# Patient Record
Sex: MALE | Race: WHITE | NOT HISPANIC OR LATINO
[De-identification: names, ages, dates, MRNs, and addresses within clinical notes are randomized per-mention and may not be internally consistent; named-entity substitution may affect disease eponyms.]

---

## 2017-01-30 PROBLEM — Z00.00 ENCOUNTER FOR PREVENTIVE HEALTH EXAMINATION: Status: ACTIVE | Noted: 2017-01-30

## 2017-02-01 ENCOUNTER — APPOINTMENT (OUTPATIENT)
Dept: INTERNAL MEDICINE | Facility: CLINIC | Age: 82
End: 2017-02-01

## 2017-02-01 DIAGNOSIS — Z71.89 OTHER SPECIFIED COUNSELING: ICD-10-CM

## 2017-02-01 DIAGNOSIS — M10.9 GOUT, UNSPECIFIED: ICD-10-CM

## 2018-12-22 ENCOUNTER — INPATIENT (INPATIENT)
Facility: HOSPITAL | Age: 83
LOS: 9 days | Discharge: NURSING, REHAB OR LONG-TERM CARE | DRG: 101 | End: 2019-01-01
Attending: FAMILY MEDICINE | Admitting: FAMILY MEDICINE
Payer: MEDICARE

## 2018-12-22 VITALS
RESPIRATION RATE: 20 BRPM | DIASTOLIC BLOOD PRESSURE: 68 MMHG | WEIGHT: 139.99 LBS | SYSTOLIC BLOOD PRESSURE: 154 MMHG | HEART RATE: 72 BPM | OXYGEN SATURATION: 96 % | HEIGHT: 72 IN | TEMPERATURE: 99 F

## 2018-12-22 DIAGNOSIS — A41.9 SEPSIS, UNSPECIFIED ORGANISM: ICD-10-CM

## 2018-12-22 DIAGNOSIS — J18.9 PNEUMONIA, UNSPECIFIED ORGANISM: ICD-10-CM

## 2018-12-22 LAB
ALBUMIN SERPL ELPH-MCNC: 3 G/DL — LOW (ref 3.3–5)
ALP SERPL-CCNC: 27 U/L — LOW (ref 30–120)
ALT FLD-CCNC: 12 U/L DA — SIGNIFICANT CHANGE UP (ref 10–60)
ANION GAP SERPL CALC-SCNC: 12 MMOL/L — SIGNIFICANT CHANGE UP (ref 5–17)
APPEARANCE UR: CLEAR — SIGNIFICANT CHANGE UP
APTT BLD: 41.8 SEC — HIGH (ref 28.5–37)
AST SERPL-CCNC: 15 U/L — SIGNIFICANT CHANGE UP (ref 10–40)
BASE EXCESS BLDV CALC-SCNC: -3.7 MMOL/L — LOW (ref -2–2)
BASOPHILS # BLD AUTO: 0.01 K/UL — SIGNIFICANT CHANGE UP (ref 0–0.2)
BASOPHILS NFR BLD AUTO: 0.1 % — SIGNIFICANT CHANGE UP (ref 0–2)
BILIRUB SERPL-MCNC: 1.1 MG/DL — SIGNIFICANT CHANGE UP (ref 0.2–1.2)
BILIRUB UR-MCNC: NEGATIVE — SIGNIFICANT CHANGE UP
BUN SERPL-MCNC: 19 MG/DL — SIGNIFICANT CHANGE UP (ref 7–23)
CALCIUM SERPL-MCNC: 9.6 MG/DL — SIGNIFICANT CHANGE UP (ref 8.4–10.5)
CHLORIDE SERPL-SCNC: 98 MMOL/L — SIGNIFICANT CHANGE UP (ref 96–108)
CO2 SERPL-SCNC: 25 MMOL/L — SIGNIFICANT CHANGE UP (ref 22–31)
COLOR SPEC: YELLOW — SIGNIFICANT CHANGE UP
CREAT SERPL-MCNC: 1.67 MG/DL — HIGH (ref 0.5–1.3)
DIFF PNL FLD: ABNORMAL
DIGOXIN SERPL-MCNC: 1 NG/ML — SIGNIFICANT CHANGE UP (ref 0.8–2)
EOSINOPHIL # BLD AUTO: 0 K/UL — SIGNIFICANT CHANGE UP (ref 0–0.5)
EOSINOPHIL NFR BLD AUTO: 0 % — SIGNIFICANT CHANGE UP (ref 0–6)
FLU A RESULT: SIGNIFICANT CHANGE UP
FLU A RESULT: SIGNIFICANT CHANGE UP
FLUAV AG NPH QL: SIGNIFICANT CHANGE UP
FLUBV AG NPH QL: SIGNIFICANT CHANGE UP
GAS PNL BLDV: SIGNIFICANT CHANGE UP
GLUCOSE SERPL-MCNC: 167 MG/DL — HIGH (ref 70–99)
GLUCOSE UR QL: NEGATIVE MG/DL — SIGNIFICANT CHANGE UP
HCO3 BLDV-SCNC: 21 MMOL/L — SIGNIFICANT CHANGE UP (ref 21–29)
HCT VFR BLD CALC: 25 % — LOW (ref 39–50)
HGB BLD-MCNC: 8.1 G/DL — LOW (ref 13–17)
HOROWITZ INDEX BLDV+IHG-RTO: 21 — SIGNIFICANT CHANGE UP
IMM GRANULOCYTES NFR BLD AUTO: 2.3 % — HIGH (ref 0–1.5)
INR BLD: 2.78 RATIO — HIGH (ref 0.88–1.16)
KETONES UR-MCNC: NEGATIVE — SIGNIFICANT CHANGE UP
LACTATE SERPL-SCNC: 1.1 MMOL/L — SIGNIFICANT CHANGE UP (ref 0.7–2)
LACTATE SERPL-SCNC: 3 MMOL/L — HIGH (ref 0.7–2)
LEUKOCYTE ESTERASE UR-ACNC: NEGATIVE — SIGNIFICANT CHANGE UP
LYMPHOCYTES # BLD AUTO: 1.01 K/UL — SIGNIFICANT CHANGE UP (ref 1–3.3)
LYMPHOCYTES # BLD AUTO: 5.3 % — LOW (ref 13–44)
MCHC RBC-ENTMCNC: 32.4 GM/DL — SIGNIFICANT CHANGE UP (ref 32–36)
MCHC RBC-ENTMCNC: 35.8 PG — HIGH (ref 27–34)
MCV RBC AUTO: 110.6 FL — HIGH (ref 80–100)
MONOCYTES # BLD AUTO: 9.63 K/UL — HIGH (ref 0–0.9)
MONOCYTES NFR BLD AUTO: 51 % — HIGH (ref 2–14)
NEUTROPHILS # BLD AUTO: 7.8 K/UL — HIGH (ref 1.8–7.4)
NEUTROPHILS NFR BLD AUTO: 41.3 % — LOW (ref 43–77)
NITRITE UR-MCNC: NEGATIVE — SIGNIFICANT CHANGE UP
NRBC # BLD: 0 /100 WBCS — SIGNIFICANT CHANGE UP (ref 0–0)
PCO2 BLDV: 28 MMHG — LOW (ref 35–50)
PH BLDV: 7.46 — HIGH (ref 7.35–7.45)
PH UR: 5 — SIGNIFICANT CHANGE UP (ref 5–8)
PLATELET # BLD AUTO: 245 K/UL — SIGNIFICANT CHANGE UP (ref 150–400)
PO2 BLDV: 44 MMHG — SIGNIFICANT CHANGE UP (ref 25–45)
POTASSIUM SERPL-MCNC: 3.6 MMOL/L — SIGNIFICANT CHANGE UP (ref 3.5–5.3)
POTASSIUM SERPL-SCNC: 3.6 MMOL/L — SIGNIFICANT CHANGE UP (ref 3.5–5.3)
PROT SERPL-MCNC: 8.1 G/DL — SIGNIFICANT CHANGE UP (ref 6–8.3)
PROT UR-MCNC: 100 MG/DL
PROTHROM AB SERPL-ACNC: 31.3 SEC — HIGH (ref 10–12.9)
RBC # BLD: 2.26 M/UL — LOW (ref 4.2–5.8)
RBC # FLD: 16.9 % — HIGH (ref 10.3–14.5)
RBC CASTS # UR COMP ASSIST: SIGNIFICANT CHANGE UP /HPF (ref 0–4)
RSV RESULT: SIGNIFICANT CHANGE UP
RSV RNA RESP QL NAA+PROBE: SIGNIFICANT CHANGE UP
SAO2 % BLDV: 74 % — SIGNIFICANT CHANGE UP (ref 67–88)
SODIUM SERPL-SCNC: 135 MMOL/L — SIGNIFICANT CHANGE UP (ref 135–145)
SP GR SPEC: 1.01 — SIGNIFICANT CHANGE UP (ref 1.01–1.02)
TROPONIN I SERPL-MCNC: 0.01 NG/ML — LOW (ref 0.02–0.06)
UROBILINOGEN FLD QL: 1 MG/DL
WBC # BLD: 18.88 K/UL — HIGH (ref 3.8–10.5)
WBC # FLD AUTO: 18.88 K/UL — HIGH (ref 3.8–10.5)

## 2018-12-22 PROCEDURE — 99285 EMERGENCY DEPT VISIT HI MDM: CPT

## 2018-12-22 PROCEDURE — 71045 X-RAY EXAM CHEST 1 VIEW: CPT | Mod: 26

## 2018-12-22 PROCEDURE — 12345: CPT | Mod: NC

## 2018-12-22 PROCEDURE — 71250 CT THORAX DX C-: CPT | Mod: 26

## 2018-12-22 PROCEDURE — 74176 CT ABD & PELVIS W/O CONTRAST: CPT | Mod: 26

## 2018-12-22 PROCEDURE — 93010 ELECTROCARDIOGRAM REPORT: CPT | Mod: 76

## 2018-12-22 PROCEDURE — 70450 CT HEAD/BRAIN W/O DYE: CPT | Mod: 26

## 2018-12-22 PROCEDURE — 99223 1ST HOSP IP/OBS HIGH 75: CPT

## 2018-12-22 RX ORDER — ASPIRIN/CALCIUM CARB/MAGNESIUM 324 MG
81 TABLET ORAL DAILY
Qty: 0 | Refills: 0 | Status: DISCONTINUED | OUTPATIENT
Start: 2018-12-22 | End: 2019-01-01

## 2018-12-22 RX ORDER — ACETAMINOPHEN 500 MG
650 TABLET ORAL EVERY 6 HOURS
Qty: 0 | Refills: 0 | Status: DISCONTINUED | OUTPATIENT
Start: 2018-12-22 | End: 2018-12-25

## 2018-12-22 RX ORDER — AZITHROMYCIN 500 MG/1
500 TABLET, FILM COATED ORAL ONCE
Qty: 0 | Refills: 0 | Status: COMPLETED | OUTPATIENT
Start: 2018-12-22 | End: 2018-12-22

## 2018-12-22 RX ORDER — ASPIRIN/CALCIUM CARB/MAGNESIUM 324 MG
325 TABLET ORAL ONCE
Qty: 0 | Refills: 0 | Status: COMPLETED | OUTPATIENT
Start: 2018-12-22 | End: 2018-12-22

## 2018-12-22 RX ORDER — SODIUM CHLORIDE 9 MG/ML
1000 INJECTION INTRAMUSCULAR; INTRAVENOUS; SUBCUTANEOUS
Qty: 0 | Refills: 0 | Status: COMPLETED | OUTPATIENT
Start: 2018-12-22 | End: 2018-12-22

## 2018-12-22 RX ORDER — ACETAMINOPHEN 500 MG
650 TABLET ORAL ONCE
Qty: 0 | Refills: 0 | Status: COMPLETED | OUTPATIENT
Start: 2018-12-22 | End: 2018-12-22

## 2018-12-22 RX ORDER — SODIUM CHLORIDE 9 MG/ML
1000 INJECTION, SOLUTION INTRAVENOUS
Qty: 0 | Refills: 0 | Status: DISCONTINUED | OUTPATIENT
Start: 2018-12-22 | End: 2018-12-23

## 2018-12-22 RX ORDER — CEFTRIAXONE 500 MG/1
1 INJECTION, POWDER, FOR SOLUTION INTRAMUSCULAR; INTRAVENOUS ONCE
Qty: 0 | Refills: 0 | Status: COMPLETED | OUTPATIENT
Start: 2018-12-22 | End: 2018-12-22

## 2018-12-22 RX ADMIN — SODIUM CHLORIDE 1000 MILLILITER(S): 9 INJECTION INTRAMUSCULAR; INTRAVENOUS; SUBCUTANEOUS at 16:22

## 2018-12-22 RX ADMIN — SODIUM CHLORIDE 1000 MILLILITER(S): 9 INJECTION INTRAMUSCULAR; INTRAVENOUS; SUBCUTANEOUS at 17:50

## 2018-12-22 RX ADMIN — CEFTRIAXONE 100 GRAM(S): 500 INJECTION, POWDER, FOR SOLUTION INTRAMUSCULAR; INTRAVENOUS at 17:00

## 2018-12-22 RX ADMIN — CEFTRIAXONE 1 GRAM(S): 500 INJECTION, POWDER, FOR SOLUTION INTRAMUSCULAR; INTRAVENOUS at 17:30

## 2018-12-22 RX ADMIN — SODIUM CHLORIDE 1000 MILLILITER(S): 9 INJECTION INTRAMUSCULAR; INTRAVENOUS; SUBCUTANEOUS at 15:30

## 2018-12-22 RX ADMIN — Medication 650 MILLIGRAM(S): at 18:31

## 2018-12-22 RX ADMIN — AZITHROMYCIN 500 MILLIGRAM(S): 500 TABLET, FILM COATED ORAL at 18:44

## 2018-12-22 RX ADMIN — Medication 650 MILLIGRAM(S): at 18:30

## 2018-12-22 RX ADMIN — SODIUM CHLORIDE 50 MILLILITER(S): 9 INJECTION, SOLUTION INTRAVENOUS at 21:48

## 2018-12-22 RX ADMIN — Medication 325 MILLIGRAM(S): at 23:23

## 2018-12-22 RX ADMIN — SODIUM CHLORIDE 1000 MILLILITER(S): 9 INJECTION INTRAMUSCULAR; INTRAVENOUS; SUBCUTANEOUS at 17:03

## 2018-12-22 RX ADMIN — AZITHROMYCIN 255 MILLIGRAM(S): 500 TABLET, FILM COATED ORAL at 17:30

## 2018-12-22 RX ADMIN — SODIUM CHLORIDE 1000 MILLILITER(S): 9 INJECTION INTRAMUSCULAR; INTRAVENOUS; SUBCUTANEOUS at 18:04

## 2018-12-22 NOTE — H&P ADULT - NSHPREVIEWOFSYSTEMS_GEN_ALL_CORE
-    CONSTITUTIONAL: No fever, weight loss, or fatigue.  EYES: No eye pain, visual disturbances, or discharge.  ENMT:  No difficulty hearing, tinnitus, vertigo; No sinus or throat pain.  NECK: No pain or stiffness.  RESPIRATORY: (+) occasional cough, no wheezing, or hemoptysis; No shortness of breath.  CARDIOVASCULAR: No chest pain, palpitations, dizziness, or leg swelling.  GASTROINTESTINAL: No abdominal pain, no nausea, vomiting, or hematemesis; No diarrhea or Change in bowel habits. No melena or hematochezia.  GENITOURINARY: No dysuria, frequency, hematuria, or incontinence.  NEUROLOGICAL: No headaches, (+) focal muscle weakness, denies numbness, or tremors.  SKIN: No itching, burning or rashes.  MUSCULOSKELETAL: No joint swelling or pain.  PSYCHIATRIC: No depression, anxiety, or agitation.  HEME/LYMPH: No easy bruising, bleeding gums, or nose bleed.  ALLERGY AND IMMUNOLOGIC: No hives or eczema.

## 2018-12-22 NOTE — ED PROVIDER NOTE - CONSTITUTIONAL, MLM
normal... Well appearing, well nourished, awake, alert, oriented to person, place, time/situation and in no apparent distress. low grade fever noted.

## 2018-12-22 NOTE — H&P ADULT - ASSESSMENT
84 y/o M with PMH of HTN, A. Fib on Xarelto, Stomach CA, GERD, and Gout presented with generalized weakness & repeated falls, also had seizures at the ED.

## 2018-12-22 NOTE — H&P ADULT - HISTORY OF PRESENT ILLNESS
This is an 84 y/o M with PMH of HTN, A. Fib on Xarelto, Stomach CA, GERD, and Gout who presented with generalized weakness with repeated falls. Patiet states that he fell while was in [place] a week ago when his left leg buckled under him, didn't see a doctor as he was coming back to [place] next day, but over the past week he fell several times & was feeling week, and moves with difficulty. No fever or chills at home, no flu-like symptoms, and no sick contacts. At the ED, patient had a siezure episode for about 30 seconds that involved his left side of the body. Never had any seizure episodes in the past.

## 2018-12-22 NOTE — H&P ADULT - PROBLEM SELECTOR PLAN 7
with CVR, will continue Digoxin & Metoprolol (Betaxolol is non formulary) with hold parameters in addition to Xarelto.

## 2018-12-22 NOTE — H&P ADULT - PROBLEM SELECTOR PLAN 10
IMPROVE VTE Individual Risk Assessment          RISK                                                          Points    [  ] Previous VTE                                                3  [  ] Thrombophilia                                             2  [ x ] Lower limb paralysis                                    2        (unable to hold up >15 seconds)    [  ] Current Cancer                                             2         (within 6 months)  [ x ] Immobilization > 24 hrs                              1  [  ] ICU/CCU stay > 24 hours                            1  [ x ] Age > 60                                                    1    IMPROVE VTE Score 4.    **IMPROVE score of 4 in addition to the other risk factors not included in this scoring system, on Xarelto for chronic A. Fib, will continue, no need for further DVT prophylaxis.

## 2018-12-22 NOTE — H&P ADULT - NSHPLABSRESULTS_GEN_ALL_CORE
-        Lactate Trend   @ 17:45 Lactate:1.1    @ 15:41 Lactate:3.0                           8.1    18.88 )-----------( 245      ( 22 Dec 2018 15:41 )             25.0                135  |  98  |  19  ----------------------------<  167<H>  3.6   |  25  |  1.67<H>    Ca    9.6      22 Dec 2018 15:41    TPro  8.1  /  Alb  3.0<L>  /  TBili  1.1  /  DBili  x   /  AST  15  /  ALT  12  /  AlkPhos  27<L>            Urinalysis Basic - ( 22 Dec 2018 15:59 )    Color: Yellow / Appearance: Clear / S.015 / pH: x  Gluc: x / Ketone: Negative  / Bili: Negative / Urobili: 1 mg/dL   Blood: x / Protein: 100 mg/dL / Nitrite: Negative   Leuk Esterase: Negative / RBC: 0-2 /HPF / WBC x   Sq Epi: x / Non Sq Epi: x / Bacteria: x      PT/INR - ( 22 Dec 2018 15:41 )   PT: 31.3 sec;   INR: 2.78 ratio         PTT - ( 22 Dec 2018 15:41 )  PTT:41.8 sec  CARDIAC MARKERS ( 22 Dec 2018 15:41 )  .008 ng/mL / x     / x     / x     / x          CAPILLARY BLOOD GLUCOSE      POCT Blood Glucose.: 150 mg/dL (23 Dec 2018 02:04)         CT CHEST                   1. Small bilateral pleural effusions right greater than left. Compressive   atelectasis is noted on the right side in some dependent atelectasis left   side.   No pneumothorax.   2. There is cardiomegaly with coronary artery and valvular calcification   without pericardial effusion.   3. No evidence of mediastinal adenopathy by size criteria or fluid   collections   in the mediastinum.            CT ABDOMEN & PELVIS:  Pending official report.          CT BRAIN                No acute intracranial hemorrhage, mass effect, or shift of   the midline structures.  Age indeterminate lacunar infarct within the posterior limb of the right   internal capsule.          XR CHEST AP OR PA 1V     Cardiomegaly. Clear lungs. No effusion.  IMPRESSION: Cardiomegaly with clear lungs    Image reviewed by me.        EKG:    As per my review shows A. Fib with CVR at 70/min, LAD (- 60),  SANDY, complete RBBB with 2ry repolarization abnormality, borderline QTc intervals, normal QRS voltage, with early transition.      -

## 2018-12-22 NOTE — H&P ADULT - PROBLEM SELECTOR PLAN 1
of unknown onset, possibly started a week ago when patient had a fall in [place] with his left leg buckled under him for unclear reason, had witnessed seizure of left side at the ED, CT showed age indeterminate lacunar infarct of posterior limb of the right internal capsule, admitted to telemetry on stroke protocol, neuro checks, started on diet as he passed dysphagia screening, fasting lipid profile, HbA1c, and TTE in am, neurology consult with Dr. Ferraro was called.

## 2018-12-22 NOTE — H&P ADULT - NSHPPHYSICALEXAM_GEN_ALL_CORE
-    Vital Signs Last 24 Hrs  T(C): 37.1 (23 Dec 2018 02:05), Max: 38.7 (22 Dec 2018 18:00)  T(F): 98.7 (23 Dec 2018 02:05), Max: 101.7 (22 Dec 2018 18:00)  HR: 74 (23 Dec 2018 02:10) (69 - 93)  BP: 149/78 (23 Dec 2018 02:10) (124/57 - 163/62)  BP(mean): --  RR: 20 (23 Dec 2018 02:10) (16 - 24)  SpO2: 100% (23 Dec 2018 02:10) (93% - 100%)          PHYSICAL EXAM:    GENERAL: NAD, well-groomed, well-developed.  HEAD:  Atraumatic, Norm cephalic.  EYES: PERRLA, conjunctiva clear.  ENMT: no nasal discharge, no yas-pharyngeal erythema or exudates, MMM.   NECK: Supple, No JVD.  NERVOUS SYSTEM:  Alert & oriented X3, right sided mouth deviation, left upper ext motor power 2/5, left lower ext 3/5, on the right side 5/5 both upper & lower ext. B/L (+) extensor plantar response, passed bedside dysphagia screening by me.  CHEST/LUNG: Fair air entry B/L, no rales, rhonchi, or wheezing.  HEART: Variable S1 & acc S2, grade 2/6 PAVITHRA over the apex and cardiac base, no extra sounds.  ABDOMEN: Soft, non-tender, non-distended; bowel sounds present, no palpable masses or organomegaly.  EXTREMITIES:  No clubbing, cyanosis, or edema.  VASCULAR: 2+ radial, carotid pulses B/L, no carotid bruits.  SKIN: No rashes or lesions.  PSYCH: normal affect & behavior.

## 2018-12-22 NOTE — ED ADULT NURSE NOTE - OBJECTIVE STATEMENT
Pt presents with complaint of multiple falls in past week. states "my legs are giving out". Pt presents with complaint of multiple falls in past week. states "my legs are giving out". Moving all extremities well. Blanchable erythema noted on sacrum

## 2018-12-22 NOTE — CONSULT NOTE ADULT - SUBJECTIVE AND OBJECTIVE BOX
Date/Time Patient Seen:  		  Referring MD:   Data Reviewed	       Patient is a 83y old  Male who presents with a chief complaint of     Subjective/HPI    in bed  seen and examined  vs and meds reviewed  ER provider note reviewed    labs and imaging reviewed and discussed with pt and family    ED Provider Note [Charted Location: [facility]] [Authored: 22-Dec-2018 15:10]- for Visit: [id number], Complete, Revised, Signed in Full, General    HISTORY OF PRESENT ILLNESS:    High Risk Travel:  International Travel? No(1).    · Chief Complaint: The patient is a 83y Male complaining of fall.  · HPI Objective Statement: 84 y/o M pt with hx of atrial fibrillation treated with blood thinners, gout, and HTN BIBA presents to the ED c/o generalized weakness and frequent falls over the past week. Pt states that he was in [place] when he had his first fall 1 week ago, but did not see a doctor at the time as he was flying back to [place] the next day. Pt states that over the past week he has been unable to ambulate due to weakness in b/l legs. Associated with cough. He has received his flu vaccine this season. Denies sick contact, SOB, chest pain, fever, chills, diarrhea, numbness in extremities, head injury, or LOC. No other complaints at this time.     	Dr. Lois Fierro- pmd  · Presenting Symptoms: generalized weakness and frequent falls, cough  · Negative Findings: no fever, no loss of consciousness, no numbness, no diarrhea, chills, SOB, chest pain, head injury  · Timing: gradual onset, frequent  · Duration: week(s)  · Severity: MODERATE  · Context: unknown  · Aggravated Factors: none  · Relieving Factors: none       PAST MEDICAL & SURGICAL HISTORY:  Stomach cancer  Hypertension  Atrial fibrillation  Gout  No significant past surgical history        Medication list         MEDICATIONS  (STANDING):  lactated ringers. 1000 milliLiter(s) (50 mL/Hr) IV Continuous <Continuous>    MEDICATIONS  (PRN):         Vitals log        ICU Vital Signs Last 24 Hrs  T(C): 38.7 (22 Dec 2018 18:00), Max: 38.7 (22 Dec 2018 18:00)  T(F): 101.7 (22 Dec 2018 18:00), Max: 101.7 (22 Dec 2018 18:00)  HR: 70 (22 Dec 2018 18:21) (70 - 74)  BP: 150/54 (22 Dec 2018 18:21) (150/54 - 163/62)  BP(mean): --  ABP: --  ABP(mean): --  RR: 24 (22 Dec 2018 18:21) (17 - 24)  SpO2: 97% (22 Dec 2018 18:21) (96% - 99%)     non smoker  non drinker  retired publishing exec          Input and Output:  I&O's Detail      Lab Data                        8.1    18.88 )-----------( 245      ( 22 Dec 2018 15:41 )             25.0     12-22    135  |  98  |  19  ----------------------------<  167<H>  3.6   |  25  |  1.67<H>    Ca    9.6      22 Dec 2018 15:41    TPro  8.1  /  Alb  3.0<L>  /  TBili  1.1  /  DBili  x   /  AST  15  /  ALT  12  /  AlkPhos  27<L>  12-22      CARDIAC MARKERS ( 22 Dec 2018 15:41 )  .008 ng/mL / x     / x     / x     / x            Review of Systems	      Objective     Physical Examination    heart s1s2  lung dec VS  abd soft  extr no gross edema      Pertinent Lab findings & Imaging      Jonathan:  NO   Adequate UO     I&O's Detail           Discussed with:     Cultures:	        Radiology      cxr  clear

## 2018-12-22 NOTE — H&P ADULT - PROBLEM SELECTOR PLAN 2
Possibly related to the above, no history of seizure episodes in the past, seizure & aspiration precautions, will monitor, PRN Ativan, neurology consult as stated above.

## 2018-12-22 NOTE — H&P ADULT - PROBLEM SELECTOR PLAN 3
of unclear cause, received IVF bolus as per sepsis protocol by ED team, and was started on empirical antibiotic therapy, will get TTE in am, also VALENTE & ESR given the monocytosis, trend TLC, and follow culture results, ID consult with Dr. Florez was called.

## 2018-12-22 NOTE — H&P ADULT - PROBLEM SELECTOR PLAN 6
ML acute on CKD, no available baseline at this time, will recheck after overnight IVF hydration, avoid nephrotoxins.

## 2018-12-22 NOTE — ED PROVIDER NOTE - OBJECTIVE STATEMENT
82 y/o M pt with hx of atrial fibrillation treated with blood thinners, gout, and HTN BIBA presents to the ED c/o generalized weakness and frequent falls over the past week. Pt states that he was in [place] when he had his first fall 1 week ago, but did not see a doctor at the time as he was flying back to [place] the next day. Pt states that over the past week he has been unable to ambulate due to weakness in b/l legs. Associated with cough. He has received his flu vaccine this season. Denies sick contact, SOB, chest pain, fever, chills, diarrhea, numbness in extremities, head injury, or LOC. No other complaints at this time.     Dr. Lois Fierro- carmina

## 2018-12-23 DIAGNOSIS — I35.0 NONRHEUMATIC AORTIC (VALVE) STENOSIS: ICD-10-CM

## 2018-12-23 DIAGNOSIS — I48.91 UNSPECIFIED ATRIAL FIBRILLATION: ICD-10-CM

## 2018-12-23 DIAGNOSIS — D72.821 MONOCYTOSIS (SYMPTOMATIC): ICD-10-CM

## 2018-12-23 DIAGNOSIS — F10.10 ALCOHOL ABUSE, UNCOMPLICATED: ICD-10-CM

## 2018-12-23 DIAGNOSIS — I63.9 CEREBRAL INFARCTION, UNSPECIFIED: ICD-10-CM

## 2018-12-23 DIAGNOSIS — R41.82 ALTERED MENTAL STATUS, UNSPECIFIED: ICD-10-CM

## 2018-12-23 DIAGNOSIS — I10 ESSENTIAL (PRIMARY) HYPERTENSION: ICD-10-CM

## 2018-12-23 DIAGNOSIS — R56.9 UNSPECIFIED CONVULSIONS: ICD-10-CM

## 2018-12-23 DIAGNOSIS — N28.9 DISORDER OF KIDNEY AND URETER, UNSPECIFIED: ICD-10-CM

## 2018-12-23 DIAGNOSIS — M10.9 GOUT, UNSPECIFIED: ICD-10-CM

## 2018-12-23 DIAGNOSIS — Z29.9 ENCOUNTER FOR PROPHYLACTIC MEASURES, UNSPECIFIED: ICD-10-CM

## 2018-12-23 DIAGNOSIS — R65.10 SYSTEMIC INFLAMMATORY RESPONSE SYNDROME (SIRS) OF NON-INFECTIOUS ORIGIN WITHOUT ACUTE ORGAN DYSFUNCTION: ICD-10-CM

## 2018-12-23 DIAGNOSIS — D53.9 NUTRITIONAL ANEMIA, UNSPECIFIED: ICD-10-CM

## 2018-12-23 LAB
ALBUMIN SERPL ELPH-MCNC: 2.6 G/DL — LOW (ref 3.3–5)
ALP SERPL-CCNC: 24 U/L — LOW (ref 30–120)
ALT FLD-CCNC: 13 U/L DA — SIGNIFICANT CHANGE UP (ref 10–60)
ANION GAP SERPL CALC-SCNC: 12 MMOL/L — SIGNIFICANT CHANGE UP (ref 5–17)
ANION GAP SERPL CALC-SCNC: 14 MMOL/L — SIGNIFICANT CHANGE UP (ref 5–17)
AST SERPL-CCNC: 13 U/L — SIGNIFICANT CHANGE UP (ref 10–40)
BASOPHILS # BLD AUTO: 0.01 K/UL — SIGNIFICANT CHANGE UP (ref 0–0.2)
BASOPHILS NFR BLD AUTO: 0.1 % — SIGNIFICANT CHANGE UP (ref 0–2)
BILIRUB DIRECT SERPL-MCNC: 0.3 MG/DL — HIGH (ref 0–0.2)
BILIRUB INDIRECT FLD-MCNC: 0.5 MG/DL — SIGNIFICANT CHANGE UP (ref 0.2–1)
BILIRUB SERPL-MCNC: 0.8 MG/DL — SIGNIFICANT CHANGE UP (ref 0.2–1.2)
BUN SERPL-MCNC: 17 MG/DL — SIGNIFICANT CHANGE UP (ref 7–23)
BUN SERPL-MCNC: 17 MG/DL — SIGNIFICANT CHANGE UP (ref 7–23)
CALCIUM SERPL-MCNC: 9.1 MG/DL — SIGNIFICANT CHANGE UP (ref 8.4–10.5)
CALCIUM SERPL-MCNC: 9.2 MG/DL — SIGNIFICANT CHANGE UP (ref 8.4–10.5)
CHLORIDE SERPL-SCNC: 101 MMOL/L — SIGNIFICANT CHANGE UP (ref 96–108)
CHLORIDE SERPL-SCNC: 103 MMOL/L — SIGNIFICANT CHANGE UP (ref 96–108)
CHOLEST SERPL-MCNC: 95 MG/DL — SIGNIFICANT CHANGE UP (ref 10–199)
CO2 SERPL-SCNC: 23 MMOL/L — SIGNIFICANT CHANGE UP (ref 22–31)
CO2 SERPL-SCNC: 24 MMOL/L — SIGNIFICANT CHANGE UP (ref 22–31)
CREAT SERPL-MCNC: 1.51 MG/DL — HIGH (ref 0.5–1.3)
CREAT SERPL-MCNC: 1.56 MG/DL — HIGH (ref 0.5–1.3)
CRP SERPL-MCNC: 15.61 MG/DL — HIGH (ref 0–0.4)
CULTURE RESULTS: SIGNIFICANT CHANGE UP
EOSINOPHIL # BLD AUTO: 0.01 K/UL — SIGNIFICANT CHANGE UP (ref 0–0.5)
EOSINOPHIL NFR BLD AUTO: 0.1 % — SIGNIFICANT CHANGE UP (ref 0–6)
ERYTHROCYTE [SEDIMENTATION RATE] IN BLOOD: 140 MM/HR — HIGH (ref 0–9)
FERRITIN SERPL-MCNC: 555 NG/ML — HIGH (ref 30–400)
FOLATE RBC-MCNC: 1185 NG/ML — SIGNIFICANT CHANGE UP (ref 499–1504)
GLUCOSE SERPL-MCNC: 162 MG/DL — HIGH (ref 70–99)
GLUCOSE SERPL-MCNC: 191 MG/DL — HIGH (ref 70–99)
HBA1C BLD-MCNC: 6.3 % — HIGH (ref 4–5.6)
HCT VFR BLD CALC: 23.2 % — LOW (ref 39–50)
HCT VFR BLD CALC: 25.3 % — LOW (ref 39–50)
HDLC SERPL-MCNC: 26 MG/DL — LOW
HGB BLD-MCNC: 7.4 G/DL — LOW (ref 13–17)
IMM GRANULOCYTES NFR BLD AUTO: 1.9 % — HIGH (ref 0–1.5)
IRON SATN MFR SERPL: 17 UG/DL — LOW (ref 45–165)
IRON SATN MFR SERPL: 7 % — LOW (ref 16–55)
LDH SERPL L TO P-CCNC: 304 U/L — HIGH (ref 50–242)
LIPID PNL WITH DIRECT LDL SERPL: 55 MG/DL — SIGNIFICANT CHANGE UP
LYMPHOCYTES # BLD AUTO: 1.07 K/UL — SIGNIFICANT CHANGE UP (ref 1–3.3)
LYMPHOCYTES # BLD AUTO: 6.3 % — LOW (ref 13–44)
MAGNESIUM SERPL-MCNC: 1.1 MG/DL — LOW (ref 1.6–2.6)
MAGNESIUM SERPL-MCNC: 1.5 MG/DL — LOW (ref 1.6–2.6)
MCHC RBC-ENTMCNC: 31.9 GM/DL — LOW (ref 32–36)
MCHC RBC-ENTMCNC: 35.9 PG — HIGH (ref 27–34)
MCV RBC AUTO: 112.6 FL — HIGH (ref 80–100)
MONOCYTES # BLD AUTO: 8.39 K/UL — HIGH (ref 0–0.9)
MONOCYTES NFR BLD AUTO: 49 % — HIGH (ref 2–14)
NEUTROPHILS # BLD AUTO: 7.31 K/UL — SIGNIFICANT CHANGE UP (ref 1.8–7.4)
NEUTROPHILS NFR BLD AUTO: 42.6 % — LOW (ref 43–77)
NT-PROBNP SERPL-SCNC: 6580 PG/ML — HIGH (ref 0–450)
PHOSPHATE SERPL-MCNC: 3.1 MG/DL — SIGNIFICANT CHANGE UP (ref 2.5–4.5)
PHOSPHATE SERPL-MCNC: 3.1 MG/DL — SIGNIFICANT CHANGE UP (ref 2.5–4.5)
PLATELET # BLD AUTO: 229 K/UL — SIGNIFICANT CHANGE UP (ref 150–400)
POTASSIUM SERPL-MCNC: 3.3 MMOL/L — LOW (ref 3.5–5.3)
POTASSIUM SERPL-MCNC: 3.4 MMOL/L — LOW (ref 3.5–5.3)
POTASSIUM SERPL-SCNC: 3.3 MMOL/L — LOW (ref 3.5–5.3)
POTASSIUM SERPL-SCNC: 3.4 MMOL/L — LOW (ref 3.5–5.3)
PROCALCITONIN SERPL-MCNC: 0.28 NG/ML — HIGH (ref 0.02–0.1)
PROLACTIN SERPL-MCNC: 23.6 NG/ML — HIGH (ref 4.1–18.4)
PROT SERPL-MCNC: 7.6 G/DL — SIGNIFICANT CHANGE UP (ref 6–8.3)
RBC # BLD: 2.06 M/UL — LOW (ref 4.2–5.8)
RBC # FLD: 17 % — HIGH (ref 10.3–14.5)
SODIUM SERPL-SCNC: 138 MMOL/L — SIGNIFICANT CHANGE UP (ref 135–145)
SODIUM SERPL-SCNC: 139 MMOL/L — SIGNIFICANT CHANGE UP (ref 135–145)
SPECIMEN SOURCE: SIGNIFICANT CHANGE UP
T3 SERPL-MCNC: 72 NG/DL — LOW (ref 80–200)
T4 AB SER-ACNC: 6.9 UG/DL — SIGNIFICANT CHANGE UP (ref 4.6–12)
TIBC SERPL-MCNC: 228 UG/DL — SIGNIFICANT CHANGE UP (ref 220–430)
TOTAL CHOLESTEROL/HDL RATIO MEASUREMENT: 3.7 RATIO — SIGNIFICANT CHANGE UP (ref 3.4–9.6)
TRIGL SERPL-MCNC: 69 MG/DL — SIGNIFICANT CHANGE UP (ref 10–149)
TSH SERPL-MCNC: 2.06 UIU/ML — SIGNIFICANT CHANGE UP (ref 0.27–4.2)
UIBC SERPL-MCNC: 211 UG/DL — SIGNIFICANT CHANGE UP (ref 110–370)
VIT B12 SERPL-MCNC: 587 PG/ML — SIGNIFICANT CHANGE UP (ref 232–1245)
WBC # BLD: 17.11 K/UL — HIGH (ref 3.8–10.5)
WBC # FLD AUTO: 17.11 K/UL — HIGH (ref 3.8–10.5)

## 2018-12-23 PROCEDURE — 99233 SBSQ HOSP IP/OBS HIGH 50: CPT

## 2018-12-23 PROCEDURE — 93306 TTE W/DOPPLER COMPLETE: CPT | Mod: 26

## 2018-12-23 PROCEDURE — 99223 1ST HOSP IP/OBS HIGH 75: CPT | Mod: 25

## 2018-12-23 PROCEDURE — 73562 X-RAY EXAM OF KNEE 3: CPT | Mod: 26,50

## 2018-12-23 RX ORDER — CEFTRIAXONE 500 MG/1
INJECTION, POWDER, FOR SOLUTION INTRAMUSCULAR; INTRAVENOUS
Qty: 0 | Refills: 0 | Status: DISCONTINUED | OUTPATIENT
Start: 2018-12-23 | End: 2018-12-24

## 2018-12-23 RX ORDER — RIVAROXABAN 15 MG-20MG
10 KIT ORAL EVERY 24 HOURS
Qty: 0 | Refills: 0 | Status: DISCONTINUED | OUTPATIENT
Start: 2018-12-23 | End: 2018-12-24

## 2018-12-23 RX ORDER — AZITHROMYCIN 500 MG/1
500 TABLET, FILM COATED ORAL DAILY
Qty: 0 | Refills: 0 | Status: DISCONTINUED | OUTPATIENT
Start: 2018-12-23 | End: 2018-12-24

## 2018-12-23 RX ORDER — FOLIC ACID 0.8 MG
1 TABLET ORAL DAILY
Qty: 0 | Refills: 0 | Status: DISCONTINUED | OUTPATIENT
Start: 2018-12-23 | End: 2019-01-01

## 2018-12-23 RX ORDER — POTASSIUM CHLORIDE 20 MEQ
20 PACKET (EA) ORAL
Qty: 0 | Refills: 0 | Status: COMPLETED | OUTPATIENT
Start: 2018-12-23 | End: 2018-12-23

## 2018-12-23 RX ORDER — COLCHICINE 0.6 MG
0.6 TABLET ORAL DAILY
Qty: 0 | Refills: 0 | Status: DISCONTINUED | OUTPATIENT
Start: 2018-12-23 | End: 2018-12-27

## 2018-12-23 RX ORDER — AZITHROMYCIN 500 MG/1
500 TABLET, FILM COATED ORAL DAILY
Qty: 0 | Refills: 0 | Status: DISCONTINUED | OUTPATIENT
Start: 2018-12-23 | End: 2018-12-23

## 2018-12-23 RX ORDER — LEVETIRACETAM 250 MG/1
500 TABLET, FILM COATED ORAL EVERY 12 HOURS
Qty: 0 | Refills: 0 | Status: DISCONTINUED | OUTPATIENT
Start: 2018-12-23 | End: 2018-12-28

## 2018-12-23 RX ORDER — ALLOPURINOL 300 MG
100 TABLET ORAL DAILY
Qty: 0 | Refills: 0 | Status: DISCONTINUED | OUTPATIENT
Start: 2018-12-23 | End: 2019-01-01

## 2018-12-23 RX ORDER — METOPROLOL TARTRATE 50 MG
25 TABLET ORAL DAILY
Qty: 0 | Refills: 0 | Status: DISCONTINUED | OUTPATIENT
Start: 2018-12-23 | End: 2018-12-29

## 2018-12-23 RX ORDER — CEFTRIAXONE 500 MG/1
1 INJECTION, POWDER, FOR SOLUTION INTRAMUSCULAR; INTRAVENOUS ONCE
Qty: 0 | Refills: 0 | Status: COMPLETED | OUTPATIENT
Start: 2018-12-23 | End: 2018-12-23

## 2018-12-23 RX ORDER — CEFTRIAXONE 500 MG/1
1 INJECTION, POWDER, FOR SOLUTION INTRAMUSCULAR; INTRAVENOUS EVERY 24 HOURS
Qty: 0 | Refills: 0 | Status: DISCONTINUED | OUTPATIENT
Start: 2018-12-24 | End: 2018-12-24

## 2018-12-23 RX ORDER — LEVETIRACETAM 250 MG/1
500 TABLET, FILM COATED ORAL EVERY 12 HOURS
Qty: 0 | Refills: 0 | Status: DISCONTINUED | OUTPATIENT
Start: 2018-12-23 | End: 2018-12-23

## 2018-12-23 RX ORDER — PANTOPRAZOLE SODIUM 20 MG/1
40 TABLET, DELAYED RELEASE ORAL
Qty: 0 | Refills: 0 | Status: DISCONTINUED | OUTPATIENT
Start: 2018-12-23 | End: 2018-12-23

## 2018-12-23 RX ORDER — MAGNESIUM SULFATE 500 MG/ML
2 VIAL (ML) INJECTION ONCE
Qty: 0 | Refills: 0 | Status: COMPLETED | OUTPATIENT
Start: 2018-12-23 | End: 2018-12-23

## 2018-12-23 RX ORDER — DIGOXIN 250 MCG
0.12 TABLET ORAL DAILY
Qty: 0 | Refills: 0 | Status: DISCONTINUED | OUTPATIENT
Start: 2018-12-23 | End: 2018-12-25

## 2018-12-23 RX ORDER — AMLODIPINE BESYLATE 2.5 MG/1
10 TABLET ORAL DAILY
Qty: 0 | Refills: 0 | Status: DISCONTINUED | OUTPATIENT
Start: 2018-12-23 | End: 2019-01-01

## 2018-12-23 RX ORDER — THIAMINE MONONITRATE (VIT B1) 100 MG
100 TABLET ORAL DAILY
Qty: 0 | Refills: 0 | Status: DISCONTINUED | OUTPATIENT
Start: 2018-12-23 | End: 2018-12-24

## 2018-12-23 RX ORDER — HYDROCHLOROTHIAZIDE 25 MG
25 TABLET ORAL DAILY
Qty: 0 | Refills: 0 | Status: DISCONTINUED | OUTPATIENT
Start: 2018-12-23 | End: 2018-12-24

## 2018-12-23 RX ADMIN — Medication 1 MILLIGRAM(S): at 21:38

## 2018-12-23 RX ADMIN — LEVETIRACETAM 400 MILLIGRAM(S): 250 TABLET, FILM COATED ORAL at 17:40

## 2018-12-23 RX ADMIN — RIVAROXABAN 10 MILLIGRAM(S): KIT at 17:41

## 2018-12-23 RX ADMIN — Medication 1 MILLIGRAM(S): at 12:30

## 2018-12-23 RX ADMIN — CEFTRIAXONE 100 GRAM(S): 500 INJECTION, POWDER, FOR SOLUTION INTRAMUSCULAR; INTRAVENOUS at 04:52

## 2018-12-23 RX ADMIN — AZITHROMYCIN 500 MILLIGRAM(S): 500 TABLET, FILM COATED ORAL at 07:00

## 2018-12-23 RX ADMIN — AMLODIPINE BESYLATE 10 MILLIGRAM(S): 2.5 TABLET ORAL at 06:59

## 2018-12-23 RX ADMIN — Medication 0.6 MILLIGRAM(S): at 17:41

## 2018-12-23 RX ADMIN — Medication 1 TABLET(S): at 12:30

## 2018-12-23 RX ADMIN — Medication 100 MILLIGRAM(S): at 12:30

## 2018-12-23 RX ADMIN — Medication 1 MILLIGRAM(S): at 11:02

## 2018-12-23 RX ADMIN — Medication 25 MILLIGRAM(S): at 07:00

## 2018-12-23 RX ADMIN — Medication 650 MILLIGRAM(S): at 11:00

## 2018-12-23 RX ADMIN — Medication 20 MILLIEQUIVALENT(S): at 12:30

## 2018-12-23 RX ADMIN — Medication 81 MILLIGRAM(S): at 12:30

## 2018-12-23 RX ADMIN — SODIUM CHLORIDE 50 MILLILITER(S): 9 INJECTION, SOLUTION INTRAVENOUS at 04:13

## 2018-12-23 RX ADMIN — Medication 1 MILLIGRAM(S): at 13:07

## 2018-12-23 RX ADMIN — Medication 50 GRAM(S): at 09:27

## 2018-12-23 RX ADMIN — Medication 0.12 MILLIGRAM(S): at 07:00

## 2018-12-23 RX ADMIN — Medication 1 MILLIGRAM(S): at 10:03

## 2018-12-23 RX ADMIN — LEVETIRACETAM 400 MILLIGRAM(S): 250 TABLET, FILM COATED ORAL at 04:13

## 2018-12-23 RX ADMIN — Medication 0.6 MILLIGRAM(S): at 06:59

## 2018-12-23 RX ADMIN — Medication 100 MILLIGRAM(S): at 12:31

## 2018-12-23 RX ADMIN — Medication 650 MILLIGRAM(S): at 09:59

## 2018-12-23 RX ADMIN — Medication 20 MILLIEQUIVALENT(S): at 09:27

## 2018-12-23 NOTE — PROVIDER CONTACT NOTE (EICU) - ASSESSMENT
upon video conference patient was alert and oriented. He was able to give a description of the events this evening stating "my left side was shaking a lot. then I could move my legs much, but that's been my problem my legs are not as strong as they were in the past." Stated he does not use home O2 and otherwise feels "tired".     I reviewed the labs, radiology, medications, and EMR documents we have access to. It does seem that Mr. Garrison arrived with multiorgan dysfunction, presumably from sepsis (fever, leukocytosis), with some liver dysfunction (pt, aptt, inr elevated- rivaroxaban should not do this), YUKI (presumed new), and hyperlactemia (resolved). CT chest and head noted, with CT abd/pelvis without prelim read- my assessment polycystic kidneys, contracted gall bladder v wall thickening and surround fluid (pt without biomarkers of gall bladder disease nor exam findings of such).

## 2018-12-23 NOTE — CONSULT NOTE ADULT - ASSESSMENT
84 y/o M with PMH of HTN, A. Fib on Xarelto, Stomach CA, GERD, and Gout who presented with generalized weakness with repeated falls. Has history of ETOH and occasional hemorrhoidal bleeding.  Lab workup notable for macrocytic anemia and leukocytosis with diff suggestive of monocytosis    Macrocytic anemia most likely related to ETOH but may have underlying myelodysplasia/myeloproliferative disease with monocytosis  Hgb has decreased since admission that maybe related to hydration but need to rule out bleeding    Recommendations  1.  check fe studies, B12, folate and thyroid studies  2.  check stool occult blood  3.  will hold transfusion and observe  4.  follow CBC  5.  will do peripheral blood flow cytometry as outpatient  5.  further hem onc recommendations pending above  discussed with patient and Dr. Mcdonnell

## 2018-12-23 NOTE — PROVIDER CONTACT NOTE (EICU) - RECOMMENDATIONS
- I restarted antibiotics (ceftriaxone and azithromycin- last dose ~1650 12.22.18) as documented fevers, malaise, hyperlactemia, and leukocytosis.   - stopped fluids, lactate resolved, tolerating PO; hopes to avoid iatrogenic volume overload  - Unclear if this was a seizure or rigors. documentation of generalized tonic clonic for 40 seconds differs from patients' description and I would not expect a generalized tonic clonic to have no post ictal phase. while seizure is possible, maybe patient had stroke in [place]- could account for leg weakness- which could be nidus with reduced seizure threshold in setting of sepsis; Pt has been placed on levetiracetam, I would have a low threshold for stopping this medication and observing off of it (reduce side effects of new medication in 83 year old); obviously if 2nd seizure then would start.   - several home medications have not bee restarted- digoxin, amlodipine, and rivaroxaban: I would suggest restarting these when possible, [id number]= 4-5 (unclear if h/o TIA; aortic calcifications on CT);  HASBLED=2  - Will require MRI of brain

## 2018-12-23 NOTE — PROVIDER CONTACT NOTE (EICU) - BACKGROUND
admitted for generalized weakness and falls; resides in [place]. PMH of atrial fibrillation and HTN.     Pt on medical floors had generalized tonic/clonic activity

## 2018-12-23 NOTE — CONSULT NOTE ADULT - SUBJECTIVE AND OBJECTIVE BOX
[facility]  Mickey Constantino PA-C  [address] [place] [address]  [phone]      Chief Complaint:  Patient is a 83y old  Male who presents with a chief complaint of Generalized weakness. (23 Dec 2018 12:34)      HPI:    Allergies:  No Known Allergies      Medications:  acetaminophen   Tablet .. 650 milliGRAM(s) Oral every 6 hours PRN  allopurinol 100 milliGRAM(s) Oral daily  aluminum hydroxide/magnesium hydroxide/simethicone Suspension 30 milliLiter(s) Oral every 6 hours PRN  amLODIPine   Tablet 10 milliGRAM(s) Oral daily  aspirin enteric coated 81 milliGRAM(s) Oral daily  azithromycin   Tablet 500 milliGRAM(s) Oral daily  bisacodyl 5 milliGRAM(s) Oral every 12 hours PRN  cefTRIAXone   IVPB      colchicine 0.6 milliGRAM(s) Oral two times a day  digoxin     Tablet 0.125 milliGRAM(s) Oral daily  folic acid 1 milliGRAM(s) Oral daily  guaiFENesin   Syrup  (Sugar-Free) 200 milliGRAM(s) Oral every 6 hours PRN  hydrochlorothiazide 25 milliGRAM(s) Oral daily  levETIRAcetam  IVPB 500 milliGRAM(s) IV Intermittent every 12 hours  LORazepam   Injectable 1 milliGRAM(s) IV Push every 2 hours PRN  LORazepam   Injectable 1 milliGRAM(s) IV Push every 1 hour PRN  metoprolol succinate ER 25 milliGRAM(s) Oral daily  multivitamin 1 Tablet(s) Oral daily  rivaroxaban 10 milliGRAM(s) Oral every 24 hours  thiamine 100 milliGRAM(s) Oral daily      PMHX/PSHX:  Stomach cancer  Hypertension  Atrial fibrillation  Gout  No significant past surgical history      Family history:  No pertinent family history in first degree relatives      Social History:     ROS:     General:  No wt loss, fevers, chills, night sweats, fatigue,   Eyes:  Good vision, no reported pain  ENT:  No sore throat, pain, runny nose, dysphagia  CV:  No pain, palpitations, hypo/hypertension  Resp:  No dyspnea, cough, tachypnea, wheezing  GI:  No pain, No nausea, No vomiting, No diarrhea, No constipation, No weight loss, No fever, No pruritis, No rectal bleeding, No tarry stools, No dysphagia,  :  No pain, bleeding, incontinence, nocturia  Muscle:  No pain, weakness  Neuro:  No weakness, tingling, memory problems  Psych:  No fatigue, insomnia, mood problems, depression  Endocrine:  No polyuria, polydipsia, cold/heat intolerance  Heme:  No petechiae, ecchymosis, easy bruisability  Skin:  No rash, tattoos, scars, edema      PHYSICAL EXAM:   Vital Signs:  Vital Signs Last 24 Hrs  T(C): 37.2 (23 Dec 2018 08:00), Max: 38.7 (22 Dec 2018 18:00)  T(F): 98.9 (23 Dec 2018 08:00), Max: 101.7 (22 Dec 2018 18:00)  HR: 74 (23 Dec 2018 12:00) (59 - 93)  BP: 151/56 (23 Dec 2018 12:00) (119/55 - 163/62)  BP(mean): 78 (23 Dec 2018 12:00) (71 - 78)  RR: 22 (23 Dec 2018 12:00) (16 - 25)  SpO2: 95% (23 Dec 2018 12:00) (93% - 100%)  Daily Height in cm: 182.88 (22 Dec 2018 14:48)    Daily Weight in k.9 (23 Dec 2018 10:36)    GENERAL:  Appears stated age, well-groomed, well-nourished, no distress  HEENT:  NC/AT,  conjunctivae clear and pink, no thyromegaly, nodules, adenopathy, no JVD, sclera -anicteric  CHEST:  Full & symmetric excursion, no increased effort, breath sounds clear  HEART:  Regular rhythm, S1, S2, no murmur/rub/S3/S4, no abdominal bruit, no edema  ABDOMEN:  Soft, non-tender, non-distended, normoactive bowel sounds,  no masses ,no hepato-splenomegaly, no signs of chronic liver disease  EXTEREMITIES:  no cyanosis,clubbing or edema  SKIN:  No rash/erythema/ecchymoses/petechiae/wounds/abscess/warm/dry  NEURO:  Alert, oriented, no asterixis, no tremor, no encephalopathy    LABS:                        7.4    17.11 )-----------( 229      ( 23 Dec 2018 06:42 )             23.2     12    138  |  101  |  17  ----------------------------<  191<H>  3.4<L>   |  23  |  1.51<H>    Ca    9.2      23 Dec 2018 11:57  Phos  3.1       Mg     1.5         TPro  7.6  /  Alb  2.6<L>  /  TBili  0.8  /  DBili  0.3<H>  /  AST  13  /  ALT  13  /  AlkPhos  24<L>      LIVER FUNCTIONS - ( 23 Dec 2018 11:57 )  Alb: 2.6 g/dL / Pro: 7.6 g/dL / ALK PHOS: 24 U/L / ALT: 13 U/L DA / AST: 13 U/L / GGT: x           PT/INR - ( 22 Dec 2018 15:41 )   PT: 31.3 sec;   INR: 2.78 ratio         PTT - ( 22 Dec 2018 15:41 )  PTT:41.8 sec  Urinalysis Basic - ( 22 Dec 2018 15:59 )    Color: Yellow / Appearance: Clear / S.015 / pH: x  Gluc: x / Ketone: Negative  / Bili: Negative / Urobili: 1 mg/dL   Blood: x / Protein: 100 mg/dL / Nitrite: Negative   Leuk Esterase: Negative / RBC: 0-2 /HPF / WBC x   Sq Epi: x / Non Sq Epi: x / Bacteria: x          Imaging: [facility]  Mickey Constantino PA-C  [address] [place] [address]  [phone]      Chief Complaint:  Patient is a 83y old  Male who presents with a chief complaint of Generalized weakness. (23 Dec 2018 12:34)      HPI: This is an 82 y/o M with PMH of HTN, A. Fib on Xarelto, Stomach CA, GERD, and Gout who presented with generalized weakness with repeated falls. Patiet states that he fell while was in [place] a week ago when his left leg buckled under him, didn't see a doctor as he was coming back to [place] next day, but over the past week he fell several times & was feeling week, and moves with difficulty. No fever or chills at home, no flu-like symptoms, and no sick contacts. At the ED, patient had a siezure episode for about 30 seconds that involved his left side of the body. Never had any seizure episodes in the past.    Allergies:  No Known Allergies      Medications:  acetaminophen   Tablet .. 650 milliGRAM(s) Oral every 6 hours PRN  allopurinol 100 milliGRAM(s) Oral daily  aluminum hydroxide/magnesium hydroxide/simethicone Suspension 30 milliLiter(s) Oral every 6 hours PRN  amLODIPine   Tablet 10 milliGRAM(s) Oral daily  aspirin enteric coated 81 milliGRAM(s) Oral daily  azithromycin   Tablet 500 milliGRAM(s) Oral daily  bisacodyl 5 milliGRAM(s) Oral every 12 hours PRN  cefTRIAXone   IVPB      colchicine 0.6 milliGRAM(s) Oral two times a day  digoxin     Tablet 0.125 milliGRAM(s) Oral daily  folic acid 1 milliGRAM(s) Oral daily  guaiFENesin   Syrup  (Sugar-Free) 200 milliGRAM(s) Oral every 6 hours PRN  hydrochlorothiazide 25 milliGRAM(s) Oral daily  levETIRAcetam  IVPB 500 milliGRAM(s) IV Intermittent every 12 hours  LORazepam   Injectable 1 milliGRAM(s) IV Push every 2 hours PRN  LORazepam   Injectable 1 milliGRAM(s) IV Push every 1 hour PRN  metoprolol succinate ER 25 milliGRAM(s) Oral daily  multivitamin 1 Tablet(s) Oral daily  rivaroxaban 10 milliGRAM(s) Oral every 24 hours  thiamine 100 milliGRAM(s) Oral daily      PMHX/PSHX:  Stomach cancer  Hypertension  Atrial fibrillation  Gout  No significant past surgical history      Family history:  No pertinent family history in first degree relatives      Social History:     ROS:     unable to obtain        PHYSICAL EXAM:   Vital Signs:  Vital Signs Last 24 Hrs  T(C): 37.2 (23 Dec 2018 08:00), Max: 38.7 (22 Dec 2018 18:00)  T(F): 98.9 (23 Dec 2018 08:00), Max: 101.7 (22 Dec 2018 18:00)  HR: 74 (23 Dec 2018 12:00) (59 - 93)  BP: 151/56 (23 Dec 2018 12:00) (119/55 - 163/62)  BP(mean): 78 (23 Dec 2018 12:00) (71 - 78)  RR: 22 (23 Dec 2018 12:00) (16 - 25)  SpO2: 95% (23 Dec 2018 12:00) (93% - 100%)  Daily Height in cm: 182.88 (22 Dec 2018 14:48)    Daily Weight in k.9 (23 Dec 2018 10:36)    nad  confused  frail  non toxic  soft, nt  no edema      LABS:                        7.4    17.11 )-----------( 229      ( 23 Dec 2018 06:42 )             23.2     12-    138  |  101  |  17  ----------------------------<  191<H>  3.4<L>   |  23  |  1.51<H>    Ca    9.2      23 Dec 2018 11:57  Phos  3.1     12  Mg     1.5     12    TPro  7.6  /  Alb  2.6<L>  /  TBili  0.8  /  DBili  0.3<H>  /  AST  13  /  ALT  13  /  AlkPhos  24<L>  12-23    LIVER FUNCTIONS - ( 23 Dec 2018 11:57 )  Alb: 2.6 g/dL / Pro: 7.6 g/dL / ALK PHOS: 24 U/L / ALT: 13 U/L DA / AST: 13 U/L / GGT: x           PT/INR - ( 22 Dec 2018 15:41 )   PT: 31.3 sec;   INR: 2.78 ratio         PTT - ( 22 Dec 2018 15:41 )  PTT:41.8 sec  Urinalysis Basic - ( 22 Dec 2018 15:59 )    Color: Yellow / Appearance: Clear / S.015 / pH: x  Gluc: x / Ketone: Negative  / Bili: Negative / Urobili: 1 mg/dL   Blood: x / Protein: 100 mg/dL / Nitrite: Negative   Leuk Esterase: Negative / RBC: 0-2 /HPF / WBC x   Sq Epi: x / Non Sq Epi: x / Bacteria: x          Imaging:

## 2018-12-23 NOTE — CONSULT NOTE ADULT - SUBJECTIVE AND OBJECTIVE BOX
Reason for Consult :     HPI:  This is an 82 y/o M with PMH of HTN, A. Fib on Xarelto, Stomach CA 25 years ago, got radiation , GERD, and Gout who presented with generalized weakness with repeated falls. Patient states that he fell while was in [place] a week ago when his left leg buckled under him, didn't see a doctor as he was coming back to [place] next day, but over the past week he fell several times & was feeling week, and moves with difficulty. No fever or chills at home, no flu-like symptoms, and no sick contacts. At the ED, patient had a seizure episode for about 30 seconds that involved his left side of the body. Never had any seizure episodes in the past.    he had another episode ? seizure while on [facility] [place] , RRT was called and patient was transferred to [facility] . he complaints of pain in LE very hypersensitive to touch . He was seen by his PMD 2 weeks ago and was never told he had elevated WBC       Past Medical & Surgical Hx:  PAST MEDICAL & SURGICAL HISTORY:  Stomach cancer  Hypertension  Atrial fibrillation  Gout  No significant past surgical history      Social History-- Retired lives with significant other   EtOH: drinks several times a week   Tobacco: denies   Drug Use: denies       FAMILY HISTORY:  No pertinent family history in first degree relatives      Allergies    No Known Allergies    Intolerances        Home/Outpatient  Medications   Home Medications:  allopurinol 100 mg oral tablet: 1 tab(s) orally once a day (22 Dec 2018 17:47)  betaxolol 10 mg oral tablet: 1 tab(s) orally once a day (22 Dec 2018 17:47)  colchicine 0.6 mg oral tablet: 1 tab(s) orally 2 times a day (22 Dec 2018 17:47)  digoxin 125 mcg (0.125 mg) oral tablet: 1 tab(s) orally once a day (22 Dec 2018 17:47)  HydroDIURIL 25 mg oral tablet: 1 tab(s) orally 3 times a week (22 Dec 2018 17:47)  NexIUM 24HR 20 mg oral delayed release capsule: 1 cap(s) orally once a day (22 Dec 2018 17:47)  Norvasc 10 mg oral tablet: 1 tab(s) orally once a day (22 Dec 2018 17:47)  Xarelto 10 mg oral tablet: 1 tab(s) orally once a day (22 Dec 2018 17:47)      Current Inpatient Medications :    ANTIBIOTICS:   azithromycin   Tablet 500 milliGRAM(s) Oral daily  cefTRIAXone   IVPB          OTHER RELEVANT MEDICATIONS :  acetaminophen   Tablet .. 650 milliGRAM(s) Oral every 6 hours PRN  allopurinol 100 milliGRAM(s) Oral daily  aluminum hydroxide/magnesium hydroxide/simethicone Suspension 30 milliLiter(s) Oral every 6 hours PRN  amLODIPine   Tablet 10 milliGRAM(s) Oral daily  aspirin enteric coated 81 milliGRAM(s) Oral daily  bisacodyl 5 milliGRAM(s) Oral every 12 hours PRN  colchicine 0.6 milliGRAM(s) Oral two times a day  digoxin     Tablet 0.125 milliGRAM(s) Oral daily  folic acid 1 milliGRAM(s) Oral daily  guaiFENesin   Syrup  (Sugar-Free) 200 milliGRAM(s) Oral every 6 hours PRN  hydrochlorothiazide 25 milliGRAM(s) Oral daily  levETIRAcetam  IVPB 500 milliGRAM(s) IV Intermittent every 12 hours  metoprolol succinate ER 25 milliGRAM(s) Oral daily  rivaroxaban 10 milliGRAM(s) Oral every 24 hours  thiamine 100 milliGRAM(s) Oral daily          REVIEW OF SYSTEMS:      ROS:  CONSTITUTIONAL:  Positive for fever or chills, feels weak, poor appetite  EYES:  Negative  blurry vision or double vision  CARDIOVASCULAR:  Negative for chest pain or palpitations  RESPIRATORY:  Negative for cough, wheezing, or SOB   GASTROINTESTINAL:  Negative for nausea, vomiting, diarrhea, constipation, or abdominal pain  GENITOURINARY:  Negative frequency, urgency , dysuria or hematuria   NEUROLOGIC:  Pos for dizziness, lightheadedness  All other systems were reviewed and are negative      I&O's Detail    22 Dec 2018 07:01  -  23 Dec 2018 07:00  --------------------------------------------------------  IN:    lactated ringers.: 350 mL  Total IN: 350 mL    OUT:    Voided: 650 mL  Total OUT: 650 mL    Total NET: -300 mL      Physical Exam:  Vital Signs Last 24 Hrs  T(C): 36.4 (23 Dec 2018 04:02), Max: 38.7 (22 Dec 2018 18:00)  T(F): 97.6 (23 Dec 2018 04:02), Max: 101.7 (22 Dec 2018 18:00)  HR: 59 (23 Dec 2018 06:00) (59 - 93)  BP: 139/51 (23 Dec 2018 06:00) (124/57 - 163/62)  BP(mean): 76 (23 Dec 2018 06:00) (75 - 76)  RR: 22 (23 Dec 2018 06:00) (16 - 24)  SpO2: 99% (23 Dec 2018 06:00) (93% - 100%)  Height (cm): 182.88 ( @ 14:48)  Weight (kg): 63.5 ( @ 14:48)  BMI (kg/m2): 19 ( @ 14:48)  BSA (m2): 1.83 ( @ 14:48)      GEN: NAD, pleasant  HEENT: normocephalic and atraumatic. EOMI. YUMIKO. Moist mucosa. Clear Posterior pharynx.  NECK: Supple. No carotid bruits.  No lymphadenopathy or thyromegaly.  LUNGS: Clear to auscultation.  HEART: Regular rate and rhythm without murmur.  ABDOMEN: Soft, nontender, and nondistended.  Positive bowel sounds.  No hepatosplenomegaly was noted.  EXTREMITIES: Without any cyanosis, clubbing, rash, lesions or edema.  NEUROLOGIC: A & O x 3, Cranial nerves II through XII are grossly intact. No focal neurological deficits   SKIN: No ulceration or induration present.      Labs:                7.4    17.11  )----------(  229       ( 23 Dec 2018 06:42 )               23.2      139    |  103    |  17     ----------------------------<  162        ( 23 Dec 2018 06:41 )  3.3     |  24     |  1.56     Ca    9.1        ( 23 Dec 2018 06:41 )  Phos  3.1       ( 23 Dec 2018 06:41 )  Mg     1.1       ( 23 Dec 2018 06:41 )    TPro  8.1    /  Alb  3.0    /  TBili  1.1    /  DBili  x      /  AST  15     /  ALT  12     /  AlkPhos  27     ( 22 Dec 2018 15:41 )    LIVER FUNCTIONS - ( 22 Dec 2018 15:41 )  Alb: 3.0 g/dL / Pro: 8.1 g/dL / ALK PHOS: 27 U/L / ALT: 12 U/L DA / AST: 15 U/L / GGT: x           PT/INR -  31.3 sec / 2.78 ratio   ( 22 Dec 2018 15:41 )       PTT -  41.8 sec   ( 22 Dec 2018 15:41 )  CAPILLARY BLOOD GLUCOSE    CARDIAC MARKERS ( 22 Dec 2018 15:41 )  .008 ng/mL / x     / x     / x     / x          Urinalysis Basic - ( 22 Dec 2018 15:59 )    Color: Yellow / Appearance: Clear / S.015 / pH: x  Gluc: x / Ketone: Negative  / Bili: Negative / Urobili: 1 mg/dL   Blood: x / Protein: 100 mg/dL / Nitrite: Negative   Leuk Esterase: Negative / RBC: 0-2 /HPF / WBC x   Sq Epi: x / Non Sq Epi: x / Bacteria: x    Lactate, Blood: 1.1 mmol/L (18 @ 17:45)  Lactate, Blood: 3.0 mmol/L (18 @ 15:41)    Sedimentation Rate, Erythrocyte: 140 mm/Hr (. @ 06:55)        Blood Gas Profile - Venous (18 @ 20:47)    pH, Venous: 7.46    pCO2, Venous: 28 mmHg    pO2, Venous: 44 mmHg    HCO3, Venous: 21 mmol/L    Base Excess, Venous: -3.7 mmol/L    Oxygen Saturation, Venous: 74 %    FIO2, Venous: 21    Blood Gas Source Venous: Venous      RECENT CULTURES:          RADIOLOGY & ADDITIONAL STUDIES:    CT Head No Cont (18 @ 16:20) >  FINDINGS: There is no acute intracranial hemorrhage, mass effect, shift   of the midline structures, herniation, extra-axial fluid collection, or   hydrocephalus.    There is an age indeterminate appearing lacunar infarct within the   posterior limb of the right internal capsule.    There is diffuse cerebral volume loss with prominence of the sulci,   fissures, and cisternal spaces which is normal for the patient's age.   There is mild deep and periventricular white matter hypoattenuation   statistically compatible with microvascular changes given calcific   atherosclerotic disease of the intracranial arteries.    Polyps versus retention cysts are noted within the left maxilla sinus.   Additional smaller polyps are notable within the bilateral sphenoid   sinuses. The mastoid air cells are clear. The calvarium is intact. The   imagedorbits are unremarkable.    IMPRESSION: No acute intracranial hemorrhage, mass effect, or shift of   the midline structures.    Age indeterminate lacunar infarct within the posterior limb of the right   internal capsule.          < from: CT Chest No Cont (12.22.18 @ 20:45) >  FINDINGS:    Lungs:  See Pleural Space Finding.    Pleural space:  Small bilateral pleural effusions right greater than   left.   Compressive atelectasis is noted on the right side in some dependent   atelectasis left side.    Heart:  There is cardiomegaly with coronary artery and valvular   calcification   without pericardial effusion.    Aorta:  Vascular calcificationin the lower neck as well as   atherosclerotic   calcification thoracic aorta without gross aneurysm.    Lymph nodes:  No evidence of mediastinal adenopathy by size criteria or   fluid   collections in the mediastinum. No evidence of mediastinal adenopathy by   size   criteria or fluid collections in the mediastinum.    Bones/joints:  Degenerative changes with erosion a.c. joint degenerative   changes glenohumeral joint. Scapulae appear nonacute. No acute rib   fractures   are appreciated. Degenerative changes throughout thoracic spine with mild   compression deformities in midportion without acute bony abnormality.    Soft tissues: Unremarkable.     IMPRESSION:   1. Small bilateral pleural effusions right greater than left. Compressive   atelectasis is noted on the right side in some dependent atelectasis left   side.   No pneumothorax.   2. There is cardiomegaly with coronary artery and valvular calcification   without pericardial effusion.   3. No evidence of mediastinal adenopathy by size criteria or fluid   collections   in the mediastinum.   4. Please see CT scan abdomen and pelvis for findings in the upper   abdomen.    Report for CT abdomen pelvis no contrast: (Prelim report is not available   for review)    Liver,  spleen, pancreas, and adrenal glands are grossly unremarkable.   Gallbladder is contracted. This limits evaluation, however there may be   small amount of pericholecystic fluid. There is no hydronephrosis.   Bilateral renal cysts are seen. There are nonobstructing renal calculi on   the right measuring up to 5 mm there are no calculi within the ureters.   No bowel obstruction is seen. Colonic diverticulosis identified with mild   thickening of the sigmoid colon. Mild questionable stranding around the   sigmoid colon.. Appendix not clearly seen. No inflammatory changes right   lower quadrant. No small bowel obstruction. Calcified atherosclerosis of   the aorta. 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 No retroperitoneal or inguinal adenopathy. Urinary bladder   unremarkable. Prostate is enlarged measuring 5.2 cm transverse dimension.   Degenerative changes seen in the bones.    IMPRESSION FOR CT ABDOMEN AND PELVIS:    NONOBSTRUCTING RIGHT RENAL CALCULI    COLONIC DIVERTICULOSIS, MILD THICKENING OF THE SIGMOID COLON WITH MILD   QUESTIONABLE PERICOLONIC STRANDING. THIS COULD REPRESENT MILD/EARLY   DIVERTICULITIS.    PROSTATE ENLARGEMENT, CORRELATE WITH PSA.     GALLBLADDER IS CONTRACTED. THIS LIMITS EVALUATION, HOWEVER THERE MAY BE   SMALL AMOUNT OF PERICHOLECYSTIC FLUID. THIS CAN BE BETTER CHARACTERIZED   ON ULTRASOUND.      Assessment :   82 y/o M with PMH of HTN, A. Fib on Xarelto, Stomach CA, GERD, and Gout presented with generalized weakness & repeated falls, also had seizures at the ED. Noted to be febrile , stormy cause of fever could be UTI as he ahs urinary retention with BPH , PVR was 350 . Doubt he has pneumonia     He may have a myeloproliferative disorder with leukocytosis with monocytosis may have CMML . he also has severe anemia .    Etiology of seizure is unclear ? CVA       Plan :   - needs hematology evaluation, need to obtain baseline labs from his PMD  - dc Zithromax as doubt he has pneumonia   - anemia profile   - await neurology evaluation  - fall precautions   - may need Flomax for BPH  - PT evaluation       Continue with present regime .  Appropriate use of antibiotics and adverse effects reviewed.      I have discussed the above plan of care with patient in detail. He  expressed understanding of the treatment plan . Risks, benefits and alternatives discussed in detail. I have asked if he has any questions or concerns and appropriately addressed them to the best of my ability .      > 65 minutes spent in direct patient care reviewing  the notes, lab data/ imaging , discussion with multidisciplinary team. All questions were addressed and answered to the best of my capacity .    Thank you for allowing me to participate in the care of your patient .

## 2018-12-23 NOTE — CONSULT NOTE ADULT - SUBJECTIVE AND OBJECTIVE BOX
Patient is a 83y old  Male who presents with a chief complaint of Generalized weakness. (23 Dec 2018 08:30)      HPI:  This is an 84 y/o M with PMH of HTN, A. Fib on Xarelto, Stomach CA, GERD, and Gout who presented with generalized weakness with repeated falls. Patiet states that he fell while was in [place] a week ago when his left leg buckled under him, didn't see a doctor as he was coming back to [place] next day, but over the past week he fell several times & was feeling week, and moves with difficulty. No fever or chills at home, no flu-like symptoms, and no sick contacts. At the ED, patient had a siezure episode for about 30 seconds that involved his left side of the body. Never had any seizure episodes in the past. (22 Dec 2018 20:48)       ROS: +ETOH 3-4 vodka per day, complains of difficulty with balance, complains of occasional hemorrhoids  Negative except for:    PAST MEDICAL & SURGICAL HISTORY:  Stomach cancer  Hypertension  Atrial fibrillation  Gout  No significant past surgical history      SOCIAL HISTORY:    FAMILY HISTORY:  No pertinent family history in first degree relatives      MEDICATIONS  (STANDING):  allopurinol 100 milliGRAM(s) Oral daily  amLODIPine   Tablet 10 milliGRAM(s) Oral daily  aspirin enteric coated 81 milliGRAM(s) Oral daily  azithromycin   Tablet 500 milliGRAM(s) Oral daily  cefTRIAXone   IVPB      colchicine 0.6 milliGRAM(s) Oral two times a day  digoxin     Tablet 0.125 milliGRAM(s) Oral daily  folic acid 1 milliGRAM(s) Oral daily  hydrochlorothiazide 25 milliGRAM(s) Oral daily  levETIRAcetam  IVPB 500 milliGRAM(s) IV Intermittent every 12 hours  magnesium sulfate  IVPB 2 Gram(s) IV Intermittent once  metoprolol succinate ER 25 milliGRAM(s) Oral daily  potassium chloride    Tablet ER 20 milliEquivalent(s) Oral every 2 hours  rivaroxaban 10 milliGRAM(s) Oral every 24 hours  thiamine 100 milliGRAM(s) Oral daily    MEDICATIONS  (PRN):  acetaminophen   Tablet .. 650 milliGRAM(s) Oral every 6 hours PRN Temp greater or equal to 38C (100.4F), Mild Pain (1 - 3)  aluminum hydroxide/magnesium hydroxide/simethicone Suspension 30 milliLiter(s) Oral every 6 hours PRN Dyspepsia  bisacodyl 5 milliGRAM(s) Oral every 12 hours PRN Constipation  guaiFENesin   Syrup  (Sugar-Free) 200 milliGRAM(s) Oral every 6 hours PRN Cough      Allergies    No Known Allergies    Intolerances        Vital Signs Last 24 Hrs  T(C): 36.4 (23 Dec 2018 04:02), Max: 38.7 (22 Dec 2018 18:00)  T(F): 97.6 (23 Dec 2018 04:02), Max: 101.7 (22 Dec 2018 18:00)  HR: 59 (23 Dec 2018 06:00) (59 - 93)  BP: 139/51 (23 Dec 2018 06:00) (124/57 - 163/62)  BP(mean): 76 (23 Dec 2018 06:00) (75 - 76)  RR: 22 (23 Dec 2018 06:00) (16 - 24)  SpO2: 99% (23 Dec 2018 06:00) (93% - 100%)    PHYSICAL EXAM  General: adult in NAD  HEENT: clear oropharynx, anicteric sclera, pink conjunctivae  Neck: supple  CV: normal S1S2 with no murmur rubs or gallops  Lungs: clear to auscultation, no wheezes, no rhales  Abdomen: soft non-tender non-distended, no hepato/splenomegaly  Ext: no clubbing cyanosis or edema  Skin: no rashes and no petichiae  Neuro: alert and oriented X3 no focal deficits      LABS:    CBC Full  -  ( 23 Dec 2018 06:42 )  WBC Count : 17.11 K/uL  Hemoglobin : 7.4 g/dL  Hematocrit : 23.2 %  Platelet Count - Automated : 229 K/uL  Mean Cell Volume : 112.6 fl  Mean Cell Hemoglobin : 35.9 pg  Mean Cell Hemoglobin Concentration : 31.9 gm/dL  Auto Neutrophil # : 7.31 K/uL  Auto Lymphocyte # : 1.07 K/uL  Auto Monocyte # : 8.39 K/uL  Auto Eosinophil # : 0.01 K/uL  Auto Basophil # : 0.01 K/uL  Auto Neutrophil % : 42.6 %  Auto Lymphocyte % : 6.3 %  Auto Monocyte % : 49.0 %  Auto Eosinophil % : 0.1 %  Auto Basophil % : 0.1 %    12-23    139  |  103  |  17  ----------------------------<  162<H>  3.3<L>   |  24  |  1.56<H>    Ca    9.1      23 Dec 2018 06:41  Phos  3.1     12-23  Mg     1.1     12-23    TPro  8.1  /  Alb  3.0<L>  /  TBili  1.1  /  DBili  x   /  AST  15  /  ALT  12  /  AlkPhos  27<L>  12-22    PT/INR - ( 22 Dec 2018 15:41 )   PT: 31.3 sec;   INR: 2.78 ratio         PTT - ( 22 Dec 2018 15:41 )  PTT:41.8 sec          BLOOD SMEAR INTERPRETATION:  WBC  RBC  plt    RADIOLOGY & ADDITIONAL STUDIES:    < from: CT Chest No Cont (12.22.18 @ 20:45) >    EXAM:  CT ABDOMEN AND PELVIS                          EXAM:  CT CHEST                                  PROCEDURE DATE:  12/22/2018          INTERPRETATION:  [facility] RADIOLOGIST PRELIMINARY REPORT    EXAM:    CT Chest Without Contrast     EXAM DATE/TIME:    12/22/2018 8:11 PM     CLINICAL HISTORY:    83 years old, male; Signs and symptoms; Other: Weakness     TECHNIQUE:    Axial computed tomography images of the chest without intravenous   contrast.    Coronal and sagittal reformatted images were created and reviewed.    MIP reconstructed images were created and reviewed.     COMPARISON:    DX XR CHEST 12/22/2018 4:07 PM     FINDINGS:    Lungs:  See Pleural Space Finding.    Pleural space:  Small bilateral pleural effusions right greater than   left.   Compressive atelectasis is noted on the right side in some dependent   atelectasis left side.    Heart:  There is cardiomegaly with coronary artery and valvular   calcification   without pericardial effusion.    Aorta:  Vascular calcificationin the lower neck as well as   atherosclerotic   calcification thoracic aorta without gross aneurysm.    Lymph nodes:  No evidence of mediastinal adenopathy by size criteria or   fluid   collections in the mediastinum. No evidence of mediastinal adenopathy by   size   criteria or fluid collections in the mediastinum.    Bones/joints:  Degenerative changes with erosion a.c. joint degenerative   changes glenohumeral joint. Scapulae appear nonacute. No acute rib   fractures   are appreciated. Degenerative changes throughout thoracic spine with mild   compression deformities in midportion without acute bony abnormality.    Soft tissues: Unremarkable.     IMPRESSION:   1. Small bilateral pleural effusions right greater than left. Compressive   atelectasis is noted on the right side in some dependent atelectasis left   side.   No pneumothorax.   2. There is cardiomegaly with coronary artery and valvular calcification   without pericardial effusion.   3. No evidence of mediastinal adenopathy by size criteria or fluid   collections   in the mediastinum.   4. Please see CT scan abdomen and pelvis for findings in the upper   abdomen.      Report for CT abdomen pelvis no contrast: (Prelim report is not available   for review)    Liver,  spleen, pancreas, and adrenal glands are grossly unremarkable.   Gallbladder is contracted. This limits evaluation, however there may be   small amount of pericholecystic fluid. There is no hydronephrosis.   Bilateral renal cysts are seen. There are nonobstructing renal calculi on   the right measuring up to 5 mm there are no calculi within the ureters.   No bowel obstruction is seen. Colonic diverticulosis identified with mild   thickening of the sigmoid colon. Mild questionable stranding around the   sigmoid colon.. Appendix not clearly seen. No inflammatory changes right   lower quadrant. No small bowel obstruction. Calcified atherosclerosis of   the aorta. 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 No retroperitoneal or inguinal adenopathy. Urinary bladder   unremarkable. Prostate is enlarged measuring 5.2 cm transverse dimension.   Degenerative changes seen in the bones.    IMPRESSION FOR CT ABDOMEN AND PELVIS:    NONOBSTRUCTING RIGHT RENAL CALCULI    COLONIC DIVERTICULOSIS, MILD THICKENING OF THE SIGMOID COLON WITH MILD   QUESTIONABLE PERICOLONIC STRANDING. THIS COULD REPRESENT MILD/EARLY   DIVERTICULITIS.    PROSTATE ENLARGEMENT, CORRELATE WITH PSA.     GALLBLADDER IS CONTRACTED. THIS LIMITS EVALUATION, HOWEVER THERE MAY BE   SMALL AMOUNT OF PERICHOLECYSTIC FLUID. THIS CAN BE BETTER CHARACTERIZED   ON ULTRASOUND.                  MATTHEW SINGLETON M.D., ATTENDING RADIOLOGIST  This document has been electronically signed. Dec 23 2018  8:13AM          < end of copied text > Patient is a 83y old  Male who presents with a chief complaint of Generalized weakness. (23 Dec 2018 08:30)      HPI:  This is an 82 y/o M with PMH of HTN, A. Fib on Xarelto, Stomach CA, GERD, and Gout who presented with generalized weakness with repeated falls. Patiet states that he fell while was in [place] a week ago when his left leg buckled under him, didn't see a doctor as he was coming back to [place] next day, but over the past week he fell several times & was feeling week, and moves with difficulty. No fever or chills at home, no flu-like symptoms, and no sick contacts. At the ED, patient had a siezure episode for about 30 seconds that involved his left side of the body. Never had any seizure episodes in the past. (22 Dec 2018 20:48)       ROS: +ETOH 3-4 vodka per day, complains of difficulty with balance, complains of occasional hemorrhoids  Negative except for:    PAST MEDICAL & SURGICAL HISTORY:  Stomach cancer  Hypertension  Atrial fibrillation  Gout  No significant past surgical history      SOCIAL HISTORY:    FAMILY HISTORY:  No pertinent family history in first degree relatives      MEDICATIONS  (STANDING):  allopurinol 100 milliGRAM(s) Oral daily  amLODIPine   Tablet 10 milliGRAM(s) Oral daily  aspirin enteric coated 81 milliGRAM(s) Oral daily  azithromycin   Tablet 500 milliGRAM(s) Oral daily  cefTRIAXone   IVPB      colchicine 0.6 milliGRAM(s) Oral two times a day  digoxin     Tablet 0.125 milliGRAM(s) Oral daily  folic acid 1 milliGRAM(s) Oral daily  hydrochlorothiazide 25 milliGRAM(s) Oral daily  levETIRAcetam  IVPB 500 milliGRAM(s) IV Intermittent every 12 hours  magnesium sulfate  IVPB 2 Gram(s) IV Intermittent once  metoprolol succinate ER 25 milliGRAM(s) Oral daily  potassium chloride    Tablet ER 20 milliEquivalent(s) Oral every 2 hours  rivaroxaban 10 milliGRAM(s) Oral every 24 hours  thiamine 100 milliGRAM(s) Oral daily    MEDICATIONS  (PRN):  acetaminophen   Tablet .. 650 milliGRAM(s) Oral every 6 hours PRN Temp greater or equal to 38C (100.4F), Mild Pain (1 - 3)  aluminum hydroxide/magnesium hydroxide/simethicone Suspension 30 milliLiter(s) Oral every 6 hours PRN Dyspepsia  bisacodyl 5 milliGRAM(s) Oral every 12 hours PRN Constipation  guaiFENesin   Syrup  (Sugar-Free) 200 milliGRAM(s) Oral every 6 hours PRN Cough      Allergies    No Known Allergies    Intolerances        Vital Signs Last 24 Hrs  T(C): 36.4 (23 Dec 2018 04:02), Max: 38.7 (22 Dec 2018 18:00)  T(F): 97.6 (23 Dec 2018 04:02), Max: 101.7 (22 Dec 2018 18:00)  HR: 59 (23 Dec 2018 06:00) (59 - 93)  BP: 139/51 (23 Dec 2018 06:00) (124/57 - 163/62)  BP(mean): 76 (23 Dec 2018 06:00) (75 - 76)  RR: 22 (23 Dec 2018 06:00) (16 - 24)  SpO2: 99% (23 Dec 2018 06:00) (93% - 100%)    PHYSICAL EXAM  General: adult in NAD  HEENT: clear oropharynx, anicteric sclera, pink conjunctivae  Neck: supple  CV: normal S1S2 with no murmur rubs or gallops  Lungs: clear to auscultation, no wheezes, no rhales  Abdomen: soft non-tender non-distended, no hepato/splenomegaly  Ext: no clubbing cyanosis or edema  Skin: no rashes and no petichiae  Neuro: alert and oriented X3 no focal deficits      LABS:    CBC Full  -  ( 23 Dec 2018 06:42 )  WBC Count : 17.11 K/uL  Hemoglobin : 7.4 g/dL  Hematocrit : 23.2 %  Platelet Count - Automated : 229 K/uL  Mean Cell Volume : 112.6 fl  Mean Cell Hemoglobin : 35.9 pg  Mean Cell Hemoglobin Concentration : 31.9 gm/dL  Auto Neutrophil # : 7.31 K/uL  Auto Lymphocyte # : 1.07 K/uL  Auto Monocyte # : 8.39 K/uL  Auto Eosinophil # : 0.01 K/uL  Auto Basophil # : 0.01 K/uL  Auto Neutrophil % : 42.6 %  Auto Lymphocyte % : 6.3 %  Auto Monocyte % : 49.0 %  Auto Eosinophil % : 0.1 %  Auto Basophil % : 0.1 %    12-23    139  |  103  |  17  ----------------------------<  162<H>  3.3<L>   |  24  |  1.56<H>    Ca    9.1      23 Dec 2018 06:41  Phos  3.1     12-23  Mg     1.1     12-23    TPro  8.1  /  Alb  3.0<L>  /  TBili  1.1  /  DBili  x   /  AST  15  /  ALT  12  /  AlkPhos  27<L>  12-22    PT/INR - ( 22 Dec 2018 15:41 )   PT: 31.3 sec;   INR: 2.78 ratio         PTT - ( 22 Dec 2018 15:41 )  PTT:41.8 sec          BLOOD SMEAR INTERPRETATION:  WBC:  mildly increased in number with predominant polys. there is a mild increase in monocytes and lymphocytes  RBC:  macrocytosis without NRBC or polychromatophilia  plt:  normal in number with occasional large plts    RADIOLOGY & ADDITIONAL STUDIES:    < from: CT Chest No Cont (12.22.18 @ 20:45) >    EXAM:  CT ABDOMEN AND PELVIS                          EXAM:  CT CHEST                                  PROCEDURE DATE:  12/22/2018          INTERPRETATION:  [facility] RADIOLOGIST PRELIMINARY REPORT    EXAM:    CT Chest Without Contrast     EXAM DATE/TIME:    12/22/2018 8:11 PM     CLINICAL HISTORY:    83 years old, male; Signs and symptoms; Other: Weakness     TECHNIQUE:    Axial computed tomography images of the chest without intravenous   contrast.    Coronal and sagittal reformatted images were created and reviewed.    MIP reconstructed images were created and reviewed.     COMPARISON:    DX XR CHEST 12/22/2018 4:07 PM     FINDINGS:    Lungs:  See Pleural Space Finding.    Pleural space:  Small bilateral pleural effusions right greater than   left.   Compressive atelectasis is noted on the right side in some dependent   atelectasis left side.    Heart:  There is cardiomegaly with coronary artery and valvular   calcification   without pericardial effusion.    Aorta:  Vascular calcificationin the lower neck as well as   atherosclerotic   calcification thoracic aorta without gross aneurysm.    Lymph nodes:  No evidence of mediastinal adenopathy by size criteria or   fluid   collections in the mediastinum. No evidence of mediastinal adenopathy by   size   criteria or fluid collections in the mediastinum.    Bones/joints:  Degenerative changes with erosion a.c. joint degenerative   changes glenohumeral joint. Scapulae appear nonacute. No acute rib   fractures   are appreciated. Degenerative changes throughout thoracic spine with mild   compression deformities in midportion without acute bony abnormality.    Soft tissues: Unremarkable.     IMPRESSION:   1. Small bilateral pleural effusions right greater than left. Compressive   atelectasis is noted on the right side in some dependent atelectasis left   side.   No pneumothorax.   2. There is cardiomegaly with coronary artery and valvular calcification   without pericardial effusion.   3. No evidence of mediastinal adenopathy by size criteria or fluid   collections   in the mediastinum.   4. Please see CT scan abdomen and pelvis for findings in the upper   abdomen.      Report for CT abdomen pelvis no contrast: (Prelim report is not available   for review)    Liver,  spleen, pancreas, and adrenal glands are grossly unremarkable.   Gallbladder is contracted. This limits evaluation, however there may be   small amount of pericholecystic fluid. There is no hydronephrosis.   Bilateral renal cysts are seen. There are nonobstructing renal calculi on   the right measuring up to 5 mm there are no calculi within the ureters.   No bowel obstruction is seen. Colonic diverticulosis identified with mild   thickening of the sigmoid colon. Mild questionable stranding around the   sigmoid colon.. Appendix not clearly seen. No inflammatory changes right   lower quadrant. No small bowel obstruction. Calcified atherosclerosis of   the aorta. 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 No retroperitoneal or inguinal adenopathy. Urinary bladder   unremarkable. Prostate is enlarged measuring 5.2 cm transverse dimension.   Degenerative changes seen in the bones.    IMPRESSION FOR CT ABDOMEN AND PELVIS:    NONOBSTRUCTING RIGHT RENAL CALCULI    COLONIC DIVERTICULOSIS, MILD THICKENING OF THE SIGMOID COLON WITH MILD   QUESTIONABLE PERICOLONIC STRANDING. THIS COULD REPRESENT MILD/EARLY   DIVERTICULITIS.    PROSTATE ENLARGEMENT, CORRELATE WITH PSA.     GALLBLADDER IS CONTRACTED. THIS LIMITS EVALUATION, HOWEVER THERE MAY BE   SMALL AMOUNT OF PERICHOLECYSTIC FLUID. THIS CAN BE BETTER CHARACTERIZED   ON ULTRASOUND.                  MATTHEW SINGLETON M.D., ATTENDING RADIOLOGIST  This document has been electronically signed. Dec 23 2018  8:13AM          < end of copied text >

## 2018-12-23 NOTE — CONSULT NOTE ADULT - SUBJECTIVE AND OBJECTIVE BOX
REASON FOR CONSULT:   Patient is currently confused  details from chart , nurse  CHIEF COMPLAINT:    HPI:  This is an 82 y/o M with PMH of HTN, A. Fib on Xarelto, Stomach CA, GERD, and Gout who presented with generalized weakness with repeated falls. Patient states that he fell while was in [place] a week ago when his left leg buckled under him, didn't see a doctor as he was coming back to [place] next day, but over the past week he fell several times & was feeling week, and moves with difficulty. No fever or chills at home, no flu-like symptoms, and no sick contacts. At the ED, patient had a seizure episode for about 30 seconds that involved his left side of the body. Never had any seizure episodes in the past. as per patient did have shaky extremities while he was awake and talking , patient was drinking alcohol a lot     patient currently denies any chest pain or shortness of breath  patient blood work showed mild leucocytosis       PAST MEDICAL & SURGICAL HISTORY:  Stomach cancer  Hypertension  Atrial fibrillation  Gout  No significant past surgical history      Allergies    No Known Allergies    Intolerances        SOCIAL HISTORY: non smoker ,  drinks alcohol regularly     FAMILY HISTORY:  No hx of MI limited history       MEDICATIONS:  MEDICATIONS  (STANDING):  allopurinol 100 milliGRAM(s) Oral daily  amLODIPine   Tablet 10 milliGRAM(s) Oral daily  aspirin enteric coated 81 milliGRAM(s) Oral daily  azithromycin   Tablet 500 milliGRAM(s) Oral daily  cefTRIAXone   IVPB      colchicine 0.6 milliGRAM(s) Oral two times a day  digoxin     Tablet 0.125 milliGRAM(s) Oral daily  folic acid 1 milliGRAM(s) Oral daily  hydrochlorothiazide 25 milliGRAM(s) Oral daily  levETIRAcetam  IVPB 500 milliGRAM(s) IV Intermittent every 12 hours  metoprolol succinate ER 25 milliGRAM(s) Oral daily  multivitamin 1 Tablet(s) Oral daily  potassium chloride    Tablet ER 20 milliEquivalent(s) Oral every 2 hours  rivaroxaban 10 milliGRAM(s) Oral every 24 hours  thiamine 100 milliGRAM(s) Oral daily    MEDICATIONS  (PRN):  acetaminophen   Tablet .. 650 milliGRAM(s) Oral every 6 hours PRN Temp greater or equal to 38C (100.4F), Mild Pain (1 - 3)  aluminum hydroxide/magnesium hydroxide/simethicone Suspension 30 milliLiter(s) Oral every 6 hours PRN Dyspepsia  bisacodyl 5 milliGRAM(s) Oral every 12 hours PRN Constipation  guaiFENesin   Syrup  (Sugar-Free) 200 milliGRAM(s) Oral every 6 hours PRN Cough  LORazepam   Injectable 1 milliGRAM(s) IV Push every 2 hours PRN CIWA-Ar score increase by 2 points and a total score of 7 or less  LORazepam   Injectable 1 milliGRAM(s) IV Push every 1 hour PRN CIWA-Ar score 8 or greater      REVIEW OF SYSTEMS:    confused limited currently   denies any chest pain or shortness of breath   All other review of systems is negative unless indicated above    Vital Signs Last 24 Hrs  T(C): 37.2 (23 Dec 2018 08:00), Max: 38.7 (22 Dec 2018 18:00)  T(F): 98.9 (23 Dec 2018 08:00), Max: 101.7 (22 Dec 2018 18:00)  HR: 70 (23 Dec 2018 08:00) (59 - 93)  BP: 136/48 (23 Dec 2018 08:00) (124/57 - 163/62)  BP(mean): 71 (23 Dec 2018 08:00) (71 - 76)  RR: 25 (23 Dec 2018 08:00) (16 - 25)  SpO2: 99% (23 Dec 2018 08:00) (93% - 100%)    I&O's Summary    22 Dec 2018 07:01  -  23 Dec 2018 07:00  --------------------------------------------------------  IN: 350 mL / OUT: 650 mL / NET: -300 mL    23 Dec 2018 07:01  -  23 Dec 2018 12:35  --------------------------------------------------------  IN: 50 mL / OUT: 200 mL / NET: -150 mL        PHYSICAL EXAM:    Constitutional: NAD, awake and alert, well-developed  HEENT: PERR, EOMI,  No oral cyananosis.  Neck:  supple,  No JVD  Respiratory: Breath sounds are clear bilaterally, No wheezing, rales or rhonchi  Cardiovascular: S1 and S2, IR IR ESM III/VI intact S2   Gastrointestinal: Bowel Sounds present, soft, nontender.   Extremities: No peripheral edema. No clubbing or cyanosis.  Vascular: 2+ peripheral pulses  Neurological: A/O confused  no focal deficits  Musculoskeletal: no calf tenderness.  Skin: No rashes.      LABS: All Labs Reviewed:                        7.4    17.11 )-----------( 229      ( 23 Dec 2018 06:42 )             23.2                         8.1    18.88 )-----------( 245      ( 22 Dec 2018 15:41 )             25.0     23 Dec 2018 11:57    138    |  101    |  17     ----------------------------<  191    3.4     |  23     |  1.51   23 Dec 2018 06:41    139    |  103    |  17     ----------------------------<  162    3.3     |  24     |  1.56   22 Dec 2018 15:41    135    |  98     |  19     ----------------------------<  167    3.6     |  25     |  1.67     Ca    9.2        23 Dec 2018 11:57  Ca    9.1        23 Dec 2018 06:41  Ca    9.6        22 Dec 2018 15:41  Phos  3.1       23 Dec 2018 11:57  Phos  3.1       23 Dec 2018 06:41  Mg     1.5       23 Dec 2018 11:57  Mg     1.1       23 Dec 2018 06:41    TPro  7.6    /  Alb  2.6    /  TBili  0.8    /  DBili  0.3    /  AST  13     /  ALT  13     /  AlkPhos  24     23 Dec 2018 11:57  TPro  8.1    /  Alb  3.0    /  TBili  1.1    /  DBili  x      /  AST  15     /  ALT  12     /  AlkPhos  27     22 Dec 2018 15:41    PT/INR - ( 22 Dec 2018 15:41 )   PT: 31.3 sec;   INR: 2.78 ratio         PTT - ( 22 Dec 2018 15:41 )  PTT:41.8 sec  CARDIAC MARKERS ( 22 Dec 2018 15:41 )  .008 ng/mL / x     / x     / x     / x          Blood Culture:         RADIOLOGY/EKG: afib RBBB LAFB     	    < from: US Transthoracic Echocardiogram w/Doppler Complete (12.23.18 @ 10:19) >  SUMMARY:  1. moderate left ventricular hypertrophy with normal left ventricular   systolic function with stage I diastolic dysfunction  2. calcific severe aortic stenosis, recommend clinical correlation  3. mild tricuspid regurgitation      < end of copied text >

## 2018-12-23 NOTE — PROGRESS NOTE ADULT - SUBJECTIVE AND OBJECTIVE BOX
Date/Time Patient Seen:  		  Referring MD:   Data Reviewed	       Patient is a 83y old  Male who presents with a chief complaint of Generalized weakness. (23 Dec 2018 04:01)  vs and meds reviewed      Subjective/HPI     PAST MEDICAL & SURGICAL HISTORY:  Stomach cancer  Hypertension  Atrial fibrillation  Gout  No significant past surgical history        Medication list         MEDICATIONS  (STANDING):  allopurinol 100 milliGRAM(s) Oral daily  amLODIPine   Tablet 10 milliGRAM(s) Oral daily  aspirin enteric coated 81 milliGRAM(s) Oral daily  azithromycin   Tablet 500 milliGRAM(s) Oral daily  cefTRIAXone   IVPB      colchicine 0.6 milliGRAM(s) Oral two times a day  digoxin     Tablet 0.125 milliGRAM(s) Oral daily  folic acid 1 milliGRAM(s) Oral daily  hydrochlorothiazide 25 milliGRAM(s) Oral daily  levETIRAcetam  IVPB 500 milliGRAM(s) IV Intermittent every 12 hours  metoprolol succinate ER 25 milliGRAM(s) Oral daily  rivaroxaban 10 milliGRAM(s) Oral every 24 hours  thiamine 100 milliGRAM(s) Oral daily    MEDICATIONS  (PRN):  acetaminophen   Tablet .. 650 milliGRAM(s) Oral every 6 hours PRN Temp greater or equal to 38C (100.4F), Mild Pain (1 - 3)  aluminum hydroxide/magnesium hydroxide/simethicone Suspension 30 milliLiter(s) Oral every 6 hours PRN Dyspepsia  bisacodyl 5 milliGRAM(s) Oral every 12 hours PRN Constipation  guaiFENesin   Syrup  (Sugar-Free) 200 milliGRAM(s) Oral every 6 hours PRN Cough         Vitals log        ICU Vital Signs Last 24 Hrs  T(C): 36.4 (23 Dec 2018 04:02), Max: 38.7 (22 Dec 2018 18:00)  T(F): 97.6 (23 Dec 2018 04:02), Max: 101.7 (22 Dec 2018 18:00)  HR: 70 (23 Dec 2018 04:00) (69 - 93)  BP: 149/53 (23 Dec 2018 04:00) (124/57 - 163/62)  BP(mean): 75 (23 Dec 2018 04:00) (75 - 75)  ABP: --  ABP(mean): --  RR: 22 (23 Dec 2018 04:00) (16 - 24)  SpO2: 98% (23 Dec 2018 04:00) (93% - 100%)           Input and Output:  I&O's Detail    22 Dec 2018 07:01  -  23 Dec 2018 07:00  --------------------------------------------------------  IN:    lactated ringers.: 350 mL  Total IN: 350 mL    OUT:    Voided: 650 mL  Total OUT: 650 mL    Total NET: -300 mL          Lab Data                        7.4    17.11 )-----------( 229      ( 23 Dec 2018 06:42 )             23.2     12-22    135  |  98  |  19  ----------------------------<  167<H>  3.6   |  25  |  1.67<H>    Ca    9.6      22 Dec 2018 15:41    TPro  8.1  /  Alb  3.0<L>  /  TBili  1.1  /  DBili  x   /  AST  15  /  ALT  12  /  AlkPhos  27<L>  12-22      CARDIAC MARKERS ( 22 Dec 2018 15:41 )  .008 ng/mL / x     / x     / x     / x            Review of Systems	      Objective     Physical Examination  heart s1s2  lung dec BS  abd soft        Pertinent Lab findings & Imaging      Jonathan:  NO   Adequate UO     I&O's Detail    22 Dec 2018 07:01  -  23 Dec 2018 07:00  --------------------------------------------------------  IN:    lactated ringers.: 350 mL  Total IN: 350 mL    OUT:    Voided: 650 mL  Total OUT: 650 mL    Total NET: -300 mL               Discussed with:     Cultures:	        Radiology

## 2018-12-23 NOTE — PROGRESS NOTE ADULT - ASSESSMENT
82 y/o M with PMH of HTN, A. Fib on Xarelto, Stomach CA, GERD, and Gout presented with generalized weakness & repeated falls, also had seizures at the ED.

## 2018-12-23 NOTE — PROGRESS NOTE ADULT - ATTENDING COMMENTS
Anemia- f/u anemia w/u.  no transfusion at present as per hematology.  peripheral blood flow cytometry as outpatient.    Knee pain- will get x ray of both knees.    Hypokalemia, low MG- repleted, repeat labs in am.    Management plan was discussed with patient at the bedside, he understands & agrees. Anemia- f/u anemia w/u.  no transfusion at present as per hematology.  peripheral blood flow cytometry as outpatient.    Knee pain- will get x ray of both knees.    Hypokalemia, low MG- repleted, repeat labs in am.    h/o ETOH- will start on CIWA protocol.    Management plan was discussed with patient at the bedside, he understands & agrees.

## 2018-12-23 NOTE — PROGRESS NOTE ADULT - SUBJECTIVE AND OBJECTIVE BOX
Patient is a 83y old  Male who presents with a chief complaint of Generalized weakness. (23 Dec 2018 08:56)      INTERVAL HPI/OVERNIGHT EVENTS: patient is seen and examined, c/o generalized pain, bilateral knee pain.    Pain Location & Control:     MEDICATIONS  (STANDING):  allopurinol 100 milliGRAM(s) Oral daily  amLODIPine   Tablet 10 milliGRAM(s) Oral daily  aspirin enteric coated 81 milliGRAM(s) Oral daily  azithromycin   Tablet 500 milliGRAM(s) Oral daily  cefTRIAXone   IVPB      colchicine 0.6 milliGRAM(s) Oral two times a day  digoxin     Tablet 0.125 milliGRAM(s) Oral daily  folic acid 1 milliGRAM(s) Oral daily  hydrochlorothiazide 25 milliGRAM(s) Oral daily  levETIRAcetam  IVPB 500 milliGRAM(s) IV Intermittent every 12 hours  metoprolol succinate ER 25 milliGRAM(s) Oral daily  multivitamin 1 Tablet(s) Oral daily  potassium chloride    Tablet ER 20 milliEquivalent(s) Oral every 2 hours  rivaroxaban 10 milliGRAM(s) Oral every 24 hours  thiamine 100 milliGRAM(s) Oral daily    MEDICATIONS  (PRN):  acetaminophen   Tablet .. 650 milliGRAM(s) Oral every 6 hours PRN Temp greater or equal to 38C (100.4F), Mild Pain (1 - 3)  aluminum hydroxide/magnesium hydroxide/simethicone Suspension 30 milliLiter(s) Oral every 6 hours PRN Dyspepsia  bisacodyl 5 milliGRAM(s) Oral every 12 hours PRN Constipation  guaiFENesin   Syrup  (Sugar-Free) 200 milliGRAM(s) Oral every 6 hours PRN Cough  LORazepam     Tablet 1 milliGRAM(s) Oral every 2 hours PRN CIWA-Ar score increase by 2 points and a total score of 7 or less  LORazepam     Tablet 1 milliGRAM(s) Oral every 1 hour PRN CIWA-Ar score 8 or greater      Allergies    No Known Allergies    Intolerances      Vital Signs Last 24 Hrs  T(C): 37.2 (23 Dec 2018 08:00), Max: 38.7 (22 Dec 2018 18:00)  T(F): 98.9 (23 Dec 2018 08:00), Max: 101.7 (22 Dec 2018 18:00)  HR: 70 (23 Dec 2018 08:00) (59 - 93)  BP: 136/48 (23 Dec 2018 08:00) (124/57 - 163/62)  BP(mean): 71 (23 Dec 2018 08:00) (71 - 76)  RR: 25 (23 Dec 2018 08:00) (16 - 25)  SpO2: 99% (23 Dec 2018 08:00) (93% - 100%)        I&O's Detail    22 Dec 2018 07:01  -  23 Dec 2018 07:00  --------------------------------------------------------  IN:    lactated ringers.: 350 mL  Total IN: 350 mL    OUT:    Voided: 650 mL  Total OUT: 650 mL    Total NET: -300 mL      23 Dec 2018 07:01  -  23 Dec 2018 10:25  --------------------------------------------------------  IN:    IV PiggyBack: 50 mL  Total IN: 50 mL    OUT:    Voided: 200 mL  Total OUT: 200 mL    Total NET: -150 mL          LABS:                        7.4    17.11 )-----------( 229      ( 23 Dec 2018 06:42 )             23.2     23 Dec 2018 06:41    139    |  103    |  17     ----------------------------<  162    3.3     |  24     |  1.56     Ca    9.1        23 Dec 2018 06:41  Phos  3.1       23 Dec 2018 06:41  Mg     1.1       23 Dec 2018 06:41    TPro  8.1    /  Alb  3.0    /  TBili  1.1    /  DBili  x      /  AST  15     /  ALT  12     /  AlkPhos  27     22 Dec 2018 15:41    PT/INR - ( 22 Dec 2018 15:41 )   PT: 31.3 sec;   INR: 2.78 ratio         PTT - ( 22 Dec 2018 15:41 )  PTT:41.8 sec  Urinalysis Basic - ( 22 Dec 2018 15:59 )    Color: Yellow / Appearance: Clear / S.015 / pH: x  Gluc: x / Ketone: Negative  / Bili: Negative / Urobili: 1 mg/dL   Blood: x / Protein: 100 mg/dL / Nitrite: Negative   Leuk Esterase: Negative / RBC: 0-2 /HPF / WBC x   Sq Epi: x / Non Sq Epi: x / Bacteria: x      CAPILLARY BLOOD GLUCOSE      POCT Blood Glucose.: 150 mg/dL (23 Dec 2018 02:04)    CARDIAC MARKERS ( 22 Dec 2018 15:41 )  .008 ng/mL / x     / x     / x     / x          Cultures    Lactate, Blood: 1.1 mmol/L ( @ 17:45)  Lactate, Blood: 3.0 mmol/L ( @ 15:41)        RADIOLOGY & ADDITIONAL TESTS:    Imaging Personally Reviewed:  [ ] YES  [ ] NO    Consultant(s) Notes Reviewed:  [x ] YES  [ ] NO    Care Discussed with Consultants/Other Providers [x ] YES  [ ] NO

## 2018-12-23 NOTE — CONSULT NOTE ADULT - PROBLEM SELECTOR RECOMMENDATION 3
severe AS on echo which was technically suboptimal , clinically AS does not appears to be severe , clinically compensated ,  will monitor ? his falls might not be related to this instead may be due to his alcoholism

## 2018-12-23 NOTE — PROGRESS NOTE ADULT - PROBLEM SELECTOR PLAN 1
of unknown onset, possibly started a week ago when patient had a fall in [place] with his left leg buckled under him for unclear reason, had witnessed seizure of left side at the ED, CT showed age indeterminate lacunar infarct of posterior limb of the right internal capsule, admitted to telemetry on stroke protocol, neuro checks, started on diet as he passed dysphagia screening, fasting lipid profile, HbA1c, and TTE in am, neurology consult with Dr. Ferraro.

## 2018-12-23 NOTE — PROGRESS NOTE ADULT - SUBJECTIVE AND OBJECTIVE BOX
neuro cons dict.  presented with fall  witnessed seizure x2.  bilateral leg weakness cva vs ?spinal.  consider ct of cervical spine without.  brain and  lumbar mri ordered   continue ac/keppra.

## 2018-12-23 NOTE — CHART NOTE - NSCHARTNOTEFT_GEN_A_CORE
RR was activated by RN, patient had a witnessed seizure episode tonic/clonic involved the whole body for about 40 seconds, regained consciousness immediately after, no post-ictal, vitals: HR 74/min, /78, RR 20/min, SPO2 100% on NC, F.S 150 mg/dl.  Patient is AAO X3, not in any distress, Left upper extremity motor strength now 4/5 (was 2/5 earlier on admission), 1/5 on left lower extremity, 5/5 on both right upper & right lower extremities, mouth deviation to right side didn't change, B/L (+) extensor planter response, more clear on left side, (+) sensory loss on left lower extremity. Will stat patient on Keppra 500 mg IVPB Q 12h 1st dose now, upgraded patient to SPCU, Neurology Dr. Ferraro was called, left message with his answering service, waiting for him to call back.

## 2018-12-23 NOTE — PROVIDER CONTACT NOTE (EICU) - SITUATION
- reviewed medications post admit note; pt ordered pantoprazole PO. d/c' medication documented history of GERD,  or bleeding; pt tolerating PO.

## 2018-12-23 NOTE — PROGRESS NOTE ADULT - ASSESSMENT
83M with PMHx as above, admitted with generalized weakness and multiple falls, possible seizure episode in ED, YUKI, sepsis (lactic acidosis, leukocytosis),. Pt now upgraded to SPCU after RRT for witnessed "tonic-clonic seizure" activity that lasted ~40 seconds    -Pt started on keppra  -Neuro consult  -CTH negative on admission. Will need MRI in am. ? CVA  -Monitor HD's. Continue BB, hctz, asa  -TTE ordered, follow up  -Respiratory stable  -PO diet. PPI  -Continue Abx. Monitor wbc/temp  -F/U cultures  -Monitor UOP/lytes  -IVF's dc'd by EICU. Lactic acidosis resolved  -DVT ppx  -Supportive care

## 2018-12-23 NOTE — CONSULT NOTE ADULT - PROBLEM SELECTOR RECOMMENDATION 9
possibly multi factorial  alcohol withdrawal , sepsis ?CVA old vs new ,   as per neurology evaluation , alcohol withdrawal protocol
sepsis eval  will check ct chest and abd urgent without contrast  will check VBG  will check TFT  will check CRP and procalcitonin  RVP neg  I and O  IVF gently - monitor for volume overload  blood cx, serial labs, serial PE, monitor vs and HD and Sat, keep sat > 88 pct  pt got dual ABX coverage for poss CAP in ED, will reassess need and target for ABX in am, may need ID eval  lactic acid repeat shows return to normal  discussed plan of care with pt and significant other  will follow
diet as tolerated  etiology unclear ? biliary versus diverticulitis  cont antibiotics  daily lfts  check abdominal sonogram

## 2018-12-23 NOTE — PROGRESS NOTE ADULT - PROBLEM SELECTOR PLAN 7
with CVR, will continue Digoxin & Metoprolol (Betaxolol is non formulary) with hold parameters in addition to Xarelto.  cardiology evl- Dr.Palla. notified.

## 2018-12-23 NOTE — PROGRESS NOTE ADULT - SUBJECTIVE AND OBJECTIVE BOX
83M with PMHx of gastric cancer, htn, afib, gout, gerd, initially admitted with generalised weakness and multiple falls. Pt possibly had a siezure episode in ED as per chart notes. Now RRT called by bedside RN for tonic-clonic seizure lasting ~40 seconds which was witnessed by the RN. Pt seen and examined at bedside, awake, following commands. no post-ictal phase. Pt denies chest pain, sob, n/v/d, loc. Pt started on keppra and transferred to SPCU for further management      PAST MEDICAL & SURGICAL HISTORY:  Stomach cancer  Hypertension  Atrial fibrillation  Gout  No significant past surgical history      Review of Systems:  Constitutional: No fever, chills, fatigue  Neuro: No headache, numbness, +weakness, + falls  Resp: No cough, wheezing, shortness of breath  CVS: No chest pain, palpitations, leg swelling  GI: No abdominal pain, nausea, vomiting, diarrhea   : No dysuria, frequency, incontinence  Skin: No itching, burning, rashes, or lesions   Msk: No joint pain or swelling  Psych: No depression, anxiety, mood swings    T(F): 97.6 (18 @ 04:02), Max: 101.7 (18 @ 18:00)  HR: 70 (18 @ 04:00) (69 - 93)  BP: 149/53 (18 @ 04:00) (124/57 - 163/62)  RR: 22 (18 @ 04:00) (16 - 24)  SpO2: 98% (18 @ 04:00) (93% - 100%)  Wt(kg): --        CAPILLARY BLOOD GLUCOSE      POCT Blood Glucose.: 150 mg/dL (23 Dec 2018 02:04)      I&O's Summary    22 Dec 2018 07:01  -  23 Dec 2018 05:59  --------------------------------------------------------  IN: 350 mL / OUT: 500 mL / NET: -150 mL        Physical Exam:     Gen: WD/WG male  Neuro: A&Ox3, non-focal  HEENT: NC/AT  Resp: CTA b/l  CVS: nl S1/S2, RRR  Abd: soft, nt, nd, +bs  Ext: no edema, +pulses  Skin: well perfused, warm      Meds:    azithromycin   Tablet Oral  cefTRIAXone   IVPB   amLODIPine   Tablet Oral  digoxin     Tablet Oral  hydrochlorothiazide Oral  metoprolol succinate ER Oral  allopurinol Oral  colchicine Oral  guaiFENesin   Syrup  (Sugar-Free) Oral PRN  acetaminophen   Tablet .. Oral PRN  levETIRAcetam  IVPB IV Intermittent  aspirin enteric coated Oral  rivaroxaban Oral  aluminum hydroxide/magnesium hydroxide/simethicone Suspension Oral PRN  bisacodyl Oral PRN                              8.1    18.88 )--------( 245      ( 22 Dec 2018 15:41 )             25.0           135  |  98  |  19  ----------------------------<  167<H>  3.6   |  25  |  1.67<H>    Ca    9.6      22 Dec 2018 15:41    TPro  8.1  /  Alb  3.0<L>  /  TBili  1.1  /  DBili  x   /  AST  15  /  ALT  12  /  AlkPhos  27<L>      Lactate 1.1            @ 17:45    Lactate 3.0            @ 15:41      CARDIAC MARKERS ( 22 Dec 2018 15:41 )  .008 ng/mL / x     / x     / x     / x          PT/INR - ( 22 Dec 2018 15:41 )   PT: 31.3 sec;   INR: 2.78 ratio         PTT - ( 22 Dec 2018 15:41 )  PTT:41.8 sec  Urinalysis Basic - ( 22 Dec 2018 15:59 )    Color: Yellow / Appearance: Clear / S.015 / pH: x  Gluc: x / Ketone: Negative  / Bili: Negative / Urobili: 1 mg/dL   Blood: x / Protein: 100 mg/dL / Nitrite: Negative   Leuk Esterase: Negative / RBC: 0-2 /HPF / WBC x   Sq Epi: x / Non Sq Epi: x / Bacteria: x        CTH:    TECHNIQUE: Multiple axial CT images of the head were obtained without   contrast. Sagittal and coronal reconstructed images were acquired from the   source data.     COMPARISON: No prior CT studies of the brain are available for comparison at   this institution.     FINDINGS: There is no acute intracranial hemorrhage, mass effect, shift of   the midline structures, herniation, extra-axial fluid collection, or   hydrocephalus.     There is an age indeterminate appearing lacunar infarct within the posterior   limb of the right internal capsule.     There is diffuse cerebral volume loss with prominence of the sulci,   fissures, and cisternal spaces which is normal for the patient's age. There   is mild deep and periventricular white matter hypoattenuation statistically   compatible with microvascular changes given calcific atherosclerotic disease   of the intracranial arteries.     Polyps versus retention cysts are noted within the left maxilla sinus.   Additional smaller polyps are notable within the bilateral sphenoid sinuses.   The mastoid air cells are clear. The calvarium is intact. The imaged orbits   are unremarkable.     IMPRESSION: No acute intracranial hemorrhage, mass effect, or shift of the   midline structures.     Age indeterminate lacunar infarct within the posterior limb of the right   internal capsule.     CT Chest/Abd/P:  TECHNIQUE:   Axial computed tomography images of the chest without intravenous contrast.   Coronal and sagittal reformatted images were created and reviewed.   MIP reconstructed images were created and reviewed.     COMPARISON:   DX XR CHEST 2018 4:07 PM     FINDINGS:   Lungs: See Pleural Space Finding.   Pleural space: Small bilateral pleural effusions right greater than left.   Compressive atelectasis is noted on the right side in some dependent   atelectasis left side.   Heart: There is cardiomegaly with coronary artery and valvular   calcification   without pericardial effusion.   Aorta: Vascular calcification in the lower neck as well as atherosclerotic   calcification thoracic aorta without gross aneurysm.   Lymph nodes: No evidence of mediastinal adenopathy by size criteria or   fluid   collections in the mediastinum. No evidence of mediastinal adenopathy by   size   criteria or fluid collections in the mediastinum.   Bones/joints: Degenerative changes with erosion a.c. joint degenerative   changes glenohumeral joint. Scapulae appear nonacute. No acute rib fractures   are appreciated. Degenerative changes throughout thoracic spine with mild   compression deformities in midportion without acute bony abnormality.   Soft tissues: Unremarkable.     IMPRESSION:   1. Small bilateral pleural effusions right greater than left. Compressive   atelectasis is noted on the right side in some dependent atelectasis left   side.   No pneumothorax.   2. There is cardiomegaly with coronary artery and valvular calcification   without pericardial effusion.   3. No evidence of mediastinal adenopathy by size criteria or fluid   collections   in the mediastinum.   4. Please see CT scan abdomen and pelvis for findings in the upper abdomen.             ******PRELIMINARY REPORT******   ******PRELIMINARY REPORT******    CXR:  INTERPRETATION: XR CHEST AP OR PA 1V     Single AP view     HISTORY: Weakness     Comparison: none.     Cardiomegaly. Clear lungs. No effusion.     IMPRESSION: Cardiomegaly with clear lungs       CENTRAL LINE: no    WELLS: no    A-LINE: no    GLOBAL ISSUE/BEST PRACTICE:  Analgesia: yes  Sedation: no  HOB elevation: yes  Stress ulcer prophylaxis: n/a  VTE prophylaxis: yes  Glycemic control: n/a  Nutrition: yes      CODE STATUS: Full  GOC discussion: Y  Critical care time spent (mins): 49  (Reviewing data, imaging, discussing with multidisciplinary team, non inclusive of procedures, discussing goals of care with patient/family)

## 2018-12-23 NOTE — DIETITIAN INITIAL EVALUATION ADULT. - OTHER INFO
84 y/o M with PMH of HTN, A. Fib on Xarelto, Stomach CA, GERD, and Gout who presented with generalized weakness with repeated falls. Has history of ETOH and occasional hemorrhoidal bleeding.  Lab workup notable for macrocytic anemia and leukocytosis with diff suggestive of monocytosis 82 y/o M with PMH of HTN, A. Fib, Stomach CA, GERD, and Gout who presented with generalized weakness with repeated falls. Has history of ETOH and occasional hemorrhoidal bleeding.  MD documents:  lab workup notable for macrocytic anemia and leukocytosis with diff suggestive of monocytosis.  Seen by heme and recommended  check Fe studies, B 12, folate and throid studies.  CT head: no acute intracranial hemorrhage.  Subjective information obtained from pt's partner at bedside. Pt had good appetite and po intake PTA. NKA to food. No difficulty in chewing or swallowing.  Pt appears confused and agitated.  He tolerated his breakfast without difficulty. Vitamin-mineral supplementation in place (MVI, B1, folic acid) and same remains appropriate. No dietary complaints at this time.

## 2018-12-23 NOTE — PROGRESS NOTE ADULT - PROBLEM SELECTOR PLAN 1
sepsis eval  labs and PE and imaging reviewed  ct scan report prelim reviewed  doubt PNA - on emp ABX  eval for GI source of sepsis - may need GI and ID eval  I and O  monitor vs and HD and Sat  keep sat > 88 pct  needs to use I martin  cvs regimen, AF - rate control, on tele monitor  replete lytes  pain assessment  am WBC noted  cx pending  will follow

## 2018-12-24 ENCOUNTER — OUTPATIENT (OUTPATIENT)
Dept: OUTPATIENT SERVICES | Facility: HOSPITAL | Age: 83
LOS: 1 days | End: 2018-12-24
Payer: COMMERCIAL

## 2018-12-24 DIAGNOSIS — J18.9 PNEUMONIA, UNSPECIFIED ORGANISM: ICD-10-CM

## 2018-12-24 LAB
ACANTHOCYTES BLD QL SMEAR: SLIGHT — SIGNIFICANT CHANGE UP
ANION GAP SERPL CALC-SCNC: 15 MMOL/L — SIGNIFICANT CHANGE UP (ref 5–17)
ANISOCYTOSIS BLD QL: SIGNIFICANT CHANGE UP
APPEARANCE UR: CLEAR — SIGNIFICANT CHANGE UP
BASE EXCESS BLDA CALC-SCNC: 0.2 MMOL/L — SIGNIFICANT CHANGE UP (ref -2–2)
BASOPHILS # BLD AUTO: 0 K/UL — SIGNIFICANT CHANGE UP (ref 0–0.2)
BASOPHILS NFR BLD AUTO: 0 % — SIGNIFICANT CHANGE UP (ref 0–2)
BILIRUB UR-MCNC: NEGATIVE — SIGNIFICANT CHANGE UP
BLOOD GAS SOURCE: SIGNIFICANT CHANGE UP
BUN SERPL-MCNC: 17 MG/DL — SIGNIFICANT CHANGE UP (ref 7–23)
CALCIUM SERPL-MCNC: 9.3 MG/DL — SIGNIFICANT CHANGE UP (ref 8.4–10.5)
CHLORIDE SERPL-SCNC: 101 MMOL/L — SIGNIFICANT CHANGE UP (ref 96–108)
CO2 SERPL-SCNC: 23 MMOL/L — SIGNIFICANT CHANGE UP (ref 22–31)
COLOR SPEC: YELLOW — SIGNIFICANT CHANGE UP
CREAT SERPL-MCNC: 1.38 MG/DL — HIGH (ref 0.5–1.3)
DACRYOCYTES BLD QL SMEAR: SLIGHT — SIGNIFICANT CHANGE UP
DIFF PNL FLD: ABNORMAL
ELLIPTOCYTES BLD QL SMEAR: SLIGHT — SIGNIFICANT CHANGE UP
EOSINOPHIL # BLD AUTO: 0 K/UL — SIGNIFICANT CHANGE UP (ref 0–0.5)
EOSINOPHIL NFR BLD AUTO: 0 % — SIGNIFICANT CHANGE UP (ref 0–6)
GLUCOSE SERPL-MCNC: 156 MG/DL — HIGH (ref 70–99)
GLUCOSE UR QL: NEGATIVE MG/DL — SIGNIFICANT CHANGE UP
HCO3 BLDA-SCNC: 25 MMOL/L — SIGNIFICANT CHANGE UP (ref 21–29)
HCT VFR BLD CALC: 26.1 % — LOW (ref 39–50)
HGB BLD-MCNC: 8.5 G/DL — LOW (ref 13–17)
HOROWITZ INDEX BLDA+IHG-RTO: 30 — SIGNIFICANT CHANGE UP
HYPOCHROMIA BLD QL: SLIGHT — SIGNIFICANT CHANGE UP
KETONES UR-MCNC: NEGATIVE — SIGNIFICANT CHANGE UP
LACTATE SERPL-SCNC: 1.5 MMOL/L — SIGNIFICANT CHANGE UP (ref 0.7–2)
LACTATE SERPL-SCNC: 2 MMOL/L — SIGNIFICANT CHANGE UP (ref 0.7–2)
LEUKOCYTE ESTERASE UR-ACNC: NEGATIVE — SIGNIFICANT CHANGE UP
LYMPHOCYTES # BLD AUTO: 12 % — LOW (ref 13–44)
LYMPHOCYTES # BLD AUTO: 3.53 K/UL — HIGH (ref 1–3.3)
LYMPHOCYTES # SPEC AUTO: 1 % — HIGH (ref 0–0)
MAGNESIUM SERPL-MCNC: 1.6 MG/DL — SIGNIFICANT CHANGE UP (ref 1.6–2.6)
MANUAL SMEAR VERIFICATION: SIGNIFICANT CHANGE UP
MCHC RBC-ENTMCNC: 32.6 GM/DL — SIGNIFICANT CHANGE UP (ref 32–36)
MCHC RBC-ENTMCNC: 35.7 PG — HIGH (ref 27–34)
MCV RBC AUTO: 109.7 FL — HIGH (ref 80–100)
MICROCYTES BLD QL: SLIGHT — SIGNIFICANT CHANGE UP
MONOCYTES # BLD AUTO: 6.47 K/UL — HIGH (ref 0–0.9)
MONOCYTES NFR BLD AUTO: 22 % — HIGH (ref 2–14)
NEUTROPHILS # BLD AUTO: 19.11 K/UL — HIGH (ref 1.8–7.4)
NEUTROPHILS NFR BLD AUTO: 60 % — SIGNIFICANT CHANGE UP (ref 43–77)
NEUTS BAND # BLD: 5 % — SIGNIFICANT CHANGE UP (ref 0–8)
NITRITE UR-MCNC: NEGATIVE — SIGNIFICANT CHANGE UP
NRBC # BLD: 0 — SIGNIFICANT CHANGE UP
NRBC # BLD: SIGNIFICANT CHANGE UP /100 WBCS (ref 0–0)
OVALOCYTES BLD QL SMEAR: SLIGHT — SIGNIFICANT CHANGE UP
PCO2 BLDA: 26 MMHG — LOW (ref 32–46)
PH BLD: 7.54 — HIGH (ref 7.35–7.45)
PH UR: 5 — SIGNIFICANT CHANGE UP (ref 5–8)
PHOSPHATE SERPL-MCNC: 3.5 MG/DL — SIGNIFICANT CHANGE UP (ref 2.5–4.5)
PLAT MORPH BLD: NORMAL — SIGNIFICANT CHANGE UP
PLATELET # BLD AUTO: 275 K/UL — SIGNIFICANT CHANGE UP (ref 150–400)
PLATELET COUNT - ESTIMATE: NORMAL — SIGNIFICANT CHANGE UP
PO2 BLDA: 87 MMHG — SIGNIFICANT CHANGE UP (ref 74–108)
POIKILOCYTOSIS BLD QL AUTO: SIGNIFICANT CHANGE UP
POLYCHROMASIA BLD QL SMEAR: SLIGHT — SIGNIFICANT CHANGE UP
POTASSIUM SERPL-MCNC: 3.9 MMOL/L — SIGNIFICANT CHANGE UP (ref 3.5–5.3)
POTASSIUM SERPL-SCNC: 3.9 MMOL/L — SIGNIFICANT CHANGE UP (ref 3.5–5.3)
PROT UR-MCNC: 30 MG/DL
RBC # BLD: 2.38 M/UL — LOW (ref 4.2–5.8)
RBC # FLD: 17 % — HIGH (ref 10.3–14.5)
RBC BLD AUTO: ABNORMAL
SAO2 % BLDA: 96 % — SIGNIFICANT CHANGE UP (ref 92–96)
SCHISTOCYTES BLD QL AUTO: SLIGHT — SIGNIFICANT CHANGE UP
SMUDGE CELLS # BLD: PRESENT — SIGNIFICANT CHANGE UP
SODIUM SERPL-SCNC: 139 MMOL/L — SIGNIFICANT CHANGE UP (ref 135–145)
SP GR SPEC: 1.02 — SIGNIFICANT CHANGE UP (ref 1.01–1.02)
SPHEROCYTES BLD QL SMEAR: SLIGHT — SIGNIFICANT CHANGE UP
STOMATOCYTES BLD QL SMEAR: SLIGHT — SIGNIFICANT CHANGE UP
UROBILINOGEN FLD QL: NEGATIVE MG/DL — SIGNIFICANT CHANGE UP
WBC # BLD: 29.4 K/UL — HIGH (ref 3.8–10.5)
WBC # FLD AUTO: 29.4 K/UL — HIGH (ref 3.8–10.5)

## 2018-12-24 PROCEDURE — 71045 X-RAY EXAM CHEST 1 VIEW: CPT | Mod: 26

## 2018-12-24 PROCEDURE — 72148 MRI LUMBAR SPINE W/O DYE: CPT | Mod: 26

## 2018-12-24 PROCEDURE — 99233 SBSQ HOSP IP/OBS HIGH 50: CPT

## 2018-12-24 PROCEDURE — 70551 MRI BRAIN STEM W/O DYE: CPT | Mod: 26

## 2018-12-24 RX ORDER — AZITHROMYCIN 500 MG/1
TABLET, FILM COATED ORAL
Qty: 0 | Refills: 0 | Status: DISCONTINUED | OUTPATIENT
Start: 2018-12-24 | End: 2018-12-25

## 2018-12-24 RX ORDER — PIPERACILLIN AND TAZOBACTAM 4; .5 G/20ML; G/20ML
3.38 INJECTION, POWDER, LYOPHILIZED, FOR SOLUTION INTRAVENOUS ONCE
Qty: 0 | Refills: 0 | Status: COMPLETED | OUTPATIENT
Start: 2018-12-24 | End: 2018-12-24

## 2018-12-24 RX ORDER — THIAMINE MONONITRATE (VIT B1) 100 MG
500 TABLET ORAL THREE TIMES A DAY
Qty: 0 | Refills: 0 | Status: COMPLETED | OUTPATIENT
Start: 2018-12-24 | End: 2018-12-27

## 2018-12-24 RX ORDER — PIPERACILLIN AND TAZOBACTAM 4; .5 G/20ML; G/20ML
3.38 INJECTION, POWDER, LYOPHILIZED, FOR SOLUTION INTRAVENOUS EVERY 8 HOURS
Qty: 0 | Refills: 0 | Status: COMPLETED | OUTPATIENT
Start: 2018-12-24 | End: 2018-12-31

## 2018-12-24 RX ORDER — AZITHROMYCIN 500 MG/1
500 TABLET, FILM COATED ORAL EVERY 24 HOURS
Qty: 0 | Refills: 0 | Status: DISCONTINUED | OUTPATIENT
Start: 2018-12-25 | End: 2018-12-25

## 2018-12-24 RX ORDER — ACETAMINOPHEN 500 MG
650 TABLET ORAL EVERY 6 HOURS
Qty: 0 | Refills: 0 | Status: DISCONTINUED | OUTPATIENT
Start: 2018-12-24 | End: 2018-12-25

## 2018-12-24 RX ORDER — ENOXAPARIN SODIUM 100 MG/ML
60 INJECTION SUBCUTANEOUS
Qty: 0 | Refills: 0 | Status: DISCONTINUED | OUTPATIENT
Start: 2018-12-24 | End: 2018-12-28

## 2018-12-24 RX ORDER — THIAMINE MONONITRATE (VIT B1) 100 MG
500 TABLET ORAL THREE TIMES A DAY
Qty: 0 | Refills: 0 | Status: DISCONTINUED | OUTPATIENT
Start: 2018-12-24 | End: 2018-12-24

## 2018-12-24 RX ORDER — AZITHROMYCIN 500 MG/1
500 TABLET, FILM COATED ORAL ONCE
Qty: 0 | Refills: 0 | Status: COMPLETED | OUTPATIENT
Start: 2018-12-24 | End: 2018-12-24

## 2018-12-24 RX ORDER — SODIUM CHLORIDE 9 MG/ML
1000 INJECTION, SOLUTION INTRAVENOUS
Qty: 0 | Refills: 0 | Status: DISCONTINUED | OUTPATIENT
Start: 2018-12-24 | End: 2018-12-25

## 2018-12-24 RX ADMIN — AZITHROMYCIN 255 MILLIGRAM(S): 500 TABLET, FILM COATED ORAL at 19:53

## 2018-12-24 RX ADMIN — Medication 25 MILLIGRAM(S): at 06:17

## 2018-12-24 RX ADMIN — Medication 0.6 MILLIGRAM(S): at 18:23

## 2018-12-24 RX ADMIN — Medication 1 MILLIGRAM(S): at 06:27

## 2018-12-24 RX ADMIN — Medication 650 MILLIGRAM(S): at 18:22

## 2018-12-24 RX ADMIN — Medication 650 MILLIGRAM(S): at 23:39

## 2018-12-24 RX ADMIN — AMLODIPINE BESYLATE 10 MILLIGRAM(S): 2.5 TABLET ORAL at 06:17

## 2018-12-24 RX ADMIN — LEVETIRACETAM 400 MILLIGRAM(S): 250 TABLET, FILM COATED ORAL at 06:18

## 2018-12-24 RX ADMIN — Medication 105 MILLIGRAM(S): at 18:31

## 2018-12-24 RX ADMIN — LEVETIRACETAM 400 MILLIGRAM(S): 250 TABLET, FILM COATED ORAL at 18:22

## 2018-12-24 RX ADMIN — Medication 0.12 MILLIGRAM(S): at 06:17

## 2018-12-24 RX ADMIN — PIPERACILLIN AND TAZOBACTAM 200 GRAM(S): 4; .5 INJECTION, POWDER, LYOPHILIZED, FOR SOLUTION INTRAVENOUS at 18:18

## 2018-12-24 RX ADMIN — Medication 0.6 MILLIGRAM(S): at 06:17

## 2018-12-24 RX ADMIN — Medication 105 MILLIGRAM(S): at 23:39

## 2018-12-24 RX ADMIN — Medication 1 MILLIGRAM(S): at 00:50

## 2018-12-24 RX ADMIN — CEFTRIAXONE 100 GRAM(S): 500 INJECTION, POWDER, FOR SOLUTION INTRAMUSCULAR; INTRAVENOUS at 04:24

## 2018-12-24 RX ADMIN — RIVAROXABAN 10 MILLIGRAM(S): KIT at 18:22

## 2018-12-24 RX ADMIN — Medication 650 MILLIGRAM(S): at 19:00

## 2018-12-24 RX ADMIN — Medication 105 MILLIGRAM(S): at 12:00

## 2018-12-24 NOTE — PROVIDER CONTACT NOTE (CHANGE IN STATUS NOTIFICATION) - BACKGROUND
pt febrile this afternoon, sepsis work-up done, tylenol suppository given at 1800, next due midnight, pt obtunded, unable to take PO

## 2018-12-24 NOTE — PROGRESS NOTE ADULT - ASSESSMENT
84 y/o M with PMH of HTN, A. Fib on Xarelto, Stomach CA, GERD, and Gout who presented with generalized weakness with repeated falls. Has history of ETOH and occasional hemorrhoidal bleeding.  Lab workup notable for macrocytic anemia and leukocytosis with diff suggestive of monocytosis    Macrocytic anemia most likely related to ETOH but may have underlying myelodysplasia/myeloproliferative disease with monocytosis.     Anemia Hgb has decreased since admission that maybe related to hydration but need to rule out bleeding  B12, Folate and iron studies are adequate. TFTs WNL    Ferritin elevated, likely reactive, increased due to acute phase reactant, as evidenced by elevated .   Should have further eval of this outpt to ensure returns to normal.     Anemia of chronic disease    Hx of Gastric cacner    Recommendations  1.  check stool occult blood, if positive will need GI eval.   2.  will hold transfusion and observe  3.  follow CBC  4.  will do peripheral blood flow cytometry as outpatient, repeat ferritin outpt  5.  further hem onc recommendations pending above  6. EtOH withdrawl protocol per medicine/SP[facility] team.   7. Obtain additional cancer records. Await family contact.

## 2018-12-24 NOTE — PROVIDER CONTACT NOTE (EICU) - BACKGROUND
- please see previous notes for further details; admitted with recurrent falls, currently concerns for sepsis until proven otherwise, in setting of presumed EtOH withdraw and seizures No

## 2018-12-24 NOTE — OCCUPATIONAL THERAPY INITIAL EVALUATION ADULT - PERTINENT HX OF CURRENT PROBLEM, REHAB EVAL
Adm with generalized weakness & recent h/o falls (r/o CVA/TIA).  ED 12/23, patient had a siezure episode for about 30 seconds that involved his left side of the body. Never had any seizure episodes in the past. At the ED, patient had a siezure episode for about 30 seconds that involved his left side of the body. Never had any seizure episodes in the past. 12/24 RRT called by nurse, + witnessed ~40 sec tonic clonic seizure. Adm with generalized weakness & recent h/o falls (r/o CVA/TIA). At the ED, patient had a siezure episode for about 30 seconds that involved his left side of the body. Never had any seizure episodes in the past. 12/24 RRT called by nurse, + witnessed ~40 sec tonic clonic seizure. + Pneumonia

## 2018-12-24 NOTE — PROGRESS NOTE ADULT - SUBJECTIVE AND OBJECTIVE BOX
Patient is a 83y old  Male who presents with a chief complaint of Generalized weakness. (24 Dec 2018 09:04)      INTERVAL HPI/OVERNIGHT EVENTS:  unable to obtained as pt is sedated.    Pain Location & Control:     MEDICATIONS  (STANDING):  allopurinol 100 milliGRAM(s) Oral daily  amLODIPine   Tablet 10 milliGRAM(s) Oral daily  aspirin enteric coated 81 milliGRAM(s) Oral daily  colchicine 0.6 milliGRAM(s) Oral two times a day  digoxin     Tablet 0.125 milliGRAM(s) Oral daily  folic acid 1 milliGRAM(s) Oral daily  hydrochlorothiazide 25 milliGRAM(s) Oral daily  levETIRAcetam  IVPB 500 milliGRAM(s) IV Intermittent every 12 hours  metoprolol succinate ER 25 milliGRAM(s) Oral daily  multivitamin 1 Tablet(s) Oral daily  rivaroxaban 10 milliGRAM(s) Oral every 24 hours  thiamine IVPB 500 milliGRAM(s) IV Intermittent three times a day    MEDICATIONS  (PRN):  acetaminophen   Tablet .. 650 milliGRAM(s) Oral every 6 hours PRN Temp greater or equal to 38C (100.4F), Mild Pain (1 - 3)  aluminum hydroxide/magnesium hydroxide/simethicone Suspension 30 milliLiter(s) Oral every 6 hours PRN Dyspepsia  bisacodyl 5 milliGRAM(s) Oral every 12 hours PRN Constipation  guaiFENesin   Syrup  (Sugar-Free) 200 milliGRAM(s) Oral every 6 hours PRN Cough  LORazepam   Injectable 1 milliGRAM(s) IV Push every 2 hours PRN CIWA-Ar score increase by 2 points and a total score of 7 or less  LORazepam   Injectable 1 milliGRAM(s) IV Push every 1 hour PRN CIWA-Ar score 8 or greater      Allergies    No Known Allergies    Intolerances          Vital Signs Last 24 Hrs  T(C): 36.7 (24 Dec 2018 08:57), Max: 37.9 (23 Dec 2018 16:03)  T(F): 98.1 (24 Dec 2018 08:57), Max: 100.2 (23 Dec 2018 16:03)  HR: 82 (24 Dec 2018 13:50) (75 - 98)  BP: 148/58 (24 Dec 2018 13:50) (129/57 - 148/58)  BP(mean): 80 (24 Dec 2018 10:00) (71 - 89)  RR: 22 (24 Dec 2018 13:50) (16 - 35)  SpO2: 94% (24 Dec 2018 13:50) (94% - 100%)        I&O's Detail    23 Dec 2018 07:01  -  24 Dec 2018 07:00  --------------------------------------------------------  IN:    IV PiggyBack: 250 mL  Total IN: 250 mL    OUT:    Voided: 200 mL  Total OUT: 200 mL    Total NET: 50 mL          LABS:                        8.5    29.40 )-----------( 275      ( 24 Dec 2018 07:18 )             26.1     24 Dec 2018 07:18    139    |  101    |  17     ----------------------------<  156    3.9     |  23     |  1.38     Ca    9.3        24 Dec 2018 07:18  Phos  3.5       24 Dec 2018 07:18  Mg     1.6       24 Dec 2018 07:18      PT/INR - ( 22 Dec 2018 15:41 )   PT: 31.3 sec;   INR: 2.78 ratio         PTT - ( 22 Dec 2018 15:41 )  PTT:41.8 sec  Urinalysis Basic - ( 22 Dec 2018 15:59 )    Color: Yellow / Appearance: Clear / S.015 / pH: x  Gluc: x / Ketone: Negative  / Bili: Negative / Urobili: 1 mg/dL   Blood: x / Protein: 100 mg/dL / Nitrite: Negative   Leuk Esterase: Negative / RBC: 0-2 /HPF / WBC x   Sq Epi: x / Non Sq Epi: x / Bacteria: x      CAPILLARY BLOOD GLUCOSE        CARDIAC MARKERS ( 22 Dec 2018 15:41 )  .008 ng/mL / x     / x     / x     / x          Cultures  Culture Results:   No growth to date. ( @ 23:17)  Culture Results:   No growth to date. ( @ 21:00)  Culture Results:   <10,000 CFU/ml Normal Urogenital kristi present ( @ 20:50)        Culture - Blood (collected 18 @ 23:17)  Source: .Blood Blood  Preliminary Report (18 @ 01:01):    No growth to date.    Culture - Blood (collected 18 @ 21:00)  Source: .Blood Blood  Preliminary Report (18 @ 22:01):    No growth to date.    Culture - Urine (collected 18 @ 20:50)  Source: .Urine Clean Catch (Midstream)  Final Report (18 @ 16:28):    <10,000 CFU/ml Normal Urogenital kristi present        RADIOLOGY & ADDITIONAL TESTS:    Imaging Personally Reviewed:  [ ] YES  [ ] NO    Consultant(s) Notes Reviewed:  [x ] YES  [ ] NO    Care Discussed with Consultants/Other Providers [x ] YES  [ ] NO

## 2018-12-24 NOTE — PROGRESS NOTE ADULT - PROBLEM SELECTOR PLAN 1
unknown onset, possibly started a week ago when patient had a fall in [place]  CT showed age indeterminate lacunar infarct of posterior limb of the right internal capsule.  physical therapy.

## 2018-12-24 NOTE — PROVIDER CONTACT NOTE (CHANGE IN STATUS NOTIFICATION) - ACTION/TREATMENT ORDERED:
will place orders-lactate, straight cath for urine cx, respiratory to obtain ABG, place pt on hypothermia blanket, continue to monitor

## 2018-12-24 NOTE — PROGRESS NOTE ADULT - SUBJECTIVE AND OBJECTIVE BOX
ID Progress note covering Dr. Jerez    Name: YUE KING  Age: 83y  Gender: Male  MRN: [id number]    Interval History-- events noted, now on CIWA protocol as has hx of ETOH abuse , sedated. Seen by GI, hem/onc  and neurology . Scheduled for MRI . Has swollen right knee ? CPPD   Notes reviewed    Past Medical History--  Stomach cancer  Hypertension  Atrial fibrillation  Gout  No significant past surgical history      For details regarding the patient's social history, family history, and other miscellaneous elements, please refer the initial infectious diseases consultation and/or the admitting history and physical examination for this admission.    Allergies--  Allergies    No Known Allergies    Intolerances        Medications--  Antibiotics:  azithromycin   Tablet 500 milliGRAM(s) Oral daily  cefTRIAXone   IVPB 1 Gram(s) IV Intermittent every 24 hours  cefTRIAXone   IVPB        Immunologic:    Other:  acetaminophen   Tablet .. PRN  allopurinol  aluminum hydroxide/magnesium hydroxide/simethicone Suspension PRN  amLODIPine   Tablet  aspirin enteric coated  bisacodyl PRN  colchicine  digoxin     Tablet  folic acid  guaiFENesin   Syrup  (Sugar-Free) PRN  hydrochlorothiazide  levETIRAcetam  IVPB  LORazepam   Injectable PRN  LORazepam   Injectable PRN  metoprolol succinate ER  multivitamin  rivaroxaban  thiamine      Review of Systems--  Review of systems unable to be obtained secondary to clinical condition.    Physical Examination--    Vital Signs: T(F): 98.3 (12-24-18 @ 04:02), Max: 100.2 (12-23-18 @ 16:03)  HR: 98 (12-24-18 @ 06:00)  BP: 129/72 (12-24-18 @ 06:00)  RR: 35 (12-24-18 @ 06:00)  SpO2: 97% (12-24-18 @ 06:00)  Wt(kg): --  General: Nontoxic-appearing Male in no acute distress. sedated   HEENT: AT/NC. PERRL. EOMI. Anicteric. Conjunctiva pink and moist. Oropharynx clear. Dentition fair.  Neck: Not rigid. No sense of mass.  Nodes: None palpable.  Lungs: Clear bilaterally without rales, wheezing or rhonchi  Heart: Regular rate and rhythm. No Murmur. No rub. No gallop. No palpable thrill.  Abdomen: Bowel sounds present and normoactive. Soft. Nondistended. Nontender. No sense of mass. No organomegaly.  Back: No spinal tenderness. No costovertebral angle tenderness.   Extremities: No cyanosis or clubbing. No edema. Right knee - joint effusion   Skin: Warm. Dry. Good turgor. No rash. No vasculitic stigmata.  Psychiatric: Appropriate affect and mood for situation.         Laboratory Studies--  CBC                        8.5    29.40 )-----------( 275      ( 24 Dec 2018 07:18 )             26.1       Chemistries  12-23    138  |  101  |  17  ----------------------------<  191<H>  3.4<L>   |  23  |  1.51<H>    Ca    9.2      23 Dec 2018 11:57  Phos  3.1     12-23  Mg     1.5     12-23    TPro  7.6  /  Alb  2.6<L>  /  TBili  0.8  /  DBili  0.3<H>  /  AST  13  /  ALT  13  /  AlkPhos  24<L>  12-23    Sedimentation Rate, Erythrocyte (12.23.18 @ 06:55)    Sedimentation Rate, Erythrocyte: 140 mm/Hr    Procalcitonin, Serum (12.23.18 @ 14:32)    Procalcitonin, Serum: 0.28:     C-Reactive Protein, Serum (12.23.18 @ 12:05)    C-Reactive Protein, Serum: 15.61 mg/dL    Iron with Total Binding Capacity (12.23.18 @ 12:05)    Iron - Total Binding Capacity.: 228 ug/dL    % Saturation, Iron: 7 %    Iron Total, Serum: 17 ug/dL    Unsaturated Iron Binding Capacity: 211 ug/dL    Ferritin, Serum (12.23.18 @ 12:05)    Ferritin, Serum: 555 ng/mL    Lactate, Blood: 1.1 mmol/L (12-22-18 @ 17:45)  Lactate, Blood: 3.0 mmol/L (12-22-18 @ 15:41)    Culture Data    Culture - Blood (collected 22 Dec 2018 23:17)  Source: .Blood Blood  Preliminary Report (24 Dec 2018 01:01):    No growth to date.    Culture - Blood (collected 22 Dec 2018 21:00)  Source: .Blood Blood  Preliminary Report (23 Dec 2018 22:01):    No growth to date.    Culture - Urine (collected 22 Dec 2018 20:50)  Source: .Urine Clean Catch (Midstream)  Final Report (23 Dec 2018 16:28):    <10,000 CFU/ml Normal Urogenital kristi present      Radiology:  Xray Knee 3 Views, Bilateral (12.23.18 @ 12:55) >  FINDINGS:  No prior exams are available for comparison.    No fracture is seen. A joint effusion is found.  No loose body is   identified. There is chondrocalcinosis suggestive of CPPD. Minimal   productive changes are noted. Atherosclerotic vascular calcifications are   noted. The soft tissues appear intact    IMPRESSION:   Joint effusion  Chondrocalcinosis suggestive of CPPD.        CT Head No Cont (12.22.18 @ 16:20) >  IMPRESSION: No acute intracranial hemorrhage, mass effect, or shift of   the midline structures.    Age indeterminate lacunar infarct within the posterior limb of the right   internal capsule.      CT Chest No Cont (12.22.18 @ 20:45) >  IMPRESSION:   1. Small bilateral pleural effusions right greater than left. Compressive   atelectasis is noted on the right side in some dependent atelectasis left   side.   No pneumothorax.   2. There is cardiomegaly with coronary artery and valvular calcification   without pericardial effusion.   3. No evidence of mediastinal adenopathy by size criteria or fluid   collections   in the mediastinum.   4. Please see CT scan abdomen and pelvis for findings in the upper   abdomen.    IMPRESSION FOR CT ABDOMEN AND PELVIS:    NONOBSTRUCTING RIGHT RENAL CALCULI    COLONIC DIVERTICULOSIS, MILD THICKENING OF THE SIGMOID COLON WITH MILD   QUESTIONABLE PERICOLONIC STRANDING. THIS COULD REPRESENT MILD/EARLY   DIVERTICULITIS.    PROSTATE ENLARGEMENT, CORRELATE WITH PSA.     GALLBLADDER IS CONTRACTED. THIS LIMITS EVALUATION, HOWEVER THERE MAY BE   SMALL AMOUNT OF PERICHOLECYSTIC FLUID. THIS CAN BE BETTER CHARACTERIZED   ON ULTRASOUND.  Assessment--    84 y/o M with PMH of HTN, A. Fib on Xarelto, Stomach CA, GERD, and Gout presented with generalized weakness & repeated falls, also had seizures at the ED. Noted to be febrile , stormy cause of fever could be UTI as he ahs urinary retention with BPH , PVR was 350 . Doubt he has pneumonia ,  he has hx of heavy ETOH abuse     He may have a myeloproliferative disorder with leukocytosis with monocytosis may have CMML . he also has severe anemia . Leukocytosis likely from myeloproliferative disorder , doubt any infection     Etiology of seizure is unclear ? CVA probable alcohol withdrawal seizure on CIWA protocol     Possible pseudogout Right knee       Plan :   - DC antibiotics   - anemia profile   - fall precautions   - may need Flomax for BPH  - PT evaluation   - follow MRI   - add colchicine for pseuogout      Continue with present regime .  Appropriate use of antibiotics and adverse effects reviewed.      I have discussed the above plan of care with patient and family in detail. They expressed understanding of the treatment plan . Risks, benefits and alternatives discussed in detail. I have asked if they have any questions or concerns and appropriately addressed them to the best of my ability .      > 35 minutes spent in direct patient care reviewing  the notes, lab data/ imaging , discussion with multidisciplinary team. All questions were addressed and answered to the best of my capacity .    Thank you for allowing me to participate in the care of your patient .        Dimple Florez MD  [phone]

## 2018-12-24 NOTE — PROGRESS NOTE ADULT - SUBJECTIVE AND OBJECTIVE BOX
Neurology Follow up note    YUE KING83yMale    HPI:  This is an 82 y/o M with PMH of HTN, A. Fib on Xarelto, Stomach CA, GERD, and Gout who presented with generalized weakness with repeated falls. Patiet states that he fell while was in [place] a week ago when his left leg buckled under him, didn't see a doctor as he was coming back to [place] next day, but over the past week he fell several times & was feeling week, and moves with difficulty. No fever or chills at home, no flu-like symptoms, and no sick contacts. At the ED, patient had a siezure episode for about 30 seconds that involved his left side of the body. Never had any seizure episodes in the past. (22 Dec 2018 20:48)      Interval History - events noted.    Patient is seen, chart was reviewed and case was discussed with the treatment team.  Pt is not in any distress.   Lying on bed comfortably.   No events reported overnight.   No clinical seizure was reported.  Sitting on chair bed comfortably.    is at bedside.    Vital Signs Last 24 Hrs  T(C): 39.5 (24 Dec 2018 17:03), Max: 39.5 (24 Dec 2018 17:03)  T(F): 103.1 (24 Dec 2018 17:03), Max: 103.1 (24 Dec 2018 17:03)  HR: 92 (24 Dec 2018 18:00) (76 - 100)  BP: 130/63 (24 Dec 2018 18:00) (129/57 - 148/58)  BP(mean): 83 (24 Dec 2018 18:00) (76 - 89)  RR: 28 (24 Dec 2018 18:00) (16 - 35)  SpO2: 97% (24 Dec 2018 18:00) (94% - 100%)        REVIEW OF SYSTEMS:  not in distress. sleepy.    On Neurological Examination:    Mental Status - Pt is lethargic barely opening his eyes(was given benzo)    Cranial Nerves - Pupils 3 mm equal and reactive to light, extraocular eye movements intact  Pt has no facial asymmetry.     Muscle tone - is normal    Motor Exam - NO DRIFT,  LE 2/5..  Sensory Exam -. Pt withdraws all extremities equally on stimulation. No asymmetry seen.    Deep tendon Reflexes - 2 plus all over.       Neck Supple -  Yes.     MEDICATIONS    acetaminophen   Tablet .. 650 milliGRAM(s) Oral every 6 hours PRN  acetaminophen  Suppository .. 650 milliGRAM(s) Rectal every 6 hours  allopurinol 100 milliGRAM(s) Oral daily  aluminum hydroxide/magnesium hydroxide/simethicone Suspension 30 milliLiter(s) Oral every 6 hours PRN  amLODIPine   Tablet 10 milliGRAM(s) Oral daily  aspirin enteric coated 81 milliGRAM(s) Oral daily  azithromycin  IVPB      azithromycin  IVPB 500 milliGRAM(s) IV Intermittent once  bisacodyl 5 milliGRAM(s) Oral every 12 hours PRN  colchicine 0.6 milliGRAM(s) Oral two times a day  digoxin     Tablet 0.125 milliGRAM(s) Oral daily  enoxaparin Injectable 60 milliGRAM(s) SubCutaneous two times a day  folic acid 1 milliGRAM(s) Oral daily  guaiFENesin   Syrup  (Sugar-Free) 200 milliGRAM(s) Oral every 6 hours PRN  lactated ringers. 1000 milliLiter(s) IV Continuous <Continuous>  levETIRAcetam  IVPB 500 milliGRAM(s) IV Intermittent every 12 hours  metoprolol succinate ER 25 milliGRAM(s) Oral daily  multivitamin 1 Tablet(s) Oral daily  piperacillin/tazobactam IVPB. 3.375 Gram(s) IV Intermittent every 8 hours  thiamine IVPB 500 milliGRAM(s) IV Intermittent three times a day      Allergies    No Known Allergies    Intolerances        LABS:  CBC Full  -  ( 24 Dec 2018 07:18 )  WBC Count : 29.40 K/uL  Hemoglobin : 8.5 g/dL  Hematocrit : 26.1 %  Platelet Count - Automated : 275 K/uL  Mean Cell Volume : 109.7 fl  Mean Cell Hemoglobin : 35.7 pg  Mean Cell Hemoglobin Concentration : 32.6 gm/dL  Auto Neutrophil # : 19.11 K/uL  Auto Lymphocyte # : 3.53 K/uL  Auto Monocyte # : 6.47 K/uL  Auto Eosinophil # : 0.00 K/uL  Auto Basophil # : 0.00 K/uL  Auto Neutrophil % : 60.0 %  Auto Lymphocyte % : 12.0 %  Auto Monocyte % : 22.0 %  Auto Eosinophil % : 0.0 %  Auto Basophil % : 0.0 %      12-24    139  |  101  |  17  ----------------------------<  156<H>  3.9   |  23  |  1.38<H>    Ca    9.3      24 Dec 2018 07:18  Phos  3.5     12-24  Mg     1.6     12-24    TPro  7.6  /  Alb  2.6<L>  /  TBili  0.8  /  DBili  0.3<H>  /  AST  13  /  ALT  13  /  AlkPhos  24<L>  12-23    Hemoglobin A1C:     Vitamin B12     RADIOLOGY    ASSESSMENT AND PLAN:      NEW ONSET GTC SEIZURE .  BILATERAL LEG WEAKNESS  ALCOHOL ABUSE,    WOULD REVIEW MRI OF THE HEAD AND LUMBAR SPINE,  CONTINUE KEPPRA,  ON CIWA PROTOCOL  CONTINUE THIAMINE.  Physical therapy evaluation.  OOB to chair/ambulation with assistance only.  Pain is accessed and addressed..  Plan of care was discussed with family. Questions answered.  Would continue to follow.

## 2018-12-24 NOTE — PROGRESS NOTE ADULT - ATTENDING COMMENTS
Anemia- f/u anemia w/u.  no transfusion at present as per hematology.  peripheral blood flow cytometry as outpatient.    Knee pain- +effusion.    Hypokalemia, low MG- resolved.    h/o ETOH- impending DTs  started on CIWA protocol.    f/u MRI brain and ls spine.

## 2018-12-24 NOTE — PROGRESS NOTE ADULT - SUBJECTIVE AND OBJECTIVE BOX
[INTERVAL HX: ]  Patient seen and examined;  Chart reviewed and events noted;   on EtOH - DT protocol.   responds to voice, to name, responds "What"  then falls asleep.   RN staff reports pt otherwise comfortable, no new events,.   Monitor w Afib    MEDICATIONS  (STANDING):  allopurinol 100 milliGRAM(s) Oral daily  amLODIPine   Tablet 10 milliGRAM(s) Oral daily  aspirin enteric coated 81 milliGRAM(s) Oral daily  colchicine 0.6 milliGRAM(s) Oral two times a day  digoxin     Tablet 0.125 milliGRAM(s) Oral daily  folic acid 1 milliGRAM(s) Oral daily  hydrochlorothiazide 25 milliGRAM(s) Oral daily  levETIRAcetam  IVPB 500 milliGRAM(s) IV Intermittent every 12 hours  metoprolol succinate ER 25 milliGRAM(s) Oral daily  multivitamin 1 Tablet(s) Oral daily  rivaroxaban 10 milliGRAM(s) Oral every 24 hours  thiamine 100 milliGRAM(s) Oral daily    MEDICATIONS  (PRN):  acetaminophen   Tablet .. 650 milliGRAM(s) Oral every 6 hours PRN Temp greater or equal to 38C (100.4F), Mild Pain (1 - 3)  aluminum hydroxide/magnesium hydroxide/simethicone Suspension 30 milliLiter(s) Oral every 6 hours PRN Dyspepsia  bisacodyl 5 milliGRAM(s) Oral every 12 hours PRN Constipation  guaiFENesin   Syrup  (Sugar-Free) 200 milliGRAM(s) Oral every 6 hours PRN Cough  LORazepam   Injectable 1 milliGRAM(s) IV Push every 2 hours PRN CIWA-Ar score increase by 2 points and a total score of 7 or less  LORazepam   Injectable 1 milliGRAM(s) IV Push every 1 hour PRN CIWA-Ar score 8 or greater      Vital Signs Last 24 Hrs  T(C): 36.7 (24 Dec 2018 08:57), Max: 37.9 (23 Dec 2018 16:03)  T(F): 98.1 (24 Dec 2018 08:57), Max: 100.2 (23 Dec 2018 16:03)  HR: 98 (24 Dec 2018 06:00) (69 - 98)  BP: 129/72 (24 Dec 2018 06:00) (119/55 - 151/56)  BP(mean): 89 (24 Dec 2018 06:00) (71 - 89)  RR: 35 (24 Dec 2018 06:00) (16 - 35)  SpO2: 97% (24 Dec 2018 06:00) (95% - 100%)    [PHYSICAL EXAM]  General: adult in NAD,  WN,  WD.  HEENT: clear oropharynx, anicteric sclera, pink conjunctivae.  Neck: supple, no masses.  CV: irreg-irreg S1S2, no murmur, no rubs, no gallops.  Lungs: clear to auscultation, no wheezes, no rales, no rhonchi.  Abdomen: soft, non-tender, non-distended, no hepatosplenomegaly, normal BS, no guarding.  Ext: no clubbing, no cyanosis, no edema.  Skin: no rashes,  no petechiae, no venous stasis changes.  Neuro: alert and oriented X1, no focal motor deficits.  LN: no KARISHMA.      [LABS:]                        8.5    29.40 )-----------( 275      ( 24 Dec 2018 07:18 )             26.1     12-24    139  |  101  |  17  ----------------------------<  156<H>  3.9   |  23  |  1.38<H>    Ca    9.3      24 Dec 2018 07:18  Phos  3.5     12-24  Mg     1.6     12-24    TPro  7.6  /  Alb  2.6<L>  /  TBili  0.8  /  DBili  0.3<H>  /  AST  13  /  ALT  13  /  AlkPhos  24<L>  12-23    PT/INR - ( 22 Dec 2018 15:41 )   PT: 31.3 sec;   INR: 2.78 ratio    PTT - ( 22 Dec 2018 15:41 )  PTT:41.8 sec      Vitamin B12, Serum (12.23.18 @ 12:05)    Vitamin B12, Serum: 587: Note: Reference Range Change on 12/18/2017. pg/mL    Folic Acid, RBC (12.23.18 @ 19:57)    Folate, RBC: 1185 ng/mL    Ferritin, Serum (12.23.18 @ 12:05)    Ferritin, Serum: 555 ng/mL    Iron with Total Binding Capacity (12.23.18 @ 12:05)    Iron - Total Binding Capacity.: 228 ug/dL    % Saturation, Iron: 7 %    Iron Total, Serum: 17 ug/dL    Unsaturated Iron Binding Capacity: 211 ug/dL    Sedimentation Rate, Erythrocyte: 140 mm/Hr (12.23.18 @ 06:55)    Thyroid Stimulating Hormone, Serum (12.23.18 @ 12:05)    Thyroid Stimulating Hormone, Serum: 2.06 uIU/mL          [RADIOLOGY STUDIES:]

## 2018-12-24 NOTE — PROGRESS NOTE ADULT - SUBJECTIVE AND OBJECTIVE BOX
PCP:    REQUESTING PHYSICIAN:    REASON FOR CONSULT:    CHIEF COMPLAINT:    HPI:  This is an 82 y/o M with PMH of HTN, A. Fib on Xarelto, Stomach CA, GERD, and Gout who presented with generalized weakness with repeated falls. Patient states that he fell while was in [place] a week ago when his left leg buckled under him, didn't see a doctor as he was coming back to [place] next day, but over the past week he fell several times & was feeling week, and moves with difficulty. No fever or chills at home, no flu-like symptoms, and no sick contacts. At the ED, patient had a seizure episode for about 30 seconds that involved his left side of the body. Never had any seizure episodes in the past. as per patient did have shaky extremities while he was awake and talking , patient was drinking alcohol a lot     patient currently denies any chest pain or shortness of breath  patient blood work showed mild leucocytosis   18: Increase leucocytosis today and fever. Pt lethargic, Atrial fib    PAST MEDICAL & SURGICAL HISTORY:  Stomach cancer  Hypertension  Atrial fibrillation  Gout  No significant past surgical history      SOCIAL HISTORY:    FAMILY HISTORY:  No pertinent family history in first degree relatives      ALLERGIES:  Allergies    No Known Allergies    Intolerances        MEDICATIONS:    MEDICATIONS  (STANDING):  acetaminophen  Suppository .. 650 milliGRAM(s) Rectal every 6 hours  allopurinol 100 milliGRAM(s) Oral daily  amLODIPine   Tablet 10 milliGRAM(s) Oral daily  aspirin enteric coated 81 milliGRAM(s) Oral daily  azithromycin  IVPB      azithromycin  IVPB 500 milliGRAM(s) IV Intermittent once  colchicine 0.6 milliGRAM(s) Oral two times a day  digoxin     Tablet 0.125 milliGRAM(s) Oral daily  enoxaparin Injectable 60 milliGRAM(s) SubCutaneous two times a day  folic acid 1 milliGRAM(s) Oral daily  lactated ringers. 1000 milliLiter(s) (125 mL/Hr) IV Continuous <Continuous>  levETIRAcetam  IVPB 500 milliGRAM(s) IV Intermittent every 12 hours  metoprolol succinate ER 25 milliGRAM(s) Oral daily  multivitamin 1 Tablet(s) Oral daily  piperacillin/tazobactam IVPB. 3.375 Gram(s) IV Intermittent every 8 hours  thiamine IVPB 500 milliGRAM(s) IV Intermittent three times a day    MEDICATIONS  (PRN):  acetaminophen   Tablet .. 650 milliGRAM(s) Oral every 6 hours PRN Temp greater or equal to 38C (100.4F), Mild Pain (1 - 3)  aluminum hydroxide/magnesium hydroxide/simethicone Suspension 30 milliLiter(s) Oral every 6 hours PRN Dyspepsia  bisacodyl 5 milliGRAM(s) Oral every 12 hours PRN Constipation  guaiFENesin   Syrup  (Sugar-Free) 200 milliGRAM(s) Oral every 6 hours PRN Cough      REVIEW OF SYSTEMS:    CONSTITUTIONAL: No weakness, fevers or chills  EYES/ENT: No visual changes;  No vertigo or throat pain   NECK: No pain or stiffness  RESPIRATORY: No cough, wheezing, hemoptysis; No shortness of breath  CARDIOVASCULAR: No chest pain or palpitations  GASTROINTESTINAL: No abdominal or epigastric pain. No nausea, vomiting, or hematemesis; No diarrhea or constipation. No melena or hematochezia.  GENITOURINARY: No dysuria, frequency or hematuria  NEUROLOGICAL: No numbness or weakness  SKIN: No itching, burning, rashes, or lesions   All other review of systems is negative unless indicated above    Vital Signs Last 24 Hrs  T(C): 39.5 (24 Dec 2018 17:03), Max: 39.5 (24 Dec 2018 17:03)  T(F): 103.1 (24 Dec 2018 17:03), Max: 103.1 (24 Dec 2018 17:03)  HR: 92 (24 Dec 2018 18:00) (76 - 100)  BP: 130/63 (24 Dec 2018 18:00) (129/57 - 148/58)  BP(mean): 83 (24 Dec 2018 18:00) (76 - 89)  RR: 28 (24 Dec 2018 18:00) (16 - 35)  SpO2: 97% (24 Dec 2018 18:00) (94% - 100%)Daily     Daily Weight in k.7 (24 Dec 2018 05:55)I&O's Summary    23 Dec 2018 07:01  -  24 Dec 2018 07:00  --------------------------------------------------------  IN: 250 mL / OUT: 200 mL / NET: 50 mL    24 Dec 2018 07:01  -  24 Dec 2018 19:37  --------------------------------------------------------  IN: 250 mL / OUT: 0 mL / NET: 250 mL        PHYSICAL EXAM:    Constitutional: NAD, awake and alert, well-developed  HEENT: PERR, EOMI,  No oral cyananosis.  Neck:  supple,  No JVD  Respiratory: Breath sounds are clear bilaterally, No wheezing, rales or rhonchi  Cardiovascular: S1 and S2, regular rate and rhythm, no Murmurs, gallops or rubs  Gastrointestinal: Bowel Sounds present, soft, nontender.   Extremities: No peripheral edema. No clubbing or cyanosis.  Vascular: 2+ peripheral pulses  Neurological: A/O x 3, no focal deficits  Musculoskeletal: no calf tenderness.  Skin: No rashes.      LABS: All Labs Reviewed:                        8.5    29.40 )-----------( 275      ( 24 Dec 2018 07:18 )             26.1                         x      x     )-----------( x        ( 23 Dec 2018 19:57 )             25.3                         7.4    17.11 )-----------( 229      ( 23 Dec 2018 06:42 )             23.2     24 Dec 2018 07:18    139    |  101    |  17     ----------------------------<  156    3.9     |  23     |  1.38   23 Dec 2018 11:57    138    |  101    |  17     ----------------------------<  191    3.4     |  23     |  1.51   23 Dec 2018 06:41    139    |  103    |  17     ----------------------------<  162    3.3     |  24     |  1.56     Ca    9.3        24 Dec 2018 07:18  Ca    9.2        23 Dec 2018 11:57  Ca    9.1        23 Dec 2018 06:41  Phos  3.5       24 Dec 2018 07:18  Phos  3.1       23 Dec 2018 11:57  Phos  3.1       23 Dec 2018 06:41  Mg     1.6       24 Dec 2018 07:18  Mg     1.5       23 Dec 2018 11:57  Mg     1.1       23 Dec 2018 06:41    TPro  7.6    /  Alb  2.6    /  TBili  0.8    /  DBili  0.3    /  AST  13     /  ALT  13     /  AlkPhos  24     23 Dec 2018 11:57  TPro  8.1    /  Alb  3.0    /  TBili  1.1    /  DBili  x      /  AST  15     /  ALT  12     /  AlkPhos  27     22 Dec 2018 15:41          Blood Culture: Organism --  Gram Stain Blood -- Gram Stain --  Specimen Source .Blood Blood  Culture-Blood --    Organism --  Gram Stain Blood -- Gram Stain --  Specimen Source .Blood Blood  Culture-Blood --    Organism --  Gram Stain Blood -- Gram Stain --  Specimen Source .Urine Clean Catch (Midstream)  Culture-Blood --       @ 06:41  Pro Bnp [id number]     @ 12:05  TSH: 2.06      RADIOLOGY/EKG:      ECHO/CARDIAC CATHTERIZATION/STRESS TEST:

## 2018-12-24 NOTE — PROGRESS NOTE ADULT - SUBJECTIVE AND OBJECTIVE BOX
Date/Time Patient Seen:  		  Referring MD:   Data Reviewed	       Patient is a 83y old  Male who presents with a chief complaint of Generalized weakness. (24 Dec 2018 07:43)      Subjective/HPI     PAST MEDICAL & SURGICAL HISTORY:  Stomach cancer  Hypertension  Atrial fibrillation  Gout  No significant past surgical history        Medication list         MEDICATIONS  (STANDING):  allopurinol 100 milliGRAM(s) Oral daily  amLODIPine   Tablet 10 milliGRAM(s) Oral daily  aspirin enteric coated 81 milliGRAM(s) Oral daily  colchicine 0.6 milliGRAM(s) Oral two times a day  digoxin     Tablet 0.125 milliGRAM(s) Oral daily  folic acid 1 milliGRAM(s) Oral daily  hydrochlorothiazide 25 milliGRAM(s) Oral daily  levETIRAcetam  IVPB 500 milliGRAM(s) IV Intermittent every 12 hours  metoprolol succinate ER 25 milliGRAM(s) Oral daily  multivitamin 1 Tablet(s) Oral daily  rivaroxaban 10 milliGRAM(s) Oral every 24 hours  thiamine 100 milliGRAM(s) Oral daily    MEDICATIONS  (PRN):  acetaminophen   Tablet .. 650 milliGRAM(s) Oral every 6 hours PRN Temp greater or equal to 38C (100.4F), Mild Pain (1 - 3)  aluminum hydroxide/magnesium hydroxide/simethicone Suspension 30 milliLiter(s) Oral every 6 hours PRN Dyspepsia  bisacodyl 5 milliGRAM(s) Oral every 12 hours PRN Constipation  guaiFENesin   Syrup  (Sugar-Free) 200 milliGRAM(s) Oral every 6 hours PRN Cough  LORazepam   Injectable 1 milliGRAM(s) IV Push every 2 hours PRN CIWA-Ar score increase by 2 points and a total score of 7 or less  LORazepam   Injectable 1 milliGRAM(s) IV Push every 1 hour PRN CIWA-Ar score 8 or greater         Vitals log        ICU Vital Signs Last 24 Hrs  T(C): 36.8 (24 Dec 2018 04:02), Max: 37.9 (23 Dec 2018 16:03)  T(F): 98.3 (24 Dec 2018 04:02), Max: 100.2 (23 Dec 2018 16:03)  HR: 98 (24 Dec 2018 06:00) (69 - 98)  BP: 129/72 (24 Dec 2018 06:00) (119/55 - 151/56)  BP(mean): 89 (24 Dec 2018 06:00) (71 - 89)  ABP: --  ABP(mean): --  RR: 35 (24 Dec 2018 06:00) (16 - 35)  SpO2: 97% (24 Dec 2018 06:00) (95% - 100%)           Input and Output:  I&O's Detail    23 Dec 2018 07:01  -  24 Dec 2018 07:00  --------------------------------------------------------  IN:    IV PiggyBack: 250 mL  Total IN: 250 mL    OUT:    Voided: 200 mL  Total OUT: 200 mL    Total NET: 50 mL          Lab Data                        8.5    29.40 )-----------( 275      ( 24 Dec 2018 07:18 )             26.1     12-24    139  |  101  |  17  ----------------------------<  156<H>  3.9   |  23  |  1.38<H>    Ca    9.3      24 Dec 2018 07:18  Phos  3.1     12-23  Mg     1.5     12-23    TPro  7.6  /  Alb  2.6<L>  /  TBili  0.8  /  DBili  0.3<H>  /  AST  13  /  ALT  13  /  AlkPhos  24<L>  12-23      CARDIAC MARKERS ( 22 Dec 2018 15:41 )  .008 ng/mL / x     / x     / x     / x            Review of Systems	      Objective     Physical Examination  heart s1s2  lung dec BS  abd soft  frail  on o2 support        Pertinent Lab findings & Imaging      Jonathan:  NO   Adequate UO     I&O's Detail    23 Dec 2018 07:01  -  24 Dec 2018 07:00  --------------------------------------------------------  IN:    IV PiggyBack: 250 mL  Total IN: 250 mL    OUT:    Voided: 200 mL  Total OUT: 200 mL    Total NET: 50 mL               Discussed with:     Cultures:	        Radiology

## 2018-12-24 NOTE — PROVIDER CONTACT NOTE (EICU) - BACKGROUND
admitted for "falls" and "malaise" moved to ICU for "seizures" concerns for alcohol withdraw seizures v new nidus.

## 2018-12-24 NOTE — PROVIDER CONTACT NOTE (EICU) - SITUATION
spoke with bedside RN Nasra about obtunded state, unable to take PO, and need for various medications

## 2018-12-24 NOTE — PROGRESS NOTE ADULT - PROBLEM SELECTOR PLAN 7
with CVR, will continue Digoxin & Metoprolol (Betaxolol is non formulary) with hold parameters in addition to Xarelto.  cardiology evl- Dr.Palla. .

## 2018-12-24 NOTE — PROVIDER CONTACT NOTE (EICU) - ASSESSMENT
- pt now obtunded  - has been given a total of 5mg lorazepam since 12.23.18 @ 1100  - MRI's pending per neurology documentation (brain and spine)  - pt remains with bilateral LE weakness- spinal MRI pending for possible infarct (?)- h/o a.fib  - remains on Keppra  -

## 2018-12-24 NOTE — PROGRESS NOTE ADULT - ASSESSMENT
83M with PMHx as above, admitted with generalized weakness and multiple falls, possible seizure episode in ED, YUKI, sepsis (lactic acidosis, leukocytosis),. Pt now upgraded to SPCU after RRT for witnessed "tonic-clonic seizure" activity that lasted ~40 seconds, hypomagnesemia, worsening anemia. Now with persistent fevers, luekocytosis, rising lactate concern for sepsis, and lethargy    -Pt started on keppra  -Neuro consult appreciated  -Continue CIWA. Thiamine/MVI/FA. Monitor for fulminant withdrawal with DT's  -Standing/prn ativan dc'd with instructions to RN to notify EICU/PA if increasing CIWA  -Neuro checks  -Monitor HD's. Continue BB, hctz, asa  -Respiratory stable  -Check ABG to evaluate for possible CO2 retention   -PO diet. PPI  -Continue Abx. Monitor wbc/temp. ID recommendations appreciated  -May need to add vancomycin  -No obvious source of sepsis  -F/U cultures  -Monitor UOP/lytes  -Replete Mg+  -DVT ppx  -Supportive care

## 2018-12-24 NOTE — PROVIDER CONTACT NOTE (EICU) - RECOMMENDATIONS
[continued from above]  - regarding his acute phase reactants (ESR, CRP, Ferritin, etc): while this could be from a variety of causes, the ESR =140 is interesting. he carries a remote history of cancer (could be new malginancy or myelodysplastic finding especially looking at the cell morphology), vasculitis or rheumatologic disease is a concern (VALENTE is pending results) though from what I can ascertain he has no stigma of vasculitis, renal function is improving, no complaints of visual changes, jaw claudication, headaches. Inflammatory/infection could also be the cause, again I feel as if this should be "ruled out". Hematologic workups should be done once the acute condition is resolved. IF his leukocytosis continues to increase and we do have concerns of leukostatsis causing CNS changes or organ dysfunction, then he would require plasmaphersis and transfer to tertiary center. [continued from above]  - regarding his acute phase reactants (ESR, CRP, Ferritin, etc): while this could be from a variety of causes, the ESR =140 is interesting. he carries a remote history of cancer (could be new malignancy or myelodysplastic finding especially looking at the cell morphology), vasculitis or rheumatologic disease is a concern (VALENTE is pending results) though from what I can ascertain he has no stigma of vasculitis- though CT abd/pelvis did note colonic diverticulosis early -itis, with sigmoid thickening and possibly pericolonic stranding (pt with no complains on my initial evaluation) , renal function is improving, no complaints of visual changes, jaw claudication, headaches. Inflammatory/infection could also be the cause, again I feel as if this should be "ruled out". Hematologic workups should be done once the acute condition is resolved. IF his leukocytosis continues to increase and we do have concerns of leukostatsis causing CNS changes or organ dysfunction, then he would require plasmaphersis and transfer to tertiary center.

## 2018-12-24 NOTE — PROVIDER CONTACT NOTE (EICU) - ASSESSMENT
- d/w Hospitalist this evening; pt had MRIs (Brain and Spine), developed another fever, this time 103 (admitted with fever >101).   - remains with leukocytosis

## 2018-12-24 NOTE — PROVIDER CONTACT NOTE (EICU) - ASSESSMENT
- lactate reviewed, 2; does not require 30ml/Kg at this time unless his clinical status and hemodynamics change. mental status remains limited.

## 2018-12-24 NOTE — PROVIDER CONTACT NOTE (EICU) - RECOMMENDATIONS
- On O2, does not use at home: would remove as long as SpO2 >92%  - fevers and leukocytosis on admission- antibiotics d/c'd 12.23.18--> low threshold for restarting short course 3-5 days based on clinical status  - started high dose thiamine, now with extra information, Wernicke's dysfunction could account for walking difficulty  - on Keppra for new seizures, if withdraw seizure would stop this medication as it could add to his obtundated state.   - d/w Hospitalist who would like to discuss with consultants the above recommendations. - On O2, does not use at home: would remove as long as SpO2 >92%  - fevers and leukocytosis on admission- antibiotics d/c'd 12.23.18--> low threshold for restarting short course 3-5 days based on clinical status  - started high dose thiamine, now with extra information, Wernicke's dysfunction could account for walking difficulty  - on Keppra for new seizures, if withdraw seizure would stop this medication as it could add to his obtundated state.   - if he cannot take PO will require full dose anticoagulation (see previous notes for [id number] and HASBLED scores); Lovenox most likely.  - d/w Hospitalist who would like to discuss with consultants the above recommendations.

## 2018-12-24 NOTE — PROGRESS NOTE ADULT - SUBJECTIVE AND OBJECTIVE BOX
83M with PMHx of gastric cancer, htn, afib, gout, gerd, initially admitted with generalised weakness and multiple falls. Pt possibly had a siezure episode in ED as per chart notes. Now RRT called by bedside RN for tonic-clonic seizure lasting ~40 seconds which was witnessed by the RN. Pt seen and examined at bedside, awake, following commands. no post-ictal phase. Pt denies chest pain, sob, n/v/d, loc. Pt started on keppra and transferred to SPCU for further management    24 hour events: Pt seen and examined at bedside. Charts/labs reviewed. Consults recommendations appreciated. Pt with etoh abuse. Started on CIWA    PAST MEDICAL & SURGICAL HISTORY:  Stomach cancer  Hypertension  Atrial fibrillation  Gout  No significant past surgical history      Review of Systems:  Constitutional: No fever, chills, fatigue  Neuro: No headache, numbness, +weakness, + falls  Resp: No cough, wheezing, shortness of breath  CVS: No chest pain, palpitations, leg swelling  GI: No abdominal pain, nausea, vomiting, diarrhea   : No dysuria, frequency, incontinence  Skin: No itching, burning, rashes, or lesions   Msk: No joint pain or swelling  Psych: No depression, anxiety, mood swings      T(F): 98.9 (18 @ 19:59), Max: 100.2 (18 @ 16:03)  HR: 75 (18 @ 18:00) (59 - 93)  BP: 143/53 (18 @ 18:00) (119/55 - 154/60)  RR: 31 (18 @ 18:00) (17 - 31)  SpO2: 99% (18 @ 18:00) (93% - 100%)  Wt(kg): --      CAPILLARY BLOOD GLUCOSE      POCT Blood Glucose.: 150 mg/dL (23 Dec 2018 02:04)      I&O's Summary    22 Dec 2018 07:  -  23 Dec 2018 07:00  --------------------------------------------------------  IN: 350 mL / OUT: 650 mL / NET: -300 mL    23 Dec 2018 07:01  -  23 Dec 2018 20:58  --------------------------------------------------------  IN: 150 mL / OUT: 200 mL / NET: -50 mL        Physical Exam:     Gen: WD/WG male  Neuro: A&Ox3, non-focal  HEENT: NC/AT  Resp: CTA b/l  CVS: nl S1/S2, RRR  Abd: soft, nt, nd, +bs  Ext: no edema, +pulses  Skin: well perfused, warm      Meds:    azithromycin   Tablet Oral  cefTRIAXone   IVPB   amLODIPine   Tablet Oral  digoxin     Tablet Oral  hydrochlorothiazide Oral  metoprolol succinate ER Oral  allopurinol Oral  colchicine Oral  guaiFENesin   Syrup  (Sugar-Free) Oral PRN  acetaminophen   Tablet .. Oral PRN  levETIRAcetam  IVPB IV Intermittent  LORazepam   Injectable IV Push PRN  LORazepam   Injectable IV Push PRN  aspirin enteric coated Oral  rivaroxaban Oral  aluminum hydroxide/magnesium hydroxide/simethicone Suspension Oral PRN  bisacodyl Oral PRN  folic acid Oral  multivitamin Oral  thiamine Oral                            7.4    17.11 )-----------( 229      ( 23 Dec 2018 06:42 )             23.2       12    138  |  101  |  17  ----------------------------<  191<H>  3.4<L>   |  23  |  1.51<H>    Ca    9.2      23 Dec 2018 11:57  Phos  3.1       Mg     1.5         TPro  7.6  /  Alb  2.6<L>  /  TBili  0.8  /  DBili  0.3<H>  /  AST  13  /  ALT  13  /  AlkPhos  24<L>  12      CARDIAC MARKERS ( 22 Dec 2018 15:41 )  .008 ng/mL / x     / x     / x     / x          PT/INR - ( 22 Dec 2018 15:41 )   PT: 31.3 sec;   INR: 2.78 ratio         PTT - ( 22 Dec 2018 15:41 )  PTT:41.8 sec  Urinalysis Basic - ( 22 Dec 2018 15:59 )    Color: Yellow / Appearance: Clear / S.015 / pH: x  Gluc: x / Ketone: Negative  / Bili: Negative / Urobili: 1 mg/dL   Blood: x / Protein: 100 mg/dL / Nitrite: Negative   Leuk Esterase: Negative / RBC: 0-2 /HPF / WBC x   Sq Epi: x / Non Sq Epi: x / Bacteria: x      .Urine Clean Catch (Midstream)   <10,000 CFU/ml Normal Urogenital kristi present --  @ 20:50        CENTRAL LINE: no    WELLS: no    A-LINE: no    GLOBAL ISSUE/BEST PRACTICE:  Analgesia: yes  Sedation: no  HOB elevation: yes  Stress ulcer prophylaxis: n/a  VTE prophylaxis: yes  Glycemic control: n/a  Nutrition: yes      CODE STATUS: Full  GOC discussion: Y  Critical care time spent (mins): 32  (Reviewing data, imaging, discussing with multidisciplinary team, non inclusive of procedures, discussing goals of care with patient/family)

## 2018-12-24 NOTE — PROGRESS NOTE ADULT - PROBLEM SELECTOR PLAN 1
ams - work up under way, poss DOMINICK, on ciwa and ativan PRN, serial labs, serial PE, replete lytes, monitor I and O  TTE reviewed, severe AS, HFpEF, may benefit from cardio eval, monitor for volume overload  atelectas, leukocytosis, frail and weak, ID eval noted, monitored off ABX  supportive medical regimen in ICU  AED regimen in effect, monitor labs, Neuro follow up, seizure precs  will follow  imaging and labs reviewed

## 2018-12-24 NOTE — PROVIDER CONTACT NOTE (EICU) - ACTION/TREATMENT ORDERED:
* d/c' lorazepam (provider to rn note stating physician/acp must be alerted for concerns of withdraw)  * d/c'd hydrochlorothiazide while we provide fluids- should be restarted when he can tolerate it.

## 2018-12-24 NOTE — PROVIDER CONTACT NOTE (EICU) - ASSESSMENT
- unable to take PO at this time, as mental status alertness/awareness is a change over past 24 hours (per my initial evaluation and documentation from yesterday) and now fever >103. I am going to hold Keppra and lorazepam at this time. If these are withdraw seizures, Keppra would not be the drug of choice. We can observe off of Keppra for the time being. Lorazepam as needed, though being held at this time as the complexity of this case requires assessment for any concerns of seizure v withdraw v rigors.   - his cardiac medications can be given IV as needed (betablockers, digoxin, etc)  - will provide full dose lovenox (see previous notes for [id number] and HASBLED scores) he has been on rivaroxaban.   - high dose thiamine started this morning - unable to take PO at this time, as mental status alertness/awareness is a change over past 24 hours (per my initial evaluation and documentation from yesterday) and now fever >103. I am going to hold Keppra and lorazepam at this time. If these are withdraw seizures, Keppra would not be the drug of choice. We can observe off of Keppra for the time being. Lorazepam as needed, though being held at this time as the complexity of this case requires assessment for any concerns of seizure v withdraw v rigors.   - regarding potential source: CT chest has some trace effusions and atelectasis, CXR today (12.24.18) to my eyes does not appear to have consolidation, though this can lag behind clinical symptoms v "atypical" pneumonia; His urine culture and UA do not appear to be grossly infected. CT abd/pelvis did note colonic diverticulosis early -itis, with sigmoid thickening and possibly pericolonic stranding (pt with no complains on my initial evaluation), hence the pipercillin/tazobactam for translocation if colonic kristi coverage. CNS infection(s) is a possibility, however based on my initial interview with him 12.22.18 this did not seem to be a clinical concern- he had no mental status changes (at that time), no headaches, no occular pain, was able to range his neck and "shake head" without concerns/complaints. The confounding variables is the starting of EtOH withdraw medications and anti seizure medications- we should follow clinically for now less ID has more definitive recommendations; Obviously viral syndromes are common this time of year as well, currently RSV and Influenza are negative, other viral syndromes would be supportive.   - his cardiac medications can be given IV as needed (betablockers, digoxin, etc)  - will provide full dose lovenox (see previous notes for [id number] and HASBLED scores) he has been on rivaroxaban.   - high dose thiamine started this morning

## 2018-12-24 NOTE — PROGRESS NOTE ADULT - ASSESSMENT
83M with PMHx as above, admitted with generalized weakness and multiple falls, possible seizure episode in ED, YUKI, sepsis (lactic acidosis, leukocytosis),. Pt now upgraded to SPCU after RRT for witnessed "tonic-clonic seizure" activity that lasted ~40 seconds. Now with hypokalemia, hypomagnesemia, worsening anemia    -Pt started on keppra  -Neuro consult appreciated  -Continue CIWA. Thiamine/MVI/FA. Monitor for fulminant withdrawal with DT's  -CTH negative on admission. Will need MRI in am. ? CVA  -Monitor HD's. Continue BB, hctz, asa  -F/U TTE  -Respiratory stable  -PO diet. PPI  -Continue Abx. Monitor wbc/temp. ID recommendations appreciated  -F/U cultures  -Monitor UOP/lytes  -Replete Mg+/K+  -DVT ppx  -Supportive care

## 2018-12-24 NOTE — PROVIDER CONTACT NOTE (EICU) - RECOMMENDATIONS
- concerns for sepsis until proven otherwise. Arrived from [place] with 'generalized weakness, malaise, and fevers'.   - started pipercillin/tazobactam, azithromycin, blood cultures, and lactate; CXR as well  - does not require the 30ml/kg until lactate returns, not hypotensive at this time; respiratory status is not compromised at the  moment; will start some fluids for his mental status changes (per primary team contributed to his EtOH withdraw and lorazepam use), poor PO intake ability, and increased insensible losses.   - Per hospitalist ID is following  - MRI's negative; if mental status remains a concern, would stop Keppra  - will d/c' lorazepam and note to call if he shows signs of withdraw, as CIWA scoring can be confused with either partial seizure, tachycardia from fevers, and worsening sepsis. While the picture can be confusing, I would rather eICU or primary team be alerted to changes than empiric treatment which may compound the issue at hand.

## 2018-12-25 LAB
AMMONIA BLD-MCNC: <10 UMOL/L — LOW (ref 11–32)
ANION GAP SERPL CALC-SCNC: 14 MMOL/L — SIGNIFICANT CHANGE UP (ref 5–17)
BASE EXCESS BLDV CALC-SCNC: 0.9 MMOL/L — SIGNIFICANT CHANGE UP (ref -2–2)
BUN SERPL-MCNC: 27 MG/DL — HIGH (ref 7–23)
CALCIUM SERPL-MCNC: 9.6 MG/DL — SIGNIFICANT CHANGE UP (ref 8.4–10.5)
CHLORIDE SERPL-SCNC: 102 MMOL/L — SIGNIFICANT CHANGE UP (ref 96–108)
CO2 SERPL-SCNC: 22 MMOL/L — SIGNIFICANT CHANGE UP (ref 22–31)
CREAT SERPL-MCNC: 1.65 MG/DL — HIGH (ref 0.5–1.3)
GAS PNL BLDV: SIGNIFICANT CHANGE UP
GLUCOSE SERPL-MCNC: 148 MG/DL — HIGH (ref 70–99)
HCO3 BLDV-SCNC: 25 MMOL/L — SIGNIFICANT CHANGE UP (ref 21–29)
HCT VFR BLD CALC: 25.6 % — LOW (ref 39–50)
HGB BLD-MCNC: 8.1 G/DL — LOW (ref 13–17)
HIV 1+2 AB+HIV1 P24 AG SERPL QL IA: SIGNIFICANT CHANGE UP
HOROWITZ INDEX BLDV+IHG-RTO: 21 — SIGNIFICANT CHANGE UP
MCHC RBC-ENTMCNC: 31.6 GM/DL — LOW (ref 32–36)
MCHC RBC-ENTMCNC: 35.1 PG — HIGH (ref 27–34)
MCV RBC AUTO: 110.8 FL — HIGH (ref 80–100)
NRBC # BLD: 0 /100 WBCS — SIGNIFICANT CHANGE UP (ref 0–0)
PCO2 BLDV: 42 MMHG — SIGNIFICANT CHANGE UP (ref 35–50)
PH BLDV: 7.4 — SIGNIFICANT CHANGE UP (ref 7.35–7.45)
PLATELET # BLD AUTO: 293 K/UL — SIGNIFICANT CHANGE UP (ref 150–400)
PO2 BLDV: 36 — SIGNIFICANT CHANGE UP (ref 25–45)
POTASSIUM SERPL-MCNC: 4.2 MMOL/L — SIGNIFICANT CHANGE UP (ref 3.5–5.3)
POTASSIUM SERPL-SCNC: 4.2 MMOL/L — SIGNIFICANT CHANGE UP (ref 3.5–5.3)
RBC # BLD: 2.31 M/UL — LOW (ref 4.2–5.8)
RBC # FLD: 17.1 % — HIGH (ref 10.3–14.5)
SAO2 % BLDV: 54 % — LOW (ref 67–88)
SODIUM SERPL-SCNC: 138 MMOL/L — SIGNIFICANT CHANGE UP (ref 135–145)
WBC # BLD: 32.87 K/UL — HIGH (ref 3.8–10.5)
WBC # FLD AUTO: 32.87 K/UL — HIGH (ref 3.8–10.5)

## 2018-12-25 PROCEDURE — 99233 SBSQ HOSP IP/OBS HIGH 50: CPT

## 2018-12-25 RX ORDER — TAMSULOSIN HYDROCHLORIDE 0.4 MG/1
0.4 CAPSULE ORAL AT BEDTIME
Qty: 0 | Refills: 0 | Status: DISCONTINUED | OUTPATIENT
Start: 2018-12-25 | End: 2019-01-01

## 2018-12-25 RX ORDER — DIGOXIN 250 MCG
0.12 TABLET ORAL DAILY
Qty: 0 | Refills: 0 | Status: DISCONTINUED | OUTPATIENT
Start: 2018-12-25 | End: 2018-12-26

## 2018-12-25 RX ORDER — METOPROLOL TARTRATE 50 MG
5 TABLET ORAL EVERY 6 HOURS
Qty: 0 | Refills: 0 | Status: DISCONTINUED | OUTPATIENT
Start: 2018-12-25 | End: 2018-12-26

## 2018-12-25 RX ORDER — ACETAMINOPHEN 500 MG
650 TABLET ORAL EVERY 6 HOURS
Qty: 0 | Refills: 0 | Status: DISCONTINUED | OUTPATIENT
Start: 2018-12-25 | End: 2019-01-01

## 2018-12-25 RX ORDER — DEXTROSE MONOHYDRATE, SODIUM CHLORIDE, AND POTASSIUM CHLORIDE 50; .745; 4.5 G/1000ML; G/1000ML; G/1000ML
1000 INJECTION, SOLUTION INTRAVENOUS
Qty: 0 | Refills: 0 | Status: DISCONTINUED | OUTPATIENT
Start: 2018-12-25 | End: 2018-12-28

## 2018-12-25 RX ADMIN — LEVETIRACETAM 400 MILLIGRAM(S): 250 TABLET, FILM COATED ORAL at 06:20

## 2018-12-25 RX ADMIN — Medication 650 MILLIGRAM(S): at 13:30

## 2018-12-25 RX ADMIN — Medication 5 MILLIGRAM(S): at 06:19

## 2018-12-25 RX ADMIN — LEVETIRACETAM 400 MILLIGRAM(S): 250 TABLET, FILM COATED ORAL at 17:28

## 2018-12-25 RX ADMIN — DEXTROSE MONOHYDRATE, SODIUM CHLORIDE, AND POTASSIUM CHLORIDE 75 MILLILITER(S): 50; .745; 4.5 INJECTION, SOLUTION INTRAVENOUS at 23:25

## 2018-12-25 RX ADMIN — Medication 100 MILLIGRAM(S): at 14:38

## 2018-12-25 RX ADMIN — Medication 650 MILLIGRAM(S): at 21:56

## 2018-12-25 RX ADMIN — Medication 1 TABLET(S): at 13:31

## 2018-12-25 RX ADMIN — Medication 81 MILLIGRAM(S): at 13:31

## 2018-12-25 RX ADMIN — ENOXAPARIN SODIUM 60 MILLIGRAM(S): 100 INJECTION SUBCUTANEOUS at 06:19

## 2018-12-25 RX ADMIN — Medication 0.6 MILLIGRAM(S): at 17:29

## 2018-12-25 RX ADMIN — Medication 105 MILLIGRAM(S): at 16:01

## 2018-12-25 RX ADMIN — PIPERACILLIN AND TAZOBACTAM 25 GRAM(S): 4; .5 INJECTION, POWDER, LYOPHILIZED, FOR SOLUTION INTRAVENOUS at 09:24

## 2018-12-25 RX ADMIN — Medication 0.12 MILLIGRAM(S): at 13:30

## 2018-12-25 RX ADMIN — Medication 650 MILLIGRAM(S): at 06:48

## 2018-12-25 RX ADMIN — Medication 105 MILLIGRAM(S): at 06:19

## 2018-12-25 RX ADMIN — Medication 650 MILLIGRAM(S): at 14:30

## 2018-12-25 RX ADMIN — PIPERACILLIN AND TAZOBACTAM 25 GRAM(S): 4; .5 INJECTION, POWDER, LYOPHILIZED, FOR SOLUTION INTRAVENOUS at 01:26

## 2018-12-25 RX ADMIN — ENOXAPARIN SODIUM 60 MILLIGRAM(S): 100 INJECTION SUBCUTANEOUS at 17:28

## 2018-12-25 RX ADMIN — Medication 650 MILLIGRAM(S): at 00:10

## 2018-12-25 RX ADMIN — DEXTROSE MONOHYDRATE, SODIUM CHLORIDE, AND POTASSIUM CHLORIDE 75 MILLILITER(S): 50; .745; 4.5 INJECTION, SOLUTION INTRAVENOUS at 09:24

## 2018-12-25 RX ADMIN — TAMSULOSIN HYDROCHLORIDE 0.4 MILLIGRAM(S): 0.4 CAPSULE ORAL at 21:42

## 2018-12-25 RX ADMIN — Medication 105 MILLIGRAM(S): at 21:42

## 2018-12-25 RX ADMIN — Medication 650 MILLIGRAM(S): at 06:18

## 2018-12-25 RX ADMIN — PIPERACILLIN AND TAZOBACTAM 25 GRAM(S): 4; .5 INJECTION, POWDER, LYOPHILIZED, FOR SOLUTION INTRAVENOUS at 17:28

## 2018-12-25 RX ADMIN — Medication 1 MILLIGRAM(S): at 13:31

## 2018-12-25 NOTE — PROGRESS NOTE ADULT - ATTENDING COMMENTS
Anemia- f/u anemia w/u.  no transfusion at present as per hematology.  peripheral blood flow cytometry as outpatient.    Knee pain- +effusion.    Hypokalemia, low MG- resolved.    h/o ETOH- impending DTs  d/c ativan due to lethargy.  f/u Ammonia level, VBG, West Nile serology, HIV screen, RPR screen

## 2018-12-25 NOTE — PROGRESS NOTE ADULT - SUBJECTIVE AND OBJECTIVE BOX
Patient is a 83y old  Male who presents with a chief complaint of Generalized weakness. (26 Dec 2018 14:11)    24 hour events: ***  PAST MEDICAL & SURGICAL HISTORY:  Stomach cancer  Hypertension  Atrial fibrillation  Gout  No significant past surgical history      Review of Systems:  Constitutional: No fever, chills, fatigue  Neuro: No headache, numbness, weakness  Resp: No cough, wheezing, shortness of breath  CVS: No chest pain, palpitations, leg swelling  GI: No abdominal pain, nausea, vomiting, diarrhea   : No dysuria, frequency, incontinence  Skin: No itching, burning, rashes, or lesions   Msk: No joint pain or swelling  Psych: No depression, anxiety, mood swings    T(F): 100.5 (18 @ 19:57), Max: 101 (18 @ 00:11)  HR: 76 (18 @ 20:00) (75 - 89)  BP: 145/63 (18 @ 20:00) (118/55 - 145/63)  RR: 19 (18 @ 20:00) (16 - 25)  SpO2: 100% (18 @ 20:00) (91% - 100%)  Wt(kg): --        CAPILLARY BLOOD GLUCOSE          I&O's Summary    25 Dec 2018 07:  -  26 Dec 2018 07:00  --------------------------------------------------------  IN: 2530 mL / OUT: 100 mL / NET: 2430 mL    26 Dec 2018 07:  -  26 Dec 2018 20:24  --------------------------------------------------------  IN: 1075 mL / OUT: 0 mL / NET: 1075 mL        Physical Exam:     Gen:   Neuro: A&Ox3, non-focal  HEENT: NC/AT  Resp: CTA b/l  CVS: nl S1/S2, RRR  Abd: soft, nt, nd, +bs  Ext: no edema, +pulses  Skin: well perfused, warm    Meds:  piperacillin/tazobactam IVPB. IV Intermittent    amLODIPine   Tablet Oral  digoxin     Tablet Oral  metoprolol succinate ER Oral  tamsulosin Oral    allopurinol Oral  colchicine Oral    guaiFENesin   Syrup  (Sugar-Free) Oral PRN    acetaminophen   Tablet .. Oral PRN  levETIRAcetam  IVPB IV Intermittent      aspirin enteric coated Oral  enoxaparin Injectable SubCutaneous    aluminum hydroxide/magnesium hydroxide/simethicone Suspension Oral PRN  bisacodyl Oral PRN      dextrose 5% + sodium chloride 0.45% with potassium chloride 20 mEq/L IV Continuous  folic acid Oral  multivitamin Oral  thiamine IVPB IV Intermittent                                  7.0    16.72 )-----------( 255      ( 26 Dec 2018 13:19 )             21.8     Bands 1.0        137  |  103  |  37<H>  ----------------------------<  236<H>  3.8   |  23  |  1.75<H>    Ca    8.9      26 Dec 2018 06:57    TPro  6.7  /  Alb  2.0<L>  /  TBili  0.5  /  DBili  0.2  /  AST  16  /  ALT  13  /  AlkPhos  27<L>              Urinalysis Basic - ( 24 Dec 2018 23:31 )    Color: Yellow / Appearance: Clear / S.020 / pH: x  Gluc: x / Ketone: Negative  / Bili: Negative / Urobili: Negative mg/dL   Blood: x / Protein: 30 mg/dL / Nitrite: Negative   Leuk Esterase: Negative / RBC: 0-2 /HPF / WBC 0-2   Sq Epi: x / Non Sq Epi: Occasional / Bacteria: Few      .Urine Catheterized   No growth --  @ 11:03  .Blood Blood   No growth to date. --  @ 00:33  .Blood Blood   No growth to date. --  @ 23:17  .Blood Blood   No growth to date. --  @ 21:00  .Urine Clean Catch (Midstream)   <10,000 CFU/ml Normal Urogenital kristi present --  @ 20:50          Radiology: ***    Bedside lung ultrasound: ***    Bedside ECHO: ***    CENTRAL LINE: Y/N          DATE INSERTED:              REMOVE: Y/N    WELLS: Y/N                        DATE INSERTED:              REMOVE: Y/N    A-LINE: Y/N                       DATE INSERTED:              REMOVE: Y/N    GLOBAL ISSUE/BEST PRACTICE:  Analgesia:  Sedation:  HOB elevation: yes  Stress ulcer prophylaxis:  VTE prophylaxis:  Glycemic control:  Nutrition:      CODE STATUS: ***  GOC discussion: Y  Critical care time spent (mins): ***  (Reviewing data, imaging, discussing with multidisciplinary team, non inclusive of procedures, discussing goals of care with patient/family) 83M with PMHx of gastric cancer, htn, afib, gout, gerd, initially admitted with generalised weakness and multiple falls. Pt possibly had a siezure episode in ED as per chart notes. Now RRT called by bedside RN for tonic-clonic seizure lasting ~40 seconds which was witnessed by the RN. Pt seen and examined at bedside, awake, following commands. no post-ictal phase. Pt denies chest pain, sob, n/v/d, loc. Pt started on keppra and transferred to [facility] for further management    24 hour events: Pt seen and examined at bedside. Charts/labs reviewed. Pt with persistent fevers and leukocytosis. Remained febrile overnight    PAST MEDICAL & SURGICAL HISTORY:  Stomach cancer  Hypertension  Atrial fibrillation  Gout  No significant past surgical history      Review of Systems:  unable to obtain    T(F): 100.5   HR: 76   BP: 145/63   RR: 19   SpO2: 100  Wt(kg): --      23 Dec 2018 07:01  -  24 Dec 2018 07:00  --------------------------------------------------------  IN: 250 mL / OUT: 200 mL / NET: 50 mL    24 Dec 2018 07:01  -  24 Dec 2018 22:20  --------------------------------------------------------  IN: 750 mL / OUT: 0 mL / NET: 750 mL      Physical Exam:     Gen: WD/WG male  Neuro: lethargic, arousable  HEENT: NC/AT  Resp: CTA b/l  CVS: nl S1/S2, RRR  Abd: soft, nt, nd, +bs  Ext: no edema, +pulses  Skin: well perfused, warm      Meds:    piperacillin/tazobactam IVPB. IV Intermittent  amLODIPine   Tablet Oral  digoxin     Tablet Oral  metoprolol succinate ER Oral  tamsulosin Oral  allopurinol Oral  colchicine Oral  guaiFENesin   Syrup  (Sugar-Free) Oral PRN  acetaminophen   Tablet .. Oral PRN  levETIRAcetam  IVPB IV Intermittent  aspirin enteric coated Oral  enoxaparin Injectable SubCutaneous  aluminum hydroxide/magnesium hydroxide/simethicone Suspension Oral PRN  bisacodyl Oral PRN  dextrose 5% + sodium chloride 0.45% with potassium chloride 20 mEq/L IV Continuous  folic acid Oral  multivitamin Oral  thiamine IVPB IV Intermittent                                8.5    29.40 )-----------( 275      ( 24 Dec 2018 07:18 )             26.1     Bands 5.0    12-24    139  |  101  |  17  ----------------------------<  156<H>  3.9   |  23  |  1.38<H>    Ca    9.3      24 Dec 2018 07:18  Phos  3.5     12-24  Mg     1.6     12-24    TPro  7.6  /  Alb  2.6<L>  /  TBili  0.8  /  DBili  0.3<H>  /  AST  13  /  ALT  13  /  AlkPhos  24<L>  12-23    Lactate 2.0           12-24 @ 18:36          .Blood Blood   No growth to date. -- 12-22 @ 23:17  .Blood Blood   No growth to date. -- 12-22 @ 21:00  .Urine Clean Catch (Midstream)   <10,000 CFU/ml Normal Urogenital kristi present -- 12-22 @ 20:50        CENTRAL LINE: no    WELLS: no    A-LINE: no    GLOBAL ISSUE/BEST PRACTICE:  Analgesia: yes  Sedation: no  HOB elevation: yes  Stress ulcer prophylaxis: n/a  VTE prophylaxis: yes  Glycemic control: n/a  Nutrition: yes      CODE STATUS: Full  GOC discussion: Y  Critical care time spent (mins): 32  (Reviewing data, imaging, discussing with multidisciplinary team, non inclusive of procedures, discussing goals of care with patient/family)

## 2018-12-25 NOTE — PROGRESS NOTE ADULT - MENTAL STATUS
unable to examine strength LE due to pain in both knees.
unable to examin due to AMS.
unable to examine as pt is sedated.

## 2018-12-25 NOTE — PROGRESS NOTE ADULT - SUBJECTIVE AND OBJECTIVE BOX
PCP:    REQUESTING PHYSICIAN:    REASON FOR CONSULT:    CHIEF COMPLAINT:    HPI:  This is an 84 y/o M with PMH of HTN, A. Fib on Xarelto, Stomach CA, GERD, and Gout who presented with generalized weakness with repeated falls. Patient states that he fell while was in [place] a week ago when his left leg buckled under him, didn't see a doctor as he was coming back to [place] next day, but over the past week he fell several times & was feeling week, and moves with difficulty. No fever or chills at home, no flu-like symptoms, and no sick contacts. At the ED, patient had a seizure episode for about 30 seconds that involved his left side of the body. Never had any seizure episodes in the past. as per patient did have shaky extremities while he was awake and talking , patient was drinking alcohol a lot     patient currently denies any chest pain or shortness of breath  patient blood work showed mild leucocytosis   18: Increase leucocytosis today and fever. Pt lethargic, Atrial fib    18 Patient is awake , confused , heart rate is controlled     PAST MEDICAL & SURGICAL HISTORY:  Stomach cancer  Hypertension  Atrial fibrillation  Gout      MEDICATIONS  (STANDING):  acetaminophen  Suppository .. 650 milliGRAM(s) Rectal every 6 hours  allopurinol 100 milliGRAM(s) Oral daily  amLODIPine   Tablet 10 milliGRAM(s) Oral daily  aspirin enteric coated 81 milliGRAM(s) Oral daily  colchicine 0.6 milliGRAM(s) Oral two times a day  dextrose 5% + sodium chloride 0.45% with potassium chloride 20 mEq/L 1000 milliLiter(s) (75 mL/Hr) IV Continuous <Continuous>  digoxin  Injectable 0.125 milliGRAM(s) IV Push daily  enoxaparin Injectable 60 milliGRAM(s) SubCutaneous two times a day  folic acid 1 milliGRAM(s) Oral daily  levETIRAcetam  IVPB 500 milliGRAM(s) IV Intermittent every 12 hours  metoprolol succinate ER 25 milliGRAM(s) Oral daily  multivitamin 1 Tablet(s) Oral daily  piperacillin/tazobactam IVPB. 3.375 Gram(s) IV Intermittent every 8 hours  thiamine IVPB 500 milliGRAM(s) IV Intermittent three times a day    MEDICATIONS  (PRN):  acetaminophen   Tablet .. 650 milliGRAM(s) Oral every 6 hours PRN Temp greater or equal to 38C (100.4F), Mild Pain (1 - 3)  aluminum hydroxide/magnesium hydroxide/simethicone Suspension 30 milliLiter(s) Oral every 6 hours PRN Dyspepsia  bisacodyl 5 milliGRAM(s) Oral every 12 hours PRN Constipation  guaiFENesin   Syrup  (Sugar-Free) 200 milliGRAM(s) Oral every 6 hours PRN Cough  metoprolol tartrate Injectable 5 milliGRAM(s) IV Push every 6 hours PRN if cannot tolerate PO medications      REVIEW OF SYSTEMS:     confused ,     Vital Signs Last 24 Hrs  T(C): 37.2 (25 Dec 2018 08:15), Max: 39.5 (24 Dec 2018 17:03)  T(F): 99 (25 Dec 2018 08:15), Max: 103.1 (24 Dec 2018 17:03)  HR: 77 (25 Dec 2018 10:00) (77 - 106)  BP: 116/63 (25 Dec 2018 10:00) (115/90 - 148/58)  BP(mean): 79 (25 Dec 2018 10:00) (74 - 98)  RR: 19 (25 Dec 2018 10:00) (19 - 31)  SpO2: 100% (25 Dec 2018 10:00) (94% - 100%)    I&O's Summary    24 Dec 2018 07:  -  25 Dec 2018 07:00  --------------------------------------------------------  IN: 1900 mL / OUT: 200 mL / NET: 1700 mL    25 Dec 2018 07:01  -  25 Dec 2018 12:03  --------------------------------------------------------  IN: 150 mL / OUT: 0 mL / NET: 150 mL        PHYSICAL EXAM:    Constitutional: NAD, awake and alert, well-developed  HEENT: PERR, EOMI,  No oral cyananosis.  Neck:  supple,  No JVD  Respiratory: Breath sounds are clear bilaterally, No wheezing, rales or rhonchi  Cardiovascular: S1 and S2, IR IR  Gastrointestinal: Bowel Sounds present, soft, nontender.   Extremities: No peripheral edema. No clubbing or cyanosis.  Vascular: 2+ peripheral pulses  Neurological: A/O confused   Musculoskeletal: no calf tenderness.  Skin: No rashes.      LABS: All Labs Reviewed:                              8.1    32.87 )-----------( 293      ( 25 Dec 2018 06:29 )             25.6     12-25    138  |  102  |  27<H>  ----------------------------<  148<H>  4.2   |  22  |  1.65<H>    Ca    9.6      25 Dec 2018 06:29  Phos  3.5     12-  Mg     1.6     12-              Urinalysis Basic - ( 24 Dec 2018 23:31 )    Color: Yellow / Appearance: Clear / S.020 / pH: x  Gluc: x / Ketone: Negative  / Bili: Negative / Urobili: Negative mg/dL   Blood: x / Protein: 30 mg/dL / Nitrite: Negative   Leuk Esterase: Negative / RBC: 0-2 /HPF / WBC 0-2   Sq Epi: x / Non Sq Epi: Occasional / Bacteria: Few       Chol 95 LDL 55 HDL 26<L> Trig 69    RADIOLOGY/EKG:< from: 12 Lead ECG (18 @ 17:55) >  Atrial fibrillation with premature ventricular or aberrantly conducted complexes  Right bundle branch block  Left anterior fascicular block  *** Bifascicular block ***  Abnormal ECG  When compared with ECG of 22-DEC-2018 15:09, (Unconfirmed)  T wave inversion no longer evident in Inferior leads  Confirmed by Palla MD, Kyle ([id number]) on 2018 10:28:32 AM    < end of copied text >        ECHO/CARDIAC CATHTERIZATION/STRESS TEST:  < from: US Transthoracic Echocardiogram w/Doppler Complete (18 @ 10:19) >    SUMMARY:  1. moderate left ventricular hypertrophy with normal left ventricular   systolic function with stage I diastolic dysfunction  2. calcific severe aortic stenosis, recommend clinical correlation  3. mild tricuspid regurgitation        < end of copied text >

## 2018-12-25 NOTE — PROGRESS NOTE ADULT - SUBJECTIVE AND OBJECTIVE BOX
Patient is a 83y old  Male who presents with a chief complaint of Generalized weakness. (25 Dec 2018 09:36)      INTERVAL HPI/OVERNIGHT EVENTS:  still lethargic, mental status impoving compared to yesterday, talking irrelevant  Pain Location & Control:     MEDICATIONS  (STANDING):  acetaminophen  Suppository .. 650 milliGRAM(s) Rectal every 6 hours  allopurinol 100 milliGRAM(s) Oral daily  amLODIPine   Tablet 10 milliGRAM(s) Oral daily  aspirin enteric coated 81 milliGRAM(s) Oral daily  colchicine 0.6 milliGRAM(s) Oral two times a day  dextrose 5% + sodium chloride 0.45% with potassium chloride 20 mEq/L 1000 milliLiter(s) (75 mL/Hr) IV Continuous <Continuous>  digoxin  Injectable 0.125 milliGRAM(s) IV Push daily  enoxaparin Injectable 60 milliGRAM(s) SubCutaneous two times a day  folic acid 1 milliGRAM(s) Oral daily  levETIRAcetam  IVPB 500 milliGRAM(s) IV Intermittent every 12 hours  metoprolol succinate ER 25 milliGRAM(s) Oral daily  multivitamin 1 Tablet(s) Oral daily  piperacillin/tazobactam IVPB. 3.375 Gram(s) IV Intermittent every 8 hours  thiamine IVPB 500 milliGRAM(s) IV Intermittent three times a day    MEDICATIONS  (PRN):  acetaminophen   Tablet .. 650 milliGRAM(s) Oral every 6 hours PRN Temp greater or equal to 38C (100.4F), Mild Pain (1 - 3)  aluminum hydroxide/magnesium hydroxide/simethicone Suspension 30 milliLiter(s) Oral every 6 hours PRN Dyspepsia  bisacodyl 5 milliGRAM(s) Oral every 12 hours PRN Constipation  guaiFENesin   Syrup  (Sugar-Free) 200 milliGRAM(s) Oral every 6 hours PRN Cough  metoprolol tartrate Injectable 5 milliGRAM(s) IV Push every 6 hours PRN if cannot tolerate PO medications      Allergies    No Known Allergies    Intolerances        Vital Signs Last 24 Hrs  T(C): 37.2 (25 Dec 2018 08:15), Max: 39.5 (24 Dec 2018 17:03)  T(F): 99 (25 Dec 2018 08:15), Max: 103.1 (24 Dec 2018 17:03)  HR: 87 (25 Dec 2018 08:00) (82 - 106)  BP: 115/90 (25 Dec 2018 08:00) (115/90 - 148/58)  BP(mean): 98 (25 Dec 2018 08:00) (74 - 98)  RR: 21 (25 Dec 2018 08:00) (21 - 31)  SpO2: 100% (25 Dec 2018 08:00) (94% - 100%)        I&O's Detail    24 Dec 2018 07:01  -  25 Dec 2018 07:00  --------------------------------------------------------  IN:    IV PiggyBack: 650 mL    lactated ringers.: 1250 mL  Total IN: 1900 mL    OUT:    Voided: 200 mL  Total OUT: 200 mL    Total NET: 1700 mL          LABS:                        8.1    32.87 )-----------( 293      ( 25 Dec 2018 06:29 )             25.6     25 Dec 2018 06:29    138    |  102    |  27     ----------------------------<  148    4.2     |  22     |  1.65     Ca    9.6        25 Dec 2018 06:29        Urinalysis Basic - ( 24 Dec 2018 23:31 )    Color: Yellow / Appearance: Clear / S.020 / pH: x  Gluc: x / Ketone: Negative  / Bili: Negative / Urobili: Negative mg/dL   Blood: x / Protein: 30 mg/dL / Nitrite: Negative   Leuk Esterase: Negative / RBC: 0-2 /HPF / WBC 0-2   Sq Epi: x / Non Sq Epi: Occasional / Bacteria: Few      CAPILLARY BLOOD GLUCOSE            Cultures  Culture Results:   No growth to date. ( @ 23:17)  Culture Results:   No growth to date. ( @ 21:00)  Culture Results:   <10,000 CFU/ml Normal Urogenital kristi present ( @ 20:50)    Lactate, Blood: 1.5 mmol/L ( @ 22:16)  Lactate, Blood: 2.0 mmol/L ( @ 18:36)      Culture - Blood (collected 18 @ 23:17)  Source: .Blood Blood  Preliminary Report (18 @ 01:01):    No growth to date.    Culture - Blood (collected 18 @ 21:00)  Source: .Blood Blood  Preliminary Report (18 @ 22:01):    No growth to date.    Culture - Urine (collected 18 @ 20:50)  Source: .Urine Clean Catch (Midstream)  Final Report (18 @ 16:28):    <10,000 CFU/ml Normal Urogenital kristi present        RADIOLOGY & ADDITIONAL TESTS:    Imaging Personally Reviewed:  [ ] YES  [ ] NO    Consultant(s) Notes Reviewed:  x YES  [ ] NO    Care Discussed with Consultants/Other Providers [x] YES  [ ] NO

## 2018-12-25 NOTE — PROGRESS NOTE ADULT - SUBJECTIVE AND OBJECTIVE BOX
Date/Time Patient Seen:  		  Referring MD:   Data Reviewed	       Patient is a 83y old  Male who presents with a chief complaint of Generalized weakness. (25 Dec 2018 08:53)      Subjective/HPI     PAST MEDICAL & SURGICAL HISTORY:  Stomach cancer  Hypertension  Atrial fibrillation  Gout  No significant past surgical history        Medication list         MEDICATIONS  (STANDING):  acetaminophen  Suppository .. 650 milliGRAM(s) Rectal every 6 hours  allopurinol 100 milliGRAM(s) Oral daily  amLODIPine   Tablet 10 milliGRAM(s) Oral daily  aspirin enteric coated 81 milliGRAM(s) Oral daily  colchicine 0.6 milliGRAM(s) Oral two times a day  dextrose 5% + sodium chloride 0.45% with potassium chloride 20 mEq/L 1000 milliLiter(s) (75 mL/Hr) IV Continuous <Continuous>  digoxin  Injectable 0.125 milliGRAM(s) IV Push daily  enoxaparin Injectable 60 milliGRAM(s) SubCutaneous two times a day  folic acid 1 milliGRAM(s) Oral daily  levETIRAcetam  IVPB 500 milliGRAM(s) IV Intermittent every 12 hours  metoprolol succinate ER 25 milliGRAM(s) Oral daily  multivitamin 1 Tablet(s) Oral daily  piperacillin/tazobactam IVPB. 3.375 Gram(s) IV Intermittent every 8 hours  thiamine IVPB 500 milliGRAM(s) IV Intermittent three times a day    MEDICATIONS  (PRN):  acetaminophen   Tablet .. 650 milliGRAM(s) Oral every 6 hours PRN Temp greater or equal to 38C (100.4F), Mild Pain (1 - 3)  aluminum hydroxide/magnesium hydroxide/simethicone Suspension 30 milliLiter(s) Oral every 6 hours PRN Dyspepsia  bisacodyl 5 milliGRAM(s) Oral every 12 hours PRN Constipation  guaiFENesin   Syrup  (Sugar-Free) 200 milliGRAM(s) Oral every 6 hours PRN Cough  metoprolol tartrate Injectable 5 milliGRAM(s) IV Push every 6 hours PRN if cannot tolerate PO medications         Vitals log        ICU Vital Signs Last 24 Hrs  T(C): 37.2 (25 Dec 2018 08:15), Max: 39.5 (24 Dec 2018 17:03)  T(F): 99 (25 Dec 2018 08:15), Max: 103.1 (24 Dec 2018 17:03)  HR: 87 (25 Dec 2018 08:00) (82 - 106)  BP: 115/90 (25 Dec 2018 08:00) (115/90 - 148/58)  BP(mean): 98 (25 Dec 2018 08:00) (74 - 98)  ABP: --  ABP(mean): --  RR: 21 (25 Dec 2018 08:00) (21 - 31)  SpO2: 100% (25 Dec 2018 08:00) (94% - 100%)           Input and Output:  I&O's Detail    24 Dec 2018 07:01  -  25 Dec 2018 07:00  --------------------------------------------------------  IN:    IV PiggyBack: 650 mL    lactated ringers.: 1250 mL  Total IN: 1900 mL    OUT:    Voided: 200 mL  Total OUT: 200 mL    Total NET: 1700 mL          Lab Data                        8.1    32.87 )-----------( 293      ( 25 Dec 2018 06:29 )             25.6     12-25    138  |  102  |  27<H>  ----------------------------<  148<H>  4.2   |  22  |  1.65<H>    Ca    9.6      25 Dec 2018 06:29  Phos  3.5     12-24  Mg     1.6     12-24    TPro  7.6  /  Alb  2.6<L>  /  TBili  0.8  /  DBili  0.3<H>  /  AST  13  /  ALT  13  /  AlkPhos  24<L>  12-23    ABG - ( 24 Dec 2018 22:15 )  pH, Arterial: x     pH, Blood: 7.54  /  pCO2: 26    /  pO2: 87    / HCO3: 25    / Base Excess: 0.2   /  SaO2: 96                      Review of Systems	      Objective     Physical Examination  heart s1s2  lung dec BS  abd soft  frail  weak  confused          Pertinent Lab findings & Imaging      Jonathan:  NO   Adequate UO     I&O's Detail    24 Dec 2018 07:01  -  25 Dec 2018 07:00  --------------------------------------------------------  IN:    IV PiggyBack: 650 mL    lactated ringers.: 1250 mL  Total IN: 1900 mL    OUT:    Voided: 200 mL  Total OUT: 200 mL    Total NET: 1700 mL               Discussed with:     Cultures:	        Radiology

## 2018-12-25 NOTE — PROGRESS NOTE ADULT - PROBLEM SELECTOR PLAN 3
Compensated at this time it does not appears to be severe on clinical exam . Will address therapeutic possibilities when stable

## 2018-12-25 NOTE — PROGRESS NOTE ADULT - SUBJECTIVE AND OBJECTIVE BOX
[INTERVAL HX: ]  Patient seen and examined;  Chart reviewed and events noted;   febrile overnight. Blood cx sent per RN.   interacted more today, knew in hospital, and name, .  Monitor with Afib    MEDICATIONS  (STANDING):  acetaminophen  Suppository .. 650 milliGRAM(s) Rectal every 6 hours  allopurinol 100 milliGRAM(s) Oral daily  amLODIPine   Tablet 10 milliGRAM(s) Oral daily  aspirin enteric coated 81 milliGRAM(s) Oral daily  colchicine 0.6 milliGRAM(s) Oral two times a day  dextrose 5% + sodium chloride 0.45% with potassium chloride 20 mEq/L 1000 milliLiter(s) (75 mL/Hr) IV Continuous <Continuous>  digoxin  Injectable 0.125 milliGRAM(s) IV Push daily  enoxaparin Injectable 60 milliGRAM(s) SubCutaneous two times a day  folic acid 1 milliGRAM(s) Oral daily  levETIRAcetam  IVPB 500 milliGRAM(s) IV Intermittent every 12 hours  metoprolol succinate ER 25 milliGRAM(s) Oral daily  multivitamin 1 Tablet(s) Oral daily  piperacillin/tazobactam IVPB. 3.375 Gram(s) IV Intermittent every 8 hours  thiamine IVPB 500 milliGRAM(s) IV Intermittent three times a day    MEDICATIONS  (PRN):  acetaminophen   Tablet .. 650 milliGRAM(s) Oral every 6 hours PRN Temp greater or equal to 38C (100.4F), Mild Pain (1 - 3)  aluminum hydroxide/magnesium hydroxide/simethicone Suspension 30 milliLiter(s) Oral every 6 hours PRN Dyspepsia  bisacodyl 5 milliGRAM(s) Oral every 12 hours PRN Constipation  guaiFENesin   Syrup  (Sugar-Free) 200 milliGRAM(s) Oral every 6 hours PRN Cough  metoprolol tartrate Injectable 5 milliGRAM(s) IV Push every 6 hours PRN if cannot tolerate PO medications      Vital Signs Last 24 Hrs  T(C): 37.2 (25 Dec 2018 08:15), Max: 39.5 (24 Dec 2018 17:03)  T(F): 99 (25 Dec 2018 08:15), Max: 103.1 (24 Dec 2018 17:03)  HR: 87 (25 Dec 2018 08:00) (82 - 106)  BP: 115/90 (25 Dec 2018 08:00) (115/90 - 148/58)  BP(mean): 98 (25 Dec 2018 08:00) (74 - 98)  RR: 21 (25 Dec 2018 08:00) (21 - 31)  SpO2: 100% (25 Dec 2018 08:00) (94% - 100%)    [PHYSICAL EXAM]  General: adult in NAD,  WN,  WD. sedated. PERRL  HEENT: clear oropharynx, anicteric sclera, pink conjunctivae.  Neck: supple, no masses.  CV: irreg-irreg S1S2, no murmur, no rubs, no gallops.  Lungs: clear to auscultation, no wheezes, no rales, no rhonchi. coarse/congested upper airway sounds  Abdomen: soft, non-tender, non-distended, no hepatosplenomegaly, normal BS, no guarding.  Ext: no clubbing, no cyanosis, no edema.  Skin: no rashes,  no petechiae, no venous stasis changes.  Neuro: alert and oriented X1, no focal motor deficits.  LN: no SC KARISHMA.  responds in several word sentences, but garbled      [LABS:]                        8.1    32.87 )-----------( 293      ( 25 Dec 2018 06:29 )             25.6     12-25    138  |  102  |  27<H>  ----------------------------<  148<H>  4.2   |  22  |  1.65<H>    Ca    9.6      25 Dec 2018 06:29  Phos  3.5     12-24  Mg     1.6     12-24    TPro  7.6  /  Alb  2.6<L>  /  TBili  0.8  /  DBili  0.3<H>  /  AST  13  /  ALT  13  /  AlkPhos  24<L>  12-23      Ferritin, Serum (12.23.18 @ 12:05)    Ferritin, Serum: 555 ng/mL        [RADIOLOGY STUDIES:]

## 2018-12-25 NOTE — PROGRESS NOTE ADULT - ASSESSMENT
83M with PMHx as above, admitted with generalized weakness and multiple falls, possible seizure episode in ED, YUKI, sepsis (lactic acidosis, leukocytosis),. Pt now upgraded to [facility] after RRT for witnessed "tonic-clonic seizure" activity that lasted ~40 seconds, hypomagnesemia, worsening anemia. Now with persistent fevers, luekocytosis, rising lactate concern for sepsis, and lethargy    -Pt started on keppra  -Neuro consult appreciated  -Continue CIWA. Thiamine/MVI/FA. Monitor for fulminant withdrawal with DT's  -Standing/prn ativan dc'd with instructions to RN to notify EICU/PA if increasing CIWA  -Neuro checks  -Monitor HD's. Continue BB, hctz, asa  -Respiratory stable  -ABG wnl  -Lactate improving  -PO diet. PPI  -Continue Abx. Monitor wbc/temp. ID recommendations appreciated  -May need to add vancomycin  -No obvious source of sepsis  -F/U cultures  -Monitor UOP/lytes  -Replete Mg+  -DVT ppx  -Supportive care

## 2018-12-25 NOTE — PROGRESS NOTE ADULT - PROBLEM SELECTOR PLAN 1
unknown onset, possibly started a week ago when patient had a fall in [place]  CT showed age indeterminate lacunar infarct of posterior limb of the right internal capsule.  physical therapy.  MRI brain and spine- negative.

## 2018-12-25 NOTE — PROGRESS NOTE ADULT - PROBLEM SELECTOR PLAN 1
febrile, confused, work up under way, ID follow up noted  MRI brain and spine noted,  CKD YUKI - I and O noted, keep in balance, monitor for volume overload  monitor vs and HD and Sat, keep sat > 88 pct  will check Ammonia level, VBG, West Nile serology, HIV screen, RPR screen  will follow and monitor  prognosis guarded  serial labs  serial PE

## 2018-12-25 NOTE — PROGRESS NOTE ADULT - SUBJECTIVE AND OBJECTIVE BOX
ID Progress note     Name: YUE KING  Age: 83y  Gender: Male  MRN: [id number]    Interval History-- Events noted, remains confused and congested . Febrile overnight to 102 . No seizures .   Notes reviewed    Past Medical History--  Stomach cancer  Hypertension  Atrial fibrillation  Gout  No significant past surgical history      For details regarding the patient's social history, family history, and other miscellaneous elements, please refer the initial infectious diseases consultation and/or the admitting history and physical examination for this admission.    Allergies--  Allergies    No Known Allergies    Intolerances        Medications--  Antibiotics:  azithromycin  IVPB      azithromycin  IVPB 500 milliGRAM(s) IV Intermittent every 24 hours  piperacillin/tazobactam IVPB. 3.375 Gram(s) IV Intermittent every 8 hours    Immunologic:    Other:  acetaminophen   Tablet .. PRN  acetaminophen  Suppository ..  allopurinol  aluminum hydroxide/magnesium hydroxide/simethicone Suspension PRN  amLODIPine   Tablet  aspirin enteric coated  bisacodyl PRN  colchicine  digoxin  Injectable  enoxaparin Injectable  folic acid  guaiFENesin   Syrup  (Sugar-Free) PRN  lactated ringers.  levETIRAcetam  IVPB  metoprolol succinate ER  metoprolol tartrate Injectable PRN  multivitamin  thiamine IVPB      Review of Systems--  Review of systems unable to be obtained secondary to clinical condition.    Physical Examination--    Vital Signs: T(F): 99.5 (12-25-18 @ 04:02), Max: 103.1 (12-24-18 @ 17:03)  HR: 101 (12-25-18 @ 06:23)  BP: 126/53 (12-25-18 @ 06:23)  RR: 24 (12-25-18 @ 06:23)  SpO2: 100% (12-25-18 @ 06:23)  Wt(kg): --  General: Nontoxic-appearing Male in no acute distress.  HEENT: AT/NC. PERRL. EOMI. Anicteric. Conjunctiva pink and moist. Oropharynx clear. Dentition fair.  Neck: Not rigid. No sense of mass.  Nodes: None palpable.  Lungs: Coarse rales  bilaterally without rales, wheezing or rhonchi  Heart: Regular rate and rhythm. No Murmur. No rub. No gallop. No palpable thrill.  Abdomen: Bowel sounds present and normoactive. Soft. Nondistended. Nontender. No sense of mass. No organomegaly.  Back: No spinal tenderness. No costovertebral angle tenderness.   Extremities: No cyanosis or clubbing. No edema. knee joint effusion right knee  Skin: Warm. Dry. Good turgor. No rash. No vasculitic stigmata.  Psychiatric: Appropriate affect and mood for situation.         Laboratory Studies--  CBC                        8.1    32.87 )-----------( 293      ( 25 Dec 2018 06:29 )             25.6       Chemistries  12-25    138  |  102  |  27<H>  ----------------------------<  148<H>  4.2   |  22  |  1.65<H>    Ca    9.6      25 Dec 2018 06:29  Phos  3.5     12-24  Mg     1.6     12-24    TPro  7.6  /  Alb  2.6<L>  /  TBili  0.8  /  DBili  0.3<H>  /  AST  13  /  ALT  13  /  AlkPhos  24<L>  12-23      Culture Data    Culture - Blood (collected 22 Dec 2018 23:17)  Source: .Blood Blood  Preliminary Report (24 Dec 2018 01:01):    No growth to date.    Culture - Blood (collected 22 Dec 2018 21:00)  Source: .Blood Blood  Preliminary Report (23 Dec 2018 22:01):    No growth to date.    Culture - Urine (collected 22 Dec 2018 20:50)  Source: .Urine Clean Catch (Midstream)  Final Report (23 Dec 2018 16:28):    <10,000 CFU/ml Normal Urogenital kristi present            Radiology:    Xray Chest 1 View- PORTABLE-Urgent (12.24.18 @ 17:58) >    Portable AP radiograph is compared to 12/22/2018.    The heart is again enlarged. The trachea is midline airthere is no focal   infiltrate or pleural effusion. The osseous structures are intact.    Impression: Cardiomegaly. No active disease. No change.     MR Lumbar Spine No Cont (12.24.18 @ 14:52) >  History back pain    There is mild lower thoracic dextroscoliosis and mid lumbar   levoscoliosis. There is mild diffuse red marrow replacement without focal   infiltrative lesion or edema. There is no compression fracture or   subluxation. The conus is normal    There are ventral syndesmophytes at L1-L2 and L2-L3 with relative   preservation of the discspaces. There is mild facet hypertrophy at L2-L3   without significant spinal canal or foraminal stenosis    At L3-L4 there is mild annular osteophyte with ventral discogenic   sclerosis and bridging syndesmophyte. There is mild ligamentous and facet  hypertrophy together resulting in mild right-sided foraminal stenosis   without central canal stenosis    At L4-L5 disc space is maintained. There are mild ventral degenerative   endplate changes. Facet hypertrophy and far lateral osteophyte results in   mild left-sided foraminal stenosis without central canal stenosis    The L5-S1 disc is maintained without significant spinal stenosis. Facet   hypertrophy results in moderate left-sided foraminal stenosis    IMPRESSION:    No acute findings. Negative for compression fracture, focal disc   protrusion or spinal stenosis. Diffuse red marrow replacement suggestive   of chronic anemia.    MR Head No Cont (12.24.18 @ 14:30) >    Comparison CT performed 2 days prior    There is no mass effect, cortical edema or hydrocephalus. There isno   evidence of acute infarct or previous parenchymal hemorrhage. There is   mild central volume loss. The orbital and sellar contents and cerebellar   tonsils are within normal limits.    IMPRESSION:    No acute findings    CT Chest No Cont (12.22.18 @ 20:45) >  IMPRESSION:   1. Small bilateral pleural effusions right greater than left. Compressive   atelectasis is noted on the right side in some dependent atelectasis left   side.   No pneumothorax.   2. There is cardiomegaly with coronary artery and valvular calcification   without pericardial effusion.   3. No evidence of mediastinal adenopathy by size criteria or fluid   collections   in the mediastinum.   4. Please see CT scan abdomen and pelvis for findings in the upper   abdomen.    IMPRESSION FOR CT ABDOMEN AND PELVIS:    NONOBSTRUCTING RIGHT RENAL CALCULI    COLONIC DIVERTICULOSIS, MILD THICKENING OF THE SIGMOID COLON WITH MILD   QUESTIONABLE PERICOLONIC STRANDING. THIS COULD REPRESENT MILD/EARLY   DIVERTICULITIS.    PROSTATE ENLARGEMENT, CORRELATE WITH PSA.     GALLBLADDER IS CONTRACTED. THIS LIMITS EVALUATION, HOWEVER THERE MAY BE   SMALL AMOUNT OF PERICHOLECYSTIC FLUID. THIS CAN BE BETTER CHARACTERIZED   ON ULTRASOUND.  Assessment--    82 y/o M with PMH of HTN, A. Fib on Xarelto, Stomach CA, GERD, and Gout presented with generalized weakness & repeated falls, also had seizures at the ED. Noted to be febrile , likley cause of fever could be UTI as he ahs urinary retention with BPH , PVR was 350 . Doubt he has pneumonia ,  he has hx of heavy ETOH abuse     He may have a myeloproliferative disorder with leukocytosis with monocytosis may have CMML . he also has severe anemia . Leukocytosis likely from myeloproliferative disorder , doubt any infection     Etiology of seizure is unclear probable alcohol withdrawal seizure on CIWA protocol     Possible pseudogout Right knee     Etiology of fever could be aspiration or viral syndrome     Plan :   - DC Zithromax , continue with Zosyn   - hematology follow up await flow cytometry results    - anemia profile   - fall precautions   - may need Flomax for BPH  - PT evaluation   - follow MRI   - add colchicine for pseuogout    Continue with present regime .  Appropriate use of antibiotics and adverse effects reviewed.    I have discussed the above plan of care with patient/family in detail. They expressed understanding of the treatment plan . Risks, benefits and alternatives discussed in detail. I have asked if they have any questions or concerns and appropriately addressed them to the best of my ability .      > 35 minutes spent in direct patient care reviewing  the notes, lab data/ imaging , discussion with multidisciplinary team. All questions were addressed and answered to the best of my capacity .    Thank you for allowing me to participate in the care of your patient .        Dimple Florez MD  [phone]

## 2018-12-26 LAB
ALBUMIN SERPL ELPH-MCNC: 2 G/DL — LOW (ref 3.3–5)
ALP SERPL-CCNC: 27 U/L — LOW (ref 30–120)
ALT FLD-CCNC: 13 U/L DA — SIGNIFICANT CHANGE UP (ref 10–60)
ANION GAP SERPL CALC-SCNC: 11 MMOL/L — SIGNIFICANT CHANGE UP (ref 5–17)
ANISOCYTOSIS BLD QL: SLIGHT — SIGNIFICANT CHANGE UP
AST SERPL-CCNC: 16 U/L — SIGNIFICANT CHANGE UP (ref 10–40)
BASOPHILS # BLD AUTO: 0 K/UL — SIGNIFICANT CHANGE UP (ref 0–0.2)
BASOPHILS NFR BLD AUTO: 0 % — SIGNIFICANT CHANGE UP (ref 0–2)
BILIRUB DIRECT SERPL-MCNC: 0.2 MG/DL — SIGNIFICANT CHANGE UP (ref 0–0.2)
BILIRUB INDIRECT FLD-MCNC: 0.3 MG/DL — SIGNIFICANT CHANGE UP (ref 0.2–1)
BILIRUB SERPL-MCNC: 0.5 MG/DL — SIGNIFICANT CHANGE UP (ref 0.2–1.2)
BUN SERPL-MCNC: 37 MG/DL — HIGH (ref 7–23)
CALCIUM SERPL-MCNC: 8.9 MG/DL — SIGNIFICANT CHANGE UP (ref 8.4–10.5)
CHLORIDE SERPL-SCNC: 103 MMOL/L — SIGNIFICANT CHANGE UP (ref 96–108)
CO2 SERPL-SCNC: 23 MMOL/L — SIGNIFICANT CHANGE UP (ref 22–31)
CREAT SERPL-MCNC: 1.75 MG/DL — HIGH (ref 0.5–1.3)
CULTURE RESULTS: NO GROWTH — SIGNIFICANT CHANGE UP
DACRYOCYTES BLD QL SMEAR: SLIGHT — SIGNIFICANT CHANGE UP
ELLIPTOCYTES BLD QL SMEAR: SLIGHT — SIGNIFICANT CHANGE UP
EOSINOPHIL # BLD AUTO: 0 K/UL — SIGNIFICANT CHANGE UP (ref 0–0.5)
EOSINOPHIL NFR BLD AUTO: 0 % — SIGNIFICANT CHANGE UP (ref 0–6)
GLUCOSE SERPL-MCNC: 236 MG/DL — HIGH (ref 70–99)
HAPTOGLOB SERPL-MCNC: 293 MG/DL — HIGH (ref 34–200)
HCT VFR BLD CALC: 21.8 % — LOW (ref 39–50)
HCT VFR BLD CALC: 22.1 % — LOW (ref 39–50)
HGB BLD-MCNC: 7 G/DL — CRITICAL LOW (ref 13–17)
HGB BLD-MCNC: 7.1 G/DL — LOW (ref 13–17)
LDH SERPL L TO P-CCNC: 273 U/L — HIGH (ref 50–242)
LG PLATELETS BLD QL AUTO: SLIGHT — SIGNIFICANT CHANGE UP
LYMPHOCYTES # BLD AUTO: 1.4 K/UL — SIGNIFICANT CHANGE UP (ref 1–3.3)
LYMPHOCYTES # BLD AUTO: 8 % — LOW (ref 13–44)
MACROCYTES BLD QL: SIGNIFICANT CHANGE UP
MANUAL SMEAR VERIFICATION: SIGNIFICANT CHANGE UP
MCHC RBC-ENTMCNC: 32.1 GM/DL — SIGNIFICANT CHANGE UP (ref 32–36)
MCHC RBC-ENTMCNC: 32.1 GM/DL — SIGNIFICANT CHANGE UP (ref 32–36)
MCHC RBC-ENTMCNC: 34.8 PG — HIGH (ref 27–34)
MCHC RBC-ENTMCNC: 35.1 PG — HIGH (ref 27–34)
MCV RBC AUTO: 108.5 FL — HIGH (ref 80–100)
MCV RBC AUTO: 109.4 FL — HIGH (ref 80–100)
MICROCYTES BLD QL: SLIGHT — SIGNIFICANT CHANGE UP
MONOCYTES # BLD AUTO: 2.97 K/UL — HIGH (ref 0–0.9)
MONOCYTES NFR BLD AUTO: 17 % — HIGH (ref 2–14)
NEUTROPHILS # BLD AUTO: 13.11 K/UL — HIGH (ref 1.8–7.4)
NEUTROPHILS NFR BLD AUTO: 74 % — SIGNIFICANT CHANGE UP (ref 43–77)
NEUTS BAND # BLD: 1 % — SIGNIFICANT CHANGE UP (ref 0–8)
NRBC # BLD: 0 /100 WBCS — SIGNIFICANT CHANGE UP (ref 0–0)
NRBC # BLD: 0 — SIGNIFICANT CHANGE UP
NRBC # BLD: SIGNIFICANT CHANGE UP /100 WBCS (ref 0–0)
PLAT MORPH BLD: NORMAL — SIGNIFICANT CHANGE UP
PLATELET # BLD AUTO: 253 K/UL — SIGNIFICANT CHANGE UP (ref 150–400)
PLATELET # BLD AUTO: 255 K/UL — SIGNIFICANT CHANGE UP (ref 150–400)
POIKILOCYTOSIS BLD QL AUTO: SLIGHT — SIGNIFICANT CHANGE UP
POLYCHROMASIA BLD QL SMEAR: SLIGHT — SIGNIFICANT CHANGE UP
POTASSIUM SERPL-MCNC: 3.8 MMOL/L — SIGNIFICANT CHANGE UP (ref 3.5–5.3)
POTASSIUM SERPL-SCNC: 3.8 MMOL/L — SIGNIFICANT CHANGE UP (ref 3.5–5.3)
PROT SERPL-MCNC: 6.7 G/DL — SIGNIFICANT CHANGE UP (ref 6–8.3)
RBC # BLD: 2.01 M/UL — LOW (ref 4.2–5.8)
RBC # BLD: 2.02 M/UL — LOW (ref 4.2–5.8)
RBC # FLD: 17.2 % — HIGH (ref 10.3–14.5)
RBC # FLD: 17.3 % — HIGH (ref 10.3–14.5)
RBC BLD AUTO: ABNORMAL
ROULEAUX BLD QL SMEAR: PRESENT
SODIUM SERPL-SCNC: 137 MMOL/L — SIGNIFICANT CHANGE UP (ref 135–145)
SPECIMEN SOURCE: SIGNIFICANT CHANGE UP
T PALLIDUM AB TITR SER: NEGATIVE — SIGNIFICANT CHANGE UP
TARGETS BLD QL SMEAR: SLIGHT — SIGNIFICANT CHANGE UP
WBC # BLD: 16.72 K/UL — HIGH (ref 3.8–10.5)
WBC # BLD: 17.48 K/UL — HIGH (ref 3.8–10.5)
WBC # FLD AUTO: 16.72 K/UL — HIGH (ref 3.8–10.5)
WBC # FLD AUTO: 17.48 K/UL — HIGH (ref 3.8–10.5)

## 2018-12-26 PROCEDURE — 72148 MRI LUMBAR SPINE W/O DYE: CPT

## 2018-12-26 PROCEDURE — 99233 SBSQ HOSP IP/OBS HIGH 50: CPT

## 2018-12-26 PROCEDURE — 70551 MRI BRAIN STEM W/O DYE: CPT

## 2018-12-26 PROCEDURE — P9016: CPT

## 2018-12-26 RX ORDER — DIGOXIN 250 MCG
0.12 TABLET ORAL DAILY
Qty: 0 | Refills: 0 | Status: DISCONTINUED | OUTPATIENT
Start: 2018-12-26 | End: 2018-12-29

## 2018-12-26 RX ADMIN — TAMSULOSIN HYDROCHLORIDE 0.4 MILLIGRAM(S): 0.4 CAPSULE ORAL at 21:37

## 2018-12-26 RX ADMIN — AMLODIPINE BESYLATE 10 MILLIGRAM(S): 2.5 TABLET ORAL at 06:17

## 2018-12-26 RX ADMIN — PIPERACILLIN AND TAZOBACTAM 25 GRAM(S): 4; .5 INJECTION, POWDER, LYOPHILIZED, FOR SOLUTION INTRAVENOUS at 13:02

## 2018-12-26 RX ADMIN — Medication 100 MILLIGRAM(S): at 13:03

## 2018-12-26 RX ADMIN — Medication 0.12 MILLIGRAM(S): at 13:03

## 2018-12-26 RX ADMIN — Medication 105 MILLIGRAM(S): at 15:34

## 2018-12-26 RX ADMIN — PIPERACILLIN AND TAZOBACTAM 25 GRAM(S): 4; .5 INJECTION, POWDER, LYOPHILIZED, FOR SOLUTION INTRAVENOUS at 19:04

## 2018-12-26 RX ADMIN — Medication 1 MILLIGRAM(S): at 13:03

## 2018-12-26 RX ADMIN — Medication 650 MILLIGRAM(S): at 13:37

## 2018-12-26 RX ADMIN — LEVETIRACETAM 400 MILLIGRAM(S): 250 TABLET, FILM COATED ORAL at 19:03

## 2018-12-26 RX ADMIN — Medication 650 MILLIGRAM(S): at 14:37

## 2018-12-26 RX ADMIN — Medication 81 MILLIGRAM(S): at 13:03

## 2018-12-26 RX ADMIN — Medication 25 MILLIGRAM(S): at 06:16

## 2018-12-26 RX ADMIN — DEXTROSE MONOHYDRATE, SODIUM CHLORIDE, AND POTASSIUM CHLORIDE 75 MILLILITER(S): 50; .745; 4.5 INJECTION, SOLUTION INTRAVENOUS at 13:32

## 2018-12-26 RX ADMIN — PIPERACILLIN AND TAZOBACTAM 25 GRAM(S): 4; .5 INJECTION, POWDER, LYOPHILIZED, FOR SOLUTION INTRAVENOUS at 01:50

## 2018-12-26 RX ADMIN — Medication 1 TABLET(S): at 13:03

## 2018-12-26 RX ADMIN — Medication 105 MILLIGRAM(S): at 06:16

## 2018-12-26 RX ADMIN — Medication 0.6 MILLIGRAM(S): at 06:15

## 2018-12-26 RX ADMIN — LEVETIRACETAM 400 MILLIGRAM(S): 250 TABLET, FILM COATED ORAL at 06:16

## 2018-12-26 RX ADMIN — ENOXAPARIN SODIUM 60 MILLIGRAM(S): 100 INJECTION SUBCUTANEOUS at 06:16

## 2018-12-26 NOTE — PROGRESS NOTE ADULT - PROBLEM SELECTOR PLAN 1
unknown onset, possibly started a week ago when patient had a fall in [place]  CT showed age indeterminate lacunar infarct of posterior limb of the right internal capsule.  physical therapy.  MRI brain and spine- negative. IMPROVE VTE Individual Risk Assessment          RISK                                                          Points    [  ] Previous VTE                                                3  [  ] Thrombophilia                                             2  [ x ] Lower limb paralysis                                    2        (unable to hold up >15 seconds)    [  ] Current Cancer                                             2         (within 6 months)  [ x ] Immobilization > 24 hrs                              1  [  ] ICU/CCU stay > 24 hours                            1  [ x ] Age > 60                                                    1    IMPROVE VTE Score 4.    **IMPROVE score of 4 in addition to the other risk factors not included in this scoring system, on Xarelto for chronic A. Fib, will continue, no need for further DVT prophylaxis.

## 2018-12-26 NOTE — PROGRESS NOTE ADULT - SUBJECTIVE AND OBJECTIVE BOX
REASON FOR VISIT: AF    HPI:  82 y/o man with a history of atrial fibrillation, HTN, gastric cancer, GERD, Gout, alcohol use admitted 12/22/18 with viral infection, seizure.    12/24/18: Increase leucocytosis today and fever. Pt lethargic, Atrial fib  12/25/18 Patient is awake , confused , heart rate is controlled   12/26/18: No complaints.    MEDICATIONS  (STANDING):  allopurinol 100 milliGRAM(s) Oral daily  amLODIPine   Tablet 10 milliGRAM(s) Oral daily  aspirin enteric coated 81 milliGRAM(s) Oral daily  colchicine 0.6 milliGRAM(s) Oral two times a day  dextrose 5% + sodium chloride 0.45% with potassium chloride 20 mEq/L 1000 milliLiter(s) (75 mL/Hr) IV Continuous <Continuous>  digoxin  Injectable 0.125 milliGRAM(s) IV Push daily  enoxaparin Injectable 60 milliGRAM(s) SubCutaneous two times a day  folic acid 1 milliGRAM(s) Oral daily  levETIRAcetam  IVPB 500 milliGRAM(s) IV Intermittent every 12 hours  metoprolol succinate ER 25 milliGRAM(s) Oral daily  multivitamin 1 Tablet(s) Oral daily  piperacillin/tazobactam IVPB. 3.375 Gram(s) IV Intermittent every 8 hours  tamsulosin 0.4 milliGRAM(s) Oral at bedtime  thiamine IVPB 500 milliGRAM(s) IV Intermittent three times a day    Vital Signs Last 24 Hrs  T(C): 37.5 (26 Dec 2018 04:16), Max: 38.3 (26 Dec 2018 00:11)  T(F): 99.5 (26 Dec 2018 04:16), Max: 101 (26 Dec 2018 00:11)  HR: 78 (26 Dec 2018 06:00) (71 - 87)  BP: 132/62 (26 Dec 2018 06:00) (115/90 - 135/59)  BP(mean): 82 (26 Dec 2018 06:00) (66 - 98)  RR: 20 (26 Dec 2018 06:00) (10 - 25)  SpO2: 99% (26 Dec 2018 06:00) (91% - 100%)    PHYSICAL EXAM:  Constitutional: Semirecumbent in bed, awake, no distress  Respiratory: Breath sounds are clear bilaterally, Nonlabored  Cardiovascular: Irregular, systolic murmur, + S2  Gastrointestinal: Abdomen is soft, nontender.   Extremities: No pedal edema.   Skin: Warm, dry; no rash.    LABS:                     7.1    17.48 )-----------( 253      ( 26 Dec 2018 06:57 )             22.1     137  |  103  |  37<H>  ----------------------------<  236<H>  3.8   |  23  |  1.75<H>    Ca    8.9      26 Dec 2018 06:57    12 Lead ECG (12.22.18 @ 17:55):  Atrial fibrillation with premature ventricular or aberrantly conducted complexes.  Right bundle branch block.  Left anterior fascicular block.     Transthoracic Echocardiogram w/Doppler Complete (12.23.18 @ 10:19):  1. Moderate left ventricular hypertrophy with normal left ventricular systolic function with stage I diastolic dysfunction  2. Calcific severe aortic stenosis, recommend clinical correlation  3. Mild tricuspid regurgitation

## 2018-12-26 NOTE — PROGRESS NOTE ADULT - PROBLEM SELECTOR PLAN 7
with CVR, will continue Digoxin & Metoprolol (Betaxolol is non formulary) with hold parameters in addition to Xarelto.  cardiology evl- Dr.Palla.

## 2018-12-26 NOTE — PROGRESS NOTE ADULT - SUBJECTIVE AND OBJECTIVE BOX
ID Progress note     Name: YUE KING  Age: 83y  Gender: Male  MRN: [id number]    Interval History-- Events noted, remains confused, febrile , positive for cough , so far sepsis work up is negative   Notes reviewed    Past Medical History--  Stomach cancer  Hypertension  Atrial fibrillation  Gout  No significant past surgical history      For details regarding the patient's social history, family history, and other miscellaneous elements, please refer the initial infectious diseases consultation and/or the admitting history and physical examination for this admission.    Allergies--  Allergies    No Known Allergies    Intolerances        Medications--  Antibiotics:  piperacillin/tazobactam IVPB. 3.375 Gram(s) IV Intermittent every 8 hours    Immunologic:    Other:  acetaminophen   Tablet .. PRN  allopurinol  aluminum hydroxide/magnesium hydroxide/simethicone Suspension PRN  amLODIPine   Tablet  aspirin enteric coated  bisacodyl PRN  colchicine  dextrose 5% + sodium chloride 0.45% with potassium chloride 20 mEq/L  digoxin  Injectable  enoxaparin Injectable  folic acid  guaiFENesin   Syrup  (Sugar-Free) PRN  levETIRAcetam  IVPB  metoprolol succinate ER  metoprolol tartrate Injectable PRN  multivitamin  tamsulosin  thiamine IVPB      Review of Systems--  Review of systems unable to be obtained secondary to clinical condition.    Physical Examination--    Vital Signs: T(F): 99.5 (12-26-18 @ 04:16), Max: 101 (12-26-18 @ 00:11)  HR: 78 (12-26-18 @ 06:00)  BP: 132/62 (12-26-18 @ 06:00)  RR: 20 (12-26-18 @ 06:00)  SpO2: 99% (12-26-18 @ 06:00)  Wt(kg): --  General: Nontoxic-appearing Male in no acute distress.  HEENT: AT/NC. PERRL. EOMI. Anicteric. Conjunctiva pink and moist. Oropharynx clear. Dentition fair.  Neck: Not rigid. No sense of mass.  Nodes: None palpable.  Lungs: coarse breath sounds  bilaterally without rales, wheezing or rhonchi  Heart: Regular rate and rhythm. No Murmur. No rub. No gallop. No palpable thrill.  Abdomen: Bowel sounds present and normoactive. Soft. Nondistended. Nontender. No sense of mass. No organomegaly.  Back: No spinal tenderness. No costovertebral angle tenderness.   Extremities: No cyanosis or clubbing. No edema.   Skin: Warm. Dry. Good turgor. No rash. No vasculitic stigmata.  Psychiatric: Appropriate affect and mood for situation.         Laboratory Studies--  CBC                        7.1    17.48 )-----------( 253      ( 26 Dec 2018 06:57 )             22.1       Chemistries  12-26    137  |  103  |  37<H>  ----------------------------<  236<H>  3.8   |  23  |  1.75<H>    Ca    8.9      26 Dec 2018 06:57    HIV-1/2 Antigen/Antibody Screen by [facility] (12.25.18 @ 20:56)    HIV-1/2 Combo Result: Nonreact:    Ferritin, Serum (12.23.18 @ 12:05)    Ferritin, Serum: 555 ng/mL    Iron with Total Binding Capacity (12.23.18 @ 12:05)    Iron - Total Binding Capacity.: 228 ug/dL    % Saturation, Iron: 7 %    Iron Total, Serum: 17 ug/dL    Unsaturated Iron Binding Capacity: 211 ug/dL          Culture Data    Culture - Blood (collected 25 Dec 2018 00:33)  Source: .Blood Blood-Peripheral  Preliminary Report (26 Dec 2018 01:01):    No growth to date.    Culture - Blood (collected 25 Dec 2018 00:33)  Source: .Blood Blood  Preliminary Report (26 Dec 2018 01:01):    No growth to date.    Culture - Blood (collected 22 Dec 2018 23:17)  Source: .Blood Blood  Preliminary Report (24 Dec 2018 01:01):    No growth to date.    Culture - Blood (collected 22 Dec 2018 21:00)  Source: .Blood Blood  Preliminary Report (23 Dec 2018 22:01):    No growth to date.    Culture - Urine (collected 22 Dec 2018 20:50)  Source: .Urine Clean Catch (Midstream)  Final Report (23 Dec 2018 16:28):    <10,000 CFU/ml Normal Urogenital kristi present          Radiology:   Xray Chest 1 View- PORTABLE-Urgent (12.24.18 @ 17:58) >  Portable AP radiograph is compared to 12/22/2018.    The heart is again enlarged. The trachea is midline airthere is no focal   infiltrate or pleural effusion. The osseous structures are intact.    Impression: Cardiomegaly. No active disease. No change.       MR Head No Cont (12.24.18 @ 14:30) >    Comparison CT performed 2 days prior    There is no mass effect, cortical edema or hydrocephalus. There isno   evidence of acute infarct or previous parenchymal hemorrhage. There is   mild central volume loss. The orbital and sellar contents and cerebellar   tonsils are within normal limits.    IMPRESSION:    No acute findings       MR Lumbar Spine No Cont (12.24.18 @ 14:52) >    IMPRESSION:    No acute findings. Negative for compression fracture, focal disc   protrusion or spinal stenosis. Diffuse red marrow replacement suggestive   of chronic anemia.      Assessment--  82 y/o M with PMH of HTN, A. Fib on Xarelto, Stomach CA, GERD, and Gout presented with generalized weakness & repeated falls, also had seizures at the ED. Noted to be febrile , likley cause of fever could be UTI as he ahs urinary retention with BPH , PVR was 350 . Doubt he has pneumonia ,  he has hx of heavy ETOH abuse     He may have a myeloproliferative disorder with leukocytosis with monocytosis may have CMML . he also has severe anemia . Leukocytosis likely from myeloproliferative disorder , doubt any infection     Etiology of seizure is unclear probable alcohol withdrawal seizure on CIWA protocol     Possible pseudogout Right knee     Etiology of fever could be aspiration or viral syndrome     His HIV test is negative     Plan :   - will  continue with Zosyn   - hematology follow up await flow cytometry results    - anemia profile   - fall precautions   - may need Flomax for BPH  - PT evaluation   - follow MRI   - add colchicine for pseudogout    Continue with present regime .  Appropriate use of antibiotics and adverse effects reviewed.    I have discussed the above plan of care with patient's family in detail. They expressed understanding of the treatment plan . Risks, benefits and alternatives discussed in detail. I have asked if they have any questions or concerns and appropriately addressed them to the best of my ability .      > 35 minutes spent in direct patient care reviewing  the notes, lab data/ imaging , discussion with multidisciplinary team. All questions were addressed and answered to the best of my capacity .    Thank you for allowing me to participate in the care of your patient .        Dimple Florez MD  [phone]

## 2018-12-26 NOTE — PROGRESS NOTE ADULT - SUBJECTIVE AND OBJECTIVE BOX
Date/Time Patient Seen:  		  Referring MD:   Data Reviewed	       Patient is a 83y old  Male who presents with a chief complaint of Generalized weakness. (25 Dec 2018 12:02)      Subjective/HPI     PAST MEDICAL & SURGICAL HISTORY:  Stomach cancer  Hypertension  Atrial fibrillation  Gout  No significant past surgical history        Medication list         MEDICATIONS  (STANDING):  allopurinol 100 milliGRAM(s) Oral daily  amLODIPine   Tablet 10 milliGRAM(s) Oral daily  aspirin enteric coated 81 milliGRAM(s) Oral daily  colchicine 0.6 milliGRAM(s) Oral two times a day  dextrose 5% + sodium chloride 0.45% with potassium chloride 20 mEq/L 1000 milliLiter(s) (75 mL/Hr) IV Continuous <Continuous>  digoxin  Injectable 0.125 milliGRAM(s) IV Push daily  enoxaparin Injectable 60 milliGRAM(s) SubCutaneous two times a day  folic acid 1 milliGRAM(s) Oral daily  levETIRAcetam  IVPB 500 milliGRAM(s) IV Intermittent every 12 hours  metoprolol succinate ER 25 milliGRAM(s) Oral daily  multivitamin 1 Tablet(s) Oral daily  piperacillin/tazobactam IVPB. 3.375 Gram(s) IV Intermittent every 8 hours  tamsulosin 0.4 milliGRAM(s) Oral at bedtime  thiamine IVPB 500 milliGRAM(s) IV Intermittent three times a day    MEDICATIONS  (PRN):  acetaminophen   Tablet .. 650 milliGRAM(s) Oral every 6 hours PRN Temp greater or equal to 38C (100.4F)  aluminum hydroxide/magnesium hydroxide/simethicone Suspension 30 milliLiter(s) Oral every 6 hours PRN Dyspepsia  bisacodyl 5 milliGRAM(s) Oral every 12 hours PRN Constipation  guaiFENesin   Syrup  (Sugar-Free) 200 milliGRAM(s) Oral every 6 hours PRN Cough  metoprolol tartrate Injectable 5 milliGRAM(s) IV Push every 6 hours PRN if cannot tolerate PO medications         Vitals log        ICU Vital Signs Last 24 Hrs  T(C): 37.5 (26 Dec 2018 04:16), Max: 38.3 (26 Dec 2018 00:11)  T(F): 99.5 (26 Dec 2018 04:16), Max: 101 (26 Dec 2018 00:11)  HR: 78 (26 Dec 2018 06:00) (71 - 87)  BP: 132/62 (26 Dec 2018 06:00) (115/90 - 135/59)  BP(mean): 82 (26 Dec 2018 06:00) (66 - 98)  ABP: --  ABP(mean): --  RR: 20 (26 Dec 2018 06:00) (10 - 25)  SpO2: 99% (26 Dec 2018 06:00) (91% - 100%)           Input and Output:  I&O's Detail    25 Dec 2018 07:01  -  26 Dec 2018 07:00  --------------------------------------------------------  IN:    dextrose 5% + sodium chloride 0.45% with potassium chloride 20 mEq/L: 1275 mL    IV PiggyBack: 500 mL    Oral Fluid: 120 mL  Total IN: 1895 mL    OUT:    Voided: 100 mL  Total OUT: 100 mL    Total NET: 1795 mL          Lab Data                        7.1    17.48 )-----------( 253      ( 26 Dec 2018 06:57 )             22.1     12-26    137  |  103  |  37<H>  ----------------------------<  236<H>  3.8   |  23  |  1.75<H>    Ca    8.9      26 Dec 2018 06:57      ABG - ( 24 Dec 2018 22:15 )  pH, Arterial: x     pH, Blood: 7.54  /  pCO2: 26    /  pO2: 87    / HCO3: 25    / Base Excess: 0.2   /  SaO2: 96                      Review of Systems	      Objective     Physical Examination    frail  weak  lung dec BS  abd soft  cn grossly int      Pertinent Lab findings & Imaging      Jonathan:  NO   Adequate UO     I&O's Detail    25 Dec 2018 07:01  -  26 Dec 2018 07:00  --------------------------------------------------------  IN:    dextrose 5% + sodium chloride 0.45% with potassium chloride 20 mEq/L: 1275 mL    IV PiggyBack: 500 mL    Oral Fluid: 120 mL  Total IN: 1895 mL    OUT:    Voided: 100 mL  Total OUT: 100 mL    Total NET: 1795 mL               Discussed with:     Cultures:	        Radiology

## 2018-12-26 NOTE — PROGRESS NOTE ADULT - COMMENTS
unable to obtained as pt is confused and lethargic.

## 2018-12-26 NOTE — PROGRESS NOTE ADULT - PROBLEM SELECTOR PLAN 3
f/u culture report.  started on antibiotics due to fever on 12/24. Fever likely due to aspiration pneumonia.  blood and urin cultures- NGTD.  started on antibiotics due to fever on 12/24.  amonia level -normal.  HIV-negative.   lactate- wnl.

## 2018-12-26 NOTE — PROGRESS NOTE ADULT - ASSESSMENT
82 y/o M with PMH of HTN, A. Fib on Xarelto, Stomach CA, GERD, and Gout presented with generalized weakness & repeated falls, also had seizures at the ED. 84 y/o M with PMH of HTN, A. Fib on Xarelto, Stomach CA, GERD, and Gout presented with generalized weakness & repeated falls, also had seizures at the ED.  pt is in ICU and undergoing Tx for aspiration PNA, mngt , workup of CVA  Consult called for evaluation of leukocytosis , anemia     Severe macrocytic anemia   sufficient iron , normal B12/ folate , normal TSH , no evidence of hemolysis   etiology is most likely multifactorial :anemia in CKD , acute illness, bone marrow suppression by ETOH with possible underling hematological disorder  there is no indication for emergent bone marrow Bx today  MM panel is ordered   full hemolysis workup is pending , but LFTs are WNL today  Hb is 7.0 today   FOBT is pending   Leukocytosis improved on current regimen ( most likely reactive, monitor)  Old LW could be obtained for clarification of pt's baseline     PLAN:  transfuse 1 unit pRBC   if hematology workup is unrevealing and an appropriate response to transfusion with short term stabilization of Hb is observed , then would treat acute condition and expect an improvement     LW results, anemia workup, utility / safety ( pt is anticoagulated) of BM Bx were d/w pt's partner in length   multiple Qs were answered with consequent appropriate feedback to pt's family /partner satisfaction  further recommendation pending above

## 2018-12-26 NOTE — PROGRESS NOTE ADULT - SUBJECTIVE AND OBJECTIVE BOX
Neurology Follow up note    YUE [id number]    HPI:  This is an 82 y/o M with PMH of HTN, A. Fib on Xarelto, Stomach CA, GERD, and Gout who presented with generalized weakness with repeated falls. Patiet states that he fell while was in [place] a week ago when his left leg buckled under him, didn't see a doctor as he was coming back to [place] next day, but over the past week he fell several times & was feeling week, and moves with difficulty. No fever or chills at home, no flu-like symptoms, and no sick contacts. At the ED, patient had a siezure episode for about 30 seconds that involved his left side of the body. Never had any seizure episodes in the past. (22 Dec 2018 20:48)      Interval History - no new events.    Patient is seen, chart was reviewed and case was discussed with the treatment team.  Pt is not in any distress.   Lying on bed comfortably.   No clinical seizure was reported.  Sitting on chair bed comfortably.    is at bedside.    Vital Signs Last 24 Hrs  T(C): 37.7 (26 Dec 2018 12:06), Max: 38.3 (26 Dec 2018 00:11)  T(F): 99.8 (26 Dec 2018 12:06), Max: 101 (26 Dec 2018 00:11)  HR: 76 (26 Dec 2018 10:00) (71 - 87)  BP: 118/55 (26 Dec 2018 10:00) (116/45 - 135/59)  BP(mean): 72 (26 Dec 2018 10:00) (66 - 83)  RR: 20 (26 Dec 2018 10:00) (15 - 25)  SpO2: 100% (26 Dec 2018 10:00) (91% - 100%)        REVIEW OF SYSTEMS:    limited due to confusion.  not in any distress.    On Neurological Examination:    Mental Status - Pt is alert, awake, oriented X2  Followssimple  commands     Speech -  Normal.    Cranial Nerves - Pupils 3 mm equal and reactive to light  Pt has no facial asymmetry. Facial sensation is intact.  Tongue - is in midline.    Muscle tone ; normal.      Motor Exam - good hand grasp bilaterally.  not able to assess power elsewhere due to pain.    Sensory Exam -  Pt withdraws all extremities equally on stimulation. No asymmetry seen. No complaints of tingling, numbness.        Deep tendon Reflexes - 2 plus all over.         Neck Supple -  Yes.     MEDICATIONS    acetaminophen   Tablet .. 650 milliGRAM(s) Oral every 6 hours PRN  allopurinol 100 milliGRAM(s) Oral daily  aluminum hydroxide/magnesium hydroxide/simethicone Suspension 30 milliLiter(s) Oral every 6 hours PRN  amLODIPine   Tablet 10 milliGRAM(s) Oral daily  aspirin enteric coated 81 milliGRAM(s) Oral daily  bisacodyl 5 milliGRAM(s) Oral every 12 hours PRN  colchicine 0.6 milliGRAM(s) Oral two times a day  dextrose 5% + sodium chloride 0.45% with potassium chloride 20 mEq/L 1000 milliLiter(s) IV Continuous <Continuous>  digoxin     Tablet 0.125 milliGRAM(s) Oral daily  enoxaparin Injectable 60 milliGRAM(s) SubCutaneous two times a day  folic acid 1 milliGRAM(s) Oral daily  guaiFENesin   Syrup  (Sugar-Free) 200 milliGRAM(s) Oral every 6 hours PRN  levETIRAcetam  IVPB 500 milliGRAM(s) IV Intermittent every 12 hours  metoprolol succinate ER 25 milliGRAM(s) Oral daily  multivitamin 1 Tablet(s) Oral daily  piperacillin/tazobactam IVPB. 3.375 Gram(s) IV Intermittent every 8 hours  tamsulosin 0.4 milliGRAM(s) Oral at bedtime  thiamine IVPB 500 milliGRAM(s) IV Intermittent three times a day      Allergies    No Known Allergies    Intolerances        LABS:  CBC Full  -  ( 26 Dec 2018 06:57 )  WBC Count : 17.48 K/uL  Hemoglobin : 7.1 g/dL  Hematocrit : 22.1 %  Platelet Count - Automated : 253 K/uL  Mean Cell Volume : 109.4 fl  Mean Cell Hemoglobin : 35.1 pg  Mean Cell Hemoglobin Concentration : 32.1 gm/dL  Auto Neutrophil # : 13.11 K/uL  Auto Lymphocyte # : 1.40 K/uL  Auto Monocyte # : 2.97 K/uL  Auto Eosinophil # : 0.00 K/uL      Urinalysis Basic - ( 24 Dec 2018 23:31 )    Color: Yellow / Appearance: Clear / S.020 / pH: x  Gluc: x / Ketone: Negative  / Bili: Negative / Urobili: Negative mg/dL   Blood: x / Protein: 30 mg/dL / Nitrite: Negative   Leuk Esterase: Negative / RBC: 0-2 /HPF / WBC 0-2   Sq Epi: x / Non Sq Epi: Occasional / Bacteria: Few          137  |  103  |  37<H>  ----------------------------<  236<H>  3.8   |  23  |  1.75<H>    Ca    8.9      26 Dec 2018 06:57      Hemoglobin A1C:     Vitamin B12     RADIOLOGY  < from: MR Lumbar Spine No Cont (18 @ 14:52) >    EXAM:  MR SPINE LUMBAR                            PROCEDURE DATE:  2018          INTERPRETATION:  MRI of lumbar spine without contrast    History back pain    There is mild lower thoracic dextroscoliosis and mid lumbar   levoscoliosis. There is mild diffuse red marrow replacement without focal   infiltrative lesion or edema. There is no compression fracture or   subluxation. The conus is normal    There are ventral syndesmophytes at L1-L2 and L2-L3 with relative   preservation of the discspaces. There is mild facet hypertrophy at L2-L3   without significant spinal canal or foraminal stenosis    At L3-L4 there is mild annular osteophyte with ventral discogenic   sclerosis and bridging syndesmophyte. There is mild ligamentous and facet  hypertrophy together resulting in mild right-sided foraminal stenosis   without central canal stenosis    At L4-L5 disc space is maintained. There are mild ventral degenerative   endplate changes. Facet hypertrophy and far lateral osteophyte results in   mild left-sided foraminal stenosis without central canal stenosis    The L5-S1 disc is maintained without significant spinal stenosis. Facet   hypertrophy results in moderate left-sided foraminal stenosis    IMPRESSION:    No acute findings. Negative for compression fracture, focal disc   protrusion or spinal stenosis. Diffuse red marrow replacement suggestive   of chronic anemia.      BLACNA MEEKS M.D., ATTENDING RADIOLOGIST  This document has been electronically signed. Dec 24 2018  3:38PM    < from: MR Head No Cont (18 @ 14:30) >    EXAM:  MR BRAIN                            PROCEDURE DATE:  2018          INTERPRETATION:  MRI brain without contrast    History CVA    Comparison CT performed 2 days prior    There is no mass effect, cortical edema or hydrocephalus. There isno   evidence of acute infarct or previous parenchymal hemorrhage. There is   mild central volume loss. The orbital and sellar contents and cerebellar   tonsils are within normal limits.    IMPRESSION:    No acute findings        BLANCA MEEKS M.D., ATTENDING RADIOLOGIST  This document has been electronically signed. Dec 24 2018  3:24PM        ASSESSMENT AND PLAN:      presented with fall and generalized seizure.  seizure ? related to ETOH.  FEVER UTI ?PNEUMONIA,  GOUTY ARTHRITIS WITH LEG WEAKNESS ?  AS MRI OF L-S SPINE REPORTED NO SIGNIFICANT CANAL STENOSIS.    CONTINUE KEPPRA FOR SEIZURE.  ANTIBIOTIC AS PER ID.  DW FAMILY (PARTNER) AT BED SIDE,  ON CIWA ; CONTINUE THIAMINE.  Physical therapy evaluation.  Pain is accessed and addressed.  Would continue to follow.

## 2018-12-26 NOTE — PROGRESS NOTE ADULT - ATTENDING COMMENTS
Anemia- f/u anemia w/u.  no transfusion at present as per hematology.  peripheral blood flow cytometry as outpatient.    Knee pain- +effusion.    Hypokalemia, low MG- resolved.    h/o ETOH- impending DTs  d/c ativan due to lethargy.  f/u Ammonia level, VBG, [place] serology, HIV screen, RPR screen Anemia- f/u anemia w/u.  no transfusion at present as per hematology.  Leucocytosis- likely due to infection and/or hematological disorder.  peripheral blood flow cytometry as outpatient.    Knee pain- +effusion.    Hypokalemia, low MG- resolved.    h/o ETOH- impending DTs  d/c ativan due to lethargy.  f/u  [place] serology,s syphilis.

## 2018-12-26 NOTE — PROGRESS NOTE ADULT - PROBLEM SELECTOR PLAN 8
Metoprolol, Norvasc, and HCTZ with hold parameters.

## 2018-12-26 NOTE — PROGRESS NOTE ADULT - SUBJECTIVE AND OBJECTIVE BOX
83M with PMHx of gastric cancer, htn, afib, gout, gerd, initially admitted with generalised weakness and multiple falls. Pt possibly had a siezure episode in ED as per chart notes. Now RRT called by bedside RN for tonic-clonic seizure lasting ~40 seconds which was witnessed by the RN. Pt seen and examined at bedside, awake, following commands. no post-ictal phase. Pt denies chest pain, sob, n/v/d, loc. Pt started on keppra and transferred to [facility] for further management    24 hour events: Pt seen and examined at bedside. Charts/labs reviewed. Worsening anemia; seen by Heme/onc, likely anemia of chronic disease. Prbc x1 ordered    PAST MEDICAL & SURGICAL HISTORY:  Stomach cancer  Hypertension  Atrial fibrillation  Gout  No significant past surgical history      Review of Systems:  unable to obtain    T(F): 100.5 (18 @ 19:57), Max: 101 (18 @ 00:11)  HR: 76 (18 @ 20:00) (75 - 89)  BP: 145/63 (18 @ 20:00) (118/55 - 145/63)  RR: 19 (18 @ 20:00) (16 - 23)  SpO2: 100% (18 @ 20:00) (91% - 100%)  Wt(kg): --          I&O's Summary    25 Dec 2018 07:  -  26 Dec 2018 07:00  --------------------------------------------------------  IN: 2530 mL / OUT: 100 mL / NET: 2430 mL    26 Dec 2018 07:  -  26 Dec 2018 22:17  --------------------------------------------------------  IN: 1225 mL / OUT: 0 mL / NET: 1225 mL        Physical Exam:     Gen: WD/WG male  Neuro: lethargic, arousable  HEENT: NC/AT  Resp: CTA b/l  CVS: nl S1/S2, RRR  Abd: soft, nt, nd, +bs  Ext: no edema, +pulses  Skin: well perfused, warm      Meds:    piperacillin/tazobactam IVPB. IV Intermittent  amLODIPine   Tablet Oral  digoxin     Tablet Oral  metoprolol succinate ER Oral  tamsulosin Oral  allopurinol Oral  colchicine Oral  guaiFENesin   Syrup  (Sugar-Free) Oral PRN  acetaminophen   Tablet .. Oral PRN  levETIRAcetam  IVPB IV Intermittent  aspirin enteric coated Oral  enoxaparin Injectable SubCutaneous  aluminum hydroxide/magnesium hydroxide/simethicone Suspension Oral PRN  bisacodyl Oral PRN  dextrose 5% + sodium chloride 0.45% with potassium chloride 20 mEq/L IV Continuous  folic acid Oral  multivitamin Oral  thiamine IVPB IV Intermittent                            7.0    16.72 )-----------( 255      ( 26 Dec 2018 13:19 )             21.8     Bands 1.0        137  |  103  |  37<H>  ----------------------------<  236<H>  3.8   |  23  |  1.75<H>    Ca    8.9      26 Dec 2018 06:57    TPro  6.7  /  Alb  2.0<L>  /  TBili  0.5  /  DBili  0.2  /  AST  16  /  ALT  13  /  AlkPhos  27<L>              Urinalysis Basic - ( 24 Dec 2018 23:31 )    Color: Yellow / Appearance: Clear / S.020 / pH: x  Gluc: x / Ketone: Negative  / Bili: Negative / Urobili: Negative mg/dL   Blood: x / Protein: 30 mg/dL / Nitrite: Negative   Leuk Esterase: Negative / RBC: 0-2 /HPF / WBC 0-2   Sq Epi: x / Non Sq Epi: Occasional / Bacteria: Few      .Urine Catheterized   No growth --  @ 11:03  .Blood Blood   No growth to date. --  @ 00:33  .Blood Blood   No growth to date. --  @ 23:17  .Blood Blood   No growth to date. --  @ 21:00  .Urine Clean Catch (Midstream)   <10,000 CFU/ml Normal Urogenital kristi present --  @ 20:50          CENTRAL LINE: no    WELLS: no    A-LINE: no    GLOBAL ISSUE/BEST PRACTICE:  Analgesia: yes  Sedation: no  HOB elevation: yes  Stress ulcer prophylaxis: n/a  VTE prophylaxis: yes  Glycemic control: n/a  Nutrition: yes      CODE STATUS: Full  GOC discussion: Y

## 2018-12-26 NOTE — PROGRESS NOTE ADULT - PROBLEM SELECTOR PLAN 1
Chronic; controlled ventricular rate; continue digoxin and metoprolol but change from IV to enteral route (now able to take PO meds); continue anticoagulation.

## 2018-12-26 NOTE — PROGRESS NOTE ADULT - PROBLEM SELECTOR PLAN 3
Fever likely due to aspiration pneumonia.  blood and urin cultures- NGTD.  started on antibiotics due to fever on 12/24.  amonia level -normal.  HIV-negative.   lactate- wnl.

## 2018-12-26 NOTE — PROGRESS NOTE ADULT - PROBLEM SELECTOR PLAN 7
with CVR, will continue Digoxin & Metoprolol (Betaxolol is non formulary) with hold parameters in addition to Xarelto.  cardiology evl- Dr.Palla. . with CVR, will continue Digoxin & Metoprolol (Betaxolol is non formulary) with hold parameters in addition to Xarelto.  cardiology evl- Dr.Palla.

## 2018-12-26 NOTE — PROGRESS NOTE ADULT - PROBLEM SELECTOR PLAN 9
On Colchicine & renal dose of Allopurinol, will continue both.

## 2018-12-26 NOTE — PROGRESS NOTE ADULT - PROBLEM SELECTOR PLAN 1
sepsis eval  AMS eval  on AED  neuro follow up  I and O noted - positive almost 2 L, Hgb low - could be hemodillution  ID following, on Zosyn, cx noted - neg x 2, serial labs, WBC trending down, however, febrile  supportive ICU care and regimen in effect  medical specialties follow up  replete lytes  caution with volume overload  multiple acute and chronic medical issues  will follow  HIV NR

## 2018-12-26 NOTE — PHARMACY COMMUNICATION NOTE - COMMENTS
Patient CrCl-=28.5, change to renal dosing for Lovenox 60 mg Q12H to once daily, and colchicine 0.6 mg twice daily to 0.3 mg once daily. MD okay with dose change. Patient CrCl-=28.5, change to renal dosing for Lovenox 60 mg Q12H to once daily, and colchicine 0.6 mg twice daily to 0.6 mg once daily. MD okay with dose change.

## 2018-12-26 NOTE — PROGRESS NOTE ADULT - SUBJECTIVE AND OBJECTIVE BOX
Patient is a 83y old  Male who presents with a chief complaint of Generalized weakness. (26 Dec 2018 07:55)      INTERVAL HPI/OVERNIGHT EVENTS:    Pain Location & Control:     MEDICATIONS  (STANDING):  allopurinol 100 milliGRAM(s) Oral daily  amLODIPine   Tablet 10 milliGRAM(s) Oral daily  aspirin enteric coated 81 milliGRAM(s) Oral daily  colchicine 0.6 milliGRAM(s) Oral two times a day  dextrose 5% + sodium chloride 0.45% with potassium chloride 20 mEq/L 1000 milliLiter(s) (75 mL/Hr) IV Continuous <Continuous>  digoxin     Tablet 0.125 milliGRAM(s) Oral daily  enoxaparin Injectable 60 milliGRAM(s) SubCutaneous two times a day  folic acid 1 milliGRAM(s) Oral daily  levETIRAcetam  IVPB 500 milliGRAM(s) IV Intermittent every 12 hours  metoprolol succinate ER 25 milliGRAM(s) Oral daily  multivitamin 1 Tablet(s) Oral daily  piperacillin/tazobactam IVPB. 3.375 Gram(s) IV Intermittent every 8 hours  tamsulosin 0.4 milliGRAM(s) Oral at bedtime  thiamine IVPB 500 milliGRAM(s) IV Intermittent three times a day    MEDICATIONS  (PRN):  acetaminophen   Tablet .. 650 milliGRAM(s) Oral every 6 hours PRN Temp greater or equal to 38C (100.4F)  aluminum hydroxide/magnesium hydroxide/simethicone Suspension 30 milliLiter(s) Oral every 6 hours PRN Dyspepsia  bisacodyl 5 milliGRAM(s) Oral every 12 hours PRN Constipation  guaiFENesin   Syrup  (Sugar-Free) 200 milliGRAM(s) Oral every 6 hours PRN Cough      Allergies    No Known Allergies    Intolerances          Vital Signs Last 24 Hrs  T(C): 37.3 (26 Dec 2018 08:15), Max: 38.3 (26 Dec 2018 00:11)  T(F): 99.1 (26 Dec 2018 08:15), Max: 101 (26 Dec 2018 00:11)  HR: 86 (26 Dec 2018 08:00) (71 - 87)  BP: 123/63 (26 Dec 2018 08:00) (116/45 - 135/59)  BP(mean): 77 (26 Dec 2018 08:00) (66 - 83)  RR: 19 (26 Dec 2018 08:00) (10 - 25)  SpO2: 100% (26 Dec 2018 08:00) (91% - 100%)        I&O's Detail    25 Dec 2018 07:01  -  26 Dec 2018 07:00  --------------------------------------------------------  IN:    dextrose 5% + sodium chloride 0.45% with potassium chloride 20 mEq/L: 1650 mL    IV PiggyBack: 700 mL    Oral Fluid: 180 mL  Total IN: 2530 mL    OUT:    Voided: 100 mL  Total OUT: 100 mL    Total NET: 2430 mL      26 Dec 2018 07:01  -  26 Dec 2018 11:38  --------------------------------------------------------  IN:    dextrose 5% + sodium chloride 0.45% with potassium chloride 20 mEq/L: 150 mL  Total IN: 150 mL    OUT:  Total OUT: 0 mL    Total NET: 150 mL          LABS:                        7.1    17.48 )-----------( 253      ( 26 Dec 2018 06:57 )             22.1     26 Dec 2018 06:57    137    |  103    |  37     ----------------------------<  236    3.8     |  23     |  1.75     Ca    8.9        26 Dec 2018 06:57        Urinalysis Basic - ( 24 Dec 2018 23:31 )    Color: Yellow / Appearance: Clear / S.020 / pH: x  Gluc: x / Ketone: Negative  / Bili: Negative / Urobili: Negative mg/dL   Blood: x / Protein: 30 mg/dL / Nitrite: Negative   Leuk Esterase: Negative / RBC: 0-2 /HPF / WBC 0-2   Sq Epi: x / Non Sq Epi: Occasional / Bacteria: Few      CAPILLARY BLOOD GLUCOSE            Cultures  Culture Results:   No growth ( @ 11:03)  Culture Results:   No growth to date. ( @ 00:33)  Culture Results:   No growth to date. ( 00:33)  Culture Results:   No growth to date. ( 23:17)  Culture Results:   No growth to date. ( 21:00)  Culture Results:   <10,000 CFU/ml Normal Urogenital kristi present ( @ 20:50)        Culture - Urine (collected 18 @ 11:03)  Source: .Urine Catheterized  Final Report (18 @ 10:46):    No growth    Culture - Blood (collected 18 @ 00:33)  Source: .Blood Blood-Peripheral  Preliminary Report (18 @ 01:01):    No growth to date.    Culture - Blood (collected 18 @ 00:33)  Source: .Blood Blood  Preliminary Report (18 @ :01):    No growth to date.    Culture - Blood (collected 18 @ 23:17)  Source: .Blood Blood  Preliminary Report (18 @ 01:01):    No growth to date.    Culture - Blood (collected 18 @ 21:00)  Source: .Blood Blood  Preliminary Report (18 @ 22:01):    No growth to date.    Culture - Urine (collected 18 @ 20:50)  Source: .Urine Clean Catch (Midstream)  Final Report (18 @ 16:28):    <10,000 CFU/ml Normal Urogenital kristi present        RADIOLOGY & ADDITIONAL TESTS:    Imaging Personally Reviewed:  [ ] YES  [ ] NO    Consultant(s) Notes Reviewed:  [ ] YES  [ ] NO    Care Discussed with Consultants/Other Providers [ ] YES  [ ] NO Patient is a 83y old  Male who presents with a chief complaint of Generalized weakness. (26 Dec 2018 07:55)      INTERVAL HPI/OVERNIGHT EVENTS:Patient is seen and examined.  mental status improving.  responding to verbal command.    Pain Location & Control:     MEDICATIONS  (STANDING):  allopurinol 100 milliGRAM(s) Oral daily  amLODIPine   Tablet 10 milliGRAM(s) Oral daily  aspirin enteric coated 81 milliGRAM(s) Oral daily  colchicine 0.6 milliGRAM(s) Oral two times a day  dextrose 5% + sodium chloride 0.45% with potassium chloride 20 mEq/L 1000 milliLiter(s) (75 mL/Hr) IV Continuous <Continuous>  digoxin     Tablet 0.125 milliGRAM(s) Oral daily  enoxaparin Injectable 60 milliGRAM(s) SubCutaneous two times a day  folic acid 1 milliGRAM(s) Oral daily  levETIRAcetam  IVPB 500 milliGRAM(s) IV Intermittent every 12 hours  metoprolol succinate ER 25 milliGRAM(s) Oral daily  multivitamin 1 Tablet(s) Oral daily  piperacillin/tazobactam IVPB. 3.375 Gram(s) IV Intermittent every 8 hours  tamsulosin 0.4 milliGRAM(s) Oral at bedtime  thiamine IVPB 500 milliGRAM(s) IV Intermittent three times a day    MEDICATIONS  (PRN):  acetaminophen   Tablet .. 650 milliGRAM(s) Oral every 6 hours PRN Temp greater or equal to 38C (100.4F)  aluminum hydroxide/magnesium hydroxide/simethicone Suspension 30 milliLiter(s) Oral every 6 hours PRN Dyspepsia  bisacodyl 5 milliGRAM(s) Oral every 12 hours PRN Constipation  guaiFENesin   Syrup  (Sugar-Free) 200 milliGRAM(s) Oral every 6 hours PRN Cough      Allergies    No Known Allergies    Intolerances    Vital Signs Last 24 Hrs  T(C): 37.3 (26 Dec 2018 08:15), Max: 38.3 (26 Dec 2018 00:11)  T(F): 99.1 (26 Dec 2018 08:15), Max: 101 (26 Dec 2018 00:11)  HR: 86 (26 Dec 2018 08:00) (71 - 87)  BP: 123/63 (26 Dec 2018 08:00) (116/45 - 135/59)  BP(mean): 77 (26 Dec 2018 08:00) (66 - 83)  RR: 19 (26 Dec 2018 08:00) (10 - 25)  SpO2: 100% (26 Dec 2018 08:00) (91% - 100%)        I&O's Detail    25 Dec 2018 07:01  -  26 Dec 2018 07:00  --------------------------------------------------------  IN:    dextrose 5% + sodium chloride 0.45% with potassium chloride 20 mEq/L: 1650 mL    IV PiggyBack: 700 mL    Oral Fluid: 180 mL  Total IN: 2530 mL    OUT:    Voided: 100 mL  Total OUT: 100 mL    Total NET: 2430 mL      26 Dec 2018 07:01  -  26 Dec 2018 11:38  --------------------------------------------------------  IN:    dextrose 5% + sodium chloride 0.45% with potassium chloride 20 mEq/L: 150 mL  Total IN: 150 mL    OUT:  Total OUT: 0 mL    Total NET: 150 mL    LABS:                        7.1    17.48 )-----------( 253      ( 26 Dec 2018 06:57 )             22.1     26 Dec 2018 06:57    137    |  103    |  37     ----------------------------<  236    3.8     |  23     |  1.75     Ca    8.9        26 Dec 2018 06:57        Urinalysis Basic - ( 24 Dec 2018 23:31 )    Color: Yellow / Appearance: Clear / S.020 / pH: x  Gluc: x / Ketone: Negative  / Bili: Negative / Urobili: Negative mg/dL   Blood: x / Protein: 30 mg/dL / Nitrite: Negative   Leuk Esterase: Negative / RBC: 0-2 /HPF / WBC 0-2   Sq Epi: x / Non Sq Epi: Occasional / Bacteria: Few      Cultures  Culture Results:   No growth ( @ 11:03)  Culture Results:   No growth to date. ( @ 00:33)  Culture Results:   No growth to date. ( @ 00:33)  Culture Results:   No growth to date. ( 23:17)  Culture Results:   No growth to date. ( @ 21:00)  Culture Results:   <10,000 CFU/ml Normal Urogenital kristi present ( @ 20:50)        Culture - Urine (collected 18 @ 11:03)  Source: .Urine Catheterized  Final Report (18 @ 10:46):    No growth    Culture - Blood (collected 18 @ 00:33)  Source: .Blood Blood-Peripheral  Preliminary Report (18 @ 01:01):    No growth to date.    Culture - Blood (collected 18 @ 00:33)  Source: .Blood Blood  Preliminary Report (18 @ 01:01):    No growth to date.    Culture - Blood (collected 18 @ 23:17)  Source: .Blood Blood  Preliminary Report (18 @ 01:01):    No growth to date.    Culture - Blood (collected 18 @ 21:00)  Source: .Blood Blood  Preliminary Report (18 @ 22:01):    No growth to date.    Culture - Urine (collected 18 @ 20:50)  Source: .Urine Clean Catch (Midstream)  Final Report (18 @ 16:28):    <10,000 CFU/ml Normal Urogenital kristi present        RADIOLOGY & ADDITIONAL TESTS:    Imaging Personally Reviewed:  [ ] YES  [ ] NO    Consultant(s) Notes Reviewed:  [x ] YES  [ ] NO    Care Discussed with Consultants/Other Providers [x ] YES  [ ] NO

## 2018-12-26 NOTE — PROGRESS NOTE ADULT - SUBJECTIVE AND OBJECTIVE BOX
Patient is a 83y old  Male who presents with a chief complaint of Generalized weakness. (26 Dec 2018 07:55)      INTERVAL HPI/OVERNIGHT EVENTS:Patient is seen and examined.  mental status improved  as per pt's partner seems uncomfortable    + constipation       Pain Location & Control:     MEDICATIONS  (STANDING):  allopurinol 100 milliGRAM(s) Oral daily  amLODIPine   Tablet 10 milliGRAM(s) Oral daily  aspirin enteric coated 81 milliGRAM(s) Oral daily  colchicine 0.6 milliGRAM(s) Oral two times a day  dextrose 5% + sodium chloride 0.45% with potassium chloride 20 mEq/L 1000 milliLiter(s) (75 mL/Hr) IV Continuous <Continuous>  digoxin     Tablet 0.125 milliGRAM(s) Oral daily  enoxaparin Injectable 60 milliGRAM(s) SubCutaneous two times a day  folic acid 1 milliGRAM(s) Oral daily  levETIRAcetam  IVPB 500 milliGRAM(s) IV Intermittent every 12 hours  metoprolol succinate ER 25 milliGRAM(s) Oral daily  multivitamin 1 Tablet(s) Oral daily  piperacillin/tazobactam IVPB. 3.375 Gram(s) IV Intermittent every 8 hours  tamsulosin 0.4 milliGRAM(s) Oral at bedtime  thiamine IVPB 500 milliGRAM(s) IV Intermittent three times a day    MEDICATIONS  (PRN):  acetaminophen   Tablet .. 650 milliGRAM(s) Oral every 6 hours PRN Temp greater or equal to 38C (100.4F)  aluminum hydroxide/magnesium hydroxide/simethicone Suspension 30 milliLiter(s) Oral every 6 hours PRN Dyspepsia  bisacodyl 5 milliGRAM(s) Oral every 12 hours PRN Constipation  guaiFENesin   Syrup  (Sugar-Free) 200 milliGRAM(s) Oral every 6 hours PRN Cough      Allergies    No Known Allergies    Intolerances    Vital Signs Last 24 Hrs  T(C): 37.3 (26 Dec 2018 08:15), Max: 38.3 (26 Dec 2018 00:11)  T(F): 99.1 (26 Dec 2018 08:15), Max: 101 (26 Dec 2018 00:11)  HR: 86 (26 Dec 2018 08:00) (71 - 87)  BP: 123/63 (26 Dec 2018 08:00) (116/45 - 135/59)  BP(mean): 77 (26 Dec 2018 08:00) (66 - 83)  RR: 19 (26 Dec 2018 08:00) (10 - 25)  SpO2: 100% (26 Dec 2018 08:00) (91% - 100%)        I&O's Detail        LABS:                        7.1    17.48 )-----------( 253      ( 26 Dec 2018 06:57 )             22.1     26 Dec 2018 06:57    137    |  103    |  37     ----------------------------<  236    3.8     |  23     |  1.75     Ca    8.9        26 Dec 2018 06:57        Urinalysis Basic - ( 24 Dec 2018 23:31 )    Color: Yellow / Appearance: Clear / S.020 / pH: x  Gluc: x / Ketone: Negative  / Bili: Negative / Urobili: Negative mg/dL   Blood: x / Protein: 30 mg/dL / Nitrite: Negative   Leuk Esterase: Negative / RBC: 0-2 /HPF / WBC 0-2   Sq Epi: x / Non Sq Epi: Occasional / Bacteria: Few      Cultures

## 2018-12-26 NOTE — PROGRESS NOTE ADULT - ASSESSMENT
83M with PMHx as above, admitted with generalized weakness and multiple falls, possible seizure episode in ED, YUKI, sepsis (lactic acidosis, leukocytosis),. Pt now upgraded to [facility] after RRT for witnessed "tonic-clonic seizure" activity that lasted ~40 seconds, hypomagnesemia, worsening anemia. Sepsis due to aspiration pna. Pt now with Worsening anemia and worsening yuki    -Pt started on keppra  -Neuro consult appreciated  -Continue CIWA. Thiamine/MVI/FA. Monitor for fulminant withdrawal with DT's  -Standing/prn ativan dc'd with instructions to RN to notify EICU/PA if increasing CIWA  -Neuro checks  -Monitor HD's. Continue BB, hctz, asa  -Respiratory stable  -PO diet. PPI  -Check repeat cbc post prbc transfusion  -Continue Abx. Monitor wbc/temp. ID recommendations appreciated  -May need to add vancomycin  -F/U cultures  -Monitor UOP/lytes  -Continue IVF's  -DVT ppx  -Supportive care

## 2018-12-27 LAB
% ALBUMIN: 41.4 % — SIGNIFICANT CHANGE UP
% ALPHA 1: 9.4 % — SIGNIFICANT CHANGE UP
% ALPHA 2: 15.3 % — SIGNIFICANT CHANGE UP
% BETA: 13.2 % — SIGNIFICANT CHANGE UP
% GAMMA: 20.7 % — SIGNIFICANT CHANGE UP
ALBUMIN SERPL ELPH-MCNC: 2.4 G/DL — LOW (ref 3.6–5.5)
ALBUMIN/GLOB SERPL ELPH: 0.7 RATIO — SIGNIFICANT CHANGE UP
ALPHA1 GLOB SERPL ELPH-MCNC: 0.6 G/DL — HIGH (ref 0.1–0.4)
ALPHA2 GLOB SERPL ELPH-MCNC: 0.9 G/DL — SIGNIFICANT CHANGE UP (ref 0.5–1)
ANION GAP SERPL CALC-SCNC: 10 MMOL/L — SIGNIFICANT CHANGE UP (ref 5–17)
APTT BLD: 35.9 SEC — SIGNIFICANT CHANGE UP (ref 27.5–36.3)
B-GLOBULIN SERPL ELPH-MCNC: 0.8 G/DL — SIGNIFICANT CHANGE UP (ref 0.5–1)
BUN SERPL-MCNC: 31 MG/DL — HIGH (ref 7–23)
CALCIUM SERPL-MCNC: 9.1 MG/DL — SIGNIFICANT CHANGE UP (ref 8.4–10.5)
CHLORIDE SERPL-SCNC: 105 MMOL/L — SIGNIFICANT CHANGE UP (ref 96–108)
CO2 SERPL-SCNC: 24 MMOL/L — SIGNIFICANT CHANGE UP (ref 22–31)
CREAT SERPL-MCNC: 1.42 MG/DL — HIGH (ref 0.5–1.3)
CULTURE RESULTS: SIGNIFICANT CHANGE UP
GAMMA GLOBULIN: 1.2 G/DL — SIGNIFICANT CHANGE UP (ref 0.6–1.6)
GLUCOSE SERPL-MCNC: 218 MG/DL — HIGH (ref 70–99)
HCT VFR BLD CALC: 24.4 % — LOW (ref 39–50)
HGB BLD-MCNC: 7.8 G/DL — LOW (ref 13–17)
IGA FLD-MCNC: 426 MG/DL — SIGNIFICANT CHANGE UP (ref 84–499)
IGG FLD-MCNC: 1242 MG/DL — SIGNIFICANT CHANGE UP (ref 610–1660)
IGM SERPL-MCNC: 126 MG/DL — SIGNIFICANT CHANGE UP (ref 35–242)
INR BLD: 1.52 RATIO — HIGH (ref 0.88–1.16)
INTERPRETATION SERPL IFE-IMP: SIGNIFICANT CHANGE UP
KAPPA LC SER QL IFE: 7.25 MG/DL — HIGH (ref 0.33–1.94)
KAPPA LC SER QL IFE: 7.25 MG/DL — HIGH (ref 0.33–1.94)
KAPPA/LAMBDA FREE LIGHT CHAIN RATIO, SERUM: 0.93 RATIO — SIGNIFICANT CHANGE UP (ref 0.26–1.65)
KAPPA/LAMBDA FREE LIGHT CHAIN RATIO, SERUM: 0.93 RATIO — SIGNIFICANT CHANGE UP (ref 0.26–1.65)
LAMBDA LC SER QL IFE: 7.76 MG/DL — HIGH (ref 0.57–2.63)
LAMBDA LC SER QL IFE: 7.76 MG/DL — HIGH (ref 0.57–2.63)
MCHC RBC-ENTMCNC: 32 GM/DL — SIGNIFICANT CHANGE UP (ref 32–36)
MCHC RBC-ENTMCNC: 33.3 PG — SIGNIFICANT CHANGE UP (ref 27–34)
MCV RBC AUTO: 104.3 FL — HIGH (ref 80–100)
NRBC # BLD: 0 /100 WBCS — SIGNIFICANT CHANGE UP (ref 0–0)
OB PNL STL: NEGATIVE — SIGNIFICANT CHANGE UP
PLATELET # BLD AUTO: 260 K/UL — SIGNIFICANT CHANGE UP (ref 150–400)
POTASSIUM SERPL-MCNC: 3.8 MMOL/L — SIGNIFICANT CHANGE UP (ref 3.5–5.3)
POTASSIUM SERPL-SCNC: 3.8 MMOL/L — SIGNIFICANT CHANGE UP (ref 3.5–5.3)
PROT PATTERN SERPL ELPH-IMP: SIGNIFICANT CHANGE UP
PROT SERPL-MCNC: 5.9 G/DL — LOW (ref 6–8.3)
PROT SERPL-MCNC: 5.9 G/DL — LOW (ref 6–8.3)
PROTHROM AB SERPL-ACNC: 16.8 SEC — HIGH (ref 10–12.9)
RBC # BLD: 2.34 M/UL — LOW (ref 4.2–5.8)
RBC # FLD: 20.7 % — HIGH (ref 10.3–14.5)
SODIUM SERPL-SCNC: 139 MMOL/L — SIGNIFICANT CHANGE UP (ref 135–145)
SPECIMEN SOURCE: SIGNIFICANT CHANGE UP
WBC # BLD: 12.47 K/UL — HIGH (ref 3.8–10.5)
WBC # FLD AUTO: 12.47 K/UL — HIGH (ref 3.8–10.5)

## 2018-12-27 PROCEDURE — 99233 SBSQ HOSP IP/OBS HIGH 50: CPT

## 2018-12-27 RX ORDER — COLCHICINE 0.6 MG
0.6 TABLET ORAL
Qty: 0 | Refills: 0 | Status: DISCONTINUED | OUTPATIENT
Start: 2018-12-27 | End: 2018-12-31

## 2018-12-27 RX ADMIN — Medication 100 MILLIGRAM(S): at 12:08

## 2018-12-27 RX ADMIN — Medication 650 MILLIGRAM(S): at 13:30

## 2018-12-27 RX ADMIN — LEVETIRACETAM 400 MILLIGRAM(S): 250 TABLET, FILM COATED ORAL at 17:01

## 2018-12-27 RX ADMIN — ENOXAPARIN SODIUM 60 MILLIGRAM(S): 100 INJECTION SUBCUTANEOUS at 12:08

## 2018-12-27 RX ADMIN — Medication 105 MILLIGRAM(S): at 01:03

## 2018-12-27 RX ADMIN — Medication 0.12 MILLIGRAM(S): at 06:52

## 2018-12-27 RX ADMIN — PIPERACILLIN AND TAZOBACTAM 25 GRAM(S): 4; .5 INJECTION, POWDER, LYOPHILIZED, FOR SOLUTION INTRAVENOUS at 10:46

## 2018-12-27 RX ADMIN — LEVETIRACETAM 400 MILLIGRAM(S): 250 TABLET, FILM COATED ORAL at 06:52

## 2018-12-27 RX ADMIN — PIPERACILLIN AND TAZOBACTAM 25 GRAM(S): 4; .5 INJECTION, POWDER, LYOPHILIZED, FOR SOLUTION INTRAVENOUS at 02:47

## 2018-12-27 RX ADMIN — Medication 1 TABLET(S): at 12:08

## 2018-12-27 RX ADMIN — PIPERACILLIN AND TAZOBACTAM 25 GRAM(S): 4; .5 INJECTION, POWDER, LYOPHILIZED, FOR SOLUTION INTRAVENOUS at 17:01

## 2018-12-27 RX ADMIN — AMLODIPINE BESYLATE 10 MILLIGRAM(S): 2.5 TABLET ORAL at 06:52

## 2018-12-27 RX ADMIN — Medication 650 MILLIGRAM(S): at 02:49

## 2018-12-27 RX ADMIN — TAMSULOSIN HYDROCHLORIDE 0.4 MILLIGRAM(S): 0.4 CAPSULE ORAL at 22:16

## 2018-12-27 RX ADMIN — DEXTROSE MONOHYDRATE, SODIUM CHLORIDE, AND POTASSIUM CHLORIDE 75 MILLILITER(S): 50; .745; 4.5 INJECTION, SOLUTION INTRAVENOUS at 10:46

## 2018-12-27 RX ADMIN — Medication 105 MILLIGRAM(S): at 12:39

## 2018-12-27 RX ADMIN — Medication 1 MILLIGRAM(S): at 12:08

## 2018-12-27 RX ADMIN — Medication 81 MILLIGRAM(S): at 12:09

## 2018-12-27 RX ADMIN — Medication 0.6 MILLIGRAM(S): at 17:01

## 2018-12-27 RX ADMIN — Medication 25 MILLIGRAM(S): at 06:53

## 2018-12-27 RX ADMIN — Medication 650 MILLIGRAM(S): at 02:48

## 2018-12-27 RX ADMIN — Medication 650 MILLIGRAM(S): at 12:39

## 2018-12-27 NOTE — PROGRESS NOTE ADULT - PROBLEM SELECTOR PLAN 1
multiple medical issues acute and chronic  on IVF, serial labs, I and O, renal following  heme follow up noted, multifactorial anemia as per report  ID follow up noted, on emp ABX, source unclear, labs and cx noted, low grade temp noted, supportive ICU measures in effect, work up under way  cont ICU care  overall prognosis guarded  will follow and monitor  monitor vs and HD and Sat

## 2018-12-27 NOTE — PROGRESS NOTE ADULT - SUBJECTIVE AND OBJECTIVE BOX
Patient is a 83y old  Male who presents with a chief complaint of Generalized weakness. (27 Dec 2018 07:45)    HPI:  This is an 84 y/o M with PMH of HTN, A. Fib on Xarelto, Stomach CA, GERD, and Gout who presented with generalized weakness with repeated falls. Patiet states that he fell while was in [place] a week ago when his left leg buckled under him, didn't see a doctor as he was coming back to [place] next day, but over the past week he fell several times & was feeling week, and moves with difficulty. No fever or chills at home, no flu-like symptoms, and no sick contacts. At the ED, patient had a seizure episode for about 30 seconds that involved his left side of the body. Never had any seizure episodes in the past. (22 Dec 2018 20:48)    Today:  Pt is alert and awake. Pt is sp 1 unit prbc yesterday. Had temp of 100.6, 100.5.    REVIEW OF SYSTEMS  no sob  no chest pain   no abd pain  +fevers    PAST MEDICAL & SURGICAL HISTORY:  Stomach cancer  Hypertension  Atrial fibrillation  Gout  No significant past surgical history    MEDICATIONS  (STANDING):  allopurinol 100 milliGRAM(s) Oral daily  amLODIPine   Tablet 10 milliGRAM(s) Oral daily  aspirin enteric coated 81 milliGRAM(s) Oral daily  colchicine 0.6 milliGRAM(s) Oral daily  dextrose 5% + sodium chloride 0.45% with potassium chloride 20 mEq/L 1000 milliLiter(s) (75 mL/Hr) IV Continuous <Continuous>  digoxin     Tablet 0.125 milliGRAM(s) Oral daily  enoxaparin Injectable 60 milliGRAM(s) SubCutaneous daily  folic acid 1 milliGRAM(s) Oral daily  levETIRAcetam  IVPB 500 milliGRAM(s) IV Intermittent every 12 hours  metoprolol succinate ER 25 milliGRAM(s) Oral daily  multivitamin 1 Tablet(s) Oral daily  piperacillin/tazobactam IVPB. 3.375 Gram(s) IV Intermittent every 8 hours  tamsulosin 0.4 milliGRAM(s) Oral at bedtime  thiamine IVPB 500 milliGRAM(s) IV Intermittent three times a day    MEDICATIONS  (PRN):  acetaminophen   Tablet .. 650 milliGRAM(s) Oral every 6 hours PRN Temp greater or equal to 38C (100.4F)  aluminum hydroxide/magnesium hydroxide/simethicone Suspension 30 milliLiter(s) Oral every 6 hours PRN Dyspepsia  bisacodyl 5 milliGRAM(s) Oral every 12 hours PRN Constipation  guaiFENesin   Syrup  (Sugar-Free) 200 milliGRAM(s) Oral every 6 hours PRN Cough    EXAM:  Vital Signs Last 24 Hrs  T(C): 37.6 (27 Dec 2018 08:36), Max: 38.1 (26 Dec 2018 19:57)  T(F): 99.7 (27 Dec 2018 08:36), Max: 100.6 (27 Dec 2018 00:25)  HR: 70 (27 Dec 2018 08:00) (70 - 104)  BP: 143/61 (27 Dec 2018 08:00) (118/55 - 145/63)  BP(mean): 85 (27 Dec 2018 08:00) (72 - 88)  RR: 17 (27 Dec 2018 08:00) (17 - 24)  SpO2: 100% (27 Dec 2018 08:00) (96% - 100%)    12-26 @ 07:01  -  12-27 @ 07:00  --------------------------------------------------------  IN: 2420 mL / OUT: 0 mL / NET: 2420 mL    PHYSICAL EXAM:  Constitutional: awake laying in bed. nad.   ENMT: patent nares, moist mucus membranes  Neck: supple  Respiratory: bilaterally clear to auscultation, no wheezing, no rhonchi, no crackles, no decreased air entry  Cardiovascular: s1s2, rrr, no murmurs.   Gastrointestinal: soft, non tender, +bowel sounds, no rebound, no guarding.   Genitourinary: deferred  Rectal: deferred   Extremities: no edema.   Neurological: alert and oriented to person  Skin:, warm to touch.   Psychiatric: no homicidal, suicidal ideation. appropriate affect.     LABS:  LIVER FUNCTIONS - ( 26 Dec 2018 23:12 )  Alb: x     / Pro: 5.9 g/dL / ALK PHOS: x     / ALT: x     / AST: x     / GGT: x                               7.8    12.47 )-----------( 260      ( 27 Dec 2018 06:43 )             24.4   12-27  139  |  105  |  31<H>  ----------------------------<  218<H>  3.8   |  24  |  1.42<H>  Ca    9.1      27 Dec 2018 06:43  TPro  5.9<L>  /  Alb  x   /  TBili  x   /  DBili  x   /  AST  x   /  ALT  x   /  AlkPhos  x   12-26    Chart reviewed.   Labs reviewed.  Imaging reviewed.   Plan discussed with consultants.

## 2018-12-27 NOTE — PROGRESS NOTE ADULT - SUBJECTIVE AND OBJECTIVE BOX
Neurology follow up note    YUE [id number]      Interval History:    Patient feels ok seen with significant other     MEDICATIONS    acetaminophen   Tablet .. 650 milliGRAM(s) Oral every 6 hours PRN  allopurinol 100 milliGRAM(s) Oral daily  aluminum hydroxide/magnesium hydroxide/simethicone Suspension 30 milliLiter(s) Oral every 6 hours PRN  amLODIPine   Tablet 10 milliGRAM(s) Oral daily  aspirin enteric coated 81 milliGRAM(s) Oral daily  bisacodyl 5 milliGRAM(s) Oral every 12 hours PRN  colchicine 0.6 milliGRAM(s) Oral two times a day  dextrose 5% + sodium chloride 0.45% with potassium chloride 20 mEq/L 1000 milliLiter(s) IV Continuous <Continuous>  digoxin     Tablet 0.125 milliGRAM(s) Oral daily  enoxaparin Injectable 60 milliGRAM(s) SubCutaneous daily  folic acid 1 milliGRAM(s) Oral daily  guaiFENesin   Syrup  (Sugar-Free) 200 milliGRAM(s) Oral every 6 hours PRN  levETIRAcetam  IVPB 500 milliGRAM(s) IV Intermittent every 12 hours  metoprolol succinate ER 25 milliGRAM(s) Oral daily  multivitamin 1 Tablet(s) Oral daily  piperacillin/tazobactam IVPB. 3.375 Gram(s) IV Intermittent every 8 hours  tamsulosin 0.4 milliGRAM(s) Oral at bedtime      Allergies    No Known Allergies    Intolerances            Vital Signs Last 24 Hrs  T(C): 37.8 (27 Dec 2018 12:12), Max: 38.1 (26 Dec 2018 19:57)  T(F): 100.1 (27 Dec 2018 12:12), Max: 100.6 (27 Dec 2018 00:25)  HR: 84 (27 Dec 2018 10:00) (70 - 104)  BP: 138/62 (27 Dec 2018 10:00) (120/55 - 145/63)  BP(mean): 84 (27 Dec 2018 10:00) (73 - 88)  RR: 17 (27 Dec 2018 10:00) (17 - 24)  SpO2: 100% (27 Dec 2018 10:00) (96% - 100%)      REVIEW OF SYSTEMS:     Constitutional: No fever, chills, fatigue, generalized weakness  Eyes: no eye pain, visual disturbances, or discharge  ENT:  No difficulty hearing, tinnitus, vertigo; No sinus or throat pain  Neck: No pain or stiffness  Respiratory: No cough, dyspnea, wheezing   Cardiovascular: No chest pain, palpitations,   Gastrointestinal: No abdominal or epigastric pain. No nausea, vomiting  No diarrhea or constipation.   Genitourinary: No dysuria, frequency, hematuria or incontinence  Neurological: No headaches, lightheadedness, vertigo, numbness or tremors  Psychiatric: No depression, anxiety, mood swings or difficulty sleeping  Musculoskeletal: H/O  joint pain   Skin: No itching, burning, rashes or lesions   Lymph Nodes: No enlarged glands  Endocrine: No heat or cold intolerance; No hair loss   Allergy and Immunologic: No hives or eczema    On Neurological Examination:    Mental Status - Patient is alert, awake, oriented X3.    Follow simple commands      Speech -   Fluent    al                         Cranial Nerves - Pupils 3 mm equal and reactive to light,   extraocular eye movements intact.   smile symmetric  intact bilateral NLF    Motor Exam -   Right upper 4/5  Left upper 4/5  Right lower2/5  Left lower 2/5      Muscle tone - is normal all over.  No asymmetry is seen.      Sensory    Bilateral intact to light touch    GENERAL Exam: Nontoxic , No Acute Distress   	  HEENT:  normocephalic, atraumatic  		  LUNGS: Decreased bilaterally  	  HEART: Normal S1S2   No murmur RRR        	  GI/ ABDOMEN:  Soft  Non tender    EXTREMITIES:   No Edema  No Clubbing  No Cyanosis     SKIN: Normal  No Ecchymosis               LABS:  CBC Full  -  ( 27 Dec 2018 06:43 )  WBC Count : 12.47 K/uL  Hemoglobin : 7.8 g/dL  Hematocrit : 24.4 %  Platelet Count - Automated : 260 K/uL  Mean Cell Volume : 104.3 fl  Mean Cell Hemoglobin : 33.3 pg  Mean Cell Hemoglobin Concentration : 32.0 gm/dL  Auto Neutrophil # : x  Auto Lymphocyte # : x  Auto Monocyte # : x  Auto Eosinophil # : x  Auto Basophil # : x  Auto Neutrophil % : x  Auto Lymphocyte % : x  Auto Monocyte % : x  Auto Eosinophil % : x  Auto Basophil % : x      12-27    139  |  105  |  31<H>  ----------------------------<  218<H>  3.8   |  24  |  1.42<H>    Ca    9.1      27 Dec 2018 06:43    TPro  5.9<L>  /  Alb  x   /  TBili  x   /  DBili  x   /  AST  x   /  ALT  x   /  AlkPhos  x   12-26    Hemoglobin A1C:     LIVER FUNCTIONS - ( 26 Dec 2018 23:12 )  Alb: x     / Pro: 5.9 g/dL / ALK PHOS: x     / ALT: x     / AST: x     / GGT: x           Vitamin B12         RADIOLOGY    ASSESSMENT AND PLAN     presented with fall and generalized seizure.    seizure KEPPRA can switch to po in oral intake ok     GOUTY ARTHRITIS WITH LEG WEAKNESS  allopurinol mri negative for CVA    spoke to HCP GENE at bedside 12/27/18 h  c       Physical therapy evaluation.  OOB to chair/ambulation with assistance only.    .  Greater than 45 minutes spent in direct patient care reviewing  the notes, lab data/ imaging , discussion with multidisciplinary team.

## 2018-12-27 NOTE — PROGRESS NOTE ADULT - ASSESSMENT
84 y/o M with PMH of HTN, A. Fib on Xarelto, Stomach CA, GERD, and Gout presented with generalized weakness & repeated falls, also had seizures at the ED.    -CVA (cerebral vascular accident).  Plan: unknown onset, possibly started a week ago when patient had a fall in [place]  CT showed age indeterminate lacunar infarct of posterior limb of the right internal capsule.  physical therapy.  MRI brain and spine- negative.     -Severe Anemia:  Repeat cbc after transfusion is improved to     -Seizure.  Plan: started on keppra. may be due to ETOH?     -SIRS (systemic inflammatory response syndrome).  Plan: Fever likely due to aspiration pneumonia.  blood and urin cultures- NGTD.  started on antibiotics due to fever on 12/24.  amonia level -normal.  HIV-negative.   lactate- wnl.    - Monocytosis.  Plan: of unclear cause, hematology evaluation.      Problem/Plan - 5:  ·  Problem: Macrocytic anemia.  Plan: ML 2ry to ETOH, started on Folic acid PO, also Thiamin for chronic ETOH consumption.      Problem/Plan - 6:  Problem: Abnormal kidney function. Plan: ML acute on CKD, no available baseline at this time, will recheck after overnight IVF hydration, avoid nephrotoxins.     Problem/Plan - 7:  ·  Problem: Atrial fibrillation.  Plan: with CVR, will continue Digoxin & Metoprolol (Betaxolol is non formulary) with hold parameters in addition to Xarelto.  cardiology evl- Dr.Palla.     Problem/Plan - 8:  ·  Problem: HTN (hypertension).  Plan: Metoprolol, Norvasc, and HCTZ with hold parameters.      Problem/Plan - 9:  ·  Problem: Gout.  Plan: On Colchicine & renal dose of Allopurinol, will continue both.      Problem/Plan - 10:  Problem: Need for prophylactic measure. Plan; IMPROVE VTE Individual Risk Assessment          RISK                                                          Points    [  ] Previous VTE                                                3  [  ] Thrombophilia                                             2  [ x ] Lower limb paralysis                                    2        (unable to hold up >15 seconds)    [  ] Current Cancer                                             2         (within 6 months)  [ x ] Immobilization > 24 hrs                              1  [  ] ICU/CCU stay > 24 hours                            1  [ x ] Age > 60                                                    1    IMPROVE VTE Score 4.    **IMPROVE score of 4 in addition to the other risk factors not included in this scoring system, on Xarelto for chronic A. Fib, will continue, no need for further DVT prophylaxis.    Attending Attestation:   Anemia- f/u anemia w/u.  no transfusion at present as per hematology.  Leucocytosis- likely due to infection and/or hematological disorder.  peripheral blood flow cytometry as outpatient.    Knee pain- +effusion.    Hypokalemia, low MG- resolved.    h/o ETOH- impending DTs  d/c ativan due to lethargy.  f/u  [place] serology,s syphilis. 82 y/o M with PMH of HTN, A. Fib on Xarelto, Stomach CA, GERD, and Gout presented with generalized weakness & repeated falls, also had seizures at the ED.    -CVA (cerebral vascular accident).  Plan: unknown onset, possibly started a week ago when patient had a fall in [place]  CT showed age indeterminate lacunar infarct of posterior limb of the right internal capsule.  physical therapy.  MRI brain and spine- negative.     -Severe Anemia:  Repeat cbc after transfusion is improved to 7.8.  stool guaiac sent. if positive, will change to prophylactic dose lovenox and consult GI. If negative, will discuss with cardio of anticoagulation options.     -Seizure.    Plan: started on keppra. may be due to ETOH?   f/u  [place] serology,s syphilis.     -SIRS (systemic inflammatory response syndrome).    Plan: Fever likely due to aspiration pneumonia.  blood and urin cultures- NGTD.  started on antibiotics due to fever on 12/24.  ammonia level -normal.  HIV-negative.   lactate- wnl.    - Monocytosis.    Plan: of unclear cause, hematology evaluation.     -Macrocytic anemia.    Plan: ML 2ry to ETOH, started on Folic acid PO, also Thiamin for chronic ETOH consumption.     -Abnormal kidney function.   Plan: ML acute on CKD, no available baseline at this time,  cw ivfs  improved today.   avoid nephrotoxins.    - Atrial fibrillation.    Plan: will continue Digoxin & Metoprolol (Betaxolol is non formulary)   was on Xarelto now on renally dosed therapeutic dose lovenox. need to re-eval in regards to continuation.     -HTN (hypertension).  Plan: Metoprolol, Norvasc, and HCTZ with hold parameters.     -Gout.  Plan: On Colchicine & renal dose of Allopurinol, will continue both.     -Need for prophylactic measure. Plan; IMPROVE VTE Individual Risk Assessment          RISK                                                          Points    [  ] Previous VTE                                                3  [  ] Thrombophilia                                             2  [ x ] Lower limb paralysis                                    2        (unable to hold up >15 seconds)    [  ] Current Cancer                                             2         (within 6 months)  [ x ] Immobilization > 24 hrs                              1  [  ] ICU/CCU stay > 24 hours                            1  [ x ] Age > 60                                                    1    IMPROVE VTE Score 4.  **IMPROVE score of 4 in addition to the other risk factors not included in this scoring system, on Xarelto for chronic A. Fib, will continue, no need for further DVT prophylaxis. 84 y/o M with PMH of HTN, A. Fib on Xarelto, Stomach CA, GERD, and Gout presented with generalized weakness & repeated falls, also had seizures at the ED.    -CVA (cerebral vascular accident).  Plan: unknown onset, possibly started a week ago when patient had a fall in [place]  CT showed age indeterminate lacunar infarct of posterior limb of the right internal capsule.  physical therapy.  MRI brain and spine- negative.     -Severe Anemia:  Repeat cbc after transfusion is improved to 7.8.  stool guaiac sent. if positive, will change to prophylactic dose lovenox and consult GI. If negative, will discuss with cardio of anticoagulation options.     -Seizure.    Plan: started on keppra. may be due to ETOH?   f/u  [place] serology,s syphilis.     -SIRS (systemic inflammatory response syndrome).    Plan: Fever likely due to aspiration pneumonia.  blood and urin cultures- NGTD.  started on antibiotics due to fever on 12/24.  ammonia level -normal.  HIV-negative.   lactate- wnl.    - Monocytosis.    Plan: of unclear cause, hematology evaluation.     -Macrocytic anemia.    Plan: ML 2ry to ETOH, started on Folic acid PO, also Thiamin for chronic ETOH consumption.     -Abnormal kidney function.   Plan: ML acute on CKD, no available baseline at this time,  cw ivfs  improved today.   avoid nephrotoxins.    - Atrial fibrillation.    Plan: will continue Digoxin & Metoprolol (Betaxolol is non formulary)   was on Xarelto now on renally dosed therapeutic dose lovenox. need to re-eval in regards to continuation.     -HTN (hypertension).  Plan: Metoprolol, Norvasc, and HCTZ with hold parameters.     -Gout.  Plan: On Colchicine & renal dose of Allopurinol, will continue both.     -Need for prophylactic measure. Plan; IMPROVE VTE Individual Risk Assessment          RISK                                                          Points    [  ] Previous VTE                                                3  [  ] Thrombophilia                                             2  [ x ] Lower limb paralysis                                    2        (unable to hold up >15 seconds)    [  ] Current Cancer                                             2         (within 6 months)  [ x ] Immobilization > 24 hrs                              1  [  ] ICU/CCU stay > 24 hours                            1  [ x ] Age > 60                                                    1    IMPROVE VTE Score 4.  **IMPROVE score of 4 in addition to the other risk factors not included in this scoring system, on Xarelto for chronic A. Fib, will continue, no need for further DVT prophylaxis.    Addendum:  guaic negative, pt/inr wnl. dicussed with hematology and cardiology. will cw anticoagulation. Pt is sp lovenox 60mg today. will convert to eliquis. will check cbc in am, if stable, will start on eliquis.

## 2018-12-27 NOTE — PROGRESS NOTE ADULT - SUBJECTIVE AND OBJECTIVE BOX
ID progress note     Name: YUE KING  Age: 83y  Gender: Male  MRN: [id number]    Interval History-- Events noted, low grade fevers persists , was up all night , now sleeping . no new complaints, got one unit of PRBC yesterday   Notes reviewed    Past Medical History--  Stomach cancer  Hypertension  Atrial fibrillation  Gout  No significant past surgical history      For details regarding the patient's social history, family history, and other miscellaneous elements, please refer the initial infectious diseases consultation and/or the admitting history and physical examination for this admission.    Allergies--  Allergies    No Known Allergies    Intolerances        Medications--  Antibiotics:  piperacillin/tazobactam IVPB. 3.375 Gram(s) IV Intermittent every 8 hours    Immunologic:    Other:  acetaminophen   Tablet .. PRN  allopurinol  aluminum hydroxide/magnesium hydroxide/simethicone Suspension PRN  amLODIPine   Tablet  aspirin enteric coated  bisacodyl PRN  colchicine  dextrose 5% + sodium chloride 0.45% with potassium chloride 20 mEq/L  digoxin     Tablet  enoxaparin Injectable  folic acid  guaiFENesin   Syrup  (Sugar-Free) PRN  levETIRAcetam  IVPB  metoprolol succinate ER  multivitamin  tamsulosin  thiamine IVPB      Review of Systems--  A 10-point review of systems was obtained.     Pertinent positives and negatives--  Constitutional: No fevers. No Chills. No Rigors.   Cardiovascular: No chest pain. No palpitations.  Respiratory: No shortness of breath. No cough.  Gastrointestinal: No nausea or vomiting. No diarrhea or constipation.   Psychiatric: Pleasant. Appropriate affect.    Review of systems otherwise negative except as previously noted.    Physical Examination--  Vital Signs: T(F): 99.9 (12-27-18 @ 04:15), Max: 100.6 (12-27-18 @ 00:25)  HR: 90 (12-27-18 @ 04:00)  BP: 120/55 (12-27-18 @ 04:00)  RR: 18 (12-27-18 @ 04:00)  SpO2: 97% (12-27-18 @ 04:00)  Wt(kg): --  General: Nontoxic-appearing Male in no acute distress.  HEENT: AT/NC. PERRL. EOMI. Anicteric. Conjunctiva pink and moist. Oropharynx clear. Dentition fair.  Neck: Not rigid. No sense of mass.  Nodes: None palpable.  Lungs: Clear bilaterally without rales, wheezing or rhonchi  Heart: Regular rate and rhythm. No Murmur. No rub. No gallop. No palpable thrill.  Abdomen: Bowel sounds present and normoactive. Soft. Nondistended. Nontender. No sense of mass. No organomegaly.  Back: No spinal tenderness. No costovertebral angle tenderness.   Extremities: No cyanosis or clubbing. No edema.   Skin: Warm. Dry. Good turgor. No rash. No vasculitic stigmata.  Psychiatric: Appropriate affect and mood for situation.         Laboratory Studies--  CBC                        7.8    12.47 )-----------( 260      ( 27 Dec 2018 06:43 )             24.4       Chemistries  12-27    139  |  105  |  31<H>  ----------------------------<  218<H>  3.8   |  24  |  1.42<H>    Ca    9.1      27 Dec 2018 06:43    TPro  5.9<L>  /  Alb  x   /  TBili  x   /  DBili  x   /  AST  x   /  ALT  x   /  AlkPhos  x   12-26      Culture Data    Culture - Urine (collected 25 Dec 2018 11:03)  Source: .Urine Catheterized  Final Report (26 Dec 2018 10:46):    No growth    Culture - Blood (collected 25 Dec 2018 00:33)  Source: .Blood Blood-Peripheral  Preliminary Report (26 Dec 2018 01:01):    No growth to date.    Culture - Blood (collected 25 Dec 2018 00:33)  Source: .Blood Blood  Preliminary Report (26 Dec 2018 01:01):    No growth to date.    Culture - Blood (collected 22 Dec 2018 23:17)  Source: .Blood Blood  Preliminary Report (24 Dec 2018 01:01):    No growth to date.    Culture - Blood (collected 22 Dec 2018 21:00)  Source: .Blood Blood  Preliminary Report (23 Dec 2018 22:01):    No growth to date.    Culture - Urine (collected 22 Dec 2018 20:50)  Source: .Urine Clean Catch (Midstream)  Final Report (23 Dec 2018 16:28):    <10,000 CFU/ml Normal Urogenital kristi present    No growth to date.        RADIOLOGY:  Xray Chest 1 View- PORTABLE-Urgent (12.24.18 @ 17:58) >  Portable AP radiograph is compared to 12/22/2018.    The heart is again enlarged. The trachea is midline airthere is no focal   infiltrate or pleural effusion. The osseous structures are intact.    Impression: Cardiomegaly. No active disease. No change.        Assessment--   MR Head No Cont (12.24.18 @ 14:30) >    Comparison CT performed 2 days prior    There is no mass effect, cortical edema or hydrocephalus. There isno   evidence of acute infarct or previous parenchymal hemorrhage. There is   mild central volume loss. The orbital and sellar contents and cerebellar   tonsils are within normal limits.    IMPRESSION:    No acute findings       MR Lumbar Spine No Cont (12.24.18 @ 14:52) >    IMPRESSION:    No acute findings. Negative for compression fracture, focal disc   protrusion or spinal stenosis. Diffuse red marrow replacement suggestive   of chronic anemia.      Assessment--  84 y/o M with PMH of HTN, A. Fib on Xarelto, Stomach CA, GERD, and Gout presented with generalized weakness & repeated falls, also had seizures at the ED. Not[facility] to be febrile , [facility] could be [facility]TI as he ahs urinary retention with BPH , PVR was 350 . Doubt he has pneumonia ,  he has hx of heavy ETOH abuse     He may have a myeloproliferative disorder with leukocytosis with monocytosis may have CMML . he also has severe anemia . Leukocytosis likely from myeloproliferative disorder , doubt any infection . he has severe anemia etiology unclear , hematology following     Etiology of seizure is unclear probable alcohol withdrawal seizure on [facility] protocol     Possible pseudogout Right knee     Etiology of fever could be aspiration or viral syndrome     His HIV test is negative     Plan :   - will  continue with Zosyn   - hematology follow up await flow cytometry results    - fall precautions   - may need Flomax for BPH  - PT evaluation   - follow MRI   - add colchicine for pseudogout    Continue with present regime .  Appropriate use of antibiotics and adverse effects reviewed.      I have discussed the above plan of care with patient's family in detail. They expressed understanding of the treatment plan . Risks, benefits and alternatives discussed in detail. I have asked if they have any questions or concerns and appropriately addressed them to the best of my ability .      > 35 minutes spent in direct patient care reviewing  the notes, lab data/ imaging , discussion with multidisciplinary team. All questions were addressed and answered to the best of my capacity .    Thank you for allowing me to participate in the care of your patient .        Dimple Florez MD  [phone]

## 2018-12-27 NOTE — PROGRESS NOTE ADULT - SUBJECTIVE AND OBJECTIVE BOX
REASON FOR VISIT: AF    HPI:  84 y/o man with a history of atrial fibrillation, HTN, gastric cancer, GERD, Gout, alcohol use admitted 12/22/18 with viral infection, seizure.    12/24/18: Increase leucocytosis today and fever. Pt lethargic, Atrial fib  12/25/18 Patient is awake , confused , heart rate is controlled   12/26/18: No complaints.  12/27/18 Patient is lethargic  arousable easily , answers question , did recieve PRBC last night , still low hemoglobin    HR BP stable       MEDICATIONS  (STANDING):  allopurinol 100 milliGRAM(s) Oral daily  amLODIPine   Tablet 10 milliGRAM(s) Oral daily  aspirin enteric coated 81 milliGRAM(s) Oral daily  colchicine 0.6 milliGRAM(s) Oral daily  dextrose 5% + sodium chloride 0.45% with potassium chloride 20 mEq/L 1000 milliLiter(s) (75 mL/Hr) IV Continuous <Continuous>  digoxin     Tablet 0.125 milliGRAM(s) Oral daily  enoxaparin Injectable 60 milliGRAM(s) SubCutaneous daily  folic acid 1 milliGRAM(s) Oral daily  levETIRAcetam  IVPB 500 milliGRAM(s) IV Intermittent every 12 hours  metoprolol succinate ER 25 milliGRAM(s) Oral daily  multivitamin 1 Tablet(s) Oral daily  piperacillin/tazobactam IVPB. 3.375 Gram(s) IV Intermittent every 8 hours  tamsulosin 0.4 milliGRAM(s) Oral at bedtime  thiamine IVPB 500 milliGRAM(s) IV Intermittent three times a day    MEDICATIONS  (PRN):  acetaminophen   Tablet .. 650 milliGRAM(s) Oral every 6 hours PRN Temp greater or equal to 38C (100.4F)  aluminum hydroxide/magnesium hydroxide/simethicone Suspension 30 milliLiter(s) Oral every 6 hours PRN Dyspepsia  bisacodyl 5 milliGRAM(s) Oral every 12 hours PRN Constipation  guaiFENesin   Syrup  (Sugar-Free) 200 milliGRAM(s) Oral every 6 hours PRN Cough    Vital Signs Last 24 Hrs  T(C): 37.7 (27 Dec 2018 04:15), Max: 38.1 (26 Dec 2018 19:57)  T(F): 99.9 (27 Dec 2018 04:15), Max: 100.6 (27 Dec 2018 00:25)  HR: 90 (27 Dec 2018 04:00) (75 - 104)  BP: 120/55 (27 Dec 2018 04:00) (118/55 - 145/63)  BP(mean): 73 (27 Dec 2018 04:00) (72 - 88)  RR: 18 (27 Dec 2018 04:00) (18 - 24)  SpO2: 97% (27 Dec 2018 04:00) (97% - 100%)    I&O's Summary    26 Dec 2018 07:01  -  27 Dec 2018 07:00  --------------------------------------------------------  IN: 2170 mL / OUT: 0 mL / NET: 2170 mL            PHYSICAL EXAM:  Constitutional: Semirecumbent in bed, awake, no distress  Respiratory: Breath sounds are clear bilaterally, Nonlabored  Cardiovascular: Irregular, systolic murmur, + S2  Gastrointestinal: Abdomen is soft, nontender.   Extremities: No pedal edema.   Skin: Warm, dry; no rash.    LABS:                                   7.8    12.47 )-----------( 260      ( 27 Dec 2018 06:43 )             24.4     12-27    139  |  105  |  31<H>  ----------------------------<  218<H>  3.8   |  24  |  1.42<H>    Ca    9.1      27 Dec 2018 06:43    TPro  5.9<L>  /  Alb  x   /  TBili  x   /  DBili  x   /  AST  x   /  ALT  x   /  AlkPhos  x   12-26        LIVER FUNCTIONS - ( 26 Dec 2018 23:12 )  Alb: x     / Pro: 5.9 g/dL / ALK PHOS: x     / ALT: x     / AST: x     / GGT: x               12-23 Chol 95 LDL 55 HDL 26<L> Trig 6912 Lead ECG (12.22.18 @ 17:55):  Atrial fibrillation with premature ventricular or aberrantly conducted complexes.  Right bundle branch block.  Left anterior fascicular block.     Transthoracic Echocardiogram w/Doppler Complete (12.23.18 @ 10:19):  1. Moderate left ventricular hypertrophy with normal left ventricular systolic function with stage I diastolic dysfunction  2. Calcific severe aortic stenosis, recommend clinical correlation  3. Mild tricuspid regurgitation

## 2018-12-27 NOTE — PROGRESS NOTE ADULT - SUBJECTIVE AND OBJECTIVE BOX
Date/Time Patient Seen:  		  Referring MD:   Data Reviewed	       Patient is a 83y old  Male who presents with a chief complaint of Generalized weakness. (26 Dec 2018 19:02)      Subjective/HPI     PAST MEDICAL & SURGICAL HISTORY:  Stomach cancer  Hypertension  Atrial fibrillation  Gout  No significant past surgical history        Medication list         MEDICATIONS  (STANDING):  allopurinol 100 milliGRAM(s) Oral daily  amLODIPine   Tablet 10 milliGRAM(s) Oral daily  aspirin enteric coated 81 milliGRAM(s) Oral daily  colchicine 0.6 milliGRAM(s) Oral daily  dextrose 5% + sodium chloride 0.45% with potassium chloride 20 mEq/L 1000 milliLiter(s) (75 mL/Hr) IV Continuous <Continuous>  digoxin     Tablet 0.125 milliGRAM(s) Oral daily  enoxaparin Injectable 60 milliGRAM(s) SubCutaneous daily  folic acid 1 milliGRAM(s) Oral daily  levETIRAcetam  IVPB 500 milliGRAM(s) IV Intermittent every 12 hours  metoprolol succinate ER 25 milliGRAM(s) Oral daily  multivitamin 1 Tablet(s) Oral daily  piperacillin/tazobactam IVPB. 3.375 Gram(s) IV Intermittent every 8 hours  tamsulosin 0.4 milliGRAM(s) Oral at bedtime  thiamine IVPB 500 milliGRAM(s) IV Intermittent three times a day    MEDICATIONS  (PRN):  acetaminophen   Tablet .. 650 milliGRAM(s) Oral every 6 hours PRN Temp greater or equal to 38C (100.4F)  aluminum hydroxide/magnesium hydroxide/simethicone Suspension 30 milliLiter(s) Oral every 6 hours PRN Dyspepsia  bisacodyl 5 milliGRAM(s) Oral every 12 hours PRN Constipation  guaiFENesin   Syrup  (Sugar-Free) 200 milliGRAM(s) Oral every 6 hours PRN Cough         Vitals log        ICU Vital Signs Last 24 Hrs  T(C): 37.7 (27 Dec 2018 04:15), Max: 38.1 (26 Dec 2018 19:57)  T(F): 99.9 (27 Dec 2018 04:15), Max: 100.6 (27 Dec 2018 00:25)  HR: 90 (27 Dec 2018 04:00) (75 - 104)  BP: 120/55 (27 Dec 2018 04:00) (118/55 - 145/63)  BP(mean): 73 (27 Dec 2018 04:00) (72 - 88)  ABP: --  ABP(mean): --  RR: 18 (27 Dec 2018 04:00) (18 - 24)  SpO2: 97% (27 Dec 2018 04:00) (97% - 100%)           Input and Output:  I&O's Detail    26 Dec 2018 07:01  -  27 Dec 2018 07:00  --------------------------------------------------------  IN:    dextrose 5% + sodium chloride 0.45% with potassium chloride 20 mEq/L: 1275 mL    IV PiggyBack: 450 mL    Oral Fluid: 120 mL    Packed Red Blood Cells: 325 mL  Total IN: 2170 mL    OUT:  Total OUT: 0 mL    Total NET: 2170 mL          Lab Data                        7.8    12.47 )-----------( 260      ( 27 Dec 2018 06:43 )             24.4     12-27    139  |  105  |  31<H>  ----------------------------<  218<H>  3.8   |  24  |  1.42<H>    Ca    9.1      27 Dec 2018 06:43    TPro  5.9<L>  /  Alb  x   /  TBili  x   /  DBili  x   /  AST  x   /  ALT  x   /  AlkPhos  x   12-26            Review of Systems	      Objective     Physical Examination  heart s1s2  lung dec BS  abd soft  frail  weak  labs reviewed        Pertinent Lab findings & Imaging      Jonathan:  NO   Adequate UO     I&O's Detail    26 Dec 2018 07:01  -  27 Dec 2018 07:00  --------------------------------------------------------  IN:    dextrose 5% + sodium chloride 0.45% with potassium chloride 20 mEq/L: 1275 mL    IV PiggyBack: 450 mL    Oral Fluid: 120 mL    Packed Red Blood Cells: 325 mL  Total IN: 2170 mL    OUT:  Total OUT: 0 mL    Total NET: 2170 mL               Discussed with:     Cultures:	        Radiology

## 2018-12-27 NOTE — PROGRESS NOTE ADULT - PROBLEM SELECTOR PLAN 1
Chronic; controlled ventricular rate; continue digoxin and metoprolol   Patient is severely anemic ? no obvious bleeding , on ecotrin , lovenox ,  check guaic   anemia work up as per hospitalist , hold continue to drop hemoglobin

## 2018-12-27 NOTE — SWALLOW BEDSIDE ASSESSMENT ADULT - COMMENTS
The patient is an 83 year old male, A&Ox2-3 with some confusion and currently on a regular diet texture with thin liquids. The RN reports that the patient is intermitently coughing with meals, along with facial grimace upon swallowing however he reports no pain or difficulty. The patient presents with incomplete dentition with some teeth missing, lingual and labial ROM and strength are WFL. The patient trialed puree, mech soft, soft, and regular solid textures with thin liquids. Adequate oral prep, extended mastication but functional for soft solids, with mildly latent transport, timely swallow trigger with hyolaryngeal excursion. No overt s/s penetration/aspiration noted with soft textures and thin liquids.

## 2018-12-27 NOTE — CHART NOTE - NSCHARTNOTEFT_GEN_A_CORE
Assessment:     Factors impacting intake: [ ] none [ ] nausea  [ ] vomiting [ ] diarrhea [ ] constipation  [ ]chewing problems [ ] swallowing issues  [ ] other:     Diet Presciption: Diet, DASH/TLC:   Sodium & Cholesterol Restricted  Supplement Feeding Modality:  Oral  Ensure Enlive Cans or Servings Per Day:  1       Frequency:  Three Times a day (12-26-18 @ 17:26)    Intake:     Current Weight: Weight (kg): 63.5 (12-22 @ 14:48)  % Weight Change    Pertinent Medications: MEDICATIONS  (STANDING):  allopurinol 100 milliGRAM(s) Oral daily  amLODIPine   Tablet 10 milliGRAM(s) Oral daily  aspirin enteric coated 81 milliGRAM(s) Oral daily  colchicine 0.6 milliGRAM(s) Oral daily  dextrose 5% + sodium chloride 0.45% with potassium chloride 20 mEq/L 1000 milliLiter(s) (75 mL/Hr) IV Continuous <Continuous>  digoxin     Tablet 0.125 milliGRAM(s) Oral daily  enoxaparin Injectable 60 milliGRAM(s) SubCutaneous daily  folic acid 1 milliGRAM(s) Oral daily  levETIRAcetam  IVPB 500 milliGRAM(s) IV Intermittent every 12 hours  metoprolol succinate ER 25 milliGRAM(s) Oral daily  multivitamin 1 Tablet(s) Oral daily  piperacillin/tazobactam IVPB. 3.375 Gram(s) IV Intermittent every 8 hours  tamsulosin 0.4 milliGRAM(s) Oral at bedtime  thiamine IVPB 500 milliGRAM(s) IV Intermittent three times a day    MEDICATIONS  (PRN):  acetaminophen   Tablet .. 650 milliGRAM(s) Oral every 6 hours PRN Temp greater or equal to 38C (100.4F)  aluminum hydroxide/magnesium hydroxide/simethicone Suspension 30 milliLiter(s) Oral every 6 hours PRN Dyspepsia  bisacodyl 5 milliGRAM(s) Oral every 12 hours PRN Constipation  guaiFENesin   Syrup  (Sugar-Free) 200 milliGRAM(s) Oral every 6 hours PRN Cough    Pertinent Labs: 12-27 Na139 mmol/L Glu 218 mg/dL<H> K+ 3.8 mmol/L Cr  1.42 mg/dL<H> BUN 31 mg/dL<H> 12-24 Phos 3.5 mg/dL 12-26 Alb 2.0 g/dL<L> 12-23 [id number] 6.3 %<H> 12-23 Chol 95 mg/dL LDL 55 mg/dL HDL 26 mg/dL<L> Trig 69 mg/dL     CAPILLARY BLOOD GLUCOSE        Skin:     Estimated Needs:   [ ] no change since previous assessment  [ ] recalculated:     Previous Nutrition Diagnosis:   [ ] Inadequate Energy Intake [ ]Inadequate Oral Intake [ ] Excessive Energy Intake   [ ] Underweight [ ] Increased Nutrient Needs [ ] Overweight/Obesity   [ ] Altered GI Function [ ] Unintended Weight Loss [ ] Food & Nutrition Related Knowledge Deficit [ ] Malnutrition     Nutrition Diagnosis is [ ] ongoing  [ ] resolved [ ] not applicable     New Nutrition Diagnosis: [ ] not applicable       Interventions:   Recommend  [ ] Change Diet To:  [ ] Nutrition Supplement  [ ] Nutrition Support  [ ] Other:     Monitoring and Evaluation:   [ ] PO intake [ x ] Tolerance to diet prescription [ x ] weights [ x ] labs[ x ] follow up per protocol  [ ] other: Assessment: 82 y/o M with PMH of HTN, A. Fib on Xarelto, Stomach CA, GERD and gout presented with generalized weakness & repeated falls, also had seizures at the ED.   CT showed age indeterminate lacunar infarct of posterior limb of the right internal capsule. MRI brain and spine- negative. Pt with severe anemia ,repeat cbc after transfusion is improved to 7.8. stool guaiac sent. Pt had a seizure secondary to ? etoh use , [place] Nile and syphilis are being ruled out. Pt with SIRS,fever likely due to aspiration pneumonia.      Factors impacting intake: [ ] none [ ] nausea  [ ] vomiting [ ] diarrhea [ ] constipation  [ ]chewing problems [ ] swallowing issues  [ ] other:     Diet Presciption: Diet, DASH/TLC:   Sodium & Cholesterol Restricted  Supplement Feeding Modality:  Oral  Ensure Enlive Cans or Servings Per Day:  1       Frequency:  Three Times a day (12-26-18 @ 17:26)    Intake:     Current Weight: Weight (kg): 63.5 (12-22 @ 14:48)  % Weight Change    Pertinent Medications: MEDICATIONS  (STANDING):  allopurinol 100 milliGRAM(s) Oral daily  amLODIPine   Tablet 10 milliGRAM(s) Oral daily  aspirin enteric coated 81 milliGRAM(s) Oral daily  colchicine 0.6 milliGRAM(s) Oral daily  dextrose 5% + sodium chloride 0.45% with potassium chloride 20 mEq/L 1000 milliLiter(s) (75 mL/Hr) IV Continuous <Continuous>  digoxin     Tablet 0.125 milliGRAM(s) Oral daily  enoxaparin Injectable 60 milliGRAM(s) SubCutaneous daily  folic acid 1 milliGRAM(s) Oral daily  levETIRAcetam  IVPB 500 milliGRAM(s) IV Intermittent every 12 hours  metoprolol succinate ER 25 milliGRAM(s) Oral daily  multivitamin 1 Tablet(s) Oral daily  piperacillin/tazobactam IVPB. 3.375 Gram(s) IV Intermittent every 8 hours  tamsulosin 0.4 milliGRAM(s) Oral at bedtime  thiamine IVPB 500 milliGRAM(s) IV Intermittent three times a day    MEDICATIONS  (PRN):  acetaminophen   Tablet .. 650 milliGRAM(s) Oral every 6 hours PRN Temp greater or equal to 38C (100.4F)  aluminum hydroxide/magnesium hydroxide/simethicone Suspension 30 milliLiter(s) Oral every 6 hours PRN Dyspepsia  bisacodyl 5 milliGRAM(s) Oral every 12 hours PRN Constipation  guaiFENesin   Syrup  (Sugar-Free) 200 milliGRAM(s) Oral every 6 hours PRN Cough    Pertinent Labs: 12-27 Na139 mmol/L Glu 218 mg/dL<H> K+ 3.8 mmol/L Cr  1.42 mg/dL<H> BUN 31 mg/dL<H> 12-24 Phos 3.5 mg/dL 12-26 Alb 2.0 g/dL<L> 12-23 [id number] 6.3 %<H> 12-23 Chol 95 mg/dL LDL 55 mg/dL HDL 26 mg/dL<L> Trig 69 mg/dL     CAPILLARY BLOOD GLUCOSE        Skin:     Estimated Needs:   [ ] no change since previous assessment  [ ] recalculated:     Previous Nutrition Diagnosis:   [ ] Inadequate Energy Intake [ ]Inadequate Oral Intake [ ] Excessive Energy Intake   [ ] Underweight [ ] Increased Nutrient Needs [ ] Overweight/Obesity   [ ] Altered GI Function [ ] Unintended Weight Loss [ ] Food & Nutrition Related Knowledge Deficit [ ] Malnutrition     Nutrition Diagnosis is [ ] ongoing  [ ] resolved [ ] not applicable     New Nutrition Diagnosis: [ ] not applicable       Interventions:   Recommend  [ ] Change Diet To:  [ ] Nutrition Supplement  [ ] Nutrition Support  [ ] Other:     Monitoring and Evaluation:   [ ] PO intake [ x ] Tolerance to diet prescription [ x ] weights [ x ] labs[ x ] follow up per protocol  [ ] other: Assessment: 84 y/o M with PMH of HTN, A. Fib on Xarelto, Stomach CA, GERD and gout presented with generalized weakness & repeated falls, also had seizures at the ED.   CT showed age indeterminate lacunar infarct of posterior limb of the right internal capsule. MRI brain and spine- negative. Pt with severe anemia ,repeat cbc after transfusion is improved to 7.8. stool guaiac sent. Pt had a seizure in the ED secondary to ? etoh use , [place] Nile and syphilis are being ruled out. Pt with SIRS,fever likely due to aspiration pneumonia. Pt with  macrocytic anemia - normal B12/ folate . serum fe and % saturation fe are low, might benefit from fe supplementation. High ferritin level c/w inflammation CRP 15.6( H) anemia most likely multifactorial anemia -  CKD , acute illness, bone marrow suppression by ETOH with possible underling hematological disorder per MD note    Factors impacting intake: [ ] none [ ] nausea  [ ] vomiting [ ] diarrhea [ ] constipation  [ ]chewing problems [ ] swallowing issues  [ ] other:     Diet Presciption: Diet, DASH/TLC:   Sodium & Cholesterol Restricted  Supplement Feeding Modality:  Oral  Ensure Enlive Cans or Servings Per Day:  1       Frequency:  Three Times a day (12-26-18 @ 17:26)    Intake:     Current Weight: Weight (kg): 63.5 (12-22 @ 14:48)  % Weight Change    Pertinent Medications: MEDICATIONS  (STANDING):  allopurinol 100 milliGRAM(s) Oral daily  amLODIPine   Tablet 10 milliGRAM(s) Oral daily  aspirin enteric coated 81 milliGRAM(s) Oral daily  colchicine 0.6 milliGRAM(s) Oral daily  dextrose 5% + sodium chloride 0.45% with potassium chloride 20 mEq/L 1000 milliLiter(s) (75 mL/Hr) IV Continuous <Continuous>  digoxin     Tablet 0.125 milliGRAM(s) Oral daily  enoxaparin Injectable 60 milliGRAM(s) SubCutaneous daily  folic acid 1 milliGRAM(s) Oral daily  levETIRAcetam  IVPB 500 milliGRAM(s) IV Intermittent every 12 hours  metoprolol succinate ER 25 milliGRAM(s) Oral daily  multivitamin 1 Tablet(s) Oral daily  piperacillin/tazobactam IVPB. 3.375 Gram(s) IV Intermittent every 8 hours  tamsulosin 0.4 milliGRAM(s) Oral at bedtime  thiamine IVPB 500 milliGRAM(s) IV Intermittent three times a day    MEDICATIONS  (PRN):  acetaminophen   Tablet .. 650 milliGRAM(s) Oral every 6 hours PRN Temp greater or equal to 38C (100.4F)  aluminum hydroxide/magnesium hydroxide/simethicone Suspension 30 milliLiter(s) Oral every 6 hours PRN Dyspepsia  bisacodyl 5 milliGRAM(s) Oral every 12 hours PRN Constipation  guaiFENesin   Syrup  (Sugar-Free) 200 milliGRAM(s) Oral every 6 hours PRN Cough    Pertinent Labs: 12-27 Na139 mmol/L Glu 218 mg/dL<H> K+ 3.8 mmol/L Cr  1.42 mg/dL<H> BUN 31 mg/dL<H> 12-24 Phos 3.5 mg/dL 12-26 Alb 2.0 g/dL<L> 12-23 [id number] 6.3 %<H> 12-23 Chol 95 mg/dL LDL 55 mg/dL HDL 26 mg/dL<L> Trig 69 mg/dL     CAPILLARY BLOOD GLUCOSE        Skin:     Estimated Needs:   [ ] no change since previous assessment  [ ] recalculated:     Previous Nutrition Diagnosis:   [ ] Inadequate Energy Intake [ ]Inadequate Oral Intake [ ] Excessive Energy Intake   [ ] Underweight [ ] Increased Nutrient Needs [ ] Overweight/Obesity   [ ] Altered GI Function [ ] Unintended Weight Loss [ ] Food & Nutrition Related Knowledge Deficit [ ] Malnutrition     Nutrition Diagnosis is [ ] ongoing  [ ] resolved [ ] not applicable     New Nutrition Diagnosis: [ ] not applicable       Interventions:   Recommend  [ ] Change Diet To:  [ ] Nutrition Supplement  [ ] Nutrition Support  [ ] Other:     Monitoring and Evaluation:   [ ] PO intake [ x ] Tolerance to diet prescription [ x ] weights [ x ] labs[ x ] follow up per protocol  [ ] other: Assessment: 84 y/o M with PMH of HTN, A. Fib on Xarelto, Stomach CA, GERD and gout presented with generalized weakness & repeated falls, also had seizures at the ED.   CT showed age indeterminate lacunar infarct of posterior limb of the right internal capsule. MRI brain and spine- negative. Pt with severe anemia ,repeat cbc after transfusion is improved to 7.8. stool guaiac sent. Pt had a seizure in the ED secondary to ? etoh use , [place] and syphilis are being ruled out. Pt with SIRS. noted, fever likely due to aspiration pneumonia. Pt with  macrocytic anemia - normal B12/ folate . serum fe and % saturation fe are low, might benefit from fe supplementation. High ferritin level c/w inflammation CRP 15.6( H).  Anemia most likely multifactorial.    Pts partner in attendance, states po intake is poor despite encouragement. He provided food preferences which have been incorporated into meal plan. I asked him to continue encouraging Ensure Enlive supplement.    Factors impacting intake: [ ] none [ ] nausea  [ ] vomiting [ ] diarrhea [ ] constipation  [ ]chewing problems [ ] swallowing issues  [ X] other: feeding self, overall poor appetite due to illness    Diet Prescription: Diet, DASH/TLC:   Sodium & Cholesterol Restricted  Supplement Feeding Modality:  Oral  Ensure Enlive Cans or Servings Per Day:  1       Frequency:  Three Times a day (12-26-18 @ 17:26)    Intake: < 25% - < 50 %served    Current Weight: Weight (kg): 63.5 (12-22 @ 14:48) This wt appears grossly inaccurate  % Weight Change 12-27-18 wt 114.6 kg/252# # RD initial assessment 240#, wt fluctuations likely due to accuracy in weighing    Pertinent Medications: MEDICATIONS  (STANDING):  allopurinol 100 milliGRAM(s) Oral daily  amLODIPine   Tablet 10 milliGRAM(s) Oral daily  aspirin enteric coated 81 milliGRAM(s) Oral daily  colchicine 0.6 milliGRAM(s) Oral daily  dextrose 5% + sodium chloride 0.45% with potassium chloride 20 mEq/L 1000 milliLiter(s) (75 mL/Hr) IV Continuous <Continuous>  digoxin     Tablet 0.125 milliGRAM(s) Oral daily  enoxaparin Injectable 60 milliGRAM(s) SubCutaneous daily  folic acid 1 milliGRAM(s) Oral daily  levETIRAcetam  IVPB 500 milliGRAM(s) IV Intermittent every 12 hours  metoprolol succinate ER 25 milliGRAM(s) Oral daily  multivitamin 1 Tablet(s) Oral daily  piperacillin/tazobactam IVPB. 3.375 Gram(s) IV Intermittent every 8 hours  tamsulosin 0.4 milliGRAM(s) Oral at bedtime  thiamine IVPB 500 milliGRAM(s) IV Intermittent three times a day    MEDICATIONS  (PRN):  acetaminophen   Tablet .. 650 milliGRAM(s) Oral every 6 hours PRN Temp greater or equal to 38C (100.4F)  aluminum hydroxide/magnesium hydroxide/simethicone Suspension 30 milliLiter(s) Oral every 6 hours PRN Dyspepsia  bisacodyl 5 milliGRAM(s) Oral every 12 hours PRN Constipation  guaiFENesin   Syrup  (Sugar-Free) 200 milliGRAM(s) Oral every 6 hours PRN Cough    Pertinent Labs: 12-27 Na139 mmol/L Glu 218 mg/dL<H> K+ 3.8 mmol/L Cr  1.42 mg/dL<H> BUN 31 mg/dL<H> 12-24 Phos 3.5 mg/dL 12-26 Alb 2.0 g/dL<L> 12-23 [id number] 6.3 %<H> 12-23 Chol 95 mg/dL LDL 55 mg/dL HDL 26 mg/dL<L> Trig 69 mg/dL     CAPILLARY BLOOD GLUCOSE        Skin: stage I to sacrum     Estimated Needs:   [ X] no change since previous assessment  [ ] recalculated:     Previous Nutrition Diagnosis:   [ ] Inadequate Energy Intake [ ]Inadequate Oral Intake [ ] Excessive Energy Intake   [ ] Underweight [ ] Increased Nutrient Needs [ ] Overweight/Obesity   [ ] Altered GI Function [ ] Unintended Weight Loss [ ] Food & Nutrition Related Knowledge Deficit [ ] Malnutrition   [x]increased nutrient needs    Nutrition Diagnosis is [X ] ongoing  [ ] resolved [ ] not applicable     New Nutrition Diagnosis: [ ] not applicable   [x] inadequate energy intake       Interventions:   Recommend  [ ] Change Diet To:  [ ] Nutrition Supplement  [ ] Nutrition Support  [X ] Other: Encourage Ensure Enlive, consider Fe supplementation [id number] 325 mg po daily, Vit C 500 mg daily ( for enhanced absorption of fe and to aid in healing)     Monitoring and Evaluation:   [X ] PO intake [ x ] Tolerance to diet prescription [ x ] weights [ x ] labs[ x ] follow up per protocol  [ ] other: Assessment: 82 y/o M with PMH of HTN, A. Fib on Xarelto, Stomach CA, GERD and gout presented with generalized weakness & repeated falls, also had seizures at the ED.   CT showed age indeterminate lacunar infarct of posterior limb of the right internal capsule. MRI brain and spine- negative. Pt with severe anemia ,repeat cbc after transfusion is improved to 7.8. stool guaiac sent. Pt had a seizure in the ED secondary to ? etoh use , [place] Nile and syphilis are being ruled out. Pt with SIRS. noted, fever likely due to aspiration pneumonia. Pt with  macrocytic anemia - normal B12/ folate . serum fe and % saturation fe are low. Anemia is most likley multifactorial High ferritin level c/w inflammation CRP 15.6( H). Once CRP is wnl, would recheck ferritin level .    Pts partner in attendance, states po intake is poor despite encouragement. He provided food preferences which have been incorporated into meal plan. I asked him to continue encouraging Ensure Enlive supplement.    Factors impacting intake: [ ] none [ ] nausea  [ ] vomiting [ ] diarrhea [ ] constipation  [ ]chewing problems [ ] swallowing issues  [ X] other: feeding self, overall poor appetite due to illness    Diet Prescription: Diet, DASH/TLC:   Sodium & Cholesterol Restricted  Supplement Feeding Modality:  Oral  Ensure Enlive Cans or Servings Per Day:  1       Frequency:  Three Times a day (12-26-18 @ 17:26)    Intake: < 25% - < 50 %served    Current Weight: Weight (kg): 63.5 (12-22 @ 14:48) This wt appears grossly inaccurate  % Weight Change 12-27-18 wt 114.6 kg/252# # RD initial assessment 240#, wt fluctuations likely due to accuracy in weighing    Pertinent Medications: MEDICATIONS  (STANDING):  allopurinol 100 milliGRAM(s) Oral daily  amLODIPine   Tablet 10 milliGRAM(s) Oral daily  aspirin enteric coated 81 milliGRAM(s) Oral daily  colchicine 0.6 milliGRAM(s) Oral daily  dextrose 5% + sodium chloride 0.45% with potassium chloride 20 mEq/L 1000 milliLiter(s) (75 mL/Hr) IV Continuous <Continuous>  digoxin     Tablet 0.125 milliGRAM(s) Oral daily  enoxaparin Injectable 60 milliGRAM(s) SubCutaneous daily  folic acid 1 milliGRAM(s) Oral daily  levETIRAcetam  IVPB 500 milliGRAM(s) IV Intermittent every 12 hours  metoprolol succinate ER 25 milliGRAM(s) Oral daily  multivitamin 1 Tablet(s) Oral daily  piperacillin/tazobactam IVPB. 3.375 Gram(s) IV Intermittent every 8 hours  tamsulosin 0.4 milliGRAM(s) Oral at bedtime  thiamine IVPB 500 milliGRAM(s) IV Intermittent three times a day    MEDICATIONS  (PRN):  acetaminophen   Tablet .. 650 milliGRAM(s) Oral every 6 hours PRN Temp greater or equal to 38C (100.4F)  aluminum hydroxide/magnesium hydroxide/simethicone Suspension 30 milliLiter(s) Oral every 6 hours PRN Dyspepsia  bisacodyl 5 milliGRAM(s) Oral every 12 hours PRN Constipation  guaiFENesin   Syrup  (Sugar-Free) 200 milliGRAM(s) Oral every 6 hours PRN Cough    Pertinent Labs: 12-27 Na139 mmol/L Glu 218 mg/dL<H> K+ 3.8 mmol/L Cr  1.42 mg/dL<H> BUN 31 mg/dL<H> 12-24 Phos 3.5 mg/dL 12-26 Alb 2.0 g/dL<L> 12-23 [id number] 6.3 %<H> 12-23 Chol 95 mg/dL LDL 55 mg/dL HDL 26 mg/dL<L> Trig 69 mg/dL     CAPILLARY BLOOD GLUCOSE        Skin: stage I to sacrum     Estimated Needs:   [ X] no change since previous assessment  [ ] recalculated:     Previous Nutrition Diagnosis:   [ ] Inadequate Energy Intake [ ]Inadequate Oral Intake [ ] Excessive Energy Intake   [ ] Underweight [ ] Increased Nutrient Needs [ ] Overweight/Obesity   [ ] Altered GI Function [ ] Unintended Weight Loss [ ] Food & Nutrition Related Knowledge Deficit [ ] Malnutrition   [x]increased nutrient needs    Nutrition Diagnosis is [X ] ongoing  [ ] resolved [ ] not applicable     New Nutrition Diagnosis: [ ] not applicable   [x] inadequate energy intake       Interventions:   Recommend  [ ] Change Diet To:  [ ] Nutrition Supplement  [ ] Nutrition Support  [X ] Other: Encourage Ensure Enlive,  Vit C 500 mg daily     Monitoring and Evaluation:   [X ] PO intake [ x ] Tolerance to diet prescription [ x ] weights [ x ] labs[ x ] follow up per protocol  [ ] other:

## 2018-12-27 NOTE — PROGRESS NOTE ADULT - SUBJECTIVE AND OBJECTIVE BOX
Interval History:  remains weak but more oriented  received 1 unit PRBC  await stool guaiac    Chart reviewed and events noted;   Overnight events:    MEDICATIONS  (STANDING):  allopurinol 100 milliGRAM(s) Oral daily  amLODIPine   Tablet 10 milliGRAM(s) Oral daily  aspirin enteric coated 81 milliGRAM(s) Oral daily  colchicine 0.6 milliGRAM(s) Oral daily  dextrose 5% + sodium chloride 0.45% with potassium chloride 20 mEq/L 1000 milliLiter(s) (75 mL/Hr) IV Continuous <Continuous>  digoxin     Tablet 0.125 milliGRAM(s) Oral daily  enoxaparin Injectable 60 milliGRAM(s) SubCutaneous daily  folic acid 1 milliGRAM(s) Oral daily  levETIRAcetam  IVPB 500 milliGRAM(s) IV Intermittent every 12 hours  metoprolol succinate ER 25 milliGRAM(s) Oral daily  multivitamin 1 Tablet(s) Oral daily  piperacillin/tazobactam IVPB. 3.375 Gram(s) IV Intermittent every 8 hours  tamsulosin 0.4 milliGRAM(s) Oral at bedtime  thiamine IVPB 500 milliGRAM(s) IV Intermittent three times a day    MEDICATIONS  (PRN):  acetaminophen   Tablet .. 650 milliGRAM(s) Oral every 6 hours PRN Temp greater or equal to 38C (100.4F)  aluminum hydroxide/magnesium hydroxide/simethicone Suspension 30 milliLiter(s) Oral every 6 hours PRN Dyspepsia  bisacodyl 5 milliGRAM(s) Oral every 12 hours PRN Constipation  guaiFENesin   Syrup  (Sugar-Free) 200 milliGRAM(s) Oral every 6 hours PRN Cough      Vital Signs Last 24 Hrs  T(C): 37.6 (27 Dec 2018 08:36), Max: 38.1 (26 Dec 2018 19:57)  T(F): 99.7 (27 Dec 2018 08:36), Max: 100.6 (27 Dec 2018 00:25)  HR: 70 (27 Dec 2018 08:00) (70 - 104)  BP: 143/61 (27 Dec 2018 08:00) (118/55 - 145/63)  BP(mean): 85 (27 Dec 2018 08:00) (72 - 88)  RR: 17 (27 Dec 2018 08:00) (17 - 24)  SpO2: 100% (27 Dec 2018 08:00) (96% - 100%)    PHYSICAL EXAM  General: adult in NAD,  weak remains in ICU  HEENT: clear oropharynx, anicteric sclera, pink conjunctivae  Neck: supple  CV: normal S1S2 with no murmur rubs or gallops  Lungs: clear to auscultation, no wheezes, no rhales  Abdomen: soft non-tender non-distended, no hepato/splenomegaly  Ext: no clubbing cyanosis or edema  Skin: no rashes and no petichiae  Neuro: alert and oriented X3 no focal deficits      LABS:  CBC Full  -  ( 27 Dec 2018 06:43 )  WBC Count : 12.47 K/uL  Hemoglobin : 7.8 g/dL  Hematocrit : 24.4 %  Platelet Count - Automated : 260 K/uL  Mean Cell Volume : 104.3 fl  Mean Cell Hemoglobin : 33.3 pg  Mean Cell Hemoglobin Concentration : 32.0 gm/dL  Auto Neutrophil # : x  Auto Lymphocyte # : x  Auto Monocyte # : x  Auto Eosinophil # : x  Auto Basophil # : x  Auto Neutrophil % : x  Auto Lymphocyte % : x  Auto Monocyte % : x  Auto Eosinophil % : x  Auto Basophil % : x    12-27    139  |  105  |  31<H>  ----------------------------<  218<H>  3.8   |  24  |  1.42<H>    Ca    9.1      27 Dec 2018 06:43    TPro  5.9<L>  /  Alb  x   /  TBili  x   /  DBili  x   /  AST  x   /  ALT  x   /  AlkPhos  x   12-26        fe studies  Ferritin, Serum: 555 ng/mL (12-23 @ 12:05)  Iron - Total Binding Capacity.: 228 ug/dL (12-23 @ 12:05)      WBC trend  12.47 K/uL (12-27-18 @ 06:43)  16.72 K/uL (12-26-18 @ 13:19)  17.48 K/uL (12-26-18 @ 06:57)  32.87 K/uL (12-25-18 @ 06:29)      Hgb trend  7.8 g/dL (12-27-18 @ 06:43)  7.0 g/dL (12-26-18 @ 13:19)  7.1 g/dL (12-26-18 @ 06:57)  8.1 g/dL (12-25-18 @ 06:29)      plt trend  260 K/uL (12-27-18 @ 06:43)  255 K/uL (12-26-18 @ 13:19)  253 K/uL (12-26-18 @ 06:57)  293 K/uL (12-25-18 @ 06:29)        RADIOLOGY & ADDITIONAL STUDIES:

## 2018-12-27 NOTE — PROGRESS NOTE ADULT - ASSESSMENT
82 y/o M with PMH of HTN, A. Fib on Xarelto, Stomach CA, GERD, and Gout presented with generalized weakness & repeated falls, also had seizures at the ED.  pt is in ICU and undergoing Tx for aspiration PNA, mngt , workup of CVA  Consult called for evaluation of leukocytosis , anemia     Severe macrocytic anemia   sufficient iron , normal B12/ folate , normal TSH , no evidence of hemolysis   etiology is most likely multifactorial :anemia in CKD , acute illness, bone marrow suppression by ETOH with possible underling hematological disorder  there is no indication for emergent bone marrow Bx today  MM panel is ordered   full hemolysis workup is pending , but LFTs are WNL today  Hb is 7.8 today   FOBT is pending   Leukocytosis improved on current regimen ( most likely reactive, monitor)  Old LW could be obtained for clarification of pt's baseline     PLAN:  hold transfusion today and observe. check CBC in am  if hematology workup is unrevealing and an appropriate response to transfusion with short term stabilization of Hb is observed , then would treat acute condition and expect an improvement     LW results, anemia workup, utility / safety ( pt is anticoagulated) of BM Bx were d/w pt's partner in length   multiple Qs were answered with consequent appropriate feedback to pt's family /partner satisfaction  check stool guaiac, PT/INR and will evaluate for anticoagulation issues with cardiology  if stool guaiac + then GI evaluation  will need to assess risk/benefit of continued anticoagulation  further recommendation pending above

## 2018-12-28 DIAGNOSIS — D64.9 ANEMIA, UNSPECIFIED: ICD-10-CM

## 2018-12-28 DIAGNOSIS — I48.91 UNSPECIFIED ATRIAL FIBRILLATION: ICD-10-CM

## 2018-12-28 LAB
ANA PAT FLD IF-IMP: ABNORMAL
ANA PATTERN 2: ABNORMAL
ANA TITR SER: ABNORMAL
ANION GAP SERPL CALC-SCNC: 11 MMOL/L — SIGNIFICANT CHANGE UP (ref 5–17)
ANTI NUCLEAR FACTOR TITER 2: ABNORMAL
BUN SERPL-MCNC: 25 MG/DL — HIGH (ref 7–23)
CALCIUM SERPL-MCNC: 9 MG/DL — SIGNIFICANT CHANGE UP (ref 8.4–10.5)
CHLORIDE SERPL-SCNC: 106 MMOL/L — SIGNIFICANT CHANGE UP (ref 96–108)
CO2 SERPL-SCNC: 24 MMOL/L — SIGNIFICANT CHANGE UP (ref 22–31)
CREAT SERPL-MCNC: 1.47 MG/DL — HIGH (ref 0.5–1.3)
CULTURE RESULTS: SIGNIFICANT CHANGE UP
GLUCOSE SERPL-MCNC: 201 MG/DL — HIGH (ref 70–99)
H CAPSUL AG SPEC-ACNC: SIGNIFICANT CHANGE UP
H CAPSUL AG UR QL IA: SIGNIFICANT CHANGE UP
HCT VFR BLD CALC: 24.6 % — LOW (ref 39–50)
HGB BLD-MCNC: 7.9 G/DL — LOW (ref 13–17)
MCHC RBC-ENTMCNC: 32.1 GM/DL — SIGNIFICANT CHANGE UP (ref 32–36)
MCHC RBC-ENTMCNC: 33.9 PG — SIGNIFICANT CHANGE UP (ref 27–34)
MCV RBC AUTO: 105.6 FL — HIGH (ref 80–100)
NRBC # BLD: 0 /100 WBCS — SIGNIFICANT CHANGE UP (ref 0–0)
PLATELET # BLD AUTO: 278 K/UL — SIGNIFICANT CHANGE UP (ref 150–400)
POTASSIUM SERPL-MCNC: 4 MMOL/L — SIGNIFICANT CHANGE UP (ref 3.5–5.3)
POTASSIUM SERPL-SCNC: 4 MMOL/L — SIGNIFICANT CHANGE UP (ref 3.5–5.3)
RBC # BLD: 2.33 M/UL — LOW (ref 4.2–5.8)
RBC # FLD: 20.3 % — HIGH (ref 10.3–14.5)
SODIUM SERPL-SCNC: 141 MMOL/L — SIGNIFICANT CHANGE UP (ref 135–145)
SPECIMEN SOURCE: SIGNIFICANT CHANGE UP
WBC # BLD: 13.82 K/UL — HIGH (ref 3.8–10.5)
WBC # FLD AUTO: 13.82 K/UL — HIGH (ref 3.8–10.5)
WNV AB SPEC QL: SIGNIFICANT CHANGE UP
WNV IGG TITR FLD: NEGATIVE — SIGNIFICANT CHANGE UP
WNV IGM SPEC QL: NEGATIVE — SIGNIFICANT CHANGE UP

## 2018-12-28 PROCEDURE — 99233 SBSQ HOSP IP/OBS HIGH 50: CPT

## 2018-12-28 RX ORDER — APIXABAN 2.5 MG/1
2.5 TABLET, FILM COATED ORAL EVERY 12 HOURS
Qty: 0 | Refills: 0 | Status: DISCONTINUED | OUTPATIENT
Start: 2018-12-28 | End: 2019-01-01

## 2018-12-28 RX ORDER — LEVETIRACETAM 250 MG/1
500 TABLET, FILM COATED ORAL
Qty: 0 | Refills: 0 | Status: DISCONTINUED | OUTPATIENT
Start: 2018-12-28 | End: 2019-01-01

## 2018-12-28 RX ADMIN — LEVETIRACETAM 500 MILLIGRAM(S): 250 TABLET, FILM COATED ORAL at 17:12

## 2018-12-28 RX ADMIN — Medication 1 MILLIGRAM(S): at 11:22

## 2018-12-28 RX ADMIN — Medication 100 MILLIGRAM(S): at 11:22

## 2018-12-28 RX ADMIN — LEVETIRACETAM 400 MILLIGRAM(S): 250 TABLET, FILM COATED ORAL at 05:00

## 2018-12-28 RX ADMIN — Medication 200 MILLIGRAM(S): at 05:00

## 2018-12-28 RX ADMIN — Medication 25 MILLIGRAM(S): at 05:01

## 2018-12-28 RX ADMIN — Medication 0.12 MILLIGRAM(S): at 05:01

## 2018-12-28 RX ADMIN — APIXABAN 2.5 MILLIGRAM(S): 2.5 TABLET, FILM COATED ORAL at 17:12

## 2018-12-28 RX ADMIN — AMLODIPINE BESYLATE 10 MILLIGRAM(S): 2.5 TABLET ORAL at 05:01

## 2018-12-28 RX ADMIN — Medication 0.6 MILLIGRAM(S): at 05:00

## 2018-12-28 RX ADMIN — Medication 650 MILLIGRAM(S): at 06:22

## 2018-12-28 RX ADMIN — PIPERACILLIN AND TAZOBACTAM 25 GRAM(S): 4; .5 INJECTION, POWDER, LYOPHILIZED, FOR SOLUTION INTRAVENOUS at 17:12

## 2018-12-28 RX ADMIN — PIPERACILLIN AND TAZOBACTAM 25 GRAM(S): 4; .5 INJECTION, POWDER, LYOPHILIZED, FOR SOLUTION INTRAVENOUS at 03:24

## 2018-12-28 RX ADMIN — DEXTROSE MONOHYDRATE, SODIUM CHLORIDE, AND POTASSIUM CHLORIDE 75 MILLILITER(S): 50; .745; 4.5 INJECTION, SOLUTION INTRAVENOUS at 01:16

## 2018-12-28 RX ADMIN — Medication 81 MILLIGRAM(S): at 11:22

## 2018-12-28 RX ADMIN — Medication 1 TABLET(S): at 11:22

## 2018-12-28 RX ADMIN — Medication 0.6 MILLIGRAM(S): at 17:12

## 2018-12-28 RX ADMIN — PIPERACILLIN AND TAZOBACTAM 25 GRAM(S): 4; .5 INJECTION, POWDER, LYOPHILIZED, FOR SOLUTION INTRAVENOUS at 11:22

## 2018-12-28 RX ADMIN — Medication 650 MILLIGRAM(S): at 05:01

## 2018-12-28 RX ADMIN — TAMSULOSIN HYDROCHLORIDE 0.4 MILLIGRAM(S): 0.4 CAPSULE ORAL at 21:42

## 2018-12-28 RX ADMIN — Medication 40 MILLIGRAM(S): at 08:25

## 2018-12-28 NOTE — PHYSICAL THERAPY INITIAL EVALUATION ADULT - PERTINENT HX OF CURRENT PROBLEM, REHAB EVAL
pt is a 82 y/o male, admitted from home with weakness and repeated falls, In ED patient had a seizure episode for about 30 seconds that involved his left side of the body

## 2018-12-28 NOTE — PHYSICAL THERAPY INITIAL EVALUATION ADULT - LEVEL OF INDEPENDENCE: SUPINE/SIT, REHAB EVAL
moderate assist (50% patients effort)/pt started pushing back against PT unable to completely sit pt at edge of bed

## 2018-12-28 NOTE — PROGRESS NOTE ADULT - SUBJECTIVE AND OBJECTIVE BOX
Patient is a 83y old  Male who presents with a chief complaint of Generalized weakness. (28 Dec 2018 17:16)      BRIEF HOSPITAL COURSE:     82 y/o M with PMH of HTN, A. Fib on Xarelto, Stomach CA, GERD, and Gout who presented with generalized weakness with repeated falls. While in ED patient was noted to have a ~ 30sec. seizure, no prior Hx of seizures. Course further complicated by anemia.     Events last 24 hours:   -no further seizure activity  -no acute bleeding noted  -head Ct showed 'age indeterminate lacunar infarct" as likely source of repeat falls     PAST MEDICAL & SURGICAL HISTORY:  Stomach cancer  Hypertension  Atrial fibrillation  Gout  No significant past surgical history      Review of Systems:  ROS all negative other than those noted in HPI      Medications:  piperacillin/tazobactam IVPB. 3.375 Gram(s) IV Intermittent every 8 hours    amLODIPine   Tablet 10 milliGRAM(s) Oral daily  digoxin     Tablet 0.125 milliGRAM(s) Oral daily  metoprolol succinate ER 25 milliGRAM(s) Oral daily  tamsulosin 0.4 milliGRAM(s) Oral at bedtime    guaiFENesin   Syrup  (Sugar-Free) 200 milliGRAM(s) Oral every 6 hours PRN    acetaminophen   Tablet .. 650 milliGRAM(s) Oral every 6 hours PRN  levETIRAcetam 500 milliGRAM(s) Oral two times a day      apixaban 2.5 milliGRAM(s) Oral every 12 hours  aspirin enteric coated 81 milliGRAM(s) Oral daily    aluminum hydroxide/magnesium hydroxide/simethicone Suspension 30 milliLiter(s) Oral every 6 hours PRN  bisacodyl 5 milliGRAM(s) Oral every 12 hours PRN      allopurinol 100 milliGRAM(s) Oral daily  colchicine 0.6 milliGRAM(s) Oral two times a day  methylPREDNISolone sodium succinate Injectable 40 milliGRAM(s) IV Push daily    folic acid 1 milliGRAM(s) Oral daily  multivitamin 1 Tablet(s) Oral daily                ICU Vital Signs Last 24 Hrs  T(C): 37.4 (28 Dec 2018 16:01), Max: 38 (28 Dec 2018 05:00)  T(F): 99.3 (28 Dec 2018 16:01), Max: 100.4 (28 Dec 2018 05:00)  HR: 71 (28 Dec 2018 18:00) (69 - 92)  BP: 123/69 (28 Dec 2018 18:00) (115/65 - 146/60)  BP(mean): 86 (28 Dec 2018 18:00) (72 - 93)  ABP: --  ABP(mean): --  RR: 10 (28 Dec 2018 18:00) (10 - 27)  SpO2: 100% (28 Dec 2018 18:00) (94% - 100%)          I&O's Detail    27 Dec 2018 07:01  -  28 Dec 2018 07:00  --------------------------------------------------------  IN:    dextrose 5% + sodium chloride 0.45% with potassium chloride 20 mEq/L: 825 mL    IV PiggyBack: 275 mL    Oral Fluid: 310 mL  Total IN: 1410 mL    OUT:  Total OUT: 0 mL    Total NET: 1410 mL      28 Dec 2018 07:01  -  28 Dec 2018 19:44  --------------------------------------------------------  IN:    IV PiggyBack: 100 mL    Oral Fluid: 470 mL  Total IN: 570 mL    OUT:  Total OUT: 0 mL    Total NET: 570 mL            LABS:                        7.9    13.82 )-----------( 278      ( 28 Dec 2018 06:33 )             24.6     12-28    141  |  106  |  25<H>  ----------------------------<  201<H>  4.0   |  24  |  1.47<H>    Ca    9.0      28 Dec 2018 06:33    TPro  5.9<L>  /  Alb  2.4<L>  /  TBili  x   /  DBili  x   /  AST  x   /  ALT  x   /  AlkPhos  x   12-26          CAPILLARY BLOOD GLUCOSE        PT/INR - ( 27 Dec 2018 14:40 )   PT: 16.8 sec;   INR: 1.52 ratio         PTT - ( 27 Dec 2018 14:40 )  PTT:35.9 sec    CULTURES:  Culture Results:   No growth (12-25-18 @ 11:03)  Culture Results:   No growth to date. (12-25-18 @ 00:33)  Culture Results:   No growth to date. (12-25-18 @ 00:33)  Culture Results:   No growth at 5 days. (12-22-18 @ 23:17)  Culture Results:   No growth at 5 days. (12-22-18 @ 21:00)  Culture Results:   <10,000 CFU/ml Normal Urogenital kristi present (12-22-18 @ 20:50)      Physical Examination:    General: No acute distress.  Alert, oriented, interactive, nonfocal    HEENT: Pupils equal, reactive to light.  Symmetric.    PULM: Clear to auscultation bilaterally, no significant sputum production    CVS: Regular rate and rhythm, no murmurs, rubs, or gallops    ABD: Soft, nondistended, nontender, normoactive bowel sounds, no masses    EXT: No edema, nontender    SKIN: Warm and well perfused, no rashes noted.    RADIOLOGY: ***     < from: CT Head No Cont (12.22.18 @ 16:20) >  IMPRESSION: No acute intracranial hemorrhage, mass effect, or shift of   the midline structures.    Age indeterminate lacunar infarct within the posterior limb of the right   internal capsule.    < end of copied text >

## 2018-12-28 NOTE — PROGRESS NOTE ADULT - SUBJECTIVE AND OBJECTIVE BOX
PCP:    REQUESTING PHYSICIAN:    REASON FOR CONSULT:    CHIEF COMPLAINT:    HPI:  82 y/o man with a history of atrial fibrillation, HTN, gastric cancer, GERD, Gout, alcohol use admitted 18 with viral infection, seizure.    18: Increase leucocytosis today and fever. Pt lethargic, Atrial fib  18 Patient is awake , confused , heart rate is controlled   18: No complaints.  18 Patient is lethargic  arousable easily , answers question , did recieve PRBC last night , still low hemoglobin    HR BP stable   18 Pt lethargic. No chest pain. Rate controlled        PAST MEDICAL & SURGICAL HISTORY:  Stomach cancer  Hypertension  Atrial fibrillation  Gout  No significant past surgical history      SOCIAL HISTORY:    FAMILY HISTORY:  No pertinent family history in first degree relatives      ALLERGIES:  Allergies    No Known Allergies    Intolerances        MEDICATIONS:    MEDICATIONS  (STANDING):  allopurinol 100 milliGRAM(s) Oral daily  amLODIPine   Tablet 10 milliGRAM(s) Oral daily  aspirin enteric coated 81 milliGRAM(s) Oral daily  colchicine 0.6 milliGRAM(s) Oral two times a day  digoxin     Tablet 0.125 milliGRAM(s) Oral daily  enoxaparin Injectable 60 milliGRAM(s) SubCutaneous daily  folic acid 1 milliGRAM(s) Oral daily  levETIRAcetam  IVPB 500 milliGRAM(s) IV Intermittent every 12 hours  methylPREDNISolone sodium succinate Injectable 40 milliGRAM(s) IV Push daily  metoprolol succinate ER 25 milliGRAM(s) Oral daily  multivitamin 1 Tablet(s) Oral daily  piperacillin/tazobactam IVPB. 3.375 Gram(s) IV Intermittent every 8 hours  tamsulosin 0.4 milliGRAM(s) Oral at bedtime    MEDICATIONS  (PRN):  acetaminophen   Tablet .. 650 milliGRAM(s) Oral every 6 hours PRN Temp greater or equal to 38C (100.4F)  aluminum hydroxide/magnesium hydroxide/simethicone Suspension 30 milliLiter(s) Oral every 6 hours PRN Dyspepsia  bisacodyl 5 milliGRAM(s) Oral every 12 hours PRN Constipation  guaiFENesin   Syrup  (Sugar-Free) 200 milliGRAM(s) Oral every 6 hours PRN Cough        Vital Signs Last 24 Hrs  T(C): 37.3 (28 Dec 2018 08:15), Max: 38 (27 Dec 2018 12:30)  T(F): 99.2 (28 Dec 2018 08:15), Max: 100.4 (27 Dec 2018 12:30)  HR: 80 (28 Dec 2018 08:00) (71 - 93)  BP: 131/50 (28 Dec 2018 08:00) (115/65 - 146/60)  BP(mean): 74 (28 Dec 2018 08:00) (72 - 93)  RR: 15 (28 Dec 2018 08:00) (15 - 27)  SpO2: 96% (28 Dec 2018 08:00) (94% - 100%)Daily     Daily Weight in k.3 (28 Dec 2018 04:07)I&O's Summary    27 Dec 2018 07:01  -  28 Dec 2018 07:00  --------------------------------------------------------  IN: 1410 mL / OUT: 0 mL / NET: 1410 mL        PHYSICAL EXAM:    Constitutional: NAD, awake lethargic  HEENT: PERR, EOMI,  No oral cyananosis.  Neck:  supple,  No JVD  Respiratory: Breath sounds are clear bilaterally, No wheezing, rales or rhonchi  Cardiovascular: S1 and S2, irregular  Gastrointestinal: Bowel Sounds present, soft, nontender.   Extremities: Edema  Vascular: 2+ peripheral pulses  Neurological: A/O x 3, no focal deficits  Musculoskeletal: no calf tenderness.  Skin: No rashes.      LABS: All Labs Reviewed:                        7.9    13.82 )-----------( 278      ( 28 Dec 2018 06:33 )             24.6                         7.8    12.47 )-----------( 260      ( 27 Dec 2018 06:43 )             24.4                         7.0    16.72 )-----------( 255      ( 26 Dec 2018 13:19 )             21.8     28 Dec 2018 06:33    141    |  106    |  25     ----------------------------<  201    4.0     |  24     |  1.47   27 Dec 2018 06:43    139    |  105    |  31     ----------------------------<  218    3.8     |  24     |  1.42   26 Dec 2018 06:57    137    |  103    |  37     ----------------------------<  236    3.8     |  23     |  1.75     Ca    9.0        28 Dec 2018 06:33  Ca    9.1        27 Dec 2018 06:43  Ca    8.9        26 Dec 2018 06:57    TPro  5.9    /  Alb  2.4    /  TBili  x      /  DBili  x      /  AST  x      /  ALT  x      /  AlkPhos  x      26 Dec 2018 23:12  TPro  6.7    /  Alb  2.0    /  TBili  0.5    /  DBili  0.2    /  AST  16     /  ALT  13     /  AlkPhos  27     26 Dec 2018 16:20    PT/INR - ( 27 Dec 2018 14:40 )   PT: 16.8 sec;   INR: 1.52 ratio         PTT - ( 27 Dec 2018 14:40 )  PTT:35.9 sec      Blood Culture: Organism --  Gram Stain Blood -- Gram Stain --  Specimen Source .Urine Catheterized  Culture-Blood --    Organism --  Gram Stain Blood -- Gram Stain --  Specimen Source .Blood Blood  Culture-Blood --            RADIOLOGY/EKG:< from: 12 Lead ECG (18 @ 17:55) >  Diagnosis Line Atrial fibrillation with premature ventricular or aberrantly conducted complexes  Right bundle branch block  Left anterior fascicular block  *** Bifascicular block ***  Abnormal ECG  When compared with ECG of 22-DEC-2018 15:09, (Unconfirmed)  T wave inversion no longer evident in Inferior leads  Confirmed by Palla MD, Brody ([id number]) on 2018 10:28:32 AM    < end of copied text >        ECHO/CARDIAC CATHTERIZATION/STRESS TEST:  < from: US Transthoracic Echocardiogram w/Doppler Complete (18 @ 10:19) >  SUMMARY:  1. moderate left ventricular hypertrophy with normal left ventricular   systolic function with stage I diastolic dysfunction  2. calcific severe aortic stenosis, recommend clinical correlation  3. mild tricuspid regurgitation                  VENUGOPAL R PALLA MEDICINE/CARDIOLOGY  This document has been electronically signed. Dec 23 2018 12:13PM    < end of copied text >

## 2018-12-28 NOTE — PROGRESS NOTE ADULT - PROBLEM SELECTOR PLAN 1
Chronic; controlled ventricular rate; continue digoxin and metoprolol   Patient is severely anemic ? no obvious bleeding , on ecotrin , lovenox. Can begin Eliquis 2.5 mg bid. Guiac negative

## 2018-12-28 NOTE — PROGRESS NOTE ADULT - PROBLEM SELECTOR PLAN 6
ML acute on CKD, no available baseline at this time, will recheck after overnight IVF hydration, avoid nephrotoxins.
-thiamine/folic acid/multivitamin  -no current signs of withdrawl  CIWA
ML acute on CKD, no available baseline at this time, will recheck after overnight IVF hydration, avoid nephrotoxins.

## 2018-12-28 NOTE — PROGRESS NOTE ADULT - SUBJECTIVE AND OBJECTIVE BOX
Neurology follow up note    YUE [id number]      Interval History:    Patient feels tired poor sleep     MEDICATIONS    acetaminophen   Tablet .. 650 milliGRAM(s) Oral every 6 hours PRN  allopurinol 100 milliGRAM(s) Oral daily  aluminum hydroxide/magnesium hydroxide/simethicone Suspension 30 milliLiter(s) Oral every 6 hours PRN  amLODIPine   Tablet 10 milliGRAM(s) Oral daily  aspirin enteric coated 81 milliGRAM(s) Oral daily  bisacodyl 5 milliGRAM(s) Oral every 12 hours PRN  colchicine 0.6 milliGRAM(s) Oral two times a day  digoxin     Tablet 0.125 milliGRAM(s) Oral daily  enoxaparin Injectable 60 milliGRAM(s) SubCutaneous daily  folic acid 1 milliGRAM(s) Oral daily  guaiFENesin   Syrup  (Sugar-Free) 200 milliGRAM(s) Oral every 6 hours PRN  levETIRAcetam  IVPB 500 milliGRAM(s) IV Intermittent every 12 hours  methylPREDNISolone sodium succinate Injectable 40 milliGRAM(s) IV Push daily  metoprolol succinate ER 25 milliGRAM(s) Oral daily  multivitamin 1 Tablet(s) Oral daily  piperacillin/tazobactam IVPB. 3.375 Gram(s) IV Intermittent every 8 hours  tamsulosin 0.4 milliGRAM(s) Oral at bedtime      Allergies    No Known Allergies    Intolerances            Vital Signs Last 24 Hrs  T(C): 37.3 (28 Dec 2018 08:15), Max: 38 (27 Dec 2018 12:30)  T(F): 99.2 (28 Dec 2018 08:15), Max: 100.4 (27 Dec 2018 12:30)  HR: 80 (28 Dec 2018 08:00) (71 - 93)  BP: 131/50 (28 Dec 2018 08:00) (115/65 - 146/60)  BP(mean): 74 (28 Dec 2018 08:00) (72 - 93)  RR: 15 (28 Dec 2018 08:00) (15 - 27)  SpO2: 96% (28 Dec 2018 08:00) (94% - 100%)         REVIEW OF SYSTEMS:     Constitutional: No fever, chills, fatigue, generalized weakness  Eyes: no eye pain, visual disturbances, or discharge  ENT:  No difficulty hearing, tinnitus, vertigo; No sinus or throat pain  Neck: No pain or stiffness  Respiratory: No cough, dyspnea, wheezing   Cardiovascular: No chest pain, palpitations,   Gastrointestinal: No abdominal or epigastric pain. No nausea, vomiting  No diarrhea or constipation.   Genitourinary: No dysuria, frequency, hematuria or incontinence  Neurological: No headaches, lightheadedness, vertigo, numbness or tremors  Psychiatric: No depression, anxiety, mood swings or difficulty sleeping  Musculoskeletal: H/O  joint pain   Skin: No itching, burning, rashes or lesions   Lymph Nodes: No enlarged glands  Endocrine: No heat or cold intolerance; No hair loss   Allergy and Immunologic: No hives or eczema    On Neurological Examination:    Mental Status - Patient is alert, awake, oriented X3.    Follow simple commands      Speech -   Fluent                       Cranial Nerves - Pupils 3 mm equal and reactive to light,   extraocular eye movements intact.   smile symmetric  intact bilateral NLF    Motor Exam -   Right upper 4/5  Left upper 4/5  Right lower2/5  Left lower 2/5      Muscle tone - is normal all over.  No asymmetry is seen.      Sensory    Bilateral intact to light touch    GENERAL Exam: Nontoxic , No Acute Distress   	  HEENT:  normocephalic, atraumatic  		  LUNGS: Decreased bilaterally  	  HEART: Normal S1S2   No murmur RRR        	  GI/ ABDOMEN:  Soft  Non tender    EXTREMITIES:   No Edema  No Clubbing  No Cyanosis     SKIN: Normal  No Ecchymosis               LABS:  CBC Full  -  ( 28 Dec 2018 06:33 )  WBC Count : 13.82 K/uL  Hemoglobin : 7.9 g/dL  Hematocrit : 24.6 %  Platelet Count - Automated : 278 K/uL  Mean Cell Volume : 105.6 fl  Mean Cell Hemoglobin : 33.9 pg  Mean Cell Hemoglobin Concentration : 32.1 gm/dL  Auto Neutrophil # : x  Auto Lymphocyte # : x  Auto Monocyte # : x  Auto Eosinophil # : x  Auto Basophil # : x  Auto Neutrophil % : x  Auto Lymphocyte % : x  Auto Monocyte % : x  Auto Eosinophil % : x  Auto Basophil % : x      12-28    141  |  106  |  25<H>  ----------------------------<  201<H>  4.0   |  24  |  1.47<H>    Ca    9.0      28 Dec 2018 06:33    TPro  5.9<L>  /  Alb  2.4<L>  /  TBili  x   /  DBili  x   /  AST  x   /  ALT  x   /  AlkPhos  x   12-26    Hemoglobin A1C:     LIVER FUNCTIONS - ( 26 Dec 2018 23:12 )  Alb: 2.4 g/dL / Pro: 5.9 g/dL / ALK PHOS: x     / ALT: x     / AST: x     / GGT: x           Vitamin B12   PT/INR - ( 27 Dec 2018 14:40 )   PT: 16.8 sec;   INR: 1.52 ratio         PTT - ( 27 Dec 2018 14:40 )  PTT:35.9 sec      RADIOLOGY    ASSESSMENT AND PLAN     presented with fall and generalized seizure.    seizure KEPPRA can switch to po in oral intake ok     GOUTY ARTHRITIS WITH LEG WEAKNESS  allopurinol mri negative for CVA    spoke to HCP GENE at bedside 12/27/18 h  c  12/28/18      Physical therapy evaluation.  OOB to chair/ambulation with assistance only.    .  Greater than 45 minutes spent in direct patient care reviewing  the notes, lab data/ imaging , discussion with multidisciplinary team.

## 2018-12-28 NOTE — PHYSICAL THERAPY INITIAL EVALUATION ADULT - ADDITIONAL COMMENTS
pt was ambulatory prior to this hospitalization. Unable to obtain complete history as pt is confused. Pt lives with his partner

## 2018-12-28 NOTE — PROGRESS NOTE ADULT - PROBLEM SELECTOR PROBLEM 6
Abnormal kidney function
ETOH abuse
Abnormal kidney function

## 2018-12-28 NOTE — PROGRESS NOTE ADULT - ASSESSMENT
82 y/o M with PMH of HTN, A. Fib on Xarelto as outpatient, history  Stomach CA, GERD, and Gout presented with generalized weakness & repeated falls, also had seizures at the ED.  pt is in ICU and undergoing Tx for aspiration PNA, mngt , also found to have CVA  Consult called for evaluation of leukocytosis , anemia     Severe macrocytic anemia likely due to chronic liver disease as a result of ETOH abuse; also with CKD and exacerbated by acute illness  sufficient iron , normal B12/ folate , normal TSH , no evidence of hemolysis   there is no indication for emergent bone marrow Bx during this admission  Leukocytosis due to acute illness/aspiration pneumonia    PLAN:  - hold transfusion today and observe.  - would transfuse as clinically indicated  - monitor CBC as anticoagulation has been resumed  - no additional acute heme intervention indicated at this time; reconsult if needed

## 2018-12-28 NOTE — PROGRESS NOTE ADULT - PROBLEM SELECTOR PROBLEM 4
HTN (hypertension)
HTN (hypertension)
Monocytosis
Anemia
Monocytosis

## 2018-12-28 NOTE — PROGRESS NOTE ADULT - PROBLEM SELECTOR PLAN 5
ML 2ry to ETOH, started on Folic acid PO, also Thiamin for chronic ETOH consumption.
norvasc/lopressor  -BP at goal
ML 2ry to ETOH, started on Folic acid PO, also Thiamin for chronic ETOH consumption.
ML 2ry to ETOH, started on Folic acid PO, also Thiamin for chronic ETOH consumption.  CIWA protocol.

## 2018-12-28 NOTE — OCCUPATIONAL THERAPY INITIAL EVALUATION ADULT - PERTINENT HX OF CURRENT PROBLEM, REHAB EVAL
Pt fell while  in [place]  when his left leg buckled under him, didn't see a doctor as he was coming back to [place] next day, but he kept falling several times & was feeling weak and moves with difficulty. Once ED pt had a seizure

## 2018-12-28 NOTE — PROGRESS NOTE ADULT - SUBJECTIVE AND OBJECTIVE BOX
Date/Time Patient Seen:  		  Referring MD:   Data Reviewed	       Patient is a 83y old  Male who presents with a chief complaint of Generalized weakness. (27 Dec 2018 14:05)      Subjective/HPI     PAST MEDICAL & SURGICAL HISTORY:  Stomach cancer  Hypertension  Atrial fibrillation  Gout  No significant past surgical history        Medication list         MEDICATIONS  (STANDING):  allopurinol 100 milliGRAM(s) Oral daily  amLODIPine   Tablet 10 milliGRAM(s) Oral daily  aspirin enteric coated 81 milliGRAM(s) Oral daily  colchicine 0.6 milliGRAM(s) Oral two times a day  dextrose 5% + sodium chloride 0.45% with potassium chloride 20 mEq/L 1000 milliLiter(s) (75 mL/Hr) IV Continuous <Continuous>  digoxin     Tablet 0.125 milliGRAM(s) Oral daily  enoxaparin Injectable 60 milliGRAM(s) SubCutaneous daily  folic acid 1 milliGRAM(s) Oral daily  levETIRAcetam  IVPB 500 milliGRAM(s) IV Intermittent every 12 hours  metoprolol succinate ER 25 milliGRAM(s) Oral daily  multivitamin 1 Tablet(s) Oral daily  piperacillin/tazobactam IVPB. 3.375 Gram(s) IV Intermittent every 8 hours  tamsulosin 0.4 milliGRAM(s) Oral at bedtime    MEDICATIONS  (PRN):  acetaminophen   Tablet .. 650 milliGRAM(s) Oral every 6 hours PRN Temp greater or equal to 38C (100.4F)  aluminum hydroxide/magnesium hydroxide/simethicone Suspension 30 milliLiter(s) Oral every 6 hours PRN Dyspepsia  bisacodyl 5 milliGRAM(s) Oral every 12 hours PRN Constipation  guaiFENesin   Syrup  (Sugar-Free) 200 milliGRAM(s) Oral every 6 hours PRN Cough         Vitals log        ICU Vital Signs Last 24 Hrs  T(C): 38 (28 Dec 2018 05:00), Max: 38 (27 Dec 2018 12:30)  T(F): 100.4 (28 Dec 2018 05:00), Max: 100.4 (27 Dec 2018 12:30)  HR: 86 (28 Dec 2018 06:00) (70 - 93)  BP: 126/58 (28 Dec 2018 06:00) (115/65 - 146/60)  BP(mean): 77 (28 Dec 2018 06:00) (72 - 93)  ABP: --  ABP(mean): --  RR: 22 (28 Dec 2018 06:00) (17 - 27)  SpO2: 97% (28 Dec 2018 06:00) (94% - 100%)           Input and Output:  I&O's Detail    27 Dec 2018 07:01  -  28 Dec 2018 07:00  --------------------------------------------------------  IN:    dextrose 5% + sodium chloride 0.45% with potassium chloride 20 mEq/L: 825 mL    IV PiggyBack: 275 mL    Oral Fluid: 310 mL  Total IN: 1410 mL    OUT:  Total OUT: 0 mL    Total NET: 1410 mL          Lab Data                        7.9    13.82 )-----------( 278      ( 28 Dec 2018 06:33 )             24.6     12-28    141  |  106  |  25<H>  ----------------------------<  201<H>  4.0   |  24  |  1.47<H>    Ca    9.0      28 Dec 2018 06:33    TPro  5.9<L>  /  Alb  2.4<L>  /  TBili  x   /  DBili  x   /  AST  x   /  ALT  x   /  AlkPhos  x   12-26            Review of Systems	      Objective     Physical Examination    frail  weak  lung dec BS  abd soft  heart s1s2      Pertinent Lab findings & Imaging      Jonathan:  NO   Adequate UO     I&O's Detail    27 Dec 2018 07:01  -  28 Dec 2018 07:00  --------------------------------------------------------  IN:    dextrose 5% + sodium chloride 0.45% with potassium chloride 20 mEq/L: 825 mL    IV PiggyBack: 275 mL    Oral Fluid: 310 mL  Total IN: 1410 mL    OUT:  Total OUT: 0 mL    Total NET: 1410 mL               Discussed with:     Cultures:	        Radiology

## 2018-12-28 NOTE — PROGRESS NOTE ADULT - SUBJECTIVE AND OBJECTIVE BOX
Patient seen and examined;  Chart reviewed and events noted;   feeling well this morning; started on low dose Eliquis by cardiology and with stable Hg this a.m.      MEDICATIONS  (STANDING):  allopurinol 100 milliGRAM(s) Oral daily  amLODIPine   Tablet 10 milliGRAM(s) Oral daily  apixaban 2.5 milliGRAM(s) Oral every 12 hours  aspirin enteric coated 81 milliGRAM(s) Oral daily  colchicine 0.6 milliGRAM(s) Oral two times a day  digoxin     Tablet 0.125 milliGRAM(s) Oral daily  folic acid 1 milliGRAM(s) Oral daily  levETIRAcetam 500 milliGRAM(s) Oral two times a day  methylPREDNISolone sodium succinate Injectable 40 milliGRAM(s) IV Push daily  metoprolol succinate ER 25 milliGRAM(s) Oral daily  multivitamin 1 Tablet(s) Oral daily  piperacillin/tazobactam IVPB. 3.375 Gram(s) IV Intermittent every 8 hours  tamsulosin 0.4 milliGRAM(s) Oral at bedtime    MEDICATIONS  (PRN):  acetaminophen   Tablet .. 650 milliGRAM(s) Oral every 6 hours PRN Temp greater or equal to 38C (100.4F)  aluminum hydroxide/magnesium hydroxide/simethicone Suspension 30 milliLiter(s) Oral every 6 hours PRN Dyspepsia  bisacodyl 5 milliGRAM(s) Oral every 12 hours PRN Constipation  guaiFENesin   Syrup  (Sugar-Free) 200 milliGRAM(s) Oral every 6 hours PRN Cough      Vital Signs Last 24 Hrs  T(C): 37.3 (28 Dec 2018 08:15), Max: 38 (28 Dec 2018 05:00)  T(F): 99.2 (28 Dec 2018 08:15), Max: 100.4 (28 Dec 2018 05:00)  HR: 78 (28 Dec 2018 11:04) (71 - 92)  BP: 141/62 (28 Dec 2018 11:04) (115/65 - 146/60)  BP(mean): 84 (28 Dec 2018 11:04) (72 - 93)  RR: 18 (28 Dec 2018 11:04) (15 - 27)  SpO2: 97% (28 Dec 2018 11:04) (94% - 100%)    PHYSICAL EXAM  General: adult elderly male  in NAD  HEENT: clear oropharynx, anicteric sclera, pink conjunctivae; + right facial droop  Neck: supple  CV: normal S1S2; irregular rhythm;  with no murmur rubs or gallops  Lungs: reduced breath sounds at bases  Abdomen: soft non-tender non-distended, no hepato/splenomegaly  Ext: no clubbing cyanosis or edema  Skin: no rashes and no petichiae  Neuro: alert and oriented X3; reduced strength in RUE    LABS:                        7.9    13.82 )-----------( 278      ( 28 Dec 2018 06:33 )             24.6     Hemoglobin: 7.9 g/dL (12-28 @ 06:33)  Hemoglobin: 7.8 g/dL (12-27 @ 06:43)  Hemoglobin: 7.0 g/dL (12-26 @ 13:19)  Hemoglobin: 7.1 g/dL (12-26 @ 06:57)  Hemoglobin: 8.1 g/dL (12-25 @ 06:29)    12-28    141  |  106  |  25<H>  ----------------------------<  201<H>  4.0   |  24  |  1.47<H>    Ca    9.0      28 Dec 2018 06:33    TPro  5.9<L>  /  Alb  2.4<L>  /  TBili  x   /  DBili  x   /  AST  x   /  ALT  x   /  AlkPhos  x   12-26    PT/INR - ( 27 Dec 2018 14:40 )   PT: 16.8 sec;   INR: 1.52 ratio    PTT - ( 27 Dec 2018 14:40 )  PTT:35.9 sec    hemolysis work-up negative  stool guaiac negative  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

## 2018-12-28 NOTE — PROGRESS NOTE ADULT - ASSESSMENT
82 y/o M with PMH of HTN, A. Fib on Xarelto, Stomach CA, GERD, and Gout who presented with generalized weakness with repeated falls. While in ED patient was noted to have a ~ 30sec. seizure, no prior Hx of seizures. Course further complicated by anemia.     Events last 24 hours:   -no further seizure activity  -no acute bleeding noted  -head Ct showed 'age indeterminate lacunar infarct" as likely source of repeat falls

## 2018-12-28 NOTE — PROGRESS NOTE ADULT - PROBLEM SELECTOR PROBLEM 7
Atrial fibrillation
Afib
Atrial fibrillation

## 2018-12-28 NOTE — PROGRESS NOTE ADULT - SUBJECTIVE AND OBJECTIVE BOX
ID Progress note    Name: YUE KING  Age: 83y  Gender: Male  MRN: [id number]    Interval History-- Events noted, awake, afebrile has severe joints pains , exacerbation of gout . Has been refusing PT . Appetite is fair   Notes reviewed    Past Medical History--  Stomach cancer  Hypertension  Atrial fibrillation  Gout  No significant past surgical history      For details regarding the patient's social history, family history, and other miscellaneous elements, please refer the initial infectious diseases consultation and/or the admitting history and physical examination for this admission.    Allergies--  Allergies    No Known Allergies    Intolerances        Medications--  Antibiotics:  piperacillin/tazobactam IVPB. 3.375 Gram(s) IV Intermittent every 8 hours    Immunologic:    Other:  acetaminophen   Tablet .. PRN  allopurinol  aluminum hydroxide/magnesium hydroxide/simethicone Suspension PRN  amLODIPine   Tablet  aspirin enteric coated  bisacodyl PRN  colchicine  dextrose 5% + sodium chloride 0.45% with potassium chloride 20 mEq/L  digoxin     Tablet  enoxaparin Injectable  folic acid  guaiFENesin   Syrup  (Sugar-Free) PRN  levETIRAcetam  IVPB  metoprolol succinate ER  multivitamin  tamsulosin      Review of Systems--  Review of systems unable to be obtained secondary to clinical condition.    Physical Examination--    Vital Signs: T(F): 100.4 (12-28-18 @ 05:00), Max: 100.4 (12-27-18 @ 12:30)  HR: 86 (12-28-18 @ 06:00)  BP: 126/58 (12-28-18 @ 06:00)  RR: 22 (12-28-18 @ 06:00)  SpO2: 97% (12-28-18 @ 06:00)  Wt(kg): --  General: Nontoxic-appearing Male in no acute distress.  HEENT: AT/NC. PERRL. EOMI. Anicteric. Conjunctiva pink and moist. Oropharynx poor oral hygiene Dentition fair.  Neck: Not rigid. No sense of mass.  Nodes: None palpable.  Lungs: Clear bilaterally without rales, wheezing or rhonchi  Heart: Regular rate and rhythm. No Murmur. No rub. No gallop. No palpable thrill.  Abdomen: Bowel sounds present and normoactive. Soft. Nondistended. Nontender. No sense of mass. No organomegaly.  Back: No spinal tenderness. No costovertebral angle tenderness.   Extremities: No cyanosis or clubbing. No edema. polyarthropathy from gout   Skin: Warm. Dry. Good turgor. No rash. No vasculitic stigmata.  Psychiatric: Appropriate affect and mood for situation.         Laboratory Studies--  CBC                        7.9    13.82 )-----------( 278      ( 28 Dec 2018 06:33 )             24.6       Chemistries  12-28    141  |  106  |  25<H>  ----------------------------<  201<H>  4.0   |  24  |  1.47<H>    Ca    9.0      28 Dec 2018 06:33    TPro  5.9<L>  /  Alb  2.4<L>  /  TBili  x   /  DBili  x   /  AST  x   /  ALT  x   /  AlkPhos  x   12-26    Protein Electrophoresis, Serum (12.26.18 @ 23:12)    Protein Total, Serum: 5.9 g/dL    Total Protein, Serum: 5.9 g/dL    Albumin, Serum: 2.4 g/dL    Alpha 1: 0.6 g/dL    Alpha 2: 0.9 g/dL    Beta Globulin: 0.8 g/dL    Gamma Globulin: 1.2 g/dL    % Albumin: 41.4 %    % Alpha 1: 9.4 %    % Alpha 2: 15.3 %    % Beta: 13.2 %    % Gamma: 20.7 %    Albumin/Globulin Ratio: 0.7 Ratio    Serum Protein Electrophoresis Interp: Hypoalbuminemia    Immunoglobulins Panel (12.26.18 @ 23:12)    DUTCH Kappa: 7.25 mg/dL    Fontana/Lambda Free Light Chain Ratio, Serum: 0.93 Ratio    Immunoglobulin Free Light Chains, Serum (12.26.18 @ 23:12)    DUTCH Kappa: 7.25 mg/dL    Fontana/Lambda Free Light Chain Ratio, Serum: 0.93 Ratio        Culture Data    Culture - Urine (collected 25 Dec 2018 11:03)  Source: .Urine Catheterized  Final Report (26 Dec 2018 10:46):    No growth    Culture - Blood (collected 25 Dec 2018 00:33)  Source: .Blood Blood-Peripheral  Preliminary Report (26 Dec 2018 01:01):    No growth to date.    Culture - Blood (collected 25 Dec 2018 00:33)  Source: .Blood Blood  Preliminary Report (26 Dec 2018 01:01):    No growth to date.    Culture - Blood (collected 22 Dec 2018 23:17)  Source: .Blood Blood  Final Report (28 Dec 2018 01:01):    No growth at 5 days.    Culture - Blood (collected 22 Dec 2018 21:00)  Source: .Blood Blood  Final Report (27 Dec 2018 22:00):    No growth at 5 days.    Culture - Urine (collected 22 Dec 2018 20:50)  Source: .Urine Clean Catch (Midstream)  Final Report (23 Dec 2018 16:28):    <10,000 CFU/ml Normal Urogenital kristi present      Radiology:  Xray Chest 1 View- PORTABLE-Urgent (12.24.18 @ 17:58) >  Portable AP radiograph is compared to 12/22/2018.    The heart is again enlarged. The trachea is midline airthere is no focal   infiltrate or pleural effusion. The osseous structures are intact.    Impression: Cardiomegaly. No active disease. No change.   MR Head No Cont (12.24.18 @ 14:30) >    Comparison CT performed 2 days prior    There is no mass effect, cortical edema or hydrocephalus. There isno   evidence of acute infarct or previous parenchymal hemorrhage. There is   mild central volume loss. The orbital and sellar contents and cerebellar   tonsils are within normal limits.    IMPRESSION:    No acute findings       MR Lumbar Spine No Cont (12.24.18 @ 14:52) >    IMPRESSION:    No acute findings. Negative for compression fracture, focal disc   protrusion or spinal stenosis. Diffuse red marrow replacement suggestive   of chronic anemia.      Assessment--  84 y/o M with PMH of HTN, A. Fib on Xarelto, Stomach CA, GERD, and Gout presented with generalized weakness & repeated falls, also had seizures at the ED. Noted to be febrile , stormy cause of fever could be UTI as he ahs urinary retention with BPH , PVR was 350 . Doubt he has pneumonia ,  he has hx of heavy ETOH abuse     He may have a myeloproliferative disorder with leukocytosis with monocytosis may have CMML . he also has severe anemia . Leukocytosis likely from myeloproliferative disorder , doubt any infection . he has severe anemia etiology unclear , hematology following . he is being wroked up for myeloma . Has macrocytic anemia likely from alcohol abuse     Etiology of seizure is unclear probable alcohol withdrawal seizure on CIWA protocol     Possible pseudogout Right knee , he has polyarticular gout     Etiology of fever could be aspiration or viral syndrome     His HIV test is negative     he     Plan :   - will  continue with Zosyn x 7 days   - give IV steroids   - hematology follow up await flow cytometry results    - fall precautions   - may need Flomax for BPH  - PT evaluation, OOB   - add colchicine for pseudogout  - dc IVF    Continue with present regime .  Appropriate use of antibiotics and adverse effects reviewed.    I have discussed the above plan of care with patient and his family in detail. They expressed understanding of the treatment plan . Risks, benefits and alternatives discussed in detail. I have asked if they have any questions or concerns and appropriately addressed them to the best of my ability .      > 35 minutes spent in direct patient care reviewing  the notes, lab data/ imaging , discussion with multidisciplinary team. All questions were addressed and answered to the best of my capacity .    I will be away from 12/29/18 thru 1/3/19 , Dr. Jerez is covering me.      Thank you for allowing me to participate in the care of your patient .        Dimple Florez MD  [phone]

## 2018-12-28 NOTE — PROGRESS NOTE ADULT - PROBLEM SELECTOR PLAN 1
-isolated event with no furterh episodes noted.  -likely from CVA/cerbreal edema  -on Kediana sneed following

## 2018-12-28 NOTE — PROGRESS NOTE ADULT - SUBJECTIVE AND OBJECTIVE BOX
[facility]  Mickey Constantino PA-C  [address] [place] [address]  [phone]      INTERVAL HPI/OVERNIGHT EVENTS:    no new events  sitting in a chair     MEDICATIONS  (STANDING):  allopurinol 100 milliGRAM(s) Oral daily  amLODIPine   Tablet 10 milliGRAM(s) Oral daily  apixaban 2.5 milliGRAM(s) Oral every 12 hours  aspirin enteric coated 81 milliGRAM(s) Oral daily  colchicine 0.6 milliGRAM(s) Oral two times a day  digoxin     Tablet 0.125 milliGRAM(s) Oral daily  folic acid 1 milliGRAM(s) Oral daily  levETIRAcetam 500 milliGRAM(s) Oral two times a day  methylPREDNISolone sodium succinate Injectable 40 milliGRAM(s) IV Push daily  metoprolol succinate ER 25 milliGRAM(s) Oral daily  multivitamin 1 Tablet(s) Oral daily  piperacillin/tazobactam IVPB. 3.375 Gram(s) IV Intermittent every 8 hours  tamsulosin 0.4 milliGRAM(s) Oral at bedtime    MEDICATIONS  (PRN):  acetaminophen   Tablet .. 650 milliGRAM(s) Oral every 6 hours PRN Temp greater or equal to 38C (100.4F)  aluminum hydroxide/magnesium hydroxide/simethicone Suspension 30 milliLiter(s) Oral every 6 hours PRN Dyspepsia  bisacodyl 5 milliGRAM(s) Oral every 12 hours PRN Constipation  guaiFENesin   Syrup  (Sugar-Free) 200 milliGRAM(s) Oral every 6 hours PRN Cough      Allergies    No Known Allergies    Intolerances        ROS:   General:  No wt loss, fevers, chills, night sweats, fatigue,   Eyes:  Good vision, no reported pain  ENT:  No sore throat, pain, runny nose, dysphagia  CV:  No pain, palpitations, hypo/hypertension  Resp:  No dyspnea, cough, tachypnea, wheezing  GI:  No pain, No nausea, No vomiting, No diarrhea, No constipation, No weight loss, No fever, No pruritis, No rectal bleeding, No tarry stools, No dysphagia,  :  No pain, bleeding, incontinence, nocturia  Muscle:  No pain, weakness  Neuro:  No weakness, tingling, memory problems  Psych:  No fatigue, insomnia, mood problems, depression  Endocrine:  No polyuria, polydipsia, cold/heat intolerance  Heme:  No petechiae, ecchymosis, easy bruisability  Skin:  No rash, tattoos, scars, edema      PHYSICAL EXAM:   Vital Signs:  Vital Signs Last 24 Hrs  T(C): 37.4 (28 Dec 2018 16:01), Max: 38 (28 Dec 2018 05:00)  T(F): 99.3 (28 Dec 2018 16:01), Max: 100.4 (28 Dec 2018 05:00)  HR: 69 (28 Dec 2018 15:27) (69 - 92)  BP: 127/65 (28 Dec 2018 15:27) (115/65 - 146/60)  BP(mean): 84 (28 Dec 2018 15:27) (72 - 93)  RR: 13 (28 Dec 2018 15:27) (13 - 27)  SpO2: 98% (28 Dec 2018 15:27) (94% - 100%)  Daily     Daily Weight in k.3 (28 Dec 2018 04:07)    GENERAL:  Appears stated age, well-groomed, well-nourished, no distress  HEENT:  NC/AT,  conjunctivae clear and pink, no thyromegaly, nodules, adenopathy, no JVD, sclera -anicteric  CHEST:  Full & symmetric excursion, no increased effort, breath sounds clear  HEART:  Regular rhythm, S1, S2, no murmur/rub/S3/S4, no abdominal bruit, no edema  ABDOMEN:  Soft, non-tender, non-distended, normoactive bowel sounds,  no masses ,no hepato-splenomegaly, no signs of chronic liver disease  EXTEREMITIES:  no cyanosis,clubbing or edema  SKIN:  No rash/erythema/ecchymoses/petechiae/wounds/abscess/warm/dry  NEURO:  Alert, oriented, no asterixis, no tremor, no encephalopathy      LABS:                        7.9    13.82 )-----------( 278      ( 28 Dec 2018 06:33 )             24.6     12-28    141  |  106  |  25<H>  ----------------------------<  201<H>  4.0   |  24  |  1.47<H>    Ca    9.0      28 Dec 2018 06:33    TPro  5.9<L>  /  Alb  2.4<L>  /  TBili  x   /  DBili  x   /  AST  x   /  ALT  x   /  AlkPhos  x   12-26    PT/INR - ( 27 Dec 2018 14:40 )   PT: 16.8 sec;   INR: 1.52 ratio         PTT - ( 27 Dec 2018 14:40 )  PTT:35.9 sec      RADIOLOGY & ADDITIONAL TESTS:

## 2018-12-28 NOTE — PROGRESS NOTE ADULT - PROBLEM SELECTOR PLAN 1
eval sepsis source  I and O  monitor for volume overload, on IVF  ID following  on emp ABX - Zosyn  serial labs  serial PE  cx review  imaging and labs reviewed  supportive ICU care in progress  unclear etiology of pt compromise  will follow and monitor closely  prognosis guarded

## 2018-12-28 NOTE — PROGRESS NOTE ADULT - ASSESSMENT
84 y/o M with PMH of HTN, A. Fib on Xarelto, Stomach CA, GERD, and Gout presented with generalized weakness & repeated falls, also had seizures at the ED.    -CVA (cerebral vascular accident).  Plan: unknown onset, possibly started a week ago when patient had a fall in [place].   CT showed age indeterminate lacunar infarct of posterior limb of the right internal capsule.  physical therapy.  MRI brain and spine- negative.   Neurology noted.     -Severe Anemia: multi factorial for ckd and etoh abuse  Repeat cbc after transfusion x1 is improved to 7.9/7.8.  Discussed with hematology. Guaiac negative. Ok to resume to anticoagulation as per hematology. Discussed with cardiology, will start low dose Eliquis 2.5mg q12 and monitor closely.     -Seizure.    Plan: started on keppra will convert to po dose.   may be due to ETOH?   f/u  [place] serology,s syphilis.     -SIRS (systemic inflammatory response syndrome):  Plan: Fever today 100.4.   likely due to aspiration pneumonia/ gout flare?  ID noted. on zosyn. solumedrol ordered. monitor cbc.   blood and urin cultures- NGTD.  started on antibiotics due to fever on 12/24 to complete 7 days.   ammonia level -normal.  HIV-negative.   lactate- wnl.    -Macrocytic anemia.    Plan: ML 2ry to ETOH, started on Folic acid PO, also Thiamin for chronic ETOH consumption.     -Abnormal kidney function.   Plan: ML acute on CKD, no available baseline at this time,  hold ivf's  stable.   avoid nephrotoxins.    - Atrial fibrillation.    Plan: will continue Digoxin & Metoprolol (Betaxolol is non formulary)   was on Xarelto, then switched to renally dosed therapeutic dose lovenox. Now will be switched to low dose eliquis. family memeber gene informed at in agreement. risks and benefits explained.     -HTN (hypertension).  Plan: Metoprolol, Norvasc, and HCTZ with hold parameters.     -Gout.  Plan: On Colchicine & renal dose of Allopurinol, will continue both.     -Need for prophylactic measure. Plan; IMPROVE VTE Individual Risk Assessment          RISK                                                          Points    [  ] Previous VTE                                                3  [  ] Thrombophilia                                             2  [ x ] Lower limb paralysis                                    2        (unable to hold up >15 seconds)    [  ] Current Cancer                                             2         (within 6 months)  [ x ] Immobilization > 24 hrs                              1  [  ] ICU/CCU stay > 24 hours                            1  [ x ] Age > 60                                                    1    IMPROVE VTE Score 4.  **IMPROVE score of 4 in addition to the other risk factors not included in this scoring system, on Xarelto for chronic A. Fib, will continue, no need for further DVT prophylaxis.

## 2018-12-28 NOTE — PROGRESS NOTE ADULT - PROBLEM SELECTOR PROBLEM 5
Macrocytic anemia
HTN (hypertension)
Macrocytic anemia

## 2018-12-28 NOTE — PROGRESS NOTE ADULT - SUBJECTIVE AND OBJECTIVE BOX
Patient is a 83y old  Male who presents with a chief complaint of Generalized weakness. (28 Dec 2018 10:43)    HPI:  This is an 82 y/o M with PMH of HTN, A. Fib on Xarelto, Stomach CA, GERD, and Gout who presented with generalized weakness with repeated falls. Patiet states that he fell while was in [place] a week ago when his left leg buckled under him, didn't see a doctor as he was coming back to [place] next day, but over the past week he fell several times & was feeling week, and moves with difficulty. No fever or chills at home, no flu-like symptoms, and no sick contacts. At the ED, patient had a siezure episode for about 30 seconds that involved his left side of the body. Never had any seizure episodes in the past. (22 Dec 2018 20:48)    Today:  Pt noted to be very upset and refusing PT/OT today. States that he wants to eat.   temp 100.4 today.     PAST MEDICAL & SURGICAL HISTORY:  Stomach cancer  Hypertension  Atrial fibrillation  Gout  No significant past surgical history    MEDICATIONS  (STANDING):  allopurinol 100 milliGRAM(s) Oral daily  amLODIPine   Tablet 10 milliGRAM(s) Oral daily  apixaban 2.5 milliGRAM(s) Oral every 12 hours  aspirin enteric coated 81 milliGRAM(s) Oral daily  colchicine 0.6 milliGRAM(s) Oral two times a day  digoxin     Tablet 0.125 milliGRAM(s) Oral daily  folic acid 1 milliGRAM(s) Oral daily  levETIRAcetam  IVPB 500 milliGRAM(s) IV Intermittent every 12 hours  methylPREDNISolone sodium succinate Injectable 40 milliGRAM(s) IV Push daily  metoprolol succinate ER 25 milliGRAM(s) Oral daily  multivitamin 1 Tablet(s) Oral daily  piperacillin/tazobactam IVPB. 3.375 Gram(s) IV Intermittent every 8 hours  tamsulosin 0.4 milliGRAM(s) Oral at bedtime    MEDICATIONS  (PRN):  acetaminophen   Tablet .. 650 milliGRAM(s) Oral every 6 hours PRN Temp greater or equal to 38C (100.4F)  aluminum hydroxide/magnesium hydroxide/simethicone Suspension 30 milliLiter(s) Oral every 6 hours PRN Dyspepsia  bisacodyl 5 milliGRAM(s) Oral every 12 hours PRN Constipation  guaiFENesin   Syrup  (Sugar-Free) 200 milliGRAM(s) Oral every 6 hours PRN Cough    EXAM:  Vital Signs Last 24 Hrs  T(C): 37.3 (28 Dec 2018 08:15), Max: 38 (27 Dec 2018 12:30)  T(F): 99.2 (28 Dec 2018 08:15), Max: 100.4 (27 Dec 2018 12:30)  HR: 80 (28 Dec 2018 08:00) (71 - 93)  BP: 131/50 (28 Dec 2018 08:00) (115/65 - 146/60)  BP(mean): 74 (28 Dec 2018 08:00) (72 - 93)  RR: 15 (28 Dec 2018 08:00) (15 - 27)  SpO2: 96% (28 Dec 2018 08:00) (94% - 100%)    12-27 @ 07:01  -  12-28 @ 07:00  --------------------------------------------------------  IN: 1410 mL / OUT: 0 mL / NET: 1410 mL    PHYSICAL EXAM:  Constitutional: awake laying in bed. nad. Pt is irritable at present.   ENMT: patent nares, moist mucus membranes  Neck: supple  Respiratory: bilaterally clear to auscultation, no wheezing, no rhonchi, no crackles, no decreased air entry  Cardiovascular: s1s2, rrr, no murmurs.   Gastrointestinal: soft, non tender, +bowel sounds, no rebound, no guarding.   Extremities: no edema.   Neurological: alert  Skin: warm to touch.   Musculoskeletal: moves all 4 extremities  Psychiatric: no homicidal, suicidal ideation. appropriate affect.     LABS:  PT/INR - ( 27 Dec 2018 14:40 )   PT: 16.8 sec;   INR: 1.52 ratio    PTT - ( 27 Dec 2018 14:40 )  PTT:35.9 sec  LIVER FUNCTIONS - ( 26 Dec 2018 23:12 )  Alb: 2.4 g/dL / Pro: 5.9 g/dL / ALK PHOS: x     / ALT: x     / AST: x     / GGT: x                            7.9    13.82 )-----------( 278      ( 28 Dec 2018 06:33 )             24.6   12-28  141  |  106  |  25<H>  ----------------------------<  201<H>  4.0   |  24  |  1.47<H>  Ca    9.0      28 Dec 2018 06:33  TPro  5.9<L>  /  Alb  2.4<L>  /  TBili  x   /  DBili  x   /  AST  x   /  ALT  x   /  AlkPhos  x   12-26    Chart reviewed.   Labs reviewed.  Imaging reviewed.   Plan discussed with consultants.

## 2018-12-28 NOTE — PROGRESS NOTE ADULT - PROBLEM SELECTOR PLAN 4
Continue beta blockers.
Continue beta blockers.
of unclear cause, hematology evaluation.
multifactorial CKD, stomach CA (intrinsic factoer lack) and EtOH abuse  -Hb stable at around 8, no acute bleeding, no current need for transfusion  -several services following including GI/heme onc
of unclear cause, hematology evaluation.

## 2018-12-29 LAB
ANION GAP SERPL CALC-SCNC: 12 MMOL/L — SIGNIFICANT CHANGE UP (ref 5–17)
BASOPHILS # BLD AUTO: 0 K/UL — SIGNIFICANT CHANGE UP (ref 0–0.2)
BASOPHILS NFR BLD AUTO: 0 % — SIGNIFICANT CHANGE UP (ref 0–2)
BUN SERPL-MCNC: 36 MG/DL — HIGH (ref 7–23)
CALCIUM SERPL-MCNC: 9.2 MG/DL — SIGNIFICANT CHANGE UP (ref 8.4–10.5)
CHLORIDE SERPL-SCNC: 107 MMOL/L — SIGNIFICANT CHANGE UP (ref 96–108)
CO2 SERPL-SCNC: 24 MMOL/L — SIGNIFICANT CHANGE UP (ref 22–31)
CREAT SERPL-MCNC: 1.51 MG/DL — HIGH (ref 0.5–1.3)
EOSINOPHIL # BLD AUTO: 0 K/UL — SIGNIFICANT CHANGE UP (ref 0–0.5)
EOSINOPHIL NFR BLD AUTO: 0 % — SIGNIFICANT CHANGE UP (ref 0–6)
GLUCOSE SERPL-MCNC: 240 MG/DL — HIGH (ref 70–99)
HCT VFR BLD CALC: 26.4 % — LOW (ref 39–50)
HGB BLD-MCNC: 8.3 G/DL — LOW (ref 13–17)
IMM GRANULOCYTES NFR BLD AUTO: 1.6 % — HIGH (ref 0–1.5)
LYMPHOCYTES # BLD AUTO: 1 K/UL — SIGNIFICANT CHANGE UP (ref 1–3.3)
LYMPHOCYTES # BLD AUTO: 10 % — LOW (ref 13–44)
MAGNESIUM SERPL-MCNC: 1.9 MG/DL — SIGNIFICANT CHANGE UP (ref 1.6–2.6)
MCHC RBC-ENTMCNC: 31.4 GM/DL — LOW (ref 32–36)
MCHC RBC-ENTMCNC: 33.7 PG — SIGNIFICANT CHANGE UP (ref 27–34)
MCV RBC AUTO: 107.3 FL — HIGH (ref 80–100)
MONOCYTES # BLD AUTO: 3.7 K/UL — HIGH (ref 0–0.9)
MONOCYTES NFR BLD AUTO: 37.1 % — HIGH (ref 2–14)
NEUTROPHILS # BLD AUTO: 5.12 K/UL — SIGNIFICANT CHANGE UP (ref 1.8–7.4)
NEUTROPHILS NFR BLD AUTO: 51.3 % — SIGNIFICANT CHANGE UP (ref 43–77)
NRBC # BLD: 0 /100 WBCS — SIGNIFICANT CHANGE UP (ref 0–0)
PLATELET # BLD AUTO: 275 K/UL — SIGNIFICANT CHANGE UP (ref 150–400)
POTASSIUM SERPL-MCNC: 4.3 MMOL/L — SIGNIFICANT CHANGE UP (ref 3.5–5.3)
POTASSIUM SERPL-SCNC: 4.3 MMOL/L — SIGNIFICANT CHANGE UP (ref 3.5–5.3)
RBC # BLD: 2.46 M/UL — LOW (ref 4.2–5.8)
RBC # FLD: 19.5 % — HIGH (ref 10.3–14.5)
SODIUM SERPL-SCNC: 143 MMOL/L — SIGNIFICANT CHANGE UP (ref 135–145)
WBC # BLD: 9.98 K/UL — SIGNIFICANT CHANGE UP (ref 3.8–10.5)
WBC # FLD AUTO: 9.98 K/UL — SIGNIFICANT CHANGE UP (ref 3.8–10.5)

## 2018-12-29 PROCEDURE — 99233 SBSQ HOSP IP/OBS HIGH 50: CPT

## 2018-12-29 RX ORDER — METOPROLOL TARTRATE 50 MG
12.5 TABLET ORAL EVERY 12 HOURS
Qty: 0 | Refills: 0 | Status: DISCONTINUED | OUTPATIENT
Start: 2018-12-30 | End: 2018-12-31

## 2018-12-29 RX ADMIN — Medication 0.6 MILLIGRAM(S): at 17:18

## 2018-12-29 RX ADMIN — Medication 0.12 MILLIGRAM(S): at 05:45

## 2018-12-29 RX ADMIN — Medication 100 MILLIGRAM(S): at 11:34

## 2018-12-29 RX ADMIN — PIPERACILLIN AND TAZOBACTAM 25 GRAM(S): 4; .5 INJECTION, POWDER, LYOPHILIZED, FOR SOLUTION INTRAVENOUS at 02:00

## 2018-12-29 RX ADMIN — PIPERACILLIN AND TAZOBACTAM 25 GRAM(S): 4; .5 INJECTION, POWDER, LYOPHILIZED, FOR SOLUTION INTRAVENOUS at 17:18

## 2018-12-29 RX ADMIN — PIPERACILLIN AND TAZOBACTAM 25 GRAM(S): 4; .5 INJECTION, POWDER, LYOPHILIZED, FOR SOLUTION INTRAVENOUS at 09:43

## 2018-12-29 RX ADMIN — LEVETIRACETAM 500 MILLIGRAM(S): 250 TABLET, FILM COATED ORAL at 05:45

## 2018-12-29 RX ADMIN — Medication 0.6 MILLIGRAM(S): at 05:45

## 2018-12-29 RX ADMIN — LEVETIRACETAM 500 MILLIGRAM(S): 250 TABLET, FILM COATED ORAL at 17:18

## 2018-12-29 RX ADMIN — Medication 1 TABLET(S): at 11:34

## 2018-12-29 RX ADMIN — Medication 40 MILLIGRAM(S): at 05:48

## 2018-12-29 RX ADMIN — Medication 1 MILLIGRAM(S): at 11:35

## 2018-12-29 RX ADMIN — TAMSULOSIN HYDROCHLORIDE 0.4 MILLIGRAM(S): 0.4 CAPSULE ORAL at 21:20

## 2018-12-29 RX ADMIN — APIXABAN 2.5 MILLIGRAM(S): 2.5 TABLET, FILM COATED ORAL at 17:18

## 2018-12-29 RX ADMIN — AMLODIPINE BESYLATE 10 MILLIGRAM(S): 2.5 TABLET ORAL at 05:45

## 2018-12-29 RX ADMIN — Medication 25 MILLIGRAM(S): at 05:45

## 2018-12-29 RX ADMIN — APIXABAN 2.5 MILLIGRAM(S): 2.5 TABLET, FILM COATED ORAL at 05:45

## 2018-12-29 RX ADMIN — Medication 81 MILLIGRAM(S): at 11:35

## 2018-12-29 NOTE — PROGRESS NOTE ADULT - PROBLEM SELECTOR PLAN 1
Chronic; controlled ventricular has generally been controlled but there was a pause (~ 3.8 seconds) this AM without clear precipitant. D/c digoxin (check level), continue metoprolol but change to metoprolol tartrate and hold for bradycardia; continue anticoagulation.

## 2018-12-29 NOTE — PROGRESS NOTE ADULT - SUBJECTIVE AND OBJECTIVE BOX
Date/Time Patient Seen:  		  Referring MD:   Data Reviewed	       Patient is a 83y old  Male who presents with a chief complaint of Generalized weakness. (28 Dec 2018 19:35)      Subjective/HPI     PAST MEDICAL & SURGICAL HISTORY:  Stomach cancer  Hypertension  Atrial fibrillation  Gout  No significant past surgical history        Medication list         MEDICATIONS  (STANDING):  allopurinol 100 milliGRAM(s) Oral daily  amLODIPine   Tablet 10 milliGRAM(s) Oral daily  apixaban 2.5 milliGRAM(s) Oral every 12 hours  aspirin enteric coated 81 milliGRAM(s) Oral daily  colchicine 0.6 milliGRAM(s) Oral two times a day  folic acid 1 milliGRAM(s) Oral daily  levETIRAcetam 500 milliGRAM(s) Oral two times a day  methylPREDNISolone sodium succinate Injectable 40 milliGRAM(s) IV Push daily  multivitamin 1 Tablet(s) Oral daily  piperacillin/tazobactam IVPB. 3.375 Gram(s) IV Intermittent every 8 hours  tamsulosin 0.4 milliGRAM(s) Oral at bedtime    MEDICATIONS  (PRN):  acetaminophen   Tablet .. 650 milliGRAM(s) Oral every 6 hours PRN Temp greater or equal to 38C (100.4F)  aluminum hydroxide/magnesium hydroxide/simethicone Suspension 30 milliLiter(s) Oral every 6 hours PRN Dyspepsia  bisacodyl 5 milliGRAM(s) Oral every 12 hours PRN Constipation  guaiFENesin   Syrup  (Sugar-Free) 200 milliGRAM(s) Oral every 6 hours PRN Cough         Vitals log        ICU Vital Signs Last 24 Hrs  T(C): 36.5 (29 Dec 2018 08:05), Max: 37.4 (28 Dec 2018 16:01)  T(F): 97.7 (29 Dec 2018 08:05), Max: 99.3 (28 Dec 2018 16:01)  HR: 63 (29 Dec 2018 08:00) (46 - 82)  BP: 134/56 (29 Dec 2018 08:00) (123/69 - 141/62)  BP(mean): 77 (29 Dec 2018 08:00) (66 - 91)  ABP: --  ABP(mean): --  RR: 19 (29 Dec 2018 05:40) (10 - 23)  SpO2: 99% (29 Dec 2018 08:00) (95% - 100%)           Input and Output:  I&O's Detail    28 Dec 2018 07:01  -  29 Dec 2018 07:00  --------------------------------------------------------  IN:    IV PiggyBack: 200 mL    Oral Fluid: 710 mL  Total IN: 910 mL    OUT:  Total OUT: 0 mL    Total NET: 910 mL          Lab Data                        8.3    9.98  )-----------( 275      ( 29 Dec 2018 07:08 )             26.4     12-29    143  |  107  |  36<H>  ----------------------------<  240<H>  4.3   |  24  |  1.51<H>    Ca    9.2      29 Dec 2018 07:08              Review of Systems	      Objective     Physical Examination    heart s1s2  lung dec BS  abd soft      Pertinent Lab findings & Imaging      Jonathan:  NO   Adequate UO     I&O's Detail    28 Dec 2018 07:01  -  29 Dec 2018 07:00  --------------------------------------------------------  IN:    IV PiggyBack: 200 mL    Oral Fluid: 710 mL  Total IN: 910 mL    OUT:  Total OUT: 0 mL    Total NET: 910 mL               Discussed with:     Cultures:	        Radiology

## 2018-12-29 NOTE — PROGRESS NOTE ADULT - SUBJECTIVE AND OBJECTIVE BOX
REASON FOR VISIT: AF    HPI:  84 y/o man with a history of atrial fibrillation, HTN, gastric cancer, GERD, Gout, alcohol use admitted 12/22/18 with viral infection, seizure.    12/24/18: Increase leucocytosis today and fever. Pt lethargic, Atrial fib  12/25/18 Patient is awake , confused , heart rate is controlled   12/26/18: No complaints.  12/27/18 Patient is lethargic  arousable easily , answers question , did recieve PRBC last night , still low hemoglobin    HR BP stable   12/28/18 Pt lethargic. No chest pain. Rate controlled  12/29/18:  "I don't know how I feel - you're the cardiologist -- tell me."    MEDICATIONS  (STANDING):  allopurinol 100 milliGRAM(s) Oral daily  amLODIPine   Tablet 10 milliGRAM(s) Oral daily  apixaban 2.5 milliGRAM(s) Oral every 12 hours  aspirin enteric coated 81 milliGRAM(s) Oral daily  colchicine 0.6 milliGRAM(s) Oral two times a day  folic acid 1 milliGRAM(s) Oral daily  levETIRAcetam 500 milliGRAM(s) Oral two times a day  methylPREDNISolone sodium succinate Injectable 40 milliGRAM(s) IV Push daily  multivitamin 1 Tablet(s) Oral daily  piperacillin/tazobactam IVPB. 3.375 Gram(s) IV Intermittent every 8 hours  tamsulosin 0.4 milliGRAM(s) Oral at bedtime    Vital Signs Last 24 Hrs  T(C): 36.5 (29 Dec 2018 08:05), Max: 37.4 (28 Dec 2018 16:01)  T(F): 97.7 (29 Dec 2018 08:05), Max: 99.3 (28 Dec 2018 16:01)  HR: 69 (29 Dec 2018 10:00) (46 - 82)  BP: 131/69 (29 Dec 2018 10:00) (123/69 - 136/68)  RR: 17 (29 Dec 2018 10:00) (10 - 23)  SpO2: 100% (29 Dec 2018 10:00) (95% - 100%)    PHYSICAL EXAM:  Constitutional: Seated in bedside chair; very deconditioned (Hoya lift)  Respiratory: Breath sounds are clear bilaterally  Cardiovascular: Irregular, normal S2  Gastrointestinal: Bowel Sounds present, soft, nontender.   Skin: No rashes.    LABS:                  8.3    9.98  )-----------( 275      ( 29 Dec 2018 07:08 )             26.4     143  |  107  |  36<H>  ----------------------------<  240<H>  4.3   |  24  |  1.51<H>    Transthoracic Echocardiogram w/Doppler Complete (12.23.18 @ 10:19) >  1. moderate left ventricular hypertrophy with normal left ventricular systolic function with stage I diastolic dysfunction  2. calcific severe aortic stenosis, recommend clinical correlation  3. mild tricuspid regurgitation    Tele: AF

## 2018-12-29 NOTE — PROGRESS NOTE ADULT - PROBLEM SELECTOR PLAN 1
more alert  on emp ABX  on steroids for GOUT attack  I and O  oral and skin hygiene  refused AM CXR  serial labs and serial PE  PT  nutrition  may need JANET on DC plan  pt is weak and deconditioned  cvs regimen in effect, on Anti Platelet regimen  will follow and monitor  cont SPCU care for now  monitor vs and HD and Sat

## 2018-12-29 NOTE — CHART NOTE - NSCHARTNOTEFT_GEN_A_CORE
-patient refused mornnig xray  -explained importance of evaluate lung fields given PNA dx, however patient refusing to have CXR done  -have canceled AM cxr

## 2018-12-29 NOTE — PROGRESS NOTE ADULT - SUBJECTIVE AND OBJECTIVE BOX
Patient is a 83y old  Male who presents with a chief complaint of Generalized weakness. (28 Dec 2018 10:43)    HPI:  This is an 84 y/o M with PMH of HTN, A. Fib on Xarelto, Stomach CA, GERD, and Gout who presented with generalized weakness with repeated falls. Patient states that he fell while he was in [place] a week ago when his left leg buckled under him, didn't see a doctor as he was coming back to [place] next day, but over the past week he fell several times & was feeling week, and moves with difficulty. No fever or chills at home, no flu-like symptoms, and no sick contacts. At the ED, patient had a seizure episode for about 30 seconds that involved his left side of the body. Never had any seizure episodes in the past. (22 Dec 2018 20:48)    Today:  Pt noted to have a 3.8 second pause on telemetry. Pt placed on dysphagia diet. Pt sitting in chair. Partner present. Denies any complaints.    PAST MEDICAL & SURGICAL HISTORY:  Stomach cancer  Hypertension  Atrial fibrillation  Gout  No significant past surgical history    MEDICATIONS  (STANDING):  allopurinol 100 milliGRAM(s) Oral daily  amLODIPine   Tablet 10 milliGRAM(s) Oral daily  apixaban 2.5 milliGRAM(s) Oral every 12 hours  aspirin enteric coated 81 milliGRAM(s) Oral daily  colchicine 0.6 milliGRAM(s) Oral two times a day  folic acid 1 milliGRAM(s) Oral daily  levETIRAcetam 500 milliGRAM(s) Oral two times a day  methylPREDNISolone sodium succinate Injectable 40 milliGRAM(s) IV Push daily  multivitamin 1 Tablet(s) Oral daily  piperacillin/tazobactam IVPB. 3.375 Gram(s) IV Intermittent every 8 hours  tamsulosin 0.4 milliGRAM(s) Oral at bedtime    MEDICATIONS  (PRN):  acetaminophen   Tablet .. 650 milliGRAM(s) Oral every 6 hours PRN Temp greater or equal to 38C (100.4F)  aluminum hydroxide/magnesium hydroxide/simethicone Suspension 30 milliLiter(s) Oral every 6 hours PRN Dyspepsia  bisacodyl 5 milliGRAM(s) Oral every 12 hours PRN Constipation  guaiFENesin   Syrup  (Sugar-Free) 200 milliGRAM(s) Oral every 6 hours PRN Cough    EXAM:  Vital Signs Last 24 Hrs  T(C): 36.5 (29 Dec 2018 08:05), Max: 37.4 (28 Dec 2018 16:01)  T(F): 97.7 (29 Dec 2018 08:05), Max: 99.3 (28 Dec 2018 16:01)  HR: 71 (29 Dec 2018 12:21) (46 - 80)  BP: 121/73 (29 Dec 2018 12:21) (121/73 - 136/68)  BP(mean): 88 (29 Dec 2018 10:00) (66 - 88)  RR: 14 (29 Dec 2018 12:21) (10 - 23)  SpO2: 99% (29 Dec 2018 12:21) (95% - 100%)    12-28 @ 07:01  -  12-29 @ 07:00  --------------------------------------------------------  IN: 910 mL / OUT: 0 mL / NET: 910 mL    PHYSICAL EXAM:  Constitutional: awake sitting in the chair. nad.   ENMT: patent nares, moist mucus membranes  Neck: supple  Back: non tender. no scoliosis.   Respiratory: bilaterally clear to auscultation, but decreased air entry in lower lobes.   Cardiovascular: s1s2, rrr, no murmurs.   Gastrointestinal: soft, non tender, +bowel sounds, no rebound, no guarding.   Extremities: no edema.   Neurological: alert   Skin: intact no rash, warm to touch.   Musculoskeletal: moves all 4 extremities    LABS:  PT/INR - ( 27 Dec 2018 14:40 )   PT: 16.8 sec;   INR: 1.52 ratio    PTT - ( 27 Dec 2018 14:40 )  PTT:35.9 sec                        8.3    9.98  )-----------( 275      ( 29 Dec 2018 07:08 )             26.4   12-29  143  |  107  |  36<H>  ----------------------------<  240<H>  4.3   |  24  |  1.51<H>  Ca    9.2      29 Dec 2018 07:08  Mg     1.9     12-29    Chart reviewed.   Labs reviewed.  Imaging reviewed.   Plan discussed with consultants.

## 2018-12-29 NOTE — PROGRESS NOTE ADULT - ASSESSMENT
84 y/o M with PMH of HTN, A. Fib on Xarelto, Stomach CA, GERD, and Gout presented with generalized weakness & repeated falls, also had seizures at the ED.    -CVA (cerebral vascular accident).  Plan: unknown onset, possibly started a week ago when patient had a fall in [place]:   CT showed age indeterminate lacunar infarct of posterior limb of the right internal capsule.  Physical therapy.  MRI brain and spine- negative.   Neurology noted.     -3.8 second pause:  cardio: digoxin held, level ordered. metoprolol changed to tartrate. will monitor.     -Aortic stenosis:  mod to severe. repeat echo on outpatient.     -Severe Anemia: multi factorial for ckd and etoh abuse  Repeat cbc after transfusion x1 is improved to 8.3/7.9/7.8  Discussed with hematology.   Guaiac negative.   Ok to resume to anticoagulation as per hematology.   Discussed with cardiology, will cw low dose Eliquis 2.5mg q12 and monitor closely.     -Seizure:  Plan: started on Keppra  may be due to ETOH?   f/u  [place] serology syphilis.     -SIRS (systemic inflammatory response syndrome):  Plan:   likely due to aspiration pneumonia/ gout flare?  ID noted. on zosyn. solumedrol ordered. monitor cbc.   blood and urine cultures- NGTD.  started on antibiotics due to fever on 12/24 to complete 7 days.   ammonia level -normal.  HIV-negative.   lactate- wnl.    -Macrocytic anemia.    Plan: ML 2ry to ETOH, started on Folic acid PO, also Thiamin for chronic ETOH consumption.     -Abnormal kidney function.   Plan: ML acute on CKD, no available baseline at this time,  encourage po fluid intake.   stable.   avoid nephrotoxins.    - Atrial fibrillation.    Plan: will continue Digoxin & Metoprolol (Betaxolol is non formulary)   was on Xarelto, then switched to renally dosed therapeutic dose Lovenox Now on low dose Eliquis. Family member gene informed and is in agreement. risks and benefits explained. Pt in agreement.     -HTN (hypertension).  Plan: Metoprolol, Norvasc, and HCTZ with hold parameters.     -Gout.  Plan: On Colchicine & renal dose of Allopurinol, will continue both. To receive 3 doses solumedrol for gout flare.  last dose solumedrol tomorrow.     -Need for prophylactic measure. Plan; IMPROVE VTE Individual Risk Assessment          RISK                                                          Points    [  ] Previous VTE                                                3  [  ] Thrombophilia                                             2  [ x ] Lower limb paralysis                                    2        (unable to hold up >15 seconds)    [  ] Current Cancer                                             2         (within 6 months)  [ x ] Immobilization > 24 hrs                              1  [  ] ICU/CCU stay > 24 hours                            1  [ x ] Age > 60                                                    1    IMPROVE VTE Score 4.  **IMPROVE score of 4 in addition to the other risk factors not included in this scoring system, on Eliquis     -Dispo: dc to rehab planned. awaiting downgrade to medical floor.

## 2018-12-29 NOTE — PROGRESS NOTE ADULT - SUBJECTIVE AND OBJECTIVE BOX
Neurology follow up note    YUE KING83yMale      Interval History:    Patient feels ok no new complaints.    MEDICATIONS    acetaminophen   Tablet .. 650 milliGRAM(s) Oral every 6 hours PRN  allopurinol 100 milliGRAM(s) Oral daily  aluminum hydroxide/magnesium hydroxide/simethicone Suspension 30 milliLiter(s) Oral every 6 hours PRN  amLODIPine   Tablet 10 milliGRAM(s) Oral daily  apixaban 2.5 milliGRAM(s) Oral every 12 hours  aspirin enteric coated 81 milliGRAM(s) Oral daily  bisacodyl 5 milliGRAM(s) Oral every 12 hours PRN  colchicine 0.6 milliGRAM(s) Oral two times a day  folic acid 1 milliGRAM(s) Oral daily  guaiFENesin   Syrup  (Sugar-Free) 200 milliGRAM(s) Oral every 6 hours PRN  levETIRAcetam 500 milliGRAM(s) Oral two times a day  methylPREDNISolone sodium succinate Injectable 40 milliGRAM(s) IV Push daily  multivitamin 1 Tablet(s) Oral daily  piperacillin/tazobactam IVPB. 3.375 Gram(s) IV Intermittent every 8 hours  tamsulosin 0.4 milliGRAM(s) Oral at bedtime      Allergies    No Known Allergies    Intolerances            Vital Signs Last 24 Hrs  T(C): 36.7 (29 Dec 2018 16:01), Max: 36.9 (28 Dec 2018 20:07)  T(F): 98.1 (29 Dec 2018 16:01), Max: 98.5 (28 Dec 2018 20:07)  HR: 74 (29 Dec 2018 16:00) (46 - 80)  BP: 130/57 (29 Dec 2018 16:00) (121/73 - 136/68)  BP(mean): 78 (29 Dec 2018 16:00) (66 - 88)  RR: 12 (29 Dec 2018 16:00) (10 - 20)  SpO2: 99% (29 Dec 2018 16:00) (95% - 100%)     REVIEW OF SYSTEMS:     Constitutional: No fever, chills, fatigue, generalized weakness  Eyes: no eye pain, visual disturbances, or discharge  ENT:  No difficulty hearing, tinnitus, vertigo; No sinus or throat pain  Neck: No pain or stiffness  Respiratory: No cough, dyspnea, wheezing   Cardiovascular: No chest pain, palpitations,   Gastrointestinal: No abdominal or epigastric pain. No nausea, vomiting  No diarrhea or constipation.   Genitourinary: No dysuria, frequency, hematuria or incontinence  Neurological: No headaches, lightheadedness, vertigo, numbness or tremors  Psychiatric: No depression, anxiety, mood swings or difficulty sleeping  Musculoskeletal: H/O  joint pain   Skin: No itching, burning, rashes or lesions   Lymph Nodes: No enlarged glands  Endocrine: No heat or cold intolerance; No hair loss   Allergy and Immunologic: No hives or eczema    On Neurological Examination:    Mental Status - Patient is alert, awake, oriented X3.    Follow simple commands      Speech -   Fluent                       Cranial Nerves - Pupils 3 mm equal and reactive to light,   extraocular eye movements intact.   smile symmetric  intact bilateral NLF    Motor Exam -   Right upper 4/5  Left upper 4/5  Right lower2+/5  Left lower 2+/5      Muscle tone - is normal all over.  No asymmetry is seen.      Sensory    Bilateral intact to light touch    GENERAL Exam: Nontoxic , No Acute Distress   	  HEENT:  normocephalic, atraumatic  		  LUNGS: Decreased bilaterally  	  HEART: Normal S1S2   No murmur RRR        	  GI/ ABDOMEN:  Soft  Non tender    EXTREMITIES:   No Edema  No Clubbing  No Cyanosis     SKIN: Normal  No Ecchymosis               LABS:  CBC Full  -  ( 29 Dec 2018 07:08 )  WBC Count : 9.98 K/uL  Hemoglobin : 8.3 g/dL  Hematocrit : 26.4 %  Platelet Count - Automated : 275 K/uL  Mean Cell Volume : 107.3 fl  Mean Cell Hemoglobin : 33.7 pg  Mean Cell Hemoglobin Concentration : 31.4 gm/dL  Auto Neutrophil # : 5.12 K/uL  Auto Lymphocyte # : 1.00 K/uL  Auto Monocyte # : 3.70 K/uL  Auto Eosinophil # : 0.00 K/uL  Auto Basophil # : 0.00 K/uL  Auto Neutrophil % : 51.3 %  Auto Lymphocyte % : 10.0 %  Auto Monocyte % : 37.1 %  Auto Eosinophil % : 0.0 %  Auto Basophil % : 0.0 %      12-29    143  |  107  |  36<H>  ----------------------------<  240<H>  4.3   |  24  |  1.51<H>    Ca    9.2      29 Dec 2018 07:08  Mg     1.9     12-29      Hemoglobin A1C:       Vitamin B12         RADIOLOGY      ASSESSMENT AND PLAN     presented with fall and generalized seizure.    seizure KEPPRA can switch to po in oral intake ok     GOUTY ARTHRITIS WITH LEG WEAKNESS  allopurinol mri negative for CVA    spoke to HCP GENE at bedside 12/27/18 h  c  12/28/18    no new events       Physical therapy evaluation.  OOB to chair/ambulation with assistance only.    .  Greater than 40 minutes spent in direct patient care reviewing  the notes, lab data/ imaging , discussion with multidisciplinary team.

## 2018-12-30 LAB
ANION GAP SERPL CALC-SCNC: 10 MMOL/L — SIGNIFICANT CHANGE UP (ref 5–17)
BUN SERPL-MCNC: 43 MG/DL — HIGH (ref 7–23)
CALCIUM SERPL-MCNC: 9.7 MG/DL — SIGNIFICANT CHANGE UP (ref 8.4–10.5)
CHLORIDE SERPL-SCNC: 106 MMOL/L — SIGNIFICANT CHANGE UP (ref 96–108)
CO2 SERPL-SCNC: 26 MMOL/L — SIGNIFICANT CHANGE UP (ref 22–31)
CREAT SERPL-MCNC: 1.55 MG/DL — HIGH (ref 0.5–1.3)
CULTURE RESULTS: SIGNIFICANT CHANGE UP
CULTURE RESULTS: SIGNIFICANT CHANGE UP
DIGOXIN SERPL-MCNC: 0.6 NG/ML — LOW (ref 0.8–2)
GLUCOSE SERPL-MCNC: 213 MG/DL — HIGH (ref 70–99)
HCT VFR BLD CALC: 22.8 % — LOW (ref 39–50)
HGB BLD-MCNC: 7.3 G/DL — LOW (ref 13–17)
MCHC RBC-ENTMCNC: 32 GM/DL — SIGNIFICANT CHANGE UP (ref 32–36)
MCHC RBC-ENTMCNC: 33.8 PG — SIGNIFICANT CHANGE UP (ref 27–34)
MCV RBC AUTO: 105.6 FL — HIGH (ref 80–100)
NRBC # BLD: 0 /100 WBCS — SIGNIFICANT CHANGE UP (ref 0–0)
PLATELET # BLD AUTO: 266 K/UL — SIGNIFICANT CHANGE UP (ref 150–400)
POTASSIUM SERPL-MCNC: 4 MMOL/L — SIGNIFICANT CHANGE UP (ref 3.5–5.3)
POTASSIUM SERPL-SCNC: 4 MMOL/L — SIGNIFICANT CHANGE UP (ref 3.5–5.3)
RBC # BLD: 2.16 M/UL — LOW (ref 4.2–5.8)
RBC # FLD: 19.1 % — HIGH (ref 10.3–14.5)
SODIUM SERPL-SCNC: 142 MMOL/L — SIGNIFICANT CHANGE UP (ref 135–145)
SPECIMEN SOURCE: SIGNIFICANT CHANGE UP
SPECIMEN SOURCE: SIGNIFICANT CHANGE UP
WBC # BLD: 8.94 K/UL — SIGNIFICANT CHANGE UP (ref 3.8–10.5)
WBC # FLD AUTO: 8.94 K/UL — SIGNIFICANT CHANGE UP (ref 3.8–10.5)

## 2018-12-30 PROCEDURE — 99232 SBSQ HOSP IP/OBS MODERATE 35: CPT

## 2018-12-30 PROCEDURE — 99233 SBSQ HOSP IP/OBS HIGH 50: CPT

## 2018-12-30 RX ORDER — ATORVASTATIN CALCIUM 80 MG/1
10 TABLET, FILM COATED ORAL AT BEDTIME
Qty: 0 | Refills: 0 | Status: DISCONTINUED | OUTPATIENT
Start: 2018-12-30 | End: 2019-01-01

## 2018-12-30 RX ADMIN — Medication 1 MILLIGRAM(S): at 11:12

## 2018-12-30 RX ADMIN — AMLODIPINE BESYLATE 10 MILLIGRAM(S): 2.5 TABLET ORAL at 05:05

## 2018-12-30 RX ADMIN — LEVETIRACETAM 500 MILLIGRAM(S): 250 TABLET, FILM COATED ORAL at 05:05

## 2018-12-30 RX ADMIN — ATORVASTATIN CALCIUM 10 MILLIGRAM(S): 80 TABLET, FILM COATED ORAL at 21:20

## 2018-12-30 RX ADMIN — Medication 81 MILLIGRAM(S): at 11:12

## 2018-12-30 RX ADMIN — APIXABAN 2.5 MILLIGRAM(S): 2.5 TABLET, FILM COATED ORAL at 05:05

## 2018-12-30 RX ADMIN — PIPERACILLIN AND TAZOBACTAM 25 GRAM(S): 4; .5 INJECTION, POWDER, LYOPHILIZED, FOR SOLUTION INTRAVENOUS at 17:43

## 2018-12-30 RX ADMIN — LEVETIRACETAM 500 MILLIGRAM(S): 250 TABLET, FILM COATED ORAL at 17:42

## 2018-12-30 RX ADMIN — PIPERACILLIN AND TAZOBACTAM 25 GRAM(S): 4; .5 INJECTION, POWDER, LYOPHILIZED, FOR SOLUTION INTRAVENOUS at 11:11

## 2018-12-30 RX ADMIN — Medication 0.6 MILLIGRAM(S): at 17:42

## 2018-12-30 RX ADMIN — Medication 1 TABLET(S): at 11:12

## 2018-12-30 RX ADMIN — APIXABAN 2.5 MILLIGRAM(S): 2.5 TABLET, FILM COATED ORAL at 17:43

## 2018-12-30 RX ADMIN — Medication 100 MILLIGRAM(S): at 11:12

## 2018-12-30 RX ADMIN — TAMSULOSIN HYDROCHLORIDE 0.4 MILLIGRAM(S): 0.4 CAPSULE ORAL at 21:20

## 2018-12-30 RX ADMIN — Medication 0.6 MILLIGRAM(S): at 05:05

## 2018-12-30 RX ADMIN — PIPERACILLIN AND TAZOBACTAM 25 GRAM(S): 4; .5 INJECTION, POWDER, LYOPHILIZED, FOR SOLUTION INTRAVENOUS at 02:51

## 2018-12-30 RX ADMIN — Medication 40 MILLIGRAM(S): at 05:05

## 2018-12-30 NOTE — PROGRESS NOTE ADULT - SUBJECTIVE AND OBJECTIVE BOX
Neurology follow up note    YUE KING83yMale      Interval History:    Patient feels ok no new complaints.    MEDICATIONS    acetaminophen   Tablet .. 650 milliGRAM(s) Oral every 6 hours PRN  allopurinol 100 milliGRAM(s) Oral daily  aluminum hydroxide/magnesium hydroxide/simethicone Suspension 30 milliLiter(s) Oral every 6 hours PRN  amLODIPine   Tablet 10 milliGRAM(s) Oral daily  apixaban 2.5 milliGRAM(s) Oral every 12 hours  aspirin enteric coated 81 milliGRAM(s) Oral daily  atorvastatin 10 milliGRAM(s) Oral at bedtime  bisacodyl 5 milliGRAM(s) Oral every 12 hours PRN  colchicine 0.6 milliGRAM(s) Oral two times a day  folic acid 1 milliGRAM(s) Oral daily  guaiFENesin   Syrup  (Sugar-Free) 200 milliGRAM(s) Oral every 6 hours PRN  levETIRAcetam 500 milliGRAM(s) Oral two times a day  metoprolol tartrate 12.5 milliGRAM(s) Oral every 12 hours  multivitamin 1 Tablet(s) Oral daily  piperacillin/tazobactam IVPB. 3.375 Gram(s) IV Intermittent every 8 hours  tamsulosin 0.4 milliGRAM(s) Oral at bedtime      Allergies    No Known Allergies    Intolerances            Vital Signs Last 24 Hrs  T(C): 36.9 (30 Dec 2018 16:01), Max: 36.9 (29 Dec 2018 20:01)  T(F): 98.5 (30 Dec 2018 16:01), Max: 98.5 (29 Dec 2018 20:01)  HR: 75 (30 Dec 2018 16:00) (50 - 89)  BP: 112/66 (30 Dec 2018 16:00) (110/66 - 156/70)  BP(mean): 80 (30 Dec 2018 16:00) (77 - 95)  RR: 18 (30 Dec 2018 16:00) (10 - 19)  SpO2: 96% (30 Dec 2018 16:00) (91% - 100%)     REVIEW OF SYSTEMS:     Constitutional: No fever, chills, fatigue, generalized weakness  Eyes: no eye pain, visual disturbances, or discharge  ENT:  No difficulty hearing, tinnitus, vertigo; No sinus or throat pain  Neck: No pain or stiffness  Respiratory: No cough, dyspnea, wheezing   Cardiovascular: No chest pain, palpitations,   Gastrointestinal: No abdominal or epigastric pain. No nausea, vomiting  No diarrhea or constipation.   Genitourinary: No dysuria, frequency, hematuria or incontinence  Neurological: No headaches, lightheadedness, vertigo, numbness or tremors  Psychiatric: No depression, anxiety, mood swings or difficulty sleeping  Musculoskeletal: H/O  joint pain   Skin: No itching, burning, rashes or lesions   Lymph Nodes: No enlarged glands  Endocrine: No heat or cold intolerance; No hair loss   Allergy and Immunologic: No hives or eczema    On Neurological Examination:    Mental Status - Patient is alert, awake, oriented X3.    Follow simple commands      Speech -   Fluent                       Cranial Nerves - Pupils 3 mm equal and reactive to light,   extraocular eye movements intact.   smile symmetric  intact bilateral NLF    Motor Exam -   Right upper 4/5  Left upper 4/5  Right lower2+/5  Left lower 2+/5      Muscle tone - is normal all over.  No asymmetry is seen.      Sensory    Bilateral intact to light touch    GENERAL Exam: Nontoxic , No Acute Distress   	  HEENT:  normocephalic, atraumatic  		  LUNGS: Decreased bilaterally  	  HEART: Normal S1S2   No murmur RRR        	  GI/ ABDOMEN:  Soft  Non tender    EXTREMITIES:   No Edema  No Clubbing  No Cyanosis     SKIN: Normal  No Ecchymosis               LABS:  CBC Full  -  ( 30 Dec 2018 06:16 )  WBC Count : 8.94 K/uL  Hemoglobin : 7.3 g/dL  Hematocrit : 22.8 %  Platelet Count - Automated : 266 K/uL  Mean Cell Volume : 105.6 fl  Mean Cell Hemoglobin : 33.8 pg  Mean Cell Hemoglobin Concentration : 32.0 gm/dL  Auto Neutrophil # : x  Auto Lymphocyte # : x  Auto Monocyte # : x  Auto Eosinophil # : x  Auto Basophil # : x  Auto Neutrophil % : x  Auto Lymphocyte % : x  Auto Monocyte % : x  Auto Eosinophil % : x  Auto Basophil % : x      12-30    142  |  106  |  43<H>  ----------------------------<  213<H>  4.0   |  26  |  1.55<H>    Ca    9.7      30 Dec 2018 06:16  Mg     1.9     12-29      Hemoglobin A1C:       Vitamin B12         RADIOLOGY    ASSESSMENT AND PLAN     presented with fall and generalized seizure.    seizure KEPPRA can switch to po in oral intake ok     GOUTY ARTHRITIS WITH LEG WEAKNESS  allopurinol mri negative for CVA    spoke to HCP GENE at bedside 12/27/18 h  c  12/28/18    no new events       Physical therapy evaluation.  OOB to chair/ambulation with assistance only.    .  Greater than 35 minutes spent in direct patient care reviewing  the notes, lab data/ imaging , discussion with multidisciplinary team.

## 2018-12-30 NOTE — PROGRESS NOTE ADULT - SUBJECTIVE AND OBJECTIVE BOX
Patient is a 83y old  Male who presents with a chief complaint of Generalized weakness. (30 Dec 2018 13:28)      INTERVAL History of Present Illness/OVERNIGHT EVENTS: patient with multiple co-morbid conditions.   no acute complaints    MEDICATIONS  (STANDING):  allopurinol 100 milliGRAM(s) Oral daily  amLODIPine   Tablet 10 milliGRAM(s) Oral daily  apixaban 2.5 milliGRAM(s) Oral every 12 hours  aspirin enteric coated 81 milliGRAM(s) Oral daily  colchicine 0.6 milliGRAM(s) Oral two times a day  folic acid 1 milliGRAM(s) Oral daily  levETIRAcetam 500 milliGRAM(s) Oral two times a day  metoprolol tartrate 12.5 milliGRAM(s) Oral every 12 hours  multivitamin 1 Tablet(s) Oral daily  piperacillin/tazobactam IVPB. 3.375 Gram(s) IV Intermittent every 8 hours  tamsulosin 0.4 milliGRAM(s) Oral at bedtime    MEDICATIONS  (PRN):  acetaminophen   Tablet .. 650 milliGRAM(s) Oral every 6 hours PRN Temp greater or equal to 38C (100.4F)  aluminum hydroxide/magnesium hydroxide/simethicone Suspension 30 milliLiter(s) Oral every 6 hours PRN Dyspepsia  bisacodyl 5 milliGRAM(s) Oral every 12 hours PRN Constipation  guaiFENesin   Syrup  (Sugar-Free) 200 milliGRAM(s) Oral every 6 hours PRN Cough      Allergies    No Known Allergies    Intolerances        REVIEW OF SYSTEMS:  Negative unless otherwise specified above.    Vital Signs Last 24 Hrs  T(C): 36.7 (30 Dec 2018 12:03), Max: 36.9 (29 Dec 2018 20:01)  T(F): 98 (30 Dec 2018 12:03), Max: 98.5 (29 Dec 2018 20:01)  HR: 87 (30 Dec 2018 12:00) (50 - 89)  BP: 115/68 (30 Dec 2018 12:00) (110/66 - 150/76)  BP(mean): 81 (30 Dec 2018 12:00) (77 - 95)  RR: 15 (30 Dec 2018 12:00) (10 - 19)  SpO2: 100% (30 Dec 2018 12:00) (91% - 100%)        PHYSICAL EXAM:  GENERAL: No apparent distress  HEAD:  Atraumatic, Normocephalic  EYES: conjunctiva and sclera clear  ENMT: Moist mucous membranes  NECK: Supple  CHEST/LUNG: Clear to auscultation bilaterally  HEART: Irregular rate and rhythm  ABDOMEN: Soft, Nontender, Nondistended; Bowel sounds present  EXTREMITIES:  No clubbing, cyanosis or edema  SKIN: No rashes or lesions  NERVOUS SYSTEM:  Alert & Oriented X3; Bilateral Lower extremity mobile, sensation to light touch intact  Generalized weakness      LABS:                        7.3    8.94  )-----------( 266      ( 30 Dec 2018 06:16 )             22.8     30 Dec 2018 06:16    142    |  106    |  43     ----------------------------<  213    4.0     |  26     |  1.55     Ca    9.7        30 Dec 2018 06:16          CAPILLARY BLOOD GLUCOSE          RADIOLOGY & ADDITIONAL TESTS:    Images reviewed personally    Consultant Notes Reviewed and Care Discussed with relevant Consultants/Other Providers.

## 2018-12-30 NOTE — CHART NOTE - NSCHARTNOTEFT_GEN_A_CORE
Assessment:     82 y/o M with PMH of HTN, A. Fib on Xarelto, Stomach CA, GERD and gout presented with generalized weakness & repeated falls, also had seizures at the ED -ETOH related? Pt c sepsis. IIntake has improved. SLP evaluated 12/27 and recommended dysphagia soft/nectar liquids. Pt also consuming ensure pudding. Although  Pt improved, he still  appears weak and deconditioned.              Factors impacting intake: [ ] none [ ] nausea  [ ] vomiting [ ] diarrhea [ ] constipation  [ ]chewing problems [ x] swallowing issues  [ ] other:     Diet Presciption: Diet, Dysphagia 3 Soft-Nectar Consistency Fluid:   DASH/TLC {Sodium & Cholesterol Restricted}  Supplement Feeding Modality:  Oral  Ensure Pudding Cans or Servings Per Day:  1       Frequency:  Three Times a day (12-28-18 @ 19:16)    Intake: >50% and 100% supplenment    Current Weight:   % Weight Change    Pertinent Medications: MEDICATIONS  (STANDING):  allopurinol 100 milliGRAM(s) Oral daily  amLODIPine   Tablet 10 milliGRAM(s) Oral daily  apixaban 2.5 milliGRAM(s) Oral every 12 hours  aspirin enteric coated 81 milliGRAM(s) Oral daily  colchicine 0.6 milliGRAM(s) Oral two times a day  folic acid 1 milliGRAM(s) Oral daily  levETIRAcetam 500 milliGRAM(s) Oral two times a day  metoprolol tartrate 12.5 milliGRAM(s) Oral every 12 hours  multivitamin 1 Tablet(s) Oral daily  piperacillin/tazobactam IVPB. 3.375 Gram(s) IV Intermittent every 8 hours  tamsulosin 0.4 milliGRAM(s) Oral at bedtime    MEDICATIONS  (PRN):  acetaminophen   Tablet .. 650 milliGRAM(s) Oral every 6 hours PRN Temp greater or equal to 38C (100.4F)  aluminum hydroxide/magnesium hydroxide/simethicone Suspension 30 milliLiter(s) Oral every 6 hours PRN Dyspepsia  bisacodyl 5 milliGRAM(s) Oral every 12 hours PRN Constipation  guaiFENesin   Syrup  (Sugar-Free) 200 milliGRAM(s) Oral every 6 hours PRN Cough    Pertinent Labs: 12-30 Na142 mmol/L Glu 213 mg/dL<H> K+ 4.0 mmol/L Cr  1.55 mg/dL<H> BUN 43 mg/dL<H> 12-24 Phos 3.5 mg/dL 12-26 Alb 2.4 g/dL<L> 12-23 [id number] 6.3 %<H> 12-23 Chol 95 mg/dL LDL 55 mg/dL HDL 26 mg/dL<L> Trig 69 mg/dL     CAPILLARY BLOOD GLUCOSE        Skin:     Estimated Needs:   [ ] no change since previous assessment  [ ] recalculated:     Previous Nutrition Diagnosis:   [ ] Inadequate Energy Intake [ ]Inadequate Oral Intake [ ] Excessive Energy Intake   [ ] Underweight [ ] Increased Nutrient Needs [ ] Overweight/Obesity   [ ] Altered GI Function [ ] Unintended Weight Loss [ ] Food & Nutrition Related Knowledge Deficit [ ] Malnutrition     Nutrition Diagnosis is [ ] ongoing  [ ] resolved [ ] not applicable     New Nutrition Diagnosis: [ ] not applicable       Interventions:   Recommend  [ ] Change Diet To:  [ ] Nutrition Supplement  [ ] Nutrition Support  [ ] Other:     Monitoring and Evaluation:   [ ] PO intake [ x ] Tolerance to diet prescription [ x ] weights [ x ] labs[ x ] follow up per protocol  [ ] other: Assessment:     84 y/o M with PMH of HTN, A. Fib on Xarelto, Stomach CA, GERD and gout presented with generalized weakness & repeated falls, also had seizures at the ED -ETOH related? Pt c sepsis. Intake has improved. SLP evaluated 12/27 and recommended dysphagia soft/nectar liquids. No reported s/sx aspiration on diet. Pt also consuming ensure pudding. Although  Pt improved but he still  appears weak and deconditioned. Noted H/H and renal labs trending down, BG trending up (likely steroid related) Recommend SLP re-evaluate for possible diet upgrade.       Factors impacting intake: [ ] none [ ] nausea  [ ] vomiting [ ] diarrhea [ ] constipation  [ ]chewing problems [ x] swallowing issues  [ ] other:     Diet Presciption: Diet, Dysphagia 3 Soft-Nectar Consistency Fluid:   DASH/TLC {Sodium & Cholesterol Restricted}  Supplement Feeding Modality:  Oral  Ensure Pudding Cans or Servings Per Day:  1       Frequency:  Three Times a day (12-28-18 @ 19:16)    Intake: >50% and 100% supplenment    Current Weight:   % Weight Change   12/30 237.6# 12/27 252#. Usual weight 230# 12/27 likely scale error    Pertinent Medications: MEDICATIONS  (STANDING):  allopurinol 100 milliGRAM(s) Oral daily  amLODIPine   Tablet 10 milliGRAM(s) Oral daily  apixaban 2.5 milliGRAM(s) Oral every 12 hours  aspirin enteric coated 81 milliGRAM(s) Oral daily  colchicine 0.6 milliGRAM(s) Oral two times a day  folic acid 1 milliGRAM(s) Oral daily  levETIRAcetam 500 milliGRAM(s) Oral two times a day  metoprolol tartrate 12.5 milliGRAM(s) Oral every 12 hours  multivitamin 1 Tablet(s) Oral daily  piperacillin/tazobactam IVPB. 3.375 Gram(s) IV Intermittent every 8 hours  tamsulosin 0.4 milliGRAM(s) Oral at bedtime    MEDICATIONS  (PRN):  acetaminophen   Tablet .. 650 milliGRAM(s) Oral every 6 hours PRN Temp greater or equal to 38C (100.4F)  aluminum hydroxide/magnesium hydroxide/simethicone Suspension 30 milliLiter(s) Oral every 6 hours PRN Dyspepsia  bisacodyl 5 milliGRAM(s) Oral every 12 hours PRN Constipation  guaiFENesin   Syrup  (Sugar-Free) 200 milliGRAM(s) Oral every 6 hours PRN Cough    Pertinent Labs: 12-30 Na142 mmol/L Glu 213 mg/dL<H> K+ 4.0 mmol/L Cr  1.55 mg/dL<H> BUN 43 mg/dL<H> 12-24 Phos 3.5 mg/dL 12-26 Alb 2.4 g/dL<L> 12-23 [id number] 6.3 %<H> 12-23 Chol 95 mg/dL LDL 55 mg/dL HDL 26 mg/dL<L> Trig 69 mg/dL     CAPILLARY BLOOD GLUCOSE        Skin: sacral p/u stage I    Estimated Needs:   [ x] no change since previous assessment  [ ] recalculated:     Previous Nutrition Diagnosis:   [ ] Inadequate Energy Intake [ ]Inadequate Oral Intake [ ] Excessive Energy Intake   [ ] Underweight [x ] Increased Nutrient Needs [ ] Overweight/Obesity   [ ] Altered GI Function [ ] Unintended Weight Loss [ ] Food & Nutrition Related Knowledge Deficit [ ] Malnutrition     Nutrition Diagnosis is [x ] ongoing  [ ] resolved [ ] not applicable     New Nutrition Diagnosis: [ ] not applicable       Interventions:   Recommend    Continue current POC  [ ] Change Diet To:  [ ] Nutrition Supplement  [ ] Nutrition Support  [ ] Other:     Monitoring and Evaluation:   [ ] PO intake [ x ] Tolerance to diet prescription [ x ] weights [ x ] labs[ x ] follow up per protocol  [ ] other:

## 2018-12-30 NOTE — PROGRESS NOTE ADULT - PROBLEM SELECTOR PLAN 1
Chronic; still with occasional bradycardia and pauses (<3 seconds); overall ventricular rate seems to be satisfactorily controlled; continue metoprolol; would not restart digoxin; continue anticoagulation if not bleeding (marked anemia).

## 2018-12-30 NOTE — PROGRESS NOTE ADULT - PROBLEM SELECTOR PLAN 1
multiple medical issues, s/p seizure, cva eval, neuro following  heart disease, AS, CAD, HTN  anemia, Gout, frail and weak and deconditioned  ID following, cardio following, Neuro following  on emp ABX  s/p Steroids for Gout  I and O  cvs regimen  AED as per NEURO  serial labs  serial PE  am Hgb noted - Heme following  prognosis guarded  will need JANET on DC plan  overall appears better, however, frail and deconditioned and with multiple medical issues - acute and chronic  will follow

## 2018-12-30 NOTE — PROGRESS NOTE ADULT - SUBJECTIVE AND OBJECTIVE BOX
REASON FOR VISIT: AF    HPI:  84 y/o man with a history of atrial fibrillation, HTN, gastric cancer, GERD, Gout, alcohol use admitted 12/22/18 with viral infection, seizure.    12/24/18: Increase leucocytosis today and fever. Pt lethargic, Atrial fib  12/25/18 Patient is awake , confused , heart rate is controlled   12/26/18: No complaints.  12/27/18 Patient is lethargic  arousable easily , answers question , did recieve PRBC last night , still low hemoglobin    HR BP stable   12/28/18 Pt lethargic. No chest pain. Rate controlled  12/29/18:  "I don't know how I feel - you're the cardiologist -- tell me."  12/30/18:  Resting comfortably in bed.    MEDICATIONS  (STANDING):  allopurinol 100 milliGRAM(s) Oral daily  amLODIPine   Tablet 10 milliGRAM(s) Oral daily  apixaban 2.5 milliGRAM(s) Oral every 12 hours  aspirin enteric coated 81 milliGRAM(s) Oral daily  colchicine 0.6 milliGRAM(s) Oral two times a day  folic acid 1 milliGRAM(s) Oral daily  levETIRAcetam 500 milliGRAM(s) Oral two times a day  metoprolol tartrate 12.5 milliGRAM(s) Oral every 12 hours  multivitamin 1 Tablet(s) Oral daily  piperacillin/tazobactam IVPB. 3.375 Gram(s) IV Intermittent every 8 hours  tamsulosin 0.4 milliGRAM(s) Oral at bedtime    Vital Signs Last 24 Hrs  T(C): 36.6 (30 Dec 2018 08:57), Max: 36.9 (29 Dec 2018 20:01)  T(F): 97.9 (30 Dec 2018 08:57), Max: 98.5 (29 Dec 2018 20:01)  HR: 87 (30 Dec 2018 12:00) (50 - 89)  BP: 115/68 (30 Dec 2018 12:00) (110/66 - 150/76)  BP(mean): 81 (30 Dec 2018 12:00) (75 - 95)  RR: 15 (30 Dec 2018 12:00) (10 - 19)  SpO2: 100% (30 Dec 2018 12:00) (91% - 100%)    PHYSICAL EXAM:  Constitutional: Semirecumbent in bed, no distress  Respiratory: Nonlabored  Cardiovascular: Irregular    LABS:                         7.3    8.94  )-----------( 266      ( 30 Dec 2018 06:16 )             22.8     142  |  106  |  43<H>  ----------------------------<  213<H>  4.0   |  26  |  1.55<H>    Transthoracic Echocardiogram w/Doppler Complete (12.23.18 @ 10:19) >  1. moderate left ventricular hypertrophy with normal left ventricular systolic function with stage I diastolic dysfunction  2. calcific severe aortic stenosis, recommend clinical correlation  3. mild tricuspid regurgitation    Tele: AF

## 2018-12-30 NOTE — PROGRESS NOTE ADULT - ASSESSMENT
84 y/o M with PMH of HTN, A. Fib on AC, Stomach CA, GERD, and Gout presented with generalized weakness & repeated falls, also had seizures at the ED.    -CVA (cerebral vascular accident) possible/Seizure:  mgmt per neuro  continue Eliquis, aspirin, BB, statin.  AEDs    -3.8 second pause/A.fib on AC:  digoxin stopped as per Dr. Gupta    -Aortic stenosis:  severe. cardio f/up outpatient.     -Severe Anemia: multi factorial  Transfuse if hb<7  f/up heme recs    -SIRS (systemic inflammatory response syndrome):  Plan:   likely due to aspiration pneumonia/ gout flare?  ID noted. on zosyn.   blood and urine cultures- NGTD.  started on antibiotics due to fever on 12/24 to complete 7 days.     CKD III: avoid nephrotoxics. monitor intake and output.     -Gout/Pseduogout   renal dose meds    -Need for prophylactic measure. Plan; IMPROVE VTE Individual Risk Assessment          RISK                                                          Points    [  ] Previous VTE                                                3  [  ] Thrombophilia                                             2  [ x ] Lower limb paralysis                                    2        (unable to hold up >15 seconds)    [  ] Current Cancer                                             2         (within 6 months)  [ x ] Immobilization > 24 hrs                              1  [  ] ICU/CCU stay > 24 hours                            1  [ x ] Age > 60                                                    1    IMPROVE VTE Score 4.  **IMPROVE score of 4 in addition to the other risk factors not included in this scoring system, on Eliquis     -Dispo: dc to rehab hopefully in next 24-72 hours.  d/w RN plan of care.

## 2018-12-30 NOTE — PROGRESS NOTE ADULT - SUBJECTIVE AND OBJECTIVE BOX
YUE KING is a yMale , patient examined and chart reviewed.    INTERVAL HPI/ OVERNIGHT EVENTS:  Awake alert. Weak.  Afebrile.    Past Medical History--  PAST MEDICAL & SURGICAL HISTORY:  Stomach cancer  Hypertension  Atrial fibrillation  Gout  No significant past surgical history      For details regarding the patient's social history, family history, and other miscellaneous elements, please refer the initial infectious diseases consultation and/or the admitting history and physical examination for this admission.    ROS:  CONSTITUTIONAL:  Negative fever or chills, feels well, good appetite  EYES:  Negative  blurry vision or double vision  CARDIOVASCULAR:  Negative for chest pain or palpitations  RESPIRATORY:  Negative for cough, wheezing, or SOB   GASTROINTESTINAL:  Negative for nausea, vomiting, diarrhea, constipation, or abdominal pain  GENITOURINARY:  Negative frequency, urgency , dysuria or hematuria   NEUROLOGIC:  No headache, confusion, dizziness, lightheadedness  All other systems were reviewed and are negative       Current inpatient medications :    ANTIBIOTICS/RELEVANT:  piperacillin/tazobactam IVPB. 3.375 Gram(s) IV Intermittent every 8 hours      acetaminophen   Tablet .. 650 milliGRAM(s) Oral every 6 hours PRN  allopurinol 100 milliGRAM(s) Oral daily  aluminum hydroxide/magnesium hydroxide/simethicone Suspension 30 milliLiter(s) Oral every 6 hours PRN  amLODIPine   Tablet 10 milliGRAM(s) Oral daily  apixaban 2.5 milliGRAM(s) Oral every 12 hours  aspirin enteric coated 81 milliGRAM(s) Oral daily  atorvastatin 10 milliGRAM(s) Oral at bedtime  bisacodyl 5 milliGRAM(s) Oral every 12 hours PRN  colchicine 0.6 milliGRAM(s) Oral two times a day  folic acid 1 milliGRAM(s) Oral daily  guaiFENesin   Syrup  (Sugar-Free) 200 milliGRAM(s) Oral every 6 hours PRN  levETIRAcetam 500 milliGRAM(s) Oral two times a day  metoprolol tartrate 12.5 milliGRAM(s) Oral every 12 hours  multivitamin 1 Tablet(s) Oral daily  tamsulosin 0.4 milliGRAM(s) Oral at bedtime      Objective:    12-29 @ 07:01  -  12-30 @ 07:00  --------------------------------------------------------  IN: 330 mL / OUT: 1300 mL / NET: -970 mL    12-30 @ 07:01  -  12-30 @ 19:21  --------------------------------------------------------  IN: 435 mL / OUT: 550 mL / NET: -115 mL      T(C): 36.9 (12-30-18 @ 16:01), Max: 36.9 (12-29-18 @ 20:01)  HR: 60 (12-30-18 @ 18:00) (50 - 89)  BP: 127/57 (12-30-18 @ 18:00) (110/66 - 156/70)  RR: 11 (12-30-18 @ 18:00) (10 - 19)  SpO2: 99% (12-30-18 @ 18:00) (91% - 100%)  Wt(kg): --      Physical Exam:  GEN: NAD, pleasant weak  HEENT: normocephalic and atraumatic. EOMI. YUMIKO. Moist mucosa. Clear Posterior pharynx.  NECK: Supple. No carotid bruits.  No lymphadenopathy or thyromegaly.  LUNGS: Clear to auscultation.  HEART: Regular rate and rhythm without murmur.  ABDOMEN: Soft, nontender, and nondistended.  Positive bowel sounds.  No hepatosplenomegaly was noted.  EXTREMITIES: Without any cyanosis, clubbing, rash, lesions or edema.  NEUROLOGIC: A & O x3, No focal neurological deficits   SKIN: No ulceration or induration present.      LABS:                        7.3    8.94  )-----------( 266      ( 30 Dec 2018 06:16 )             22.8       12-30    142  |  106  |  43<H>  ----------------------------<  213<H>  4.0   |  26  |  1.55<H>    Ca    9.7      30 Dec 2018 06:16  Mg     1.9     12-29    MICROBIOLOGY:  Culture - Urine (12.25.18 @ 11:03)    Specimen Source: .Urine Catheterized    Culture Results:   No growth    Culture - Blood (12.25.18 @ 00:33)    Specimen Source: .Blood Blood-Peripheral    Culture Results:   No growth at 5 days.    RADIOLOGY & ADDITIONAL STUDIES:      Assessment :  84 y/o M with PMH of HTN, A. Fib on Xarelto, Stomach CA, GERD, and Gout presented with generalized weakness & repeated falls, also had seizures at the ED. Noted to be febrile stormy cause of fever could be UTI as he ahs urinary retention with BPH , PVR was 350 . he has hx of heavy ETOH abuse     Etiology of seizure is unclear probable alcohol withdrawal seizure on CIWA protocol     Possible pseudogout Right knee , he has polyarticular gout     Etiology of fever could be aspiration or viral syndrome       Plan :   - will  continue with Zosyn x 7 days ( 6/7)  - PT evaluation, OOB         Continue with present regiment.  Appropriate use of antibiotics and adverse effects reviewed.    I have discussed the above plan of care with patient/ family in detail. They expressed understanding of the the treatment plan . Risks, benefits and alternatives discussed in detail. I have asked if they have any questions or concerns and appropriately addressed them to the best of my ability .      Critical care time greater then 35 minutes reviewing notes, labs data/ imaging , discussion with multidisciplinary team.    Thank you for allowing me to participate in care of your patient .        Satish Jerez MD  Infectious Disease  [phone]

## 2018-12-31 LAB
ALBUMIN SERPL ELPH-MCNC: 2.2 G/DL — LOW (ref 3.3–5)
ALP SERPL-CCNC: 30 U/L — SIGNIFICANT CHANGE UP (ref 30–120)
ALT FLD-CCNC: 35 U/L DA — SIGNIFICANT CHANGE UP (ref 10–60)
ANION GAP SERPL CALC-SCNC: 11 MMOL/L — SIGNIFICANT CHANGE UP (ref 5–17)
AST SERPL-CCNC: 31 U/L — SIGNIFICANT CHANGE UP (ref 10–40)
BILIRUB SERPL-MCNC: 0.5 MG/DL — SIGNIFICANT CHANGE UP (ref 0.2–1.2)
BUN SERPL-MCNC: 36 MG/DL — HIGH (ref 7–23)
CALCIUM SERPL-MCNC: 9.2 MG/DL — SIGNIFICANT CHANGE UP (ref 8.4–10.5)
CHLORIDE SERPL-SCNC: 107 MMOL/L — SIGNIFICANT CHANGE UP (ref 96–108)
CO2 SERPL-SCNC: 25 MMOL/L — SIGNIFICANT CHANGE UP (ref 22–31)
CREAT SERPL-MCNC: 1.31 MG/DL — HIGH (ref 0.5–1.3)
GLUCOSE SERPL-MCNC: 158 MG/DL — HIGH (ref 70–99)
HCT VFR BLD CALC: 24.8 % — LOW (ref 39–50)
HGB BLD-MCNC: 7.9 G/DL — LOW (ref 13–17)
MCHC RBC-ENTMCNC: 31.9 GM/DL — LOW (ref 32–36)
MCHC RBC-ENTMCNC: 33.5 PG — SIGNIFICANT CHANGE UP (ref 27–34)
MCV RBC AUTO: 105.1 FL — HIGH (ref 80–100)
NRBC # BLD: 0 /100 WBCS — SIGNIFICANT CHANGE UP (ref 0–0)
NT-PROBNP SERPL-SCNC: 1478 PG/ML — HIGH (ref 0–450)
PLATELET # BLD AUTO: 291 K/UL — SIGNIFICANT CHANGE UP (ref 150–400)
POTASSIUM SERPL-MCNC: 3.8 MMOL/L — SIGNIFICANT CHANGE UP (ref 3.5–5.3)
POTASSIUM SERPL-SCNC: 3.8 MMOL/L — SIGNIFICANT CHANGE UP (ref 3.5–5.3)
PROT SERPL-MCNC: 6.8 G/DL — SIGNIFICANT CHANGE UP (ref 6–8.3)
RBC # BLD: 2.36 M/UL — LOW (ref 4.2–5.8)
RBC # FLD: 19.1 % — HIGH (ref 10.3–14.5)
SODIUM SERPL-SCNC: 143 MMOL/L — SIGNIFICANT CHANGE UP (ref 135–145)
WBC # BLD: 7.53 K/UL — SIGNIFICANT CHANGE UP (ref 3.8–10.5)
WBC # FLD AUTO: 7.53 K/UL — SIGNIFICANT CHANGE UP (ref 3.8–10.5)

## 2018-12-31 PROCEDURE — 99233 SBSQ HOSP IP/OBS HIGH 50: CPT

## 2018-12-31 PROCEDURE — 99232 SBSQ HOSP IP/OBS MODERATE 35: CPT

## 2018-12-31 RX ORDER — COLCHICINE 0.6 MG
0.6 TABLET ORAL DAILY
Qty: 0 | Refills: 0 | Status: DISCONTINUED | OUTPATIENT
Start: 2019-01-01 | End: 2019-01-01

## 2018-12-31 RX ORDER — METOPROLOL TARTRATE 50 MG
25 TABLET ORAL EVERY 12 HOURS
Qty: 0 | Refills: 0 | Status: DISCONTINUED | OUTPATIENT
Start: 2018-12-31 | End: 2019-01-01

## 2018-12-31 RX ADMIN — Medication 81 MILLIGRAM(S): at 12:53

## 2018-12-31 RX ADMIN — APIXABAN 2.5 MILLIGRAM(S): 2.5 TABLET, FILM COATED ORAL at 18:01

## 2018-12-31 RX ADMIN — Medication 1 TABLET(S): at 12:53

## 2018-12-31 RX ADMIN — Medication 12.5 MILLIGRAM(S): at 06:20

## 2018-12-31 RX ADMIN — LEVETIRACETAM 500 MILLIGRAM(S): 250 TABLET, FILM COATED ORAL at 06:20

## 2018-12-31 RX ADMIN — Medication 1 MILLIGRAM(S): at 12:53

## 2018-12-31 RX ADMIN — APIXABAN 2.5 MILLIGRAM(S): 2.5 TABLET, FILM COATED ORAL at 06:20

## 2018-12-31 RX ADMIN — TAMSULOSIN HYDROCHLORIDE 0.4 MILLIGRAM(S): 0.4 CAPSULE ORAL at 22:12

## 2018-12-31 RX ADMIN — AMLODIPINE BESYLATE 10 MILLIGRAM(S): 2.5 TABLET ORAL at 06:20

## 2018-12-31 RX ADMIN — PIPERACILLIN AND TAZOBACTAM 25 GRAM(S): 4; .5 INJECTION, POWDER, LYOPHILIZED, FOR SOLUTION INTRAVENOUS at 18:06

## 2018-12-31 RX ADMIN — Medication 0.6 MILLIGRAM(S): at 06:20

## 2018-12-31 RX ADMIN — PIPERACILLIN AND TAZOBACTAM 25 GRAM(S): 4; .5 INJECTION, POWDER, LYOPHILIZED, FOR SOLUTION INTRAVENOUS at 01:42

## 2018-12-31 RX ADMIN — LEVETIRACETAM 500 MILLIGRAM(S): 250 TABLET, FILM COATED ORAL at 18:01

## 2018-12-31 RX ADMIN — ATORVASTATIN CALCIUM 10 MILLIGRAM(S): 80 TABLET, FILM COATED ORAL at 21:50

## 2018-12-31 RX ADMIN — PIPERACILLIN AND TAZOBACTAM 25 GRAM(S): 4; .5 INJECTION, POWDER, LYOPHILIZED, FOR SOLUTION INTRAVENOUS at 10:22

## 2018-12-31 RX ADMIN — Medication 100 MILLIGRAM(S): at 12:53

## 2018-12-31 NOTE — PROGRESS NOTE ADULT - SUBJECTIVE AND OBJECTIVE BOX
Neurology follow up note    YUE KING83yMale      Interval History:    Patient feels ok no new complaints.    MEDICATIONS    acetaminophen   Tablet .. 650 milliGRAM(s) Oral every 6 hours PRN  allopurinol 100 milliGRAM(s) Oral daily  aluminum hydroxide/magnesium hydroxide/simethicone Suspension 30 milliLiter(s) Oral every 6 hours PRN  amLODIPine   Tablet 10 milliGRAM(s) Oral daily  apixaban 2.5 milliGRAM(s) Oral every 12 hours  aspirin enteric coated 81 milliGRAM(s) Oral daily  atorvastatin 10 milliGRAM(s) Oral at bedtime  bisacodyl 5 milliGRAM(s) Oral every 12 hours PRN  colchicine 0.6 milliGRAM(s) Oral two times a day  folic acid 1 milliGRAM(s) Oral daily  guaiFENesin   Syrup  (Sugar-Free) 200 milliGRAM(s) Oral every 6 hours PRN  levETIRAcetam 500 milliGRAM(s) Oral two times a day  metoprolol tartrate 25 milliGRAM(s) Oral every 12 hours  multivitamin 1 Tablet(s) Oral daily  piperacillin/tazobactam IVPB. 3.375 Gram(s) IV Intermittent every 8 hours  tamsulosin 0.4 milliGRAM(s) Oral at bedtime      Allergies    No Known Allergies    Intolerances            Vital Signs Last 24 Hrs  T(C): 36.9 (31 Dec 2018 12:55), Max: 37 (31 Dec 2018 00:01)  T(F): 98.4 (31 Dec 2018 12:55), Max: 98.6 (31 Dec 2018 00:01)  HR: 76 (31 Dec 2018 14:00) (55 - 88)  BP: 113/60 (31 Dec 2018 12:00) (113/60 - 155/60)  BP(mean): 77 (31 Dec 2018 12:00) (73 - 87)  RR: 15 (31 Dec 2018 14:00) (10 - 28)  SpO2: 100% (31 Dec 2018 14:00) (97% - 100%)     REVIEW OF SYSTEMS:     Constitutional: No fever, chills, fatigue, generalized weakness  Eyes: no eye pain, visual disturbances, or discharge  ENT:  No difficulty hearing, tinnitus, vertigo; No sinus or throat pain  Neck: No pain or stiffness  Respiratory: No cough, dyspnea, wheezing   Cardiovascular: No chest pain, palpitations,   Gastrointestinal: No abdominal or epigastric pain. No nausea, vomiting  No diarrhea or constipation.   Genitourinary: No dysuria, frequency, hematuria or incontinence  Neurological: No headaches, lightheadedness, vertigo, numbness or tremors  Psychiatric: No depression, anxiety, mood swings or difficulty sleeping  Musculoskeletal: H/O  joint pain   Skin: No itching, burning, rashes or lesions   Lymph Nodes: No enlarged glands  Endocrine: No heat or cold intolerance; No hair loss   Allergy and Immunologic: No hives or eczema    On Neurological Examination:    Mental Status - Patient is alert, awake, oriented X3.    Follow simple commands      Speech -   Fluent                       Cranial Nerves - Pupils 3 mm equal and reactive to light,   extraocular eye movements intact.   smile symmetric  intact bilateral NLF    Motor Exam -   Right upper 4/5  Left upper 4/5  Right lower2+/5  Left lower 2+/5      Muscle tone - is normal all over.  No asymmetry is seen.      Sensory    Bilateral intact to light touch    GENERAL Exam: Nontoxic , No Acute Distress   	  HEENT:  normocephalic, atraumatic  		  LUNGS: Decreased bilaterally  	  HEART: Normal S1S2   No murmur RRR        	  GI/ ABDOMEN:  Soft  Non tender    EXTREMITIES:   No Edema  No Clubbing  No Cyanosis     SKIN: Normal  No Ecchymosis               LABS:  CBC Full  -  ( 31 Dec 2018 06:47 )  WBC Count : 7.53 K/uL  Hemoglobin : 7.9 g/dL  Hematocrit : 24.8 %  Platelet Count - Automated : 291 K/uL  Mean Cell Volume : 105.1 fl  Mean Cell Hemoglobin : 33.5 pg  Mean Cell Hemoglobin Concentration : 31.9 gm/dL  Auto Neutrophil # : x  Auto Lymphocyte # : x  Auto Monocyte # : x  Auto Eosinophil # : x  Auto Basophil # : x  Auto Neutrophil % : x  Auto Lymphocyte % : x  Auto Monocyte % : x  Auto Eosinophil % : x  Auto Basophil % : x      12-31    143  |  107  |  36<H>  ----------------------------<  158<H>  3.8   |  25  |  1.31<H>    Ca    9.2      31 Dec 2018 06:47    TPro  6.8  /  Alb  2.2<L>  /  TBili  0.5  /  DBili  x   /  AST  31  /  ALT  35  /  AlkPhos  30  12-31    Hemoglobin A1C:     LIVER FUNCTIONS - ( 31 Dec 2018 06:47 )  Alb: 2.2 g/dL / Pro: 6.8 g/dL / ALK PHOS: 30 U/L / ALT: 35 U/L DA / AST: 31 U/L / GGT: x           Vitamin B12         RADIOLOGY    ASSESSMENT AND PLAN     presented with fall and generalized seizure.    seizure KEPPRA can switch to po in oral intake ok     GOUTY ARTHRITIS WITH LEG WEAKNESS  allopurinol mri negative for CVA    spoke to HCP GENE at bedside 12/27/18 h  c  12/28/18    Neurologic standpoint only cleared for discharge planning     no new events       Physical therapy evaluation.  OOB to chair/ambulation with assistance only.    Greater than 35 minutes spent in direct patient care reviewing  the notes, lab data/ imaging , discussion with multidisciplinary team.

## 2018-12-31 NOTE — PROGRESS NOTE ADULT - PROBLEM SELECTOR PLAN 1
Chronic; still with occasional bradycardia and pauses (<3 seconds); no significant pauses noted ,with mild rapid ventricular rate , would increase  metoprolol; would not restart digoxin; continue anticoagulation if not bleeding (marked anemia).

## 2018-12-31 NOTE — PROGRESS NOTE ADULT - ASSESSMENT
84 y/o M with PMH of HTN, A. Fib on AC, Stomach CA, GERD, and Gout presented with generalized weakness & repeated falls, also had seizures at the ED.    -CVA (cerebral vascular accident) possible/Seizure:  mgmt per neuro  continue Eliquis, aspirin, BB, statin.  AEDs    - Sinus pauses/A.fib on AC:  digoxin stopped.  Metoprolol being titrated.    -Aortic stenosis:  not clinically severe as per Dr. Navarrete.   cardio f/up with echo outpatient.     -Severe Anemia: multi factorial  Transfuse if hb<7  f/up heme recs    -SIRS (systemic inflammatory response syndrome):  Plan:   likely due to aspiration pneumonia/ gout flare?  ID noted. on zosyn.   blood and urine cultures- NGTD.  started on antibiotics due to fever on 12/24 to complete 7 days today.     CKD III: avoid nephrotoxics. monitor intake and output.     -Gout/Pseduogout   renal dose meds    -Need for prophylactic measure. Plan; IMPROVE VTE Individual Risk Assessment          RISK                                                          Points    [  ] Previous VTE                                                3  [  ] Thrombophilia                                             2  [ x ] Lower limb paralysis                                    2        (unable to hold up >15 seconds)    [  ] Current Cancer                                             2         (within 6 months)  [ x ] Immobilization > 24 hrs                              1  [  ] ICU/CCU stay > 24 hours                            1  [ x ] Age > 60                                                    1    IMPROVE VTE Score 4.  **IMPROVE score of 4 in addition to the other risk factors not included in this scoring system, on Eliquis     -Dispo: dc to rehab hopefully in next 24-48 hours.  d/w RN plan of care.  Left message for partner

## 2018-12-31 NOTE — PROGRESS NOTE ADULT - SUBJECTIVE AND OBJECTIVE BOX
Patient is a 83y old  Male who presents with a chief complaint of Generalized weakness. (31 Dec 2018 13:38)      INTERVAL History of Present Illness/OVERNIGHT EVENTS: no new issues.  case d/w cardiology  continue telemonitor for another 24-48 hours    MEDICATIONS  (STANDING):  allopurinol 100 milliGRAM(s) Oral daily  amLODIPine   Tablet 10 milliGRAM(s) Oral daily  apixaban 2.5 milliGRAM(s) Oral every 12 hours  aspirin enteric coated 81 milliGRAM(s) Oral daily  atorvastatin 10 milliGRAM(s) Oral at bedtime  colchicine 0.6 milliGRAM(s) Oral two times a day  folic acid 1 milliGRAM(s) Oral daily  levETIRAcetam 500 milliGRAM(s) Oral two times a day  metoprolol tartrate 25 milliGRAM(s) Oral every 12 hours  multivitamin 1 Tablet(s) Oral daily  piperacillin/tazobactam IVPB. 3.375 Gram(s) IV Intermittent every 8 hours  tamsulosin 0.4 milliGRAM(s) Oral at bedtime    MEDICATIONS  (PRN):  acetaminophen   Tablet .. 650 milliGRAM(s) Oral every 6 hours PRN Temp greater or equal to 38C (100.4F)  aluminum hydroxide/magnesium hydroxide/simethicone Suspension 30 milliLiter(s) Oral every 6 hours PRN Dyspepsia  bisacodyl 5 milliGRAM(s) Oral every 12 hours PRN Constipation  guaiFENesin   Syrup  (Sugar-Free) 200 milliGRAM(s) Oral every 6 hours PRN Cough      Allergies    No Known Allergies    Intolerances        REVIEW OF SYSTEMS:  Negative unless otherwise specified above.    Vital Signs Last 24 Hrs  T(C): 36.9 (31 Dec 2018 12:55), Max: 37 (31 Dec 2018 00:01)  T(F): 98.4 (31 Dec 2018 12:55), Max: 98.6 (31 Dec 2018 00:01)  HR: 69 (31 Dec 2018 12:00) (55 - 88)  BP: 113/60 (31 Dec 2018 12:00) (112/66 - 156/70)  BP(mean): 77 (31 Dec 2018 12:00) (73 - 87)  RR: 18 (31 Dec 2018 12:00) (10 - 28)  SpO2: 98% (31 Dec 2018 12:00) (96% - 100%)        PHYSICAL EXAM:  GENERAL: No apparent distress  HEAD:  Atraumatic, Normocephalic  EYES: conjunctiva and sclera clear  ENMT: Moist mucous membranes  NECK: Supple  CHEST/LUNG: Clear to auscultation bilaterally  HEART: Regular rate and rhythm  ABDOMEN: Soft, Nontender, Nondistended; Bowel sounds present  EXTREMITIES:  No clubbing, cyanosis or edema  SKIN: No rashes or lesions  NERVOUS SYSTEM:  Alert & Oriented X3; Bilateral Lower extremity mobile, sensation to light touch intact      LABS:                        7.9    7.53  )-----------( 291      ( 31 Dec 2018 06:47 )             24.8     31 Dec 2018 06:47    143    |  107    |  36     ----------------------------<  158    3.8     |  25     |  1.31     Ca    9.2        31 Dec 2018 06:47    TPro  6.8    /  Alb  2.2    /  TBili  0.5    /  DBili  x      /  AST  31     /  ALT  35     /  AlkPhos  30     31 Dec 2018 06:47        CAPILLARY BLOOD GLUCOSE          RADIOLOGY & ADDITIONAL TESTS:    Images reviewed personally    Consultant Notes Reviewed and Care Discussed with relevant Consultants/Other Providers.

## 2018-12-31 NOTE — PROGRESS NOTE ADULT - PROBLEM SELECTOR PLAN 1
s/p sepsis  acute infection  unclear etiol  frail and weak  gout  AF  anemia  atelectasis  supportive medical regimen and care, assist with ADL, PT as tolerated, consider for JANET DC plan  serial labs, serial PE, I and O, monitor vs and HD and sat  overall showing improvement  on AC for AF  on cvs regimen  on ABX - ID following  out of bed to chair this am  will follow and monitor  prognosis remains guarded - however - pt is starting to show improvement

## 2018-12-31 NOTE — PROGRESS NOTE ADULT - SUBJECTIVE AND OBJECTIVE BOX
YUE KING is a yMale , patient examined and chart reviewed.    INTERVAL HPI/ OVERNIGHT EVENTS:  Awake alert. Weak. stable.  Afebrile.    Past Medical History--  PAST MEDICAL & SURGICAL HISTORY:  Stomach cancer  Hypertension  Atrial fibrillation  Gout  No significant past surgical history      For details regarding the patient's social history, family history, and other miscellaneous elements, please refer the initial infectious diseases consultation and/or the admitting history and physical examination for this admission.    ROS:  CONSTITUTIONAL:  Negative fever or chills, feels well, good appetite  EYES:  Negative  blurry vision or double vision  CARDIOVASCULAR:  Negative for chest pain or palpitations  RESPIRATORY:  Negative for cough, wheezing, or SOB   GASTROINTESTINAL:  Negative for nausea, vomiting, diarrhea, constipation, or abdominal pain  GENITOURINARY:  Negative frequency, urgency , dysuria or hematuria   NEUROLOGIC:  No headache, confusion, dizziness, lightheadedness  All other systems were reviewed and are negative       Current inpatient medications :    ANTIBIOTICS/RELEVANT:  piperacillin/tazobactam IVPB. 3.375 Gram(s) IV Intermittent every 8 hours      acetaminophen   Tablet .. 650 milliGRAM(s) Oral every 6 hours PRN  allopurinol 100 milliGRAM(s) Oral daily  aluminum hydroxide/magnesium hydroxide/simethicone Suspension 30 milliLiter(s) Oral every 6 hours PRN  amLODIPine   Tablet 10 milliGRAM(s) Oral daily  apixaban 2.5 milliGRAM(s) Oral every 12 hours  aspirin enteric coated 81 milliGRAM(s) Oral daily  atorvastatin 10 milliGRAM(s) Oral at bedtime  bisacodyl 5 milliGRAM(s) Oral every 12 hours PRN  colchicine 0.6 milliGRAM(s) Oral two times a day  folic acid 1 milliGRAM(s) Oral daily  guaiFENesin   Syrup  (Sugar-Free) 200 milliGRAM(s) Oral every 6 hours PRN  levETIRAcetam 500 milliGRAM(s) Oral two times a day  metoprolol tartrate 12.5 milliGRAM(s) Oral every 12 hours  multivitamin 1 Tablet(s) Oral daily  tamsulosin 0.4 milliGRAM(s) Oral at bedtime      Objective:    I&O's Summary    30 Dec 2018 07:01  -  31 Dec 2018 07:00  --------------------------------------------------------  IN: 435 mL / OUT: 650 mL / NET: -215 mL    31 Dec 2018 07:01  -  31 Dec 2018 18:41  --------------------------------------------------------  IN: 100 mL / OUT: 200 mL / NET: -100 mL    ICU Vital Signs Last 24 Hrs  T(C): 36.9 (31 Dec 2018 16:03), Max: 37 (31 Dec 2018 00:01)  T(F): 98.4 (31 Dec 2018 16:03), Max: 98.6 (31 Dec 2018 00:01)  HR: 81 (31 Dec 2018 18:22) (55 - 88)  BP: 134/61 (31 Dec 2018 18:22) (113/60 - 155/60)  BP(mean): 80 (31 Dec 2018 18:22) (77 - 87)  RR: 20 (31 Dec 2018 18:22) (10 - 28)  SpO2: 100% (31 Dec 2018 18:22) (97% - 100%)    Physical Exam:  GEN: NAD, pleasant weak  HEENT: normocephalic and atraumatic. EOMI. YUMIKO. Moist mucosa. Clear Posterior pharynx.  NECK: Supple. No carotid bruits.  No lymphadenopathy or thyromegaly.  LUNGS: Clear to auscultation.  HEART: Regular rate and rhythm without murmur.  ABDOMEN: Soft, nontender, and nondistended.  Positive bowel sounds.  No hepatosplenomegaly was noted.  EXTREMITIES: Without any cyanosis, clubbing, rash, lesions or edema.  NEUROLOGIC: A & O x3, No focal neurological deficits   SKIN: No ulceration or induration present.      LABS:                        7.9    7.53  )-----------( 291      ( 31 Dec 2018 06:47 )             24.8   12-31    143  |  107  |  36<H>  ----------------------------<  158<H>  3.8   |  25  |  1.31<H>    Ca    9.2      31 Dec 2018 06:47    TPro  6.8  /  Alb  2.2<L>  /  TBili  0.5  /  DBili  x   /  AST  31  /  ALT  35  /  AlkPhos  30  12-31    MICROBIOLOGY:  Culture - Urine (12.25.18 @ 11:03)    Specimen Source: .Urine Catheterized    Culture Results:   No growth    Culture - Blood (12.25.18 @ 00:33)    Specimen Source: .Blood Blood-Peripheral    Culture Results:   No growth at 5 days.    RADIOLOGY & ADDITIONAL STUDIES:      Assessment :  82 y/o M with PMH of HTN, A. Fib on Xarelto, Stomach CA, GERD, and Gout presented with generalized weakness & repeated falls, also had seizures at the ED. Noted to be febrile , stormy cause of fever could be UTI as he ahs urinary retention with BPH , PVR was 350 . he has hx of heavy ETOH abuse     Etiology of seizure is unclear probable alcohol withdrawal seizure on CIWA protocol     Possible pseudogout Right knee , he has polyarticular gout     Etiology of fever could be aspiration or viral syndrome     Overall better    Plan :   - will  continue with Zosyn x 7 days ( 7/7)  - PT evaluation, OOB   - Dc planning      Continue with present regiment.  Appropriate use of antibiotics and adverse effects reviewed.    I have discussed the above plan of care with patient/ family in detail. They expressed understanding of the the treatment plan . Risks, benefits and alternatives discussed in detail. I have asked if they have any questions or concerns and appropriately addressed them to the best of my ability .      Critical care time greater then 35 minutes reviewing notes, labs data/ imaging , discussion with multidisciplinary team.    Thank you for allowing me to participate in care of your patient .        Satish Jerez MD  Infectious Disease  [phone]

## 2018-12-31 NOTE — PROGRESS NOTE ADULT - SUBJECTIVE AND OBJECTIVE BOX
Date/Time Patient Seen:  		  Referring MD:   Data Reviewed	       Patient is a 83y old  Male who presents with a chief complaint of Generalized weakness. (30 Dec 2018 19:21)      Subjective/HPI     PAST MEDICAL & SURGICAL HISTORY:  Stomach cancer  Hypertension  Atrial fibrillation  Gout  No significant past surgical history        Medication list         MEDICATIONS  (STANDING):  allopurinol 100 milliGRAM(s) Oral daily  amLODIPine   Tablet 10 milliGRAM(s) Oral daily  apixaban 2.5 milliGRAM(s) Oral every 12 hours  aspirin enteric coated 81 milliGRAM(s) Oral daily  atorvastatin 10 milliGRAM(s) Oral at bedtime  colchicine 0.6 milliGRAM(s) Oral two times a day  folic acid 1 milliGRAM(s) Oral daily  levETIRAcetam 500 milliGRAM(s) Oral two times a day  metoprolol tartrate 12.5 milliGRAM(s) Oral every 12 hours  multivitamin 1 Tablet(s) Oral daily  piperacillin/tazobactam IVPB. 3.375 Gram(s) IV Intermittent every 8 hours  tamsulosin 0.4 milliGRAM(s) Oral at bedtime    MEDICATIONS  (PRN):  acetaminophen   Tablet .. 650 milliGRAM(s) Oral every 6 hours PRN Temp greater or equal to 38C (100.4F)  aluminum hydroxide/magnesium hydroxide/simethicone Suspension 30 milliLiter(s) Oral every 6 hours PRN Dyspepsia  bisacodyl 5 milliGRAM(s) Oral every 12 hours PRN Constipation  guaiFENesin   Syrup  (Sugar-Free) 200 milliGRAM(s) Oral every 6 hours PRN Cough         Vitals log        ICU Vital Signs Last 24 Hrs  T(C): 36.8 (31 Dec 2018 04:01), Max: 37 (31 Dec 2018 00:01)  T(F): 98.3 (31 Dec 2018 04:01), Max: 98.6 (31 Dec 2018 00:01)  HR: 71 (31 Dec 2018 06:00) (53 - 89)  BP: 155/60 (31 Dec 2018 06:00) (110/66 - 156/70)  BP(mean): 87 (31 Dec 2018 06:00) (73 - 87)  ABP: --  ABP(mean): --  RR: 19 (31 Dec 2018 06:00) (10 - 19)  SpO2: 100% (31 Dec 2018 06:00) (91% - 100%)           Input and Output:  I&O's Detail    30 Dec 2018 07:01  -  31 Dec 2018 07:00  --------------------------------------------------------  IN:    IV PiggyBack: 75 mL    Oral Fluid: 360 mL  Total IN: 435 mL    OUT:    Voided: 650 mL  Total OUT: 650 mL    Total NET: -215 mL          Lab Data                        7.9    7.53  )-----------( 291      ( 31 Dec 2018 06:47 )             24.8     12-30    142  |  106  |  43<H>  ----------------------------<  213<H>  4.0   |  26  |  1.55<H>    Ca    9.7      30 Dec 2018 06:16  Mg     1.9     12-29              Review of Systems	      Objective     Physical Examination    frail  weak  lung dec BS  abd soft  extr chr changes      Pertinent Lab findings & Imaging      Jonathan:  NO   Adequate UO     I&O's Detail    30 Dec 2018 07:01  -  31 Dec 2018 07:00  --------------------------------------------------------  IN:    IV PiggyBack: 75 mL    Oral Fluid: 360 mL  Total IN: 435 mL    OUT:    Voided: 650 mL  Total OUT: 650 mL    Total NET: -215 mL               Discussed with:     Cultures:	        Radiology

## 2018-12-31 NOTE — PROGRESS NOTE ADULT - SUBJECTIVE AND OBJECTIVE BOX
REASON FOR VISIT: AF    HPI:  82 y/o man with a history of atrial fibrillation, HTN, gastric cancer, GERD, Gout, alcohol use admitted 12/22/18 with viral infection, seizure.    12/24/18: Increase leucocytosis today and fever. Pt lethargic, Atrial fib  12/25/18 Patient is awake , confused , heart rate is controlled   12/26/18: No complaints.  12/27/18 Patient is lethargic  arousable easily , answers question , did recieve PRBC last night , still low hemoglobin    HR BP stable   12/28/18 Pt lethargic. No chest pain. Rate controlled  12/29/18:  "I don't know how I feel - you're the cardiologist -- tell me."  12/30/18:  Resting comfortably in bed.  12/31/18 Patient is feeling better , forgetful  heart rate is controlled mild tachycardia with acitivity     MEDICATIONS  (STANDING):  allopurinol 100 milliGRAM(s) Oral daily  amLODIPine   Tablet 10 milliGRAM(s) Oral daily  apixaban 2.5 milliGRAM(s) Oral every 12 hours  aspirin enteric coated 81 milliGRAM(s) Oral daily  atorvastatin 10 milliGRAM(s) Oral at bedtime  colchicine 0.6 milliGRAM(s) Oral two times a day  folic acid 1 milliGRAM(s) Oral daily  levETIRAcetam 500 milliGRAM(s) Oral two times a day  metoprolol tartrate 12.5 milliGRAM(s) Oral every 12 hours  multivitamin 1 Tablet(s) Oral daily  piperacillin/tazobactam IVPB. 3.375 Gram(s) IV Intermittent every 8 hours  tamsulosin 0.4 milliGRAM(s) Oral at bedtime    MEDICATIONS  (PRN):  acetaminophen   Tablet .. 650 milliGRAM(s) Oral every 6 hours PRN Temp greater or equal to 38C (100.4F)  aluminum hydroxide/magnesium hydroxide/simethicone Suspension 30 milliLiter(s) Oral every 6 hours PRN Dyspepsia  bisacodyl 5 milliGRAM(s) Oral every 12 hours PRN Constipation  guaiFENesin   Syrup  (Sugar-Free) 200 milliGRAM(s) Oral every 6 hours PRN Cough      Vital Signs Last 24 Hrs  T(C): 36.9 (31 Dec 2018 12:55), Max: 37 (31 Dec 2018 00:01)  T(F): 98.4 (31 Dec 2018 12:55), Max: 98.6 (31 Dec 2018 00:01)  HR: 69 (31 Dec 2018 12:00) (55 - 88)  BP: 113/60 (31 Dec 2018 12:00) (112/66 - 156/70)  BP(mean): 77 (31 Dec 2018 12:00) (73 - 87)  RR: 18 (31 Dec 2018 12:00) (10 - 28)  SpO2: 98% (31 Dec 2018 12:00) (96% - 100%)    I&O's Summary    30 Dec 2018 07:01  -  31 Dec 2018 07:00  --------------------------------------------------------  IN: 435 mL / OUT: 650 mL / NET: -215 mL      PHYSICAL EXAM:  Constitutional: Semirecumbent in bed, no distress  Respiratory: Nonlabored  Cardiovascular: Irregular    LABS:                                              7.9    7.53  )-----------( 291      ( 31 Dec 2018 06:47 )             24.8     12-31    143  |  107  |  36<H>  ----------------------------<  158<H>  3.8   |  25  |  1.31<H>    Ca    9.2      31 Dec 2018 06:47    TPro  6.8  /  Alb  2.2<L>  /  TBili  0.5  /  DBili  x   /  AST  31  /  ALT  35  /  AlkPhos  30  12-31        LIVER FUNCTIONS - ( 31 Dec 2018 06:47 )  Alb: 2.2 g/dL / Pro: 6.8 g/dL / ALK PHOS: 30 U/L / ALT: 35 U/L DA / AST: 31 U/L / GGT: x               12-23 Chol 95 LDL 55 HDL 26<L> Trig 69  Transthoracic Echocardiogram w/Doppler Complete (12.23.18 @ 10:19) >  1. moderate left ventricular hypertrophy with normal left ventricular systolic function with stage I diastolic dysfunction  2. calcific severe aortic stenosis, recommend clinical correlation  3. mild tricuspid regurgitation    Tele: AF

## 2019-01-01 ENCOUNTER — TRANSCRIPTION ENCOUNTER (OUTPATIENT)
Age: 84
End: 2019-01-01

## 2019-01-01 VITALS
OXYGEN SATURATION: 95 % | TEMPERATURE: 98 F | RESPIRATION RATE: 18 BRPM | DIASTOLIC BLOOD PRESSURE: 64 MMHG | SYSTOLIC BLOOD PRESSURE: 130 MMHG | HEART RATE: 74 BPM

## 2019-01-01 LAB
ALBUMIN SERPL ELPH-MCNC: 2.4 G/DL — LOW (ref 3.3–5)
ALP SERPL-CCNC: 31 U/L — SIGNIFICANT CHANGE UP (ref 30–120)
ALT FLD-CCNC: 40 U/L DA — SIGNIFICANT CHANGE UP (ref 10–60)
ANION GAP SERPL CALC-SCNC: 13 MMOL/L — SIGNIFICANT CHANGE UP (ref 5–17)
AST SERPL-CCNC: 33 U/L — SIGNIFICANT CHANGE UP (ref 10–40)
BILIRUB SERPL-MCNC: 0.6 MG/DL — SIGNIFICANT CHANGE UP (ref 0.2–1.2)
BUN SERPL-MCNC: 26 MG/DL — HIGH (ref 7–23)
CALCIUM SERPL-MCNC: 9.3 MG/DL — SIGNIFICANT CHANGE UP (ref 8.4–10.5)
CHLORIDE SERPL-SCNC: 107 MMOL/L — SIGNIFICANT CHANGE UP (ref 96–108)
CO2 SERPL-SCNC: 24 MMOL/L — SIGNIFICANT CHANGE UP (ref 22–31)
CREAT SERPL-MCNC: 1.27 MG/DL — SIGNIFICANT CHANGE UP (ref 0.5–1.3)
GLUCOSE SERPL-MCNC: 164 MG/DL — HIGH (ref 70–99)
HCT VFR BLD CALC: 27.2 % — LOW (ref 39–50)
HGB BLD-MCNC: 8.6 G/DL — LOW (ref 13–17)
MCHC RBC-ENTMCNC: 31.6 GM/DL — LOW (ref 32–36)
MCHC RBC-ENTMCNC: 34 PG — SIGNIFICANT CHANGE UP (ref 27–34)
MCV RBC AUTO: 107.5 FL — HIGH (ref 80–100)
NRBC # BLD: 0 /100 WBCS — SIGNIFICANT CHANGE UP (ref 0–0)
PLATELET # BLD AUTO: 285 K/UL — SIGNIFICANT CHANGE UP (ref 150–400)
POTASSIUM SERPL-MCNC: 3.5 MMOL/L — SIGNIFICANT CHANGE UP (ref 3.5–5.3)
POTASSIUM SERPL-SCNC: 3.5 MMOL/L — SIGNIFICANT CHANGE UP (ref 3.5–5.3)
PROT SERPL-MCNC: 7.2 G/DL — SIGNIFICANT CHANGE UP (ref 6–8.3)
RBC # BLD: 2.53 M/UL — LOW (ref 4.2–5.8)
RBC # FLD: 19.4 % — HIGH (ref 10.3–14.5)
SODIUM SERPL-SCNC: 144 MMOL/L — SIGNIFICANT CHANGE UP (ref 135–145)
WBC # BLD: 8.66 K/UL — SIGNIFICANT CHANGE UP (ref 3.8–10.5)
WBC # FLD AUTO: 8.66 K/UL — SIGNIFICANT CHANGE UP (ref 3.8–10.5)

## 2019-01-01 PROCEDURE — 87631 RESP VIRUS 3-5 TARGETS: CPT

## 2019-01-01 PROCEDURE — 71250 CT THORAX DX C-: CPT

## 2019-01-01 PROCEDURE — 83550 IRON BINDING TEST: CPT

## 2019-01-01 PROCEDURE — 82272 OCCULT BLD FECES 1-3 TESTS: CPT

## 2019-01-01 PROCEDURE — 97116 GAIT TRAINING THERAPY: CPT

## 2019-01-01 PROCEDURE — 86780 TREPONEMA PALLIDUM: CPT

## 2019-01-01 PROCEDURE — 85045 AUTOMATED RETICULOCYTE COUNT: CPT

## 2019-01-01 PROCEDURE — 83540 ASSAY OF IRON: CPT

## 2019-01-01 PROCEDURE — 86140 C-REACTIVE PROTEIN: CPT

## 2019-01-01 PROCEDURE — 83605 ASSAY OF LACTIC ACID: CPT

## 2019-01-01 PROCEDURE — 82140 ASSAY OF AMMONIA: CPT

## 2019-01-01 PROCEDURE — 83615 LACTATE (LD) (LDH) ENZYME: CPT

## 2019-01-01 PROCEDURE — 73562 X-RAY EXAM OF KNEE 3: CPT

## 2019-01-01 PROCEDURE — 85652 RBC SED RATE AUTOMATED: CPT

## 2019-01-01 PROCEDURE — 86850 RBC ANTIBODY SCREEN: CPT

## 2019-01-01 PROCEDURE — 97162 PT EVAL MOD COMPLEX 30 MIN: CPT

## 2019-01-01 PROCEDURE — 80061 LIPID PANEL: CPT

## 2019-01-01 PROCEDURE — 86038 ANTINUCLEAR ANTIBODIES: CPT

## 2019-01-01 PROCEDURE — 87385 HISTOPLASMA CAPSUL AG IA: CPT

## 2019-01-01 PROCEDURE — 74176 CT ABD & PELVIS W/O CONTRAST: CPT

## 2019-01-01 PROCEDURE — 85730 THROMBOPLASTIN TIME PARTIAL: CPT

## 2019-01-01 PROCEDURE — 87040 BLOOD CULTURE FOR BACTERIA: CPT

## 2019-01-01 PROCEDURE — 84484 ASSAY OF TROPONIN QUANT: CPT

## 2019-01-01 PROCEDURE — 84145 PROCALCITONIN (PCT): CPT

## 2019-01-01 PROCEDURE — 85027 COMPLETE CBC AUTOMATED: CPT

## 2019-01-01 PROCEDURE — 80076 HEPATIC FUNCTION PANEL: CPT

## 2019-01-01 PROCEDURE — 82962 GLUCOSE BLOOD TEST: CPT

## 2019-01-01 PROCEDURE — 71045 X-RAY EXAM CHEST 1 VIEW: CPT

## 2019-01-01 PROCEDURE — 80048 BASIC METABOLIC PNL TOTAL CA: CPT

## 2019-01-01 PROCEDURE — 84436 ASSAY OF TOTAL THYROXINE: CPT

## 2019-01-01 PROCEDURE — 86901 BLOOD TYPING SEROLOGIC RH(D): CPT

## 2019-01-01 PROCEDURE — 86789 WEST NILE VIRUS ANTIBODY: CPT

## 2019-01-01 PROCEDURE — 80162 ASSAY OF DIGOXIN TOTAL: CPT

## 2019-01-01 PROCEDURE — 84155 ASSAY OF PROTEIN SERUM: CPT

## 2019-01-01 PROCEDURE — 99285 EMERGENCY DEPT VISIT HI MDM: CPT | Mod: 25

## 2019-01-01 PROCEDURE — 87389 HIV-1 AG W/HIV-1&-2 AB AG IA: CPT

## 2019-01-01 PROCEDURE — 96365 THER/PROPH/DIAG IV INF INIT: CPT

## 2019-01-01 PROCEDURE — 97166 OT EVAL MOD COMPLEX 45 MIN: CPT

## 2019-01-01 PROCEDURE — 36430 TRANSFUSION BLD/BLD COMPNT: CPT

## 2019-01-01 PROCEDURE — 82784 ASSAY IGA/IGD/IGG/IGM EACH: CPT

## 2019-01-01 PROCEDURE — 84146 ASSAY OF PROLACTIN: CPT

## 2019-01-01 PROCEDURE — 86788 WEST NILE VIRUS AB IGM: CPT

## 2019-01-01 PROCEDURE — 99232 SBSQ HOSP IP/OBS MODERATE 35: CPT

## 2019-01-01 PROCEDURE — 86900 BLOOD TYPING SEROLOGIC ABO: CPT

## 2019-01-01 PROCEDURE — 82607 VITAMIN B-12: CPT

## 2019-01-01 PROCEDURE — 83735 ASSAY OF MAGNESIUM: CPT

## 2019-01-01 PROCEDURE — 83010 ASSAY OF HAPTOGLOBIN QUANT: CPT

## 2019-01-01 PROCEDURE — 36415 COLL VENOUS BLD VENIPUNCTURE: CPT

## 2019-01-01 PROCEDURE — 97110 THERAPEUTIC EXERCISES: CPT

## 2019-01-01 PROCEDURE — 70450 CT HEAD/BRAIN W/O DYE: CPT

## 2019-01-01 PROCEDURE — 97535 SELF CARE MNGMENT TRAINING: CPT

## 2019-01-01 PROCEDURE — 84480 ASSAY TRIIODOTHYRONINE (T3): CPT

## 2019-01-01 PROCEDURE — 85610 PROTHROMBIN TIME: CPT

## 2019-01-01 PROCEDURE — 93306 TTE W/DOPPLER COMPLETE: CPT

## 2019-01-01 PROCEDURE — 82728 ASSAY OF FERRITIN: CPT

## 2019-01-01 PROCEDURE — 83880 ASSAY OF NATRIURETIC PEPTIDE: CPT

## 2019-01-01 PROCEDURE — 97530 THERAPEUTIC ACTIVITIES: CPT

## 2019-01-01 PROCEDURE — 82803 BLOOD GASES ANY COMBINATION: CPT

## 2019-01-01 PROCEDURE — 87086 URINE CULTURE/COLONY COUNT: CPT

## 2019-01-01 PROCEDURE — 83521 IG LIGHT CHAINS FREE EACH: CPT

## 2019-01-01 PROCEDURE — 81001 URINALYSIS AUTO W/SCOPE: CPT

## 2019-01-01 PROCEDURE — 82747 ASSAY OF FOLIC ACID RBC: CPT

## 2019-01-01 PROCEDURE — 86923 COMPATIBILITY TEST ELECTRIC: CPT

## 2019-01-01 PROCEDURE — 86334 IMMUNOFIX E-PHORESIS SERUM: CPT

## 2019-01-01 PROCEDURE — 84443 ASSAY THYROID STIM HORMONE: CPT

## 2019-01-01 PROCEDURE — 99239 HOSP IP/OBS DSCHRG MGMT >30: CPT

## 2019-01-01 PROCEDURE — 80053 COMPREHEN METABOLIC PANEL: CPT

## 2019-01-01 PROCEDURE — 84165 PROTEIN E-PHORESIS SERUM: CPT

## 2019-01-01 PROCEDURE — 93005 ELECTROCARDIOGRAM TRACING: CPT

## 2019-01-01 PROCEDURE — 83036 HEMOGLOBIN GLYCOSYLATED A1C: CPT

## 2019-01-01 PROCEDURE — 84100 ASSAY OF PHOSPHORUS: CPT

## 2019-01-01 RX ORDER — COLCHICINE 0.6 MG
1 TABLET ORAL
Qty: 0 | Refills: 0 | COMMUNITY
Start: 2019-01-01

## 2019-01-01 RX ORDER — LEVETIRACETAM 250 MG/1
1 TABLET, FILM COATED ORAL
Qty: 0 | Refills: 0 | COMMUNITY
Start: 2019-01-01

## 2019-01-01 RX ORDER — ATORVASTATIN CALCIUM 80 MG/1
1 TABLET, FILM COATED ORAL
Qty: 0 | Refills: 0 | COMMUNITY
Start: 2019-01-01

## 2019-01-01 RX ORDER — TAMSULOSIN HYDROCHLORIDE 0.4 MG/1
1 CAPSULE ORAL
Qty: 0 | Refills: 0 | COMMUNITY
Start: 2019-01-01

## 2019-01-01 RX ORDER — COLCHICINE 0.6 MG
1 TABLET ORAL
Qty: 0 | Refills: 0 | COMMUNITY

## 2019-01-01 RX ORDER — ASPIRIN/CALCIUM CARB/MAGNESIUM 324 MG
1 TABLET ORAL
Qty: 0 | Refills: 0 | COMMUNITY
Start: 2019-01-01

## 2019-01-01 RX ORDER — APIXABAN 2.5 MG/1
1 TABLET, FILM COATED ORAL
Qty: 0 | Refills: 0 | COMMUNITY
Start: 2019-01-01

## 2019-01-01 RX ORDER — BETAXOLOL 20 MG/1
1 TABLET, FILM COATED ORAL
Qty: 0 | Refills: 0 | COMMUNITY

## 2019-01-01 RX ORDER — FOLIC ACID 0.8 MG
1 TABLET ORAL
Qty: 0 | Refills: 0 | COMMUNITY
Start: 2019-01-01

## 2019-01-01 RX ORDER — RIVAROXABAN 15 MG-20MG
1 KIT ORAL
Qty: 0 | Refills: 0 | COMMUNITY

## 2019-01-01 RX ORDER — DIGOXIN 250 MCG
1 TABLET ORAL
Qty: 0 | Refills: 0 | COMMUNITY

## 2019-01-01 RX ORDER — METOPROLOL TARTRATE 50 MG
1 TABLET ORAL
Qty: 0 | Refills: 0 | COMMUNITY
Start: 2019-01-01

## 2019-01-01 RX ADMIN — Medication 81 MILLIGRAM(S): at 11:57

## 2019-01-01 RX ADMIN — LEVETIRACETAM 500 MILLIGRAM(S): 250 TABLET, FILM COATED ORAL at 06:16

## 2019-01-01 RX ADMIN — APIXABAN 2.5 MILLIGRAM(S): 2.5 TABLET, FILM COATED ORAL at 06:16

## 2019-01-01 RX ADMIN — Medication 1 TABLET(S): at 11:57

## 2019-01-01 RX ADMIN — Medication 100 MILLIGRAM(S): at 11:57

## 2019-01-01 RX ADMIN — Medication 1 MILLIGRAM(S): at 11:57

## 2019-01-01 RX ADMIN — Medication 25 MILLIGRAM(S): at 06:16

## 2019-01-01 RX ADMIN — AMLODIPINE BESYLATE 10 MILLIGRAM(S): 2.5 TABLET ORAL at 06:16

## 2019-01-01 RX ADMIN — Medication 0.6 MILLIGRAM(S): at 11:57

## 2019-01-01 NOTE — PROGRESS NOTE ADULT - SUBJECTIVE AND OBJECTIVE BOX
Date/Time Patient Seen:  		  Referring MD:   Data Reviewed	       Patient is a 83y old  Male who presents with a chief complaint of Generalized weakness. (31 Dec 2018 18:40)      Subjective/HPI     PAST MEDICAL & SURGICAL HISTORY:  Stomach cancer  Hypertension  Atrial fibrillation  Gout  No significant past surgical history        Medication list         MEDICATIONS  (STANDING):  allopurinol 100 milliGRAM(s) Oral daily  amLODIPine   Tablet 10 milliGRAM(s) Oral daily  apixaban 2.5 milliGRAM(s) Oral every 12 hours  aspirin enteric coated 81 milliGRAM(s) Oral daily  atorvastatin 10 milliGRAM(s) Oral at bedtime  colchicine 0.6 milliGRAM(s) Oral daily  folic acid 1 milliGRAM(s) Oral daily  levETIRAcetam 500 milliGRAM(s) Oral two times a day  metoprolol tartrate 25 milliGRAM(s) Oral every 12 hours  multivitamin 1 Tablet(s) Oral daily  tamsulosin 0.4 milliGRAM(s) Oral at bedtime    MEDICATIONS  (PRN):  acetaminophen   Tablet .. 650 milliGRAM(s) Oral every 6 hours PRN Temp greater or equal to 38C (100.4F)  aluminum hydroxide/magnesium hydroxide/simethicone Suspension 30 milliLiter(s) Oral every 6 hours PRN Dyspepsia  bisacodyl 5 milliGRAM(s) Oral every 12 hours PRN Constipation  guaiFENesin   Syrup  (Sugar-Free) 200 milliGRAM(s) Oral every 6 hours PRN Cough         Vitals log        ICU Vital Signs Last 24 Hrs  T(C): 36.5 (01 Jan 2019 05:10), Max: 37.2 (31 Dec 2018 21:04)  T(F): 97.7 (01 Jan 2019 05:10), Max: 99 (31 Dec 2018 21:04)  HR: 67 (01 Jan 2019 05:10) (67 - 88)  BP: 152/75 (01 Jan 2019 05:10) (113/60 - 169/77)  BP(mean): 92 (31 Dec 2018 23:53) (77 - 92)  ABP: --  ABP(mean): --  RR: 18 (01 Jan 2019 05:10) (15 - 28)  SpO2: 99% (01 Jan 2019 05:10) (97% - 100%)           Input and Output:  I&O's Detail    31 Dec 2018 07:01  -  01 Jan 2019 07:00  --------------------------------------------------------  IN:    IV PiggyBack: 100 mL  Total IN: 100 mL    OUT:    Voided: 300 mL  Total OUT: 300 mL    Total NET: -200 mL          Lab Data                        8.6    8.66  )-----------( 285      ( 01 Jan 2019 07:36 )             27.2     12-31    143  |  107  |  36<H>  ----------------------------<  158<H>  3.8   |  25  |  1.31<H>    Ca    9.2      31 Dec 2018 06:47    TPro  6.8  /  Alb  2.2<L>  /  TBili  0.5  /  DBili  x   /  AST  31  /  ALT  35  /  AlkPhos  30  12-31            Review of Systems	      Objective     Physical Examination  heart s1s2  lung dec BS  abd soft  on room air        Pertinent Lab findings & Imaging      Jonathan:  NO   Adequate UO     I&O's Detail    31 Dec 2018 07:01  -  01 Jan 2019 07:00  --------------------------------------------------------  IN:    IV PiggyBack: 100 mL  Total IN: 100 mL    OUT:    Voided: 300 mL  Total OUT: 300 mL    Total NET: -200 mL               Discussed with:     Cultures:	        Radiology

## 2019-01-01 NOTE — DISCHARGE NOTE ADULT - SECONDARY DIAGNOSIS.
Atrial fibrillation Aortic stenosis Macrocytic anemia SIRS (systemic inflammatory response syndrome)

## 2019-01-01 NOTE — DISCHARGE NOTE ADULT - HOSPITAL COURSE
This is an 82 y/o M with PMH of HTN, A. Fib on Xarelto, Stomach CA, GERD, and Gout who presented with generalized weakness with repeated falls. Patient states that he fell while was in [place] a week ago when his left leg buckled under him, didn't see a doctor as he was coming back to [place] next day, but over the past week he fell several times & was feeling week, and moves with difficulty. No fever or chills at home, no flu-like symptoms, and no sick contacts. At the ED, patient had a siezure episode for about 30 seconds that involved his left side of the body. Never had any seizure episodes in the past.  Keppra started as per Dr. Malone - no further seizures in Hospital.  Digoxin stopped due to sinus pauses - as per Dr. Palla.  Colchicine renally dosed.  patient with multiple co-morbid conditions but no further inpatient needs at this time.  spoke to Mikael, his partner - ok with discharge to [facility] today.  patient will see Dr. Fierro next week - currently his PCP is out on vacation.    NAD  lungs clear  irregular rhythm  abdomen soft NT ND  No edema  non-focal  frail

## 2019-01-01 NOTE — PROGRESS NOTE ADULT - PROBLEM SELECTOR PROBLEM 3
ETOH abuse
ETOH abuse
Aortic stenosis
Aortic stenosis
HTN (hypertension)
SIRS (systemic inflammatory response syndrome)
CVA (cerebral vascular accident)
SIRS (systemic inflammatory response syndrome)
HTN (hypertension)
SIRS (systemic inflammatory response syndrome)

## 2019-01-01 NOTE — PROGRESS NOTE ADULT - REASON FOR ADMISSION
Generalized weakness.

## 2019-01-01 NOTE — PROGRESS NOTE ADULT - PROBLEM SELECTOR PROBLEM 2
Monocytosis
Monocytosis
Aortic stenosis
Atrial fibrillation
Atrial fibrillation
Seizure
Sepsis
Seizure
Aortic stenosis
Seizure

## 2019-01-01 NOTE — DISCHARGE NOTE ADULT - MEDICATION SUMMARY - MEDICATIONS TO CHANGE
I will SWITCH the dose or number of times a day I take the medications listed below when I get home from the hospital:    colchicine 0.6 mg oral tablet  -- 1 tab(s) by mouth 2 times a day

## 2019-01-01 NOTE — DISCHARGE NOTE ADULT - PATIENT PORTAL LINK FT
You can access the [facility] Portal, offered by [facility], by registering with the following website: [email or web address]

## 2019-01-01 NOTE — PROGRESS NOTE ADULT - PROBLEM SELECTOR PLAN 1
completed Zosyn ABX  completed steroids for Gout  ID follow up noted  completed CIWA and monitoring and tx for Etoh abuse and DOMINICK  supportive medical regimen and care  PT  planned for JANET  serial PE  serial LABS  replete lytes as needed  education and counseling  optimization of renal and heart disease

## 2019-01-01 NOTE — DISCHARGE NOTE ADULT - PLAN OF CARE
avoid future episodes Keppra started.  Cleared by Neuro for outpatient f/up switched to adjusted dose Eliquis for stroke prevention.  Digoxin stopped due to pause >3 seconds repeat echo in 2-4 weeks to properly assess AV area transfuse if hb<7 completed abx course for fever/UTI

## 2019-01-01 NOTE — DISCHARGE NOTE ADULT - MEDICATION SUMMARY - MEDICATIONS TO STOP TAKING
I will STOP taking the medications listed below when I get home from the hospital:    betaxolol 10 mg oral tablet  -- 1 tab(s) by mouth once a day    HydroDIURIL 25 mg oral tablet  -- 1 tab(s) by mouth 3 times a week    digoxin 125 mcg (0.125 mg) oral tablet  -- 1 tab(s) by mouth once a day    Xarelto 10 mg oral tablet  -- 1 tab(s) by mouth once a day

## 2019-01-01 NOTE — DISCHARGE NOTE ADULT - CARE PROVIDERS DIRECT ADDRESSES
,[email or web address],[email or web address],DirectAddress_Unknown

## 2019-01-01 NOTE — PROGRESS NOTE ADULT - PROBLEM SELECTOR PLAN 2
Moderate to severe; repeat echo as outpatient.
Moderate to severe; repeat echo as outpatient.
Moderate to severe; repeat echo as outpatient.  Very poor functional status.
Rate is controlled. Continue anticoagulation
Rate is controlled. Continue anticoagulation  on IV digoxin , change to po  , monitor levels , AC   monitor hemoglobin
started on keppra. may be due to ETOH?
-remains on zosyn with intermittent fevers  -ID following
started on keppra. may be due to ETOH?
Moderate to severe; repeat echo as outpatient.
started on keppra. may be due to ETOH?
Possibly related to the above, no history of seizure episodes in the past, seizure & aspiration precautions, will monitor, PRN Ativan, neurology consult as stated above.
Possibly related to the above, no history of seizure episodes in the past, seizure & aspiration precautions, will monitor, PRN Ativan, neurology.

## 2019-01-01 NOTE — PROGRESS NOTE ADULT - SUBJECTIVE AND OBJECTIVE BOX
REASON FOR VISIT: AF    HPI:  84 y/o man with a history of atrial fibrillation, HTN, gastric cancer, GERD, Gout, alcohol use admitted 18 with viral infection, seizure.    18: Increase leucocytosis today and fever. Pt lethargic, Atrial fib  18 Patient is awake , confused , heart rate is controlled   18: No complaints.  18 Patient is lethargic  arousable easily , answers question , did recieve PRBC last night , still low hemoglobin    HR BP stable   18 Pt lethargic. No chest pain. Rate controlled  18:  "I don't know how I feel - you're the cardiologist -- tell me."  18:  Resting comfortably in bed.  18 Patient is feeling better , forgetful  heart rate is controlled mild tachycardia with acitivity   19: no complaints and feels much better. Denies chest pain or SOB or Palp.     MEDICATIONS  (STANDING):  allopurinol 100 milliGRAM(s) Oral daily  amLODIPine   Tablet 10 milliGRAM(s) Oral daily  apixaban 2.5 milliGRAM(s) Oral every 12 hours  aspirin enteric coated 81 milliGRAM(s) Oral daily  atorvastatin 10 milliGRAM(s) Oral at bedtime  colchicine 0.6 milliGRAM(s) Oral daily  folic acid 1 milliGRAM(s) Oral daily  levETIRAcetam 500 milliGRAM(s) Oral two times a day  metoprolol tartrate 25 milliGRAM(s) Oral every 12 hours  multivitamin 1 Tablet(s) Oral daily  tamsulosin 0.4 milliGRAM(s) Oral at bedtime    MEDICATIONS  (PRN):  acetaminophen   Tablet .. 650 milliGRAM(s) Oral every 6 hours PRN Temp greater or equal to 38C (100.4F)  aluminum hydroxide/magnesium hydroxide/simethicone Suspension 30 milliLiter(s) Oral every 6 hours PRN Dyspepsia  bisacodyl 5 milliGRAM(s) Oral every 12 hours PRN Constipation  guaiFENesin   Syrup  (Sugar-Free) 200 milliGRAM(s) Oral every 6 hours PRN Cough      Vital Signs Last 24 Hrs  T(C): 36.9 (2019 09:40), Max: 37.2 (31 Dec 2018 21:04)  T(F): 98.5 (2019 09:40), Max: 99 (31 Dec 2018 21:04)  HR: 85 (2019 09:40) (67 - 87)  BP: 143/64 (2019 09:40) (134/61 - 169/77)  BP(mean): 92 (31 Dec 2018 23:53) (80 - 92)  RR: 19 (2019 09:40) (15 - 20)  SpO2: 98% (2019 09:40) (97% - 100%)    I&O's Detail    31 Dec 2018 07:01  -  2019 07:00  --------------------------------------------------------  IN:    IV PiggyBack: 100 mL  Total IN: 100 mL    OUT:    Voided: 300 mL  Total OUT: 300 mL    Total NET: -200 mL          Daily     Daily Weight in k.9 (31 Dec 2018 19:34)    PHYSICAL EXAM:  Constitutional: Sitting in chair, no distress  Respiratory: Nonlabored and clear BS  Cardiovascular: s1,s2, ir, ir  Ext: no c/c/e  skin no rash.    LABS:                                                         8.6    8.66  )-----------( 285      ( 2019 07:36 )             27.2     01    144  |  107  |  26<H>  ----------------------------<  164<H>  3.5   |  24  |  1.27    Ca    9.3      2019 07:36    TPro  7.2  /  Alb  2.4<L>  /  TBili  0.6  /  DBili  x   /  AST  33  /  ALT  40  /  AlkPhos  31          LIVER FUNCTIONS - ( 2019 07:36 )  Alb: 2.4 g/dL / Pro: 7.2 g/dL / ALK PHOS: 31 U/L / ALT: 40 U/L DA / AST: 33 U/L / GGT: x             12-23 Chol 95 LDL 55 HDL 26<L> Trig 69          12-23 Chol 95 LDL 55 HDL 26<L> Trig 69  Transthoracic Echocardiogram w/Doppler Complete (12.23.18 @ 10:19) >  1. moderate left ventricular hypertrophy with normal left ventricular systolic function with stage I diastolic dysfunction  2. calcific severe aortic stenosis, recommend clinical correlation  3. mild tricuspid regurgitation    Tele: AF

## 2019-01-01 NOTE — PROGRESS NOTE ADULT - PROVIDER SPECIALTY LIST ADULT
Cardiology
Critical Care
Gastroenterology
Heme/Onc
Hospitalist
Infectious Disease
Neurology
Pulmonology
Heme/Onc
Hospitalist
Hospitalist
Cardiology
Hospitalist
Hospitalist

## 2019-01-01 NOTE — DISCHARGE NOTE ADULT - ADDITIONAL INSTRUCTIONS
see cardiologist within 5-10 days of discharge for medication adjustment as needed and for cardiac issues

## 2019-01-01 NOTE — DISCHARGE NOTE ADULT - CARE PROVIDER_API CALL
Palla, Venugopal R (MD), Cardiovascular Disease; Internal Medicine  [address], [place] [id number]  Phone: [phone]  Fax: [phone]    Jessica Whitehead), Neurology  [address] [facility] [address]  [place] [address]  Phone: [phone]  Fax: [phone]    Maverick Leonardo), Hematology; Internal Medicine; Medical Oncology  [address] [facility]  [address]  [place] [address]  Phone: [phone]  Fax: [phone]

## 2019-01-01 NOTE — DISCHARGE NOTE ADULT - MEDICATION SUMMARY - MEDICATIONS TO TAKE
I will START or STAY ON the medications listed below when I get home from the hospital:    aspirin 81 mg oral delayed release tablet  -- 1 tab(s) by mouth once a day  -- Indication: For Stroke prevention    tamsulosin 0.4 mg oral capsule  -- 1 cap(s) by mouth once a day (at bedtime)  -- Indication: For bph    apixaban 2.5 mg oral tablet  -- 1 tab(s) by mouth every 12 hours  -- Indication: For A.fib    levETIRAcetam 500 mg oral tablet  -- 1 tab(s) by mouth 2 times a day  -- Indication: For Seizure    colchicine 0.6 mg oral tablet  -- 1 tab(s) by mouth once a day  -- Indication: For Gout    allopurinol 100 mg oral tablet  -- 1 tab(s) by mouth once a day  -- Indication: For Gout    atorvastatin 10 mg oral tablet  -- 1 tab(s) by mouth once a day (at bedtime)  -- Indication: For CAD    metoprolol tartrate 25 mg oral tablet  -- 1 tab(s) by mouth every 12 hours  -- Indication: For CAD    Norvasc 10 mg oral tablet  -- 1 tab(s) by mouth once a day  -- Indication: For HTN (hypertension)    NexIUM 24HR 20 mg oral delayed release capsule  -- 1 cap(s) by mouth once a day  -- Indication: For GERD    Multiple Vitamins oral tablet  -- 1 tab(s) by mouth once a day  -- Indication: For Supplement    folic acid 1 mg oral tablet  -- 1 tab(s) by mouth once a day  -- Indication: For Supplement

## 2019-01-01 NOTE — PROGRESS NOTE ADULT - SUBJECTIVE AND OBJECTIVE BOX
Neurology follow up note    YUE [id number]      Interval History:    Patient feels ok no new complaints.    MEDICATIONS    acetaminophen   Tablet .. 650 milliGRAM(s) Oral every 6 hours PRN  allopurinol 100 milliGRAM(s) Oral daily  aluminum hydroxide/magnesium hydroxide/simethicone Suspension 30 milliLiter(s) Oral every 6 hours PRN  amLODIPine   Tablet 10 milliGRAM(s) Oral daily  apixaban 2.5 milliGRAM(s) Oral every 12 hours  aspirin enteric coated 81 milliGRAM(s) Oral daily  atorvastatin 10 milliGRAM(s) Oral at bedtime  bisacodyl 5 milliGRAM(s) Oral every 12 hours PRN  colchicine 0.6 milliGRAM(s) Oral daily  folic acid 1 milliGRAM(s) Oral daily  guaiFENesin   Syrup  (Sugar-Free) 200 milliGRAM(s) Oral every 6 hours PRN  levETIRAcetam 500 milliGRAM(s) Oral two times a day  metoprolol tartrate 25 milliGRAM(s) Oral every 12 hours  multivitamin 1 Tablet(s) Oral daily  tamsulosin 0.4 milliGRAM(s) Oral at bedtime      Allergies    No Known Allergies    Intolerances            Vital Signs Last 24 Hrs  T(C): 36.5 (01 Jan 2019 05:10), Max: 37.2 (31 Dec 2018 21:04)  T(F): 97.7 (01 Jan 2019 05:10), Max: 99 (31 Dec 2018 21:04)  HR: 67 (01 Jan 2019 05:10) (67 - 88)  BP: 152/75 (01 Jan 2019 05:10) (113/60 - 169/77)  BP(mean): 92 (31 Dec 2018 23:53) (77 - 92)  RR: 18 (01 Jan 2019 05:10) (15 - 28)  SpO2: 99% (01 Jan 2019 05:10) (97% - 100%)       REVIEW OF SYSTEMS:     Constitutional: No fever, chills, fatigue, generalized weakness  Eyes: no eye pain, visual disturbances, or discharge  ENT:  No difficulty hearing, tinnitus, vertigo; No sinus or throat pain  Neck: No pain or stiffness  Respiratory: No cough, dyspnea, wheezing   Cardiovascular: No chest pain, palpitations,   Gastrointestinal: No abdominal or epigastric pain. No nausea, vomiting  No diarrhea or constipation.   Genitourinary: No dysuria, frequency, hematuria or incontinence  Neurological: No headaches, lightheadedness, vertigo, numbness or tremors  Psychiatric: No depression, anxiety, mood swings or difficulty sleeping  Musculoskeletal: H/O  joint pain   Skin: No itching, burning, rashes or lesions   Lymph Nodes: No enlarged glands  Endocrine: No heat or cold intolerance; No hair loss   Allergy and Immunologic: No hives or eczema    On Neurological Examination:    Mental Status - Patient is alert, awake, oriented X3.    Follow simple commands      Speech -   Fluent                       Cranial Nerves - Pupils 3 mm equal and reactive to light,   extraocular eye movements intact.   smile symmetric  intact bilateral NLF    Motor Exam -   Right upper 4/5  Left upper 4/5  Right lower2+/5  Left lower 2+/5      Muscle tone - is normal all over.  No asymmetry is seen.      Sensory    Bilateral intact to light touch    GENERAL Exam: Nontoxic , No Acute Distress   	  HEENT:  normocephalic, atraumatic  		  LUNGS: Decreased bilaterally  	  HEART: Normal S1S2   No murmur RRR        	  GI/ ABDOMEN:  Soft  Non tender    EXTREMITIES:   No Edema  No Clubbing  No Cyanosis     SKIN: Normal  No Ecchymosis               LABS:  CBC Full  -  ( 01 Jan 2019 07:36 )  WBC Count : 8.66 K/uL  Hemoglobin : 8.6 g/dL  Hematocrit : 27.2 %  Platelet Count - Automated : 285 K/uL  Mean Cell Volume : 107.5 fl  Mean Cell Hemoglobin : 34.0 pg  Mean Cell Hemoglobin Concentration : 31.6 gm/dL  Auto Neutrophil # : x  Auto Lymphocyte # : x  Auto Monocyte # : x  Auto Eosinophil # : x  Auto Basophil # : x  Auto Neutrophil % : x  Auto Lymphocyte % : x  Auto Monocyte % : x  Auto Eosinophil % : x  Auto Basophil % : x      01-01    144  |  107  |  26<H>  ----------------------------<  164<H>  3.5   |  24  |  1.27    Ca    9.3      01 Jan 2019 07:36    TPro  7.2  /  Alb  2.4<L>  /  TBili  0.6  /  DBili  x   /  AST  33  /  ALT  40  /  AlkPhos  31  01-01    Hemoglobin A1C:     LIVER FUNCTIONS - ( 01 Jan 2019 07:36 )  Alb: 2.4 g/dL / Pro: 7.2 g/dL / ALK PHOS: 31 U/L / ALT: 40 U/L DA / AST: 33 U/L / GGT: x           Vitamin B12         RADIOLOGY    ASSESSMENT AND PLAN     presented with fall and generalized seizure.    seizure KEPPRA can switch to po in oral intake ok     GOUTY ARTHRITIS WITH LEG WEAKNESS  allopurinol mri negative for CVA    spoke to HCP GENE at bedside 12/27/18 h  c  12/28/18    Neurologic standpoint only cleared for discharge planning     no new events       Physical therapy evaluation.  OOB to chair/ambulation with assistance only.    Greater than 35 minutes spent in direct patient care reviewing  the notes, lab data/ imaging , discussion with multidisciplinary team.

## 2019-01-01 NOTE — DISCHARGE NOTE ADULT - CARE PLAN
Principal Discharge DX:	Seizure  Goal:	avoid future episodes  Assessment and plan of treatment:	Keppra started.  Cleared by Neuro for outpatient f/up  Secondary Diagnosis:	Atrial fibrillation  Assessment and plan of treatment:	switched to adjusted dose Eliquis for stroke prevention.  Digoxin stopped due to pause >3 seconds  Secondary Diagnosis:	Aortic stenosis  Assessment and plan of treatment:	repeat echo in 2-4 weeks to properly assess AV area  Secondary Diagnosis:	Macrocytic anemia  Assessment and plan of treatment:	transfuse if hb<7  Secondary Diagnosis:	SIRS (systemic inflammatory response syndrome)  Assessment and plan of treatment:	completed abx course for fever/UTI

## 2019-01-06 ENCOUNTER — INPATIENT (INPATIENT)
Facility: HOSPITAL | Age: 84
LOS: 3 days | Discharge: NURSING, REHAB OR LONG-TERM CARE | DRG: 683 | End: 2019-01-10
Attending: HOSPITALIST | Admitting: INTERNAL MEDICINE
Payer: MEDICARE

## 2019-01-06 VITALS
OXYGEN SATURATION: 99 % | HEART RATE: 91 BPM | HEIGHT: 73 IN | RESPIRATION RATE: 17 BRPM | WEIGHT: 190.04 LBS | SYSTOLIC BLOOD PRESSURE: 129 MMHG | TEMPERATURE: 98 F | DIASTOLIC BLOOD PRESSURE: 70 MMHG

## 2019-01-06 LAB
ALBUMIN SERPL ELPH-MCNC: 2.2 G/DL — LOW (ref 3.3–5)
ALP SERPL-CCNC: 31 U/L — LOW (ref 40–120)
ALT FLD-CCNC: 20 U/L DA — SIGNIFICANT CHANGE UP (ref 10–45)
ANION GAP SERPL CALC-SCNC: 14 MMOL/L — SIGNIFICANT CHANGE UP (ref 5–17)
ANISOCYTOSIS BLD QL: SLIGHT — SIGNIFICANT CHANGE UP
APPEARANCE UR: CLEAR — SIGNIFICANT CHANGE UP
APTT BLD: 36.3 SEC — SIGNIFICANT CHANGE UP (ref 27.5–36.3)
AST SERPL-CCNC: 15 U/L — SIGNIFICANT CHANGE UP (ref 10–40)
BASOPHILS NFR BLD AUTO: 0 % — SIGNIFICANT CHANGE UP (ref 0–2)
BILIRUB SERPL-MCNC: 0.4 MG/DL — SIGNIFICANT CHANGE UP (ref 0.2–1.2)
BILIRUB UR-MCNC: ABNORMAL
BLD GP AB SCN SERPL QL: SIGNIFICANT CHANGE UP
BUN SERPL-MCNC: 40 MG/DL — HIGH (ref 7–23)
CALCIUM SERPL-MCNC: 9.2 MG/DL — SIGNIFICANT CHANGE UP (ref 8.4–10.5)
CHLORIDE SERPL-SCNC: 100 MMOL/L — SIGNIFICANT CHANGE UP (ref 96–108)
CO2 SERPL-SCNC: 21 MMOL/L — LOW (ref 22–31)
COLOR SPEC: YELLOW — SIGNIFICANT CHANGE UP
CREAT SERPL-MCNC: 2.13 MG/DL — HIGH (ref 0.5–1.3)
DIFF PNL FLD: ABNORMAL
EOSINOPHIL NFR BLD AUTO: 0 % — SIGNIFICANT CHANGE UP (ref 0–6)
GLUCOSE SERPL-MCNC: 209 MG/DL — HIGH (ref 70–99)
GLUCOSE UR QL: NEGATIVE — SIGNIFICANT CHANGE UP
HCT VFR BLD CALC: 26.9 % — LOW (ref 39–50)
HGB BLD-MCNC: 8.6 G/DL — LOW (ref 13–17)
HYPOCHROMIA BLD QL: SLIGHT — SIGNIFICANT CHANGE UP
INR BLD: 2.17 RATIO — HIGH (ref 0.88–1.16)
KETONES UR-MCNC: NEGATIVE — SIGNIFICANT CHANGE UP
LEUKOCYTE ESTERASE UR-ACNC: NEGATIVE — SIGNIFICANT CHANGE UP
LYMPHOCYTES # BLD AUTO: 6 % — LOW (ref 13–44)
MACROCYTES BLD QL: SLIGHT — SIGNIFICANT CHANGE UP
MCHC RBC-ENTMCNC: 32.1 GM/DL — SIGNIFICANT CHANGE UP (ref 32–36)
MCHC RBC-ENTMCNC: 34.3 PG — HIGH (ref 27–34)
MCV RBC AUTO: 106.9 FL — HIGH (ref 80–100)
METAMYELOCYTES # FLD: 8 % — HIGH (ref 0–0)
MONOCYTES NFR BLD AUTO: 10 % — SIGNIFICANT CHANGE UP (ref 2–14)
MYELOCYTES NFR BLD: 8 % — HIGH (ref 0–0)
NEUTROPHILS NFR BLD AUTO: 68 % — SIGNIFICANT CHANGE UP (ref 43–77)
NITRITE UR-MCNC: NEGATIVE — SIGNIFICANT CHANGE UP
OB PNL STL: NEGATIVE — SIGNIFICANT CHANGE UP
PH UR: 5 — SIGNIFICANT CHANGE UP (ref 5–8)
PLAT MORPH BLD: NORMAL — SIGNIFICANT CHANGE UP
PLATELET # BLD AUTO: 152 K/UL — SIGNIFICANT CHANGE UP (ref 150–400)
POTASSIUM SERPL-MCNC: 3.8 MMOL/L — SIGNIFICANT CHANGE UP (ref 3.5–5.3)
POTASSIUM SERPL-SCNC: 3.8 MMOL/L — SIGNIFICANT CHANGE UP (ref 3.5–5.3)
PROT SERPL-MCNC: 7.2 G/DL — SIGNIFICANT CHANGE UP (ref 6–8.3)
PROT UR-MCNC: 30 MG/DL
PROTHROM AB SERPL-ACNC: 24.9 SEC — HIGH (ref 10–12.9)
RBC # BLD: 2.51 M/UL — LOW (ref 4.2–5.8)
RBC # FLD: 18.6 % — HIGH (ref 10.3–14.5)
RBC BLD AUTO: ABNORMAL
SODIUM SERPL-SCNC: 135 MMOL/L — SIGNIFICANT CHANGE UP (ref 135–145)
SP GR SPEC: 1.02 — SIGNIFICANT CHANGE UP (ref 1.01–1.02)
UROBILINOGEN FLD QL: NEGATIVE — SIGNIFICANT CHANGE UP
WBC # BLD: 20.2 K/UL — HIGH (ref 3.8–10.5)
WBC # FLD AUTO: 20.2 K/UL — HIGH (ref 3.8–10.5)

## 2019-01-06 PROCEDURE — 93010 ELECTROCARDIOGRAM REPORT: CPT

## 2019-01-06 PROCEDURE — 99285 EMERGENCY DEPT VISIT HI MDM: CPT

## 2019-01-06 PROCEDURE — 71045 X-RAY EXAM CHEST 1 VIEW: CPT | Mod: 26

## 2019-01-06 RX ORDER — SODIUM CHLORIDE 9 MG/ML
1000 INJECTION INTRAMUSCULAR; INTRAVENOUS; SUBCUTANEOUS ONCE
Qty: 0 | Refills: 0 | Status: COMPLETED | OUTPATIENT
Start: 2019-01-06 | End: 2019-01-06

## 2019-01-06 RX ADMIN — SODIUM CHLORIDE 2000 MILLILITER(S): 9 INJECTION INTRAMUSCULAR; INTRAVENOUS; SUBCUTANEOUS at 23:00

## 2019-01-06 NOTE — INPATIENT CERTIFICATION FOR MEDICARE PATIENTS - RISKS OF ADVERSE EVENTS
Concern for worsening infectious process/Concern for delay in diagnosis and treatment/Other:/concern for further bleeding/Concern for renal deterioration

## 2019-01-06 NOTE — H&P ADULT - NEUROLOGICAL DETAILS
responds to verbal commands/responds to pain/sensation intact/deep reflexes intact/cranial nerves intact

## 2019-01-06 NOTE — ED ADULT NURSE NOTE - PMH
Anemia, unspecified type    Benign prostatic hyperplasia, unspecified whether lower urinary tract symptoms present    Cancer of stomach    Chronic atrial fibrillation    Essential hypertension    Hyperlipemia    Seizure disorder

## 2019-01-06 NOTE — H&P ADULT - ASSESSMENT
84 y/o M with h/o stomach CA, chronic Anemia, Afib (on Apixaban), seizure disorder, Gout, presents with generalized weakness, episode of ?blood in stool and urine, now with acute renal insuff, elev WBC  Pt is afebrile, urine unrevealing now except for trace blood  Pt has guaiac neg stool  +Gout    Admit  IVhydration  ff up labs - ffup CBC, BUN/Crea  Hold eliquis  Renal sono  Check procalcitonin, hold off further antibx for now  Cont current meds-short course of Prednisone for gout  Cont Atorvastatin, Amlodipine, Metoprolol, ASA  Urology eval if with persistent hematuria  Cont Flomax  DVT Prophylaxis with IPC for now      IMPROVE VTE Individual Risk Assessment  RISK                                                                Points  [  ] Previous VTE                                                  3  [  ] Thrombophilia                                               2  [  ] Lower limb paralysis                                      2        (unable to hold up >15 seconds)    [  ] Current Cancer                                              2         (within 6 months)  [  ] Immobilization > 24 hrs                                1  [  ] ICU/CCU stay > 24 hours                              1  [ x ] Age > 60                                                      1  IMPROVE VTE Score__1___    *pt was on Eliquis, INR 2.1, ?hematuria (see note)

## 2019-01-06 NOTE — ED PROVIDER NOTE - MEDICAL DECISION MAKING DETAILS
84 y/o M h/o stomach CA, Chronic anemia, Afib, HTN presenting with hematuria and low h/h as per galileo thomson- Basic labs, Type and screen, coags, reassess 82 y/o M h/o stomach CA, Chronic anemia, Afib, HTN presenting with hematuria and low h/h as per galileo thomson- Basic labs, Type and screen, coags, stool occult, reassess

## 2019-01-06 NOTE — H&P ADULT - NSHPPHYSICALEXAM_GEN_ALL_CORE
Vital Signs (24 Hrs):  T(C): 36.9 (01-06-19 @ 21:02), Max: 36.9 (01-06-19 @ 21:02)  HR: 91 (01-06-19 @ 21:02) (91 - 91)  BP: 129/70 (01-06-19 @ 21:02) (129/70 - 129/70)  RR: 17 (01-06-19 @ 21:02) (17 - 17)  SpO2: 99% (01-06-19 @ 21:02) (99% - 99%)  Daily Height in cm: 185.42 (06 Jan 2019 21:02)

## 2019-01-06 NOTE — ED PROVIDER NOTE - OBJECTIVE STATEMENT
84 y/o M with h/o stomach CA, chronic Anemia, Afib, Gout sent in from [facility] for low H/H and hematuria. Patient states he was recently admitted to [place] ED and treated for a pneumonia. Then discharged and sent to [facility] rehab.  Accompanied with son. Denies abdominal pain, dysuria, frequency, nausea, vomiting, weakness, headache, chest pain, shortness of breath. Appears well. No distress. 84 y/o M with h/o stomach CA, chronic Anemia, Afib (on Apixaban), Gout sent in from [facility] for low H/H and hematuria. Patient states he was recently admitted to [place] ED and treated for a pneumonia. Then discharged and sent to [facility] rehab.  Accompanied with son. Denies abdominal pain, dysuria, frequency, nausea, vomiting, weakness, headache, chest pain, shortness of breath. Appears well. No distress.  Daily meds: Flomax, Norvasc, metoprolol, Levetiracetam, Atorvastatin, Aspirin, Apixaban 84 y/o M with h/o stomach CA, chronic Anemia, Afib (on Apixaban), Gout sent in from [facility] for low H/H, hematuria and questional blood in stool. Patient states he was recently admitted to [place] ED and treated for a pneumonia. Then discharged and sent to [facility] rehab.  Accompanied with son. Denies abdominal pain, dysuria, frequency, nausea, vomiting, weakness, headache, chest pain, shortness of breath. Appears well. No distress.  Daily meds: Flomax, Norvasc, metoprolol, Levetiracetam, Atorvastatin, Aspirin, Apixaban

## 2019-01-06 NOTE — ED ADULT NURSE NOTE - NSIMPLEMENTINTERV_GEN_ALL_ED
Implemented All Fall with Harm Risk Interventions:  [place] to call system. Call bell, personal items and telephone within reach. Instruct patient to call for assistance. Room bathroom lighting operational. Non-slip footwear when patient is off stretcher. Physically safe environment: no spills, clutter or unnecessary equipment. Stretcher in lowest position, wheels locked, appropriate side rails in place. Provide visual cue, wrist band, yellow gown, etc. Monitor gait and stability. Monitor for mental status changes and reorient to person, place, and time. Review medications for side effects contributing to fall risk. Reinforce activity limits and safety measures with patient and family. Provide visual clues: red socks.

## 2019-01-06 NOTE — ED PROVIDER NOTE - ATTENDING CONTRIBUTION TO CARE
pt sent from rehab for gross hematuria today c possible blood in stool and anemia, Hgb about 8.  pt otherwise denies chest pain, sob, dizziness, abd/flank pain. takes apixaban. h/o afib, stomach ca    exam: well appearing, NAD. HEENT: eyes perrl, nose normal, OP no erythema/exudate/swelling. cor: RRR, s1s2, 2+rad pulses. lungs: ctabl, no resp distress. abd: soft, ntnd. brown stool. neuro: a&ox3, cn2-12 intact, WESLEY, 5/5 strength c nl sensation all extremities, nl coordination. extrem: no swelling. Skin: normal, no rash    AP: hematuria, ?blood in stool, anemic, on apixaban. hemodynamically stable. r/o UTI, coagulopathy, gi bleed. will likely need admit for further workup and monitoring as pt c bleeding on anticoagulant

## 2019-01-06 NOTE — H&P ADULT - PMH
Anemia, unspecified type    Benign prostatic hyperplasia, unspecified whether lower urinary tract symptoms present    Cancer of stomach    Chronic atrial fibrillation    Essential hypertension    Hyperlipemia    Nonrheumatic aortic valve stenosis    Seizure disorder

## 2019-01-06 NOTE — H&P ADULT - HISTORY OF PRESENT ILLNESS
84 y/o M with h/o stomach CA, chronic Anemia, Afib (on Apixaban), Gout sent in from [facility] for low H/H, hematuria and questional blood in stool. Patient states he was recently admitted to [place] ED and treated for a pneumonia. Then discharged and sent to [facility] rehab.  Accompanied with son. Denies abdominal pain, dysuria, frequency, nausea, vomiting, weakness, headache, chest pain, shortness of breath. Appears well. No distress.  Daily meds: Flomax, Norvasc, metoprolol, Levetiracetam, Atorvastatin, Aspirin, Apixaban 84 y/o M with h/o stomach CA, chronic Anemia, Afib (on Apixaban), Gout sent in from [facility] for low H/H, hematuria and questional blood in stool. Patient states he was recently admitted to [place] ED and treated for a pneumonia. Then discharged and sent to [facility]hab.  Accompanied with son. Denies abdominal pain, dysuria, frequency, nausea, vomiting, weakness, headache, chest pain, shortness of breath. Appears well. No distress.  Daily meds: Flomax, Norvasc, metoprolol, Levetiracetam, Atorvastatin, Aspirin, Apixaban    Echo 12/24/18 1. moderate left ventricular hypertrophy with normal left ventricular   systolic function with stage I diastolic dysfunction  2. calcific severe aortic stenosis, recommend clinical correlation  3. mild tricuspid regurgitation 82 y/o M with h/o stomach CA, chronic Anemia, Afib (on Apixaban), Gout sent in from [facility] for low H/H, hematuria and questional blood in stool. Patient states he was recently admitted to [place] ED treated for pneumonia and new seizure.   Since yesterday, pt states he has been feeling weak, also c/o aches and pains in his feet and was just started on Prednisone today for gout.  He was also started on IV hydration. However, last night, when pt went to the bathroom, staff noted large amount of foul smelling stool, mixed with blood.  staff was unsure whether blood was with BM or with urine.  Pt is on Apixaban and ASA. So pt sent to the ED.  Denies abdominal pain, dysuria, frequency, nausea, vomiting, weakness, headache, chest pain, shortness of breath.   He was recently seen by GI and heme for anemia when he was at [facility]-no EGD/coloscopy performed as he was guiac neg.  He did receive a unit of PRBC for H/H 7/23 prior to being discharged to [facility] rehab on 01/01/19.  He just had echo 12/24/18 w/c revealed moderate left ventricular hypertrophy with normal left ventricular systolic function with stage I diastolic dysfunction.  Moderate calcific severe aortic stenosis, mild tricuspid regurgitation. 84 y/o M with h/o stomach CA, chronic Anemia, Afib (on Apixaban), [facility], hematuria and questional blood in stool. Patient states he was recently admitted to [place] ED treated for pneumonia and new seizure.   Since yesterday, pt states he has been feeling weak, also c/o aches and pains in his feet and was just started on Prednisone today for gout.  He was also started on IV hydration. However, last night, when pt went to the bathroom, staff noted large amount of foul smelling stool, mixed with blood.  staff was unsure whether blood was with BM or with urine.  Pt is on Apixaban and ASA. So pt sent to the ED.  Denies abdominal pain, dysuria, frequency, nausea, vomiting, weakness, headache, chest pain, shortness of breath.   He was recently seen by GI and heme for anemia when he was at [facility]-no EGD/coloscopy performed as he was guiac neg.  He did receive a unit of PRBC for H/H 7/20 during hospitalization.  He just had echo 12/24/18 w/c revealed moderate left ventricular hypertrophy with normal left ventricular systolic function with stage I diastolic dysfunction.  Moderate calcific severe aortic stenosis, mild tricuspid regurgitation. 82 y/o M with h/o stomach CA, chronic Anemia, Afib (on Apixaban), [facility], hematuria and questional blood in stool. Patient states he was recently admitted to [place] ED treated for pneumonia and new seizure.   Since yesterday, pt states he has been feeling weak, also c/o aches and pains in his feet and was just started on Prednisone today for gout.  He was also started on IV hydration. However, last night, when pt went to the bathroom, staff noted large amount of foul smelling stool, mixed with blood.  staff was unsure whether blood was with BM or with urine.  Pt is on Apixaban and ASA. So pt sent to the ED.  Denies abdominal pain, dysuria, frequency, nausea, vomiting, headache, chest pain, shortness of breath.   He was recently seen by GI and heme (12/23/18) for anemia when he was at [facility]-no EGD/coloscopy performed as he was guiac neg.  He did receive a unit of PRBC for H/H 7/20 during hospitalization.  He just had echo 12/24/18 w/c revealed moderate left ventricular hypertrophy with normal left ventricular systolic function with stage I diastolic dysfunction.  Moderate calcific severe aortic stenosis, mild tricuspid regurgitation.

## 2019-01-07 DIAGNOSIS — M10.9 GOUT, UNSPECIFIED: ICD-10-CM

## 2019-01-07 DIAGNOSIS — R31.0 GROSS HEMATURIA: ICD-10-CM

## 2019-01-07 DIAGNOSIS — C16.9 MALIGNANT NEOPLASM OF STOMACH, UNSPECIFIED: ICD-10-CM

## 2019-01-07 DIAGNOSIS — N40.0 BENIGN PROSTATIC HYPERPLASIA WITHOUT LOWER URINARY TRACT SYMPTOMS: ICD-10-CM

## 2019-01-07 DIAGNOSIS — G40.909 EPILEPSY, UNSPECIFIED, NOT INTRACTABLE, WITHOUT STATUS EPILEPTICUS: ICD-10-CM

## 2019-01-07 DIAGNOSIS — I48.2 CHRONIC ATRIAL FIBRILLATION: ICD-10-CM

## 2019-01-07 DIAGNOSIS — D64.9 ANEMIA, UNSPECIFIED: ICD-10-CM

## 2019-01-07 PROBLEM — I48.91 UNSPECIFIED ATRIAL FIBRILLATION: Chronic | Status: ACTIVE | Noted: 2018-12-22

## 2019-01-07 PROBLEM — I10 ESSENTIAL (PRIMARY) HYPERTENSION: Chronic | Status: ACTIVE | Noted: 2018-12-22

## 2019-01-07 LAB
ANION GAP SERPL CALC-SCNC: 10 MMOL/L — SIGNIFICANT CHANGE UP (ref 5–17)
BACTERIA # UR AUTO: NEGATIVE /HPF — SIGNIFICANT CHANGE UP
BASOPHILS # BLD AUTO: 0.4 K/UL — HIGH (ref 0–0.2)
BASOPHILS NFR BLD AUTO: 1 % — SIGNIFICANT CHANGE UP (ref 0–2)
BUN SERPL-MCNC: 45 MG/DL — HIGH (ref 7–23)
CALCIUM SERPL-MCNC: 9.2 MG/DL — SIGNIFICANT CHANGE UP (ref 8.4–10.5)
CHLORIDE SERPL-SCNC: 103 MMOL/L — SIGNIFICANT CHANGE UP (ref 96–108)
CO2 SERPL-SCNC: 23 MMOL/L — SIGNIFICANT CHANGE UP (ref 22–31)
CREAT SERPL-MCNC: 2.15 MG/DL — HIGH (ref 0.5–1.3)
DACRYOCYTES BLD QL SMEAR: SLIGHT — SIGNIFICANT CHANGE UP
ELLIPTOCYTES BLD QL SMEAR: SLIGHT — SIGNIFICANT CHANGE UP
EOSINOPHIL # BLD AUTO: 0 K/UL — SIGNIFICANT CHANGE UP (ref 0–0.5)
EOSINOPHIL NFR BLD AUTO: 0 % — SIGNIFICANT CHANGE UP (ref 0–6)
EPI CELLS # UR: SIGNIFICANT CHANGE UP
FERRITIN SERPL-MCNC: 1437 NG/ML — HIGH (ref 30–400)
FOLATE SERPL-MCNC: 9.3 NG/ML — SIGNIFICANT CHANGE UP
GLUCOSE SERPL-MCNC: 145 MG/DL — HIGH (ref 70–99)
HCT VFR BLD CALC: 24.6 % — LOW (ref 39–50)
HCT VFR BLD CALC: 26.3 % — LOW (ref 39–50)
HGB BLD-MCNC: 7.7 G/DL — LOW (ref 13–17)
HGB BLD-MCNC: 8.1 G/DL — LOW (ref 13–17)
HYPOCHROMIA BLD QL: SIGNIFICANT CHANGE UP
INR BLD: 1.82 RATIO — HIGH (ref 0.88–1.16)
IRON SATN MFR SERPL: 43 UG/DL — LOW (ref 45–165)
LYMPHOCYTES # BLD AUTO: 1.3 K/UL — SIGNIFICANT CHANGE UP (ref 1–3.3)
LYMPHOCYTES # BLD AUTO: 8 % — LOW (ref 13–44)
MACROCYTES BLD QL: SLIGHT — SIGNIFICANT CHANGE UP
MAGNESIUM SERPL-MCNC: 1.6 MG/DL — SIGNIFICANT CHANGE UP (ref 1.6–2.6)
MCHC RBC-ENTMCNC: 30.7 GM/DL — LOW (ref 32–36)
MCHC RBC-ENTMCNC: 31.3 GM/DL — LOW (ref 32–36)
MCHC RBC-ENTMCNC: 33.4 PG — SIGNIFICANT CHANGE UP (ref 27–34)
MCHC RBC-ENTMCNC: 33.6 PG — SIGNIFICANT CHANGE UP (ref 27–34)
MCV RBC AUTO: 107.3 FL — HIGH (ref 80–100)
MCV RBC AUTO: 108.7 FL — HIGH (ref 80–100)
MONOCYTES # BLD AUTO: 5.3 K/UL — HIGH (ref 0–0.9)
MONOCYTES NFR BLD AUTO: 30 % — HIGH (ref 2–14)
MYELOCYTES NFR BLD: 2 % — HIGH (ref 0–0)
NEUTROPHILS # BLD AUTO: 10.8 K/UL — HIGH (ref 1.8–7.4)
NEUTROPHILS NFR BLD AUTO: 59 % — SIGNIFICANT CHANGE UP (ref 43–77)
NEUTS HYPERSEG # BLD: PRESENT — SIGNIFICANT CHANGE UP
PLAT MORPH BLD: NORMAL — SIGNIFICANT CHANGE UP
PLATELET # BLD AUTO: 143 K/UL — LOW (ref 150–400)
PLATELET # BLD AUTO: 146 K/UL — LOW (ref 150–400)
POIKILOCYTOSIS BLD QL AUTO: SLIGHT — SIGNIFICANT CHANGE UP
POLYCHROMASIA BLD QL SMEAR: SLIGHT — SIGNIFICANT CHANGE UP
POTASSIUM SERPL-MCNC: 3.5 MMOL/L — SIGNIFICANT CHANGE UP (ref 3.5–5.3)
POTASSIUM SERPL-SCNC: 3.5 MMOL/L — SIGNIFICANT CHANGE UP (ref 3.5–5.3)
PROCALCITONIN SERPL-MCNC: 0.7 NG/ML — HIGH
PROTHROM AB SERPL-ACNC: 20.8 SEC — HIGH (ref 10–12.9)
RBC # BLD: 2.29 M/UL — LOW (ref 4.2–5.8)
RBC # BLD: 2.42 M/UL — LOW (ref 4.2–5.8)
RBC # FLD: 19.1 % — HIGH (ref 10.3–14.5)
RBC # FLD: 19.3 % — HIGH (ref 10.3–14.5)
RBC BLD AUTO: ABNORMAL
RBC CASTS # UR COMP ASSIST: SIGNIFICANT CHANGE UP /HPF (ref 0–4)
ROULEAUX BLD QL SMEAR: PRESENT — SIGNIFICANT CHANGE UP
SCHISTOCYTES BLD QL AUTO: SLIGHT — SIGNIFICANT CHANGE UP
SMUDGE CELLS # BLD: PRESENT — SIGNIFICANT CHANGE UP
SODIUM SERPL-SCNC: 136 MMOL/L — SIGNIFICANT CHANGE UP (ref 135–145)
TSH SERPL-MCNC: 1.35 UIU/ML — SIGNIFICANT CHANGE UP (ref 0.27–4.2)
VIT B12 SERPL-MCNC: 665 PG/ML — SIGNIFICANT CHANGE UP (ref 232–1245)
WBC # BLD: 14.8 K/UL — HIGH (ref 3.8–10.5)
WBC # BLD: 17.8 K/UL — HIGH (ref 3.8–10.5)
WBC # FLD AUTO: 14.8 K/UL — HIGH (ref 3.8–10.5)
WBC # FLD AUTO: 17.8 K/UL — HIGH (ref 3.8–10.5)
WBC UR QL: NEGATIVE /HPF — SIGNIFICANT CHANGE UP (ref 0–5)

## 2019-01-07 PROCEDURE — 12345: CPT | Mod: NC,GC

## 2019-01-07 PROCEDURE — 99223 1ST HOSP IP/OBS HIGH 75: CPT

## 2019-01-07 RX ORDER — ATORVASTATIN CALCIUM 80 MG/1
10 TABLET, FILM COATED ORAL AT BEDTIME
Qty: 0 | Refills: 0 | Status: DISCONTINUED | OUTPATIENT
Start: 2019-01-07 | End: 2019-01-10

## 2019-01-07 RX ORDER — METOPROLOL TARTRATE 50 MG
25 TABLET ORAL
Qty: 0 | Refills: 0 | Status: DISCONTINUED | OUTPATIENT
Start: 2019-01-07 | End: 2019-01-07

## 2019-01-07 RX ORDER — ATORVASTATIN CALCIUM 80 MG/1
10 TABLET, FILM COATED ORAL AT BEDTIME
Qty: 0 | Refills: 0 | Status: DISCONTINUED | OUTPATIENT
Start: 2019-01-07 | End: 2019-01-07

## 2019-01-07 RX ORDER — PANTOPRAZOLE SODIUM 20 MG/1
40 TABLET, DELAYED RELEASE ORAL
Qty: 0 | Refills: 0 | Status: DISCONTINUED | OUTPATIENT
Start: 2019-01-07 | End: 2019-01-10

## 2019-01-07 RX ORDER — AMLODIPINE BESYLATE 2.5 MG/1
10 TABLET ORAL ONCE
Qty: 0 | Refills: 0 | Status: DISCONTINUED | OUTPATIENT
Start: 2019-01-07 | End: 2019-01-07

## 2019-01-07 RX ORDER — SODIUM CHLORIDE 9 MG/ML
1000 INJECTION INTRAMUSCULAR; INTRAVENOUS; SUBCUTANEOUS
Qty: 0 | Refills: 0 | Status: DISCONTINUED | OUTPATIENT
Start: 2019-01-07 | End: 2019-01-09

## 2019-01-07 RX ORDER — LEVETIRACETAM 250 MG/1
500 TABLET, FILM COATED ORAL
Qty: 0 | Refills: 0 | Status: DISCONTINUED | OUTPATIENT
Start: 2019-01-07 | End: 2019-01-10

## 2019-01-07 RX ORDER — ASPIRIN/CALCIUM CARB/MAGNESIUM 324 MG
81 TABLET ORAL DAILY
Qty: 0 | Refills: 0 | Status: DISCONTINUED | OUTPATIENT
Start: 2019-01-07 | End: 2019-01-07

## 2019-01-07 RX ORDER — ACETAMINOPHEN 500 MG
650 TABLET ORAL EVERY 6 HOURS
Qty: 0 | Refills: 0 | Status: DISCONTINUED | OUTPATIENT
Start: 2019-01-07 | End: 2019-01-10

## 2019-01-07 RX ORDER — LEVETIRACETAM 250 MG/1
500 TABLET, FILM COATED ORAL
Qty: 0 | Refills: 0 | Status: DISCONTINUED | OUTPATIENT
Start: 2019-01-07 | End: 2019-01-07

## 2019-01-07 RX ORDER — TAMSULOSIN HYDROCHLORIDE 0.4 MG/1
0.4 CAPSULE ORAL AT BEDTIME
Qty: 0 | Refills: 0 | Status: DISCONTINUED | OUTPATIENT
Start: 2019-01-07 | End: 2019-01-10

## 2019-01-07 RX ORDER — METOPROLOL TARTRATE 50 MG
25 TABLET ORAL
Qty: 0 | Refills: 0 | Status: DISCONTINUED | OUTPATIENT
Start: 2019-01-07 | End: 2019-01-10

## 2019-01-07 RX ORDER — AMLODIPINE BESYLATE 2.5 MG/1
10 TABLET ORAL ONCE
Qty: 0 | Refills: 0 | Status: COMPLETED | OUTPATIENT
Start: 2019-01-07 | End: 2019-01-07

## 2019-01-07 RX ORDER — PANTOPRAZOLE SODIUM 20 MG/1
40 TABLET, DELAYED RELEASE ORAL
Qty: 0 | Refills: 0 | Status: DISCONTINUED | OUTPATIENT
Start: 2019-01-07 | End: 2019-01-07

## 2019-01-07 RX ORDER — ASPIRIN/CALCIUM CARB/MAGNESIUM 324 MG
81 TABLET ORAL DAILY
Qty: 0 | Refills: 0 | Status: DISCONTINUED | OUTPATIENT
Start: 2019-01-07 | End: 2019-01-10

## 2019-01-07 RX ADMIN — Medication 1 TABLET(S): at 12:16

## 2019-01-07 RX ADMIN — LEVETIRACETAM 500 MILLIGRAM(S): 250 TABLET, FILM COATED ORAL at 17:54

## 2019-01-07 RX ADMIN — ATORVASTATIN CALCIUM 10 MILLIGRAM(S): 80 TABLET, FILM COATED ORAL at 22:43

## 2019-01-07 RX ADMIN — AMLODIPINE BESYLATE 10 MILLIGRAM(S): 2.5 TABLET ORAL at 12:16

## 2019-01-07 RX ADMIN — TAMSULOSIN HYDROCHLORIDE 0.4 MILLIGRAM(S): 0.4 CAPSULE ORAL at 03:12

## 2019-01-07 RX ADMIN — Medication 25 MILLIGRAM(S): at 17:55

## 2019-01-07 RX ADMIN — PANTOPRAZOLE SODIUM 40 MILLIGRAM(S): 20 TABLET, DELAYED RELEASE ORAL at 07:32

## 2019-01-07 RX ADMIN — SODIUM CHLORIDE 1000 MILLILITER(S): 9 INJECTION INTRAMUSCULAR; INTRAVENOUS; SUBCUTANEOUS at 01:06

## 2019-01-07 RX ADMIN — Medication 25 MILLIGRAM(S): at 07:31

## 2019-01-07 RX ADMIN — LEVETIRACETAM 500 MILLIGRAM(S): 250 TABLET, FILM COATED ORAL at 07:31

## 2019-01-07 RX ADMIN — Medication 81 MILLIGRAM(S): at 12:16

## 2019-01-07 RX ADMIN — SODIUM CHLORIDE 70 MILLILITER(S): 9 INJECTION INTRAMUSCULAR; INTRAVENOUS; SUBCUTANEOUS at 01:07

## 2019-01-07 RX ADMIN — TAMSULOSIN HYDROCHLORIDE 0.4 MILLIGRAM(S): 0.4 CAPSULE ORAL at 22:43

## 2019-01-07 RX ADMIN — SODIUM CHLORIDE 70 MILLILITER(S): 9 INJECTION INTRAMUSCULAR; INTRAVENOUS; SUBCUTANEOUS at 22:44

## 2019-01-07 RX ADMIN — Medication 20 MILLIGRAM(S): at 07:31

## 2019-01-07 NOTE — PROGRESS NOTE ADULT - PROBLEM SELECTOR PLAN 1
H/H - 8/26, no gross bleeding, guiac neg. stool.  Pt. had full GI workup in December at [facility], he did not have EGD/Colonoscopy since he was occult blood negative.

## 2019-01-07 NOTE — PROGRESS NOTE ADULT - PROBLEM SELECTOR PLAN 2
no bleeding today, check repeat CBC today  Urology eval if pt. with persistent hematuria  renal/bladder sono pending

## 2019-01-07 NOTE — PROGRESS NOTE ADULT - SUBJECTIVE AND OBJECTIVE BOX
Patient is a 83y old  Male who presents with a chief complaint of hematuria, lethargy (2019 23:47)  Patient seen and examined at bedside.  Pt. with no abd pain, no dysuria, no hematuria this morning.      ALLERGIES:  No Known Allergies    MEDICATIONS  (STANDING):  aspirin  chewable 81 milliGRAM(s) Oral daily  atorvastatin 10 milliGRAM(s) Oral at bedtime  levETIRAcetam 500 milliGRAM(s) Oral two times a day  metoprolol tartrate 25 milliGRAM(s) Oral two times a day  multivitamin 1 Tablet(s) Oral daily  pantoprazole    Tablet 40 milliGRAM(s) Oral before breakfast  predniSONE   Tablet 20 milliGRAM(s) Oral daily  sodium chloride 0.9%. 1000 milliLiter(s) (70 mL/Hr) IV Continuous <Continuous>  tamsulosin 0.4 milliGRAM(s) Oral at bedtime    MEDICATIONS  (PRN):  acetaminophen   Tablet .. 650 milliGRAM(s) Oral every 6 hours PRN Temp greater or equal to 38C (100.4F), Mild Pain (1 - 3), Moderate Pain (4 - 6)    Vital Signs Last 24 Hrs  T(F): 98.3 (2019 08:42), Max: 98.5 (2019 21:02)  HR: 69 (2019 08:42) (69 - 100)  BP: 127/62 (2019 08:42) (120/68 - 129/70)  RR: 16 (2019 08:42) (15 - 18)  SpO2: 98% (2019 08:42) (96% - 99%)  I&O's Summary    PHYSICAL EXAM:  General: NAD, A/O x 3  ENT: MMM  Neck: Supple, No JVD  Lungs: Clear to auscultation bilaterally  Cardio: RRR, S1/S2, No murmurs  Abdomen: Soft, Nontender, Nondistended; Bowel sounds present  Extremities: No calf tenderness, No pitting edema  Neuro:  no focal deficits    LABS:                        8.1    14.8  )-----------( 146      ( 2019 15:14 )             26.3     01-07    136  |  103  |  45  ----------------------------<  145  3.5   |  23  |  2.15    Ca    9.2      2019 05:45  Mg     1.6         TPro  7.2  /  Alb  2.2  /  TBili  0.4  /  DBili  x   /  AST  15  /  ALT  20  /  AlkPhos  31      eGFR if Non African American: 27 mL/min/1.73M2 (19 @ 05:45)  eGFR if African American: 32 mL/min/1.73M2 (19 @ 05:45)    PT/INR - ( 2019 05:45 )   PT: 20.8 sec;   INR: 1.82 ratio         PTT - ( 2019 21:08 )  PTT:36.3 sec        12 Chol 95 mg/dL LDL 55 mg/dL HDL 26 mg/dL Trig 69 mg/dL  TSH 1.35   TSH with FT4 reflex --  Total T3 --        CAPILLARY BLOOD GLUCOSE         [id number] 6.3    Urinalysis Basic - ( 2019 23:45 )    Color: Yellow / Appearance: Clear / S.025 / pH: x  Gluc: x / Ketone: Negative  / Bili: Small / Urobili: Negative   Blood: x / Protein: 30 mg/dL / Nitrite: Negative   Leuk Esterase: Negative / RBC: 0-4 /HPF / WBC Negative /HPF   Sq Epi: x / Non Sq Epi: Neg.-Few / Bacteria: Negative /HPF      RADIOLOGY & ADDITIONAL TESTS:    Care Discussed with Consultants/Other Providers:

## 2019-01-08 DIAGNOSIS — D50.0 IRON DEFICIENCY ANEMIA SECONDARY TO BLOOD LOSS (CHRONIC): ICD-10-CM

## 2019-01-08 DIAGNOSIS — N17.9 ACUTE KIDNEY FAILURE, UNSPECIFIED: ICD-10-CM

## 2019-01-08 DIAGNOSIS — D69.6 THROMBOCYTOPENIA, UNSPECIFIED: ICD-10-CM

## 2019-01-08 LAB
ANION GAP SERPL CALC-SCNC: 13 MMOL/L — SIGNIFICANT CHANGE UP (ref 5–17)
BUN SERPL-MCNC: 54 MG/DL — HIGH (ref 7–23)
CALCIUM SERPL-MCNC: 9.3 MG/DL — SIGNIFICANT CHANGE UP (ref 8.4–10.5)
CHLORIDE SERPL-SCNC: 105 MMOL/L — SIGNIFICANT CHANGE UP (ref 96–108)
CO2 SERPL-SCNC: 21 MMOL/L — LOW (ref 22–31)
CREAT SERPL-MCNC: 2.01 MG/DL — HIGH (ref 0.5–1.3)
CULTURE RESULTS: NO GROWTH — SIGNIFICANT CHANGE UP
GLUCOSE SERPL-MCNC: 149 MG/DL — HIGH (ref 70–99)
HCT VFR BLD CALC: 24.5 % — LOW (ref 39–50)
HGB BLD-MCNC: 7.5 G/DL — LOW (ref 13–17)
MCHC RBC-ENTMCNC: 30.6 GM/DL — LOW (ref 32–36)
MCHC RBC-ENTMCNC: 33.1 PG — SIGNIFICANT CHANGE UP (ref 27–34)
MCV RBC AUTO: 108 FL — HIGH (ref 80–100)
PLATELET # BLD AUTO: 142 K/UL — LOW (ref 150–400)
POTASSIUM SERPL-MCNC: 3.8 MMOL/L — SIGNIFICANT CHANGE UP (ref 3.5–5.3)
POTASSIUM SERPL-SCNC: 3.8 MMOL/L — SIGNIFICANT CHANGE UP (ref 3.5–5.3)
RBC # BLD: 2.27 M/UL — LOW (ref 4.2–5.8)
RBC # FLD: 18.5 % — HIGH (ref 10.3–14.5)
SODIUM SERPL-SCNC: 139 MMOL/L — SIGNIFICANT CHANGE UP (ref 135–145)
SPECIMEN SOURCE: SIGNIFICANT CHANGE UP
WBC # BLD: 10.5 K/UL — SIGNIFICANT CHANGE UP (ref 3.8–10.5)
WBC # FLD AUTO: 10.5 K/UL — SIGNIFICANT CHANGE UP (ref 3.8–10.5)

## 2019-01-08 PROCEDURE — 99233 SBSQ HOSP IP/OBS HIGH 50: CPT

## 2019-01-08 PROCEDURE — 99221 1ST HOSP IP/OBS SF/LOW 40: CPT

## 2019-01-08 PROCEDURE — 76775 US EXAM ABDO BACK WALL LIM: CPT | Mod: 26

## 2019-01-08 RX ADMIN — SODIUM CHLORIDE 70 MILLILITER(S): 9 INJECTION INTRAMUSCULAR; INTRAVENOUS; SUBCUTANEOUS at 22:22

## 2019-01-08 RX ADMIN — SODIUM CHLORIDE 70 MILLILITER(S): 9 INJECTION INTRAMUSCULAR; INTRAVENOUS; SUBCUTANEOUS at 17:34

## 2019-01-08 RX ADMIN — SODIUM CHLORIDE 70 MILLILITER(S): 9 INJECTION INTRAMUSCULAR; INTRAVENOUS; SUBCUTANEOUS at 11:20

## 2019-01-08 RX ADMIN — ATORVASTATIN CALCIUM 10 MILLIGRAM(S): 80 TABLET, FILM COATED ORAL at 22:20

## 2019-01-08 RX ADMIN — Medication 20 MILLIGRAM(S): at 06:33

## 2019-01-08 RX ADMIN — Medication 81 MILLIGRAM(S): at 11:20

## 2019-01-08 RX ADMIN — PANTOPRAZOLE SODIUM 40 MILLIGRAM(S): 20 TABLET, DELAYED RELEASE ORAL at 06:33

## 2019-01-08 RX ADMIN — LEVETIRACETAM 500 MILLIGRAM(S): 250 TABLET, FILM COATED ORAL at 06:32

## 2019-01-08 RX ADMIN — Medication 1 TABLET(S): at 11:20

## 2019-01-08 RX ADMIN — TAMSULOSIN HYDROCHLORIDE 0.4 MILLIGRAM(S): 0.4 CAPSULE ORAL at 22:20

## 2019-01-08 RX ADMIN — Medication 25 MILLIGRAM(S): at 06:33

## 2019-01-08 RX ADMIN — LEVETIRACETAM 500 MILLIGRAM(S): 250 TABLET, FILM COATED ORAL at 17:34

## 2019-01-08 RX ADMIN — Medication 25 MILLIGRAM(S): at 17:34

## 2019-01-08 NOTE — CONSULT NOTE ADULT - SUBJECTIVE AND OBJECTIVE BOX
NEPHROLOGY CONSULTATION    CHIEF COMPLAINT: hematuria    HPI:  Pt is 82 yo M with h/o stomach CA, chronic Anemia, Afib (on Apixaban), Gout sent in from [facility] for low H/H, hematuria and possibly blood in stool. Patient states he was recently admitted to [place] ED treated for pneumonia and new seizure.   Since yesterday, pt states he has been feeling weak, also c/o aches and pains in his feet and was just started on Prednisone today for gout.  He was also started on IV hydration. However, last night, when pt went to the bathroom, staff noted large amount of foul smelling stool, mixed with blood, staff was unsure whether blood was from BM or from urine. Pt is on Apixaban and ASA. Denies abdominal pain, dysuria, frequency, nausea, vomiting, headache, chest pain, shortness of breath. He was recently seen by GI and heme (18) for anemia when he was at [facility], no EGD/coloscopy performed as he was guaiac neg.  He did receive a unit of PRBC for H/H  during hospitalization. He just had echo 18 pos for moderate left ventricular hypertrophy with normal left ventricular systolic function with stage I diastolic dysfunction, moderate calcific severe aortic stenosis, mild tricuspid regurgitation.     ROS:  as above    Allergies:  No Known Allergies    PAST MEDICAL & SURGICAL HISTORY:  Aortic valve stenosis  Cancer of stomach  Anemia, unspecified type  Seizure disorder  Benign prostatic hyperplasia  Hyperlipemia  Essential hypertension  Chronic atrial fibrillation  Atrial fibrillation  Gout  No significant past surgical history    SOCIAL HISTORY:  negative    FAMILY HISTORY:  No pertinent family history in first degree relatives    MEDICATIONS  (STANDING):  aspirin  chewable 81 milliGRAM(s) Oral daily  atorvastatin 10 milliGRAM(s) Oral at bedtime  levETIRAcetam 500 milliGRAM(s) Oral two times a day  metoprolol tartrate 25 milliGRAM(s) Oral two times a day  multivitamin 1 Tablet(s) Oral daily  pantoprazole    Tablet 40 milliGRAM(s) Oral before breakfast  predniSONE   Tablet 20 milliGRAM(s) Oral daily  sodium chloride 0.9%. 1000 milliLiter(s) (70 mL/Hr) IV Continuous <Continuous>  tamsulosin 0.4 milliGRAM(s) Oral at bedtime    Vital Signs Last 24 Hrs  T(C): 36.8 (19 @ 04:58), Max: 36.8 (19 @ 22:45)  T(F): 98.2 (19 @ 04:58), Max: 98.3 (19 @ 22:45)  HR: 70 (19 @ 04:58) (70 - 79)  BP: 121/52 (19 @ 04:58) (114/62 - 121/56)  RR: 15 (19 @ 04:58) (15 - 16)  SpO2: 95% (19 @ 04:58) (95% - 98%)    Conversant, no apparent distress  PERRLA, pink conjunctivae, no ptosis  Neck non tender, no mass, no thyromegaly or nodules  Normal respiratory effort, lungs clear to auscultation  Heart with RRR, no murmurs or rubs, no peripheral edema  Abdomen soft, no masses, no organomegaly  Skin no rashes, ulcers or lesions, normal turgor and temperature  Appropriate affect, AO x 3    LABS:                        7.5    10.5  )-----------( 142      ( 2019 06:44 )             24.5         139  |  105  |  54<H>  ----------------------------<  149<H>  3.8   |  21<L>  |  2.01<H>    Ca    9.3      2019 06:44  Mg     1.6         TPro  7.2  /  Alb  2.2<L>  /  TBili  0.4  /  DBili  x   /  AST  15  /  ALT  20  /  AlkPhos  31<L>      Urinalysis Basic - ( 2019 23:45 )    Color: Yellow / Appearance: Clear / S.025 / pH: x  Gluc: x / Ketone: Negative  / Bili: Small / Urobili: Negative   Blood: x / Protein: 30 mg/dL / Nitrite: Negative   Leuk Esterase: Negative / RBC: 0-4 /HPF / WBC Negative /HPF   Sq Epi: x / Non Sq Epi: Neg.-Few / Bacteria: Negative /HPF    LIVER FUNCTIONS - ( 2019 21:08 )  Alb: 2.2 g/dL / Pro: 7.2 g/dL / ALK PHOS: 31 U/L / ALT: 20 U/L DA / AST: 15 U/L / GGT: x           Culture - Urine (collected 2019 23:45)  Source: .Urine Clean Catch (Midstream)  Final Report (2019 12:07):    No growth    PT/INR - ( 2019 05:45 )   PT: 20.8 sec;   INR: 1.82 ratio       PTT - ( 2019 21:08 )  PTT:36.3 sec  Urinalysis Basic - ( 2019 23:45 )    A/P: NEPHROLOGY CONSULTATION    CHIEF COMPLAINT: hematuria    HPI:  Pt is 84 yo M with h/o stomach CA, chronic Anemia, Afib (on Apixaban), [facility]out [facility] H/H, hematuria and possibly blood in stool. Patient states he was recently admitted to [place] ED treated for pneumonia and new seizure.   Since yesterday, pt states he has been feeling weak, also c/o aches and pains in his feet and was just started on Prednisone today for gout.  He was also started on IV hydration. However, last night, when pt went to the bathroom, staff noted large amount of foul smelling stool, mixed with blood, staff was unsure whether blood was from BM or from urine. Pt is on Apixaban and ASA. Denies abdominal pain, dysuria, frequency, nausea, vomiting, headache, chest pain, shortness of breath. He was recently seen by GI and heme (18) for anemia when he was at [facility], no EGD/coloscopy performed as he was guaiac neg.  He did receive a unit of PRBC for H/H  during hospitalization. He just had echo 18 pos for moderate left ventricular hypertrophy with normal left ventricular systolic function with stage I diastolic dysfunction, moderate calcific severe aortic stenosis, mild tricuspid regurgitation. Awake, poor historian. History mostly from chart. SPEP negative on last adm.    ROS:  as above    Allergies:  No Known Allergies    PAST MEDICAL & SURGICAL HISTORY:  Aortic valve stenosis  Cancer of stomach  Anemia, unspecified type  Seizure disorder  Benign prostatic hyperplasia  Hyperlipemia  Essential hypertension  Chronic atrial fibrillation  Atrial fibrillation  Gout  No significant past surgical history    SOCIAL HISTORY:  negative    FAMILY HISTORY:  No pertinent family history in first degree relatives    MEDICATIONS  (STANDING):  aspirin  chewable 81 milliGRAM(s) Oral daily  atorvastatin 10 milliGRAM(s) Oral at bedtime  levETIRAcetam 500 milliGRAM(s) Oral two times a day  metoprolol tartrate 25 milliGRAM(s) Oral two times a day  multivitamin 1 Tablet(s) Oral daily  pantoprazole    Tablet 40 milliGRAM(s) Oral before breakfast  predniSONE   Tablet 20 milliGRAM(s) Oral daily  sodium chloride 0.9%. 1000 milliLiter(s) (70 mL/Hr) IV Continuous <Continuous>  tamsulosin 0.4 milliGRAM(s) Oral at bedtime    Vital Signs Last 24 Hrs  T(C): 36.8 (19 @ 04:58), Max: 36.8 (19 @ 22:45)  T(F): 98.2 (19 @ 04:58), Max: 98.3 (19 @ 22:45)  HR: 70 (19 @ 04:58) (70 - 79)  BP: 121/52 (19 @ 04:58) (114/62 - 121/56)  RR: 15 (19 @ 04:58) (15 - 16)  SpO2: 95% (19 @ 04:58) (95% - 98%)    Conversant, no apparent distress  PERRLA, EOMI  Neck non tender, supple  Normal respiratory effort, lungs clear to auscultation  Heart S1S2, no peripheral edema  Abdomen soft, NT, ND  Appropriate affect    LABS:                        7.5    10.5  )-----------( 142      ( 2019 06:44 )             24.5         139  |  105  |  54<H>  ----------------------------<  149<H>  3.8   |  21<L>  |  2.01<H>    Ca    9.3      2019 06:44  Mg     1.6         TPro  7.2  /  Alb  2.2<L>  /  TBili  0.4  /  DBili  x   /  AST  15  /  ALT  20  /  AlkPhos  31<L>      Urinalysis Basic - ( 2019 23:45 )    Color: Yellow / Appearance: Clear / S.025 / pH: x  Gluc: x / Ketone: Negative  / Bili: Small / Urobili: Negative   Blood: x / Protein: 30 mg/dL / Nitrite: Negative   Leuk Esterase: Negative / RBC: 0-4 /HPF / WBC Negative /HPF   Sq Epi: x / Non Sq Epi: Neg.-Few / Bacteria: Negative /HPF    LIVER FUNCTIONS - ( 2019 21:08 )  Alb: 2.2 g/dL / Pro: 7.2 g/dL / ALK PHOS: 31 U/L / ALT: 20 U/L DA / AST: 15 U/L / GGT: x           Culture - Urine (collected 2019 23:45)  Source: .Urine Clean Catch (Midstream)  Final Report (2019 12:07):    No growth    PT/INR - ( 2019 05:45 )   PT: 20.8 sec;   INR: 1.82 ratio       PTT - ( 2019 21:08 )  PTT:36.3 sec  Urinalysis Basic - ( 2019 23:45 )    A/P:    Suspect pre-renal YUKI on CKD  Agree w/IVF  No nephrotoxins  CBC, BMP in am  Will f/u

## 2019-01-08 NOTE — PROGRESS NOTE ADULT - ATTENDING COMMENTS
I have personally seen and examined patient on the above date.  I discussed the case with BOB Mcbride and I agree with findings and plan as detailed per note above, which I have amended where appropriate.      Hematuria in setting of Apixaban use- Hb is at baseline. If remains stable in AM, would resume Apixaban and schedule for outpatient urology follow-up for a possible cystoscopy. If Hb drops further (or if hematuria reoccurs), would consult urology in AM.    YUKI - on IVF. Repeat BMP in AM. Baseline Cr ~ 1.3. Check PVR x 1. Follow-up renal sonogram
I have personally seen and examined patient on the above date.  I discussed the case with BOB Mcbride and I agree with findings and plan as detailed per note above, which I have amended where appropriate.      Hematuria in setting of Apixaban use- Hb is at baseline. If remains stable in AM, would resume Apixaban and schedule for outpatient urology follow-up for a possible cystoscopy. If Hb drops further (or if hematuria reoccurs), would consult urology in AM.    YUKI - on IVF. Repeat BMP in AM. Baseline Cr ~ 1.3. Check PVR x 1. Follow-up renal sonogram

## 2019-01-08 NOTE — PROGRESS NOTE ADULT - SUBJECTIVE AND OBJECTIVE BOX
Patient is a 83y old  Male who presents with a chief complaint of hematuria, lethargy (2019 15:51)      Patient seen and examined at bedside.    ALLERGIES:  No Known Allergies    MEDICATIONS  (STANDING):  aspirin  chewable 81 milliGRAM(s) Oral daily  atorvastatin 10 milliGRAM(s) Oral at bedtime  levETIRAcetam 500 milliGRAM(s) Oral two times a day  metoprolol tartrate 25 milliGRAM(s) Oral two times a day  multivitamin 1 Tablet(s) Oral daily  pantoprazole    Tablet 40 milliGRAM(s) Oral before breakfast  predniSONE   Tablet 20 milliGRAM(s) Oral daily  sodium chloride 0.9%. 1000 milliLiter(s) (70 mL/Hr) IV Continuous <Continuous>  tamsulosin 0.4 milliGRAM(s) Oral at bedtime    MEDICATIONS  (PRN):  acetaminophen   Tablet .. 650 milliGRAM(s) Oral every 6 hours PRN Temp greater or equal to 38C (100.4F), Mild Pain (1 - 3), Moderate Pain (4 - 6)    Vital Signs Last 24 Hrs  T(F): 98.2 (2019 04:58), Max: 98.3 (2019 08:42)  HR: 70 (2019 04:58) (69 - 79)  BP: 121/52 (2019 04:58) (114/62 - 127/62)  RR: 15 (2019 04:58) (15 - 16)  SpO2: 95% (2019 04:58) (95% - 98%)  I&O's Summary    2019 07:01  -  2019 07:00  --------------------------------------------------------  IN: 490 mL / OUT: 450 mL / NET: 40 mL      BMI (kg/m2): 25.1 (19 @ 21:02)  PHYSICAL EXAM:  General: NAD, A/O x 3  ENT: MMM  Neck: Supple, No JVD  Lungs: Clear to auscultation bilaterally  Cardio: RRR, S1/S2, No murmurs  Abdomen: Soft, Nontender, Nondistended; Bowel sounds present  Extremities: No calf tenderness, No pitting edema    LABS:                        7.5    10.5  )-----------( 142      ( 2019 06:44 )             24.5           139  |  105  |  54  ----------------------------<  149  3.8   |  21  |  2.01    Ca    9.3      2019 06:44  Mg     1.6         TPro  7.2  /  Alb  2.2  /  TBili  0.4  /  DBili  x   /  AST  15  /  ALT  20  /  AlkPhos  31       eGFR if Non African American: 30 mL/min/1.73M2 (19 @ 06:44)  eGFR if African American: 35 mL/min/1.73M2 (19 @ 06:44)    PT/INR - ( 2019 05:45 )   PT: 20.8 sec;   INR: 1.82 ratio      PTT - ( 2019 21:08 )  PTT:36.3 sec     12-23 Chol 95 mg/dL LDL 55 mg/dL HDL 26 mg/dL Trig 69 mg/dL  TSH 1.35   TSH with FT4 reflex --  Total T3 --    CAPILLARY BLOOD GLUCOSE    12-23 [id number] 6.3    Urinalysis Basic - ( 2019 23:45 )    Color: Yellow / Appearance: Clear / S.025 / pH: x  Gluc: x / Ketone: Negative  / Bili: Small / Urobili: Negative   Blood: x / Protein: 30 mg/dL / Nitrite: Negative   Leuk Esterase: Negative / RBC: 0-4 /HPF / WBC Negative /HPF   Sq Epi: x / Non Sq Epi: Neg.-Few / Bacteria: Negative /HPF    RADIOLOGY & ADDITIONAL TESTS:    Care Discussed with Consultants/Other Providers:

## 2019-01-08 NOTE — CONSULT NOTE ADULT - SUBJECTIVE AND OBJECTIVE BOX
83 man with gastric cancer, atrial fibrillation on anticoagulation was in a subacute rehab setting following treatment of a respiratory illness (treated for pneumonia) and developed hematuria and/or blood per rectum. Sent from [facility] for evaluation. To date, has been stable without bleeding, albeit with a chronic anemia. He was noted to have elevated creatinine which is being evaluated. It is hard to know his functional baseline as he appears presently an unreliable historian, but he states he had no mobility limitation prior to his recent respiratory illness. He denies focal weakness, sensory loss. He notes a fall while at an event in [place] in December. It is reported that he was treated for a gout flare at [facility] with steroids. Was evaluated by PT today and basically found to be at min assist level for bed mob, transfers and very limited ambulation.     He lives with a significant other in a private home w stairs.     On exam, he is thin, but not cachectic. Non toxic.  Cannot recall recent details of illness.  No cranial nerve abnormalities.  No focal motor deficits.  Coordination is normal or near normal.

## 2019-01-08 NOTE — CONSULT NOTE ADULT - ASSESSMENT
Based upon his diagnoses, PT evaluation and the physical findings above, I recommend that he return to [facility] when his current medical condition is resolved.

## 2019-01-08 NOTE — PROGRESS NOTE ADULT - ASSESSMENT
84 y/o M with h/o stomach CA, chronic Anemia, Afib (on Apixaban), seizure disorder, Gout, presents with generalized weakness, episode of ?blood in stool and urine, now with acute renal insuff, elev WBC  Pt is afebrile, urine unrevealing now except for trace blood.

## 2019-01-08 NOTE — PHYSICAL THERAPY INITIAL EVALUATION ADULT - ADDITIONAL COMMENTS
Pt admitted from , where he was ambulating short distances with assist and required assist with ADLs.  Prior to last hospitalization, pt was living home with significant other.  He was independent with ambulation and ADLs, able to negotiate 22 steps in the house independently.

## 2019-01-09 LAB
ALBUMIN SERPL ELPH-MCNC: 1.9 G/DL — LOW (ref 3.3–5)
ALP SERPL-CCNC: 25 U/L — LOW (ref 40–120)
ALT FLD-CCNC: 30 U/L DA — SIGNIFICANT CHANGE UP (ref 10–45)
ANION GAP SERPL CALC-SCNC: 12 MMOL/L — SIGNIFICANT CHANGE UP (ref 5–17)
AST SERPL-CCNC: 29 U/L — SIGNIFICANT CHANGE UP (ref 10–40)
BILIRUB SERPL-MCNC: 0.3 MG/DL — SIGNIFICANT CHANGE UP (ref 0.2–1.2)
BUN SERPL-MCNC: 51 MG/DL — HIGH (ref 7–23)
CALCIUM SERPL-MCNC: 9 MG/DL — SIGNIFICANT CHANGE UP (ref 8.4–10.5)
CHLORIDE SERPL-SCNC: 107 MMOL/L — SIGNIFICANT CHANGE UP (ref 96–108)
CO2 SERPL-SCNC: 21 MMOL/L — LOW (ref 22–31)
CREAT SERPL-MCNC: 1.95 MG/DL — HIGH (ref 0.5–1.3)
GLUCOSE SERPL-MCNC: 157 MG/DL — HIGH (ref 70–99)
HCT VFR BLD CALC: 25.4 % — LOW (ref 39–50)
HGB BLD-MCNC: 7.9 G/DL — LOW (ref 13–17)
MCHC RBC-ENTMCNC: 31.1 GM/DL — LOW (ref 32–36)
MCHC RBC-ENTMCNC: 33.7 PG — SIGNIFICANT CHANGE UP (ref 27–34)
MCV RBC AUTO: 108.3 FL — HIGH (ref 80–100)
PLATELET # BLD AUTO: 146 K/UL — LOW (ref 150–400)
POTASSIUM SERPL-MCNC: 4 MMOL/L — SIGNIFICANT CHANGE UP (ref 3.5–5.3)
POTASSIUM SERPL-SCNC: 4 MMOL/L — SIGNIFICANT CHANGE UP (ref 3.5–5.3)
PROT SERPL-MCNC: 6.4 G/DL — SIGNIFICANT CHANGE UP (ref 6–8.3)
RBC # BLD: 2.34 M/UL — LOW (ref 4.2–5.8)
RBC # FLD: 18.8 % — HIGH (ref 10.3–14.5)
SODIUM SERPL-SCNC: 140 MMOL/L — SIGNIFICANT CHANGE UP (ref 135–145)
WBC # BLD: 7.6 K/UL — SIGNIFICANT CHANGE UP (ref 3.8–10.5)
WBC # FLD AUTO: 7.6 K/UL — SIGNIFICANT CHANGE UP (ref 3.8–10.5)

## 2019-01-09 PROCEDURE — 99233 SBSQ HOSP IP/OBS HIGH 50: CPT

## 2019-01-09 RX ORDER — SODIUM CHLORIDE 9 MG/ML
1000 INJECTION, SOLUTION INTRAVENOUS
Qty: 0 | Refills: 0 | Status: DISCONTINUED | OUTPATIENT
Start: 2019-01-09 | End: 2019-01-10

## 2019-01-09 RX ADMIN — PANTOPRAZOLE SODIUM 40 MILLIGRAM(S): 20 TABLET, DELAYED RELEASE ORAL at 05:37

## 2019-01-09 RX ADMIN — Medication 25 MILLIGRAM(S): at 18:24

## 2019-01-09 RX ADMIN — LEVETIRACETAM 500 MILLIGRAM(S): 250 TABLET, FILM COATED ORAL at 05:37

## 2019-01-09 RX ADMIN — TAMSULOSIN HYDROCHLORIDE 0.4 MILLIGRAM(S): 0.4 CAPSULE ORAL at 22:54

## 2019-01-09 RX ADMIN — Medication 25 MILLIGRAM(S): at 05:37

## 2019-01-09 RX ADMIN — Medication 1 TABLET(S): at 12:05

## 2019-01-09 RX ADMIN — Medication 81 MILLIGRAM(S): at 12:05

## 2019-01-09 RX ADMIN — SODIUM CHLORIDE 70 MILLILITER(S): 9 INJECTION INTRAMUSCULAR; INTRAVENOUS; SUBCUTANEOUS at 05:37

## 2019-01-09 RX ADMIN — Medication 20 MILLIGRAM(S): at 05:37

## 2019-01-09 RX ADMIN — ATORVASTATIN CALCIUM 10 MILLIGRAM(S): 80 TABLET, FILM COATED ORAL at 22:54

## 2019-01-09 RX ADMIN — LEVETIRACETAM 500 MILLIGRAM(S): 250 TABLET, FILM COATED ORAL at 18:25

## 2019-01-09 NOTE — PROGRESS NOTE ADULT - SUBJECTIVE AND OBJECTIVE BOX
Patient is a 83y old  Male who presents with a chief complaint of hematuria, lethargy (2019 16:49)    Patient feeling better.  No acute issues.      Patient seen and examined at bedside.    ALLERGIES:  No Known Allergies    MEDICATIONS  (STANDING):  aspirin  chewable 81 milliGRAM(s) Oral daily  atorvastatin 10 milliGRAM(s) Oral at bedtime  levETIRAcetam 500 milliGRAM(s) Oral two times a day  metoprolol tartrate 25 milliGRAM(s) Oral two times a day  multivitamin 1 Tablet(s) Oral daily  pantoprazole    Tablet 40 milliGRAM(s) Oral before breakfast  predniSONE   Tablet 20 milliGRAM(s) Oral daily  tamsulosin 0.4 milliGRAM(s) Oral at bedtime    MEDICATIONS  (PRN):  acetaminophen   Tablet .. 650 milliGRAM(s) Oral every 6 hours PRN Temp greater or equal to 38C (100.4F), Mild Pain (1 - 3), Moderate Pain (4 - 6)    Vital Signs Last 24 Hrs  T(F): 97.5 (2019 05:32), Max: 98.4 (2019 16:26)  HR: 70 (2019 05:32) (68 - 70)  BP: 133/61 (2019 05:32) (129/61 - 133/63)  RR: 14 (2019 05:32) (14 - 14)  SpO2: 99% (2019 05:32) (96% - 99%)  I&O's Summary    2019 07:  -  2019 07:00  --------------------------------------------------------  IN: 2020 mL / OUT: 0 mL / NET: 2020 mL    2019 07:  -  2019 13:39  --------------------------------------------------------  IN: 690 mL / OUT: 0 mL / NET: 690 mL      BMI (kg/m2): 25.1 (19 @ 21:02)  PHYSICAL EXAM:  General: NAD, A/O x 3  ENT: MMM  Neck: Supple, No JVD  Lungs: Clear to auscultation bilaterally  Cardio: RRR, S1/S2, No murmurs  Abdomen: Soft, Nontender, Nondistended; Bowel sounds present  Extremities: No calf tenderness, No pitting edema    LABS:                        7.9    7.6   )-----------( 146      ( 2019 08:00 )             25.4           140  |  107  |  51  ----------------------------<  157  4.0   |  21  |  1.95    Ca    9.0      2019 08:00  Mg     1.6         TPro  6.4  /  Alb  1.9  /  TBili  0.3  /  DBili  x   /  AST  29  /  ALT  30  /  AlkPhos  25       eGFR if Non African American: 31 mL/min/1.73M2 (19 @ 08:00)  eGFR if : 36 mL/min/1.73M2 (19 @ 08:00)    PT/INR - ( 2019 05:45 )   PT: 20.8 sec;   INR: 1.82 ratio       PTT - ( 2019 21:08 )  PTT:36.3 sec     12-23 Chol 95 mg/dL LDL 55 mg/dL HDL 26 mg/dL Trig 69 mg/dL  TSH 1.35   TSH with FT4 reflex --  Total T3 --    CAPILLARY BLOOD GLUCOSE    12-23 [id number] 6.3    Urinalysis Basic - ( 2019 23:45 )    Color: Yellow / Appearance: Clear / S.025 / pH: x  Gluc: x / Ketone: Negative  / Bili: Small / Urobili: Negative   Blood: x / Protein: 30 mg/dL / Nitrite: Negative   Leuk Esterase: Negative / RBC: 0-4 /HPF / WBC Negative /HPF   Sq Epi: x / Non Sq Epi: Neg.-Few / Bacteria: Negative /HPF    Culture - Urine (collected 2019 23:45)  Source: .Urine Clean Catch (Midstream)  Final Report (2019 12:07):    No growth    RADIOLOGY & ADDITIONAL TESTS:    Care Discussed with Consultants/Other Providers:

## 2019-01-09 NOTE — PROGRESS NOTE ADULT - SUBJECTIVE AND OBJECTIVE BOX
Awake, denies complaints    Vital Signs Last 24 Hrs  T(C): 36.4 (01-09-19 @ 15:19), Max: 36.8 (01-08-19 @ 22:18)  T(F): 97.5 (01-09-19 @ 15:19), Max: 98.3 (01-08-19 @ 22:18)  HR: 60 (01-09-19 @ 15:19) (60 - 70)  BP: 132/70 (01-09-19 @ 15:19) (132/70 - 133/63)  RR: 14 (01-09-19 @ 15:19) (14 - 14)  SpO2: 97% (01-09-19 @ 15:19) (97% - 99%)    Neck non tender, supple  Normal respiratory effort, lungs clear to auscultation  Heart S1S2, no peripheral edema  Abdomen soft, NT, ND  Appropriate affect                        7.9    7.6   )-----------( 146      ( 09 Jan 2019 08:00 )             25.4     09 Jan 2019 08:00    140    |  107    |  51     ----------------------------<  157    4.0     |  21     |  1.95     Ca    9.0        09 Jan 2019 08:00    TPro  6.4    /  Alb  1.9    /  TBili  0.3    /  DBili  x      /  AST  29     /  ALT  30     /  AlkPhos  25     09 Jan 2019 08:00    LIVER FUNCTIONS - ( 09 Jan 2019 08:00 )  Alb: 1.9 g/dL / Pro: 6.4 g/dL / ALK PHOS: 25 U/L / ALT: 30 U/L DA / AST: 29 U/L / GGT: x           acetaminophen   Tablet .. 650 milliGRAM(s) Oral every 6 hours PRN  aspirin  chewable 81 milliGRAM(s) Oral daily  atorvastatin 10 milliGRAM(s) Oral at bedtime  levETIRAcetam 500 milliGRAM(s) Oral two times a day  metoprolol tartrate 25 milliGRAM(s) Oral two times a day  multivitamin 1 Tablet(s) Oral daily  pantoprazole    Tablet 40 milliGRAM(s) Oral before breakfast  predniSONE   Tablet 20 milliGRAM(s) Oral daily  tamsulosin 0.4 milliGRAM(s) Oral at bedtime    A/P:    Suspect pre-renal YUKI on CKD  Gentle IVF  No nephrotoxins  CBC, BMP in am  Will f/u

## 2019-01-09 NOTE — PROGRESS NOTE ADULT - ASSESSMENT
82 y/o M with h/o stomach CA, chronic Anemia, Afib (on Apixaban), seizure disorder, Gout, presents with generalized weakness, episode of ?blood in stool and urine, now with acute renal insuff, elev WBC.

## 2019-01-09 NOTE — OCCUPATIONAL THERAPY INITIAL EVALUATION ADULT - TOILETING, PREVIOUS LEVEL OF FUNCTION, OT EVAL
Per Patient's request,  completed and faxed [facility] referral for lodging dates 10/2 - 10/3 and 10/22 - 10/24. [facility] will contact Patient directly for confirmation of reservation.  will continue to provide support as needed.      Nieves Casper (Martens), [facility], MSW   - [facility] [place]  Phone: [phone]  Pager: [phone]  9/26/2018          
independent

## 2019-01-10 ENCOUNTER — TRANSCRIPTION ENCOUNTER (OUTPATIENT)
Age: 84
End: 2019-01-10

## 2019-01-10 VITALS
HEART RATE: 75 BPM | TEMPERATURE: 98 F | RESPIRATION RATE: 14 BRPM | OXYGEN SATURATION: 98 % | DIASTOLIC BLOOD PRESSURE: 50 MMHG | SYSTOLIC BLOOD PRESSURE: 128 MMHG

## 2019-01-10 DIAGNOSIS — N18.3 CHRONIC KIDNEY DISEASE, STAGE 3 (MODERATE): ICD-10-CM

## 2019-01-10 LAB
ALBUMIN SERPL ELPH-MCNC: 2.1 G/DL — LOW (ref 3.3–5)
ALP SERPL-CCNC: 26 U/L — LOW (ref 40–120)
ALT FLD-CCNC: 30 U/L DA — SIGNIFICANT CHANGE UP (ref 10–45)
ANION GAP SERPL CALC-SCNC: 13 MMOL/L — SIGNIFICANT CHANGE UP (ref 5–17)
AST SERPL-CCNC: 21 U/L — SIGNIFICANT CHANGE UP (ref 10–40)
BASOPHILS # BLD AUTO: 0.6 K/UL — HIGH (ref 0–0.2)
BASOPHILS NFR BLD AUTO: 5.9 % — HIGH (ref 0–2)
BILIRUB SERPL-MCNC: 0.4 MG/DL — SIGNIFICANT CHANGE UP (ref 0.2–1.2)
BUN SERPL-MCNC: 44 MG/DL — HIGH (ref 7–23)
CALCIUM SERPL-MCNC: 9.3 MG/DL — SIGNIFICANT CHANGE UP (ref 8.4–10.5)
CHLORIDE SERPL-SCNC: 106 MMOL/L — SIGNIFICANT CHANGE UP (ref 96–108)
CO2 SERPL-SCNC: 20 MMOL/L — LOW (ref 22–31)
CREAT SERPL-MCNC: 1.86 MG/DL — HIGH (ref 0.5–1.3)
EOSINOPHIL # BLD AUTO: 0 K/UL — SIGNIFICANT CHANGE UP (ref 0–0.5)
EOSINOPHIL NFR BLD AUTO: 0.3 % — SIGNIFICANT CHANGE UP (ref 0–6)
GLUCOSE SERPL-MCNC: 163 MG/DL — HIGH (ref 70–99)
HCT VFR BLD CALC: 26.5 % — LOW (ref 39–50)
HGB BLD-MCNC: 8.3 G/DL — LOW (ref 13–17)
LYMPHOCYTES # BLD AUTO: 0.9 K/UL — LOW (ref 1–3.3)
LYMPHOCYTES # BLD AUTO: 9.1 % — LOW (ref 13–44)
MCHC RBC-ENTMCNC: 31.2 GM/DL — LOW (ref 32–36)
MCHC RBC-ENTMCNC: 33.6 PG — SIGNIFICANT CHANGE UP (ref 27–34)
MCV RBC AUTO: 107.7 FL — HIGH (ref 80–100)
MONOCYTES # BLD AUTO: 2.5 K/UL — HIGH (ref 0–0.9)
MONOCYTES NFR BLD AUTO: 24.2 % — HIGH (ref 1–9)
NEUTROPHILS # BLD AUTO: 6.3 K/UL — SIGNIFICANT CHANGE UP (ref 1.8–7.4)
NEUTROPHILS NFR BLD AUTO: 60.5 % — SIGNIFICANT CHANGE UP (ref 43–77)
PLATELET # BLD AUTO: 161 K/UL — SIGNIFICANT CHANGE UP (ref 150–400)
POTASSIUM SERPL-MCNC: 3.9 MMOL/L — SIGNIFICANT CHANGE UP (ref 3.5–5.3)
POTASSIUM SERPL-SCNC: 3.9 MMOL/L — SIGNIFICANT CHANGE UP (ref 3.5–5.3)
PROT SERPL-MCNC: 6.6 G/DL — SIGNIFICANT CHANGE UP (ref 6–8.3)
RBC # BLD: 2.46 M/UL — LOW (ref 4.2–5.8)
RBC # FLD: 18.9 % — HIGH (ref 10.3–14.5)
SODIUM SERPL-SCNC: 139 MMOL/L — SIGNIFICANT CHANGE UP (ref 135–145)
TSH SERPL-MCNC: 2.01 UIU/ML — SIGNIFICANT CHANGE UP (ref 0.27–4.2)
URATE SERPL-MCNC: 9.4 MG/DL — HIGH (ref 3.4–8.8)
WBC # BLD: 10.5 K/UL — SIGNIFICANT CHANGE UP (ref 3.8–10.5)
WBC # FLD AUTO: 10.5 K/UL — SIGNIFICANT CHANGE UP (ref 3.8–10.5)

## 2019-01-10 PROCEDURE — 80053 COMPREHEN METABOLIC PANEL: CPT

## 2019-01-10 PROCEDURE — 86900 BLOOD TYPING SEROLOGIC ABO: CPT

## 2019-01-10 PROCEDURE — 84550 ASSAY OF BLOOD/URIC ACID: CPT

## 2019-01-10 PROCEDURE — 83540 ASSAY OF IRON: CPT

## 2019-01-10 PROCEDURE — 99239 HOSP IP/OBS DSCHRG MGMT >30: CPT

## 2019-01-10 PROCEDURE — 86850 RBC ANTIBODY SCREEN: CPT

## 2019-01-10 PROCEDURE — 71045 X-RAY EXAM CHEST 1 VIEW: CPT

## 2019-01-10 PROCEDURE — 97116 GAIT TRAINING THERAPY: CPT

## 2019-01-10 PROCEDURE — 83735 ASSAY OF MAGNESIUM: CPT

## 2019-01-10 PROCEDURE — 82746 ASSAY OF FOLIC ACID SERUM: CPT

## 2019-01-10 PROCEDURE — 97161 PT EVAL LOW COMPLEX 20 MIN: CPT

## 2019-01-10 PROCEDURE — 82607 VITAMIN B-12: CPT

## 2019-01-10 PROCEDURE — 81001 URINALYSIS AUTO W/SCOPE: CPT

## 2019-01-10 PROCEDURE — 93005 ELECTROCARDIOGRAM TRACING: CPT

## 2019-01-10 PROCEDURE — 76775 US EXAM ABDO BACK WALL LIM: CPT

## 2019-01-10 PROCEDURE — 99285 EMERGENCY DEPT VISIT HI MDM: CPT | Mod: 25

## 2019-01-10 PROCEDURE — 36000 PLACE NEEDLE IN VEIN: CPT

## 2019-01-10 PROCEDURE — 87086 URINE CULTURE/COLONY COUNT: CPT

## 2019-01-10 PROCEDURE — 84443 ASSAY THYROID STIM HORMONE: CPT

## 2019-01-10 PROCEDURE — 80048 BASIC METABOLIC PNL TOTAL CA: CPT

## 2019-01-10 PROCEDURE — 86901 BLOOD TYPING SEROLOGIC RH(D): CPT

## 2019-01-10 PROCEDURE — 85610 PROTHROMBIN TIME: CPT

## 2019-01-10 PROCEDURE — 85730 THROMBOPLASTIN TIME PARTIAL: CPT

## 2019-01-10 PROCEDURE — 82272 OCCULT BLD FECES 1-3 TESTS: CPT

## 2019-01-10 PROCEDURE — 85027 COMPLETE CBC AUTOMATED: CPT

## 2019-01-10 PROCEDURE — 82728 ASSAY OF FERRITIN: CPT

## 2019-01-10 PROCEDURE — 84145 PROCALCITONIN (PCT): CPT

## 2019-01-10 PROCEDURE — 97166 OT EVAL MOD COMPLEX 45 MIN: CPT

## 2019-01-10 RX ORDER — TAMSULOSIN HYDROCHLORIDE 0.4 MG/1
1 CAPSULE ORAL
Qty: 0 | Refills: 0 | COMMUNITY

## 2019-01-10 RX ORDER — ASPIRIN/CALCIUM CARB/MAGNESIUM 324 MG
1 TABLET ORAL
Qty: 0 | Refills: 0 | COMMUNITY

## 2019-01-10 RX ORDER — ATORVASTATIN CALCIUM 80 MG/1
1 TABLET, FILM COATED ORAL
Qty: 0 | Refills: 0 | COMMUNITY

## 2019-01-10 RX ORDER — AMLODIPINE BESYLATE 2.5 MG/1
1 TABLET ORAL
Qty: 0 | Refills: 0 | COMMUNITY

## 2019-01-10 RX ORDER — METOPROLOL TARTRATE 50 MG
1 TABLET ORAL
Qty: 0 | Refills: 0 | COMMUNITY

## 2019-01-10 RX ORDER — APIXABAN 2.5 MG/1
1 TABLET, FILM COATED ORAL
Qty: 0 | Refills: 0 | COMMUNITY

## 2019-01-10 RX ORDER — LEVETIRACETAM 250 MG/1
1 TABLET, FILM COATED ORAL
Qty: 0 | Refills: 0 | COMMUNITY

## 2019-01-10 RX ADMIN — LEVETIRACETAM 500 MILLIGRAM(S): 250 TABLET, FILM COATED ORAL at 06:05

## 2019-01-10 RX ADMIN — SODIUM CHLORIDE 75 MILLILITER(S): 9 INJECTION, SOLUTION INTRAVENOUS at 11:10

## 2019-01-10 RX ADMIN — SODIUM CHLORIDE 75 MILLILITER(S): 9 INJECTION, SOLUTION INTRAVENOUS at 06:05

## 2019-01-10 RX ADMIN — Medication 20 MILLIGRAM(S): at 06:05

## 2019-01-10 RX ADMIN — PANTOPRAZOLE SODIUM 40 MILLIGRAM(S): 20 TABLET, DELAYED RELEASE ORAL at 06:05

## 2019-01-10 RX ADMIN — Medication 1 TABLET(S): at 11:10

## 2019-01-10 RX ADMIN — Medication 81 MILLIGRAM(S): at 11:10

## 2019-01-10 RX ADMIN — Medication 25 MILLIGRAM(S): at 06:05

## 2019-01-10 NOTE — PROGRESS NOTE ADULT - ASSESSMENT
84 y/o M with h/o stomach CA, chronic Anemia, Afib (on Apixaban), seizure disorder, Gout, presents with generalized weakness, episode of ?blood in stool and urine, now with acute renal insuff, elev WBC.      discharge planning

## 2019-01-10 NOTE — DISCHARGE NOTE ADULT - PLAN OF CARE
improve anemia Hold Eliquis for 1 week - recheck CBC on 1/16/19 and if hemoglobin >9.0, restart eliquis on 1/17/19

## 2019-01-10 NOTE — PROGRESS NOTE ADULT - PROBLEM SELECTOR PLAN 2
secondary to gross hematuria versus stool?? secondary to gross hematuria versus stool??  likely secondary to gross hematuria

## 2019-01-10 NOTE — DISCHARGE NOTE ADULT - CARE PLAN
Principal Discharge DX:	Anemia due to blood loss  Goal:	improve anemia  Assessment and plan of treatment:	Hold Eliquis for 1 week - recheck CBC on 1/16/19 and if hemoglobin >9.0, restart eliquis on 1/17/19  Secondary Diagnosis:	YUKI (acute kidney injury)  Secondary Diagnosis:	Atrial fibrillation, unspecified type

## 2019-01-10 NOTE — PROGRESS NOTE ADULT - REASON FOR ADMISSION
hematuria, lethargy

## 2019-01-10 NOTE — PROGRESS NOTE ADULT - SUBJECTIVE AND OBJECTIVE BOX
Patient is a 83y old  Male who presents with a chief complaint of hematuria, lethargy (09 Jan 2019 21:28)    Patient feels okay.  No acute issues.  Denies chest pain, sob, nausea, vomiting.      Patient seen and examined at bedside.    ALLERGIES:  No Known Allergies    MEDICATIONS  (STANDING):  aspirin  chewable 81 milliGRAM(s) Oral daily  atorvastatin 10 milliGRAM(s) Oral at bedtime  levETIRAcetam 500 milliGRAM(s) Oral two times a day  metoprolol tartrate 25 milliGRAM(s) Oral two times a day  multivitamin 1 Tablet(s) Oral daily  pantoprazole    Tablet 40 milliGRAM(s) Oral before breakfast  predniSONE   Tablet 20 milliGRAM(s) Oral daily  sodium chloride 0.45%. 1000 milliLiter(s) (75 mL/Hr) IV Continuous <Continuous>  tamsulosin 0.4 milliGRAM(s) Oral at bedtime    MEDICATIONS  (PRN):  acetaminophen   Tablet .. 650 milliGRAM(s) Oral every 6 hours PRN Temp greater or equal to 38C (100.4F), Mild Pain (1 - 3), Moderate Pain (4 - 6)    Vital Signs Last 24 Hrs  T(F): 97.3 (10 Anthony 2019 04:51), Max: 98.2 (09 Jan 2019 22:56)  HR: 71 (10 Anthony 2019 04:51) (60 - 72)  BP: 132/82 (10 Anthony 2019 04:51) (122/60 - 132/82)  RR: 14 (10 Anthony 2019 04:51) (14 - 14)  SpO2: 98% (10 Anthony 2019 04:51) (97% - 98%)  I&O's Summary    09 Jan 2019 07:01  -  10 Anthony 2019 07:00  --------------------------------------------------------  IN: 2190 mL / OUT: 1000 mL / NET: 1190 mL      BMI (kg/m2): 25.1 (01-06-19 @ 21:02)  PHYSICAL EXAM:  General: NAD, A/O x 3  ENT: MMM  Neck: Supple, No JVD  Lungs: Clear to auscultation bilaterally  Cardio: RRR, S1/S2, No murmurs  Abdomen: Soft, Nontender, Nondistended; Bowel sounds present  Extremities: No calf tenderness, No pitting edema    LABS:                        8.3    10.5  )-----------( 161      ( 10 Anthony 2019 06:33 )             26.5       01-10    139  |  106  |  44  ----------------------------<  163  3.9   |  20  |  1.86    Ca    9.3      10 Anthony 2019 06:33    TPro  6.6  /  Alb  2.1  /  TBili  0.4  /  DBili  x   /  AST  21  /  ALT  30  /  AlkPhos  26  01-10     eGFR if African American: 38 mL/min/1.73M2 (01-10-19 @ 06:33)  eGFR if Non African American: 33 mL/min/1.73M2 (01-10-19 @ 06:33)    12-23 Chol 95 mg/dL LDL 55 mg/dL HDL 26 mg/dL Trig 69 mg/dL    CAPILLARY BLOOD GLUCOSE    12-23 [id number] 6.3    Culture - Urine (collected 06 Jan 2019 23:45)  Source: .Urine Clean Catch (Midstream)  Final Report (08 Jan 2019 12:07):    No growth        RADIOLOGY & ADDITIONAL TESTS:    Care Discussed with Consultants/Other Providers:

## 2019-01-10 NOTE — DISCHARGE NOTE ADULT - HOSPITAL COURSE
83M PMH stomach cancer, HTN, HLD, atrial fibrillation presents for acute blood loss anemia secondary to gross hematuria.  Eliquis was discontinued and hemoglobin improved.  Patient's eliquis to be on hold until 1/16/19 with recheck of hemoglobin.  If hemoglobin >9.0, can restart eliquis.

## 2019-01-10 NOTE — DISCHARGE NOTE ADULT - MEDICATION SUMMARY - MEDICATIONS TO TAKE
I will START or STAY ON the medications listed below when I get home from the hospital:    aspirin 81 mg oral delayed release tablet  -- 1 tab(s) by mouth once a day  -- Indication: For Atrial fibrillation    tamsulosin 0.4 mg oral capsule  -- 1 cap(s) by mouth once a day (at bedtime)  -- Indication: For BPH    levETIRAcetam 500 mg oral tablet  -- 1 tab(s) by mouth 2 times a day  -- Indication: For Seizure disorder    allopurinol 100 mg oral tablet  -- 1 tab(s) by mouth once a day  -- Indication: For Gout, unspecified cause, unspecified chronicity, unspecified site    atorvastatin 10 mg oral tablet  -- 1 tab(s) by mouth once a day (at bedtime)  -- Indication: For Hyperlipemia    metoprolol tartrate 25 mg oral tablet  -- 1 tab(s) by mouth every 12 hours  -- Indication: For Atrial Fibrillation    Norvasc 10 mg oral tablet  -- 1 tab(s) by mouth once a day  -- Indication: For Essential hypertension    NexIUM 24HR 20 mg oral delayed release capsule  -- 1 cap(s) by mouth once a day  -- Indication: For GERD    Multiple Vitamins oral tablet  -- 1 tab(s) by mouth once a day  -- Indication: For Supplement    folic acid 1 mg oral tablet  -- 1 tab(s) by mouth once a day  -- Indication: For Supplement

## 2019-01-10 NOTE — PROGRESS NOTE ADULT - SUBJECTIVE AND OBJECTIVE BOX
Awake, denies complaints, seen earlier    Vital Signs Last 24 Hrs  T(C): 36.4 (01-10-19 @ 15:37), Max: 36.8 (01-09-19 @ 22:56)  T(F): 97.6 (01-10-19 @ 15:37), Max: 98.2 (01-09-19 @ 22:56)  HR: 75 (01-10-19 @ 15:37) (71 - 75)  BP: 128/50 (01-10-19 @ 15:37) (122/60 - 132/82)  RR: 14 (01-10-19 @ 15:37) (14 - 14)  SpO2: 98% (01-10-19 @ 15:37) (97% - 98%)    Neck non tender, supple  Normal respiratory effort, lungs clear to auscultation  Heart S1S2, no peripheral edema  Abdomen soft, NT, ND  Appropriate affect                               8.3    10.5  )-----------( 161      ( 10 Anthony 2019 06:33 )             26.5     10 Anthony 2019 06:33    139    |  106    |  44     ----------------------------<  163    3.9     |  20     |  1.86     Ca    9.3        10 Anthony 2019 06:33    TPro  6.6    /  Alb  2.1    /  TBili  0.4    /  DBili  x      /  AST  21     /  ALT  30     /  AlkPhos  26     10 Anthony 2019 06:33    LIVER FUNCTIONS - ( 10 Anthony 2019 06:33 )  Alb: 2.1 g/dL / Pro: 6.6 g/dL / ALK PHOS: 26 U/L / ALT: 30 U/L DA / AST: 21 U/L / GGT: x           acetaminophen   Tablet .. 650 milliGRAM(s) Oral every 6 hours PRN  aspirin  chewable 81 milliGRAM(s) Oral daily  atorvastatin 10 milliGRAM(s) Oral at bedtime  levETIRAcetam 500 milliGRAM(s) Oral two times a day  metoprolol tartrate 25 milliGRAM(s) Oral two times a day  multivitamin 1 Tablet(s) Oral daily  pantoprazole    Tablet 40 milliGRAM(s) Oral before breakfast  predniSONE   Tablet 20 milliGRAM(s) Oral daily  sodium chloride 0.45%. 1000 milliLiter(s) IV Continuous <Continuous>  tamsulosin 0.4 milliGRAM(s) Oral at bedtime    A/P:    S/p pre-renal YUKI on CKD  Improving  No nephrotoxins  Encourage PO  F/u labs

## 2019-01-10 NOTE — DISCHARGE NOTE ADULT - PATIENT PORTAL LINK FT
You can access the [facility] Patient Portal, offered by [facility], by registering with the following website: [email or web address]

## 2019-01-10 NOTE — CDI QUERY NOTE - NSCDIOTHERTXTBX_GEN_ALL_CORE_HH
Presents with gross hematuria.  Note - Anemia due to blood loss.    Please clarify the acuity of blood loss anemia.      Thank you

## 2019-01-27 RX ADMIN — TRAMADOL HYDROCHLORIDE 25 MILLIGRAM(S): 50 TABLET ORAL at 04:18

## 2019-02-05 ENCOUNTER — INPATIENT (INPATIENT)
Facility: HOSPITAL | Age: 84
LOS: 4 days | Discharge: NURSING, REHAB OR LONG-TERM CARE | DRG: 554 | End: 2019-02-10
Attending: INTERNAL MEDICINE | Admitting: INTERNAL MEDICINE
Payer: MEDICARE

## 2019-02-05 VITALS
RESPIRATION RATE: 18 BRPM | TEMPERATURE: 98 F | OXYGEN SATURATION: 91 % | HEIGHT: 73 IN | WEIGHT: 184.97 LBS | DIASTOLIC BLOOD PRESSURE: 68 MMHG | SYSTOLIC BLOOD PRESSURE: 120 MMHG | HEART RATE: 100 BPM

## 2019-02-05 DIAGNOSIS — A41.9 SEPSIS, UNSPECIFIED ORGANISM: ICD-10-CM

## 2019-02-05 PROBLEM — I35.0 NONRHEUMATIC AORTIC (VALVE) STENOSIS: Chronic | Status: ACTIVE | Noted: 2019-01-07

## 2019-02-05 PROBLEM — E78.5 HYPERLIPIDEMIA, UNSPECIFIED: Chronic | Status: ACTIVE | Noted: 2019-01-07

## 2019-02-05 PROBLEM — I48.2 CHRONIC ATRIAL FIBRILLATION: Chronic | Status: ACTIVE | Noted: 2019-01-07

## 2019-02-05 PROBLEM — C16.9 MALIGNANT NEOPLASM OF STOMACH, UNSPECIFIED: Chronic | Status: ACTIVE | Noted: 2019-01-07

## 2019-02-05 PROBLEM — I10 ESSENTIAL (PRIMARY) HYPERTENSION: Chronic | Status: ACTIVE | Noted: 2019-01-07

## 2019-02-05 PROBLEM — N40.0 BENIGN PROSTATIC HYPERPLASIA WITHOUT LOWER URINARY TRACT SYMPTOMS: Chronic | Status: ACTIVE | Noted: 2019-01-07

## 2019-02-05 PROBLEM — G40.909 EPILEPSY, UNSPECIFIED, NOT INTRACTABLE, WITHOUT STATUS EPILEPTICUS: Chronic | Status: ACTIVE | Noted: 2019-01-07

## 2019-02-05 LAB
ALBUMIN SERPL ELPH-MCNC: 2 G/DL — LOW (ref 3.3–5)
ALP SERPL-CCNC: 25 U/L — LOW (ref 40–120)
ALT FLD-CCNC: 10 U/L DA — SIGNIFICANT CHANGE UP (ref 10–45)
ANION GAP SERPL CALC-SCNC: 12 MMOL/L — SIGNIFICANT CHANGE UP (ref 5–17)
APPEARANCE UR: ABNORMAL
APTT BLD: 31.6 SEC — SIGNIFICANT CHANGE UP (ref 27.5–36.3)
AST SERPL-CCNC: 12 U/L — SIGNIFICANT CHANGE UP (ref 10–40)
BASOPHILS # BLD AUTO: 0.2 K/UL — SIGNIFICANT CHANGE UP (ref 0–0.2)
BASOPHILS NFR BLD AUTO: 1.1 % — SIGNIFICANT CHANGE UP (ref 0–2)
BILIRUB SERPL-MCNC: 0.6 MG/DL — SIGNIFICANT CHANGE UP (ref 0.2–1.2)
BILIRUB UR-MCNC: NEGATIVE — SIGNIFICANT CHANGE UP
BLD GP AB SCN SERPL QL: SIGNIFICANT CHANGE UP
BUN SERPL-MCNC: 29 MG/DL — HIGH (ref 7–23)
CALCIUM SERPL-MCNC: 9.1 MG/DL — SIGNIFICANT CHANGE UP (ref 8.4–10.5)
CHLORIDE SERPL-SCNC: 101 MMOL/L — SIGNIFICANT CHANGE UP (ref 96–108)
CO2 SERPL-SCNC: 22 MMOL/L — SIGNIFICANT CHANGE UP (ref 22–31)
COLOR SPEC: YELLOW — SIGNIFICANT CHANGE UP
COMMENT - URINE: SIGNIFICANT CHANGE UP
CREAT SERPL-MCNC: 1.66 MG/DL — HIGH (ref 0.5–1.3)
DIFF PNL FLD: NEGATIVE — SIGNIFICANT CHANGE UP
EOSINOPHIL # BLD AUTO: 0 K/UL — SIGNIFICANT CHANGE UP (ref 0–0.5)
EOSINOPHIL NFR BLD AUTO: 0.1 % — SIGNIFICANT CHANGE UP (ref 0–6)
EPI CELLS # UR: SIGNIFICANT CHANGE UP
FLU A RESULT: SIGNIFICANT CHANGE UP
FLU A RESULT: SIGNIFICANT CHANGE UP
FLUAV AG NPH QL: SIGNIFICANT CHANGE UP
FLUBV AG NPH QL: SIGNIFICANT CHANGE UP
GLUCOSE SERPL-MCNC: 223 MG/DL — HIGH (ref 70–99)
GLUCOSE UR QL: NEGATIVE — SIGNIFICANT CHANGE UP
HCT VFR BLD CALC: 20.3 % — CRITICAL LOW (ref 39–50)
HCT VFR BLD CALC: 22.3 % — LOW (ref 39–50)
HGB BLD-MCNC: 6.3 G/DL — CRITICAL LOW (ref 13–17)
HGB BLD-MCNC: 7.3 G/DL — LOW (ref 13–17)
INR BLD: 1.44 RATIO — HIGH (ref 0.88–1.16)
KETONES UR-MCNC: NEGATIVE — SIGNIFICANT CHANGE UP
LACTATE SERPL-SCNC: 2 MMOL/L — SIGNIFICANT CHANGE UP (ref 0.7–2)
LEUKOCYTE ESTERASE UR-ACNC: NEGATIVE — SIGNIFICANT CHANGE UP
LYMPHOCYTES # BLD AUTO: 1.6 K/UL — SIGNIFICANT CHANGE UP (ref 1–3.3)
LYMPHOCYTES # BLD AUTO: 8.7 % — LOW (ref 13–44)
MCHC RBC-ENTMCNC: 30.9 GM/DL — LOW (ref 32–36)
MCHC RBC-ENTMCNC: 32.5 GM/DL — SIGNIFICANT CHANGE UP (ref 32–36)
MCHC RBC-ENTMCNC: 33.3 PG — SIGNIFICANT CHANGE UP (ref 27–34)
MCHC RBC-ENTMCNC: 34.3 PG — HIGH (ref 27–34)
MCV RBC AUTO: 105.7 FL — HIGH (ref 80–100)
MCV RBC AUTO: 107.7 FL — HIGH (ref 80–100)
MONOCYTES # BLD AUTO: 5.8 K/UL — HIGH (ref 0–0.9)
MONOCYTES NFR BLD AUTO: 30.8 % — HIGH (ref 1–9)
NEUTROPHILS # BLD AUTO: 11.2 K/UL — HIGH (ref 1.8–7.4)
NEUTROPHILS NFR BLD AUTO: 59.3 % — SIGNIFICANT CHANGE UP (ref 43–77)
NITRITE UR-MCNC: NEGATIVE — SIGNIFICANT CHANGE UP
PH UR: 5 — SIGNIFICANT CHANGE UP (ref 5–8)
PLATELET # BLD AUTO: 188 K/UL — SIGNIFICANT CHANGE UP (ref 150–400)
PLATELET # BLD AUTO: 214 K/UL — SIGNIFICANT CHANGE UP (ref 150–400)
POTASSIUM SERPL-MCNC: 3.7 MMOL/L — SIGNIFICANT CHANGE UP (ref 3.5–5.3)
POTASSIUM SERPL-SCNC: 3.7 MMOL/L — SIGNIFICANT CHANGE UP (ref 3.5–5.3)
PROCALCITONIN SERPL-MCNC: 0.4 NG/ML — HIGH
PROT SERPL-MCNC: 7.1 G/DL — SIGNIFICANT CHANGE UP (ref 6–8.3)
PROT UR-MCNC: 30 MG/DL
PROTHROM AB SERPL-ACNC: 16.3 SEC — HIGH (ref 10–12.9)
RBC # BLD: 1.88 M/UL — LOW (ref 4.2–5.8)
RBC # BLD: 2.11 M/UL — LOW (ref 4.2–5.8)
RBC # FLD: 18.3 % — HIGH (ref 10.3–14.5)
RBC # FLD: 18.9 % — HIGH (ref 10.3–14.5)
RSV RESULT: SIGNIFICANT CHANGE UP
RSV RNA RESP QL NAA+PROBE: SIGNIFICANT CHANGE UP
SODIUM SERPL-SCNC: 135 MMOL/L — SIGNIFICANT CHANGE UP (ref 135–145)
SP GR SPEC: 1.02 — SIGNIFICANT CHANGE UP (ref 1.01–1.02)
UROBILINOGEN FLD QL: NEGATIVE — SIGNIFICANT CHANGE UP
WBC # BLD: 18.9 K/UL — HIGH (ref 3.8–10.5)
WBC # BLD: 19.4 K/UL — HIGH (ref 3.8–10.5)
WBC # FLD AUTO: 18.9 K/UL — HIGH (ref 3.8–10.5)
WBC # FLD AUTO: 19.4 K/UL — HIGH (ref 3.8–10.5)

## 2019-02-05 PROCEDURE — 99223 1ST HOSP IP/OBS HIGH 75: CPT | Mod: AI

## 2019-02-05 PROCEDURE — 73120 X-RAY EXAM OF HAND: CPT | Mod: 26,LT

## 2019-02-05 PROCEDURE — 93010 ELECTROCARDIOGRAM REPORT: CPT

## 2019-02-05 PROCEDURE — 99285 EMERGENCY DEPT VISIT HI MDM: CPT

## 2019-02-05 PROCEDURE — 71045 X-RAY EXAM CHEST 1 VIEW: CPT | Mod: 26

## 2019-02-05 RX ORDER — SODIUM CHLORIDE 9 MG/ML
1000 INJECTION, SOLUTION INTRAVENOUS
Qty: 0 | Refills: 0 | Status: DISCONTINUED | OUTPATIENT
Start: 2019-02-05 | End: 2019-02-10

## 2019-02-05 RX ORDER — ACETAMINOPHEN 500 MG
650 TABLET ORAL EVERY 6 HOURS
Qty: 0 | Refills: 0 | Status: DISCONTINUED | OUTPATIENT
Start: 2019-02-05 | End: 2019-02-10

## 2019-02-05 RX ORDER — FOLIC ACID 0.8 MG
1 TABLET ORAL DAILY
Qty: 0 | Refills: 0 | Status: DISCONTINUED | OUTPATIENT
Start: 2019-02-05 | End: 2019-02-10

## 2019-02-05 RX ORDER — LEVETIRACETAM 250 MG/1
500 TABLET, FILM COATED ORAL
Qty: 0 | Refills: 0 | Status: DISCONTINUED | OUTPATIENT
Start: 2019-02-05 | End: 2019-02-10

## 2019-02-05 RX ORDER — DEXTROSE 50 % IN WATER 50 %
15 SYRINGE (ML) INTRAVENOUS ONCE
Qty: 0 | Refills: 0 | Status: DISCONTINUED | OUTPATIENT
Start: 2019-02-05 | End: 2019-02-10

## 2019-02-05 RX ORDER — ALLOPURINOL 300 MG
100 TABLET ORAL DAILY
Qty: 0 | Refills: 0 | Status: DISCONTINUED | OUTPATIENT
Start: 2019-02-05 | End: 2019-02-06

## 2019-02-05 RX ORDER — ASPIRIN/CALCIUM CARB/MAGNESIUM 324 MG
81 TABLET ORAL DAILY
Qty: 0 | Refills: 0 | Status: DISCONTINUED | OUTPATIENT
Start: 2019-02-05 | End: 2019-02-10

## 2019-02-05 RX ORDER — GLUCAGON INJECTION, SOLUTION 0.5 MG/.1ML
1 INJECTION, SOLUTION SUBCUTANEOUS ONCE
Qty: 0 | Refills: 0 | Status: DISCONTINUED | OUTPATIENT
Start: 2019-02-05 | End: 2019-02-10

## 2019-02-05 RX ORDER — METOPROLOL TARTRATE 50 MG
25 TABLET ORAL EVERY 12 HOURS
Qty: 0 | Refills: 0 | Status: DISCONTINUED | OUTPATIENT
Start: 2019-02-05 | End: 2019-02-10

## 2019-02-05 RX ORDER — ACETAMINOPHEN 500 MG
975 TABLET ORAL ONCE
Qty: 0 | Refills: 0 | Status: COMPLETED | OUTPATIENT
Start: 2019-02-05 | End: 2019-02-05

## 2019-02-05 RX ORDER — SODIUM CHLORIDE 9 MG/ML
1000 INJECTION INTRAMUSCULAR; INTRAVENOUS; SUBCUTANEOUS
Qty: 0 | Refills: 0 | Status: DISCONTINUED | OUTPATIENT
Start: 2019-02-05 | End: 2019-02-06

## 2019-02-05 RX ORDER — DEXTROSE 50 % IN WATER 50 %
25 SYRINGE (ML) INTRAVENOUS ONCE
Qty: 0 | Refills: 0 | Status: DISCONTINUED | OUTPATIENT
Start: 2019-02-05 | End: 2019-02-10

## 2019-02-05 RX ORDER — ATORVASTATIN CALCIUM 80 MG/1
10 TABLET, FILM COATED ORAL AT BEDTIME
Qty: 0 | Refills: 0 | Status: DISCONTINUED | OUTPATIENT
Start: 2019-02-05 | End: 2019-02-10

## 2019-02-05 RX ORDER — SODIUM CHLORIDE 9 MG/ML
2600 INJECTION INTRAMUSCULAR; INTRAVENOUS; SUBCUTANEOUS ONCE
Qty: 0 | Refills: 0 | Status: COMPLETED | OUTPATIENT
Start: 2019-02-05 | End: 2019-02-05

## 2019-02-05 RX ORDER — CEFTRIAXONE 500 MG/1
1 INJECTION, POWDER, FOR SOLUTION INTRAMUSCULAR; INTRAVENOUS ONCE
Qty: 0 | Refills: 0 | Status: COMPLETED | OUTPATIENT
Start: 2019-02-05 | End: 2019-02-05

## 2019-02-05 RX ORDER — AMLODIPINE BESYLATE 2.5 MG/1
10 TABLET ORAL DAILY
Qty: 0 | Refills: 0 | Status: DISCONTINUED | OUTPATIENT
Start: 2019-02-05 | End: 2019-02-07

## 2019-02-05 RX ORDER — TAMSULOSIN HYDROCHLORIDE 0.4 MG/1
0.4 CAPSULE ORAL AT BEDTIME
Qty: 0 | Refills: 0 | Status: DISCONTINUED | OUTPATIENT
Start: 2019-02-05 | End: 2019-02-10

## 2019-02-05 RX ORDER — HEPARIN SODIUM 5000 [USP'U]/ML
5000 INJECTION INTRAVENOUS; SUBCUTANEOUS EVERY 8 HOURS
Qty: 0 | Refills: 0 | Status: DISCONTINUED | OUTPATIENT
Start: 2019-02-05 | End: 2019-02-08

## 2019-02-05 RX ORDER — DEXTROSE 50 % IN WATER 50 %
12.5 SYRINGE (ML) INTRAVENOUS ONCE
Qty: 0 | Refills: 0 | Status: DISCONTINUED | OUTPATIENT
Start: 2019-02-05 | End: 2019-02-10

## 2019-02-05 RX ADMIN — SODIUM CHLORIDE 2600 MILLILITER(S): 9 INJECTION INTRAMUSCULAR; INTRAVENOUS; SUBCUTANEOUS at 12:30

## 2019-02-05 RX ADMIN — Medication 650 MILLIGRAM(S): at 16:23

## 2019-02-05 RX ADMIN — SODIUM CHLORIDE 75 MILLILITER(S): 9 INJECTION INTRAMUSCULAR; INTRAVENOUS; SUBCUTANEOUS at 22:20

## 2019-02-05 RX ADMIN — TAMSULOSIN HYDROCHLORIDE 0.4 MILLIGRAM(S): 0.4 CAPSULE ORAL at 22:21

## 2019-02-05 RX ADMIN — ATORVASTATIN CALCIUM 10 MILLIGRAM(S): 80 TABLET, FILM COATED ORAL at 22:21

## 2019-02-05 RX ADMIN — SODIUM CHLORIDE 2600 MILLILITER(S): 9 INJECTION INTRAMUSCULAR; INTRAVENOUS; SUBCUTANEOUS at 11:30

## 2019-02-05 RX ADMIN — CEFTRIAXONE 1 GRAM(S): 500 INJECTION, POWDER, FOR SOLUTION INTRAMUSCULAR; INTRAVENOUS at 12:30

## 2019-02-05 RX ADMIN — CEFTRIAXONE 100 GRAM(S): 500 INJECTION, POWDER, FOR SOLUTION INTRAMUSCULAR; INTRAVENOUS at 11:58

## 2019-02-05 RX ADMIN — LEVETIRACETAM 500 MILLIGRAM(S): 250 TABLET, FILM COATED ORAL at 22:21

## 2019-02-05 RX ADMIN — Medication 975 MILLIGRAM(S): at 11:58

## 2019-02-05 RX ADMIN — HEPARIN SODIUM 5000 UNIT(S): 5000 INJECTION INTRAVENOUS; SUBCUTANEOUS at 22:21

## 2019-02-05 NOTE — ED PROVIDER NOTE - MEDICAL DECISION MAKING DETAILS
84 yr old male with hx of HLD, HTN, a.fib, chronic gout, AKF, BPH, CML presents from [facility] due to " abnormal labs", low h/h. Pt reports generalized bodyaches, with chronic right left knee pain ( similar in the past, xray negative). Denies any chest pain, sob, urinary complaints, cough, abdominal pain, n/v/d or any other symptoms at this time.: septic labs, ivf, antipyretics - likely admit

## 2019-02-05 NOTE — H&P ADULT - NSHPLABSRESULTS_GEN_ALL_CORE
7.3    18.9  )-----------( 214      ( 2019 11:20 )             22.3           135  |  101  |  29<H>  ----------------------------<  223<H>  3.7   |  22  |  1.66<H>    Ca    9.1      2019 11:20    TPro  7.1  /  Alb  2.0<L>  /  TBili  0.6  /  DBili  x   /  AST  12  /  ALT  10  /  AlkPhos  25<L>        PT/INR - ( 2019 11:20 )   PT: 16.3 sec;   INR: 1.44 ratio         PTT - ( 2019 11:20 )  PTT:31.6 sec          Urinalysis Basic - ( 2019 11:31 )    Color: Yellow / Appearance: Slightly Turbid / S.020 / pH: x  Gluc: x / Ketone: Negative  / Bili: Negative / Urobili: Negative   Blood: x / Protein: 30 mg/dL / Nitrite: Negative   Leuk Esterase: Negative / RBC: x / WBC x   Sq Epi: x / Non Sq Epi: Neg.-Few / Bacteria: x                < from: Xray Chest 1 View-PORTABLE IMMEDIATE (19 @ 13:40) >    IMPRESSION: Question developing left perihilar infiltrate. Heart   enlargement and dilated tortuous aorta again noted.                  CRISTI VIZCARRA M.D., ATTENDING RADIOLOGIST  This document has been electronically signed. 2019  1:50PM              < end of copied text >    IMPROVE VTE Individual Risk Assessment          RISK                                                          Points  [  ] Previous VTE                                                3  [  ] Thrombophilia                                             2  [  ] Lower limb paralysis                                   2        (unable to hold up >15 seconds)    [  ] Current Cancer                                             2         (within 6 months)  [  ] Immobilization > 24 hrs                              1  [  ] ICU/CCU stay > 24 hours                             1  [  x] Age > 60                                                         1    IMPROVE VTE Score:         [     1    ]

## 2019-02-05 NOTE — ED ADULT NURSE NOTE - PMH
Anemia, unspecified type    Atrial fibrillation    Benign prostatic hyperplasia, unspecified whether lower urinary tract symptoms present    Cancer of stomach    Chronic atrial fibrillation    Essential hypertension    Gout    Hyperlipemia    Hypertension    Nonrheumatic aortic valve stenosis    Seizure disorder    Stomach cancer

## 2019-02-05 NOTE — H&P ADULT - NSHPREVIEWOFSYSTEMS_GEN_ALL_CORE
For information on Fall & Injury Prevention, visit [email or web address] Neuro: No headache, numbness, weakness  Resp: No cough, wheezing, shortness of breath  CVS: No chest pain, palpitations, leg swelling  GI: No abdominal pain, nausea, vomiting, diarrhea   : No dysuria, frequency, incontinence        Psych: No depression, anxiety, mood swings

## 2019-02-05 NOTE — H&P ADULT - NSHPPHYSICALEXAM_GEN_ALL_CORE
PHYSICAL EXAM:  GENERAL: NAD, well-groomed, well-developed, AO X 3  HEAD:  Atraumatic, Normocephalic  EYES: EOMI, PERRLA, conjunctiva and sclera clear  ENMT: No tonsillar erythema, exudates, or enlargement; Moist mucous membranes, Good dentition  NECK: Supple, No JVD  CHEST/LUNG: Clear to auscultation bilaterally; No rales, rhonchi, wheezing, or rubs  HEART: Regular rate and rhythm; S1/S2, No murmurs, rubs, or gallops  ABDOMEN: Soft, Nontender, Nondistended; Bowel sounds present  VASCULAR: Normal pulses, Normal capillary refill  EXTREMITIES:  2+ Peripheral Pulses b/l knees non tender no erythema   right hand 2nd mcp + erythema + swelling + tenderness     NERVOUS SYSTEM; CN 2-12 intact, No focal deficits

## 2019-02-05 NOTE — ED PROVIDER NOTE - CARE PLAN
Principal Discharge DX:	Sepsis  Secondary Diagnosis:	Fever  Secondary Diagnosis:	Cellulitis Principal Discharge DX:	Sepsis  Secondary Diagnosis:	Fever  Secondary Diagnosis:	Cellulitis  Secondary Diagnosis:	Other sideroblastic anemia

## 2019-02-05 NOTE — ED PROVIDER NOTE - PHYSICAL EXAMINATION
left hand: positive redness, with generalized swelling, warm to touch, positive radial pulses, less than 2 sec cap refill, pos ROM

## 2019-02-05 NOTE — ED ADULT NURSE NOTE - NSIMPLEMENTINTERV_GEN_ALL_ED
Implemented All Universal Safety Interventions:  [place] to call system. Call bell, personal items and telephone within reach. Instruct patient to call for assistance. Room bathroom lighting operational. Non-slip footwear when patient is off stretcher. Physically safe environment: no spills, clutter or unnecessary equipment. Stretcher in lowest position, wheels locked, appropriate side rails in place.

## 2019-02-05 NOTE — H&P ADULT - ASSESSMENT
85 yo m with pmh  stomach cancer, chronic anemia, chronic afib on xarelto, gout presents with sepsis secondary to PNA along with acute gout flare    1. sepsis secondary to PNA: cxr indicates possible developing PNA will check procalcitonin  level. patient recieved sepsis protocol fluid resuscitation in ed 2.4 L  will order stat lactate start patient on ctx and azithromycin  2. acute gout flare: will start patient on prednisone 40mg daily  no NSAID due to tila c/w allopurinol. check xray right hand  3. 85 yo m with pmh  stomach cancer, chronic anemia, chronic afib on xarelto, seizure d/o, gout presents with sepsis secondary to PNA along with acute gout flare    1. sepsis secondary to PNA: cxr indicates possible developing PNA will check procalcitonin  level. patient recieved sepsis protocol fluid resuscitation in ed 2.4 L  will order stat lactate start patient on ctx and azithromycin  2. acute gout flare: will start patient on prednisone 40mg daily  no NSAID due to tila c/w allopurinol. check xray left hand   3. chronic afib rate controlled hold a/c at this time due to the anemia  4. chronic anemia: will repeat cbc consider transfusion if repeat hgb still low  5. tila on ckd stage 3: baseline creatitine 1.3 will start ns 75ml/hr  6. seizure d/o stable resume home meds  7. stomach cancer stable baseline  8. dvt ppx: heparin subq   PT consult placed 85 yo m with pmh  stomach cancer, chronic anemia, chronic afib on xarelto, seizure d/o, gout presents with sepsis secondary to PNA along with acute gout flare    1. sepsis secondary to PNA: cxr indicates possible developing PNA will check procalcitonin  level. patient recieved sepsis protocol fluid resuscitation in ed 2.4 L   will hold of on abx until pct level returns   2. acute gout flare: will start patient on prednisone 40mg daily  no NSAID due to tila c/w allopurinol. check xray left hand   3. chronic afib rate controlled hold a/c at this time due to the anemia  4. chronic anemia: will repeat cbc consider transfusion if repeat hgb still low  5. tila on ckd stage 3: baseline creatitine 1.3 will start ns 75ml/hr  6. seizure d/o stable resume home meds  7. stomach cancer stable baseline  8. dvt ppx: heparin subq   PT consult placed

## 2019-02-05 NOTE — ED ADULT NURSE NOTE - OBJECTIVE STATEMENT
pt arrived for blood transfusion, skin noted to be hot in triage, rectal temp 102.5 c/o pain in left knee and left hand. left hand noted to be red and swollen around knuckles, knee without redness

## 2019-02-05 NOTE — ED PROVIDER NOTE - ATTENDING CONTRIBUTION TO CARE
Pt seen and examined, history taken and chart reviewed. Pt currently at [facility] for rehab status post pneumonia. Pt was sent from facility for a low hgb and was found to be febrile. Pt is awake and alert oriented times 2, lungs cba, abdomen soft non tender, left hand with redness to dorsum. cxr possible pneumonia. I agree with acp's plan and diagnosis.

## 2019-02-05 NOTE — ED PROVIDER NOTE - PROGRESS NOTE DETAILS
positive wbc count, labs and imaging reviewed. Pt to be admitted for fever, possible gout flare, cellulitis. CM called and aware of re admission. Hospitalist- Dr. Hall accepted pt. Pt made aware of plan.

## 2019-02-05 NOTE — H&P ADULT - HISTORY OF PRESENT ILLNESS
83 yo m admitted 2-5-19 with pmh stomach cancer, chronic anemia, chronic afib on xarelto, gout presents with abnormal labs from [facility]. As per patient he is currently c/o b/l knee pain 6/10 constant and left hand pain 7/10. + fever (rectal 102.5) no chest pain no n/v no headache, dizziness, cough, palpitations

## 2019-02-05 NOTE — ED PROVIDER NOTE - OBJECTIVE STATEMENT
84 yr old male with hx of HLD, HTN, a.fib, chronic gout, AKF, BPH, CML presents from [facility] due to " abnormal labs", low h/h. Pt reports generalized bodyaches, with chronic right left knee pain ( similar in the past, xray negative). Denies any chest pain, sob, urinary complaints, cough, abdominal pain, n/v/d or any other symptoms at this time.

## 2019-02-06 DIAGNOSIS — D64.9 ANEMIA, UNSPECIFIED: ICD-10-CM

## 2019-02-06 LAB
ANION GAP SERPL CALC-SCNC: 12 MMOL/L — SIGNIFICANT CHANGE UP (ref 5–17)
BUN SERPL-MCNC: 28 MG/DL — HIGH (ref 7–23)
CALCIUM SERPL-MCNC: 8.3 MG/DL — LOW (ref 8.4–10.5)
CHLORIDE SERPL-SCNC: 106 MMOL/L — SIGNIFICANT CHANGE UP (ref 96–108)
CO2 SERPL-SCNC: 19 MMOL/L — LOW (ref 22–31)
CREAT SERPL-MCNC: 1.43 MG/DL — HIGH (ref 0.5–1.3)
CULTURE RESULTS: NO GROWTH — SIGNIFICANT CHANGE UP
GLUCOSE SERPL-MCNC: 177 MG/DL — HIGH (ref 70–99)
HCT VFR BLD CALC: 23.3 % — LOW (ref 39–50)
HCT VFR BLD CALC: 23.3 % — LOW (ref 39–50)
HCT VFR BLD CALC: 25.1 % — LOW (ref 39–50)
HGB BLD-MCNC: 7.3 G/DL — LOW (ref 13–17)
HGB BLD-MCNC: 7.3 G/DL — LOW (ref 13–17)
HGB BLD-MCNC: 7.8 G/DL — LOW (ref 13–17)
MCHC RBC-ENTMCNC: 30.9 GM/DL — LOW (ref 32–36)
MCHC RBC-ENTMCNC: 31.4 GM/DL — LOW (ref 32–36)
MCHC RBC-ENTMCNC: 31.4 GM/DL — LOW (ref 32–36)
MCHC RBC-ENTMCNC: 32.2 PG — SIGNIFICANT CHANGE UP (ref 27–34)
MCHC RBC-ENTMCNC: 32.2 PG — SIGNIFICANT CHANGE UP (ref 27–34)
MCHC RBC-ENTMCNC: 32.7 PG — SIGNIFICANT CHANGE UP (ref 27–34)
MCV RBC AUTO: 102.4 FL — HIGH (ref 80–100)
MCV RBC AUTO: 104 FL — HIGH (ref 80–100)
MCV RBC AUTO: 104.4 FL — HIGH (ref 80–100)
PLATELET # BLD AUTO: 188 K/UL — SIGNIFICANT CHANGE UP (ref 150–400)
PLATELET # BLD AUTO: 193 K/UL — SIGNIFICANT CHANGE UP (ref 150–400)
PLATELET # BLD AUTO: 210 K/UL — SIGNIFICANT CHANGE UP (ref 150–400)
POTASSIUM SERPL-MCNC: 3.6 MMOL/L — SIGNIFICANT CHANGE UP (ref 3.5–5.3)
POTASSIUM SERPL-SCNC: 3.6 MMOL/L — SIGNIFICANT CHANGE UP (ref 3.5–5.3)
RBC # BLD: 2.24 M/UL — LOW (ref 4.2–5.8)
RBC # BLD: 2.27 M/UL — LOW (ref 4.2–5.8)
RBC # BLD: 2.41 M/UL — LOW (ref 4.2–5.8)
RBC # FLD: 19.5 % — HIGH (ref 10.3–14.5)
SODIUM SERPL-SCNC: 137 MMOL/L — SIGNIFICANT CHANGE UP (ref 135–145)
SPECIMEN SOURCE: SIGNIFICANT CHANGE UP
WBC # BLD: 12.8 K/UL — HIGH (ref 3.8–10.5)
WBC # BLD: 13.2 K/UL — HIGH (ref 3.8–10.5)
WBC # BLD: 16.4 K/UL — HIGH (ref 3.8–10.5)
WBC # FLD AUTO: 12.8 K/UL — HIGH (ref 3.8–10.5)
WBC # FLD AUTO: 13.2 K/UL — HIGH (ref 3.8–10.5)
WBC # FLD AUTO: 16.4 K/UL — HIGH (ref 3.8–10.5)

## 2019-02-06 PROCEDURE — 99233 SBSQ HOSP IP/OBS HIGH 50: CPT

## 2019-02-06 PROCEDURE — 71250 CT THORAX DX C-: CPT | Mod: 26

## 2019-02-06 RX ORDER — PANTOPRAZOLE SODIUM 20 MG/1
40 TABLET, DELAYED RELEASE ORAL
Qty: 0 | Refills: 0 | Status: DISCONTINUED | OUTPATIENT
Start: 2019-02-06 | End: 2019-02-10

## 2019-02-06 RX ORDER — INSULIN LISPRO 100/ML
6 VIAL (ML) SUBCUTANEOUS ONCE
Qty: 0 | Refills: 0 | Status: COMPLETED | OUTPATIENT
Start: 2019-02-06 | End: 2019-02-06

## 2019-02-06 RX ORDER — SODIUM CHLORIDE 9 MG/ML
1000 INJECTION INTRAMUSCULAR; INTRAVENOUS; SUBCUTANEOUS
Qty: 0 | Refills: 0 | Status: DISCONTINUED | OUTPATIENT
Start: 2019-02-06 | End: 2019-02-07

## 2019-02-06 RX ORDER — COLCHICINE 0.6 MG
0.6 TABLET ORAL DAILY
Qty: 0 | Refills: 0 | Status: DISCONTINUED | OUTPATIENT
Start: 2019-02-06 | End: 2019-02-10

## 2019-02-06 RX ORDER — ALLOPURINOL 300 MG
300 TABLET ORAL DAILY
Qty: 0 | Refills: 0 | Status: DISCONTINUED | OUTPATIENT
Start: 2019-02-06 | End: 2019-02-07

## 2019-02-06 RX ORDER — INSULIN LISPRO 100/ML
4 VIAL (ML) SUBCUTANEOUS ONCE
Qty: 0 | Refills: 0 | Status: COMPLETED | OUTPATIENT
Start: 2019-02-06 | End: 2019-02-06

## 2019-02-06 RX ORDER — INDOMETHACIN 50 MG
50 CAPSULE ORAL THREE TIMES A DAY
Qty: 0 | Refills: 0 | Status: DISCONTINUED | OUTPATIENT
Start: 2019-02-06 | End: 2019-02-07

## 2019-02-06 RX ADMIN — TAMSULOSIN HYDROCHLORIDE 0.4 MILLIGRAM(S): 0.4 CAPSULE ORAL at 23:26

## 2019-02-06 RX ADMIN — HEPARIN SODIUM 5000 UNIT(S): 5000 INJECTION INTRAVENOUS; SUBCUTANEOUS at 06:54

## 2019-02-06 RX ADMIN — ATORVASTATIN CALCIUM 10 MILLIGRAM(S): 80 TABLET, FILM COATED ORAL at 22:49

## 2019-02-06 RX ADMIN — AMLODIPINE BESYLATE 10 MILLIGRAM(S): 2.5 TABLET ORAL at 06:52

## 2019-02-06 RX ADMIN — Medication 25 MILLIGRAM(S): at 06:52

## 2019-02-06 RX ADMIN — Medication 6 UNIT(S): at 17:37

## 2019-02-06 RX ADMIN — Medication 25 MILLIGRAM(S): at 17:39

## 2019-02-06 RX ADMIN — Medication 50 MILLIGRAM(S): at 22:49

## 2019-02-06 RX ADMIN — Medication 300 MILLIGRAM(S): at 12:45

## 2019-02-06 RX ADMIN — Medication 1 TABLET(S): at 12:46

## 2019-02-06 RX ADMIN — Medication 0.6 MILLIGRAM(S): at 12:45

## 2019-02-06 RX ADMIN — Medication 50 MILLIGRAM(S): at 14:12

## 2019-02-06 RX ADMIN — Medication 650 MILLIGRAM(S): at 07:50

## 2019-02-06 RX ADMIN — HEPARIN SODIUM 5000 UNIT(S): 5000 INJECTION INTRAVENOUS; SUBCUTANEOUS at 22:49

## 2019-02-06 RX ADMIN — Medication 81 MILLIGRAM(S): at 12:46

## 2019-02-06 RX ADMIN — HEPARIN SODIUM 5000 UNIT(S): 5000 INJECTION INTRAVENOUS; SUBCUTANEOUS at 14:12

## 2019-02-06 RX ADMIN — Medication 650 MILLIGRAM(S): at 06:52

## 2019-02-06 RX ADMIN — LEVETIRACETAM 500 MILLIGRAM(S): 250 TABLET, FILM COATED ORAL at 17:40

## 2019-02-06 RX ADMIN — Medication 40 MILLIGRAM(S): at 08:23

## 2019-02-06 RX ADMIN — Medication 1 MILLIGRAM(S): at 12:46

## 2019-02-06 RX ADMIN — LEVETIRACETAM 500 MILLIGRAM(S): 250 TABLET, FILM COATED ORAL at 06:52

## 2019-02-06 RX ADMIN — Medication 50 MILLIGRAM(S): at 23:49

## 2019-02-06 RX ADMIN — SODIUM CHLORIDE 75 MILLILITER(S): 9 INJECTION INTRAMUSCULAR; INTRAVENOUS; SUBCUTANEOUS at 14:12

## 2019-02-06 RX ADMIN — Medication 4 UNIT(S): at 23:26

## 2019-02-06 NOTE — PROVIDER CONTACT NOTE (MEDICATION) - RECOMMENDATIONS
Fingerstick 200 for breakfast, 300 for lunch, Dr. Anderson notified, Prednisone PO discontinued with no insulin  coverage.

## 2019-02-06 NOTE — PROGRESS NOTE ADULT - ASSESSMENT
83 yo m with pmh  stomach cancer, chronic anemia, chronic afib on xarelto, seizure d/o, gout presents with sepsis secondary to PNA along with acute gout flare    1. sepsis secondary to PNA: questionable opacity, pt is afebrile, no cough, no SOB, will get CT noncontrast to evaluate further, await CT results, would not put on abx for now  procalcitonin not impressive   2. acute gout flare: also severe arthritis, continue prednisone 40mg daily for flare  3. chronic afib :rate controlled ,holding a/c at this time due to the acute on chronic anemia and recent hematuria, will need to evaluate if patient is too high risk to continue a/c moving forward   4. acute on chronic anemia: s/p 1 unit PRBC's, keep >7.0  5. tila on ckd stage 3: resolving, around baseline, repeat bmp in am , avoid nephrotoxins  6. seizure d/o stable resume home meds  7. stomach cancer: no acute issues       DVT/GI ppx 85 yo m with pmh  stomach cancer, chronic anemia, chronic afib on xarelto, seizure d/o, gout presents with sepsis secondary to PNA along with acute gout flare    1. sepsis secondary likely acute gout flare: questionable opacity on xray, pt is afebrile, no cough, no SOB, will get CT noncontrast to evaluate further , would not put on abx for now  procalcitonin not impressive , seen by ID and agrees with plan   2. acute gout flare: also severe arthritis, continue prednisone 40mg daily for flare  3. chronic afib :rate controlled ,holding a/c at this time due to the acute on chronic anemia and recent hematuria, will need to evaluate if patient is too high risk to continue a/c moving forward   4. acute on chronic anemia: s/p 1 unit PRBC's, keep >7.0  5. tila on ckd stage 3: resolving, around baseline, repeat bmp in am , avoid nephrotoxins  6. seizure d/o stable resume home meds  7. stomach cancer: no acute issues       DVT/GI ppx     addendum- pt is refusing CT chest until I speak with his PMD, he will give me his number shortly and I will call to discuss case 85 yo m with pmh  stomach cancer, chronic anemia, chronic afib on xarelto, seizure d/o, gout presents with sepsis secondary to PNA along with acute gout flare    1. sepsis secondary likely acute gout flare: questionable opacity on xray, pt is afebrile, no cough, no SOB, will get CT noncontrast to evaluate further , would not put on abx for now  procalcitonin not impressive , seen by ID and agrees with plan   2. acute gout flare: also severe arthritis, continue prednisone 40mg daily for flare  3. chronic afib :rate controlled ,holding a/c at this time due to the acute on chronic anemia and recent hematuria, will need to evaluate if patient is too high risk to continue a/c moving forward   4. acute on chronic anemia: s/p 1 unit PRBC's, keep >7.0  5. tila on ckd stage 3: resolving, around baseline, repeat bmp in am , avoid nephrotoxins  6. seizure d/o stable resume home meds  7. stomach cancer: no acute issues       DVT/GI ppx     addendum- Spoke with patient's PCP Dr. Lois Wood- [phone] and discussed patients care. Patient had a Hg of 11 back in April of 2018, his drop in h/h is an acute issue that has never been worked up in his past admission here. Will get GI to see patient for possible scope, r/o GIB, r/o recurrent GI malignancy  Also, his PCP has explained that patient has severe gout and the only thing that has helped him over the years is the combination of indomethacin TID , allopurinol and colchicine. His PCP is aware that this may affect his kidney function however he is recommending to restart these medications and follow his kidney function. Will give NS at 75 cc at this time along with the NSAID and monitor   Pt and friend at bedside in agreement 83 yo m with pmh  stomach cancer, chronic anemia, chronic afib on xarelto, seizure d/o, gout presents with sepsis secondary to PNA along with acute gout flare    1. sepsis secondary likely acute gout flare: questionable opacity on xray, pt is afebrile, no cough, no SOB, will get CT noncontrast to evaluate further , would not put on abx for now  procalcitonin not impressive , seen by ID and agrees with plan   2. acute gout flare: also severe arthritis, continue prednisone 40mg daily for flare  3. chronic afib :rate controlled ,holding a/c at this time due to the acute on chronic anemia and recent hematuria, will need to evaluate if patient is too high risk to continue a/c moving forward   4. acute on chronic anemia: s/p 1 unit PRBC's, keep >7.0  5. tila on ckd stage 3: resolving, around baseline, repeat bmp in am , avoid nephrotoxins  6. seizure d/o stable resume home meds  7. stomach cancer: no acute issues   DVT/GI ppx     addendum- Spoke with patient's PCP Dr. Lois Wood- [phone] and discussed patients care. Patient had a Hg of 11 back in April of 2018, his drop in h/h is an acute issue that has never been worked up in his past admission here. Will get GI to see patient for possible scope, r/o GIB, r/o recurrent GI malignancy  Also, his PCP has explained that patient has severe gout and the only thing that has helped him over the years is the combination of indomethacin TID , allopurinol and colchicine. His PCP is aware that this may affect his kidney function however he is recommending to restart these medications and follow his kidney function. Will give NS at 75 cc at this time along with the NSAID and monitor   Pt and friend at bedside in agreement   He is also in agreement of doing a CT noncontrast  chest to evaluate for PNA- will continue off abx until results of CT

## 2019-02-06 NOTE — PROGRESS NOTE ADULT - SUBJECTIVE AND OBJECTIVE BOX
YUE KING  84y  Male    Patient is a 84y old  Male who presents with a chief complaint of abnormal lab work (2019 14:16)    Pt seen and examined, no acute events overnight  s/p 1 unit PRBC's  complaining of pain in b/l knees     PAST MEDICAL & SURGICAL HISTORY:  Nonrheumatic aortic valve stenosis  Cancer of stomach  Anemia, unspecified type  Seizure disorder  Benign prostatic hyperplasia, unspecified whether lower urinary tract symptoms present  Hyperlipemia  Essential hypertension  Chronic atrial fibrillation  Stomach cancer  Hypertension  Atrial fibrillation  Gout  No significant past surgical history  No significant past surgical history        MedsMEDICATIONS  (STANDING):  allopurinol 100 milliGRAM(s) Oral daily  amLODIPine   Tablet 10 milliGRAM(s) Oral daily  aspirin enteric coated 81 milliGRAM(s) Oral daily  atorvastatin 10 milliGRAM(s) Oral at bedtime  dextrose 5%. 1000 milliLiter(s) (50 mL/Hr) IV Continuous <Continuous>  dextrose 50% Injectable 12.5 Gram(s) IV Push once  dextrose 50% Injectable 25 Gram(s) IV Push once  dextrose 50% Injectable 25 Gram(s) IV Push once  folic acid 1 milliGRAM(s) Oral daily  heparin  Injectable 5000 Unit(s) SubCutaneous every 8 hours  levETIRAcetam 500 milliGRAM(s) Oral two times a day  metoprolol tartrate 25 milliGRAM(s) Oral every 12 hours  multivitamin 1 Tablet(s) Oral daily  predniSONE   Tablet 40 milliGRAM(s) Oral once  predniSONE   Tablet 40 milliGRAM(s) Oral daily  sodium chloride 0.9%. 1000 milliLiter(s) (75 mL/Hr) IV Continuous <Continuous>  tamsulosin 0.4 milliGRAM(s) Oral at bedtime    MEDICATIONS  (PRN):  acetaminophen   Tablet .. 650 milliGRAM(s) Oral every 6 hours PRN Temp greater or equal to 38C (100.4F), Mild Pain (1 - 3), Moderate Pain (4 - 6)  dextrose 40% Gel 15 Gram(s) Oral once PRN Blood Glucose LESS THAN 70 milliGRAM(s)/deciliter  glucagon  Injectable 1 milliGRAM(s) IntraMuscular once PRN Glucose LESS THAN 70 milligrams/deciliter      Vital Signs Last 24 Hrs  T(C): 36.7 (2019 04:47), Max: 39.2 (2019 11:15)  T(F): 98.1 (2019 04:47), Max: 102.5 (2019 11:15)  HR: 100 (2019 04:47) (94 - 101)  BP: 125/64 (2019 04:47) (100/54 - 127/53)  BP(mean): --  RR: 14 (2019 04:47) (14 - 20)  SpO2: 97% (2019 04:47) (91% - 100%)    PHYSICAL EXAM:  GENERAL: NAD   HEAD:  NC/AT  EYES: EOMI, PERRLA, conjunctiva and sclera clear  NERVOUS SYSTEM:  Alert & Oriented X3  CHEST/LUNG: Clear lungs b/l  HEART: S1S2, RRR   ABDOMEN: Soft, non-tender, non-distended, + bowel sounds  EXTREMITIES:  2+ Peripheral Pulses, No clubbing, cyanosis, or edema      LABS:                          7.3    12.8  )-----------( 188      ( 2019 07:23 )             23.3       02-    137  |  106  |  28<H>  ----------------------------<  177<H>  3.6   |  19<L>  |  1.43<H>    Ca    8.3<L>      2019 07:23    TPro  7.1  /  Alb  2.0<L>  /  TBili  0.6  /  DBili  x   /  AST  12  /  ALT  10  /  AlkPhos  25<L>  02-05      PT/INR - ( 2019 11:20 )   PT: 16.3 sec;   INR: 1.44 ratio         PTT - ( 2019 11:20 )  PTT:31.6 sec          Urinalysis Basic - ( 2019 11:31 )    Color: Yellow / Appearance: Slightly Turbid / S.020 / pH: x  Gluc: x / Ketone: Negative  / Bili: Negative / Urobili: Negative   Blood: x / Protein: 30 mg/dL / Nitrite: Negative   Leuk Esterase: Negative / RBC: x / WBC x   Sq Epi: x / Non Sq Epi: Neg.-Few / Bacteria: x                        RADIOLOGY & ADDITIONAL TESTS:    Imaging Personally Reviewed:  [ ] YES  [ ] NO      HEALTH ISSUES - PROBLEM Dx:          Care Discussed with Consultants/Other Providers [ x] YES  [ ] NO

## 2019-02-07 ENCOUNTER — TRANSCRIPTION ENCOUNTER (OUTPATIENT)
Age: 84
End: 2019-02-07

## 2019-02-07 LAB
ANION GAP SERPL CALC-SCNC: 14 MMOL/L — SIGNIFICANT CHANGE UP (ref 5–17)
BUN SERPL-MCNC: 46 MG/DL — HIGH (ref 7–23)
CALCIUM SERPL-MCNC: 8.3 MG/DL — LOW (ref 8.4–10.5)
CHLORIDE SERPL-SCNC: 105 MMOL/L — SIGNIFICANT CHANGE UP (ref 96–108)
CO2 SERPL-SCNC: 18 MMOL/L — LOW (ref 22–31)
CREAT SERPL-MCNC: 1.77 MG/DL — HIGH (ref 0.5–1.3)
GLUCOSE SERPL-MCNC: 241 MG/DL — HIGH (ref 70–99)
HCT VFR BLD CALC: 24.3 % — LOW (ref 39–50)
HGB BLD-MCNC: 7.7 G/DL — LOW (ref 13–17)
MCHC RBC-ENTMCNC: 31.7 GM/DL — LOW (ref 32–36)
MCHC RBC-ENTMCNC: 32.8 PG — SIGNIFICANT CHANGE UP (ref 27–34)
MCV RBC AUTO: 103.7 FL — HIGH (ref 80–100)
PLATELET # BLD AUTO: 216 K/UL — SIGNIFICANT CHANGE UP (ref 150–400)
POTASSIUM SERPL-MCNC: 4 MMOL/L — SIGNIFICANT CHANGE UP (ref 3.5–5.3)
POTASSIUM SERPL-SCNC: 4 MMOL/L — SIGNIFICANT CHANGE UP (ref 3.5–5.3)
RBC # BLD: 2.34 M/UL — LOW (ref 4.2–5.8)
RBC # FLD: 19.2 % — HIGH (ref 10.3–14.5)
SODIUM SERPL-SCNC: 137 MMOL/L — SIGNIFICANT CHANGE UP (ref 135–145)
WBC # BLD: 11.5 K/UL — HIGH (ref 3.8–10.5)
WBC # FLD AUTO: 11.5 K/UL — HIGH (ref 3.8–10.5)

## 2019-02-07 PROCEDURE — 99233 SBSQ HOSP IP/OBS HIGH 50: CPT

## 2019-02-07 RX ORDER — ALLOPURINOL 300 MG
100 TABLET ORAL DAILY
Qty: 0 | Refills: 0 | Status: DISCONTINUED | OUTPATIENT
Start: 2019-02-07 | End: 2019-02-08

## 2019-02-07 RX ORDER — INDOMETHACIN 50 MG
50 CAPSULE ORAL THREE TIMES A DAY
Qty: 0 | Refills: 0 | Status: DISCONTINUED | OUTPATIENT
Start: 2019-02-07 | End: 2019-02-07

## 2019-02-07 RX ADMIN — SODIUM CHLORIDE 75 MILLILITER(S): 9 INJECTION INTRAMUSCULAR; INTRAVENOUS; SUBCUTANEOUS at 07:03

## 2019-02-07 RX ADMIN — HEPARIN SODIUM 5000 UNIT(S): 5000 INJECTION INTRAVENOUS; SUBCUTANEOUS at 07:03

## 2019-02-07 RX ADMIN — AMLODIPINE BESYLATE 10 MILLIGRAM(S): 2.5 TABLET ORAL at 07:03

## 2019-02-07 RX ADMIN — Medication 1 MILLIGRAM(S): at 11:35

## 2019-02-07 RX ADMIN — Medication 1 TABLET(S): at 11:35

## 2019-02-07 RX ADMIN — Medication 50 MILLIGRAM(S): at 07:04

## 2019-02-07 RX ADMIN — LEVETIRACETAM 500 MILLIGRAM(S): 250 TABLET, FILM COATED ORAL at 07:04

## 2019-02-07 RX ADMIN — Medication 25 MILLIGRAM(S): at 07:04

## 2019-02-07 RX ADMIN — Medication 0.6 MILLIGRAM(S): at 11:35

## 2019-02-07 RX ADMIN — Medication 81 MILLIGRAM(S): at 11:35

## 2019-02-07 RX ADMIN — Medication 300 MILLIGRAM(S): at 11:35

## 2019-02-07 RX ADMIN — Medication 50 MILLIGRAM(S): at 17:15

## 2019-02-07 RX ADMIN — HEPARIN SODIUM 5000 UNIT(S): 5000 INJECTION INTRAVENOUS; SUBCUTANEOUS at 13:06

## 2019-02-07 RX ADMIN — ATORVASTATIN CALCIUM 10 MILLIGRAM(S): 80 TABLET, FILM COATED ORAL at 22:12

## 2019-02-07 RX ADMIN — Medication 50 MILLIGRAM(S): at 07:45

## 2019-02-07 RX ADMIN — HEPARIN SODIUM 5000 UNIT(S): 5000 INJECTION INTRAVENOUS; SUBCUTANEOUS at 22:13

## 2019-02-07 RX ADMIN — TAMSULOSIN HYDROCHLORIDE 0.4 MILLIGRAM(S): 0.4 CAPSULE ORAL at 22:12

## 2019-02-07 RX ADMIN — Medication 50 MILLIGRAM(S): at 13:06

## 2019-02-07 RX ADMIN — LEVETIRACETAM 500 MILLIGRAM(S): 250 TABLET, FILM COATED ORAL at 17:17

## 2019-02-07 RX ADMIN — PANTOPRAZOLE SODIUM 40 MILLIGRAM(S): 20 TABLET, DELAYED RELEASE ORAL at 07:05

## 2019-02-07 RX ADMIN — Medication 25 MILLIGRAM(S): at 17:17

## 2019-02-07 NOTE — PROGRESS NOTE ADULT - ASSESSMENT
83 y/o with pmh significant for stomach cancer (25 years ago), anemia, chronic afib, and gout, who presents to ER with drop in hemoglobin. Abdomen soft. Nontender. VSS. H/H 7.3/23.3. Discussed with patient, that given signifcant drop in hemoglobin and history of Gastric Ca. Endoscopy scheduled for tomorrow 2/8/19.

## 2019-02-07 NOTE — PROGRESS NOTE ADULT - ATTENDING COMMENTS
Patient seen and examined; agree with assessment and plan as above.  Patient more alert and conversant today.  No acute complaints.  No reports of melena or BRBPR.  VSS.  Abdomen soft, nontender    Plan: EGD tomorrow

## 2019-02-07 NOTE — PROGRESS NOTE ADULT - SUBJECTIVE AND OBJECTIVE BOX
CC: f/u for fever - resolved    Patient reports that he has no pain "whatsoever" & that he would like to rest     REVIEW OF SYSTEMS:  All other review of systems negative (Comprehensive ROS)    Antimicrobials Day #  :  Other Medications Reviewed    T(F): 97.2 (02-07-19 @ 05:10), Max: 97.5 (02-06-19 @ 21:55)  HR: 67 (02-07-19 @ 05:10)  BP: 115/68 (02-07-19 @ 05:10)  RR: 14 (02-07-19 @ 05:10)  SpO2: 99% (02-07-19 @ 05:10)  Wt(kg): --    PHYSICAL EXAM:  General: alert, no acute distress  Eyes:  anicteric, no conjunctival injection, no discharge  Oropharynx: no lesions or injection 	  Neck: supple, without adenopathy  Lungs: clear to auscultation  Heart: regular rate and rhythm; no murmurs  Abdomen: soft, nondistended, nontender, without mass or organomegaly  Skin: no lesions  Extremities: no clubbing or cyanosis. Bilateral 2+ edema. Left hand with swelling, no pain.   Neurologic: alert, oriented, moves all extremities    LAB RESULTS:                        7.7    11.5  )-----------( 216      ( 07 Feb 2019 06:52 )             24.3     02-07    137  |  105  |  46<H>  ----------------------------<  241<H>  4.0   |  18<L>  |  1.77<H>    Ca    8.3<L>      07 Feb 2019 06:52          MICROBIOLOGY:  RECENT CULTURES:  02-05 @ 11:31 .Urine Clean Catch (Midstream)     No growth      02-05 @ 11:25 .Blood Blood-Peripheral     No growth to date.      02-05 @ 11:20 .Blood Blood-Peripheral     No growth to date.    RADIOLOGY REVIEWED:

## 2019-02-07 NOTE — PROGRESS NOTE ADULT - SUBJECTIVE AND OBJECTIVE BOX
INTERVAL HPI/OVERNIGHT EVENTS:  HPI:    85 y/o with pmh significant for stomach cancer (25 years ago), anemia, chronic afib, and gout, who presents to ER for drop in hemoglobin. Seen and examined at bedside. No complaints from patient over night.     MEDICATIONS  (STANDING):  allopurinol 300 milliGRAM(s) Oral daily  amLODIPine   Tablet 10 milliGRAM(s) Oral daily  aspirin enteric coated 81 milliGRAM(s) Oral daily  atorvastatin 10 milliGRAM(s) Oral at bedtime  colchicine 0.6 milliGRAM(s) Oral daily  dextrose 5%. 1000 milliLiter(s) (50 mL/Hr) IV Continuous <Continuous>  dextrose 50% Injectable 12.5 Gram(s) IV Push once  dextrose 50% Injectable 25 Gram(s) IV Push once  dextrose 50% Injectable 25 Gram(s) IV Push once  folic acid 1 milliGRAM(s) Oral daily  heparin  Injectable 5000 Unit(s) SubCutaneous every 8 hours  indomethacin 50 milliGRAM(s) Oral three times a day  levETIRAcetam 500 milliGRAM(s) Oral two times a day  metoprolol tartrate 25 milliGRAM(s) Oral every 12 hours  multivitamin 1 Tablet(s) Oral daily  pantoprazole    Tablet 40 milliGRAM(s) Oral before breakfast  tamsulosin 0.4 milliGRAM(s) Oral at bedtime    MEDICATIONS  (PRN):  acetaminophen   Tablet .. 650 milliGRAM(s) Oral every 6 hours PRN Temp greater or equal to 38C (100.4F), Mild Pain (1 - 3), Moderate Pain (4 - 6)  dextrose 40% Gel 15 Gram(s) Oral once PRN Blood Glucose LESS THAN 70 milliGRAM(s)/deciliter  glucagon  Injectable 1 milliGRAM(s) IntraMuscular once PRN Glucose LESS THAN 70 milligrams/deciliter    Allergies    No Known Allergies    Intolerances      HYSICAL EXAM:   Vital Signs:  Vital Signs Last 24 Hrs  T(C): 36.2 (07 Feb 2019 05:10), Max: 37.1 (06 Feb 2019 15:38)  T(F): 97.2 (07 Feb 2019 05:10), Max: 98.7 (06 Feb 2019 15:38)  HR: 67 (07 Feb 2019 05:10) (67 - 98)  BP: 115/68 (07 Feb 2019 05:10) (104/63 - 116/57)  BP(mean): --  RR: 14 (07 Feb 2019 05:10) (14 - 14)  SpO2: 99% (07 Feb 2019 05:10) (96% - 99%)  Daily     Daily I&O's Summary    06 Feb 2019 07:01  -  07 Feb 2019 07:00  --------------------------------------------------------  IN: 600 mL / OUT: 1060 mL / NET: -460 mL    07 Feb 2019 07:01  -  07 Feb 2019 15:02  --------------------------------------------------------  IN: 0 mL / OUT: 80 mL / NET: -80 mL        GENERAL:  Appears stated age, well-groomed, well-nourished, no distress  HEENT:  NC/AT,  conjunctivae clear and pink, no thyromegaly, nodules, adenopathy, no JVD, sclera -anicteric  CHEST:  Full & symmetric excursion, no increased effort, breath sounds clear  HEART:  Regular rhythm, S1, S2, no murmur/rub/S3/S4, no abdominal bruit, no edema  ABDOMEN:  Soft, non-tender, non-distended, normoactive bowel sounds,  no masses ,no hepato-splenomegaly, no signs of chronic liver disease  EXTEREMITIES:  no cyanosis,clubbing or edema  SKIN:  No rash/erythema/ecchymoses/petechiae/wounds/abscess/warm/dry  NEURO:  Alert, oriented, no asterixis, no tremor, no encephalopathy      LABS:                        7.7    11.5  )-----------( 216      ( 07 Feb 2019 06:52 )             24.3     02-07    137  |  105  |  46<H>  ----------------------------<  241<H>  4.0   |  18<L>  |  1.77<H>    Ca    8.3<L>      07 Feb 2019 06:52          amylase   lipase  RADIOLOGY & ADDITIONAL TESTS: INTERVAL HPI/OVERNIGHT EVENTS:  HPI:    85 y/o with pmh significant for stomach cancer (25 years ago), anemia, chronic afib, and gout, who presents to ER for drop in hemoglobin. Seen and examined at bedside. No complaints from patient over night.     MEDICATIONS  (STANDING):  allopurinol 300 milliGRAM(s) Oral daily  amLODIPine   Tablet 10 milliGRAM(s) Oral daily  aspirin enteric coated 81 milliGRAM(s) Oral daily  atorvastatin 10 milliGRAM(s) Oral at bedtime  colchicine 0.6 milliGRAM(s) Oral daily  dextrose 5%. 1000 milliLiter(s) (50 mL/Hr) IV Continuous <Continuous>  dextrose 50% Injectable 12.5 Gram(s) IV Push once  dextrose 50% Injectable 25 Gram(s) IV Push once  dextrose 50% Injectable 25 Gram(s) IV Push once  folic acid 1 milliGRAM(s) Oral daily  heparin  Injectable 5000 Unit(s) SubCutaneous every 8 hours  indomethacin 50 milliGRAM(s) Oral three times a day  levETIRAcetam 500 milliGRAM(s) Oral two times a day  metoprolol tartrate 25 milliGRAM(s) Oral every 12 hours  multivitamin 1 Tablet(s) Oral daily  pantoprazole    Tablet 40 milliGRAM(s) Oral before breakfast  tamsulosin 0.4 milliGRAM(s) Oral at bedtime    MEDICATIONS  (PRN):  acetaminophen   Tablet .. 650 milliGRAM(s) Oral every 6 hours PRN Temp greater or equal to 38C (100.4F), Mild Pain (1 - 3), Moderate Pain (4 - 6)  dextrose 40% Gel 15 Gram(s) Oral once PRN Blood Glucose LESS THAN 70 milliGRAM(s)/deciliter  glucagon  Injectable 1 milliGRAM(s) IntraMuscular once PRN Glucose LESS THAN 70 milligrams/deciliter    Allergies    No Known Allergies    Intolerances      HYSICAL EXAM:   Vital Signs:  Vital Signs Last 24 Hrs  T(C): 36.2 (07 Feb 2019 05:10), Max: 37.1 (06 Feb 2019 15:38)  T(F): 97.2 (07 Feb 2019 05:10), Max: 98.7 (06 Feb 2019 15:38)  HR: 67 (07 Feb 2019 05:10) (67 - 98)  BP: 115/68 (07 Feb 2019 05:10) (104/63 - 116/57)  BP(mean): --  RR: 14 (07 Feb 2019 05:10) (14 - 14)  SpO2: 99% (07 Feb 2019 05:10) (96% - 99%)  Daily     Daily I&O's Summary    06 Feb 2019 07:01  -  07 Feb 2019 07:00  --------------------------------------------------------  IN: 600 mL / OUT: 1060 mL / NET: -460 mL    07 Feb 2019 07:01  -  07 Feb 2019 15:02  --------------------------------------------------------  IN: 0 mL / OUT: 80 mL / NET: -80 mL    GENERAL:  Appears stated age, well-groomed, well-nourished, no distress  HEENT:  NC/AT,  conjunctivae clear and pink, no thyromegaly, nodules, adenopathy, no JVD, sclera -anicteric  CHEST:  Full & symmetric excursion, no increased effort, breath sounds clear  HEART:  irregular rhythm, S1, S2, , +1 UE edema  ABDOMEN:  Soft, non-tender, non-distended, normoactive bowel sounds,  no masses ,no hepato-splenomegaly, no signs of chronic liver disease  EXTEREMITIES:  no cyanosis,clubbing   SKIN:  No rash, warm/dry  NEURO:  Alert, oriented, no asterixis, no tremor, no encephalopathy      LABS:                        7.7    11.5  )-----------( 216      ( 07 Feb 2019 06:52 )             24.3     02-07    137  |  105  |  46<H>  ----------------------------<  241<H>  4.0   |  18<L>  |  1.77<H>    Ca    8.3<L>      07 Feb 2019 06:52          amylase   lipase  RADIOLOGY & ADDITIONAL TESTS:

## 2019-02-07 NOTE — PROGRESS NOTE ADULT - SUBJECTIVE AND OBJECTIVE BOX
Patient is a 84y old  Male who presents with a chief complaint of abnormal lab work (2019 15:40)      Patient seen and examined at bedside.    ALLERGIES:  No Known Allergies    MEDICATIONS:  acetaminophen   Tablet .. 650 milliGRAM(s) Oral every 6 hours PRN  allopurinol 300 milliGRAM(s) Oral daily  amLODIPine   Tablet 10 milliGRAM(s) Oral daily  aspirin enteric coated 81 milliGRAM(s) Oral daily  atorvastatin 10 milliGRAM(s) Oral at bedtime  colchicine 0.6 milliGRAM(s) Oral daily  dextrose 40% Gel 15 Gram(s) Oral once PRN  dextrose 5%. 1000 milliLiter(s) IV Continuous <Continuous>  dextrose 50% Injectable 12.5 Gram(s) IV Push once  dextrose 50% Injectable 25 Gram(s) IV Push once  dextrose 50% Injectable 25 Gram(s) IV Push once  folic acid 1 milliGRAM(s) Oral daily  glucagon  Injectable 1 milliGRAM(s) IntraMuscular once PRN  heparin  Injectable 5000 Unit(s) SubCutaneous every 8 hours  levETIRAcetam 500 milliGRAM(s) Oral two times a day  metoprolol tartrate 25 milliGRAM(s) Oral every 12 hours  multivitamin 1 Tablet(s) Oral daily  pantoprazole    Tablet 40 milliGRAM(s) Oral before breakfast  predniSONE   Tablet 40 milliGRAM(s) Oral daily  sodium chloride 0.9%. 1000 milliLiter(s) IV Continuous <Continuous>  tamsulosin 0.4 milliGRAM(s) Oral at bedtime    Vital Signs Last 24 Hrs  T(F): 97.2 (2019 05:10), Max: 98.7 (2019 15:38)  HR: 67 (2019 05:10) (67 - 98)  BP: 115/68 (2019 05:10) (104/63 - 116/57)  RR: 14 (2019 05:10) (14 - 14)  SpO2: 99% (2019 05:10) (96% - 99%)  I&O's Summary    2019 07:01  -  2019 07:00  --------------------------------------------------------  IN: 600 mL / OUT: 1060 mL / NET: -460 mL        PHYSICAL EXAM:  General: NAD,   ENT: MMM  Neck: Supple, No JVD  Lungs: Clear to auscultation bilaterally  Cardio: RRR, S1/S2, No murmurs  Abdomen: Soft, Nontender, Nondistended; Bowel sounds present  Extremities: left hand swelling and erythema improved   LABS:                        7.7    11.5  )-----------( 216      ( 2019 06:52 )             24.3         137  |  105  |  46  ----------------------------<  241  4.0   |  18  |  1.77    Ca    8.3      2019 06:52    TPro  7.1  /  Alb  2.0  /  TBili  0.6  /  DBili  x   /  AST  12  /  ALT  10  /  AlkPhos  25      eGFR if Non African American: 35 mL/min/1.73M2 (19 @ 06:52)  eGFR if African American: 40 mL/min/1.73M2 (19 @ 06:52)    PT/INR - ( 2019 11:20 )   PT: 16.3 sec;   INR: 1.44 ratio         PTT - ( 2019 11:20 )  PTT:31.6 sec  Lactate, Blood: 2.0 mmol/L ( @ 11:20)         Chol 95 mg/dL LDL 55 mg/dL HDL 26 mg/dL Trig 69 mg/dL        CAPILLARY BLOOD GLUCOSE      POCT Blood Glucose.: 251 mg/dL (2019 08:34)  POCT Blood Glucose.: 325 mg/dL (2019 22:45)  POCT Blood Glucose.: 363 mg/dL (2019 17:27)  POCT Blood Glucose.: 310 mg/dL (2019 12:18)    1223 [id number] 6.3    Urinalysis Basic - ( 2019 11:31 )    Color: Yellow / Appearance: Slightly Turbid / S.020 / pH: x  Gluc: x / Ketone: Negative  / Bili: Negative / Urobili: Negative   Blood: x / Protein: 30 mg/dL / Nitrite: Negative   Leuk Esterase: Negative / RBC: x / WBC x   Sq Epi: x / Non Sq Epi: Neg.-Few / Bacteria: x        Culture - Urine (collected 2019 11:31)  Source: .Urine Clean Catch (Midstream)  Final Report (2019 17:32):    No growth    Culture - Blood (collected 2019 11:25)  Source: .Blood Blood-Peripheral  Preliminary Report (2019 17:01):    No growth to date.    Culture - Blood (collected 2019 11:20)  Source: .Blood Blood-Peripheral  Preliminary Report (2019 17:01):    No growth to date.        RADIOLOGY & ADDITIONAL TESTS:  < from: CT Chest No Cont (19 @ 15:33) >    IMPRESSION:     Trace bilateral pleuraleffusions and bibasilar atelectasis. No focal   consolidation.    Moderate compression deformity of T8, slightly worsened compared to the   prior study.    Additional findings as above.                  VERENICE ROBERTS   This document has been electronically signed. 2019  3:51PM        < end of copied text >    Care Discussed with Consultants/Other Providers:

## 2019-02-07 NOTE — PHYSICAL THERAPY INITIAL EVALUATION ADULT - ADDITIONAL COMMENTS
Pt admitted from  where he was having short term rehab.  Pt states he was minimally ambulatory with a RW and assistance, also required assistance for ADLs.  Pt doesn't want to return to .

## 2019-02-07 NOTE — PROGRESS NOTE ADULT - ASSESSMENT
85 yo male with gout, Afib, falls, anemia, BPH and recent seizure in Dec 2018 admitted with anemia and fever in ER on 2/5.  Fever was secondary to polyarticular gout flare & improved with improved joint pain on treatment.  He has no localizing signs of infection.  PCT is not reliable given CKD  Anemia w/u as per GI    Suggest:  1. See no indication for empiric antibiotics  2. Follow blood cultures - till final negative  Will no longer follow actively, please reconsult with questions, or if the status changes, thanks

## 2019-02-07 NOTE — PROGRESS NOTE ADULT - ASSESSMENT
85 yo m with pmh  stomach cancer, chronic anemia, chronic afib on xarelto, seizure d/o, gout presents with sepsis secondary to PNA along with acute gout flare    1. sepsis secondary likely acute gout flare: questionable opacity on xray, pt is afebrile, no cough, no SOB, will get CT noncontrast to evaluate further , would not put on abx for now  procalcitonin not impressive , seen by ID and agrees with plan   2. acute gout flare: also severe arthritis, c/w indomethacin, colchicine, and allopurinol. improved since starting the indomethacin   3. chronic afib :rate controlled ,holding a/c at this time due to the acute on chronic anemia and recent hematuria, will need to evaluate if patient is too high risk to continue a/c moving forward   4. acute on chronic anemia: s/p 1 unit PRBC's, keep >7.0 patients baseline was 11 in 4/2018 seen by gi will get EGD 2/8/19 npo after midnight   5. tila on ckd stage 3: elevated creatinine due to indomethacin c.w ivf ns 75ml/hr   6. seizure d/o stable resume home meds  7. stomach cancer: no acute issues   8. dvt ppx: heparin sub q 85 yo m with pmh  stomach cancer, chronic anemia, chronic afib on xarelto, seizure d/o, gout presents with sepsis secondary to PNA along with acute gout flare    1. sepsis secondary likely acute gout flare: ct chest neg no pna, pna ruled out  procalcitonin not impressive , seen by ID and agrees with plan   2. acute gout flare: also severe arthritis, c/w indomethacin, colchicine, and allopurinol. improved since starting the indomethacin   3. chronic afib :rate controlled ,holding a/c at this time due to the acute on chronic anemia and recent hematuria, will need to evaluate if patient is too high risk to continue a/c moving forward   4. acute on chronic anemia: s/p 1 unit PRBC's, keep >7.0 patients baseline was 11 in 4/2018 seen by gi will get EGD 2/8/19 npo after midnight   5. tila on ckd stage 3: elevated creatinine due to indomethacin c.w ivf ns 75ml/hr   6. seizure d/o stable resume home meds  7. stomach cancer: no acute issues   8. dvt ppx: heparin sub q

## 2019-02-08 ENCOUNTER — RESULT REVIEW (OUTPATIENT)
Age: 84
End: 2019-02-08

## 2019-02-08 DIAGNOSIS — C16.9 MALIGNANT NEOPLASM OF STOMACH, UNSPECIFIED: ICD-10-CM

## 2019-02-08 LAB
ANION GAP SERPL CALC-SCNC: 11 MMOL/L — SIGNIFICANT CHANGE UP (ref 5–17)
BUN SERPL-MCNC: 52 MG/DL — HIGH (ref 7–23)
CALCIUM SERPL-MCNC: 8.6 MG/DL — SIGNIFICANT CHANGE UP (ref 8.4–10.5)
CHLORIDE SERPL-SCNC: 109 MMOL/L — HIGH (ref 96–108)
CO2 SERPL-SCNC: 19 MMOL/L — LOW (ref 22–31)
CREAT SERPL-MCNC: 1.74 MG/DL — HIGH (ref 0.5–1.3)
GLUCOSE SERPL-MCNC: 194 MG/DL — HIGH (ref 70–99)
HCT VFR BLD CALC: 23.1 % — LOW (ref 39–50)
HGB BLD-MCNC: 7.3 G/DL — LOW (ref 13–17)
MCHC RBC-ENTMCNC: 31.5 GM/DL — LOW (ref 32–36)
MCHC RBC-ENTMCNC: 32.8 PG — SIGNIFICANT CHANGE UP (ref 27–34)
MCV RBC AUTO: 104.1 FL — HIGH (ref 80–100)
PLATELET # BLD AUTO: 194 K/UL — SIGNIFICANT CHANGE UP (ref 150–400)
POTASSIUM SERPL-MCNC: 4.4 MMOL/L — SIGNIFICANT CHANGE UP (ref 3.5–5.3)
POTASSIUM SERPL-SCNC: 4.4 MMOL/L — SIGNIFICANT CHANGE UP (ref 3.5–5.3)
RBC # BLD: 2.22 M/UL — LOW (ref 4.2–5.8)
RBC # FLD: 18.9 % — HIGH (ref 10.3–14.5)
SODIUM SERPL-SCNC: 139 MMOL/L — SIGNIFICANT CHANGE UP (ref 135–145)
URATE SERPL-MCNC: 7.9 MG/DL — SIGNIFICANT CHANGE UP (ref 3.4–8.8)
WBC # BLD: 6.7 K/UL — SIGNIFICANT CHANGE UP (ref 3.8–10.5)
WBC # FLD AUTO: 6.7 K/UL — SIGNIFICANT CHANGE UP (ref 3.8–10.5)

## 2019-02-08 PROCEDURE — 99233 SBSQ HOSP IP/OBS HIGH 50: CPT

## 2019-02-08 PROCEDURE — 88112 CYTOPATH CELL ENHANCE TECH: CPT | Mod: 26

## 2019-02-08 PROCEDURE — 99223 1ST HOSP IP/OBS HIGH 75: CPT

## 2019-02-08 RX ORDER — ALLOPURINOL 300 MG
300 TABLET ORAL DAILY
Qty: 0 | Refills: 0 | Status: DISCONTINUED | OUTPATIENT
Start: 2019-02-08 | End: 2019-02-09

## 2019-02-08 RX ORDER — INDOMETHACIN 50 MG
25 CAPSULE ORAL THREE TIMES A DAY
Qty: 0 | Refills: 0 | Status: DISCONTINUED | OUTPATIENT
Start: 2019-02-08 | End: 2019-02-08

## 2019-02-08 RX ADMIN — LEVETIRACETAM 500 MILLIGRAM(S): 250 TABLET, FILM COATED ORAL at 18:02

## 2019-02-08 RX ADMIN — PANTOPRAZOLE SODIUM 40 MILLIGRAM(S): 20 TABLET, DELAYED RELEASE ORAL at 06:00

## 2019-02-08 RX ADMIN — Medication 81 MILLIGRAM(S): at 16:20

## 2019-02-08 RX ADMIN — HEPARIN SODIUM 5000 UNIT(S): 5000 INJECTION INTRAVENOUS; SUBCUTANEOUS at 06:00

## 2019-02-08 RX ADMIN — ATORVASTATIN CALCIUM 10 MILLIGRAM(S): 80 TABLET, FILM COATED ORAL at 23:27

## 2019-02-08 RX ADMIN — Medication 25 MILLIGRAM(S): at 06:00

## 2019-02-08 RX ADMIN — Medication 1 TABLET(S): at 16:20

## 2019-02-08 RX ADMIN — LEVETIRACETAM 500 MILLIGRAM(S): 250 TABLET, FILM COATED ORAL at 06:00

## 2019-02-08 RX ADMIN — Medication 0.6 MILLIGRAM(S): at 16:19

## 2019-02-08 RX ADMIN — Medication 1 MILLIGRAM(S): at 16:20

## 2019-02-08 RX ADMIN — Medication 650 MILLIGRAM(S): at 23:28

## 2019-02-08 RX ADMIN — Medication 25 MILLIGRAM(S): at 18:02

## 2019-02-08 RX ADMIN — TAMSULOSIN HYDROCHLORIDE 0.4 MILLIGRAM(S): 0.4 CAPSULE ORAL at 23:28

## 2019-02-08 RX ADMIN — Medication 100 MILLIGRAM(S): at 16:19

## 2019-02-08 NOTE — CONSULT NOTE ADULT - ATTENDING COMMENTS
Consultant notes reviewed : YES [ x] ; NO [ ]  Case discussed with attending physician / consultant: YES [x ] ; NO [ ]
Patient seen and examined with Alisia Cheung NP.  Agree with assessment and plan as above.    Elderly male with history of gastric cancer diagnosed 25 years ago (treated with radiation tx) now admitted with declining Hgb/Hct.  No reports of melena or BRBPR.  VSS.  Abdomen soft, nontender    Plan: EGD Friday.  Continue PPI.

## 2019-02-08 NOTE — PROGRESS NOTE ADULT - ASSESSMENT
83 yo m with pmh  stomach cancer, chronic anemia, chronic afib on xarelto, seizure d/o, gout presents with sepsis secondary to PNA along with acute gout flare    1. sepsis secondary likely acute gout flare: ct chest neg no pna, pna ruled out  procalcitonin not impressive , seen by ID and agrees with plan   2. acute gout flare: also severe arthritis, continue allopurinol, colchicine. symptoms improved with indocin.   3. chronic afib, severe aortic stenosis: rate controlled ,holding a/c at this time due to the acute on chronic anemia and recent hematuria, will need to evaluate if patient is too high risk to continue a/c moving forward   cardio consulted- Dr. Licona  4. acute on chronic anemia: s/p 1 unit PRBC's, keep >7.0 patients baseline was 11 in 4/2018 seen by gi will get EGD 2/8/19 npo after midnight   guaiac negative. Heme/onc consult, Dr. Estevez.   5. tila on ckd stage 3: baseline creatinine 1.4-1.7 d/c fluids. nephrology following.  6. seizure d/o stable resume home meds  7. stomach cancer: no acute issues   8. dvt ppx: heparin held due to acute anemia    patient is adamant on continuing indomethacin for gout flare, as that is what has worked for him in the past. He states that Dr. Fierro has cleared this up 2 days ago and doesn't understand why it's a problem for him to get. Patient also introduced me to Mikael, his work partner/proxy/advocate is at bedside.  They both ask me to speak to Dr. Lois Fierro, and dial his number at bedside. ([phone]) Dr. Fierro initially states that pt's creatinine has been around 2 generally and is okay for him to get indomethacin as long as there's not a big jump in the renal function. He adds that pt benefits strongly from indocin therefore should not be withheld due to his current creatinine level (1.74). I asked him at what point would you be concerned with his creatinine, he then states along the lines of "looking at his numbers of what I have, his creatinine was 1.6, 1.7 and 1.3 in 2010" I asked him if anything above 2 is of concern, he replies well yes, but perhaps it's better to lower the indocin 25mg TID rather than 50mg TID. I also brought up pt's ECHO findings of severe AS, he states he was not aware of this finding. I told him it would be difficult to treat his tila with fluids, with concern to putting the pt in acute chf, he states that he understands that situation. He does continue to recommend indocin in the mean time though. Also, we discussed the patient's finding of acute anemia, requiring transfusion and going for EGD today. patient and Gene insist on continuing indocin as they want "what Dr. Fierro recommends". they do not feel that me and Dr. Dye can make the decision to withhold indocin based on our findings knowing Dr. Fierro is okay with it.  I spoke with Dr. Dye regarding this, she strongly feels indocin is contraindicated in this case and would cause more harm than benefit at this time, given that prednisone can be tried for gout flare in lieu of indocin. I do agree with Dr. Dye and will hold for now, re-discuss with patient and Dr. Fierro if needed.

## 2019-02-08 NOTE — PROGRESS NOTE ADULT - SUBJECTIVE AND OBJECTIVE BOX
Awake, alert, denies pain    Vital Signs Last 24 Hrs  T(C): 36.2 (02-08-19 @ 16:10), Max: 36.5 (02-07-19 @ 21:00)  T(F): 97.2 (02-08-19 @ 16:10), Max: 97.7 (02-07-19 @ 21:00)  HR: 86 (02-08-19 @ 16:10) (86 - 97)  BP: 128/67 (02-08-19 @ 16:10) (118/73 - 140/86)  RR: 14 (02-08-19 @ 16:10) (14 - 16)  SpO2: 97% (02-08-19 @ 16:10) (97% - 99%)    Conversant, no apparent distress  PERRLA, pink conjunctivae, no ptosis  Neck non tender, supple  Normal respiratory effort, lungs good air entry  Heart S1S2  Extr + edema  Abdomen soft, NT, ND  Appropriate affect, AO                         7.3    6.7   )-----------( 194      ( 08 Feb 2019 07:04 )             23.1     08 Feb 2019 07:04    139    |  109    |  52     ----------------------------<  194    4.4     |  19     |  1.74     Ca    8.6        08 Feb 2019 07:04    acetaminophen   Tablet .. 650 milliGRAM(s) Oral every 6 hours PRN  allopurinol 100 milliGRAM(s) Oral daily  aspirin enteric coated 81 milliGRAM(s) Oral daily  atorvastatin 10 milliGRAM(s) Oral at bedtime  colchicine 0.6 milliGRAM(s) Oral daily  dextrose 40% Gel 15 Gram(s) Oral once PRN  dextrose 5%. 1000 milliLiter(s) IV Continuous <Continuous>  dextrose 50% Injectable 12.5 Gram(s) IV Push once  dextrose 50% Injectable 25 Gram(s) IV Push once  dextrose 50% Injectable 25 Gram(s) IV Push once  folic acid 1 milliGRAM(s) Oral daily  glucagon  Injectable 1 milliGRAM(s) IntraMuscular once PRN  levETIRAcetam 500 milliGRAM(s) Oral two times a day  metoprolol tartrate 25 milliGRAM(s) Oral every 12 hours  multivitamin 1 Tablet(s) Oral daily  pantoprazole    Tablet 40 milliGRAM(s) Oral before breakfast  tamsulosin 0.4 milliGRAM(s) Oral at bedtime    A/P:    Pt w/severe AS  CKD 3, suspect cardio-renal  Gout, anemia  NSAID's are contraindicated in this pt with persistent anemia and CKD  Will continue Allopurinol and Colchicine for now  D/w pt and his op md, all in agreement  Will check UA, PVR  GI w/up  CBC, BMP in am

## 2019-02-08 NOTE — CONSULT NOTE ADULT - PROBLEM SELECTOR RECOMMENDATION 9
CBC in am  PPI daily  Upper Endoscopy on 2/8/19
Patient with reportedly history of chronic anemia, now with exacerbation. Suspect kidney disease/chronic disease component of anemia. Medication(s) such as Keppra, Allopurinol may add to anemia as well. Check iron studies, B12/folate, reticulocyte count/haptoglobin. Continue to monitor CBC, and transfuse packed red blood cells if hemoglobin less than 7/increased symptoms.

## 2019-02-08 NOTE — PROGRESS NOTE ADULT - SUBJECTIVE AND OBJECTIVE BOX
Patient is a 84y old  Male who presents with a chief complaint of abnormal lab work (07 Feb 2019 17:14)      Patient seen and examined at bedside.    ALLERGIES:  No Known Allergies    MEDICATIONS:  acetaminophen   Tablet .. 650 milliGRAM(s) Oral every 6 hours PRN  allopurinol 100 milliGRAM(s) Oral daily  aspirin enteric coated 81 milliGRAM(s) Oral daily  atorvastatin 10 milliGRAM(s) Oral at bedtime  colchicine 0.6 milliGRAM(s) Oral daily  dextrose 40% Gel 15 Gram(s) Oral once PRN  dextrose 5%. 1000 milliLiter(s) IV Continuous <Continuous>  dextrose 50% Injectable 12.5 Gram(s) IV Push once  dextrose 50% Injectable 25 Gram(s) IV Push once  dextrose 50% Injectable 25 Gram(s) IV Push once  folic acid 1 milliGRAM(s) Oral daily  glucagon  Injectable 1 milliGRAM(s) IntraMuscular once PRN  levETIRAcetam 500 milliGRAM(s) Oral two times a day  metoprolol tartrate 25 milliGRAM(s) Oral every 12 hours  multivitamin 1 Tablet(s) Oral daily  pantoprazole    Tablet 40 milliGRAM(s) Oral before breakfast  tamsulosin 0.4 milliGRAM(s) Oral at bedtime    Vital Signs Last 24 Hrs  T(F): 97.1 (08 Feb 2019 05:42), Max: 97.7 (07 Feb 2019 17:13)  HR: 86 (08 Feb 2019 05:42) (86 - 92)  BP: 118/73 (08 Feb 2019 05:42) (104/59 - 125/65)  RR: 16 (08 Feb 2019 05:42) (14 - 16)  SpO2: 98% (08 Feb 2019 05:42) (96% - 99%)  I&O's Summary    07 Feb 2019 07:01  -  08 Feb 2019 07:00  --------------------------------------------------------  IN: 720 mL / OUT: 930 mL / NET: -210 mL        PHYSICAL EXAM:  General: NAD, AOx3  Neck: Supple, No JVD  Lungs: Clear to auscultation bilaterally  Cardio: RRR, S1/S2, No murmurs  Abdomen: Soft, Nontender, Nondistended; Bowel sounds present  Extremities: No clubbing, cyanosis, or edema    LABS:                        7.3    6.7   )-----------( 194      ( 08 Feb 2019 07:04 )             23.1     02-08    139  |  109  |  52  ----------------------------<  194  4.4   |  19  |  1.74    Ca    8.6      08 Feb 2019 07:04      eGFR if Non African American: 35 mL/min/1.73M2 (02-08-19 @ 07:04)  eGFR if : 41 mL/min/1.73M2 (02-08-19 @ 07:04)    12-23 Chol 95 mg/dL LDL 55 mg/dL HDL 26 mg/dL Trig 69 mg/dL    CAPILLARY BLOOD GLUCOSE    POCT Blood Glucose.: 168 mg/dL (08 Feb 2019 11:34)  POCT Blood Glucose.: 200 mg/dL (08 Feb 2019 07:48)  POCT Blood Glucose.: 282 mg/dL (07 Feb 2019 22:07)  POCT Blood Glucose.: 292 mg/dL (07 Feb 2019 16:41)    12-23 [id number] 6.3    Culture - Urine (collected 05 Feb 2019 11:31)  Source: .Urine Clean Catch (Midstream)  Final Report (06 Feb 2019 17:32):    No growth    Culture - Blood (collected 05 Feb 2019 11:25)  Source: .Blood Blood-Peripheral  Preliminary Report (06 Feb 2019 17:01):    No growth to date.    Culture - Blood (collected 05 Feb 2019 11:20)  Source: .Blood Blood-Peripheral  Preliminary Report (06 Feb 2019 17:01):    No growth to date.      RADIOLOGY & ADDITIONAL TESTS:    Care Discussed with Consultants/Other Providers:

## 2019-02-09 LAB
ANION GAP SERPL CALC-SCNC: 8 MMOL/L — SIGNIFICANT CHANGE UP (ref 5–17)
BUN SERPL-MCNC: 46 MG/DL — HIGH (ref 7–23)
CALCIUM SERPL-MCNC: 8.6 MG/DL — SIGNIFICANT CHANGE UP (ref 8.4–10.5)
CHLORIDE SERPL-SCNC: 112 MMOL/L — HIGH (ref 96–108)
CO2 SERPL-SCNC: 21 MMOL/L — LOW (ref 22–31)
CREAT SERPL-MCNC: 1.5 MG/DL — HIGH (ref 0.5–1.3)
GLUCOSE SERPL-MCNC: 172 MG/DL — HIGH (ref 70–99)
HAPTOGLOB SERPL-MCNC: 299 MG/DL — HIGH (ref 34–200)
HCT VFR BLD CALC: 23.9 % — LOW (ref 39–50)
HGB BLD-MCNC: 7.5 G/DL — LOW (ref 13–17)
IRON SATN MFR SERPL: 34 % — SIGNIFICANT CHANGE UP (ref 16–55)
IRON SATN MFR SERPL: 66 UG/DL — SIGNIFICANT CHANGE UP (ref 45–165)
MCHC RBC-ENTMCNC: 31.3 GM/DL — LOW (ref 32–36)
MCHC RBC-ENTMCNC: 32.8 PG — SIGNIFICANT CHANGE UP (ref 27–34)
MCV RBC AUTO: 104.7 FL — HIGH (ref 80–100)
PLATELET # BLD AUTO: 211 K/UL — SIGNIFICANT CHANGE UP (ref 150–400)
POTASSIUM SERPL-MCNC: 3.9 MMOL/L — SIGNIFICANT CHANGE UP (ref 3.5–5.3)
POTASSIUM SERPL-SCNC: 3.9 MMOL/L — SIGNIFICANT CHANGE UP (ref 3.5–5.3)
RBC # BLD: 2.28 M/UL — LOW (ref 4.2–5.8)
RBC # BLD: 2.31 M/UL — LOW (ref 4.2–5.8)
RBC # FLD: 18.6 % — HIGH (ref 10.3–14.5)
RETICS #: 37.2 K/UL — SIGNIFICANT CHANGE UP (ref 25–125)
RETICS/RBC NFR: 1.6 % — SIGNIFICANT CHANGE UP (ref 0.5–2.5)
SODIUM SERPL-SCNC: 141 MMOL/L — SIGNIFICANT CHANGE UP (ref 135–145)
TIBC SERPL-MCNC: 192 UG/DL — LOW (ref 220–430)
UIBC SERPL-MCNC: 126 UG/DL — SIGNIFICANT CHANGE UP (ref 110–370)
WBC # BLD: 6 K/UL — SIGNIFICANT CHANGE UP (ref 3.8–10.5)
WBC # FLD AUTO: 6 K/UL — SIGNIFICANT CHANGE UP (ref 3.8–10.5)

## 2019-02-09 PROCEDURE — 99232 SBSQ HOSP IP/OBS MODERATE 35: CPT

## 2019-02-09 PROCEDURE — 99223 1ST HOSP IP/OBS HIGH 75: CPT

## 2019-02-09 RX ORDER — ALLOPURINOL 300 MG
400 TABLET ORAL DAILY
Qty: 0 | Refills: 0 | Status: DISCONTINUED | OUTPATIENT
Start: 2019-02-09 | End: 2019-02-10

## 2019-02-09 RX ORDER — INSULIN LISPRO 100/ML
VIAL (ML) SUBCUTANEOUS
Qty: 0 | Refills: 0 | Status: DISCONTINUED | OUTPATIENT
Start: 2019-02-09 | End: 2019-02-10

## 2019-02-09 RX ADMIN — Medication 0.6 MILLIGRAM(S): at 12:26

## 2019-02-09 RX ADMIN — LEVETIRACETAM 500 MILLIGRAM(S): 250 TABLET, FILM COATED ORAL at 07:04

## 2019-02-09 RX ADMIN — PANTOPRAZOLE SODIUM 40 MILLIGRAM(S): 20 TABLET, DELAYED RELEASE ORAL at 07:04

## 2019-02-09 RX ADMIN — Medication 1: at 17:36

## 2019-02-09 RX ADMIN — Medication 81 MILLIGRAM(S): at 12:26

## 2019-02-09 RX ADMIN — Medication 300 MILLIGRAM(S): at 12:26

## 2019-02-09 RX ADMIN — Medication 1 MILLIGRAM(S): at 12:26

## 2019-02-09 RX ADMIN — ATORVASTATIN CALCIUM 10 MILLIGRAM(S): 80 TABLET, FILM COATED ORAL at 22:30

## 2019-02-09 RX ADMIN — Medication 400 MILLIGRAM(S): at 23:27

## 2019-02-09 RX ADMIN — Medication 3: at 12:26

## 2019-02-09 RX ADMIN — Medication 25 MILLIGRAM(S): at 17:37

## 2019-02-09 RX ADMIN — Medication 25 MILLIGRAM(S): at 07:04

## 2019-02-09 RX ADMIN — Medication 1 TABLET(S): at 12:27

## 2019-02-09 RX ADMIN — LEVETIRACETAM 500 MILLIGRAM(S): 250 TABLET, FILM COATED ORAL at 17:37

## 2019-02-09 RX ADMIN — Medication 650 MILLIGRAM(S): at 00:30

## 2019-02-09 RX ADMIN — TAMSULOSIN HYDROCHLORIDE 0.4 MILLIGRAM(S): 0.4 CAPSULE ORAL at 22:30

## 2019-02-09 NOTE — CONSULT NOTE ADULT - ASSESSMENT
83 y/o with pmh significant for stomach cancer (25 years ago), anemia, chronic afib, and gout, who presents to ER with drop in hemoglobin. Abdomen soft. Nontender. VSS. H/H 7.3/23.3. Discussed with patient, that given signifcant drop in hemoglobin and history of Gastric Ca, would recommend upper endoscopy to evaluate. Patient and primary team in agreement.
85 yo male with gout, Afib, falls, anemia,BPH,and recent seizure in Dec 2018 admitted with anemia and febrile in ER on 2/5.  I suspect fever is secondary to polyarticular gout.  He has no localizing signs of infection.  I suspect abnormal CXR is not reflective of pneumonia.  Steroids will help with gout.  Anemia may need additional w/u  Suggest:  1.Hold standing antibiotics  2.anemia evaluation is in progress  3.Await blood cultures  4.Will check a PC level in AM, chest CT is pending, I am skeptical on clinical grounds of any acute pneumonia
Assessment and Plan:   · Assessment		  83 yo m with pmh  stomach cancer, chronic anemia, chronic afib on xarelto, seizure d/o, gout presents with sepsis secondary to PNA along with acute gout flare    1. sepsis secondary likely acute gout flare: ct chest neg no pna, pna ruled out  procalcitonin not impressive , seen by ID and agrees with plan   2. acute gout flare: also severe arthritis, continue allopurinol, colchicine. symptoms improved with indocin.   3. chronic afib, severe aortic stenosis: rate controlled ,holding a/c at this time due to the acute on chronic anemia and recent hematuria, will need to evaluate if patient is too high risk to continue a/c moving forward   4. acute on chronic anemia: s/p 1 unit PRBC's, keep >7.0 patients baseline was 11 in 4/2018 seen by gi will get EGD 2/8/19 npo after midnight   guaiac negative. Heme/onc consult, Dr. Estevez.   5. tila on ckd stage 3: baseline creatinine 1.4-1.7 d/c fluids. nephrology following.  6. seizure d/o stable resume home meds  7. stomach cancer: no acute issues   8. dvt ppx: heparin held due to acute anemia
84-year-old gentleman with history of stomach cancer, chronic anemia, chronic atrial fibrillation on XARELTO, gout, admitted from his nursing facility with anemia exacerbation/polyarticular gout exacerbation associated with fever.  Hematology consulted for his anemia.

## 2019-02-09 NOTE — PROGRESS NOTE ADULT - REASON FOR ADMISSION
Sepsis/PNA
abnormal lab work

## 2019-02-09 NOTE — PROGRESS NOTE ADULT - SUBJECTIVE AND OBJECTIVE BOX
Subjective  Comfortable  Offers no complaints to me  Desires to go home    Objective  HR: 64 BP: 119/54 RR: 14 SpO2: 100%     PHYSICAL EXAM:  General: NAD, Comfortable  Pulm: CTA BL No Rhonchi Rales or wheezes  Neck: Supple, No JVD  CVS: RRR No rubs murmurs or gallops  Abdomen: Soft, Nontender, Nondistended; No masses or organomegally  Extremities: No calf tenderness, No pitting edema             7.5    6.0   )-------( 211              23.9     141  |  112  |  46  ---------------------<  172  3.9   |  21  |  1.50  A/P  84M AFib on Xarelto, Gout  Admit Feb5th Sepsis, PNA, Gout, YUKI, Anemia  Primary concern at this moment is anemia  Macrocytic. Will get B12 Folate MMA and homocystein, ferritin, Hg electrophoresis  Gout, uric acid still not at goal, will increase allopurinol as tolerated by renal function  SCD for DVT Prox

## 2019-02-09 NOTE — CONSULT NOTE ADULT - SUBJECTIVE AND OBJECTIVE BOX
Chief Complaint: no cardiac complaints    HPI:  s not aware of this finding. I told him it would be difficult to treat his tila with fluids, with concern to putting the pt in acute chf, he states that he understands that situation. He does continue to recommend indocin in the mean time though. Also, we discussed the patient's finding of acute anemia, requiring transfusion and going for EGD today. patient and Gene insist on continuing indocin as they want "what Dr. Fierro recommends". they do not feel that me and Dr. Dye can make the decision to withhold indocin based on our findings knowing Dr. Fierro is okay with it.  I spoke with Dr. Dye regarding this, she strongly feels indocin is contraindicated in this case and would cause more harm than benefit at this time, given that prednisone can be tried for gout flare in lieu of indocin. I do agree with Dr. Dye and will hold for now, re-discuss with patient and Dr. Fierro if needed.       PMH:   Nonrheumatic aortic valve stenosis  Cancer of stomach  Anemia, unspecified type  Seizure disorder  Benign prostatic hyperplasia, unspecified whether lower urinary tract symptoms present  Hyperlipemia  Essential hypertension  HTN (hypertension)  Chronic atrial fibrillation  Stomach cancer  Hypertension  Atrial fibrillation  Gout    PSH:   No significant past surgical history  No significant past surgical history    Family History:  FAMILY HISTORY:  No pertinent family history in first degree relatives  No pertinent family history in first degree relatives      Social History:  Smoking:  Alcohol:  Drugs:    Allergies:  No Known Allergies      Medications:  acetaminophen   Tablet .. 650 milliGRAM(s) Oral every 6 hours PRN  allopurinol 300 milliGRAM(s) Oral daily  aspirin enteric coated 81 milliGRAM(s) Oral daily  atorvastatin 10 milliGRAM(s) Oral at bedtime  colchicine 0.6 milliGRAM(s) Oral daily  dextrose 40% Gel 15 Gram(s) Oral once PRN  dextrose 5%. 1000 milliLiter(s) IV Continuous <Continuous>  dextrose 50% Injectable 12.5 Gram(s) IV Push once  dextrose 50% Injectable 25 Gram(s) IV Push once  dextrose 50% Injectable 25 Gram(s) IV Push once  folic acid 1 milliGRAM(s) Oral daily  glucagon  Injectable 1 milliGRAM(s) IntraMuscular once PRN  insulin lispro (HumaLOG) corrective regimen sliding scale   SubCutaneous three times a day before meals  levETIRAcetam 500 milliGRAM(s) Oral two times a day  metoprolol tartrate 25 milliGRAM(s) Oral every 12 hours  multivitamin 1 Tablet(s) Oral daily  pantoprazole    Tablet 40 milliGRAM(s) Oral before breakfast  tamsulosin 0.4 milliGRAM(s) Oral at bedtime      REVIEW OF SYSTEMS:  CONSTITUTIONAL: No fever, weight loss, or fatigue  EYES: No eye pain, visual disturbances, or discharge  ENMT:  No difficulty hearing, tinnitus, vertigo; No sinus or throat pain  NECK: No pain or stiffness  BREASTS: No pain, masses, or nipple discharge  RESPIRATORY: No cough, wheezing, chills or hemoptysis; No shortness of breath  CARDIOVASCULAR: No chest pain, palpitations, dizziness, or leg swelling  GASTROINTESTINAL: No abdominal or epigastric pain. No nausea, vomiting, or hematemesis; No diarrhea or constipation. No melena or hematochezia.  GENITOURINARY: No dysuria, frequency, hematuria, or incontinence  NEUROLOGICAL: No headaches, memory loss, loss of strength, numbness, or tremors  SKIN: No itching, burning, rashes, or lesions   LYMPH NODES: No enlarged glands  ENDOCRINE: No heat or cold intolerance; No hair loss  MUSCULOSKELETAL: No joint pain or swelling; No muscle, back, or extremity pain  PSYCHIATRIC: No depression, anxiety, mood swings, or difficulty sleeping  HEME/LYMPH: No easy bruising, or bleeding gums  ALLERY AND IMMUNOLOGIC: No hives or eczema    Physical Exam:  T(C): 36.5 (02-09-19 @ 15:24), Max: 36.5 (02-09-19 @ 15:24)  HR: 64 (02-09-19 @ 15:24) (61 - 86)  BP: 119/54 (02-09-19 @ 15:24) (112/58 - 128/67)  RR: 14 (02-09-19 @ 15:24) (14 - 14)  SpO2: 100% (02-09-19 @ 15:24) (96% - 100%)  Wt(kg): --    GENERAL: NAD, well-groomed, well-developed  HEAD:  Atraumatic, Normocephalic  EYES: EOMI, conjunctiva and sclera clear  ENT: Moist mucous membranes,  NECK: Supple, No JVD, no bruits  CHEST/LUNG: Clear to percussion bilaterally; No rales, rhonchi, wheezing, or rubs  HEART: Regular rate and rhythm; No murmurs, rubs, or gallops PMI non displaced.  ABDOMEN: Soft, Nontender, Nondistended; Bowel sounds present  EXTREMITIES:  2+ Peripheral Pulses, No clubbing, cyanosis, or edema  SKIN: No rashes or lesions  NERVOUS SYSTEM:  Alert & Oriented X3, Good concentration; Motor Strength 5/5 B/L upper and lower extremities; DTRs 2+ intact and symmetric    Cardiovascular Diagnostic Testing:  ECG:  < from: 12 Lead ECG (02.05.19 @ 11:44) >  Diagnosis Line Atrial fibrillation with rapid ventricular response  Right bundle branch block  Left anterior fascicular block  *** Bifascicular block ***  Abnormal ECG  When compared with ECG of 06-JAN-2019 22:50,  there does not appear to be a significant interval change   Confirmed by PRO BRASWELL, CHARLIE S ([id number]) on 2/6/2019 8:49:15 AM    < end of copied text >  echo  < from: US Transthoracic Echocardiogram w/Doppler Complete (12.23.18 @ 10:19) >  SUMMARY:  1. moderate left ventricular hypertrophy with normal left ventricular   systolic function with stage I diastolic dysfunction  2. calcific severe aortic stenosis, recommend clinical correlation  3. mild tricuspid regurgitation          < end of copied text >      Labs:                        7.5    6.0   )-----------( 211      ( 09 Feb 2019 07:17 )             23.9     02-09    141  |  112<H>  |  46<H>  ----------------------------<  172<H>  3.9   |  21<L>  |  1.50<H>    Ca    8.6      09 Feb 2019 07:17                      Imaging:    ASSESMENT AND PLAN:
HPI:   Patient is a 84y male with a past history of AfIb, recurrent falls, anemia, remote gastric cancer, BPH,gout, and failure to thrive who was admitted to [facility] 2 with anemia from a SNF.  He was admitted to [facility] in late December with a fall and seizure, head CT with old lacunar infarct.He received zosyn and prednisone for possible infection and gout.Chest CT reportedly showed pleural efusions and atelectasis.He had AS on ECHO, felt not to be severe.He went to a SNF, was admitted to [facility] in early January with hematuria and anemia and YUKI.He was stabilized and returned yesterday with anemia from his SNF.No obvious GI bleeding or hematuria. for A Fib.He c/o left wrist/hand pain along with Rt 1st MCP pain and knee pain.He had a fever to 102 yesterday, received antibiotics in ER, nothing continued.He received a blood transfusion last night.No recent GI endoscopies with recent anemia.No abdominal pain or dysuria, no cough or chest pain.    REVIEW OF SYSTEMS:  All other review of systems negative (Comprehensive ROS): multifocal joint pain    PAST MEDICAL & SURGICAL HISTORY:  Nonrheumatic aortic valve stenosis  Cancer of stomach  Anemia, unspecified type  Seizure disorder  Benign prostatic hyperplasia, unspecified whether lower urinary tract symptoms present  Hyperlipemia  Essential hypertension  Chronic atrial fibrillation  Stomach cancer  Hypertension  Atrial fibrillation  Gout  No significant past surgical history  No significant past surgical history      Allergies    No Known Allergies    Intolerances        Antimicrobials Day #  :    Other Medications:  acetaminophen   Tablet .. 650 milliGRAM(s) Oral every 6 hours PRN  allopurinol 100 milliGRAM(s) Oral daily  amLODIPine   Tablet 10 milliGRAM(s) Oral daily  aspirin enteric coated 81 milliGRAM(s) Oral daily  atorvastatin 10 milliGRAM(s) Oral at bedtime  dextrose 40% Gel 15 Gram(s) Oral once PRN  dextrose 5%. 1000 milliLiter(s) IV Continuous <Continuous>  dextrose 50% Injectable 12.5 Gram(s) IV Push once  dextrose 50% Injectable 25 Gram(s) IV Push once  dextrose 50% Injectable 25 Gram(s) IV Push once  folic acid 1 milliGRAM(s) Oral daily  glucagon  Injectable 1 milliGRAM(s) IntraMuscular once PRN  heparin  Injectable 5000 Unit(s) SubCutaneous every 8 hours  levETIRAcetam 500 milliGRAM(s) Oral two times a day  metoprolol tartrate 25 milliGRAM(s) Oral every 12 hours  multivitamin 1 Tablet(s) Oral daily  predniSONE   Tablet 40 milliGRAM(s) Oral once  predniSONE   Tablet 40 milliGRAM(s) Oral daily  tamsulosin 0.4 milliGRAM(s) Oral at bedtime      FAMILY HISTORY:  No pertinent family history in first degree relatives  No pertinent family history in first degree relatives      SOCIAL HISTORY:  Smoking:  no   ETOH:yes     Drug Use: no   Single     T(F): 98.1 (19 @ 04:47), Max: 101 (19 @ 15:20)  HR: 100 (19 @ 04:47)  BP: 125/64 (19 @ 04:47)  RR: 14 (19 @ 04:47)  SpO2: 97% (19 @ 04:47)  Wt(kg): --    PHYSICAL EXAM:  General: alert, no acute distress  Eyes:  anicteric, no conjunctival injection, no discharge  Oropharynx: no lesions or injection 	  Neck: supple, without adenopathy  Lungs: clear to auscultation  Heart: irregular rate and rhythm; +maya  Abdomen: soft, nondistended, nontender, without mass or organomegaly  Skin: no lesions  Extremities: no clubbing, cyanosis, or edema  Neurologic: alert, oriented, moves all extremities  Multiple joints inflamed especially left wrist and hand, rt knee, rt 1st MCP joint  LAB RESULTS:                        7.3    12.8  )-----------( 188      ( 2019 07:23 )             23.3     02-06    137  |  106  |  28<H>  ----------------------------<  177<H>  3.6   |  19<L>  |  1.43<H>    Ca    8.3<L>      2019 07:23    TPro  7.1  /  Alb  2.0<L>  /  TBili  0.6  /  DBili  x   /  AST  12  /  ALT  10  /  AlkPhos  25<L>  02-05    LIVER FUNCTIONS - ( 2019 11:20 )  Alb: 2.0 g/dL / Pro: 7.1 g/dL / ALK PHOS: 25 U/L / ALT: 10 U/L DA / AST: 12 U/L / GGT: x           Urinalysis Basic - ( 2019 11:31 )    Color: Yellow / Appearance: Slightly Turbid / S.020 / pH: x  Gluc: x / Ketone: Negative  / Bili: Negative / Urobili: Negative   Blood: x / Protein: 30 mg/dL / Nitrite: Negative   Leuk Esterase: Negative / RBC: x / WBC x   Sq Epi: x / Non Sq Epi: Neg.-Few / Bacteria: x        MICROBIOLOGY:  RECENT CULTURES:  Influenza/RSV negative  pending    RADIOLOGY REVIEWED:  < from: Xray Chest 1 View-PORTABLE IMMEDIATE (19 @ 13:40) >  INTERPRETATION:  AP chest on 2019 at 1:05 PM. Patient has   sepsis.    Heart is enlarged. The aorta is dilated and uncoiled.    There may be a small infiltrate developing off the left hilum compared to    of this year.    IMPRESSION: Question developing left perihilar infiltrate. Heart   enlargement and dilated tortuous aorta again noted.    < end of copied text >  < from: Xray Hand 2 Views, Left (19 @ 14:19) >  INTERPRETATION:  Left hand. Patient has local redness and swelling. 3   views obtained.    Soft tissue swelling is seen particularly on the dorsum of the hand.    There is mild to moderate multangular area degeneration.    Fifth finger is held in flexion at the PIP joint. There is mild   degeneration at the second and first MP joints and at the thumb IP joint.    No bone destruction or fracture is evident.    IMPRESSION: As above.    < end of copied text >
INTERVAL HPI/OVERNIGHT EVENTS:  HPI:  83 y/o with pmh significant for stomach cancer (25 years ago), anemia, chronic afib, and gout, who presents to ER for drop in hemoglobin. Patient was sent from [facility]. GI consulted to see patient due to anemia and previous history of stomach Ca. Patient denies abdominal pain, nausea, vomiting, dizziness, bloody or dark stool. He is unsure of last colonoscopy and upper endoscopy which were done by Dr. Isaiah [facility].     MEDICATIONS  (STANDING):  allopurinol 300 milliGRAM(s) Oral daily  amLODIPine   Tablet 10 milliGRAM(s) Oral daily  aspirin enteric coated 81 milliGRAM(s) Oral daily  atorvastatin 10 milliGRAM(s) Oral at bedtime  colchicine 0.6 milliGRAM(s) Oral daily  dextrose 5%. 1000 milliLiter(s) (50 mL/Hr) IV Continuous <Continuous>  dextrose 50% Injectable 12.5 Gram(s) IV Push once  dextrose 50% Injectable 25 Gram(s) IV Push once  dextrose 50% Injectable 25 Gram(s) IV Push once  folic acid 1 milliGRAM(s) Oral daily  heparin  Injectable 5000 Unit(s) SubCutaneous every 8 hours  indomethacin 50 milliGRAM(s) Oral three times a day  levETIRAcetam 500 milliGRAM(s) Oral two times a day  metoprolol tartrate 25 milliGRAM(s) Oral every 12 hours  multivitamin 1 Tablet(s) Oral daily  sodium chloride 0.9%. 1000 milliLiter(s) (75 mL/Hr) IV Continuous <Continuous>  tamsulosin 0.4 milliGRAM(s) Oral at bedtime    MEDICATIONS  (PRN):  acetaminophen   Tablet .. 650 milliGRAM(s) Oral every 6 hours PRN Temp greater or equal to 38C (100.4F), Mild Pain (1 - 3), Moderate Pain (4 - 6)  dextrose 40% Gel 15 Gram(s) Oral once PRN Blood Glucose LESS THAN 70 milliGRAM(s)/deciliter  glucagon  Injectable 1 milliGRAM(s) IntraMuscular once PRN Glucose LESS THAN 70 milligrams/deciliter      Allergies    No Known Allergies    Intolerances    PHYSICAL EXAM:   Vital Signs:  Vital Signs Last 24 Hrs  T(C): 37.1 (2019 15:38), Max: 37.1 (2019 15:38)  T(F): 98.7 (2019 15:38), Max: 98.7 (2019 15:38)  HR: 98 (2019 15:38) (98 - 100)  BP: 116/57 (2019 15:38) (106/54 - 125/64)  BP(mean): --  RR: 14 (2019 15:38) (14 - 15)  SpO2: 98% (2019 15:38) (96% - 98%)  Daily Height in cm: 185.42 (2019 17:47)    Daily Weight in k.5 (2019 17:47)I&O's Summary    2019 07:01  -  2019 07:00  --------------------------------------------------------  IN: 3567 mL / OUT: 650 mL / NET: 2917 mL    2019 07:01  -  2019 15:40  --------------------------------------------------------  IN: 600 mL / OUT: 460 mL / NET: 140 mL        GENERAL:  Appears stated age, well-groomed, well-nourished, no distress  HEENT:  NC/AT,  conjunctivae clear and pink, no thyromegaly, nodules, adenopathy, no JVD, sclera -anicteric  CHEST:  Full & symmetric excursion, no increased effort, breath sounds clear  HEART:  irregular rhythm, S1, S2, , +1 UE edema  ABDOMEN:  Soft, non-tender, non-distended, normoactive bowel sounds,  no masses ,no hepato-splenomegaly, no signs of chronic liver disease  EXTEREMITIES:  no cyanosis,clubbing   SKIN:  No rash, warm/dry  NEURO:  Alert, oriented, no asterixis, no tremor, no encephalopathy      LABS:                        7.3    12.8  )-----------( 188      ( 2019 07:23 )             23.3     02-    137  |  106  |  28<H>  ----------------------------<  177<H>  3.6   |  19<L>  |  1.43<H>    Ca    8.3<L>      2019 07:23    TPro  7.1  /  Alb  2.0<L>  /  TBili  0.6  /  DBili  x   /  AST  12  /  ALT  10  /  AlkPhos  25<L>      PT/INR - ( 2019 11:20 )   PT: 16.3 sec;   INR: 1.44 ratio         PTT - ( 2019 11:20 )  PTT:31.6 sec  Urinalysis Basic - ( 2019 11:31 )    Color: Yellow / Appearance: Slightly Turbid / S.020 / pH: x  Gluc: x / Ketone: Negative  / Bili: Negative / Urobili: Negative   Blood: x / Protein: 30 mg/dL / Nitrite: Negative   Leuk Esterase: Negative / RBC: x / WBC x   Sq Epi: x / Non Sq Epi: Neg.-Few / Bacteria: x      amylase   lipase  RADIOLOGY & ADDITIONAL TESTS:
NEPHROLOGY CONSULTATION    CHIEF COMPLAINT: anemia    HPI:  Pt is 83 yo m with pmh stomach cancer, chronic anemia, chronic afib on xarelto, gout sent for [facility], in ER + fever (rectal 102.5), Hgb 6.3, no chest pain, SOB, no n/v/d/c, dysuria, no headache, dizziness, cough, palpitations. Awake, alert. States he had gout attack 2 days ago which has now resolved. Denies pain. Patient w/baseline CKD 3 w/Cr ~ 1.3-1.5.    ROS:  as above    Allergies:  No Known Allergies    PAST MEDICAL & SURGICAL HISTORY:  Nonrheumatic aortic valve stenosis  Cancer of stomach  Anemia, unspecified type  Seizure disorder  Benign prostatic hyperplasia, unspecified whether lower urinary tract symptoms present  Hyperlipemia  Essential hypertension  Chronic atrial fibrillation  Gout  No significant past surgical history  CKD 3    SOCIAL HISTORY:  negative    FAMILY HISTORY:  No pertinent family history in first degree relatives    MEDICATIONS  (STANDING):  allopurinol 100 milliGRAM(s) Oral daily  amLODIPine   Tablet 10 milliGRAM(s) Oral daily  aspirin enteric coated 81 milliGRAM(s) Oral daily  atorvastatin 10 milliGRAM(s) Oral at bedtime  colchicine 0.6 milliGRAM(s) Oral daily  dextrose 5%. 1000 milliLiter(s) (50 mL/Hr) IV Continuous <Continuous>  dextrose 50% Injectable 12.5 Gram(s) IV Push once  dextrose 50% Injectable 25 Gram(s) IV Push once  dextrose 50% Injectable 25 Gram(s) IV Push once  folic acid 1 milliGRAM(s) Oral daily  heparin  Injectable 5000 Unit(s) SubCutaneous every 8 hours  levETIRAcetam 500 milliGRAM(s) Oral two times a day  metoprolol tartrate 25 milliGRAM(s) Oral every 12 hours  multivitamin 1 Tablet(s) Oral daily  pantoprazole    Tablet 40 milliGRAM(s) Oral before breakfast  tamsulosin 0.4 milliGRAM(s) Oral at bedtime    Vital Signs Last 24 Hrs  T(C): 36.5 (02-07-19 @ 17:13), Max: 36.5 (02-07-19 @ 17:13)  T(F): 97.7 (02-07-19 @ 17:13), Max: 97.7 (02-07-19 @ 17:13)  HR: 92 (02-07-19 @ 17:13) (67 - 92)  BP: 104/59 (02-07-19 @ 17:13) (104/59 - 115/68)  RR: 14 (02-07-19 @ 17:13) (14 - 14)  SpO2: 96% (02-07-19 @ 17:13) (96% - 99%)    Conversant, no apparent distress  PERRLA, pink conjunctivae, no ptosis  Neck non tender, supple  Normal respiratory effort, lungs clear to auscultation  Heart S1S2  Extr + edema  Abdomen soft, NT, ND  Appropriate affect, AO     LABS:                        7.7    11.5  )-----------( 216      ( 07 Feb 2019 06:52 )             24.3     02-07    137  |  105  |  46<H>  ----------------------------<  241<H>  4.0   |  18<L>  |  1.77<H>    Ca    8.3<L>      07 Feb 2019 06:52    Culture - Urine (collected 05 Feb 2019 11:31)  Source: .Urine Clean Catch (Midstream)  Final Report (06 Feb 2019 17:32):    No growth    Culture - Blood (collected 05 Feb 2019 11:25)  Source: .Blood Blood-Peripheral  Preliminary Report (06 Feb 2019 17:01):    No growth to date.    Culture - Blood (collected 05 Feb 2019 11:20)  Source: .Blood Blood-Peripheral  Preliminary Report (06 Feb 2019 17:01):    No growth to date.    A/P:    Pt w/severe AS  CKD 3, cardio-renal  Gout, anemia  NSAID's are contraindicated in this pt with persistent anemia and CKD  Will continue Allopurinol and Colchicine for now  Can add steroids if recurrent pain  Will check UA, PVR  GI w/up  CBC, BMP in am
YUE KING  84y  Male  Admitting: [facility]    HPI:  84-year-old gentleman with history of stomach cancer, chronic anemia, chronic atrial fibrillation on XARELTO, gout, admitted from his nursing facility with anemia exacerbation/polyarticular gout exacerbation associated with fever.  Hematology consulted for his anemia.    PAST MEDICAL & SURGICAL HISTORY:  Nonrheumatic aortic valve stenosis  Cancer of stomach  Anemia, unspecified type  Seizure disorder  Benign prostatic hyperplasia, unspecified whether lower urinary tract symptoms present  Hyperlipemia  Essential hypertension  Chronic atrial fibrillation  Stomach cancer  Hypertension  Atrial fibrillation  Gout  No significant past surgical history    HEALTH ISSUES - PROBLEM Dx:  Anemia, unspecified type: Anemia, unspecified type    MEDICATIONS  (STANDING):  allopurinol 100 milliGRAM(s) Oral daily  aspirin enteric coated 81 milliGRAM(s) Oral daily  atorvastatin 10 milliGRAM(s) Oral at bedtime  colchicine 0.6 milliGRAM(s) Oral daily  dextrose 5%. 1000 milliLiter(s) (50 mL/Hr) IV Continuous <Continuous>  dextrose 50% Injectable 12.5 Gram(s) IV Push once  dextrose 50% Injectable 25 Gram(s) IV Push once  dextrose 50% Injectable 25 Gram(s) IV Push once  folic acid 1 milliGRAM(s) Oral daily  levETIRAcetam 500 milliGRAM(s) Oral two times a day  metoprolol tartrate 25 milliGRAM(s) Oral every 12 hours  multivitamin 1 Tablet(s) Oral daily  pantoprazole    Tablet 40 milliGRAM(s) Oral before breakfast  tamsulosin 0.4 milliGRAM(s) Oral at bedtime    MEDICATIONS  (PRN):  acetaminophen   Tablet .. 650 milliGRAM(s) Oral every 6 hours PRN Temp greater or equal to 38C (100.4F), Mild Pain (1 - 3), Moderate Pain (4 - 6)  dextrose 40% Gel 15 Gram(s) Oral once PRN Blood Glucose LESS THAN 70 milliGRAM(s)/deciliter  glucagon  Injectable 1 milliGRAM(s) IntraMuscular once PRN Glucose LESS THAN 70 milligrams/deciliter    Allergies    No Known Allergies    FAMILY HISTORY:  No pertinent family history in first degree relatives    SOCIAL HISTORY: No EtOH, no tobacco    REVIEW OF SYSTEMS:    CONSTITUTIONAL: No chills  EYES/ENT: No visual changes;  No vertigo or throat pain   NECK: No pain or stiffness  RESPIRATORY: No cough, wheezing, hemoptysis; No shortness of breath  CARDIOVASCULAR: No chest pain or palpitations  GASTROINTESTINAL: No abdominal or epigastric pain. No nausea, vomiting, or hematemesis; No diarrhea or constipation. No melena or hematochezia.  GENITOURINARY: No dysuria  NEUROLOGICAL: No numbness   SKIN: No itching, burning   All other review of systems is negative unless indicated above.    Height (cm): 185.42 (02-08 @ 13:16)  Weight (kg): 53.28719078 (02-08 @ 13:16)  BMI (kg/m2): 15.7 (02-08 @ 13:16)  BSA (m2): 1.73 (02-08 @ 13:16)    T(F): 97.2 (02-08-19 @ 16:10), Max: 97.7 (02-07-19 @ 17:13)  HR: 86 (02-08-19 @ 16:10)  BP: 128/67 (02-08-19 @ 16:10)  RR: 14 (02-08-19 @ 16:10)  SpO2: 97% (02-08-19 @ 16:10)    GENERAL: NAD, well-developed  HEAD:  Atraumatic, Normocephalic  EYES: EOMI, PERRLA, conjunctiva and sclera clear  NECK: Supple, No JVD  CHEST/LUNG: decreased BS bases ant.  HEART: Regular rate and rhythm; No murmurs, rubs, or gallops  ABDOMEN: Soft, Nontender, Nondistended; Bowel sounds present  EXTREMITIES:  2+ Peripheral Pulses, No clubbing, cyanosis, or edema  NEUROLOGY: non-focal  SKIN: No rashes or lesions  oropharynx-poor dentition    Labs:             7.3    6.7   )-----------( 194      ( 02-08 @ 07:04 )             23.1                7.7    11.5  )-----------( 216      ( 02-07 @ 06:52 )             24.3                7.8    13.2  )-----------( 210      ( 02-06 @ 18:30 )             25.1                7.3    12.8  )-----------( 188      ( 02-06 @ 07:23 )             23.3                7.3    16.4  )-----------( 193      ( 02-06 @ 01:30 )             23.3       02-08    139  |  109<H>  |  52<H>  ----------------------------<  194<H>  4.4   |  19<L>  |  1.74<H>    Ca    8.6      08 Feb 2019 07:04    Protein Electrophoresis, Serum (12.26.18 @ 23:12)    Protein Total, Serum: 5.9 g/dL    Total Protein, Serum: 5.9 g/dL    Albumin, Serum: 2.4 g/dL    Alpha 1: 0.6 g/dL    Alpha 2: 0.9 g/dL    Beta Globulin: 0.8 g/dL    Gamma Globulin: 1.2 g/dL    % Albumin: 41.4 %    % Alpha 1: 9.4 %    % Alpha 2: 15.3 %    % Beta: 13.2 %    % Gamma: 20.7 %    Albumin/Globulin Ratio: 0.7 Ratio    Serum Protein Electrophoresis Interp: Hypoalbuminemia        Radiology and additional tests:  < from: CT Chest No Cont (02.06.19 @ 15:33) >    EXAM:  CT CHEST      PROCEDURE DATE:  02/06/2019        INTERPRETATION:  CLINICAL INFORMATION: Sepsis, evaluate for pneumonia    COMPARISON: CT of the chest dated 12/22/2018    PROCEDURE:   CT of the Chest was performed without intravenous contrast.  Sagittal and coronal reformats were performed.  MIP reformats were performed.    FINDINGS:    CHEST:     LUNGS AND LARGE AIRWAYS: Patent central airways.  Small bibasilar   compressive atelectasis.  PLEURA: Trace bilateral pleural effusions.  VESSELS: Within normal limits.  HEART: Enlarged. Coronary artery and valvular calcifications are noted.   No pericardial effusion.  MEDIASTINUM AND MICHAEL: No lymphadenopathy.  CHEST WALL AND LOWER NECK: Within normal limits.  VISUALIZED UPPER ABDOMEN: Partially visualized bilateral perinephric   stranding.  BONES: Multilevel degenerative changes of the spine. There are mild   compression deformities of T7 and T8, slightly worsened compared to the   prior study.    IMPRESSION:     Trace bilateral pleuraleffusions and bibasilar atelectasis. No focal   consolidation.    Moderate compression deformity of T8, slightly worsened compared to the   prior study.    Additional findings as above.    < end of copied text >

## 2019-02-10 ENCOUNTER — TRANSCRIPTION ENCOUNTER (OUTPATIENT)
Age: 84
End: 2019-02-10

## 2019-02-10 VITALS
SYSTOLIC BLOOD PRESSURE: 150 MMHG | RESPIRATION RATE: 14 BRPM | TEMPERATURE: 98 F | DIASTOLIC BLOOD PRESSURE: 67 MMHG | HEART RATE: 82 BPM | WEIGHT: 225.53 LBS | OXYGEN SATURATION: 96 %

## 2019-02-10 LAB
CULTURE RESULTS: SIGNIFICANT CHANGE UP
CULTURE RESULTS: SIGNIFICANT CHANGE UP
FERRITIN SERPL-MCNC: 653 NG/ML — HIGH (ref 30–400)
FERRITIN SERPL-MCNC: 729 NG/ML — HIGH (ref 30–400)
FOLATE SERPL-MCNC: 16.6 NG/ML — SIGNIFICANT CHANGE UP
FOLATE SERPL-MCNC: 18.7 NG/ML — SIGNIFICANT CHANGE UP
HCYS SERPL-MCNC: 11.4 UMOL/L — SIGNIFICANT CHANGE UP (ref 5–20)
SPECIMEN SOURCE: SIGNIFICANT CHANGE UP
SPECIMEN SOURCE: SIGNIFICANT CHANGE UP
URATE SERPL-MCNC: 6.7 MG/DL — SIGNIFICANT CHANGE UP (ref 3.4–8.8)
VIT B12 SERPL-MCNC: 777 PG/ML — SIGNIFICANT CHANGE UP (ref 232–1245)
VIT B12 SERPL-MCNC: 785 PG/ML — SIGNIFICANT CHANGE UP (ref 232–1245)

## 2019-02-10 PROCEDURE — 96365 THER/PROPH/DIAG IV INF INIT: CPT

## 2019-02-10 PROCEDURE — 82962 GLUCOSE BLOOD TEST: CPT

## 2019-02-10 PROCEDURE — 83010 ASSAY OF HAPTOGLOBIN QUANT: CPT

## 2019-02-10 PROCEDURE — 84550 ASSAY OF BLOOD/URIC ACID: CPT

## 2019-02-10 PROCEDURE — 82746 ASSAY OF FOLIC ACID SERUM: CPT

## 2019-02-10 PROCEDURE — 86900 BLOOD TYPING SEROLOGIC ABO: CPT

## 2019-02-10 PROCEDURE — 87086 URINE CULTURE/COLONY COUNT: CPT

## 2019-02-10 PROCEDURE — 82728 ASSAY OF FERRITIN: CPT

## 2019-02-10 PROCEDURE — 99232 SBSQ HOSP IP/OBS MODERATE 35: CPT

## 2019-02-10 PROCEDURE — 87040 BLOOD CULTURE FOR BACTERIA: CPT

## 2019-02-10 PROCEDURE — 97161 PT EVAL LOW COMPLEX 20 MIN: CPT

## 2019-02-10 PROCEDURE — 84165 PROTEIN E-PHORESIS SERUM: CPT

## 2019-02-10 PROCEDURE — 87631 RESP VIRUS 3-5 TARGETS: CPT

## 2019-02-10 PROCEDURE — 83605 ASSAY OF LACTIC ACID: CPT

## 2019-02-10 PROCEDURE — 36415 COLL VENOUS BLD VENIPUNCTURE: CPT

## 2019-02-10 PROCEDURE — 83020 HEMOGLOBIN ELECTROPHORESIS: CPT

## 2019-02-10 PROCEDURE — P9016: CPT

## 2019-02-10 PROCEDURE — 84145 PROCALCITONIN (PCT): CPT

## 2019-02-10 PROCEDURE — 85045 AUTOMATED RETICULOCYTE COUNT: CPT

## 2019-02-10 PROCEDURE — 88112 CYTOPATH CELL ENHANCE TECH: CPT

## 2019-02-10 PROCEDURE — 71045 X-RAY EXAM CHEST 1 VIEW: CPT

## 2019-02-10 PROCEDURE — 80053 COMPREHEN METABOLIC PANEL: CPT

## 2019-02-10 PROCEDURE — 84155 ASSAY OF PROTEIN SERUM: CPT

## 2019-02-10 PROCEDURE — 93005 ELECTROCARDIOGRAM TRACING: CPT

## 2019-02-10 PROCEDURE — 36430 TRANSFUSION BLD/BLD COMPNT: CPT

## 2019-02-10 PROCEDURE — 83921 ORGANIC ACID SINGLE QUANT: CPT

## 2019-02-10 PROCEDURE — 83090 ASSAY OF HOMOCYSTEINE: CPT

## 2019-02-10 PROCEDURE — 82607 VITAMIN B-12: CPT

## 2019-02-10 PROCEDURE — 85730 THROMBOPLASTIN TIME PARTIAL: CPT

## 2019-02-10 PROCEDURE — 85027 COMPLETE CBC AUTOMATED: CPT

## 2019-02-10 PROCEDURE — 85610 PROTHROMBIN TIME: CPT

## 2019-02-10 PROCEDURE — 83550 IRON BINDING TEST: CPT

## 2019-02-10 PROCEDURE — 80048 BASIC METABOLIC PNL TOTAL CA: CPT

## 2019-02-10 PROCEDURE — 99285 EMERGENCY DEPT VISIT HI MDM: CPT | Mod: 25

## 2019-02-10 PROCEDURE — 86334 IMMUNOFIX E-PHORESIS SERUM: CPT

## 2019-02-10 PROCEDURE — 83020 HEMOGLOBIN ELECTROPHORESIS: CPT | Mod: 26

## 2019-02-10 PROCEDURE — 83540 ASSAY OF IRON: CPT

## 2019-02-10 PROCEDURE — 71250 CT THORAX DX C-: CPT

## 2019-02-10 PROCEDURE — 86901 BLOOD TYPING SEROLOGIC RH(D): CPT

## 2019-02-10 PROCEDURE — 73120 X-RAY EXAM OF HAND: CPT

## 2019-02-10 PROCEDURE — 86923 COMPATIBILITY TEST ELECTRIC: CPT

## 2019-02-10 PROCEDURE — 81001 URINALYSIS AUTO W/SCOPE: CPT

## 2019-02-10 PROCEDURE — 86850 RBC ANTIBODY SCREEN: CPT

## 2019-02-10 RX ADMIN — LEVETIRACETAM 500 MILLIGRAM(S): 250 TABLET, FILM COATED ORAL at 06:57

## 2019-02-10 RX ADMIN — Medication 1: at 12:14

## 2019-02-10 RX ADMIN — PANTOPRAZOLE SODIUM 40 MILLIGRAM(S): 20 TABLET, DELAYED RELEASE ORAL at 06:57

## 2019-02-10 RX ADMIN — Medication 81 MILLIGRAM(S): at 12:13

## 2019-02-10 RX ADMIN — Medication 25 MILLIGRAM(S): at 06:57

## 2019-02-10 RX ADMIN — Medication 1 TABLET(S): at 12:13

## 2019-02-10 RX ADMIN — Medication 1 MILLIGRAM(S): at 12:13

## 2019-02-10 RX ADMIN — Medication 400 MILLIGRAM(S): at 12:13

## 2019-02-10 RX ADMIN — Medication 0.6 MILLIGRAM(S): at 12:13

## 2019-02-10 RX ADMIN — Medication 1: at 08:19

## 2019-02-10 NOTE — DISCHARGE NOTE ADULT - MEDICATION SUMMARY - MEDICATIONS TO TAKE
I will START or STAY ON the medications listed below when I get home from the hospital:    aspirin 81 mg oral delayed release tablet  -- 1 tab(s) by mouth once a day  -- Indication: For CAD    Flomax 0.4 mg oral capsule  -- 1 cap(s) by mouth once a day  -- Indication: For BPH    levETIRAcetam 500 mg oral tablet  -- 1 tab(s) by mouth 2 times a day  -- Indication: For Other    allopurinol 100 mg oral tablet  -- 1 tab(s) by mouth once a day  -- Indication: For Gout    atorvastatin 10 mg oral tablet  -- 1 tab(s) by mouth once a day (at bedtime)  -- Indication: For HLD    metoprolol tartrate 25 mg oral tablet  -- 1 tab(s) by mouth every 12 hours  -- Indication: For HTN    Norvasc 10 mg oral tablet  -- 1 tab(s) by mouth once a day  -- Indication: For HTN    NexIUM 24HR 20 mg oral delayed release capsule  -- 1 cap(s) by mouth once a day  -- Indication: For GERD    Multiple Vitamins oral tablet  -- 1 tab(s) by mouth once a day  -- Indication: For Vitamin Defeciency    folic acid 1 mg oral tablet  -- 1 tab(s) by mouth once a day  -- Indication: For Folte Defeciency

## 2019-02-10 NOTE — DISCHARGE NOTE ADULT - HOSPITAL COURSE
84 year old male with Gout AFib Anemia BOH and Recent Seizure disorder  Came to ED for Fever on Feb 5th  Right knee pain  Mild Fever in ED  Physical exam showed elderly male  Moderate knee pain  Otherwise in no distress  Labs showed elevated urate  Elevated WBC  Patient was admitted for RO Sepsis, Acute gout flair. Anemia, Macrocytic  WBC and fever resolved shortly after  ID saw patient, attributed WBC and fever to gout attack.  EGD was performed, no sign of bleeding.  Anemia, Macrocytic in nature  B12 Folate, Homocystein, MMA normal  Patient desired to go home, feeling alright  Patient cleared to go home  suggest Allopurinol be increased as outpatient   FU with hematology for evaluation of macrocytic anemia  Patient cleared for discharge 84 year old male with Gout AFib Anemia BOH and Recent Seizure disorder  Came to ED for Fever on Feb 5th  Right knee pain  Mild Fever in ED  Physical exam showed elderly male  Moderate knee pain  Otherwise in no distress  Labs showed elevated urate  Elevated WBC, Creatininne elevated above baseline  Patient was admitted for RO Sepsis, Acute gout flair. Anemia, Macrocytic, RO YUKI  WBC and fever resolved shortly after  ID saw patient, attributed WBC and fever to gout attack.  EGD was performed, no sign of bleeding.  Anemia, Macrocytic in nature  B12 Folate, Homocystein, MMA normal  YUKI ruled out, may resume ACE/ARB  Patient desired to go home, feeling alright  Patient cleared to go home  suggest Allopurinol be increased as outpatient   FU with hematology for evaluation of macrocytic anemia  Patient cleared for discharge

## 2019-02-10 NOTE — DISCHARGE NOTE ADULT - CARE PLAN
Principal Discharge DX:	Gout  Goal:	Pain free  Assessment and plan of treatment:	Patient is pain free  Secondary Diagnosis:	Fever

## 2019-02-10 NOTE — DISCHARGE NOTE ADULT - COMMUNITY RESOURCES
Pt to be discharged to [facility] for JANET, Dr White to follow at the facility

## 2019-02-12 LAB
HEMOGLOBIN INTERPRETATION: SIGNIFICANT CHANGE UP
HGB A MFR BLD: 97.3 % — SIGNIFICANT CHANGE UP (ref 95.8–98)
HGB A2 MFR BLD: 2.7 % — SIGNIFICANT CHANGE UP (ref 2–3.2)
PROT SERPL-MCNC: 6.3 G/DL — SIGNIFICANT CHANGE UP (ref 6–8.3)
PROT SERPL-MCNC: 6.3 G/DL — SIGNIFICANT CHANGE UP (ref 6–8.3)

## 2019-02-13 LAB — METHYLMALONATE SERPL-SCNC: 326 NMOL/L — SIGNIFICANT CHANGE UP (ref 0–378)

## 2019-02-14 NOTE — CDI QUERY NOTE - NSCDIOTHERTXTBX_GEN_ALL_CORE_HH
[facility] policy based on KDIGO guidelines defines YUKI as any of the following;  -	Increase Creatinine = 0.3mg/dl from baseline within 48 hours; or  -	Increase Creatinine level to = 1.5x baseline, which is known or presumed to have occurred within 7 days.    Baseline - 1.3 - 1.7    Creatinine levels during this admission - 1.66 - 1.43 - 1.77 - 1.74 - 1.50    Please clarify;    YUKI ruled in – please document clinical data to support YUKI.    YUKI ruled out.      Thank you

## 2019-02-18 LAB
% ALBUMIN: 38 % — SIGNIFICANT CHANGE UP
% ALPHA 1: 7.5 % — SIGNIFICANT CHANGE UP
% ALPHA 2: 15.4 % — SIGNIFICANT CHANGE UP
% BETA: 14.4 % — SIGNIFICANT CHANGE UP
% GAMMA: 24.7 % — SIGNIFICANT CHANGE UP
% M SPIKE: SIGNIFICANT CHANGE UP
ALBUMIN SERPL ELPH-MCNC: 2.4 G/DL — LOW (ref 3.6–5.5)
ALBUMIN/GLOB SERPL ELPH: 0.6 RATIO — SIGNIFICANT CHANGE UP
ALPHA1 GLOB SERPL ELPH-MCNC: 0.5 G/DL — HIGH (ref 0.1–0.4)
ALPHA2 GLOB SERPL ELPH-MCNC: 1 G/DL — SIGNIFICANT CHANGE UP (ref 0.5–1)
B-GLOBULIN SERPL ELPH-MCNC: 0.9 G/DL — SIGNIFICANT CHANGE UP (ref 0.5–1)
GAMMA GLOBULIN: 1.6 G/DL — SIGNIFICANT CHANGE UP (ref 0.6–1.6)
M-SPIKE: SIGNIFICANT CHANGE UP (ref 0–0)
PROT PATTERN SERPL ELPH-IMP: SIGNIFICANT CHANGE UP

## 2019-02-25 NOTE — PATIENT PROFILE ADULT - NSALCOHOLLASTUSEDT_GEN_A_NUR
47 y/o F pt presents to the ED c/o pain in the right arm since this morning. Pt states that the pain began after she woke up this morning, starting at the right side scapular region radiating down the right arm. Associated with tingling and numbness sensation in the fingers in right hand. Pain aggravated with movement. She states that she has not been lifting any heavy objects. She was seen at urgent care this morning and received a toridol injection at 10:30 am, with mild relief of sx. She was prescribed muscle relaxant, which she took at 1:30pm without any relief of sx. She also took motrin today. Pt states that she was prescribed steroids, but has not started it yet. Denies neck pain, trauma to the area, headache, or weakness in extremities. No other complaints at this time. 24-Dec-2018

## 2019-02-27 ENCOUNTER — INPATIENT (INPATIENT)
Facility: HOSPITAL | Age: 84
LOS: 6 days | Discharge: HOME | DRG: 812 | End: 2019-03-06
Attending: STUDENT IN AN ORGANIZED HEALTH CARE EDUCATION/TRAINING PROGRAM | Admitting: STUDENT IN AN ORGANIZED HEALTH CARE EDUCATION/TRAINING PROGRAM
Payer: MEDICARE

## 2019-02-27 VITALS
TEMPERATURE: 98 F | RESPIRATION RATE: 17 BRPM | DIASTOLIC BLOOD PRESSURE: 73 MMHG | WEIGHT: 190.92 LBS | SYSTOLIC BLOOD PRESSURE: 135 MMHG | HEIGHT: 73 IN | OXYGEN SATURATION: 97 % | HEART RATE: 115 BPM

## 2019-02-27 DIAGNOSIS — N40.0 BENIGN PROSTATIC HYPERPLASIA WITHOUT LOWER URINARY TRACT SYMPTOMS: ICD-10-CM

## 2019-02-27 DIAGNOSIS — I48.91 UNSPECIFIED ATRIAL FIBRILLATION: ICD-10-CM

## 2019-02-27 DIAGNOSIS — E78.5 HYPERLIPIDEMIA, UNSPECIFIED: ICD-10-CM

## 2019-02-27 DIAGNOSIS — D64.9 ANEMIA, UNSPECIFIED: ICD-10-CM

## 2019-02-27 DIAGNOSIS — G40.909 EPILEPSY, UNSPECIFIED, NOT INTRACTABLE, WITHOUT STATUS EPILEPTICUS: ICD-10-CM

## 2019-02-27 DIAGNOSIS — E87.6 HYPOKALEMIA: ICD-10-CM

## 2019-02-27 DIAGNOSIS — I10 ESSENTIAL (PRIMARY) HYPERTENSION: ICD-10-CM

## 2019-02-27 DIAGNOSIS — R50.9 FEVER, UNSPECIFIED: ICD-10-CM

## 2019-02-27 DIAGNOSIS — M1A.9XX0 CHRONIC GOUT, UNSPECIFIED, WITHOUT TOPHUS (TOPHI): ICD-10-CM

## 2019-02-27 LAB
ALBUMIN SERPL ELPH-MCNC: 2.1 G/DL — LOW (ref 3.3–5)
ALP SERPL-CCNC: 32 U/L — LOW (ref 40–120)
ALT FLD-CCNC: 10 U/L DA — SIGNIFICANT CHANGE UP (ref 10–45)
ANION GAP SERPL CALC-SCNC: 14 MMOL/L — SIGNIFICANT CHANGE UP (ref 5–17)
APPEARANCE UR: CLEAR — SIGNIFICANT CHANGE UP
APTT BLD: 29.3 SEC — SIGNIFICANT CHANGE UP (ref 27.5–36.3)
AST SERPL-CCNC: 14 U/L — SIGNIFICANT CHANGE UP (ref 10–40)
BACTERIA # UR AUTO: ABNORMAL /HPF
BASOPHILS NFR BLD AUTO: 1 % — SIGNIFICANT CHANGE UP (ref 0–2)
BILIRUB SERPL-MCNC: 0.5 MG/DL — SIGNIFICANT CHANGE UP (ref 0.2–1.2)
BILIRUB UR-MCNC: NEGATIVE — SIGNIFICANT CHANGE UP
BUN SERPL-MCNC: 31 MG/DL — HIGH (ref 7–23)
CALCIUM SERPL-MCNC: 8.2 MG/DL — LOW (ref 8.4–10.5)
CHLORIDE SERPL-SCNC: 103 MMOL/L — SIGNIFICANT CHANGE UP (ref 96–108)
CO2 SERPL-SCNC: 21 MMOL/L — LOW (ref 22–31)
COLOR SPEC: YELLOW — SIGNIFICANT CHANGE UP
CREAT SERPL-MCNC: 1.65 MG/DL — HIGH (ref 0.5–1.3)
DIFF PNL FLD: NEGATIVE — SIGNIFICANT CHANGE UP
EPI CELLS # UR: SIGNIFICANT CHANGE UP
FLU A RESULT: SIGNIFICANT CHANGE UP
FLU A RESULT: SIGNIFICANT CHANGE UP
FLUAV AG NPH QL: SIGNIFICANT CHANGE UP
FLUBV AG NPH QL: SIGNIFICANT CHANGE UP
GLUCOSE SERPL-MCNC: 198 MG/DL — HIGH (ref 70–99)
GLUCOSE UR QL: NEGATIVE — SIGNIFICANT CHANGE UP
HCT VFR BLD CALC: 21.6 % — LOW (ref 39–50)
HGB BLD-MCNC: 6.8 G/DL — CRITICAL LOW (ref 13–17)
INR BLD: 1.47 RATIO — HIGH (ref 0.88–1.16)
KETONES UR-MCNC: NEGATIVE — SIGNIFICANT CHANGE UP
LACTATE SERPL-SCNC: 2 MMOL/L — SIGNIFICANT CHANGE UP (ref 0.7–2)
LEUKOCYTE ESTERASE UR-ACNC: ABNORMAL
LYMPHOCYTES # BLD AUTO: 7 % — LOW (ref 13–44)
MCHC RBC-ENTMCNC: 31.4 GM/DL — LOW (ref 32–36)
MCHC RBC-ENTMCNC: 33.3 PG — SIGNIFICANT CHANGE UP (ref 27–34)
MCV RBC AUTO: 106.2 FL — HIGH (ref 80–100)
MONOCYTES NFR BLD AUTO: 22 % — HIGH (ref 2–14)
NEUTROPHILS NFR BLD AUTO: 58 % — SIGNIFICANT CHANGE UP (ref 43–77)
NITRITE UR-MCNC: NEGATIVE — SIGNIFICANT CHANGE UP
OB PNL STL: NEGATIVE — SIGNIFICANT CHANGE UP
PH UR: 5 — SIGNIFICANT CHANGE UP (ref 5–8)
PLATELET # BLD AUTO: 176 K/UL — SIGNIFICANT CHANGE UP (ref 150–400)
POTASSIUM SERPL-MCNC: 3.4 MMOL/L — LOW (ref 3.5–5.3)
POTASSIUM SERPL-SCNC: 3.4 MMOL/L — LOW (ref 3.5–5.3)
PROT SERPL-MCNC: 7.2 G/DL — SIGNIFICANT CHANGE UP (ref 6–8.3)
PROT UR-MCNC: 30 MG/DL
PROTHROM AB SERPL-ACNC: 16.7 SEC — HIGH (ref 10–12.9)
RBC # BLD: 2.04 M/UL — LOW (ref 4.2–5.8)
RBC # FLD: 22.1 % — HIGH (ref 10.3–14.5)
RBC CASTS # UR COMP ASSIST: NEGATIVE /HPF — SIGNIFICANT CHANGE UP (ref 0–4)
RSV RESULT: SIGNIFICANT CHANGE UP
RSV RNA RESP QL NAA+PROBE: SIGNIFICANT CHANGE UP
SODIUM SERPL-SCNC: 138 MMOL/L — SIGNIFICANT CHANGE UP (ref 135–145)
SP GR SPEC: 1.02 — SIGNIFICANT CHANGE UP (ref 1.01–1.02)
UROBILINOGEN FLD QL: NEGATIVE — SIGNIFICANT CHANGE UP
WBC # BLD: 22.5 K/UL — HIGH (ref 3.8–10.5)
WBC # FLD AUTO: 22.5 K/UL — HIGH (ref 3.8–10.5)
WBC UR QL: SIGNIFICANT CHANGE UP /HPF (ref 0–5)

## 2019-02-27 PROCEDURE — 71045 X-RAY EXAM CHEST 1 VIEW: CPT | Mod: 26

## 2019-02-27 PROCEDURE — 99285 EMERGENCY DEPT VISIT HI MDM: CPT

## 2019-02-27 PROCEDURE — 93010 ELECTROCARDIOGRAM REPORT: CPT

## 2019-02-27 PROCEDURE — 99223 1ST HOSP IP/OBS HIGH 75: CPT

## 2019-02-27 RX ORDER — ATORVASTATIN CALCIUM 80 MG/1
10 TABLET, FILM COATED ORAL AT BEDTIME
Qty: 0 | Refills: 0 | Status: DISCONTINUED | OUTPATIENT
Start: 2019-02-27 | End: 2019-03-01

## 2019-02-27 RX ORDER — INDOMETHACIN 50 MG
25 CAPSULE ORAL
Qty: 0 | Refills: 0 | Status: DISCONTINUED | OUTPATIENT
Start: 2019-02-27 | End: 2019-02-28

## 2019-02-27 RX ORDER — AMLODIPINE BESYLATE 2.5 MG/1
10 TABLET ORAL DAILY
Qty: 0 | Refills: 0 | Status: DISCONTINUED | OUTPATIENT
Start: 2019-02-27 | End: 2019-03-01

## 2019-02-27 RX ORDER — ASPIRIN/CALCIUM CARB/MAGNESIUM 324 MG
81 TABLET ORAL DAILY
Qty: 0 | Refills: 0 | Status: DISCONTINUED | OUTPATIENT
Start: 2019-02-27 | End: 2019-03-01

## 2019-02-27 RX ORDER — ENOXAPARIN SODIUM 100 MG/ML
40 INJECTION SUBCUTANEOUS DAILY
Qty: 0 | Refills: 0 | Status: DISCONTINUED | OUTPATIENT
Start: 2019-02-27 | End: 2019-03-01

## 2019-02-27 RX ORDER — TAMSULOSIN HYDROCHLORIDE 0.4 MG/1
0.4 CAPSULE ORAL AT BEDTIME
Qty: 0 | Refills: 0 | Status: DISCONTINUED | OUTPATIENT
Start: 2019-02-27 | End: 2019-03-01

## 2019-02-27 RX ORDER — ALLOPURINOL 300 MG
100 TABLET ORAL DAILY
Qty: 0 | Refills: 0 | Status: DISCONTINUED | OUTPATIENT
Start: 2019-02-27 | End: 2019-03-01

## 2019-02-27 RX ORDER — CEFEPIME 1 G/1
2000 INJECTION, POWDER, FOR SOLUTION INTRAMUSCULAR; INTRAVENOUS ONCE
Qty: 0 | Refills: 0 | Status: COMPLETED | OUTPATIENT
Start: 2019-02-27 | End: 2019-02-27

## 2019-02-27 RX ORDER — PANTOPRAZOLE SODIUM 20 MG/1
40 TABLET, DELAYED RELEASE ORAL
Qty: 0 | Refills: 0 | Status: DISCONTINUED | OUTPATIENT
Start: 2019-02-27 | End: 2019-03-01

## 2019-02-27 RX ORDER — SODIUM CHLORIDE 9 MG/ML
2700 INJECTION INTRAMUSCULAR; INTRAVENOUS; SUBCUTANEOUS ONCE
Qty: 0 | Refills: 0 | Status: COMPLETED | OUTPATIENT
Start: 2019-02-27 | End: 2019-02-27

## 2019-02-27 RX ORDER — DEXTROSE MONOHYDRATE, SODIUM CHLORIDE, AND POTASSIUM CHLORIDE 50; .745; 4.5 G/1000ML; G/1000ML; G/1000ML
1000 INJECTION, SOLUTION INTRAVENOUS
Qty: 0 | Refills: 0 | Status: DISCONTINUED | OUTPATIENT
Start: 2019-02-27 | End: 2019-02-28

## 2019-02-27 RX ORDER — ASPIRIN/CALCIUM CARB/MAGNESIUM 324 MG
81 TABLET ORAL DAILY
Qty: 0 | Refills: 0 | Status: DISCONTINUED | OUTPATIENT
Start: 2019-02-27 | End: 2019-02-27

## 2019-02-27 RX ORDER — PIPERACILLIN AND TAZOBACTAM 4; .5 G/20ML; G/20ML
3.38 INJECTION, POWDER, LYOPHILIZED, FOR SOLUTION INTRAVENOUS EVERY 8 HOURS
Qty: 0 | Refills: 0 | Status: DISCONTINUED | OUTPATIENT
Start: 2019-02-27 | End: 2019-02-28

## 2019-02-27 RX ORDER — ACETAMINOPHEN 500 MG
650 TABLET ORAL EVERY 6 HOURS
Qty: 0 | Refills: 0 | Status: DISCONTINUED | OUTPATIENT
Start: 2019-02-27 | End: 2019-03-01

## 2019-02-27 RX ORDER — ACETAMINOPHEN 500 MG
650 TABLET ORAL ONCE
Qty: 0 | Refills: 0 | Status: COMPLETED | OUTPATIENT
Start: 2019-02-27 | End: 2019-02-27

## 2019-02-27 RX ORDER — METOPROLOL TARTRATE 50 MG
25 TABLET ORAL EVERY 12 HOURS
Qty: 0 | Refills: 0 | Status: DISCONTINUED | OUTPATIENT
Start: 2019-02-27 | End: 2019-03-01

## 2019-02-27 RX ORDER — FOLIC ACID 0.8 MG
1 TABLET ORAL DAILY
Qty: 0 | Refills: 0 | Status: DISCONTINUED | OUTPATIENT
Start: 2019-02-27 | End: 2019-03-01

## 2019-02-27 RX ORDER — LEVETIRACETAM 250 MG/1
500 TABLET, FILM COATED ORAL
Qty: 0 | Refills: 0 | Status: DISCONTINUED | OUTPATIENT
Start: 2019-02-27 | End: 2019-03-01

## 2019-02-27 RX ORDER — VANCOMYCIN HCL 1 G
1000 VIAL (EA) INTRAVENOUS ONCE
Qty: 0 | Refills: 0 | Status: COMPLETED | OUTPATIENT
Start: 2019-02-27 | End: 2019-02-27

## 2019-02-27 RX ADMIN — CEFEPIME 2000 MILLIGRAM(S): 1 INJECTION, POWDER, FOR SOLUTION INTRAMUSCULAR; INTRAVENOUS at 18:33

## 2019-02-27 RX ADMIN — CEFEPIME 100 MILLIGRAM(S): 1 INJECTION, POWDER, FOR SOLUTION INTRAMUSCULAR; INTRAVENOUS at 18:14

## 2019-02-27 RX ADMIN — LEVETIRACETAM 500 MILLIGRAM(S): 250 TABLET, FILM COATED ORAL at 23:31

## 2019-02-27 RX ADMIN — Medication 650 MILLIGRAM(S): at 18:14

## 2019-02-27 RX ADMIN — TAMSULOSIN HYDROCHLORIDE 0.4 MILLIGRAM(S): 0.4 CAPSULE ORAL at 23:29

## 2019-02-27 RX ADMIN — SODIUM CHLORIDE 2700 MILLILITER(S): 9 INJECTION INTRAMUSCULAR; INTRAVENOUS; SUBCUTANEOUS at 18:17

## 2019-02-27 RX ADMIN — Medication 650 MILLIGRAM(S): at 20:30

## 2019-02-27 RX ADMIN — ATORVASTATIN CALCIUM 10 MILLIGRAM(S): 80 TABLET, FILM COATED ORAL at 23:29

## 2019-02-27 RX ADMIN — Medication 250 MILLIGRAM(S): at 18:34

## 2019-02-27 RX ADMIN — Medication 25 MILLIGRAM(S): at 23:29

## 2019-02-27 NOTE — ED ADULT NURSE NOTE - NSIMPLEMENTINTERV_GEN_ALL_ED
Implemented All Universal Safety Interventions:  [place] to call system. Call bell, personal items and telephone within reach. Instruct patient to call for assistance. Room bathroom lighting operational. Non-slip footwear when patient is off stretcher. Physically safe environment: no spills, clutter or unnecessary equipment. Stretcher in lowest position, wheels locked, appropriate side rails in place.

## 2019-02-27 NOTE — H&P ADULT - ATTENDING COMMENTS
IMPROVE VTE Individual Risk Assessment  RISK                                                                Points  [  ] Previous VTE                                                  3  [  ] Thrombophilia                                               2  [  ] Lower limb paralysis                                      2        (unable to hold up >15 seconds)    [  ] Current Cancer                                              2        [  ] Im  [  ] ICU/CCU stay > 24 hours                              1  [x  ] Age > 60                                                      1  IMPROVE VTE Score ___1__

## 2019-02-27 NOTE — H&P ADULT - PROBLEM SELECTOR PLAN 1
guaiac negative stools in ED  found with hgb of 6.8 order for one unit prbc's  gi consult for possible colonoscopy  follow up anemia profile  monitor cbc

## 2019-02-27 NOTE — H&P ADULT - NSHPLABSRESULTS_GEN_ALL_CORE
6.8    22.5  )-----------( 176      ( 27 Feb 2019 17:30 )             21.6     Lactate, Blood: 2.0 mmol/L (02-27 @ 17:45)    02-27    138  |  103  |  31  ----------------------------<  198  3.4   |  21  |  1.65    Ca    8.2      27 Feb 2019 17:30    TPro  7.2  /  Alb  2.1  /  TBili  0.5  /  DBili  x   /  AST  14  /  ALT  10  /  AlkPhos  32  02-27    PT/INR - ( 27 Feb 2019 17:30 )   PT: 16.7 sec;   INR: 1.47 ratio         PTT - ( 27 Feb 2019 17:30 )  PTT:29.3 sec      12-23 [id number] 6.3    12-23 Chol 95 mg/dL LDL 55 mg/dL HDL 26 mg/dL Trig 69 mg/dL

## 2019-02-27 NOTE — ED PROVIDER NOTE - CLINICAL SUMMARY MEDICAL DECISION MAKING FREE TEXT BOX
84 yr old male with hx of anemia, a.fib, gout, BPH, HTN, HLD presents from  rehab due to low h/h. Pt was recently admitted to hospital due to gout flare, anemia. Endoscopy was done showing no signs of bleeding. Pt was discharged and sent to rehab. Pt has scheduled outpt colonoscopy this Monday with Dr. Cottrell. Denies any fever, chills, n/v/d, sob, chest pain or any other symptoms at this time. will check sepsis labs, cbc- and likely transfusion and admit

## 2019-02-27 NOTE — ED PROVIDER NOTE - ATTENDING CONTRIBUTION TO CARE
Dr. Vicente: I performed a face to face bedside interview with patient regarding history of present illness, review of symptoms and past medical history. I completed an independent physical exam.  I have discussed patient's plan of care with PA.   I agree with note as stated above, having amended the EMR as needed to reflect my findings.   This includes HISTORY OF PRESENT ILLNESS, HIV, PAST MEDICAL/SURGICAL/FAMILY/SOCIAL HISTORY, ALLERGIES AND HOME MEDICATIONS, REVIEW OF SYSTEMS, PHYSICAL EXAM, and any PROGRESS NOTES during the time I functioned as the attending physician for this patient.    84F recently admitted for PNA, h/o anemia s/p transfusion recently, was supposed to get a colonoscopy next week, not on AC, sent from rehab due to low h/h and fever. Found to be febrile to 100.7 here. Pt denies cough, chest pain, sob, dysuria, hematuria, abdo pain.    On exam pt appears chronically ill, NAD, tachy/regular, CTAB, abdo soft/nt/nd, rectal as above, no pedal edema.    Plan - guaiac, transfuse, sepsis w/u

## 2019-02-27 NOTE — H&P ADULT - HISTORY OF PRESENT ILLNESS
84 male with pmh significant for anemia, gout, afib (off xarelto for about a month), bph, htn, hld, seizure d/o, presented from [facility] with abnormal labs.  Pt reports recent admission to [facility] for gout flare then discharged to rehab.  Pt now returns with guaiac negative anemia ( 6.8 ) reports episode of bright red blood in stool about three days ago.  Pt was scheduled for outpatient colonoscopy this coming Monday.  Pt also found with leukocytosis also found with temp 100.7 F rectally.  Denies chills, fever, sob, chest pain, weakness, dysuria.

## 2019-02-27 NOTE — H&P ADULT - PROBLEM SELECTOR PLAN 2
found with rectal temp of 100.7F in ED  no clear source of infection  F/u labs, blood cultures, lactate, urinalysis, cxr, rvp  maintain contact precautions found with rectal temp of 100.7F in ED  no clear source of infection  F/u labs, blood cultures, lactate, urinalysis, cxr, rvp  continue empiric abx for now  maintain contact precautions

## 2019-02-27 NOTE — ED PROVIDER NOTE - OBJECTIVE STATEMENT
84 yr old male with hx of anemia, a.fib, gout, BPH, HTN, HLD presents from  rehab due to low h/h. Pt was recently admitted to hospital due to gout flare, anemia. Endoscopy was done showing no signs of bleeding. Pt was discharged and sent to rehab. Pt has scheduled outpt colonoscopy this Monday with Dr. Cottrell. Denies any fever, chills, n/v/d, sob, chest pain or any other symptoms at this time.

## 2019-02-27 NOTE — H&P ADULT - NSHPREVIEWOFSYSTEMS_GEN_ALL_CORE
REVIEW OF SYSTEMS:  CONSTITUTIONAL: No fever, weight loss, or fatigue  EYES: No eye pain, visual disturbances, or discharge  ENMT:  No difficulty hearing, tinnitus, vertigo; No sinus or throat pain  NECK: No pain or stiffness  BREASTS: No pain, masses, or nipple discharge  RESPIRATORY: No cough, wheezing, chills or hemoptysis; No shortness of breath  CARDIOVASCULAR: No chest pain, palpitations, dizziness, or +leg swelling  GASTROINTESTINAL: No abdominal or epigastric pain. No nausea, vomiting, or hematemesis; No diarrhea or constipation. +BRBPR  GENITOURINARY: No dysuria, frequency, hematuria, or incontinence  NEUROLOGICAL: No headaches, memory loss, loss of strength, numbness, or tremors  SKIN: No itching, burning, rashes, or lesions   LYMPH NODES: No enlarged glands  ENDOCRINE: No heat or cold intolerance; No hair loss  MUSCULOSKELETAL: + joint pain or swelling; No muscle, back, or extremity pain  PSYCHIATRIC: No depression, anxiety, mood swings, or difficulty sleeping  HEME/LYMPH: No easy bruising, or bleeding gums  ALLERY AND IMMUNOLOGIC: No hives or eczema    ALL ROS REVIEWED AND NORMAL EXCEPT AS STATED ABOVE

## 2019-02-27 NOTE — H&P ADULT - ASSESSMENT
84 male with anemia, afib ( off xarelto ), gout, bph, htn, hld, seizure d/o sent from [facility] for the evaluation of abnormal labs.

## 2019-02-27 NOTE — ED PROVIDER NOTE - PROGRESS NOTE DETAILS
discussed case with Dr. Silva, accepted pt. Pt and family agree with plan. Dr. Cottrell (GI) made aware of admission and will see pt in hospital.

## 2019-02-27 NOTE — H&P ADULT - NSHPPHYSICALEXAM_GEN_ALL_CORE
T(C): 36.4 (02-27-19 @ 17:02), Max: 36.4 (02-27-19 @ 17:02)  HR: 115 (02-27-19 @ 17:02) (115 - 115)  BP: 135/73 (02-27-19 @ 17:02) (135/73 - 135/73)  RR: 17 (02-27-19 @ 17:02) (17 - 17)  SpO2: 97% (02-27-19 @ 17:02) (97% - 97%)  Wt(kg): --Vital Signs Last 24 Hrs  T(C): 36.4 (27 Feb 2019 17:02), Max: 36.4 (27 Feb 2019 17:02)  T(F): 97.5 (27 Feb 2019 17:02), Max: 97.5 (27 Feb 2019 17:02)  HR: 115 (27 Feb 2019 17:02) (115 - 115)  BP: 135/73 (27 Feb 2019 17:02) (135/73 - 135/73)  BP(mean): --  RR: 17 (27 Feb 2019 17:02) (17 - 17)  SpO2: 97% (27 Feb 2019 17:02) (97% - 97%)    PHYSICAL EXAM:  GENERAL: NAD, well - developed  HEAD:  Atraumatic, Normocephalic  EYES: EOMI, PERRLA, conjunctiva and sclera clear  ENMT: No tonsillar erythema, exudates, or enlargement; Dry mucous membranes  NECK: Supple, No JVD, Normal thyroid  NERVOUS SYSTEM:  Alert & Oriented X3, Good concentration; Motor Strength 5/5 B/L upper and lower extremities; DTRs 2+ intact and symmetric  CHEST/LUNG: Clear to percussion bilaterally; No rales, rhonchi, wheezing, or rubs  HEART: S1S2  ABDOMEN: Soft, Nontender, Nondistended; Bowel sounds present  EXTREMITIES:  2+ Peripheral Pulses, +2 bilateral edema  LYMPH: No lymphadenopathy noted  SKIN: No rashes or lesions

## 2019-02-28 ENCOUNTER — TRANSCRIPTION ENCOUNTER (OUTPATIENT)
Age: 84
End: 2019-02-28

## 2019-02-28 DIAGNOSIS — D64.9 ANEMIA, UNSPECIFIED: ICD-10-CM

## 2019-02-28 LAB
ANION GAP SERPL CALC-SCNC: 12 MMOL/L — SIGNIFICANT CHANGE UP (ref 5–17)
BASOPHILS # BLD AUTO: 0.1 K/UL — SIGNIFICANT CHANGE UP (ref 0–0.2)
BASOPHILS NFR BLD AUTO: 0.4 % — SIGNIFICANT CHANGE UP (ref 0–2)
BUN SERPL-MCNC: 30 MG/DL — HIGH (ref 7–23)
CALCIUM SERPL-MCNC: 7.6 MG/DL — LOW (ref 8.4–10.5)
CHLORIDE SERPL-SCNC: 108 MMOL/L — SIGNIFICANT CHANGE UP (ref 96–108)
CO2 SERPL-SCNC: 22 MMOL/L — SIGNIFICANT CHANGE UP (ref 22–31)
CREAT SERPL-MCNC: 1.43 MG/DL — HIGH (ref 0.5–1.3)
EOSINOPHIL # BLD AUTO: 0.1 K/UL — SIGNIFICANT CHANGE UP (ref 0–0.5)
EOSINOPHIL NFR BLD AUTO: 0.4 % — SIGNIFICANT CHANGE UP (ref 0–6)
GLUCOSE SERPL-MCNC: 159 MG/DL — HIGH (ref 70–99)
HCT VFR BLD CALC: 20.7 % — CRITICAL LOW (ref 39–50)
HCT VFR BLD CALC: 24.6 % — LOW (ref 39–50)
HGB BLD-MCNC: 6.8 G/DL — CRITICAL LOW (ref 13–17)
HGB BLD-MCNC: 7.9 G/DL — LOW (ref 13–17)
LYMPHOCYTES # BLD AUTO: 1.7 K/UL — SIGNIFICANT CHANGE UP (ref 1–3.3)
LYMPHOCYTES # BLD AUTO: 11 % — LOW (ref 13–44)
MAGNESIUM SERPL-MCNC: 0.9 MG/DL — CRITICAL LOW (ref 1.6–2.6)
MAGNESIUM SERPL-MCNC: <.8 MG/DL — CRITICAL LOW (ref 1.6–2.6)
MCHC RBC-ENTMCNC: 32.7 GM/DL — SIGNIFICANT CHANGE UP (ref 32–36)
MCHC RBC-ENTMCNC: 33.2 PG — SIGNIFICANT CHANGE UP (ref 27–34)
MCV RBC AUTO: 101.6 FL — HIGH (ref 80–100)
MONOCYTES # BLD AUTO: 4.1 K/UL — HIGH (ref 0–0.9)
MONOCYTES NFR BLD AUTO: 26.3 % — HIGH (ref 2–14)
NEUTROPHILS # BLD AUTO: 9.6 K/UL — HIGH (ref 1.8–7.4)
NEUTROPHILS NFR BLD AUTO: 61.8 % — SIGNIFICANT CHANGE UP (ref 43–77)
OB PNL STL: NEGATIVE — SIGNIFICANT CHANGE UP
PHOSPHATE SERPL-MCNC: 3.4 MG/DL — SIGNIFICANT CHANGE UP (ref 2.5–4.5)
PLATELET # BLD AUTO: 140 K/UL — LOW (ref 150–400)
POTASSIUM SERPL-MCNC: 3.2 MMOL/L — LOW (ref 3.5–5.3)
POTASSIUM SERPL-MCNC: 3.6 MMOL/L — SIGNIFICANT CHANGE UP (ref 3.5–5.3)
POTASSIUM SERPL-SCNC: 3.2 MMOL/L — LOW (ref 3.5–5.3)
POTASSIUM SERPL-SCNC: 3.6 MMOL/L — SIGNIFICANT CHANGE UP (ref 3.5–5.3)
PROCALCITONIN SERPL-MCNC: 0.54 NG/ML — HIGH
RBC # BLD: 2.03 M/UL — LOW (ref 4.2–5.8)
RBC # FLD: 20.4 % — HIGH (ref 10.3–14.5)
SODIUM SERPL-SCNC: 142 MMOL/L — SIGNIFICANT CHANGE UP (ref 135–145)
WBC # BLD: 15.5 K/UL — HIGH (ref 3.8–10.5)
WBC # FLD AUTO: 15.5 K/UL — HIGH (ref 3.8–10.5)

## 2019-02-28 PROCEDURE — 99223 1ST HOSP IP/OBS HIGH 75: CPT

## 2019-02-28 PROCEDURE — 74176 CT ABD & PELVIS W/O CONTRAST: CPT | Mod: 26

## 2019-02-28 PROCEDURE — 99233 SBSQ HOSP IP/OBS HIGH 50: CPT

## 2019-02-28 RX ORDER — POTASSIUM CHLORIDE 20 MEQ
40 PACKET (EA) ORAL ONCE
Qty: 0 | Refills: 0 | Status: COMPLETED | OUTPATIENT
Start: 2019-02-28 | End: 2019-02-28

## 2019-02-28 RX ORDER — MAGNESIUM SULFATE 500 MG/ML
1 VIAL (ML) INJECTION
Qty: 0 | Refills: 0 | Status: COMPLETED | OUTPATIENT
Start: 2019-02-28 | End: 2019-02-28

## 2019-02-28 RX ORDER — POTASSIUM CHLORIDE 20 MEQ
20 PACKET (EA) ORAL ONCE
Qty: 0 | Refills: 0 | Status: COMPLETED | OUTPATIENT
Start: 2019-02-28 | End: 2019-02-28

## 2019-02-28 RX ORDER — SOD SULF/SODIUM/NAHCO3/KCL/PEG
1000 SOLUTION, RECONSTITUTED, ORAL ORAL
Qty: 0 | Refills: 0 | Status: COMPLETED | OUTPATIENT
Start: 2019-02-28 | End: 2019-02-28

## 2019-02-28 RX ORDER — MAGNESIUM OXIDE 400 MG ORAL TABLET 241.3 MG
400 TABLET ORAL
Qty: 0 | Refills: 0 | Status: COMPLETED | OUTPATIENT
Start: 2019-02-28 | End: 2019-02-28

## 2019-02-28 RX ORDER — FUROSEMIDE 40 MG
20 TABLET ORAL ONCE
Qty: 0 | Refills: 0 | Status: COMPLETED | OUTPATIENT
Start: 2019-02-28 | End: 2019-02-28

## 2019-02-28 RX ORDER — POTASSIUM CHLORIDE 20 MEQ
10 PACKET (EA) ORAL ONCE
Qty: 0 | Refills: 0 | Status: COMPLETED | OUTPATIENT
Start: 2019-02-28 | End: 2019-02-28

## 2019-02-28 RX ORDER — INDOMETHACIN 50 MG
25 CAPSULE ORAL EVERY 12 HOURS
Qty: 0 | Refills: 0 | Status: DISCONTINUED | OUTPATIENT
Start: 2019-02-28 | End: 2019-03-01

## 2019-02-28 RX ORDER — MAGNESIUM SULFATE 500 MG/ML
1 VIAL (ML) INJECTION ONCE
Qty: 0 | Refills: 0 | Status: DISCONTINUED | OUTPATIENT
Start: 2019-02-28 | End: 2019-02-28

## 2019-02-28 RX ORDER — MAGNESIUM SULFATE 500 MG/ML
1 VIAL (ML) INJECTION ONCE
Qty: 0 | Refills: 0 | Status: COMPLETED | OUTPATIENT
Start: 2019-02-28 | End: 2019-02-28

## 2019-02-28 RX ADMIN — Medication 25 MILLIGRAM(S): at 18:12

## 2019-02-28 RX ADMIN — MAGNESIUM OXIDE 400 MG ORAL TABLET 400 MILLIGRAM(S): 241.3 TABLET ORAL at 12:03

## 2019-02-28 RX ADMIN — Medication 100 GRAM(S): at 13:48

## 2019-02-28 RX ADMIN — TAMSULOSIN HYDROCHLORIDE 0.4 MILLIGRAM(S): 0.4 CAPSULE ORAL at 22:26

## 2019-02-28 RX ADMIN — DEXTROSE MONOHYDRATE, SODIUM CHLORIDE, AND POTASSIUM CHLORIDE 75 MILLILITER(S): 50; .745; 4.5 INJECTION, SOLUTION INTRAVENOUS at 01:29

## 2019-02-28 RX ADMIN — Medication 20 MILLIGRAM(S): at 15:03

## 2019-02-28 RX ADMIN — Medication 100 GRAM(S): at 18:13

## 2019-02-28 RX ADMIN — Medication 20 MILLIEQUIVALENT(S): at 13:29

## 2019-02-28 RX ADMIN — Medication 650 MILLIGRAM(S): at 18:05

## 2019-02-28 RX ADMIN — LEVETIRACETAM 500 MILLIGRAM(S): 250 TABLET, FILM COATED ORAL at 05:21

## 2019-02-28 RX ADMIN — Medication 20 MILLIGRAM(S): at 06:35

## 2019-02-28 RX ADMIN — Medication 81 MILLIGRAM(S): at 12:02

## 2019-02-28 RX ADMIN — Medication 100 GRAM(S): at 12:25

## 2019-02-28 RX ADMIN — Medication 100 GRAM(S): at 06:29

## 2019-02-28 RX ADMIN — Medication 1 TABLET(S): at 12:03

## 2019-02-28 RX ADMIN — ENOXAPARIN SODIUM 40 MILLIGRAM(S): 100 INJECTION SUBCUTANEOUS at 12:01

## 2019-02-28 RX ADMIN — AMLODIPINE BESYLATE 10 MILLIGRAM(S): 2.5 TABLET ORAL at 05:20

## 2019-02-28 RX ADMIN — Medication 40 MILLIEQUIVALENT(S): at 04:48

## 2019-02-28 RX ADMIN — Medication 25 MILLIGRAM(S): at 08:08

## 2019-02-28 RX ADMIN — LEVETIRACETAM 500 MILLIGRAM(S): 250 TABLET, FILM COATED ORAL at 18:12

## 2019-02-28 RX ADMIN — Medication 25 MILLIGRAM(S): at 05:20

## 2019-02-28 RX ADMIN — PIPERACILLIN AND TAZOBACTAM 25 GRAM(S): 4; .5 INJECTION, POWDER, LYOPHILIZED, FOR SOLUTION INTRAVENOUS at 01:56

## 2019-02-28 RX ADMIN — Medication 40 MILLIEQUIVALENT(S): at 14:40

## 2019-02-28 RX ADMIN — Medication 1000 MILLILITER(S): at 18:40

## 2019-02-28 RX ADMIN — Medication 1 MILLIGRAM(S): at 12:02

## 2019-02-28 RX ADMIN — Medication 650 MILLIGRAM(S): at 12:21

## 2019-02-28 RX ADMIN — Medication 10 MILLIEQUIVALENT(S): at 08:39

## 2019-02-28 RX ADMIN — MAGNESIUM OXIDE 400 MG ORAL TABLET 400 MILLIGRAM(S): 241.3 TABLET ORAL at 18:12

## 2019-02-28 RX ADMIN — PANTOPRAZOLE SODIUM 40 MILLIGRAM(S): 20 TABLET, DELAYED RELEASE ORAL at 06:29

## 2019-02-28 RX ADMIN — Medication 650 MILLIGRAM(S): at 08:08

## 2019-02-28 RX ADMIN — Medication 100 MILLIGRAM(S): at 12:01

## 2019-02-28 RX ADMIN — MAGNESIUM OXIDE 400 MG ORAL TABLET 400 MILLIGRAM(S): 241.3 TABLET ORAL at 08:39

## 2019-02-28 RX ADMIN — Medication 650 MILLIGRAM(S): at 08:19

## 2019-02-28 RX ADMIN — Medication 100 GRAM(S): at 05:01

## 2019-02-28 RX ADMIN — ATORVASTATIN CALCIUM 10 MILLIGRAM(S): 80 TABLET, FILM COATED ORAL at 22:26

## 2019-02-28 RX ADMIN — PIPERACILLIN AND TAZOBACTAM 25 GRAM(S): 4; .5 INJECTION, POWDER, LYOPHILIZED, FOR SOLUTION INTRAVENOUS at 08:42

## 2019-02-28 RX ADMIN — Medication 1000 MILLILITER(S): at 22:29

## 2019-02-28 NOTE — PROGRESS NOTE ADULT - ASSESSMENT
85 yo m admitted 2-27-19 with pmh anemia, chronic afib on no a/c, bph, essential htn, dyslipidemia, seizure d/o presents with acute blood loss anemia.    1. acute blood loss anemia: patient on 2nd unit of prbc. patient scheduled for colonoscopy in am. Dr. White did text me yesterday letting me know patient had a drop in hgb, elevated wbc along with a strongly positive fecal occult. When fecal occult was repeated in ED it was neg. npo after midnight.  2. leukocytosis: 85 yo m admitted 2-27-19 with pmh anemia, chronic afib on no a/c, bph, essential htn, dyslipidemia, seizure d/o presents with acute blood loss anemia.    1. acute blood loss anemia: patient on 2nd unit of prbc. patient scheduled for colonoscopy in am. Dr. White did text me yesterday letting me know patient had a drop in hgb, elevated wbc along with a strongly positive fecal occult. When fecal occult was repeated in ED it was neg. npo after midnight.  2. leukocytosis: no fever lactate on 2-27-19 85 yo m admitted 2-27-19 with pmh anemia, chronic afib on no a/c, bph, essential htn, dyslipidemia, seizure d/o presents with acute blood loss anemia.    1. acute blood loss anemia: patient on 2nd unit of prbc. patient scheduled for colonoscopy in am. Dr. White did text me yesterday letting me know patient had a drop in hgb, elevated wbc along with a strongly positive fecal occult. When fecal occult was repeated in ED it was neg. npo after midnight.  2. leukocytosis: no fever lactate on 2-27-19 2.0 will repeat bcx in process will d/c abx at this time. c/w monitoring if patient spikes will consider starting broad spectrum abx  3. hypokalemia: replete  4. essential htn: stable c/w home meds  5. hypomagnesemia: replete  6. bph: stable c/w flomax  7. seizure disorder: c/w keppra  8. chronic afib: rate controlled on lopressor no a/c due to bleeding risk  9. chronic gout: no flare at this time c/w allopurinol  10. ckd stage 3: baseline   11. dvt ppx: lovenox

## 2019-02-28 NOTE — CONSULT NOTE ADULT - ASSESSMENT
84-year-old gentleman with history of anemia, atrial fibrillation and seizure disorder admitted with anemia exacerbation from his [place] facility. Patient notes recent history of bright red blood per rectum. He was scheduled for outpatient colonoscopy this coming Monday. On admission, found to have rectal temp of 100.7°F in the emergency department-evaluation in progress.  Patient reports he stopped drinking alcohol approximately 3 months prior to admission

## 2019-02-28 NOTE — CONSULT NOTE ADULT - PROBLEM SELECTOR RECOMMENDATION 9
84-year-old gentleman with history of stomach cancer and chronic anemia who was admitted with anemia exacerbation. Chronic kidney disease contributes to his anemia, however, GI bleed noted currently. Macrocytosis also noted-h/o ETOH may contribute. However, check vitamin B12/folate, haptoglobin. May have underlying bone marrow disorder such as myelodysplasia (discussed with patient). Increased monocyte % noted-?CMMoL. Follow CBC with differential. Patient should proceed with GI evaluation.

## 2019-02-28 NOTE — CONSULT NOTE ADULT - SUBJECTIVE AND OBJECTIVE BOX
INTERVAL HPI/OVERNIGHT EVENTS:  HPI:  83 y/o male (known from prior admission) with PMH Afib (off xarelto), anemia, stomach cancer (25 years ago), gout, HTN, BPH who presents to ER from [facility] with drop in hemoglobin. He was seen and examined at bedside this morning.  Patient states "I was sent her for a transfusion", and he noticed red blood in his stool 3 days ago. Hemoglobin on admission was 6.8. Denies nausea. vomiting, abdominal pain, weight loss, loss of appetite. Denies fever/chills.     MEDICATIONS  (STANDING):  allopurinol 100 milliGRAM(s) Oral daily  amLODIPine   Tablet 10 milliGRAM(s) Oral daily  aspirin enteric coated 81 milliGRAM(s) Oral daily  atorvastatin 10 milliGRAM(s) Oral at bedtime  enoxaparin Injectable 40 milliGRAM(s) SubCutaneous daily  folic acid 1 milliGRAM(s) Oral daily  furosemide    Tablet 20 milliGRAM(s) Oral once  levETIRAcetam 500 milliGRAM(s) Oral two times a day  magnesium oxide 400 milliGRAM(s) Oral three times a day with meals  magnesium sulfate  IVPB 1 Gram(s) IV Intermittent every 1 hour  metoprolol tartrate 25 milliGRAM(s) Oral every 12 hours  multivitamin 1 Tablet(s) Oral daily  pantoprazole    Tablet 40 milliGRAM(s) Oral before breakfast  piperacillin/tazobactam IVPB. 3.375 Gram(s) IV Intermittent every 8 hours  polyethylene glycol/electrolyte Solution 1000 milliLiter(s) Oral <User Schedule>  polyethylene glycol/electrolyte Solution 1000 milliLiter(s) Oral <User Schedule>  potassium chloride   Solution 20 milliEquivalent(s) Oral once  tamsulosin 0.4 milliGRAM(s) Oral at bedtime    MEDICATIONS  (PRN):  acetaminophen    Suspension .. 650 milliGRAM(s) Oral every 6 hours PRN Temp greater or equal to 38C (100.4F), Mild Pain (1 - 3)      Allergies    No Known Allergies    Intolerances    PHYSICAL EXAM:   Vital Signs:  Vital Signs Last 24 Hrs  T(C): 36.7 (2019 09:46), Max: 36.8 (2019 06:31)  T(F): 98 (2019 09:46), Max: 98.3 (2019 06:31)  HR: 86 (2019 09:46) (75 - 115)  BP: 124/67 (2019 09:46) (110/60 - 135/73)  BP(mean): --  RR: 14 (2019 09:46) (14 - 17)  SpO2: 97% (2019 09:46) (95% - 97%)  Daily Height in cm: 185.42 (2019 17:02)    Daily I&O's Summary    2019 07:01  -  2019 07:00  --------------------------------------------------------  IN: 300 mL / OUT: 0 mL / NET: 300 mL        GENERAL:  Appears stated age, well-groomed, well-nourished, no distress  HEENT:  NC/AT,  conjunctivae clear and pink,   CHEST:  Full & symmetric excursion, no increased effort, breath sounds clear  HEART:  Regular rhythm, S1, S2, no murmur  ABDOMEN:  Soft, non-tender, non-distended,+ normoactive bowel sounds,  EXTEREMITIES:  +1 B/L JESSE  SKIN:  No rash, warm/dry  NEURO:  Alert, oriented,      LABS:                        6.8    15.5  )-----------( 140      ( 2019 03:00 )             20.7     02    142  |  108  |  30<H>  ----------------------------<  159<H>  3.2<L>   |  22  |  1.43<H>    Ca    7.6<L>      2019 03:00  Mg     <.8         TPro  7.2  /  Alb  2.1<L>  /  TBili  0.5  /  DBili  x   /  AST  14  /  ALT  10  /  AlkPhos  32<L>      PT/INR - ( 2019 17:30 )   PT: 16.7 sec;   INR: 1.47 ratio         PTT - ( 2019 17:30 )  PTT:29.3 sec  Urinalysis Basic - ( 2019 20:00 )    Color: Yellow / Appearance: Clear / S.020 / pH: x  Gluc: x / Ketone: Negative  / Bili: Negative / Urobili: Negative   Blood: x / Protein: 30 mg/dL / Nitrite: Negative   Leuk Esterase: Trace / RBC: Negative /HPF / WBC 0-2 /HPF   Sq Epi: x / Non Sq Epi: Neg.-Few / Bacteria: Trace /HPF      amylase   lipase  RADIOLOGY & ADDITIONAL TESTS:

## 2019-02-28 NOTE — CONSULT NOTE ADULT - SUBJECTIVE AND OBJECTIVE BOX
YUE KING  84y  Male  Admitting: SIDRA Silva    HPI:  84-year-old gentleman with history of anemia, atrial fibrillation and seizure disorder admitted with anemia exacerbation from his [place] facility. Patient notes recent history of bright red blood per rectum. He was scheduled for outpatient colonoscopy this coming Monday. On admission, found to have rectal temp of 100.7°F in the emergency department-evaluation in progress.  Patient reports he stopped drinking alcohol approximately 3 months prior to admission.    PAST MEDICAL & SURGICAL HISTORY:  Nonrheumatic aortic valve stenosis  Anemia, unspecified type  Seizure disorder  Benign prostatic hyperplasia, unspecified whether lower urinary tract symptoms present  Hyperlipemia  Essential hypertension  Chronic atrial fibrillation  Stomach cancer  Hypertension  Atrial fibrillation  Gout    HEALTH ISSUES - PROBLEM Dx:  Hypokalemia: Hypokalemia  Chronic gout without tophus, unspecified cause, unspecified site: Chronic gout without tophus, unspecified cause, unspecified site  Atrial fibrillation, unspecified type: Atrial fibrillation, unspecified type  Benign prostatic hyperplasia, unspecified whether lower urinary tract symptoms present: Benign prostatic hyperplasia, unspecified whether lower urinary tract symptoms present  Hyperlipidemia, unspecified hyperlipidemia type: Hyperlipidemia, unspecified hyperlipidemia type  Seizure disorder: Seizure disorder  Essential hypertension: Essential hypertension  Fever of unknown origin: Fever of unknown origin  Anemia, unspecified type: Anemia, unspecified type    MEDICATIONS  (STANDING):  allopurinol 100 milliGRAM(s) Oral daily  amLODIPine   Tablet 10 milliGRAM(s) Oral daily  aspirin enteric coated 81 milliGRAM(s) Oral daily  atorvastatin 10 milliGRAM(s) Oral at bedtime  enoxaparin Injectable 40 milliGRAM(s) SubCutaneous daily  folic acid 1 milliGRAM(s) Oral daily  furosemide    Tablet 20 milliGRAM(s) Oral once  levETIRAcetam 500 milliGRAM(s) Oral two times a day  magnesium oxide 400 milliGRAM(s) Oral three times a day with meals  metoprolol tartrate 25 milliGRAM(s) Oral every 12 hours  multivitamin 1 Tablet(s) Oral daily  pantoprazole    Tablet 40 milliGRAM(s) Oral before breakfast  piperacillin/tazobactam IVPB. 3.375 Gram(s) IV Intermittent every 8 hours  tamsulosin 0.4 milliGRAM(s) Oral at bedtime    MEDICATIONS  (PRN):  acetaminophen    Suspension .. 650 milliGRAM(s) Oral every 6 hours PRN Temp greater or equal to 38C (100.4F), Mild Pain (1 - 3)    Allergies    No Known Allergies    FAMILY HISTORY:  No pertinent family history in first degree relatives    SOCIAL HISTORY: Former EtOH; no tobacco    REVIEW OF SYSTEMS:    CONSTITUTIONAL: Denies weakness  EYES/ENT: No visual changes;  No vertigo or throat pain   NECK: No pain or stiffness  RESPIRATORY: No cough, wheezing, hemoptysis; No shortness of breath  CARDIOVASCULAR: No chest pain or palpitations  GASTROINTESTINAL: No abdominal or epigastric pain. No nausea, vomiting, or hematemesis; No diarrhea or constipation. + Hematochezia.  GENITOURINARY: No dysuria, frequency or hematuria  NEUROLOGICAL: No numbness   SKIN: No itching, burning, rashes, or lesions   All other review of systems is negative unless indicated above.    Height (cm): 185.42 (02-27 @ 17:02)  Weight (kg): 103.3 (02-28 @ 05:12)  BMI (kg/m2): 30 (02-28 @ 05:12)  BSA (m2): 2.27 (02-28 @ 05:12)    T(F): 98.3 (02-28-19 @ 06:56), Max: 98.3 (02-28-19 @ 06:31)  HR: 75 (02-28-19 @ 06:56)  BP: 127/55 (02-28-19 @ 06:56)  RR: 16 (02-28-19 @ 06:56)  SpO2: 95% (02-28-19 @ 06:56)    GENERAL: NAD, well-developed  Oropharynx-poor dentition  HEAD:  Atraumatic, Normocephalic  EYES: EOMI, PERRLA, conjunctiva and sclera clear  NECK: Supple, No JVD  CHEST/LUNG: decreased BS bases ant.  HEART: Regular rate and rhythm  ABDOMEN: Soft, Nontender, Nondistended; +BS  EXTREMITIES:  no calf tenderness  NEUROLOGY: awake, alert  SKIN: No vesicles    Labs:             6.8    15.5  )-----------( 140      ( 02-28 @ 03:00 )             20.7                6.8    22.5  )-----------( 176      ( 02-27 @ 17:30 )             21.6       02-28    TPro  7.2  /  Alb  2.1<L>  /  TBili  0.5  /  DBili  x   /  AST  14  /  ALT  10  /  AlkPhos  32<L>  02-27    PT/INR - ( 27 Feb 2019 17:30 )   PT: 16.7 sec;   INR: 1.47 ratio    PTT - ( 27 Feb 2019 17:30 )  PTT:29.3 sec    Occult Blood, Feces: Negative (02-27-19 @ 17:30)    Radiology and additional tests:  < from: Xray Chest 1 View- PORTABLE-Urgent (02.27.19 @ 18:06) >  EXAM:  XR CHEST PORTABLE URGENT 1V      PROCEDURE DATE:  02/27/2019        INTERPRETATION:  CLINICAL INFORMATION: Anemia, shortness of breath,   recent pneumonia.    TECHNIQUE: Frontal view of the chest   COMPARISON: February 5, 2019.    FINDINGS:    LUNGS/PLEURA: Small left pleural effusion with basilar atelectasis or   consolidation. No pneumothorax.  MEDIASTINUM: Cardiac silhouette is enlarged.  OTHER: None.    IMPRESSION:     Small left pleural effusion with basilar atelectasis or consolidation.       TERRIE WILLETT M.D., ATTENDING RADIOLOGIST  This document has been electronically signed. Feb 27 2019  8:03PM    < end of copied text >

## 2019-02-28 NOTE — CHART NOTE - NSCHARTNOTEFT_GEN_A_CORE
Chart reviewed. Nurses notes reviewed.  When I was called by nurse momin about H/H of 6.8/20 at , I ordered a unit of PRBC to be transfused.  Magnesium was also 0.9 and MagSO4 IVPB 2 gms total and po Mg supp were ordered.  Repeat H/H s/p  transfusion and Lytes post repletion were ordered as well. Chart reviewed. Nurses notes reviewed.  When I was called by nurse momin about H/H of 6.8/20 at 4 AM 02/29/19, I ordered a unit of PRBC stat to be transfused.  Magnesium was also 0.9 and MagSO4 IVPB 2 gms total and po Mg supp were ordered.  Repeat H/H s/p  transfusion and Lytes post repletion were ordered as well. Chart reviewed. Nurses notes reviewed.  When I was called by nurse momin about H/H of 6.8/20 at 3:40AM 02/29/19, I ordered a unit of PRBC stat to be transfused.  Magnesium was also 0.9 and MagSO4 IVPB 2 gms total and po Mg supp were ordered.  Repeat H/H s/p  transfusion and Lytes post repletion were ordered as well.

## 2019-02-28 NOTE — CONSULT NOTE ADULT - ASSESSMENT
83 y/o male (known from prior admission) with PMH Afib (off xarelto), anemia, stomach cancer (25 years ago), gout, HTN, BPH who presents to ER from [facility] with drop in hemoglobin. Hemoglobin 6.8 and is receiving 1 unit of PRBC this morning. Patient had recently EGD 2/5/19 in recent setting of anemia and due to hx of stomach cancer, however no significant findings noted. He was due to have colonoscopy on Monday. Abdomen soft, non tender. VSS

## 2019-02-28 NOTE — CONSULT NOTE ADULT - PROBLEM SELECTOR RECOMMENDATION 9
Clear liquid diet  NPO after midnight  Colonoscopy 3/1/19 with Dr. Simba Marinelli Prep- ordered  Monitor H/H

## 2019-02-28 NOTE — PROGRESS NOTE ADULT - SUBJECTIVE AND OBJECTIVE BOX
Patient is a 84y old  Male who presents with a chief complaint of Anemia and Leukocytosis (2019 18:57)      Patient seen and examined at bedside.    ALLERGIES:  No Known Allergies    MEDICATIONS:  acetaminophen    Suspension .. 650 milliGRAM(s) Oral every 6 hours PRN  allopurinol 100 milliGRAM(s) Oral daily  amLODIPine   Tablet 10 milliGRAM(s) Oral daily  aspirin enteric coated 81 milliGRAM(s) Oral daily  atorvastatin 10 milliGRAM(s) Oral at bedtime  enoxaparin Injectable 40 milliGRAM(s) SubCutaneous daily  folic acid 1 milliGRAM(s) Oral daily  furosemide    Tablet 20 milliGRAM(s) Oral once  levETIRAcetam 500 milliGRAM(s) Oral two times a day  magnesium oxide 400 milliGRAM(s) Oral three times a day with meals  metoprolol tartrate 25 milliGRAM(s) Oral every 12 hours  multivitamin 1 Tablet(s) Oral daily  pantoprazole    Tablet 40 milliGRAM(s) Oral before breakfast  piperacillin/tazobactam IVPB. 3.375 Gram(s) IV Intermittent every 8 hours  polyethylene glycol/electrolyte Solution 1000 milliLiter(s) Oral <User Schedule>  polyethylene glycol/electrolyte Solution 1000 milliLiter(s) Oral <User Schedule>  tamsulosin 0.4 milliGRAM(s) Oral at bedtime    Vital Signs Last 24 Hrs  T(F): 98.3 (2019 06:56), Max: 98.3 (2019 06:31)  HR: 75 (2019 06:56) (75 - 115)  BP: 127/55 (2019 06:56) (110/60 - 135/73)  RR: 16 (2019 06:56) (15 - 17)  SpO2: 95% (2019 06:56) (95% - 97%)  I&O's Summary    2019 07:01  -  2019 07:00  --------------------------------------------------------  IN: 300 mL / OUT: 0 mL / NET: 300 mL        PHYSICAL EXAM:  General: NAD, A/O x 3  ENT: MMM  Neck: Supple, No JVD  Lungs: Clear to auscultation bilaterally  Cardio: RRR, S1/S2, No murmurs  Abdomen: Soft, Nontender, Nondistended; Bowel sounds present  Extremities: No cyanosis, No edema    LABS:                        6.8    15.5  )-----------( 140      ( 2019 03:00 )             20.7         142  |  108  |  30  ----------------------------<  159  3.2   |  22  |  1.43    Ca    7.6      2019 03:00  Mg     <.8         TPro  7.2  /  Alb  2.1  /  TBili  0.5  /  DBili  x   /  AST  14  /  ALT  10  /  AlkPhos  32      eGFR if Non African American: 44 mL/min/1.73M2 (19 @ 03:00)  eGFR if African American: 52 mL/min/1.73M2 (19 @ 03:00)    PT/INR - ( 2019 17:30 )   PT: 16.7 sec;   INR: 1.47 ratio         PTT - ( 2019 17:30 )  PTT:29.3 sec  Lactate, Blood: 2.0 mmol/L ( @ 17:45)        12-23 Chol 95 mg/dL LDL 55 mg/dL HDL 26 mg/dL Trig 69 mg/dL        CAPILLARY BLOOD GLUCOSE        12-23 [id number] 6.3    Urinalysis Basic - ( 2019 20:00 )    Color: Yellow / Appearance: Clear / S.020 / pH: x  Gluc: x / Ketone: Negative  / Bili: Negative / Urobili: Negative   Blood: x / Protein: 30 mg/dL / Nitrite: Negative   Leuk Esterase: Trace / RBC: Negative /HPF / WBC 0-2 /HPF   Sq Epi: x / Non Sq Epi: Neg.-Few / Bacteria: Trace /HPF          RADIOLOGY & ADDITIONAL TESTS:    Care Discussed with Consultants/Other Providers: Patient is a 84y old  Male who presents with a chief complaint of Anemia and Leukocytosis (2019 18:57)      Patient seen and examined at bedside.    ALLERGIES:  No Known Allergies    MEDICATIONS:  acetaminophen    Suspension .. 650 milliGRAM(s) Oral every 6 hours PRN  allopurinol 100 milliGRAM(s) Oral daily  amLODIPine   Tablet 10 milliGRAM(s) Oral daily  aspirin enteric coated 81 milliGRAM(s) Oral daily  atorvastatin 10 milliGRAM(s) Oral at bedtime  enoxaparin Injectable 40 milliGRAM(s) SubCutaneous daily  folic acid 1 milliGRAM(s) Oral daily  furosemide    Tablet 20 milliGRAM(s) Oral once  levETIRAcetam 500 milliGRAM(s) Oral two times a day  magnesium oxide 400 milliGRAM(s) Oral three times a day with meals  metoprolol tartrate 25 milliGRAM(s) Oral every 12 hours  multivitamin 1 Tablet(s) Oral daily  pantoprazole    Tablet 40 milliGRAM(s) Oral before breakfast  piperacillin/tazobactam IVPB. 3.375 Gram(s) IV Intermittent every 8 hours  polyethylene glycol/electrolyte Solution 1000 milliLiter(s) Oral <User Schedule>  polyethylene glycol/electrolyte Solution 1000 milliLiter(s) Oral <User Schedule>  tamsulosin 0.4 milliGRAM(s) Oral at bedtime    Vital Signs Last 24 Hrs  T(F): 98.3 (2019 06:56), Max: 98.3 (2019 06:31)  HR: 75 (2019 06:56) (75 - 115)  BP: 127/55 (2019 06:56) (110/60 - 135/73)  RR: 16 (2019 06:56) (15 - 17)  SpO2: 95% (2019 06:56) (95% - 97%)  I&O's Summary    2019 07:01  -  2019 07:00  --------------------------------------------------------  IN: 300 mL / OUT: 0 mL / NET: 300 mL        PHYSICAL EXAM:  General: NAD,    ENT: MMM  Neck: Supple, No JVD  Lungs: Clear to auscultation bilaterally  Cardio: RRR, S1/S2, No murmurs  Abdomen: Soft, Nontender, Nondistended; Bowel sounds present  Extremities: No cyanosis, No edema    LABS:                        6.8    15.5  )-----------( 140      ( 2019 03:00 )             20.7         142  |  108  |  30  ----------------------------<  159  3.2   |  22  |  1.43    Ca    7.6      2019 03:00  Mg     <.8         TPro  7.2  /  Alb  2.1  /  TBili  0.5  /  DBili  x   /  AST  14  /  ALT  10  /  AlkPhos  32      eGFR if Non African American: 44 mL/min/1.73M2 (19 @ 03:00)  eGFR if African American: 52 mL/min/1.73M2 (19 @ 03:00)    PT/INR - ( 2019 17:30 )   PT: 16.7 sec;   INR: 1.47 ratio         PTT - ( 2019 17:30 )  PTT:29.3 sec  Lactate, Blood: 2.0 mmol/L ( @ 17:45)        12-23 Chol 95 mg/dL LDL 55 mg/dL HDL 26 mg/dL Trig 69 mg/dL        CAPILLARY BLOOD GLUCOSE        12-23 [id number] 6.3    Urinalysis Basic - ( 2019 20:00 )    Color: Yellow / Appearance: Clear / S.020 / pH: x  Gluc: x / Ketone: Negative  / Bili: Negative / Urobili: Negative   Blood: x / Protein: 30 mg/dL / Nitrite: Negative   Leuk Esterase: Trace / RBC: Negative /HPF / WBC 0-2 /HPF   Sq Epi: x / Non Sq Epi: Neg.-Few / Bacteria: Trace /HPF          RADIOLOGY & ADDITIONAL TESTS:  < from: CT Abdomen and Pelvis No Cont (19 @ 10:17) >    IMPRESSION:     Small bilateral pleural effusions.  5 mm distal right ureteral calculus.  Mild right hydroureteronephrosis.  Additional bilateral nonobstructive nephrolithiasis.  Colonic diverticulosis without CT evidence of acute diverticulitis.  Focal infrarenal abdominal aortic aneurysm (3.2 cm).                    MARTHA GUILLORY M.D., ATTENDING RADIOLOGIST  This document has been electronically signed. 2019 11:00AM        < end of copied text >    Care Discussed with Consultants/Other Providers:

## 2019-02-28 NOTE — CONSULT NOTE ADULT - ATTENDING COMMENTS
Consultant notes reviewed : YES [x ] ; NO [ ]
Patient seen and examined with Alisia Cheung NP.  Agree with assessment and plan as above.    Elderly male, well-known to me, recently admitted with anemia and suspected GI bleeding and underwent EGD which was unrevealing; now admitted with worsening anemia.  VSS. Abdomen soft, nontender BS+    Plan: Colonoscopy tomorrow

## 2019-03-01 LAB
ANION GAP SERPL CALC-SCNC: 15 MMOL/L — SIGNIFICANT CHANGE UP (ref 5–17)
BASOPHILS # BLD AUTO: 0.1 K/UL — SIGNIFICANT CHANGE UP (ref 0–0.2)
BASOPHILS NFR BLD AUTO: 0.9 % — SIGNIFICANT CHANGE UP (ref 0–2)
BUN SERPL-MCNC: 20 MG/DL — SIGNIFICANT CHANGE UP (ref 7–23)
CALCIUM SERPL-MCNC: 8.4 MG/DL — SIGNIFICANT CHANGE UP (ref 8.4–10.5)
CHLORIDE SERPL-SCNC: 112 MMOL/L — HIGH (ref 96–108)
CO2 SERPL-SCNC: 18 MMOL/L — LOW (ref 22–31)
CREAT SERPL-MCNC: 1.14 MG/DL — SIGNIFICANT CHANGE UP (ref 0.5–1.3)
CULTURE RESULTS: NO GROWTH — SIGNIFICANT CHANGE UP
EOSINOPHIL # BLD AUTO: 0.1 K/UL — SIGNIFICANT CHANGE UP (ref 0–0.5)
EOSINOPHIL NFR BLD AUTO: 0.6 % — SIGNIFICANT CHANGE UP (ref 0–6)
FOLATE SERPL-MCNC: >20 NG/ML — SIGNIFICANT CHANGE UP
GLUCOSE SERPL-MCNC: 162 MG/DL — HIGH (ref 70–99)
HAPTOGLOB SERPL-MCNC: 327 MG/DL — HIGH (ref 34–200)
HCT VFR BLD CALC: 25.6 % — LOW (ref 39–50)
HGB BLD-MCNC: 7.7 G/DL — LOW (ref 13–17)
INTERPRETATION SERPL IFE-IMP: SIGNIFICANT CHANGE UP
LYMPHOCYTES # BLD AUTO: 1.2 K/UL — SIGNIFICANT CHANGE UP (ref 1–3.3)
LYMPHOCYTES # BLD AUTO: 12.9 % — LOW (ref 13–44)
MAGNESIUM SERPL-MCNC: 1.5 MG/DL — LOW (ref 1.6–2.6)
MCHC RBC-ENTMCNC: 30.2 GM/DL — LOW (ref 32–36)
MCHC RBC-ENTMCNC: 30.9 PG — SIGNIFICANT CHANGE UP (ref 27–34)
MCV RBC AUTO: 102.2 FL — HIGH (ref 80–100)
MONOCYTES # BLD AUTO: 2.4 K/UL — HIGH (ref 0–0.9)
MONOCYTES NFR BLD AUTO: 25.6 % — HIGH (ref 1–9)
NEUTROPHILS # BLD AUTO: 5.5 K/UL — SIGNIFICANT CHANGE UP (ref 1.8–7.4)
NEUTROPHILS NFR BLD AUTO: 59.9 % — SIGNIFICANT CHANGE UP (ref 43–77)
PLATELET # BLD AUTO: 159 K/UL — SIGNIFICANT CHANGE UP (ref 150–400)
POTASSIUM SERPL-MCNC: 3.9 MMOL/L — SIGNIFICANT CHANGE UP (ref 3.5–5.3)
POTASSIUM SERPL-SCNC: 3.9 MMOL/L — SIGNIFICANT CHANGE UP (ref 3.5–5.3)
RBC # BLD: 2.51 M/UL — LOW (ref 4.2–5.8)
RBC # FLD: 20.8 % — HIGH (ref 10.3–14.5)
SODIUM SERPL-SCNC: 145 MMOL/L — SIGNIFICANT CHANGE UP (ref 135–145)
SPECIMEN SOURCE: SIGNIFICANT CHANGE UP
VIT B12 SERPL-MCNC: 871 PG/ML — SIGNIFICANT CHANGE UP (ref 232–1245)
WBC # BLD: 9.2 K/UL — SIGNIFICANT CHANGE UP (ref 3.8–10.5)
WBC # FLD AUTO: 9.2 K/UL — SIGNIFICANT CHANGE UP (ref 3.8–10.5)

## 2019-03-01 PROCEDURE — 99232 SBSQ HOSP IP/OBS MODERATE 35: CPT

## 2019-03-01 PROCEDURE — 99233 SBSQ HOSP IP/OBS HIGH 50: CPT

## 2019-03-01 RX ORDER — MAGNESIUM SULFATE 500 MG/ML
2 VIAL (ML) INJECTION ONCE
Qty: 0 | Refills: 0 | Status: DISCONTINUED | OUTPATIENT
Start: 2019-03-01 | End: 2019-03-01

## 2019-03-01 RX ORDER — AMLODIPINE BESYLATE 2.5 MG/1
10 TABLET ORAL DAILY
Qty: 0 | Refills: 0 | Status: DISCONTINUED | OUTPATIENT
Start: 2019-03-01 | End: 2019-03-06

## 2019-03-01 RX ORDER — ALLOPURINOL 300 MG
100 TABLET ORAL DAILY
Qty: 0 | Refills: 0 | Status: DISCONTINUED | OUTPATIENT
Start: 2019-03-01 | End: 2019-03-01

## 2019-03-01 RX ORDER — ENOXAPARIN SODIUM 100 MG/ML
40 INJECTION SUBCUTANEOUS DAILY
Qty: 0 | Refills: 0 | Status: DISCONTINUED | OUTPATIENT
Start: 2019-03-01 | End: 2019-03-06

## 2019-03-01 RX ORDER — METOPROLOL TARTRATE 50 MG
25 TABLET ORAL EVERY 12 HOURS
Qty: 0 | Refills: 0 | Status: DISCONTINUED | OUTPATIENT
Start: 2019-03-01 | End: 2019-03-01

## 2019-03-01 RX ORDER — LEVETIRACETAM 250 MG/1
500 TABLET, FILM COATED ORAL
Qty: 0 | Refills: 0 | Status: DISCONTINUED | OUTPATIENT
Start: 2019-03-01 | End: 2019-03-06

## 2019-03-01 RX ORDER — ASPIRIN/CALCIUM CARB/MAGNESIUM 324 MG
81 TABLET ORAL DAILY
Qty: 0 | Refills: 0 | Status: DISCONTINUED | OUTPATIENT
Start: 2019-03-01 | End: 2019-03-06

## 2019-03-01 RX ORDER — FOLIC ACID 0.8 MG
1 TABLET ORAL DAILY
Qty: 0 | Refills: 0 | Status: DISCONTINUED | OUTPATIENT
Start: 2019-03-01 | End: 2019-03-06

## 2019-03-01 RX ORDER — METOPROLOL TARTRATE 50 MG
25 TABLET ORAL EVERY 12 HOURS
Qty: 0 | Refills: 0 | Status: DISCONTINUED | OUTPATIENT
Start: 2019-03-01 | End: 2019-03-06

## 2019-03-01 RX ORDER — AMLODIPINE BESYLATE 2.5 MG/1
10 TABLET ORAL DAILY
Qty: 0 | Refills: 0 | Status: DISCONTINUED | OUTPATIENT
Start: 2019-03-01 | End: 2019-03-01

## 2019-03-01 RX ORDER — MAGNESIUM SULFATE 500 MG/ML
1 VIAL (ML) INJECTION
Qty: 0 | Refills: 0 | Status: COMPLETED | OUTPATIENT
Start: 2019-03-01 | End: 2019-03-01

## 2019-03-01 RX ORDER — INDOMETHACIN 50 MG
25 CAPSULE ORAL
Qty: 0 | Refills: 0 | Status: DISCONTINUED | OUTPATIENT
Start: 2019-03-01 | End: 2019-03-06

## 2019-03-01 RX ORDER — PANTOPRAZOLE SODIUM 20 MG/1
40 TABLET, DELAYED RELEASE ORAL
Qty: 0 | Refills: 0 | Status: DISCONTINUED | OUTPATIENT
Start: 2019-03-01 | End: 2019-03-01

## 2019-03-01 RX ORDER — ATORVASTATIN CALCIUM 80 MG/1
10 TABLET, FILM COATED ORAL AT BEDTIME
Qty: 0 | Refills: 0 | Status: DISCONTINUED | OUTPATIENT
Start: 2019-03-01 | End: 2019-03-06

## 2019-03-01 RX ORDER — PANTOPRAZOLE SODIUM 20 MG/1
40 TABLET, DELAYED RELEASE ORAL
Qty: 0 | Refills: 0 | Status: DISCONTINUED | OUTPATIENT
Start: 2019-03-01 | End: 2019-03-06

## 2019-03-01 RX ORDER — ACETAMINOPHEN 500 MG
650 TABLET ORAL EVERY 6 HOURS
Qty: 0 | Refills: 0 | Status: DISCONTINUED | OUTPATIENT
Start: 2019-03-01 | End: 2019-03-06

## 2019-03-01 RX ORDER — ATORVASTATIN CALCIUM 80 MG/1
10 TABLET, FILM COATED ORAL AT BEDTIME
Qty: 0 | Refills: 0 | Status: DISCONTINUED | OUTPATIENT
Start: 2019-03-01 | End: 2019-03-01

## 2019-03-01 RX ORDER — TAMSULOSIN HYDROCHLORIDE 0.4 MG/1
0.4 CAPSULE ORAL AT BEDTIME
Qty: 0 | Refills: 0 | Status: DISCONTINUED | OUTPATIENT
Start: 2019-03-01 | End: 2019-03-06

## 2019-03-01 RX ORDER — ALLOPURINOL 300 MG
100 TABLET ORAL DAILY
Qty: 0 | Refills: 0 | Status: DISCONTINUED | OUTPATIENT
Start: 2019-03-01 | End: 2019-03-06

## 2019-03-01 RX ADMIN — Medication 1 MILLIGRAM(S): at 17:56

## 2019-03-01 RX ADMIN — AMLODIPINE BESYLATE 10 MILLIGRAM(S): 2.5 TABLET ORAL at 17:56

## 2019-03-01 RX ADMIN — Medication 100 MILLIGRAM(S): at 17:56

## 2019-03-01 RX ADMIN — Medication 25 MILLIGRAM(S): at 06:17

## 2019-03-01 RX ADMIN — Medication 25 MILLIGRAM(S): at 18:54

## 2019-03-01 RX ADMIN — Medication 100 GRAM(S): at 17:57

## 2019-03-01 RX ADMIN — TAMSULOSIN HYDROCHLORIDE 0.4 MILLIGRAM(S): 0.4 CAPSULE ORAL at 22:45

## 2019-03-01 RX ADMIN — PANTOPRAZOLE SODIUM 40 MILLIGRAM(S): 20 TABLET, DELAYED RELEASE ORAL at 18:54

## 2019-03-01 RX ADMIN — LEVETIRACETAM 500 MILLIGRAM(S): 250 TABLET, FILM COATED ORAL at 18:54

## 2019-03-01 RX ADMIN — Medication 100 GRAM(S): at 10:29

## 2019-03-01 RX ADMIN — Medication 25 MILLIGRAM(S): at 16:35

## 2019-03-01 RX ADMIN — ENOXAPARIN SODIUM 40 MILLIGRAM(S): 100 INJECTION SUBCUTANEOUS at 17:56

## 2019-03-01 RX ADMIN — Medication 1 TABLET(S): at 17:56

## 2019-03-01 RX ADMIN — ATORVASTATIN CALCIUM 10 MILLIGRAM(S): 80 TABLET, FILM COATED ORAL at 22:44

## 2019-03-01 RX ADMIN — Medication 650 MILLIGRAM(S): at 18:54

## 2019-03-01 RX ADMIN — AMLODIPINE BESYLATE 10 MILLIGRAM(S): 2.5 TABLET ORAL at 06:17

## 2019-03-01 RX ADMIN — Medication 81 MILLIGRAM(S): at 17:56

## 2019-03-01 RX ADMIN — LEVETIRACETAM 500 MILLIGRAM(S): 250 TABLET, FILM COATED ORAL at 06:17

## 2019-03-01 RX ADMIN — PANTOPRAZOLE SODIUM 40 MILLIGRAM(S): 20 TABLET, DELAYED RELEASE ORAL at 06:17

## 2019-03-01 NOTE — PROGRESS NOTE ADULT - PROBLEM SELECTOR PLAN 1
84-year-old gentleman with history of stomach cancer and chronic anemia who was admitted with anemia exacerbation. Chronic kidney disease contributes to his anemia, however, GI bleed noted recently. Macrocytosis also noted-h/o ETOH may contribute, and vitamin B12/folate levels pending. However, suspect underlying bone marrow disorder such as myelodysplasia (discussed with patient). Increased monocyte % noted-?CMMoL. Discussed BM evaluation to further evaluate. Patient and Gene expressed their understanding of recommended f/u regarding this issue. Patient stated he wished to defer any further discussion of this at this time. They wish to confer with patient's physician in [facility] first.  Would monitor CBC with diff. T/C transfusion PRBC's if hemoglobin <7/increased symptoms.

## 2019-03-01 NOTE — PROGRESS NOTE ADULT - SUBJECTIVE AND OBJECTIVE BOX
Patient is a 84y old  Male who presents with a chief complaint of Anemia and Leukocytosis (2019 09:50)      Patient seen and examined at bedside.    ALLERGIES:  No Known Allergies    MEDICATIONS:  acetaminophen    Suspension .. 650 milliGRAM(s) Oral every 6 hours PRN  allopurinol 100 milliGRAM(s) Oral daily  amLODIPine   Tablet 10 milliGRAM(s) Oral daily  aspirin enteric coated 81 milliGRAM(s) Oral daily  atorvastatin 10 milliGRAM(s) Oral at bedtime  enoxaparin Injectable 40 milliGRAM(s) SubCutaneous daily  folic acid 1 milliGRAM(s) Oral daily  indomethacin 25 milliGRAM(s) Oral every 12 hours PRN  levETIRAcetam 500 milliGRAM(s) Oral two times a day  metoprolol tartrate 25 milliGRAM(s) Oral every 12 hours  multivitamin 1 Tablet(s) Oral daily  pantoprazole    Tablet 40 milliGRAM(s) Oral before breakfast  tamsulosin 0.4 milliGRAM(s) Oral at bedtime    Vital Signs Last 24 Hrs  T(F): 98.2 (01 Mar 2019 06:13), Max: 98.5 (2019 21:06)  HR: 82 (01 Mar 2019 06:13) (71 - 86)  BP: 129/72 (01 Mar 2019 06:13) (104/56 - 129/72)  RR: 15 (01 Mar 2019 06:13) (14 - 16)  SpO2: 98% (01 Mar 2019 06:13) (97% - 99%)  I&O's Summary    2019 07:01  -  01 Mar 2019 07:00  --------------------------------------------------------  IN: 1000 mL / OUT: 300 mL / NET: 700 mL        PHYSICAL EXAM:  General: NAD,   ENT: MMM  Neck: Supple, No JVD  Lungs: Clear to auscultation bilaterally  Cardio: RRR, S1/S2, No murmurs  Abdomen: Soft, Nontender, Nondistended; Bowel sounds present  Extremities: No cyanosis, No edema    LABS:                        7.7    9.2   )-----------( 159      ( 01 Mar 2019 06:32 )             25.6         145  |  112  |  20  ----------------------------<  162  3.9   |  18  |  1.14    Ca    8.4      01 Mar 2019 06:32  Phos  3.4     0228  Mg     1.5         TPro  7.2  /  Alb  2.1  /  TBili  0.5  /  DBili  x   /  AST  14  /  ALT  10  /  AlkPhos  32      eGFR if Non African American: 59 mL/min/1.73M2 (19 @ 06:32)  eGFR if : 68 mL/min/1.73M2 (19 @ 06:32)    PT/INR - ( 2019 17:30 )   PT: 16.7 sec;   INR: 1.47 ratio         PTT - ( 2019 17:30 )  PTT:29.3 sec  Lactate, Blood: 2.0 mmol/L ( @ 17:45)         Chol 95 mg/dL LDL 55 mg/dL HDL 26 mg/dL Trig 69 mg/dL        CAPILLARY BLOOD GLUCOSE         [id number] 6.3    Urinalysis Basic - ( 2019 20:00 )    Color: Yellow / Appearance: Clear / S.020 / pH: x  Gluc: x / Ketone: Negative  / Bili: Negative / Urobili: Negative   Blood: x / Protein: 30 mg/dL / Nitrite: Negative   Leuk Esterase: Trace / RBC: Negative /HPF / WBC 0-2 /HPF   Sq Epi: x / Non Sq Epi: Neg.-Few / Bacteria: Trace /HPF        Culture - Urine (collected 2019 20:00)  Source: .Urine Clean Catch (Midstream)  Final Report (01 Mar 2019 02:56):    No growth    Culture - Blood (collected 2019 17:40)  Source: .Blood Blood-Peripheral  Preliminary Report (2019 20:01):    No growth to date.    Culture - Blood (collected 2019 17:40)  Source: .Blood Blood-Peripheral  Preliminary Report (2019 20:01):    No growth to date.        RADIOLOGY & ADDITIONAL TESTS:    Care Discussed with Consultants/Other Providers:

## 2019-03-01 NOTE — PROGRESS NOTE ADULT - SUBJECTIVE AND OBJECTIVE BOX
YUE KING   84y   Male    Admitting: SIDRA Silva  HPI:  84-year-old gentleman with history of anemia, atrial fibrillation and seizure disorder admitted with anemia exacerbation from his [place] facility. Patient notes recent history of bright red blood per rectum. He was scheduled for outpatient colonoscopy this coming Monday. On admission, found to have rectal temp of 100.7°F in the emergency department.  Patient reports he stopped drinking alcohol approximately 3 months prior to admission.    PAST MEDICAL & SURGICAL HISTORY:  Nonrheumatic aortic valve stenosis  Cancer of stomach  Anemia, unspecified type  Seizure disorder  Benign prostatic hyperplasia, unspecified whether lower urinary tract symptoms present  Hyperlipemia  Essential hypertension  Chronic atrial fibrillation  Stomach cancer  Hypertension  Atrial fibrillation  Gout    HEALTH ISSUES - PROBLEM Dx:  Anemia: Anemia  Hypokalemia: Hypokalemia  Chronic gout without tophus, unspecified cause, unspecified site: Chronic gout without tophus, unspecified cause, unspecified site  Atrial fibrillation, unspecified type: Atrial fibrillation, unspecified type  Benign prostatic hyperplasia, unspecified whether lower urinary tract symptoms present: Benign prostatic hyperplasia, unspecified whether lower urinary tract symptoms present  Hyperlipidemia, unspecified hyperlipidemia type: Hyperlipidemia, unspecified hyperlipidemia type  Seizure disorder: Seizure disorder  Essential hypertension: Essential hypertension  Fever of unknown origin: Fever of unknown origin  Anemia, unspecified type: Anemia, unspecified type    MEDICATIONS  (STANDING):  magnesium sulfate  IVPB 1 Gram(s) IV Intermittent every 1 hour    MEDICATIONS  (PRN):    Allergies    No Known Allergies    INTERVAL HPI/OVERNIGHT EVENTS:  Patient S&E at bedside. S/P colonoscopy-resting comfortably at present. Health care proxy Gene at bedside.    VITAL SIGNS:  T(F): 98.2 (03-01-19 @ 11:56)  HR: 82 (03-01-19 @ 11:56)  BP: 129/72 (03-01-19 @ 11:56)  RR: 15 (03-01-19 @ 11:56)  SpO2: 98% (03-01-19 @ 11:56)    PHYSICAL EXAM:  GENERAL: NAD, well-developed  Oropharynx-poor dentition  HEAD:  Atraumatic, Normocephalic  EYES: EOMI, PERRLA, conjunctiva and sclera clear  NECK: Supple, No JVD  CHEST/LUNG: decreased BS bases ant.  HEART: Regular rate and rhythm  ABDOMEN: Soft, Nontender, Nondistended; +BS  EXTREMITIES:  no calf tenderness  NEUROLOGY: awake, alert  SKIN: No vesicles    Labs:             7.7    9.2   )-----------( 159      ( 03-01 @ 06:32 )             25.6                7.9    x     )-----------( x        ( 02-28 @ 12:13 )             24.6                6.8    15.5  )-----------( 140      ( 02-28 @ 03:00 )             20.7                6.8    22.5  )-----------( 176      ( 02-27 @ 17:30 )             21.6       03-01    145  |  112<H>  |  20  ----------------------------<  162<H>  3.9   |  18<L>  |  1.14    Ca    8.4      01 Mar 2019 06:32  Phos  3.4     02-28  Mg     1.5     03-01    TPro  7.2  /  Alb  2.1<L>  /  TBili  0.5  /  DBili  x   /  AST  14  /  ALT  10  /  AlkPhos  32<L>  02-27    PT/INR - ( 27 Feb 2019 17:30 )   PT: 16.7 sec;   INR: 1.47 ratio         PTT - ( 27 Feb 2019 17:30 )  PTT:29.3 sec  Vitamin B12, Serum: 871 pg/mL (03-01-19 @ 06:32)  Folate, Serum: >20.0 ng/mL (03-01-19 @ 06:32)    Occult Blood, Feces: Negative (02-28-19 @ 20:00)  Occult Blood, Feces: Negative (02-27-19 @ 17:30)    Culture - Urine (collected 27 Feb 2019 20:00)  Source: .Urine Clean Catch (Midstream)  Final Report (01 Mar 2019 02:56):    No growth    Culture - Blood (collected 27 Feb 2019 17:40)  Source: .Blood Blood-Peripheral  Preliminary Report (28 Feb 2019 20:01):    No growth to date.    Culture - Blood (collected 27 Feb 2019 17:40)  Source: .Blood Blood-Peripheral  Preliminary Report (28 Feb 2019 20:01):    No growth to date.        RADIOLOGY & ADDITIONAL TESTS:    < from: CT Abdomen and Pelvis No Cont (02.28.19 @ 10:17) >    EXAM:  CT ABDOMEN AND PELVIS      PROCEDURE DATE:  02/28/2019        INTERPRETATION:  CLINICAL INFORMATION: Anemia         COMPARISON: 12/22/2018    PROCEDURE:   CT of the Abdomen and Pelvis was performed without intravenous contrast.   Intravenouscontrast: None.  Oral contrast: None.  Sagittal and coronal reformats were performed.    FINDINGS:    LOWER CHEST: Small bilateral pleural effusions. Basilar atelectasis.   Enlarged heart with coronary artery and aortic valve calcifications.   There is a hyperdense intraventricular septum, which can be seen in the   setting of anemia.     Please note that evaluation of the abdominal organs and vascular   structures is limited by lack of intravenous contrast.    LIVER: Within normal limits.  BILE DUCTS: Normal caliber.  GALLBLADDER: Sludge and/or gallstones.  SPLEEN: Within normal limits.  PANCREAS: Atrophic.  ADRENALS: Within normal limits.  KIDNEYS/URETERS: Bilateral hypodense renal lesions, possibly cysts. 5 mm   distal right ureteral calculus. Mild right hydroureteronephrosis.   Additional bilateral nonobstructive renal calculi measuring up to 8 mm in   the right kidney and 2 mm in the left kidney. Bilateral perinephric   stranding and fluid.    BLADDER: Within normal limits.  REPRODUCTIVE ORGANS: Prostate is enlarged.    BOWEL: No bowel obstruction. Appendix not visualized. Colonic   diverticulosis. Sigmoid colon wall thickening, possibly chronic.  PERITONEUM: No ascites.  VESSELS:  Atherosclerotic calcifications. Focal infrarenalabdominal   aortic aneurysm measuring 3.2 cm.  RETROPERITONEUM: Prominent lymph nodes.    ABDOMINAL WALL: Subcutaneous soft tissue edema.  BONES: Hip and spine degenerative changes.    IMPRESSION:     Small bilateral pleural effusions.  5 mm distal right ureteral calculus.  Mild right hydroureteronephrosis.  Additional bilateral nonobstructive nephrolithiasis.  Colonic diverticulosis without CT evidence of acute diverticulitis.  Focal infrarenal abdominal aortic aneurysm (3.2 cm).      MARTHA GUILLORY M.D., ATTENDING RADIOLOGIST  This document has been electronically signed. Feb 28 2019 11:00AM    < end of copied text >

## 2019-03-01 NOTE — PROGRESS NOTE ADULT - ASSESSMENT
85 yo m admitted 2-27-19 with pmh anemia, chronic afib on no a/c, bph, essential htn, dyslipidemia, seizure d/o presents with acute blood loss anemia.    1. acute blood loss anemia: h/h stable s/p transfusion. colonoscopy today  2. leukocytosis: resolved  3. hypokalemia: improved   4. essential htn: stable c/w home meds  5. hypomagnesemia: replete  6. bph: stable c/w flomax  7. seizure disorder: c/w keppra  8. chronic afib: rate controlled on lopressor no a/c due to bleeding risk  9. chronic gout: no flare at this time c/w allopurinol. patient requesting indomethacin prn aware of risk and benefits regarding kidney function becoming elevated. will start indomethacin 25mg po q12 prn.    10. ckd stage 3: baseline   11. dvt ppx: lovenox

## 2019-03-01 NOTE — PROGRESS NOTE ADULT - ASSESSMENT
84-year-old gentleman with history of anemia, atrial fibrillation and seizure disorder admitted with anemia exacerbation from his [place] facility. Patient notes recent history of bright red blood per rectum. He was scheduled for outpatient colonoscopy this coming Monday. On admission, found to have rectal temp of 100.7°F in the emergency department.  Patient reports he stopped drinking alcohol approximately 3 months prior to admission

## 2019-03-02 LAB
ANION GAP SERPL CALC-SCNC: 11 MMOL/L — SIGNIFICANT CHANGE UP (ref 5–17)
BUN SERPL-MCNC: 18 MG/DL — SIGNIFICANT CHANGE UP (ref 7–23)
CALCIUM SERPL-MCNC: 8.5 MG/DL — SIGNIFICANT CHANGE UP (ref 8.4–10.5)
CHLORIDE SERPL-SCNC: 109 MMOL/L — HIGH (ref 96–108)
CO2 SERPL-SCNC: 20 MMOL/L — LOW (ref 22–31)
CREAT SERPL-MCNC: 1.04 MG/DL — SIGNIFICANT CHANGE UP (ref 0.5–1.3)
FOLATE SERPL-MCNC: >20 NG/ML — SIGNIFICANT CHANGE UP
GLUCOSE SERPL-MCNC: 160 MG/DL — HIGH (ref 70–99)
HCT VFR BLD CALC: 25.5 % — LOW (ref 39–50)
HCYS SERPL-MCNC: 9 UMOL/L — SIGNIFICANT CHANGE UP
HGB BLD-MCNC: 7.6 G/DL — LOW (ref 13–17)
MAGNESIUM SERPL-MCNC: 1.7 MG/DL — SIGNIFICANT CHANGE UP (ref 1.6–2.6)
MCHC RBC-ENTMCNC: 30 GM/DL — LOW (ref 32–36)
MCHC RBC-ENTMCNC: 31 PG — SIGNIFICANT CHANGE UP (ref 27–34)
MCV RBC AUTO: 103.5 FL — HIGH (ref 80–100)
PHOSPHATE SERPL-MCNC: 2.9 MG/DL — SIGNIFICANT CHANGE UP (ref 2.5–4.5)
PLATELET # BLD AUTO: 158 K/UL — SIGNIFICANT CHANGE UP (ref 150–400)
POTASSIUM SERPL-MCNC: 4 MMOL/L — SIGNIFICANT CHANGE UP (ref 3.5–5.3)
POTASSIUM SERPL-SCNC: 4 MMOL/L — SIGNIFICANT CHANGE UP (ref 3.5–5.3)
RBC # BLD: 2.46 M/UL — LOW (ref 4.2–5.8)
RBC # FLD: 20.7 % — HIGH (ref 10.3–14.5)
SODIUM SERPL-SCNC: 140 MMOL/L — SIGNIFICANT CHANGE UP (ref 135–145)
VIT B12 SERPL-MCNC: 836 PG/ML — SIGNIFICANT CHANGE UP (ref 232–1245)
WBC # BLD: 9.4 K/UL — SIGNIFICANT CHANGE UP (ref 3.8–10.5)
WBC # FLD AUTO: 9.4 K/UL — SIGNIFICANT CHANGE UP (ref 3.8–10.5)

## 2019-03-02 PROCEDURE — 99232 SBSQ HOSP IP/OBS MODERATE 35: CPT

## 2019-03-02 RX ORDER — MAGNESIUM OXIDE 400 MG ORAL TABLET 241.3 MG
800 TABLET ORAL EVERY 12 HOURS
Qty: 0 | Refills: 0 | Status: DISCONTINUED | OUTPATIENT
Start: 2019-03-02 | End: 2019-03-02

## 2019-03-02 RX ORDER — MAGNESIUM OXIDE 400 MG ORAL TABLET 241.3 MG
400 TABLET ORAL EVERY 12 HOURS
Qty: 0 | Refills: 0 | Status: DISCONTINUED | OUTPATIENT
Start: 2019-03-02 | End: 2019-03-03

## 2019-03-02 RX ADMIN — Medication 25 MILLIGRAM(S): at 19:33

## 2019-03-02 RX ADMIN — Medication 650 MILLIGRAM(S): at 03:41

## 2019-03-02 RX ADMIN — TAMSULOSIN HYDROCHLORIDE 0.4 MILLIGRAM(S): 0.4 CAPSULE ORAL at 22:05

## 2019-03-02 RX ADMIN — Medication 25 MILLIGRAM(S): at 18:30

## 2019-03-02 RX ADMIN — Medication 25 MILLIGRAM(S): at 20:25

## 2019-03-02 RX ADMIN — ATORVASTATIN CALCIUM 10 MILLIGRAM(S): 80 TABLET, FILM COATED ORAL at 22:05

## 2019-03-02 RX ADMIN — Medication 25 MILLIGRAM(S): at 17:44

## 2019-03-02 RX ADMIN — Medication 25 MILLIGRAM(S): at 07:38

## 2019-03-02 RX ADMIN — Medication 25 MILLIGRAM(S): at 07:41

## 2019-03-02 RX ADMIN — Medication 81 MILLIGRAM(S): at 11:16

## 2019-03-02 RX ADMIN — Medication 100 MILLIGRAM(S): at 11:16

## 2019-03-02 RX ADMIN — Medication 1 MILLIGRAM(S): at 11:16

## 2019-03-02 RX ADMIN — Medication 650 MILLIGRAM(S): at 03:00

## 2019-03-02 RX ADMIN — ENOXAPARIN SODIUM 40 MILLIGRAM(S): 100 INJECTION SUBCUTANEOUS at 11:45

## 2019-03-02 RX ADMIN — LEVETIRACETAM 500 MILLIGRAM(S): 250 TABLET, FILM COATED ORAL at 17:44

## 2019-03-02 RX ADMIN — AMLODIPINE BESYLATE 10 MILLIGRAM(S): 2.5 TABLET ORAL at 07:40

## 2019-03-02 RX ADMIN — LEVETIRACETAM 500 MILLIGRAM(S): 250 TABLET, FILM COATED ORAL at 07:40

## 2019-03-02 RX ADMIN — Medication 25 MILLIGRAM(S): at 07:45

## 2019-03-02 RX ADMIN — MAGNESIUM OXIDE 400 MG ORAL TABLET 400 MILLIGRAM(S): 241.3 TABLET ORAL at 17:44

## 2019-03-02 RX ADMIN — PANTOPRAZOLE SODIUM 40 MILLIGRAM(S): 20 TABLET, DELAYED RELEASE ORAL at 07:14

## 2019-03-02 NOTE — PROGRESS NOTE ADULT - SUBJECTIVE AND OBJECTIVE BOX
Subjective  Patient comfortable  Talkative  Offers no complaints for me today  Objective  02-96-92-BP-135/75  Gen-NAD  Pulm-Clear  CVS-Pulse, Irregular, No murmurs  Abdo-Soft  Ext-2+ edema BL lower extremities                      7.6    9.4   )-----------( 158              25.5     140  |  109  |  18  ----------------------------<  160  4.0   |  20  |  1.04  Ma.6 yesterday  Assessment and plan  84M from home HTN CKD3 Afib Gout HO Stomach CA, Hemorrhoids, Diverticulosis  Admit Anemia, SP Colonoscopy  1)Anemia, Macrocytic  Upper GI and lower GI workup thus far insignificant  Capsule endoscopy will be the next modality  However, patients anemia is macrocytic, Normal B12/Folate/MMA  Homocystein. Bone marrow asp is recommended by heme onc.  Dispo pending arrangement for bone marrow aspiration   2)Fever  No fevers reported. No evidence of infection at this moment  Monitor off ABX  3)CKD3  Creatinine normal today, however prior evidence of renal disease.  Perhaps compensated at the current moment  Will continue to avoid nephrotoxic meds.  If BP control is required, Amlodipine and Lasix would be the appropriate  Agents. Patient not on Ace/Arb inhibitor at the current moment. No documented  reason. Will send urine for protein/cre ratio

## 2019-03-03 PROCEDURE — 99232 SBSQ HOSP IP/OBS MODERATE 35: CPT

## 2019-03-03 PROCEDURE — 99222 1ST HOSP IP/OBS MODERATE 55: CPT

## 2019-03-03 RX ADMIN — Medication 25 MILLIGRAM(S): at 17:44

## 2019-03-03 RX ADMIN — LEVETIRACETAM 500 MILLIGRAM(S): 250 TABLET, FILM COATED ORAL at 06:34

## 2019-03-03 RX ADMIN — Medication 25 MILLIGRAM(S): at 23:37

## 2019-03-03 RX ADMIN — PANTOPRAZOLE SODIUM 40 MILLIGRAM(S): 20 TABLET, DELAYED RELEASE ORAL at 06:34

## 2019-03-03 RX ADMIN — TAMSULOSIN HYDROCHLORIDE 0.4 MILLIGRAM(S): 0.4 CAPSULE ORAL at 22:53

## 2019-03-03 RX ADMIN — Medication 25 MILLIGRAM(S): at 02:12

## 2019-03-03 RX ADMIN — ATORVASTATIN CALCIUM 10 MILLIGRAM(S): 80 TABLET, FILM COATED ORAL at 22:53

## 2019-03-03 RX ADMIN — Medication 1 MILLIGRAM(S): at 11:20

## 2019-03-03 RX ADMIN — Medication 25 MILLIGRAM(S): at 05:20

## 2019-03-03 RX ADMIN — Medication 25 MILLIGRAM(S): at 06:34

## 2019-03-03 RX ADMIN — AMLODIPINE BESYLATE 10 MILLIGRAM(S): 2.5 TABLET ORAL at 06:34

## 2019-03-03 RX ADMIN — Medication 100 MILLIGRAM(S): at 11:20

## 2019-03-03 RX ADMIN — Medication 650 MILLIGRAM(S): at 16:26

## 2019-03-03 RX ADMIN — Medication 81 MILLIGRAM(S): at 11:20

## 2019-03-03 RX ADMIN — MAGNESIUM OXIDE 400 MG ORAL TABLET 400 MILLIGRAM(S): 241.3 TABLET ORAL at 09:21

## 2019-03-03 RX ADMIN — Medication 650 MILLIGRAM(S): at 16:56

## 2019-03-03 RX ADMIN — ENOXAPARIN SODIUM 40 MILLIGRAM(S): 100 INJECTION SUBCUTANEOUS at 11:20

## 2019-03-03 RX ADMIN — Medication 650 MILLIGRAM(S): at 23:35

## 2019-03-03 RX ADMIN — LEVETIRACETAM 500 MILLIGRAM(S): 250 TABLET, FILM COATED ORAL at 17:44

## 2019-03-03 NOTE — PROGRESS NOTE ADULT - SUBJECTIVE AND OBJECTIVE BOX
Subjective  Patient comfortable  Talkative  Offers no complaints for me today  Objective  88-73-45-BP-136/75  Gen-NAD  Pulm-Clear  CVS-Pulse, Irregular, No murmurs  Abdo-Soft  Ext-2+ edema BL lower extremities           No new labs  Assessment and plan  84M from home HTN CKD3 Afib Gout HO Stomach CA, Hemorrhoids, Diverticulosis  Admit Anemia, SP Colonoscopy  1)Anemia, Macrocytic  Discussed extensively with patient, family, GI, PCP (Lois Kileygissel [phone])  Macrocytic anemia. FOBT negative x4 times  Upper and lower GI investigations unremarkable  B12/Folate/Homocystein/MMA normal.  Next best step Bone marrow Biopsy. Discussed with Heme/Onc on call (who will inform Dr. Estevez).  If no other issues develop, Bone marrow biopsy by heme/onc tomorrow (consent by [facility])  ASA will need to be continued for AFib (discussed with heme onc)  Xarelto maybe resumed afterwards if okay with cardiology and GI (cardiology now following)  If bone marrow biopsy unrevealing, patient may continue with GI workup, which may include transfer to [place] for Capsule endoscopy or push enteroscopy/single balloon. GI aware of plan  Everyone discussed with in agreement. Antiparietal cell antibodies in AM. Repeat Mag  3)CKD3  Creatinine normal today, however prior evidence of renal disease.  Perhaps compensated at the current moment  Will continue to try and avoid nephrotoxic meds.  If BP control is required, Amlodipine and Lasix would be the appropriate  Agents. Patient not on Ace/Arb inhibitor at the current moment. No documented  reason. Will send urine for protein/cre ratio  *PCP reports patient has baseline creatinine around 1.6gm/dl    *PCP also reports history of chronic alcohol use. 2 drinks a day, but no alcoholism, never any signs of withdrawal.  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 Treated with ABX and radiation.

## 2019-03-03 NOTE — CONSULT NOTE ADULT - ASSESSMENT
84 year old man admitted for work up of anemia.  He has chronic AF and was previously on anticoagulation with Xarelto, but this has been on hold since Dec 2018 because of anemia.  History of aortic stenosis 	  No history of TIA, CVA   He has been asymptomatic with respect to cardiac issues and is hemodynamically stable.     Plan  1. agree to hold a/c for now until anemia is fully worked up... if no evidence of bleeding and no other contraindications, then favor resuming a/c  2. check echocardiogram  3. anemia workup in progress - bone marrow biopsy planned for tomorrow

## 2019-03-03 NOTE — CONSULT NOTE ADULT - SUBJECTIVE AND OBJECTIVE BOX
[facility] Consultation    CHIEF COMPLAINT: Patient is a 84y old  Male who presents with a chief complaint of Anemia and Leukocytosis (02 Mar 2019 09:13)  patient seen and examined  history from patient  - good reliability and review of chart      HPI:  84 male with pmh significant for anemia, gout, afib (off xarelto for about a month), bph, htn, hld, seizure d/o, presented from [facility] with abnormal labs.  Pt reports recent admission to [facility] for gout flare then discharged to rehab.  Pt now returns with guaiac negative anemia ( 6.8 ) reports episode of bright red blood in stool about three days ago.  Pt was scheduled for outpatient colonoscopy this coming Monday.  Pt also found with leukocytosis also found with temp 100.7 F rectally.  Denies chills, fever, sob, chest pain, weakness, dysuria. (27 Feb 2019 18:57)      PAST MEDICAL & SURGICAL HISTORY  Denies CAD, MI, CHF  Nonrheumatic aortic valve stenosis  Cancer of stomach  Anemia, unspecified type  Seizure disorder  Benign prostatic hyperplasia, unspecified whether lower urinary tract symptoms present  Hyperlipemia  Essential hypertension  Chronic atrial fibrillation  Stomach cancer  Hypertension  Atrial fibrillation  Gout  No significant past surgical history  No significant past surgical history      SOCIAL HISTORY: non smoker   FAMILY HISTORYNo pertinent family history in first degree relatives      Home Medications:  allopurinol 100 mg oral tablet: 1 tab(s) orally once a day (05 Feb 2019 15:59)  aspirin 81 mg oral delayed release tablet: 1 tab(s) orally once a day (05 Feb 2019 15:59)  atorvastatin 10 mg oral tablet: 1 tab(s) orally once a day (at bedtime) (05 Feb 2019 15:59)  Flomax 0.4 mg oral capsule: 1 cap(s) orally once a day (05 Feb 2019 15:59)  folic acid 1 mg oral tablet: 1 tab(s) orally once a day (05 Feb 2019 15:59)  levETIRAcetam 500 mg oral tablet: 1 tab(s) orally 2 times a day (05 Feb 2019 14:55)  metoprolol tartrate 25 mg oral tablet: 1 tab(s) orally every 12 hours (05 Feb 2019 15:59)  Multiple Vitamins oral tablet: 1 tab(s) orally once a day (05 Feb 2019 15:59)  NexIUM 24HR 20 mg oral delayed release capsule: 1 cap(s) orally once a day (05 Feb 2019 15:59)  Norvasc 10 mg oral tablet: 1 tab(s) orally once a day (05 Feb 2019 15:59)    MEDICATIONS  (STANDING):  allopurinol 100 milliGRAM(s) Oral daily  amLODIPine   Tablet 10 milliGRAM(s) Oral daily  aspirin enteric coated 81 milliGRAM(s) Oral daily  atorvastatin 10 milliGRAM(s) Oral at bedtime  enoxaparin Injectable 40 milliGRAM(s) SubCutaneous daily  folic acid 1 milliGRAM(s) Oral daily  levETIRAcetam 500 milliGRAM(s) Oral two times a day  magnesium oxide 400 milliGRAM(s) Oral every 12 hours  metoprolol tartrate 25 milliGRAM(s) Oral every 12 hours  pantoprazole    Tablet 40 milliGRAM(s) Oral before breakfast  tamsulosin 0.4 milliGRAM(s) Oral at bedtime    MEDICATIONS  (PRN):  acetaminophen    Suspension .. 650 milliGRAM(s) Oral every 6 hours PRN Temp greater or equal to 38C (100.4F), Mild Pain (1 - 3)  indomethacin 25 milliGRAM(s) Oral two times a day PRN Moderate Pain (4 - 6)      Allergies  No Known Allergies            REVIEW OF SYSTEMS:    CONSTITUTIONAL: feeling weak   EYES: No visual changes, No diplopia  ENMT: No throat pain , No exudate  NECK: No pain or stiffness  RESPIRATORY: No cough, wheezing, hemoptysis; No shortness of breath  CARDIOVASCULAR: No chest pain or chest pressure.  No shortness of breath or dyspnea on exertion.  No palpitations, dizziness, light headedness, syncope or near syncope.  No edema, no orthopnea.   GASTROINTESTINAL: No abdominal pain. No nausea, vomiting, or hematemesis; No diarrhea or constipation. No melena or hematochezia.  GENITOURINARY: No dysuria, frequency or hematuria  NEUROLOGICAL: No numbness or weakness  SKIN: No itching or rash  All other review of systems is negative unless indicated above    VITAL SIGNS:   Vital Signs Last 24 Hrs  T(C): 36.3 (03 Mar 2019 06:20), Max: 36.4 (02 Mar 2019 15:32)  T(F): 97.4 (03 Mar 2019 06:20), Max: 97.6 (02 Mar 2019 15:32)  HR: 85 (03 Mar 2019 06:20) (64 - 87)  BP: 136/60 (03 Mar 2019 06:20) (125/67 - 136/60)  BP(mean): --  RR: 15 (03 Mar 2019 06:20) (15 - 15)  SpO2: 97% (03 Mar 2019 06:20) (97% - 98%)    I&O's Summary    02 Mar 2019 07:01  -  03 Mar 2019 07:00  --------------------------------------------------------  IN: 1120 mL / OUT: 550 mL / NET: 570 mL        PHYSICAL EXAM:    Constitutional: NAD, awake and alert, well-developed  Eyes:  EOMI,  Pupils round, no lesions  ENMT: no exudate or erythema  Pulmonary: Non-labored, breath sounds are clear bilaterally, No wheezing, rales or rhonchi  Cardiovascular: PMI not palpable non-displaced irregular S1 and S2, ll/Vl systolic murmur  Gastrointestinal: Bowel Sounds present, soft, nontender.   Lymph: No peripheral edema. No cervical lymphadenopathy.  Neurological: Alert, no focal deficits  Skin: No rashes. Changes of chronic venous stasis. No cyanosis.  Psych:  Mood & affect appropriate    LABS: All Labs Reviewed:                        7.6    9.4   )-----------( 158      ( 02 Mar 2019 06:06 )             25.5                         7.7    9.2   )-----------( 159      ( 01 Mar 2019 06:32 )             25.6     02 Mar 2019 06:06    140    |  109    |  18     ----------------------------<  160    4.0     |  20     |  1.04   01 Mar 2019 06:32    145    |  112    |  20     ----------------------------<  162    3.9     |  18     |  1.14     Ca    8.5        02 Mar 2019 06:06  Ca    8.4        01 Mar 2019 06:32  Phos  2.9       02 Mar 2019 10:53  Mg     1.7       02 Mar 2019 10:53  Mg     1.5       01 Mar 2019 06:32    < from: 12 Lead ECG (02.27.19 @ 17:51) >  Atrial fibrillation  Right bundle branch block  Left anterior fascicular block  *** Bifascicular block ***  Abnormal ECG  When compared with ECG of 05-FEB-2019 11:44,  Nonspecific T wave abnormality now evident in Inferior leads    < end of copied text >                      RADIOLOGY:    EKG:

## 2019-03-04 ENCOUNTER — RESULT REVIEW (OUTPATIENT)
Age: 84
End: 2019-03-04

## 2019-03-04 LAB
ALBUMIN SERPL ELPH-MCNC: 1.9 G/DL — LOW (ref 3.3–5)
ALP SERPL-CCNC: 27 U/L — LOW (ref 40–120)
ALT FLD-CCNC: 17 U/L DA — SIGNIFICANT CHANGE UP (ref 10–45)
ANION GAP SERPL CALC-SCNC: 11 MMOL/L — SIGNIFICANT CHANGE UP (ref 5–17)
AST SERPL-CCNC: 20 U/L — SIGNIFICANT CHANGE UP (ref 10–40)
BILIRUB SERPL-MCNC: 0.3 MG/DL — SIGNIFICANT CHANGE UP (ref 0.2–1.2)
BUN SERPL-MCNC: 21 MG/DL — SIGNIFICANT CHANGE UP (ref 7–23)
CALCIUM SERPL-MCNC: 8.7 MG/DL — SIGNIFICANT CHANGE UP (ref 8.4–10.5)
CHLORIDE SERPL-SCNC: 107 MMOL/L — SIGNIFICANT CHANGE UP (ref 96–108)
CO2 SERPL-SCNC: 21 MMOL/L — LOW (ref 22–31)
CREAT SERPL-MCNC: 1.15 MG/DL — SIGNIFICANT CHANGE UP (ref 0.5–1.3)
CULTURE RESULTS: SIGNIFICANT CHANGE UP
CULTURE RESULTS: SIGNIFICANT CHANGE UP
GLUCOSE SERPL-MCNC: 154 MG/DL — HIGH (ref 70–99)
HCT VFR BLD CALC: 24.7 % — LOW (ref 39–50)
HGB BLD-MCNC: 7.5 G/DL — LOW (ref 13–17)
MAGNESIUM SERPL-MCNC: 1.3 MG/DL — LOW (ref 1.6–2.6)
MCHC RBC-ENTMCNC: 30.4 GM/DL — LOW (ref 32–36)
MCHC RBC-ENTMCNC: 31.2 PG — SIGNIFICANT CHANGE UP (ref 27–34)
MCV RBC AUTO: 102.8 FL — HIGH (ref 80–100)
PLATELET # BLD AUTO: 181 K/UL — SIGNIFICANT CHANGE UP (ref 150–400)
POTASSIUM SERPL-MCNC: 4.3 MMOL/L — SIGNIFICANT CHANGE UP (ref 3.5–5.3)
POTASSIUM SERPL-SCNC: 4.3 MMOL/L — SIGNIFICANT CHANGE UP (ref 3.5–5.3)
PROT SERPL-MCNC: 6.8 G/DL — SIGNIFICANT CHANGE UP (ref 6–8.3)
RBC # BLD: 2.41 M/UL — LOW (ref 4.2–5.8)
RBC # FLD: 20.3 % — HIGH (ref 10.3–14.5)
SODIUM SERPL-SCNC: 139 MMOL/L — SIGNIFICANT CHANGE UP (ref 135–145)
SPECIMEN SOURCE: SIGNIFICANT CHANGE UP
SPECIMEN SOURCE: SIGNIFICANT CHANGE UP
WBC # BLD: 8.8 K/UL — SIGNIFICANT CHANGE UP (ref 3.8–10.5)
WBC # FLD AUTO: 8.8 K/UL — SIGNIFICANT CHANGE UP (ref 3.8–10.5)

## 2019-03-04 PROCEDURE — 99233 SBSQ HOSP IP/OBS HIGH 50: CPT

## 2019-03-04 PROCEDURE — 93306 TTE W/DOPPLER COMPLETE: CPT | Mod: 26

## 2019-03-04 PROCEDURE — 99232 SBSQ HOSP IP/OBS MODERATE 35: CPT

## 2019-03-04 PROCEDURE — 38222 DX BONE MARROW BX & ASPIR: CPT

## 2019-03-04 RX ORDER — FUROSEMIDE 40 MG
40 TABLET ORAL EVERY 12 HOURS
Qty: 0 | Refills: 0 | Status: DISCONTINUED | OUTPATIENT
Start: 2019-03-04 | End: 2019-03-06

## 2019-03-04 RX ORDER — LIDOCAINE HCL 20 MG/ML
10 VIAL (ML) INJECTION ONCE
Qty: 0 | Refills: 0 | Status: COMPLETED | OUTPATIENT
Start: 2019-03-04 | End: 2019-03-04

## 2019-03-04 RX ADMIN — Medication 25 MILLIGRAM(S): at 19:36

## 2019-03-04 RX ADMIN — AMLODIPINE BESYLATE 10 MILLIGRAM(S): 2.5 TABLET ORAL at 06:15

## 2019-03-04 RX ADMIN — LEVETIRACETAM 500 MILLIGRAM(S): 250 TABLET, FILM COATED ORAL at 17:18

## 2019-03-04 RX ADMIN — Medication 1 MILLIGRAM(S): at 11:51

## 2019-03-04 RX ADMIN — TAMSULOSIN HYDROCHLORIDE 0.4 MILLIGRAM(S): 0.4 CAPSULE ORAL at 22:25

## 2019-03-04 RX ADMIN — Medication 25 MILLIGRAM(S): at 17:18

## 2019-03-04 RX ADMIN — Medication 100 MILLIGRAM(S): at 11:51

## 2019-03-04 RX ADMIN — ATORVASTATIN CALCIUM 10 MILLIGRAM(S): 80 TABLET, FILM COATED ORAL at 22:25

## 2019-03-04 RX ADMIN — Medication 25 MILLIGRAM(S): at 20:36

## 2019-03-04 RX ADMIN — Medication 25 MILLIGRAM(S): at 22:26

## 2019-03-04 RX ADMIN — Medication 10 MILLILITER(S): at 09:41

## 2019-03-04 RX ADMIN — Medication 650 MILLIGRAM(S): at 00:30

## 2019-03-04 RX ADMIN — LEVETIRACETAM 500 MILLIGRAM(S): 250 TABLET, FILM COATED ORAL at 06:15

## 2019-03-04 RX ADMIN — Medication 81 MILLIGRAM(S): at 11:51

## 2019-03-04 RX ADMIN — PANTOPRAZOLE SODIUM 40 MILLIGRAM(S): 20 TABLET, DELAYED RELEASE ORAL at 06:15

## 2019-03-04 RX ADMIN — Medication 25 MILLIGRAM(S): at 00:30

## 2019-03-04 RX ADMIN — ENOXAPARIN SODIUM 40 MILLIGRAM(S): 100 INJECTION SUBCUTANEOUS at 11:51

## 2019-03-04 RX ADMIN — Medication 25 MILLIGRAM(S): at 06:15

## 2019-03-04 RX ADMIN — Medication 40 MILLIGRAM(S): at 17:18

## 2019-03-04 NOTE — PROGRESS NOTE ADULT - PROBLEM SELECTOR PLAN 1
84-year-old gentleman with history of stomach cancer and chronic anemia who was admitted with anemia exacerbation. Chronic kidney disease contributes to his anemia, however, GI bleed noted recently. Macrocytosis also noted-h/o ETOH may contribute. However, suspect underlying bone marrow disorder such as myelodysplasia. Increased monocyte % noted-?CMMoL. Discussed BM evaluation to further evaluate-with potential benefits/side effects. Patient stated he consents to BM Aspiration/Biopsy-verbal and written consent obtained.

## 2019-03-04 NOTE — PROGRESS NOTE ADULT - SUBJECTIVE AND OBJECTIVE BOX
INTERVAL HPI/OVERNIGHT EVENTS:  HPI:  85 y/o male (known from prior admission) with PMH Afib (off xarelto), anemia, stomach cancer (25 years ago), gout, HTN, BPH who presents to ER from [facility] with drop in hemoglobin. Patient being followed by GI due to anemia. S/P Colonoscopy on 3/1/19. No source of bleeding noted. Negative FOB. S/P EGD during prior admission, and no bleeding identified. Patient denies abdomina pain, nausea, vomiting, melena, or BRBPR.     MEDICATIONS  (STANDING):  allopurinol 100 milliGRAM(s) Oral daily  amLODIPine   Tablet 10 milliGRAM(s) Oral daily  aspirin enteric coated 81 milliGRAM(s) Oral daily  atorvastatin 10 milliGRAM(s) Oral at bedtime  enoxaparin Injectable 40 milliGRAM(s) SubCutaneous daily  folic acid 1 milliGRAM(s) Oral daily  furosemide    Tablet 40 milliGRAM(s) Oral every 12 hours  levETIRAcetam 500 milliGRAM(s) Oral two times a day  metoprolol tartrate 25 milliGRAM(s) Oral every 12 hours  pantoprazole    Tablet 40 milliGRAM(s) Oral before breakfast  tamsulosin 0.4 milliGRAM(s) Oral at bedtime    MEDICATIONS  (PRN):  acetaminophen    Suspension .. 650 milliGRAM(s) Oral every 6 hours PRN Temp greater or equal to 38C (100.4F), Mild Pain (1 - 3)  indomethacin 25 milliGRAM(s) Oral two times a day PRN Moderate Pain (4 - 6)      Allergies    No Known Allergies    Intolerances        PHYSICAL EXAM:   Vital Signs:  Vital Signs Last 24 Hrs  T(C): 36.5 (04 Mar 2019 05:30), Max: 36.8 (03 Mar 2019 22:30)  T(F): 97.7 (04 Mar 2019 05:30), Max: 98.2 (03 Mar 2019 22:30)  HR: 70 (04 Mar 2019 05:30) (70 - 82)  BP: 123/65 (04 Mar 2019 05:30) (123/65 - 140/80)  BP(mean): --  RR: 15 (04 Mar 2019 05:30) (15 - 15)  SpO2: 97% (04 Mar 2019 05:30) (97% - 97%)  Daily     Daily I&O's Summary    03 Mar 2019 07:01  -  04 Mar 2019 07:00  --------------------------------------------------------  IN: 720 mL / OUT: 1560 mL / NET: -840 mL    04 Mar 2019 07:01  -  04 Mar 2019 15:34  --------------------------------------------------------  IN: 700 mL / OUT: 0 mL / NET: 700 mL    GENERAL:  Appears stated age, well-groomed, well-nourished, no distress  HEENT:  NC/AT,  conjunctivae clear and pink,   CHEST:  Full & symmetric excursion, no increased effort, breath sounds clear  HEART:  Regular rhythm, S1, S2, no murmur  ABDOMEN:  Soft, non-tender, non-distended,+ normoactive bowel sounds,  EXTEREMITIES:  +1 B/L JESSE  SKIN:  No rash, warm/dry  NEURO:  Alert, oriented,    LABS:                        7.5    8.8   )-----------( 181      ( 04 Mar 2019 06:58 )             24.7     03-04    139  |  107  |  21  ----------------------------<  154<H>  4.3   |  21<L>  |  1.15    Ca    8.7      04 Mar 2019 06:58  Mg     1.3     03-04    TPro  6.8  /  Alb  1.9<L>  /  TBili  0.3  /  DBili  x   /  AST  20  /  ALT  17  /  AlkPhos  27<L>  03-04

## 2019-03-04 NOTE — PHYSICAL THERAPY INITIAL EVALUATION ADULT - LEVEL OF INDEPENDENCE: SIT/STAND, REHAB EVAL
maximum assist (25% patients effort)/moderate assist (50% patients effort)
minimum assist (75% patients effort)

## 2019-03-04 NOTE — PROGRESS NOTE ADULT - ASSESSMENT
85 y/o male (known from prior admission) with PMH Afib (off xarelto), anemia, stomach cancer (25 years ago), gout, HTN, BPH who presents to ER from [facility] with drop in hemoglobin. Hemoglobin 6.8 and is receiving 1 unit of PRBC this morning. Patient had recently EGD 2/5/19 in recent setting of anemia and due to hx of stomach cancer, however no significant findings noted. Abdomen soft, non tender. VSS, S/P Colonoscopy 3/1/19, no bleeding noted. Discussed with patient that anemia most likely not due to GI source and Hem/Onc recommended bone biopsy for further evaluation.

## 2019-03-04 NOTE — PHYSICAL THERAPY INITIAL EVALUATION ADULT - ADDITIONAL COMMENTS
pt was at sub acute rehab prior to admit prior to that lived at home with friend and house keeper in private home 3 steps to enter and 7 to bedroom.  pt amb Independent.
Pt lives alone in an apartment with 4 MISTI with 1 rail.  Pt uses [facility] for ambulation and is independent with ADLs at home.  Pt states she also owns a RW

## 2019-03-04 NOTE — PROGRESS NOTE ADULT - ASSESSMENT
84-year-old gentleman with history of anemia, atrial fibrillation and seizure disorder admitted with anemia exacerbation from his [place] facility. Patient notes recent history of bright red blood per rectum. He was scheduled for outpatient colonoscopy.  On admission, found to have rectal temp of 100.7°F in the emergency department.  Patient reports he stopped drinking alcohol approximately 3 months prior to admission

## 2019-03-04 NOTE — PROGRESS NOTE ADULT - SUBJECTIVE AND OBJECTIVE BOX
Follow up for as /af  SUBJ: patient offers no complaints. anemia w/u in progress no cp no sob  PMH  Nonrheumatic aortic valve stenosis  Cancer of stomach  Anemia, unspecified type  Seizure disorder  Benign prostatic hyperplasia, unspecified whether lower urinary tract symptoms present  Hyperlipemia  Essential hypertension  HTN (hypertension)  Chronic atrial fibrillation  Stomach cancer  Hypertension  Atrial fibrillation  Gout      MEDICATIONS  (STANDING):  allopurinol 100 milliGRAM(s) Oral daily  amLODIPine   Tablet 10 milliGRAM(s) Oral daily  aspirin enteric coated 81 milliGRAM(s) Oral daily  atorvastatin 10 milliGRAM(s) Oral at bedtime  enoxaparin Injectable 40 milliGRAM(s) SubCutaneous daily  folic acid 1 milliGRAM(s) Oral daily  furosemide    Tablet 40 milliGRAM(s) Oral every 12 hours  levETIRAcetam 500 milliGRAM(s) Oral two times a day  metoprolol tartrate 25 milliGRAM(s) Oral every 12 hours  pantoprazole    Tablet 40 milliGRAM(s) Oral before breakfast  tamsulosin 0.4 milliGRAM(s) Oral at bedtime    MEDICATIONS  (PRN):  acetaminophen    Suspension .. 650 milliGRAM(s) Oral every 6 hours PRN Temp greater or equal to 38C (100.4F), Mild Pain (1 - 3)  indomethacin 25 milliGRAM(s) Oral two times a day PRN Moderate Pain (4 - 6)        PHYSICAL EXAM:  Vital Signs Last 24 Hrs  T(C): 36.5 (04 Mar 2019 05:30), Max: 36.8 (03 Mar 2019 22:30)  T(F): 97.7 (04 Mar 2019 05:30), Max: 98.2 (03 Mar 2019 22:30)  HR: 70 (04 Mar 2019 05:30) (70 - 82)  BP: 123/65 (04 Mar 2019 05:30) (123/65 - 140/80)  BP(mean): --  RR: 15 (04 Mar 2019 05:30) (15 - 15)  SpO2: 97% (04 Mar 2019 05:30) (97% - 97%)    GENERAL: NAD, well-groomed, well-developed  HEAD:  Atraumatic, Normocephalic  EYES: EOMI, PERRLA, conjunctiva and sclera clear  ENT: Moist mucous membranes,  NECK: Supple, No JVD, no bruits  CHEST/LUNG: Clear to percussion bilaterally; No rales, rhonchi, wheezing, or rubs  HEART: irregular  ABDOMEN: Soft, Nontender, Nondistended; Bowel sounds present  EXTREMITIES:  2+ Peripheral Pulses, No clubbing, cyanosis, or edema  SKIN: No rashes or lesions  NERVOUS SYSTEM:  Alert & Oriented X3, Good concentration; Motor Strength 5/5 B/L upper and lower extremities; DTRs 2+ intact and symmetric          :    LABS:                        7.5    8.8   )-----------( 181      ( 04 Mar 2019 06:58 )             24.7     03-04    139  |  107  |  21  ----------------------------<  154<H>  4.3   |  21<L>  |  1.15    Ca    8.7      04 Mar 2019 06:58  Mg     1.3     03-04    TPro  6.8  /  Alb  1.9<L>  /  TBili  0.3  /  DBili  x   /  AST  20  /  ALT  17  /  AlkPhos  27<L>  03-04            I&O's Summary    03 Mar 2019 07:01  -  04 Mar 2019 07:00  --------------------------------------------------------  IN: 720 mL / OUT: 1560 mL / NET: -840 mL    04 Mar 2019 07:01  -  04 Mar 2019 16:24  --------------------------------------------------------  IN: 700 mL / OUT: 0 mL / NET: 700 mL      BNP    RADIOLOGY & ADDITIONAL STUDIES:    ECHO:mod as nl ef

## 2019-03-04 NOTE — PROGRESS NOTE ADULT - SUBJECTIVE AND OBJECTIVE BOX
YUE KING   84y   Male    Admitting: SIDRA Silva  HPI:  84-year-old gentleman with history of anemia, atrial fibrillation and seizure disorder admitted with anemia exacerbation from his [place] facility. Patient notes recent history of bright red blood per rectum. He was scheduled for outpatient colonoscopy. On admission, found to have rectal temp of 100.7°F in the emergency department.  Patient reports he stopped drinking alcohol approximately 3 months prior to admission.    PAST MEDICAL & SURGICAL HISTORY:  Nonrheumatic aortic valve stenosis  Cancer of stomach  Anemia, unspecified type  Seizure disorder  Benign prostatic hyperplasia, unspecified whether lower urinary tract symptoms present  Hyperlipemia  Essential hypertension  Chronic atrial fibrillation  Stomach cancer  Hypertension  Atrial fibrillation  Gout  No significant past surgical history  No significant past surgical history    HEALTH ISSUES - PROBLEM Dx:  Anemia: Anemia  Hypokalemia: Hypokalemia  Chronic gout without tophus, unspecified cause, unspecified site: Chronic gout without tophus, unspecified cause, unspecified site  Atrial fibrillation, unspecified type: Atrial fibrillation, unspecified type  Benign prostatic hyperplasia, unspecified whether lower urinary tract symptoms present: Benign prostatic hyperplasia, unspecified whether lower urinary tract symptoms present  Hyperlipidemia, unspecified hyperlipidemia type: Hyperlipidemia, unspecified hyperlipidemia type  Seizure disorder: Seizure disorder  Essential hypertension: Essential hypertension  Fever of unknown origin: Fever of unknown origin  Anemia, unspecified type: Anemia, unspecified type    MEDICATIONS  (STANDING):  allopurinol 100 milliGRAM(s) Oral daily  amLODIPine   Tablet 10 milliGRAM(s) Oral daily  aspirin enteric coated 81 milliGRAM(s) Oral daily  atorvastatin 10 milliGRAM(s) Oral at bedtime  enoxaparin Injectable 40 milliGRAM(s) SubCutaneous daily  folic acid 1 milliGRAM(s) Oral daily  levETIRAcetam 500 milliGRAM(s) Oral two times a day  lidocaine 1% (Preservative-free) Injectable 10 milliLiter(s) Local Injection once  metoprolol tartrate 25 milliGRAM(s) Oral every 12 hours  pantoprazole    Tablet 40 milliGRAM(s) Oral before breakfast  tamsulosin 0.4 milliGRAM(s) Oral at bedtime    MEDICATIONS  (PRN):  acetaminophen    Suspension .. 650 milliGRAM(s) Oral every 6 hours PRN Temp greater or equal to 38C (100.4F), Mild Pain (1 - 3)  indomethacin 25 milliGRAM(s) Oral two times a day PRN Moderate Pain (4 - 6)    Allergies    No Known Allergies    INTERVAL HPI/OVERNIGHT EVENTS:  Patient S&E at bedside. No c/o CP or SOB. Has reconsidered, and does wish to pursue bone marrow procedure.    VITAL SIGNS:  T(F): 97.7 (03-04-19 @ 05:30)  HR: 70 (03-04-19 @ 05:30)  BP: 123/65 (03-04-19 @ 05:30)  RR: 15 (03-04-19 @ 05:30)  SpO2: 97% (03-04-19 @ 05:30)    PHYSICAL EXAM:  GENERAL: NAD, well-developed  Oropharynx-poor dentition  HEAD:  Atraumatic, Normocephalic  EYES: EOMI, PERRLA, conjunctiva and sclera clear  NECK: Supple, No JVD  CHEST/LUNG: decreased BS bases ant.  HEART: Regular rate and rhythm  ABDOMEN: Soft, Nontender, Nondistended; +BS  EXTREMITIES:  no calf tenderness  NEUROLOGY: awake, alert  SKIN: No vesicles    Labs:             7.5    8.8   )-----------( 181      ( 03-04 @ 06:58 )             24.7                7.6    9.4   )-----------( 158      ( 03-02 @ 06:06 )             25.5       03-04    139  |  107  |  21  ----------------------------<  154<H>  4.3   |  21<L>  |  1.15    Ca    8.7      04 Mar 2019 06:58  Phos  2.9     03-02  Mg     1.3     03-04    TPro  6.8  /  Alb  1.9<L>  /  TBili  0.3  /  DBili  x   /  AST  20  /  ALT  17  /  AlkPhos  27<L>  03-04    Vitamin B12, Serum: 836 pg/mL (03-02-19 @ 10:53)  Folate, Serum: >20.0 ng/mL (03-02-19 @ 10:53)

## 2019-03-04 NOTE — PHYSICAL THERAPY INITIAL EVALUATION ADULT - CRITERIA FOR SKILLED THERAPEUTIC INTERVENTIONS
impairments found/functional limitations in following categories
functional limitations in following categories/impairments found

## 2019-03-04 NOTE — PROGRESS NOTE ADULT - ASSESSMENT
Pt is a 84 y o PMH anemia, Gout, Afib off xarelto for one month, seizure, came from [facility] presented with abnormal labs admitted for anemia, fever.    #Anemia: bone marrow biopsy today, f/u GI recs    #Fever: blood cultures neg    #Gout: pt demands Indomethacin, given prn, monitor renal function Pt is a 84 y o PMH anemia, Gout, Afib off xarelto for one month, seizure, came from [facility] presented with abnormal labs admitted for anemia, fever.    #Anemia: bone marrow biopsy done today, f/u GI recs, already had colonscopy, no acute findings    #Afib: per Cardiology favor resuming Xarelto if no active bleeding, await GI recs, if so would monitor inpt for 24-48 hours on AC for any bleed on AC    #Fever: blood cultures neg    #Gout: pt demands Indomethacin, given prn, monitor renal function

## 2019-03-04 NOTE — PROGRESS NOTE ADULT - SUBJECTIVE AND OBJECTIVE BOX
CC: Patient is a 84y old  Male who presents with a chief complaint of Anemia and Leukocytosis (03 Mar 2019 14:41)      S: No f/c/n/v/pain    Patient seen and examined at bedside.    ALLERGIES:  No Known Allergies      MEDICATIONS:  acetaminophen    Suspension .. 650 milliGRAM(s) Oral every 6 hours PRN  allopurinol 100 milliGRAM(s) Oral daily  amLODIPine   Tablet 10 milliGRAM(s) Oral daily  aspirin enteric coated 81 milliGRAM(s) Oral daily  atorvastatin 10 milliGRAM(s) Oral at bedtime  enoxaparin Injectable 40 milliGRAM(s) SubCutaneous daily  folic acid 1 milliGRAM(s) Oral daily  indomethacin 25 milliGRAM(s) Oral two times a day PRN  levETIRAcetam 500 milliGRAM(s) Oral two times a day  metoprolol tartrate 25 milliGRAM(s) Oral every 12 hours  pantoprazole    Tablet 40 milliGRAM(s) Oral before breakfast  tamsulosin 0.4 milliGRAM(s) Oral at bedtime        Vital Signs Last 24 Hrs  T(F): 97.7 (04 Mar 2019 05:30), Max: 98.2 (03 Mar 2019 22:30)  HR: 70 (04 Mar 2019 05:30) (70 - 91)  BP: 123/65 (04 Mar 2019 05:30) (123/65 - 140/80)  RR: 15 (04 Mar 2019 05:30) (15 - 15)  SpO2: 97% (04 Mar 2019 05:30) (97% - 100%)  I&O's Summary    03 Mar 2019 07:01  -  04 Mar 2019 07:00  --------------------------------------------------------  IN: 720 mL / OUT: 1560 mL / NET: -840 mL        PHYSICAL EXAM:  General: NAD  ENT: MMM  Neck: Supple, No JVD  Lungs: Clear to auscultation bilaterally  Cardio: RRR, S1/S2, No murmurs  Abdomen: Soft, Nontender, Nondistended; Bowel sounds present  Extremities: No cyanosis, No edema  Neuro: no new deficits  Skin: no rashes  Psych: AAO    LABS:                        7.6    9.4   )-----------( 158      ( 02 Mar 2019 06:06 )             25.5     03-02    140  |  109  |  18  ----------------------------<  160  4.0   |  20  |  1.04    Ca    8.5      02 Mar 2019 06:06  Phos  2.9     03-02  Mg     1.7     03-02      eGFR if Non African American: 66 mL/min/1.73M2 (03-02-19 @ 06:06)  eGFR if African American: 76 mL/min/1.73M2 (03-02-19 @ 06:06)            12-23 Chol 95 mg/dL LDL 55 mg/dL HDL 26 mg/dL Trig 69 mg/dL        CAPILLARY BLOOD GLUCOSE        12-23 [id number] 6.3        Culture - Urine (collected 27 Feb 2019 20:00)  Source: .Urine Clean Catch (Midstream)  Final Report (01 Mar 2019 02:56):    No growth    Culture - Blood (collected 27 Feb 2019 17:40)  Source: .Blood Blood-Peripheral  Preliminary Report (28 Feb 2019 20:01):    No growth to date.    Culture - Blood (collected 27 Feb 2019 17:40)  Source: .Blood Blood-Peripheral  Preliminary Report (28 Feb 2019 20:01):    No growth to date.        RADIOLOGY & ADDITIONAL TESTS:    Care Discussed with Consultants/Other Providers:

## 2019-03-04 NOTE — PHYSICAL THERAPY INITIAL EVALUATION ADULT - PLANNED THERAPY INTERVENTIONS, PT EVAL
gait training/transfer training/bed mobility training
gait training/transfer training/bed mobility training

## 2019-03-05 LAB
ANION GAP SERPL CALC-SCNC: 12 MMOL/L — SIGNIFICANT CHANGE UP (ref 5–17)
BLD GP AB SCN SERPL QL: SIGNIFICANT CHANGE UP
BUN SERPL-MCNC: 20 MG/DL — SIGNIFICANT CHANGE UP (ref 7–23)
CALCIUM SERPL-MCNC: 9 MG/DL — SIGNIFICANT CHANGE UP (ref 8.4–10.5)
CHLORIDE SERPL-SCNC: 106 MMOL/L — SIGNIFICANT CHANGE UP (ref 96–108)
CO2 SERPL-SCNC: 22 MMOL/L — SIGNIFICANT CHANGE UP (ref 22–31)
CREAT SERPL-MCNC: 1.09 MG/DL — SIGNIFICANT CHANGE UP (ref 0.5–1.3)
GLUCOSE SERPL-MCNC: 185 MG/DL — HIGH (ref 70–99)
HCT VFR BLD CALC: 24.7 % — LOW (ref 39–50)
HGB BLD-MCNC: 7.5 G/DL — LOW (ref 13–17)
MCHC RBC-ENTMCNC: 30.4 GM/DL — LOW (ref 32–36)
MCHC RBC-ENTMCNC: 31.4 PG — SIGNIFICANT CHANGE UP (ref 27–34)
MCV RBC AUTO: 103.1 FL — HIGH (ref 80–100)
PCA AB SER-ACNC: SIGNIFICANT CHANGE UP
PLATELET # BLD AUTO: 202 K/UL — SIGNIFICANT CHANGE UP (ref 150–400)
POTASSIUM SERPL-MCNC: 4 MMOL/L — SIGNIFICANT CHANGE UP (ref 3.5–5.3)
POTASSIUM SERPL-SCNC: 4 MMOL/L — SIGNIFICANT CHANGE UP (ref 3.5–5.3)
RBC # BLD: 2.39 M/UL — LOW (ref 4.2–5.8)
RBC # FLD: 20.1 % — HIGH (ref 10.3–14.5)
SODIUM SERPL-SCNC: 140 MMOL/L — SIGNIFICANT CHANGE UP (ref 135–145)
WBC # BLD: 8.1 K/UL — SIGNIFICANT CHANGE UP (ref 3.8–10.5)
WBC # FLD AUTO: 8.1 K/UL — SIGNIFICANT CHANGE UP (ref 3.8–10.5)

## 2019-03-05 PROCEDURE — 74176 CT ABD & PELVIS W/O CONTRAST: CPT | Mod: 26

## 2019-03-05 PROCEDURE — 99232 SBSQ HOSP IP/OBS MODERATE 35: CPT

## 2019-03-05 PROCEDURE — 99233 SBSQ HOSP IP/OBS HIGH 50: CPT

## 2019-03-05 RX ADMIN — Medication 25 MILLIGRAM(S): at 22:03

## 2019-03-05 RX ADMIN — Medication 25 MILLIGRAM(S): at 21:03

## 2019-03-05 RX ADMIN — Medication 40 MILLIGRAM(S): at 18:04

## 2019-03-05 RX ADMIN — Medication 25 MILLIGRAM(S): at 18:05

## 2019-03-05 RX ADMIN — Medication 100 MILLIGRAM(S): at 13:11

## 2019-03-05 RX ADMIN — AMLODIPINE BESYLATE 10 MILLIGRAM(S): 2.5 TABLET ORAL at 06:32

## 2019-03-05 RX ADMIN — Medication 40 MILLIGRAM(S): at 06:32

## 2019-03-05 RX ADMIN — Medication 25 MILLIGRAM(S): at 13:19

## 2019-03-05 RX ADMIN — Medication 81 MILLIGRAM(S): at 13:11

## 2019-03-05 RX ADMIN — ATORVASTATIN CALCIUM 10 MILLIGRAM(S): 80 TABLET, FILM COATED ORAL at 21:03

## 2019-03-05 RX ADMIN — TAMSULOSIN HYDROCHLORIDE 0.4 MILLIGRAM(S): 0.4 CAPSULE ORAL at 21:03

## 2019-03-05 RX ADMIN — Medication 25 MILLIGRAM(S): at 06:32

## 2019-03-05 RX ADMIN — LEVETIRACETAM 500 MILLIGRAM(S): 250 TABLET, FILM COATED ORAL at 18:04

## 2019-03-05 RX ADMIN — Medication 1 MILLIGRAM(S): at 13:11

## 2019-03-05 RX ADMIN — PANTOPRAZOLE SODIUM 40 MILLIGRAM(S): 20 TABLET, DELAYED RELEASE ORAL at 06:32

## 2019-03-05 RX ADMIN — LEVETIRACETAM 500 MILLIGRAM(S): 250 TABLET, FILM COATED ORAL at 06:32

## 2019-03-05 RX ADMIN — Medication 25 MILLIGRAM(S): at 14:30

## 2019-03-05 RX ADMIN — ENOXAPARIN SODIUM 40 MILLIGRAM(S): 100 INJECTION SUBCUTANEOUS at 13:11

## 2019-03-05 NOTE — CONSULT NOTE ADULT - ASSESSMENT
84 man with severe anemia, a. fib, right ureteral calculus, concerning finding on CT scan though I dont see clinical evidence of fistula who has deficits in mobility and ADLs.   Given his inability to sustain progress in subacute rehab due to intermittent medical problems, it is reasonable to try to utilize the enhanced med supervision and therapy in acute.   Will check Hb in am after todays transfusion.   Await determination of med stability to participate in rehab.    Consider head CT given apparent focal neuro findings.

## 2019-03-05 NOTE — PROGRESS NOTE ADULT - ASSESSMENT
Pt is a 84 y o PMH anemia, Gout, Afib off xarelto for one month, seizure, came from [facility] presented with abnormal labs admitted for anemia, fever.    #Anemia: bone marrow biopsy done, f/u GI recs, already had colonscopy, no acute findings; pt has had Hg of 7 since December, has been off AC for 3 months, no acute change now, will need outpt f/u    #Afib: per Cardiology favor resuming Xarelto if no active bleeding, await GI recs, however, discussed with pt and he is more comfortable with his own Cardiologist making these decisions, pt has been off of AC for 3 months and Hg is the same as it has been for months, still low, attempted to reach  to notify him but no answer and no voicemail    #Ureteral calculus: seen on Feb 28th, normal Cr, voiding, no symptoms, will get repeat CT abd to follow up  #Fever: blood cultures neg    #Gout: pt demands Indomethacin, given prn, monitor renal function    #Debility: pt requested Acute rehab eval, if denied will try [facility] Pt is a 84 y o PMH anemia, Gout, Afib off xarelto for one month, seizure, came from [facility] presented with abnormal labs admitted for anemia, fever.    #Anemia: bone marrow biopsy done, appreciate GI recs, recommend transfusion, counseled pt to get 1 unit to increase reserve; pt had colonscopy with no acute findings, has had Hg of 7 since December, has been off AC for 3 months, no acute change now, will need outpt f/u    #Afib: per Cardiology favor resuming Xarelto if no active bleeding, await GI recs, however, discussed with pt and he is more comfortable with his own Cardiologist making these decisions, pt has been off of AC for 3 months and Hg is the same as it has been for months, still low, attempted to reach  to notify him but no answer and no voicemail    #Ureteral calculus: seen on Feb 28th, normal Cr, voiding, no symptoms, will get repeat CT abd to follow up    #Fever: blood cultures neg    #Gout: pt demands Indomethacin, given prn, monitor renal function    #Debility: pt requested Acute rehab eval, if denied will try [facility]

## 2019-03-05 NOTE — DIETITIAN INITIAL EVALUATION ADULT. - OTHER INFO
Pt. reports good appetite. Tolerates regular diet. Denies N/V/ DIARRHEA.  Last BM WAS 3/4.  STAGE 1 TOP SCROTUM.  No edema noted.

## 2019-03-05 NOTE — PROGRESS NOTE ADULT - SUBJECTIVE AND OBJECTIVE BOX
YUE KING   84y   Male    Admitting: SIDRA Silva  HPI:  84-year-old gentleman with history of anemia, atrial fibrillation and seizure disorder admitted with anemia exacerbation from his [place] facility. Patient notes recent history of bright red blood per rectum. He was scheduled for outpatient colonoscopy. On admission, found to have rectal temp of 100.7°F in the emergency department.  Patient reports he stopped drinking alcohol approximately 3 months prior to admission.    PAST MEDICAL & SURGICAL HISTORY:  Nonrheumatic aortic valve stenosis  Cancer of stomach  Anemia, unspecified type  Seizure disorder  Benign prostatic hyperplasia, unspecified whether lower urinary tract symptoms present  Hyperlipemia  Essential hypertension  Chronic atrial fibrillation  Stomach cancer  Hypertension  Atrial fibrillation  Gout  No significant past surgical history  No significant past surgical history    HEALTH ISSUES - PROBLEM Dx:  Anemia: Anemia  Hypokalemia: Hypokalemia  Chronic gout without tophus, unspecified cause, unspecified site: Chronic gout without tophus, unspecified cause, unspecified site  Atrial fibrillation, unspecified type: Atrial fibrillation, unspecified type  Benign prostatic hyperplasia, unspecified whether lower urinary tract symptoms present: Benign prostatic hyperplasia, unspecified whether lower urinary tract symptoms present  Hyperlipidemia, unspecified hyperlipidemia type: Hyperlipidemia, unspecified hyperlipidemia type  Seizure disorder: Seizure disorder  Essential hypertension: Essential hypertension  Fever of unknown origin: Fever of unknown origin  Anemia, unspecified type: Anemia, unspecified type    MEDICATIONS  (STANDING):  allopurinol 100 milliGRAM(s) Oral daily  amLODIPine   Tablet 10 milliGRAM(s) Oral daily  aspirin enteric coated 81 milliGRAM(s) Oral daily  atorvastatin 10 milliGRAM(s) Oral at bedtime  enoxaparin Injectable 40 milliGRAM(s) SubCutaneous daily  folic acid 1 milliGRAM(s) Oral daily  furosemide    Tablet 40 milliGRAM(s) Oral every 12 hours  levETIRAcetam 500 milliGRAM(s) Oral two times a day  metoprolol tartrate 25 milliGRAM(s) Oral every 12 hours  pantoprazole    Tablet 40 milliGRAM(s) Oral before breakfast  tamsulosin 0.4 milliGRAM(s) Oral at bedtime    MEDICATIONS  (PRN):  acetaminophen    Suspension .. 650 milliGRAM(s) Oral every 6 hours PRN Temp greater or equal to 38C (100.4F), Mild Pain (1 - 3)  indomethacin 25 milliGRAM(s) Oral two times a day PRN Moderate Pain (4 - 6)    Allergies    No Known Allergies      INTERVAL HPI/OVERNIGHT EVENTS:  Patient S&E at bedside. Resting comfortably. no c/o CP or SOB. Denies pain post BM procedure.    VITAL SIGNS:  T(F): 97.4 (03-05-19 @ 15:36)  HR: 105 (03-05-19 @ 18:03)  BP: 126/57 (03-05-19 @ 18:03)  RR: 15 (03-05-19 @ 15:36)  SpO2: 99% (03-05-19 @ 15:36)    PHYSICAL EXAM:  GENERAL: NAD, well-developed  EYES: EOMI, PERRLA, conjunctiva and sclera clear  NECK: Supple, No JVD  CHEST/LUNG: decreased BS bases ant.  HEART: Regular rate and rhythm  ABDOMEN: Soft, Nontender, Nondistended; +BS  EXTREMITIES:  no calf tenderness  NEUROLOGY: awake, alert  SKIN: No vesicles; no bleeding from BM biopsy site.    Labs:             7.5    8.1   )-----------( 202      ( 03-05 @ 06:15 )             24.7                7.5    8.8   )-----------( 181      ( 03-04 @ 06:58 )             24.7       03-05    140  |  106  |  20  ----------------------------<  185<H>  4.0   |  22  |  1.09    Ca    9.0      05 Mar 2019 06:15  Mg     1.3     03-04    TPro  6.8  /  Alb  1.9<L>  /  TBili  0.3  /  DBili  x   /  AST  20  /  ALT  17  /  AlkPhos  27<L>  03-04

## 2019-03-05 NOTE — PROGRESS NOTE ADULT - ATTENDING COMMENTS
Consultant notes reviewed : YES [x ] ; NO [ ]
Consultant notes reviewed : YES [x ] ; NO [ ]
Consultant notes reviewed : YES [x ] ; NO [ ]    Bone Marrow Procedure Note:  Verbal and written consent obtained from patient.   Left lateral decubitus position. Under sterile conditions, post 1 % local lidocaine anesthetic, approx. 8 cc BM aspirate and 0.5 cm BM biopsy obtained from right PSIC. Adequate hemostasis. No complications. Patient tolerated procedure well. Specimens including for flow cytometry and cytogenetics, brought to lab. Advised patient of recommended H/O f/u-his questions answered at this time.
Patient seen and examined.  Agree with assessment and plan as above.
Patient seen and examined; agree with assessment and plan as above.  No evidence of GI Bleeding.  Bone marrow biopsy performed earlier today.  Recommend to transfuse 1 unit PRBC.  Continue present diet as tolerated.  Repeat stool occult blood tests have been negative.

## 2019-03-05 NOTE — PROGRESS NOTE ADULT - PROBLEM SELECTOR PLAN 1
84-year-old gentleman with history of stomach cancer and chronic anemia who was admitted with anemia exacerbation. Chronic kidney disease contributes to his anemia, however, GI bleed noted recently. GI evaluation negative for active bleeding source. Macrocytosis also noted-h/o ETOH may contribute. However, suspect underlying bone marrow disorder such as myelodysplasia. Increased monocyte % noted-?CMMoL. S/P BM procedure with results pending.  Transfusional support as needed. Patient agreeable to outpatient H/O f/u.

## 2019-03-05 NOTE — PROGRESS NOTE ADULT - ASSESSMENT
84-year-old gentleman with history of anemia, atrial fibrillation and seizure disorder admitted with anemia exacerbation from his [place] facility. Patient notes recent history of bright red blood per rectum. He was scheduled for outpatient colonoscopy.  On admission, found to have rectal temp of 100.7°F in the emergency department.  Patient reports he stopped drinking alcohol approximately 3 months prior to admission

## 2019-03-05 NOTE — PROGRESS NOTE ADULT - SUBJECTIVE AND OBJECTIVE BOX
CC: Patient is a 84y old  Male who presents with a chief complaint of Anemia and Leukocytosis (04 Mar 2019 16:23)      S: No f/c/n/v/pain, pt wants Cardiologist Sri to be in the loop about all decisions and wants his approval for everything    Patient seen and examined at bedside.    ALLERGIES:  No Known Allergies      MEDICATIONS:  acetaminophen    Suspension .. 650 milliGRAM(s) Oral every 6 hours PRN  allopurinol 100 milliGRAM(s) Oral daily  amLODIPine   Tablet 10 milliGRAM(s) Oral daily  aspirin enteric coated 81 milliGRAM(s) Oral daily  atorvastatin 10 milliGRAM(s) Oral at bedtime  enoxaparin Injectable 40 milliGRAM(s) SubCutaneous daily  folic acid 1 milliGRAM(s) Oral daily  furosemide    Tablet 40 milliGRAM(s) Oral every 12 hours  indomethacin 25 milliGRAM(s) Oral two times a day PRN  levETIRAcetam 500 milliGRAM(s) Oral two times a day  metoprolol tartrate 25 milliGRAM(s) Oral every 12 hours  pantoprazole    Tablet 40 milliGRAM(s) Oral before breakfast  tamsulosin 0.4 milliGRAM(s) Oral at bedtime        Vital Signs Last 24 Hrs  T(F): 97.7 (05 Mar 2019 05:08), Max: 98.6 (04 Mar 2019 16:40)  HR: 70 (05 Mar 2019 05:08) (70 - 94)  BP: 147/73 (05 Mar 2019 05:08) (134/64 - 152/68)  RR: 14 (05 Mar 2019 05:08) (14 - 14)  SpO2: 100% (05 Mar 2019 05:08) (99% - 100%)  I&O's Summary    04 Mar 2019 07:01  -  05 Mar 2019 07:00  --------------------------------------------------------  IN: 1180 mL / OUT: 1700 mL / NET: -520 mL    05 Mar 2019 07:01  -  05 Mar 2019 13:52  --------------------------------------------------------  IN: 400 mL / OUT: 500 mL / NET: -100 mL        PHYSICAL EXAM:  General: NAD  ENT: MMM  Neck: Supple, No JVD  Lungs: Clear to auscultation bilaterally  Cardio: RRR, S1/S2, No murmurs  Abdomen: Soft, Nontender, Nondistended; Bowel sounds present  Extremities: No cyanosis, No edema  Neuro: no new deficits  Skin: no rashes  Psych: AAO    LABS:                        7.5    8.1   )-----------( 202      ( 05 Mar 2019 06:15 )             24.7     03-05    140  |  106  |  20  ----------------------------<  185  4.0   |  22  |  1.09    Ca    9.0      05 Mar 2019 06:15  Mg     1.3     03-04    TPro  6.8  /  Alb  1.9  /  TBili  0.3  /  DBili  x   /  AST  20  /  ALT  17  /  AlkPhos  27  03-04    eGFR if Non African American: 62 mL/min/1.73M2 (03-05-19 @ 06:15)  eGFR if African American: 72 mL/min/1.73M2 (03-05-19 @ 06:15)            12-23 Chol 95 mg/dL LDL 55 mg/dL HDL 26 mg/dL Trig 69 mg/dL        CAPILLARY BLOOD GLUCOSE        12-23 [id number] 6.3          RADIOLOGY & ADDITIONAL TESTS:    Care Discussed with Consultants/Other Providers:

## 2019-03-05 NOTE — CONSULT NOTE ADULT - SUBJECTIVE AND OBJECTIVE BOX
84 man with history of gastric cancer (long ago), a fib, CKD, anemia, gout who reports was normally mobile until December when had fall/developed pneumonia. Since then has been back and forth between acute hospital and subacute rehab with three admissions to [facility]. Most recently had a suspected GI bleed with bright red blood, colonoscopy and prior endoscopy did not disclose bleeding source. Hb has remained quite low, in the 7s, and a bone marrow biopsy was performed yesterday. In addition, off Xarelto. Since hospitalized, found to have a 5mm right ureteral stone, initially with mild hydro, now resolved, though stone remains present in the ureter. Renal function has improved. 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 An echo showed normal LV fxn, aortic stenosis and regurg and mitral regurg as well as atrial enlargement.  During this time his level of disability has increased and he now requires assist for mobility. Asked to consider for acute rehab.     He lives with his partner in a  in [place] with bath and bedrooms up a flight of stairs.  is present. 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     He complains of bilateral knee pain. Voids with control. Denies confusion, visual changes, upper limb weakness.     On exam, in no distress in bed.   Speech fluent, follows all commands and is conversationally appropriate.   No facial asymmetry. CNs are normal.   There may be a mild right hemiparesis.   There is diffuse atrophy of large muscles.   There is absence of LINH sensation at right great toe; it is present in hands and left great toe.   There is mild right leg distal edema.  He performs bed mob (sup to sit) with bed controls, rails and excessive effort.   Sit bal is good.   Sit to stand with mod assist.   Stand tolerance about 10 sec.   Unable to step.

## 2019-03-05 NOTE — DIETITIAN INITIAL EVALUATION ADULT. - PROBLEM SELECTOR PLAN 2
found with rectal temp of 100.7F in ED  no clear source of infection  F/u labs, blood cultures, lactate, urinalysis, cxr, rvp  continue empiric abx for now  maintain contact precautions

## 2019-03-05 NOTE — PROGRESS NOTE ADULT - ASSESSMENT
85 y/o male (known from prior admission) with PMH Afib (off xarelto), anemia, stomach cancer (25 years ago), gout, HTN, BPH who presents to ER from [facility] with drop in hemoglobin. Hemoglobin 6.8 and is receiving 1 unit of PRBC this morning. Patient had recently EGD 2/5/19 in recent setting of anemia and due to hx of stomach cancer, however no significant findings noted. Abdomen soft, non tender. VSS, S/P Colonoscopy 3/1/19, no bleeding noted. Discussed with patient that anemia most likely not due to GI source and Hem/Onc recommended bone biopsy for further evaluation. Patient to receive another unit PRBC today.

## 2019-03-06 ENCOUNTER — TRANSCRIPTION ENCOUNTER (OUTPATIENT)
Age: 84
End: 2019-03-06

## 2019-03-06 ENCOUNTER — INPATIENT (INPATIENT)
Facility: HOSPITAL | Age: 84
LOS: 34 days | Discharge: NURSING, REHAB OR LONG-TERM CARE | DRG: 949 | End: 2019-04-10
Attending: PHYSICAL MEDICINE & REHABILITATION | Admitting: PHYSICAL MEDICINE & REHABILITATION
Payer: MEDICARE

## 2019-03-06 VITALS
TEMPERATURE: 98 F | OXYGEN SATURATION: 98 % | HEART RATE: 86 BPM | DIASTOLIC BLOOD PRESSURE: 65 MMHG | SYSTOLIC BLOOD PRESSURE: 122 MMHG | RESPIRATION RATE: 14 BRPM | WEIGHT: 227.74 LBS

## 2019-03-06 VITALS
WEIGHT: 225.97 LBS | DIASTOLIC BLOOD PRESSURE: 76 MMHG | TEMPERATURE: 99 F | SYSTOLIC BLOOD PRESSURE: 148 MMHG | HEIGHT: 73 IN | RESPIRATION RATE: 14 BRPM | HEART RATE: 96 BPM | OXYGEN SATURATION: 97 %

## 2019-03-06 DIAGNOSIS — N20.1 CALCULUS OF URETER: ICD-10-CM

## 2019-03-06 DIAGNOSIS — R53.81 OTHER MALAISE: ICD-10-CM

## 2019-03-06 LAB
ALBUMIN SERPL ELPH-MCNC: 2.1 G/DL — LOW (ref 3.3–5)
ALP SERPL-CCNC: 30 U/L — LOW (ref 40–120)
ALT FLD-CCNC: 12 U/L DA — SIGNIFICANT CHANGE UP (ref 10–45)
ANION GAP SERPL CALC-SCNC: 12 MMOL/L — SIGNIFICANT CHANGE UP (ref 5–17)
ANION GAP SERPL CALC-SCNC: 12 MMOL/L — SIGNIFICANT CHANGE UP (ref 5–17)
AST SERPL-CCNC: 17 U/L — SIGNIFICANT CHANGE UP (ref 10–40)
BASOPHILS NFR BLD AUTO: 1 % — SIGNIFICANT CHANGE UP (ref 0–2)
BILIRUB SERPL-MCNC: 0.7 MG/DL — SIGNIFICANT CHANGE UP (ref 0.2–1.2)
BUN SERPL-MCNC: 22 MG/DL — SIGNIFICANT CHANGE UP (ref 7–23)
BUN SERPL-MCNC: 22 MG/DL — SIGNIFICANT CHANGE UP (ref 7–23)
CALCIUM SERPL-MCNC: 8.9 MG/DL — SIGNIFICANT CHANGE UP (ref 8.4–10.5)
CALCIUM SERPL-MCNC: 9 MG/DL — SIGNIFICANT CHANGE UP (ref 8.4–10.5)
CHLORIDE SERPL-SCNC: 99 MMOL/L — SIGNIFICANT CHANGE UP (ref 96–108)
CHLORIDE SERPL-SCNC: 99 MMOL/L — SIGNIFICANT CHANGE UP (ref 96–108)
CO2 SERPL-SCNC: 23 MMOL/L — SIGNIFICANT CHANGE UP (ref 22–31)
CO2 SERPL-SCNC: 24 MMOL/L — SIGNIFICANT CHANGE UP (ref 22–31)
CREAT SERPL-MCNC: 1.22 MG/DL — SIGNIFICANT CHANGE UP (ref 0.5–1.3)
CREAT SERPL-MCNC: 1.25 MG/DL — SIGNIFICANT CHANGE UP (ref 0.5–1.3)
GLUCOSE SERPL-MCNC: 218 MG/DL — HIGH (ref 70–99)
GLUCOSE SERPL-MCNC: 267 MG/DL — HIGH (ref 70–99)
HCT VFR BLD CALC: 27.8 % — LOW (ref 39–50)
HCT VFR BLD CALC: 29.9 % — LOW (ref 39–50)
HGB BLD-MCNC: 9 G/DL — LOW (ref 13–17)
HGB BLD-MCNC: 9.4 G/DL — LOW (ref 13–17)
LYMPHOCYTES # BLD AUTO: 17 % — SIGNIFICANT CHANGE UP (ref 13–44)
MCHC RBC-ENTMCNC: 31.4 PG — SIGNIFICANT CHANGE UP (ref 27–34)
MCHC RBC-ENTMCNC: 31.5 GM/DL — LOW (ref 32–36)
MCHC RBC-ENTMCNC: 32.2 GM/DL — SIGNIFICANT CHANGE UP (ref 32–36)
MCHC RBC-ENTMCNC: 32.6 PG — SIGNIFICANT CHANGE UP (ref 27–34)
MCV RBC AUTO: 101.1 FL — HIGH (ref 80–100)
MCV RBC AUTO: 99.6 FL — SIGNIFICANT CHANGE UP (ref 80–100)
METHYLMALONATE SERPL-SCNC: 478 NMOL/L — HIGH (ref 0–378)
MONOCYTES NFR BLD AUTO: 38 % — HIGH (ref 2–14)
NEUTROPHILS NFR BLD AUTO: 38 % — LOW (ref 43–77)
PLATELET # BLD AUTO: 235 K/UL — SIGNIFICANT CHANGE UP (ref 150–400)
PLATELET # BLD AUTO: 262 K/UL — SIGNIFICANT CHANGE UP (ref 150–400)
POTASSIUM SERPL-MCNC: 4.1 MMOL/L — SIGNIFICANT CHANGE UP (ref 3.5–5.3)
POTASSIUM SERPL-MCNC: 4.2 MMOL/L — SIGNIFICANT CHANGE UP (ref 3.5–5.3)
POTASSIUM SERPL-SCNC: 4.1 MMOL/L — SIGNIFICANT CHANGE UP (ref 3.5–5.3)
POTASSIUM SERPL-SCNC: 4.2 MMOL/L — SIGNIFICANT CHANGE UP (ref 3.5–5.3)
PROT SERPL-MCNC: 7.4 G/DL — SIGNIFICANT CHANGE UP (ref 6–8.3)
RBC # BLD: 2.75 M/UL — LOW (ref 4.2–5.8)
RBC # BLD: 3 M/UL — LOW (ref 4.2–5.8)
RBC # FLD: 20.6 % — HIGH (ref 10.3–14.5)
RBC # FLD: 21.1 % — HIGH (ref 10.3–14.5)
SODIUM SERPL-SCNC: 134 MMOL/L — LOW (ref 135–145)
SODIUM SERPL-SCNC: 135 MMOL/L — SIGNIFICANT CHANGE UP (ref 135–145)
WBC # BLD: 12.1 K/UL — HIGH (ref 3.8–10.5)
WBC # BLD: 17.1 K/UL — HIGH (ref 3.8–10.5)
WBC # FLD AUTO: 12.1 K/UL — HIGH (ref 3.8–10.5)
WBC # FLD AUTO: 17.1 K/UL — HIGH (ref 3.8–10.5)

## 2019-03-06 PROCEDURE — 85018 HEMOGLOBIN: CPT

## 2019-03-06 PROCEDURE — 82746 ASSAY OF FOLIC ACID SERUM: CPT

## 2019-03-06 PROCEDURE — 83605 ASSAY OF LACTIC ACID: CPT

## 2019-03-06 PROCEDURE — 86850 RBC ANTIBODY SCREEN: CPT

## 2019-03-06 PROCEDURE — 97162 PT EVAL MOD COMPLEX 30 MIN: CPT

## 2019-03-06 PROCEDURE — 83010 ASSAY OF HAPTOGLOBIN QUANT: CPT

## 2019-03-06 PROCEDURE — 86923 COMPATIBILITY TEST ELECTRIC: CPT

## 2019-03-06 PROCEDURE — 99285 EMERGENCY DEPT VISIT HI MDM: CPT | Mod: 25

## 2019-03-06 PROCEDURE — 85730 THROMBOPLASTIN TIME PARTIAL: CPT

## 2019-03-06 PROCEDURE — 87631 RESP VIRUS 3-5 TARGETS: CPT

## 2019-03-06 PROCEDURE — 83921 ORGANIC ACID SINGLE QUANT: CPT

## 2019-03-06 PROCEDURE — 99239 HOSP IP/OBS DSCHRG MGMT >30: CPT

## 2019-03-06 PROCEDURE — 85610 PROTHROMBIN TIME: CPT

## 2019-03-06 PROCEDURE — 83735 ASSAY OF MAGNESIUM: CPT

## 2019-03-06 PROCEDURE — 82607 VITAMIN B-12: CPT

## 2019-03-06 PROCEDURE — 36415 COLL VENOUS BLD VENIPUNCTURE: CPT

## 2019-03-06 PROCEDURE — 86255 FLUORESCENT ANTIBODY SCREEN: CPT

## 2019-03-06 PROCEDURE — 96374 THER/PROPH/DIAG INJ IV PUSH: CPT

## 2019-03-06 PROCEDURE — 86900 BLOOD TYPING SEROLOGIC ABO: CPT

## 2019-03-06 PROCEDURE — 84145 PROCALCITONIN (PCT): CPT

## 2019-03-06 PROCEDURE — 84100 ASSAY OF PHOSPHORUS: CPT

## 2019-03-06 PROCEDURE — 74176 CT ABD & PELVIS W/O CONTRAST: CPT

## 2019-03-06 PROCEDURE — 85014 HEMATOCRIT: CPT

## 2019-03-06 PROCEDURE — 83090 ASSAY OF HOMOCYSTEINE: CPT

## 2019-03-06 PROCEDURE — 87086 URINE CULTURE/COLONY COUNT: CPT

## 2019-03-06 PROCEDURE — 71045 X-RAY EXAM CHEST 1 VIEW: CPT

## 2019-03-06 PROCEDURE — 99232 SBSQ HOSP IP/OBS MODERATE 35: CPT

## 2019-03-06 PROCEDURE — 85027 COMPLETE CBC AUTOMATED: CPT

## 2019-03-06 PROCEDURE — 86901 BLOOD TYPING SEROLOGIC RH(D): CPT

## 2019-03-06 PROCEDURE — 99223 1ST HOSP IP/OBS HIGH 75: CPT

## 2019-03-06 PROCEDURE — 84132 ASSAY OF SERUM POTASSIUM: CPT

## 2019-03-06 PROCEDURE — 86334 IMMUNOFIX E-PHORESIS SERUM: CPT

## 2019-03-06 PROCEDURE — 81001 URINALYSIS AUTO W/SCOPE: CPT

## 2019-03-06 PROCEDURE — 97116 GAIT TRAINING THERAPY: CPT

## 2019-03-06 PROCEDURE — 87040 BLOOD CULTURE FOR BACTERIA: CPT

## 2019-03-06 PROCEDURE — P9016: CPT

## 2019-03-06 PROCEDURE — 80053 COMPREHEN METABOLIC PANEL: CPT

## 2019-03-06 PROCEDURE — 36430 TRANSFUSION BLD/BLD COMPNT: CPT

## 2019-03-06 PROCEDURE — 80048 BASIC METABOLIC PNL TOTAL CA: CPT

## 2019-03-06 PROCEDURE — 97530 THERAPEUTIC ACTIVITIES: CPT

## 2019-03-06 PROCEDURE — 93306 TTE W/DOPPLER COMPLETE: CPT

## 2019-03-06 PROCEDURE — 93005 ELECTROCARDIOGRAM TRACING: CPT

## 2019-03-06 PROCEDURE — 82272 OCCULT BLD FECES 1-3 TESTS: CPT

## 2019-03-06 RX ORDER — FOLIC ACID 0.8 MG
1 TABLET ORAL
Qty: 0 | Refills: 0 | COMMUNITY
Start: 2019-03-06

## 2019-03-06 RX ORDER — ASPIRIN/CALCIUM CARB/MAGNESIUM 324 MG
1 TABLET ORAL
Qty: 0 | Refills: 0 | COMMUNITY
Start: 2019-03-06

## 2019-03-06 RX ORDER — FUROSEMIDE 40 MG
1 TABLET ORAL
Qty: 0 | Refills: 0 | COMMUNITY
Start: 2019-03-06

## 2019-03-06 RX ORDER — ATORVASTATIN CALCIUM 80 MG/1
1 TABLET, FILM COATED ORAL
Qty: 0 | Refills: 0 | COMMUNITY
Start: 2019-03-06

## 2019-03-06 RX ORDER — TAMSULOSIN HYDROCHLORIDE 0.4 MG/1
0.4 CAPSULE ORAL AT BEDTIME
Qty: 0 | Refills: 0 | Status: DISCONTINUED | OUTPATIENT
Start: 2019-03-06 | End: 2019-04-10

## 2019-03-06 RX ORDER — PANTOPRAZOLE SODIUM 20 MG/1
40 TABLET, DELAYED RELEASE ORAL
Qty: 0 | Refills: 0 | Status: DISCONTINUED | OUTPATIENT
Start: 2019-03-06 | End: 2019-04-10

## 2019-03-06 RX ORDER — ALLOPURINOL 300 MG
100 TABLET ORAL DAILY
Qty: 0 | Refills: 0 | Status: DISCONTINUED | OUTPATIENT
Start: 2019-03-06 | End: 2019-03-15

## 2019-03-06 RX ORDER — SENNA PLUS 8.6 MG/1
2 TABLET ORAL AT BEDTIME
Qty: 0 | Refills: 0 | Status: DISCONTINUED | OUTPATIENT
Start: 2019-03-06 | End: 2019-04-10

## 2019-03-06 RX ORDER — ATORVASTATIN CALCIUM 80 MG/1
10 TABLET, FILM COATED ORAL AT BEDTIME
Qty: 0 | Refills: 0 | Status: DISCONTINUED | OUTPATIENT
Start: 2019-03-06 | End: 2019-04-10

## 2019-03-06 RX ORDER — LEVETIRACETAM 250 MG/1
1 TABLET, FILM COATED ORAL
Qty: 0 | Refills: 0 | COMMUNITY
Start: 2019-03-06

## 2019-03-06 RX ORDER — TAMSULOSIN HYDROCHLORIDE 0.4 MG/1
1 CAPSULE ORAL
Qty: 0 | Refills: 0 | COMMUNITY
Start: 2019-03-06

## 2019-03-06 RX ORDER — LEVETIRACETAM 250 MG/1
500 TABLET, FILM COATED ORAL
Qty: 0 | Refills: 0 | Status: DISCONTINUED | OUTPATIENT
Start: 2019-03-06 | End: 2019-04-10

## 2019-03-06 RX ORDER — INDOMETHACIN 50 MG
1 CAPSULE ORAL
Qty: 0 | Refills: 0 | COMMUNITY
Start: 2019-03-06

## 2019-03-06 RX ORDER — METOPROLOL TARTRATE 50 MG
1 TABLET ORAL
Qty: 0 | Refills: 0 | COMMUNITY
Start: 2019-03-06

## 2019-03-06 RX ORDER — ALLOPURINOL 300 MG
1 TABLET ORAL
Qty: 0 | Refills: 0 | COMMUNITY
Start: 2019-03-06

## 2019-03-06 RX ORDER — ALLOPURINOL 300 MG
1 TABLET ORAL
Qty: 0 | Refills: 0 | COMMUNITY

## 2019-03-06 RX ORDER — INDOMETHACIN 50 MG
25 CAPSULE ORAL
Qty: 0 | Refills: 0 | Status: DISCONTINUED | OUTPATIENT
Start: 2019-03-06 | End: 2019-03-15

## 2019-03-06 RX ORDER — METOPROLOL TARTRATE 50 MG
25 TABLET ORAL EVERY 12 HOURS
Qty: 0 | Refills: 0 | Status: DISCONTINUED | OUTPATIENT
Start: 2019-03-06 | End: 2019-03-07

## 2019-03-06 RX ORDER — FUROSEMIDE 40 MG
40 TABLET ORAL EVERY 12 HOURS
Qty: 0 | Refills: 0 | Status: DISCONTINUED | OUTPATIENT
Start: 2019-03-06 | End: 2019-03-07

## 2019-03-06 RX ORDER — PANTOPRAZOLE SODIUM 20 MG/1
1 TABLET, DELAYED RELEASE ORAL
Qty: 0 | Refills: 0 | DISCHARGE
Start: 2019-03-06

## 2019-03-06 RX ORDER — DOCUSATE SODIUM 100 MG
100 CAPSULE ORAL
Qty: 0 | Refills: 0 | Status: DISCONTINUED | OUTPATIENT
Start: 2019-03-06 | End: 2019-04-10

## 2019-03-06 RX ORDER — ESOMEPRAZOLE MAGNESIUM 40 MG/1
1 CAPSULE, DELAYED RELEASE ORAL
Qty: 0 | Refills: 0 | COMMUNITY

## 2019-03-06 RX ORDER — TAMSULOSIN HYDROCHLORIDE 0.4 MG/1
1 CAPSULE ORAL
Qty: 0 | Refills: 0 | COMMUNITY

## 2019-03-06 RX ORDER — FOLIC ACID 0.8 MG
1 TABLET ORAL DAILY
Qty: 0 | Refills: 0 | Status: DISCONTINUED | OUTPATIENT
Start: 2019-03-06 | End: 2019-04-10

## 2019-03-06 RX ORDER — ENOXAPARIN SODIUM 100 MG/ML
40 INJECTION SUBCUTANEOUS EVERY 24 HOURS
Qty: 0 | Refills: 0 | Status: DISCONTINUED | OUTPATIENT
Start: 2019-03-06 | End: 2019-03-11

## 2019-03-06 RX ORDER — AMLODIPINE BESYLATE 2.5 MG/1
10 TABLET ORAL DAILY
Qty: 0 | Refills: 0 | Status: DISCONTINUED | OUTPATIENT
Start: 2019-03-06 | End: 2019-03-07

## 2019-03-06 RX ORDER — ASPIRIN/CALCIUM CARB/MAGNESIUM 324 MG
81 TABLET ORAL DAILY
Qty: 0 | Refills: 0 | Status: DISCONTINUED | OUTPATIENT
Start: 2019-03-06 | End: 2019-03-18

## 2019-03-06 RX ADMIN — LEVETIRACETAM 500 MILLIGRAM(S): 250 TABLET, FILM COATED ORAL at 18:09

## 2019-03-06 RX ADMIN — LEVETIRACETAM 500 MILLIGRAM(S): 250 TABLET, FILM COATED ORAL at 06:44

## 2019-03-06 RX ADMIN — Medication 40 MILLIGRAM(S): at 18:09

## 2019-03-06 RX ADMIN — Medication 40 MILLIGRAM(S): at 06:43

## 2019-03-06 RX ADMIN — ENOXAPARIN SODIUM 40 MILLIGRAM(S): 100 INJECTION SUBCUTANEOUS at 12:20

## 2019-03-06 RX ADMIN — TAMSULOSIN HYDROCHLORIDE 0.4 MILLIGRAM(S): 0.4 CAPSULE ORAL at 22:14

## 2019-03-06 RX ADMIN — ATORVASTATIN CALCIUM 10 MILLIGRAM(S): 80 TABLET, FILM COATED ORAL at 22:14

## 2019-03-06 RX ADMIN — Medication 81 MILLIGRAM(S): at 12:20

## 2019-03-06 RX ADMIN — Medication 25 MILLIGRAM(S): at 18:09

## 2019-03-06 RX ADMIN — Medication 100 MILLIGRAM(S): at 18:09

## 2019-03-06 RX ADMIN — Medication 1 MILLIGRAM(S): at 12:20

## 2019-03-06 RX ADMIN — Medication 25 MILLIGRAM(S): at 16:23

## 2019-03-06 RX ADMIN — Medication 25 MILLIGRAM(S): at 17:00

## 2019-03-06 RX ADMIN — AMLODIPINE BESYLATE 10 MILLIGRAM(S): 2.5 TABLET ORAL at 06:43

## 2019-03-06 RX ADMIN — Medication 25 MILLIGRAM(S): at 06:43

## 2019-03-06 RX ADMIN — PANTOPRAZOLE SODIUM 40 MILLIGRAM(S): 20 TABLET, DELAYED RELEASE ORAL at 06:43

## 2019-03-06 RX ADMIN — Medication 100 MILLIGRAM(S): at 12:20

## 2019-03-06 NOTE — H&P ADULT - NSHPPHYSICALEXAM_GEN_ALL_CORE
Pe; Patient alert, no facial droop, no dysarthria. Mild distress due to pain, also significantly tremulous.Patient dneis feeling chilled or feverish, states he has had tremors in past. no delirium, restlessness or agitation noted. O x 3-4 follows 1-2 step commands consistently    lungs: clear bilaterally no R/r/W  cor: RRR Nl S1 and S2  abd: soft, +BS NT/ND, no R?G  no suprapubic pain  ext: right knee mod effusion +lateral joint line TTP  guards, reduced ROM right knee,30-65 degrees limited by pain  left knee trace effusion and warmth, ROM 20-75-80 degrees  right LE trace-1+ edema LE, distensible, soft  ankle 1+ swelling, baseline per paitnet. NO TTP ankle joint  left Le trace swelling  no erythema bilaterally    ROM: affected due to guarding from pain and mod tremors  reduced shoudler AROm FF at least 50-60 degrees on left 60 on right  motor4-/5 elbow flexion 4/5   gross grasp 4+/5 +severe tremors, incoordination +_dysmetira  right HF 3-/5 quad 3-./5 due to pain in knee  ankle PF 4-/5 DF 4/5 due to referred pain  left HF 3+/5 quad 4-/5 ankle PF and DF 5-/5

## 2019-03-06 NOTE — DISCHARGE NOTE PROVIDER - NSDCCPCAREPLAN_GEN_ALL_CORE_FT
PRINCIPAL DISCHARGE DIAGNOSIS  Problem: Anemia  Assessment and Plan of Treatment:       SECONDARY DISCHARGE DIAGNOSES  Problem: Sepsis  Assessment and Plan of Treatment:     Problem: Fever of unknown origin  Assessment and Plan of Treatment:

## 2019-03-06 NOTE — PATIENT PROFILE ADULT - FALL HARM RISK
other/bones(Osteoporosis,prev fx,steroid use,metastatic bone ca)/coagulation(Bleeding disorder R/T clinical cond/anti-coags)

## 2019-03-06 NOTE — H&P ADULT - NSHPREVIEWOFSYSTEMS_GEN_ALL_CORE
+bilateral Ue and LE tremors, worsening per patient  denies h.o falls +weak. Denies H/a +fatigue +mild dizziness/l.ightheadedness    cor: no CP,palpitations,   respiratory: denies cough, SOB, wheezing  GI: Last BM 1 days ago. deneis diarrhea, constipation, N/V  : voiding well, denies dysuria, hesitancy, incontinence  ext: right knee pain. reports h/o DJD on Xray in past. denies buckling in knee or limitation in ambulation distance at home, although difficulty with stairs. pain same location and quality, although mod-severe

## 2019-03-06 NOTE — CONSULT NOTE ADULT - PROBLEM SELECTOR RECOMMENDATION 9
- encouraged hydration and continue Flomax  - repeat U/A and urine culture  - Follow labs and continue rehab  - likely repeat KUB and renal sono next week

## 2019-03-06 NOTE — PROGRESS NOTE ADULT - REASON FOR ADMISSION
Anemia and Leukocytosis

## 2019-03-06 NOTE — PROGRESS NOTE ADULT - SUBJECTIVE AND OBJECTIVE BOX
YUE KING   84y   Male    Admitting: SAEED Gallego  HPI:  84-year-old gentleman with history of anemia, atrial fibrillation and seizure disorder admitted with anemia exacerbation from his [place] facility. Patient noted recent history of bright red blood per rectum. He was scheduled for outpatient colonoscopy. S/P BM biopsy.  Patient reported he stopped drinking alcohol approximately 3 months prior to admission.  Patient is now on the acute rehabilitation unit.    PAST MEDICAL & SURGICAL HISTORY:  Nonrheumatic aortic valve stenosis  Cancer of stomach  Anemia, unspecified type  Seizure disorder  Benign prostatic hyperplasia, unspecified whether lower urinary tract symptoms present  Hyperlipemia  Essential hypertension  Chronic atrial fibrillation  Stomach cancer  Hypertension  Atrial fibrillation  Gout    HEALTH ISSUES - PROBLEM Dx:  Ureteral stone: Ureteral stone    MEDICATIONS  (STANDING):  allopurinol 100 milliGRAM(s) Oral daily  amLODIPine   Tablet 10 milliGRAM(s) Oral daily  aspirin enteric coated 81 milliGRAM(s) Oral daily  atorvastatin 10 milliGRAM(s) Oral at bedtime  docusate sodium 100 milliGRAM(s) Oral two times a day  enoxaparin Injectable 40 milliGRAM(s) SubCutaneous every 24 hours  folic acid 1 milliGRAM(s) Oral daily  furosemide    Tablet 40 milliGRAM(s) Oral every 12 hours  levETIRAcetam 500 milliGRAM(s) Oral two times a day  metoprolol tartrate 25 milliGRAM(s) Oral every 12 hours  multivitamin 1 Tablet(s) Oral daily  pantoprazole    Tablet 40 milliGRAM(s) Oral before breakfast  tamsulosin 0.4 milliGRAM(s) Oral at bedtime    MEDICATIONS  (PRN):  indomethacin 25 milliGRAM(s) Oral two times a day PRN Moderate Pain (4 - 6)  senna 2 Tablet(s) Oral at bedtime PRN Constipation    Allergies    No Known Allergies    INTERVAL HPI/OVERNIGHT EVENTS:  Patient S&E at bedside. No c/o CP or SOB.    VITAL SIGNS:  T(F): 98.6 (03-06-19 @ 13:00)  HR: 106 (03-06-19 @ 18:10)  BP: 135/77 (03-06-19 @ 18:10)  RR: 14 (03-06-19 @ 18:10)  SpO2: 97% (03-06-19 @ 18:10)    PHYSICAL EXAM:  GENERAL: NAD, well-developed  EYES: EOMI, PERRLA, conjunctiva and sclera clear  NECK: Supple, No JVD  CHEST/LUNG: decreased BS bases ant.  HEART: Regular rate and rhythm  ABDOMEN: Soft, Nontender, Nondistended; +BS  EXTREMITIES:  no calf tenderness  NEUROLOGY: awake, alert  SKIN: No vesicles    Labs:             9.4    17.1  )-----------( 262      ( 03-06 @ 16:39 )             29.9                9.0    12.1  )-----------( 235      ( 03-06 @ 09:53 )             27.8                7.5    8.1   )-----------( 202      ( 03-05 @ 06:15 )             24.7                7.5    8.8   )-----------( 181      ( 03-04 @ 06:58 )             24.7       03-06    135  |  99  |  22  ----------------------------<  218<H>  4.2   |  24  |  1.25    Ca    8.9      06 Mar 2019 16:39    TPro  7.4  /  Alb  2.1<L>  /  TBili  0.7  /  DBili  x   /  AST  17  /  ALT  12  /  AlkPhos  30<L>  03-06    Cytopathology - Non Gyn Report:   ACCESSION No:  [id number]    YUE KING                      1        Cytopathology Report            Specimen(s) Submitted  ESOPHAGUS, BRUSH      Clinical History  None provided      Gross Description  Received: 20 ml of clear fluid in CytoLyt  Prepared: 1 ThinPrep slide made from brush tip  2 slides received fixed      Final Diagnosis  ESOPHAGUS, BRUSH  NEGATIVE FOR MALIGNANT CELLS.    Specimen consists of mature squamous epithelium. Fungal organisms  morphologically consistent with Candida  species  present.    Screened by: [facility](ASCP)  Verified by: Tigre Odell M.D.  (Electronic Signature)  Reported on: 02/12/19 16:36 EST, [address] [place] [address]  [id number]  Cytology technical processing performed at [address] [place] [id number]  _________________________________________________________________ (02.08.19 @ 08:40)

## 2019-03-06 NOTE — PROGRESS NOTE ADULT - SUBJECTIVE AND OBJECTIVE BOX
CC: Patient is a 84y old  Male who presents with a chief complaint of Anemia and Leukocytosis (05 Mar 2019 19:48)      S: No f/c/n/v/pain    Patient seen and examined at bedside.    ALLERGIES:  No Known Allergies      MEDICATIONS:  acetaminophen    Suspension .. 650 milliGRAM(s) Oral every 6 hours PRN  allopurinol 100 milliGRAM(s) Oral daily  amLODIPine   Tablet 10 milliGRAM(s) Oral daily  aspirin enteric coated 81 milliGRAM(s) Oral daily  atorvastatin 10 milliGRAM(s) Oral at bedtime  enoxaparin Injectable 40 milliGRAM(s) SubCutaneous daily  folic acid 1 milliGRAM(s) Oral daily  furosemide    Tablet 40 milliGRAM(s) Oral every 12 hours  indomethacin 25 milliGRAM(s) Oral two times a day PRN  levETIRAcetam 500 milliGRAM(s) Oral two times a day  metoprolol tartrate 25 milliGRAM(s) Oral every 12 hours  pantoprazole    Tablet 40 milliGRAM(s) Oral before breakfast  tamsulosin 0.4 milliGRAM(s) Oral at bedtime        Vital Signs Last 24 Hrs  T(F): 98.2 (06 Mar 2019 05:27), Max: 98.6 (05 Mar 2019 20:27)  HR: 86 (06 Mar 2019 05:27) (86 - 106)  BP: 122/65 (06 Mar 2019 05:27) (122/65 - 139/55)  RR: 14 (06 Mar 2019 05:27) (14 - 15)  SpO2: 98% (06 Mar 2019 05:27) (98% - 99%)  I&O's Summary    05 Mar 2019 07:01  -  06 Mar 2019 07:00  --------------------------------------------------------  IN: 2194 mL / OUT: 3180 mL / NET: -986 mL        PHYSICAL EXAM:  General: NAD  ENT: MMM  Neck: Supple, No JVD  Lungs: Clear to auscultation bilaterally  Cardio: RRR, S1/S2, No murmurs  Abdomen: Soft, Nontender, Nondistended; Bowel sounds present  Extremities: No cyanosis, No edema  Neuro: no new deficits  Skin: no rashes  Psych: AAO    LABS:                        7.5    8.1   )-----------( 202      ( 05 Mar 2019 06:15 )             24.7     03-05    140  |  106  |  20  ----------------------------<  185  4.0   |  22  |  1.09    Ca    9.0      05 Mar 2019 06:15  Mg     1.3     03-04    TPro  6.8  /  Alb  1.9  /  TBili  0.3  /  DBili  x   /  AST  20  /  ALT  17  /  AlkPhos  27  03-04    eGFR if Non African American: 62 mL/min/1.73M2 (03-05-19 @ 06:15)  eGFR if African American: 72 mL/min/1.73M2 (03-05-19 @ 06:15)            12-23 Chol 95 mg/dL LDL 55 mg/dL HDL 26 mg/dL Trig 69 mg/dL        CAPILLARY BLOOD GLUCOSE        12-23 [id number] 6.3          RADIOLOGY & ADDITIONAL TESTS:    Care Discussed with Consultants/Other Providers: CC: Patient is a 84y old  Male who presents with a chief complaint of Anemia and Leukocytosis (05 Mar 2019 19:48)      S: No f/c/n/v/pain    Patient seen and examined at bedside.    ALLERGIES:  No Known Allergies      MEDICATIONS:  acetaminophen    Suspension .. 650 milliGRAM(s) Oral every 6 hours PRN  allopurinol 100 milliGRAM(s) Oral daily  amLODIPine   Tablet 10 milliGRAM(s) Oral daily  aspirin enteric coated 81 milliGRAM(s) Oral daily  atorvastatin 10 milliGRAM(s) Oral at bedtime  enoxaparin Injectable 40 milliGRAM(s) SubCutaneous daily  folic acid 1 milliGRAM(s) Oral daily  furosemide    Tablet 40 milliGRAM(s) Oral every 12 hours  indomethacin 25 milliGRAM(s) Oral two times a day PRN  levETIRAcetam 500 milliGRAM(s) Oral two times a day  metoprolol tartrate 25 milliGRAM(s) Oral every 12 hours  pantoprazole    Tablet 40 milliGRAM(s) Oral before breakfast  tamsulosin 0.4 milliGRAM(s) Oral at bedtime        Vital Signs Last 24 Hrs  T(F): 98.2 (06 Mar 2019 05:27), Max: 98.6 (05 Mar 2019 20:27)  HR: 86 (06 Mar 2019 05:27) (86 - 106)  BP: 122/65 (06 Mar 2019 05:27) (122/65 - 139/55)  RR: 14 (06 Mar 2019 05:27) (14 - 15)  SpO2: 98% (06 Mar 2019 05:27) (98% - 99%)  I&O's Summary    05 Mar 2019 07:01  -  06 Mar 2019 07:00  --------------------------------------------------------  IN: 2194 mL / OUT: 3180 mL / NET: -986 mL        PHYSICAL EXAM:  General: NAD  ENT: MMM  Neck: Supple, No JVD  Lungs: Clear to auscultation bilaterally  Cardio: RRR, S1/S2, No murmurs  Abdomen: Soft, Nontender, Nondistended; Bowel sounds present  Extremities: No cyanosis, +LE edema  Neuro: no new deficits  Skin: no rashes  Psych: AAO    LABS:                        7.5    8.1   )-----------( 202      ( 05 Mar 2019 06:15 )             24.7     03-05    140  |  106  |  20  ----------------------------<  185  4.0   |  22  |  1.09    Ca    9.0      05 Mar 2019 06:15  Mg     1.3     03-04    TPro  6.8  /  Alb  1.9  /  TBili  0.3  /  DBili  x   /  AST  20  /  ALT  17  /  AlkPhos  27  03-04    eGFR if Non African American: 62 mL/min/1.73M2 (03-05-19 @ 06:15)  eGFR if African American: 72 mL/min/1.73M2 (03-05-19 @ 06:15)            12-23 Chol 95 mg/dL LDL 55 mg/dL HDL 26 mg/dL Trig 69 mg/dL        CAPILLARY BLOOD GLUCOSE        12-23 [id number] 6.3          RADIOLOGY & ADDITIONAL TESTS:    Care Discussed with Consultants/Other Providers:

## 2019-03-06 NOTE — H&P ADULT - HISTORY OF PRESENT ILLNESS
Patient is 84 male with PMH significant for HTN, HLD,  Afib (off xarelto), stomach CA, anemia, gout, BPH. seizure d/o, recently stopped EToh use, who presented from [facility] with abnormal labs.  Pt reports recent admission to [facility] for gout flare then discharged to rehab.  Pt now returns with guaiac negative anemia (HgB 6.8 ) and  episode of bright red blood in stool. Pt also found with leukocytosis, with temp 100.7 F rectally.  Denies chills, fever, sob, chest pain, weakness, dysuria.    Patient was seen by GI and hematology.  S/P Colonoscopy on 3/1/19. No source of bleeding noted. Negative FOB. S/P EGD during prior admission, and no bleeding identified. Heme performed BM biopsy 3/4, results pending. CKD, Etoh may contribute, although underlying BM d/o such as myelodysplasia suspected. Transfused PRBC. Patient transferred to - IRF due to debility and functional decline Patient is 84 male RH dominant with PMH significant for HTN, HLD,  Afib (off xarelto), stomach CA, anemia, gout, BPH. seizure d/o, recently stopped EToh use, who presented from [facility] with abnormal labs.  Pt reports recent admission to [facility] for gout flare then discharged to rehab.  Pt now returns with guaiac negative anemia (HgB 6.8 ) and  episode of bright red blood in stool. Pt also found with leukocytosis, with temp 100.7 F rectally.  Denies chills, fever, sob, chest pain, weakness, dysuria.    Patient was seen by GI and hematology.  S/P Colonoscopy on 3/1/19. No source of bleeding noted. Negative FOB. S/P EGD during prior admission, and no bleeding identified. Heme performed BM biopsy 3/4, results pending. CKD, Etoh may contribute, although underlying BM d/o such as myelodysplasia suspected. Transfused PRBC. Patient transferred to - IRF due to debility and functional decline

## 2019-03-06 NOTE — H&P ADULT - NSHPLABSRESULTS_GEN_ALL_CORE
RECENT LABS    Vital Signs Last 24 Hrs  T(C): 37 (06 Mar 2019 13:00), Max: 37 (05 Mar 2019 20:27)  T(F): 98.6 (06 Mar 2019 13:00), Max: 98.6 (05 Mar 2019 20:27)  HR: 96 (06 Mar 2019 13:00) (86 - 106)  BP: 148/76 (06 Mar 2019 13:00) (122/65 - 148/76)  BP(mean): --  RR: 14 (06 Mar 2019 13:00) (14 - 15)  SpO2: 97% (06 Mar 2019 13:00) (97% - 99%)                          9.0    12.1  )-----------( 235      ( 06 Mar 2019 09:53 )             27.8     03-06    134<L>  |  99  |  22  ----------------------------<  267<H>  4.1   |  23  |  1.22    Ca    9.0      06 Mar 2019 09:53          CAPILLARY BLOOD GLUCOSE RECENT LABS    Vital Signs Last 24 Hrs  T(C): 37 (06 Mar 2019 13:00), Max: 37 (05 Mar 2019 20:27)  T(F): 98.6 (06 Mar 2019 13:00), Max: 98.6 (05 Mar 2019 20:27)  HR: 96 (06 Mar 2019 13:00) (86 - 106)  BP: 148/76 (06 Mar 2019 13:00) (122/65 - 148/76)  BP(mean): --  RR: 14 (06 Mar 2019 13:00) (14 - 15)  SpO2: 97% (06 Mar 2019 13:00) (97% - 99%)                          9.0    12.1  )-----------( 235      ( 06 Mar 2019 09:53 )             27.8     03-06    134<L>  |  99  |  22  ----------------------------<  267<H>  4.1   |  23  |  1.22    Ca    9.0      06 Mar 2019 09:53          CAPILLARY BLOOD GLUCOSE    < from: 12 Lead ECG (02.27.19 @ 17:51) >  Atrial fibrillation  Right bundle branch block  Left anterior fascicular block  *** Bifascicular block ***  Abnormal ECG  When compared with ECG of 05-FEB-2019 11:44,  Nonspecific T wave abnormality now evident in Inferior leads    < end of copied text >    ECHO 3/4/19:  Summary:   1. Moderate calcific aortic stenosis with moderate aortic insufficiency   2. Moderate mitral insuffciency   3. Biatrial enlargement   4. Normal global left ventricular systolic function.   5. Spectral Doppler shows restrictive pattern of left ventricular   myocardial filling (Grade III diastolic dysfunction).   6. Mild-moderate tricuspid regurgitation.   7. Dilatation of the aortic root.   8. Estimated pulmonary artery systolic pressure is 47.0 mmHg assuming a   right atrial pressure of 10 mmHg, which is consistent with mild pulmonary   hypertension.   9. Pulmonary hypertension is present.  10. LA volume Index is 62.6 ml/m² ml/m2.    [id number] Trung Blake MD, [facility] , Electronically signed on 3/4/2019 at   2:52:47 PM     PROCEDURE DATE:  12/23/2018          INTERPRETATION:  Radiographs of the left knee         CLINICAL INFORMATION:  Pain    TECHNIQUE:   Frontal, lateral and oblique views of the knee were   obtained.     FINDINGS:  No prior exams are available for comparison.    No fracture is seen. A joint effusion is found.  No loose body is   identified. There is chondrocalcinosis suggestive of CPPD. Minimal   productive changes are noted. Atherosclerotic vascular calcifications are   noted. The soft tissues appear intact    IMPRESSION:   Joint effusion  Chondrocalcinosis suggestive of CPPD.

## 2019-03-06 NOTE — DISCHARGE NOTE NURSING/CASE MANAGEMENT/SOCIAL WORK - NSDCDPATPORTLINK_GEN_ALL_CORE
You can access the [facility] Patient Portal, offered by [facility], by registering with the following website: [email or web address]

## 2019-03-06 NOTE — H&P ADULT - NSHPSOCIALHISTORY_GEN_ALL_CORE
Soc: Lives with friend and house keeper in private home 3 steps to enter and 7 to bedroom.  pt amb Independent without assistive device, denies limitation in ambulation distance or h/o falls. Some difficulty with stair negotiation./ Bed and bath on top floor. Denies any difficulty with bADLs    Function: mod assist bed mobility  mod assist x 1 person with RW in sit to stand transfers  unable to ambulate due to dizziness and weakness, fair static standing balance, fair-good sitting balance

## 2019-03-06 NOTE — PROGRESS NOTE ADULT - ASSESSMENT
84-year-old gentleman with history of anemia, atrial fibrillation and seizure disorder admitted with anemia exacerbation from his [place] facility. Patient notes recent history of bright red blood per rectum. He was scheduled for outpatient colonoscopy.  On admission, found to have rectal temp of 100.7°F in the emergency department.  Patient reports he stopped drinking alcohol approximately 3 months prior to admission

## 2019-03-06 NOTE — H&P ADULT - ASSESSMENT
Patient is 84 male with PMH significant for HTN, HLD,  Afib (off xarelto), stomach CA, anemia, gout, BPH. seizure d/o, recently stopped EToh use, p/w chronic anemia possibly due to myelodysplastic syndrome, s/p tx PRBC, debility and functional decline      #Anemia:   -f/u bone marrow biopsy reuslts  -monitor H/H  -hospitalist consult  -continue folic acid, MVI  -continue pantoprazole  -begin comprehensive rehab program OT, PT    #Afib: discussed with pt's Cardiologist, wants to try resuming AC, no active bleeding, pt has been off of AC for 3 months and Hg is the same as it has been for months, still low, but will give trial of AC as pt will be monitored in rehab if ok with Cardiology and GI    #Ureteral calculus: seen on Feb 28th, normal Cr, voiding, no symptoms, stable on repeat CT abd  -monitor voiding patterns, symptoms  -bladder scan, PVR's, toileting program, adequate hydration      #Gout:   -pt demands Indomethacin, given prn, monitor renal function and CBC  -allopurinol  -BMP in AM 3/7    #DVT PPX  -continue lovenox      Precautions:        fall, cardiac                                                                          Diet: regular    DVT Prophylaxis:                                  lovenox                                        Medical Prognosis: good    Prescreen Comparison: I have reviewed the prescreen information and I found no relevant changes between the preadmission  screening and my post admission evaluation.     Expected Therapy:   P.T.        hrs/day           O. T.      hrs/day           S.L.P.        hrs/day                    P&O                                                   Excpected Frequency: 5 days/7 day period    Rehab Potential:                                           Estimated Disposition:                          ELOS:              days      Rationale For Inpatient Rehab Admission- Patient demonstrates the following:     [X] Medically appropriate for rehabilitation admission   [X] Has attainable rehab goals with an approrpriate discharge plan  [X] Has rehabilitation potential (expected to make significant improvement within a reasonable period of time)  [X] Requires close medical management by a rehab physician, rehab nursing care and comprehensive interdisciplinary team (including         PT, OT, SLP and/or prosthetics and orthotics) Patient is 84 male with PMH significant for HTN, HLD,  Afib (off xarelto), stomach CA, anemia, gout, BPH. seizure d/o, recently stopped EToh use, p/w chronic anemia possibly due to myelodysplastic syndrome, s/p tx PRBC, debility and functional decline      #Anemia:   -f/u bone marrow biopsy reuslts  -monitor H/H  -hospitalist consult  -continue folic acid, MVI  -continue pantoprazole  -begin comprehensive rehab program OT, PT    #Afib:   -f/u with hospitalist re: starting AC  -monitor vitals    #HTN  -continue norvasc, lasix  -monitor vitals  -monitor BMP    #Gout:   -pt demands Indomethacin, given prn, monitor renal function and CBC  -allopurinol  -BMP in AM 3/7    #BPH:  -continue flomax, bladder scan      #Ureteral calculus: seen on Feb 28th, normal Cr, voiding, no symptoms, stable on repeat CT abd  -monitor voiding patterns, symptoms  -bladder scan, PVR's, toileting program, adequate hydration    #DVT PPX  -continue lovenox      Precautions:        fall, cardiac                                                                          Diet: regular    DVT Prophylaxis:                                  lovenox                                        Medical Prognosis: good    Prescreen Comparison: I have reviewed the prescreen information and I found no relevant changes between the preadmission  screening and my post admission evaluation.     Expected Therapy:   P.T.        hrs/day           O. T.      hrs/day           S.L.P.        hrs/day                    P&O                                                   Excpected Frequency: 5 days/7 day period    Rehab Potential:                                           Estimated Disposition:                          ELOS:              days      Rationale For Inpatient Rehab Admission- Patient demonstrates the following:     [X] Medically appropriate for rehabilitation admission   [X] Has attainable rehab goals with an approrpriate discharge plan  [X] Has rehabilitation potential (expected to make significant improvement within a reasonable period of time)  [X] Requires close medical management by a rehab physician, rehab nursing care and comprehensive interdisciplinary team (including         PT, OT, SLP and/or prosthetics and orthotics) Patient is 84 male with PMH significant for HTN, HLD,  Afib (off xarelto), stomach CA, anemia, gout, BPH. seizure d/o, recently stopped EToh use, p/w chronic anemia possibly due to myelodysplastic syndrome, s/p tx PRBC, debility and functional decline      #Anemia:   -f/u bone marrow biopsy reuslts  -monitor H/H  -hospitalist consult  -continue folic acid, MVI  -continue pantoprazole  -begin comprehensive rehab program OT, PT    #Afib:   -f/u with hospitalist re: starting AC  -continue metoprolol, cardiac precautions  -monitor vitals    #HTN  -continue norvasc, lasix  -monitor vitals  -monitor BMP    #SZ  -controlled on keppra    #Gout:   -pt demands Indomethacin, given prn, monitor renal function and CBC  -allopurinol  -BMP in AM 3/7    #BPH:  -continue flomax, bladder scan      #Ureteral calculus: seen on Feb 28th, normal Cr, voiding, no symptoms, stable on repeat CT abd  -monitor voiding patterns, symptoms  -bladder scan, PVR's, toileting program, adequate hydration    #DVT PPX  -continue lovenox      Precautions:        fall, cardiac                                                                          Diet: regular    DVT Prophylaxis:                                  lovenox                                        Medical Prognosis: good    Prescreen Comparison: I have reviewed the prescreen information and I found no relevant changes between the preadmission  screening and my post admission evaluation.     Expected Therapy:   P.T.        hrs/day           O. T.      hrs/day           S.L.P.        hrs/day                    P&O                                                   Excpected Frequency: 5 days/7 day period    Rehab Potential:                                           Estimated Disposition:                          ELOS:              days      Rationale For Inpatient Rehab Admission- Patient demonstrates the following:     [X] Medically appropriate for rehabilitation admission   [X] Has attainable rehab goals with an approrpriate discharge plan  [X] Has rehabilitation potential (expected to make significant improvement within a reasonable period of time)  [X] Requires close medical management by a rehab physician, rehab nursing care and comprehensive interdisciplinary team (including         PT, OT, SLP and/or prosthetics and orthotics) Patient is 84 male with PMH significant for HTN, HLD,  Afib (off xarelto), stomach CA, anemia, gout, BPH. 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  -pt demands Indomethacin, given prn, monitor renal function and CBC  -allopurinol  -uric acid level 3/7 given swelling, warmth, pain  -BMP in AM 3/7    #BPH:  -continue flomax, bladder scan      #Ureteral calculus: seen on Feb 28th, normal Cr, voiding, no symptoms, stable on repeat CT abd  -monitor voiding patterns, symptoms  -bladder scan, PVR's, toileting program, adequate hydration    #DVT PPX  -continue lovenox      Precautions:        fall, cardiac                                                                          Diet: regular    DVT Prophylaxis:                                  lovenox                                        Medical Prognosis: good    Prescreen Comparison: I have reviewed the prescreen information and I found no relevant changes between the preadmission  screening and my post admission evaluation.     Expected Therapy:   P.T.        hrs/day           O. T.      hrs/day           S.L.P.        hrs/day                    P&O                                                   Excpected Frequency: 5 days/7 day period    Rehab Potential:                                           Estimated Disposition:                          ELOS:              days      Rationale For Inpatient Rehab Admission- Patient demonstrates the following:     [X] Medically appropriate for rehabilitation admission   [X] Has attainable rehab goals with an approrpriate discharge plan  [X] Has rehabilitation potential (expected to make significant improvement within a reasonable period of time)  [X] Requires close medical management by a rehab physician, rehab nursing care and comprehensive interdisciplinary team (including         PT, OT, SLP and/or prosthetics and orthotics)      LABS:  CBC, CMP ,uric acid 3/7  hospitalist consult  neurologist consult Patient is 84 male with PMH significant for HTN, HLD,  Afib (off xarelto), stomach CA, anemia, gout, BPH. 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 WBAT  -uric acid level 3/7 given swelling, warmth, pain  -BMP in AM 3/7    #BPH:  -continue flomax, bladder scan      #Ureteral calculus: seen on Feb 28th, normal Cr, voiding, no symptoms, stable on repeat CT abd  -monitor voiding patterns, symptoms  -bladder scan, PVR's, toileting program, adequate hydration    #DVT PPX  -continue lovenox      Precautions:        fall, cardiac                                                                          Diet: regular    DVT Prophylaxis:                                  lovenox                                        Medical Prognosis: good    Prescreen Comparison: I have reviewed the prescreen information and I found no relevant changes between the preadmission  screening and my post admission evaluation.     Expected Therapy:   P.T.        hrs/day           O. T.      hrs/day           S.L.P.        hrs/day                    P&O                                                   Excpected Frequency: 5 days/7 day period    Rehab Potential:                                           Estimated Disposition:                          ELOS:              days      Rationale For Inpatient Rehab Admission- Patient demonstrates the following:     [X] Medically appropriate for rehabilitation admission   [X] Has attainable rehab goals with an approrpriate discharge plan  [X] Has rehabilitation potential (expected to make significant improvement within a reasonable period of time)  [X] Requires close medical management by a rehab physician, rehab nursing care and comprehensive interdisciplinary team (including         PT, OT, SLP and/or prosthetics and orthotics)      LABS:  CBC, CMP ,uric acid 3/7  hospitalist consult  neurologist consult

## 2019-03-06 NOTE — PROGRESS NOTE ADULT - PROVIDER SPECIALTY LIST ADULT
Cardiology
Gastroenterology
Gastroenterology
Heme/Onc
Hospitalist

## 2019-03-06 NOTE — DISCHARGE NOTE PROVIDER - HOSPITAL COURSE
Pt is a 84 y o PMH anemia, Gout, Afib off xarelto for one month, seizure, came from [facility] presented with abnormal labs admitted for anemia, fever.        #Anemia: pt had colonscopy with no acute findings, has had Hg of 7 since December, has been off AC for 3 months, no acute change now, will need outpt f/u, bone marrow biopsy done inpt, transfusion ordered yesterday, if Hg stable will plan for rehab today or when bed available        #Afib: discussed with pt's Cardiologist, wants to try resuming AC, no active bleeding, pt has been off of AC for 3 months and Hg is the same as it has been for months, still low, but will give trial of AC as pt will be monitored in rehab if ok with Cardiology and GI        #Ureteral calculus: seen on Feb 28th, normal Cr, voiding, no symptoms, stable on repeat CT abd, consulted Urology to evaluate        #Fever: blood cultures neg        #Gout: pt demands Indomethacin, given prn, monitor renal function        #Debility: Acute rehab today

## 2019-03-06 NOTE — PROGRESS NOTE ADULT - ASSESSMENT
Pt is a 84 y o PMH anemia, Gout, Afib off xarelto for one month, seizure, came from [facility] presented with abnormal labs admitted for anemia, fever.    #Anemia: pt had colonscopy with no acute findings, has had Hg of 7 since December, has been off AC for 3 months, no acute change now, will need outpt f/u, bone marrow biopsy done inpt, transfusion ordered yesterday, if Hg stable will plan for rehab today or when bed available    #Afib: per Cardiology favor resuming Xarelto if no active bleeding, however, discussed with pt and he is more comfortable with his own Cardiologist making these decisions, pt has been off of AC for 3 months and Hg is the same as it has been for months, still low, attempted to reach  to notify him but no answer and no voicemail    #Ureteral calculus: seen on Feb 28th, normal Cr, voiding, no symptoms, stable on repeat CT abd, consulted Urology to evaluate    #Fever: blood cultures neg    #Gout: pt demands Indomethacin, given prn, monitor renal function    #Debility: pt requested Acute rehab eval, if denied will try [facility] Pt is a 84 y o PMH anemia, Gout, Afib off xarelto for one month, seizure, came from [facility] presented with abnormal labs admitted for anemia, fever.    #Anemia: pt had colonscopy with no acute findings, has had Hg of 7 since December, has been off AC for 3 months, no acute change now, will need outpt f/u, bone marrow biopsy done inpt, transfusion ordered yesterday, if Hg stable will plan for rehab today or when bed available    #Afib: discussed with pt's Cardiologist, wants to try resuming AC, no active bleeding, pt has been off of AC for 3 months and Hg is the same as it has been for months, still low, but will give trial of AC as pt will be monitored in rehab if ok with Cardiology and GI    #Ureteral calculus: seen on Feb 28th, normal Cr, voiding, no symptoms, stable on repeat CT abd, consulted Urology to evaluate    #Fever: blood cultures neg    #Gout: pt demands Indomethacin, given prn, monitor renal function    #Debility: Acute rehab today

## 2019-03-07 LAB
APPEARANCE UR: CLEAR — SIGNIFICANT CHANGE UP
BACTERIA # UR AUTO: ABNORMAL /HPF
BILIRUB UR-MCNC: NEGATIVE — SIGNIFICANT CHANGE UP
COLOR SPEC: YELLOW — SIGNIFICANT CHANGE UP
COMMENT - URINE: SIGNIFICANT CHANGE UP
COMMENT - URINE: SIGNIFICANT CHANGE UP
DIFF PNL FLD: ABNORMAL
EPI CELLS # UR: SIGNIFICANT CHANGE UP
GLUCOSE UR QL: 50 MG/DL
GRAN CASTS # UR COMP ASSIST: ABNORMAL
HBA1C BLD-MCNC: 7 % — HIGH (ref 4–5.6)
KETONES UR-MCNC: NEGATIVE — SIGNIFICANT CHANGE UP
LEUKOCYTE ESTERASE UR-ACNC: NEGATIVE — SIGNIFICANT CHANGE UP
NITRITE UR-MCNC: NEGATIVE — SIGNIFICANT CHANGE UP
PH UR: 5 — SIGNIFICANT CHANGE UP (ref 5–8)
PROT UR-MCNC: 30 MG/DL
RBC CASTS # UR COMP ASSIST: ABNORMAL /HPF (ref 0–4)
SP GR SPEC: 1.02 — SIGNIFICANT CHANGE UP (ref 1.01–1.02)
T4 AB SER-ACNC: 6.5 UG/DL — SIGNIFICANT CHANGE UP (ref 4.6–12)
TSH SERPL-MCNC: 2.58 UIU/ML — SIGNIFICANT CHANGE UP (ref 0.27–4.2)
URATE SERPL-MCNC: 6.8 MG/DL — SIGNIFICANT CHANGE UP (ref 3.4–8.8)
UROBILINOGEN FLD QL: NEGATIVE — SIGNIFICANT CHANGE UP
WBC UR QL: NEGATIVE /HPF — SIGNIFICANT CHANGE UP (ref 0–5)

## 2019-03-07 PROCEDURE — 99223 1ST HOSP IP/OBS HIGH 75: CPT

## 2019-03-07 PROCEDURE — 99233 SBSQ HOSP IP/OBS HIGH 50: CPT

## 2019-03-07 RX ORDER — METOPROLOL TARTRATE 50 MG
25 TABLET ORAL EVERY 12 HOURS
Qty: 0 | Refills: 0 | Status: DISCONTINUED | OUTPATIENT
Start: 2019-03-07 | End: 2019-04-10

## 2019-03-07 RX ORDER — AMLODIPINE BESYLATE 2.5 MG/1
10 TABLET ORAL DAILY
Qty: 0 | Refills: 0 | Status: DISCONTINUED | OUTPATIENT
Start: 2019-03-07 | End: 2019-04-10

## 2019-03-07 RX ORDER — FUROSEMIDE 40 MG
40 TABLET ORAL EVERY 12 HOURS
Qty: 0 | Refills: 0 | Status: DISCONTINUED | OUTPATIENT
Start: 2019-03-07 | End: 2019-03-08

## 2019-03-07 RX ADMIN — LEVETIRACETAM 500 MILLIGRAM(S): 250 TABLET, FILM COATED ORAL at 06:59

## 2019-03-07 RX ADMIN — Medication 1 TABLET(S): at 11:34

## 2019-03-07 RX ADMIN — Medication 25 MILLIGRAM(S): at 06:59

## 2019-03-07 RX ADMIN — ENOXAPARIN SODIUM 40 MILLIGRAM(S): 100 INJECTION SUBCUTANEOUS at 11:34

## 2019-03-07 RX ADMIN — Medication 81 MILLIGRAM(S): at 11:34

## 2019-03-07 RX ADMIN — Medication 25 MILLIGRAM(S): at 09:09

## 2019-03-07 RX ADMIN — LEVETIRACETAM 500 MILLIGRAM(S): 250 TABLET, FILM COATED ORAL at 17:30

## 2019-03-07 RX ADMIN — Medication 25 MILLIGRAM(S): at 00:17

## 2019-03-07 RX ADMIN — AMLODIPINE BESYLATE 10 MILLIGRAM(S): 2.5 TABLET ORAL at 06:59

## 2019-03-07 RX ADMIN — Medication 25 MILLIGRAM(S): at 01:00

## 2019-03-07 RX ADMIN — Medication 40 MILLIGRAM(S): at 06:59

## 2019-03-07 RX ADMIN — Medication 1 MILLIGRAM(S): at 11:34

## 2019-03-07 RX ADMIN — ATORVASTATIN CALCIUM 10 MILLIGRAM(S): 80 TABLET, FILM COATED ORAL at 22:05

## 2019-03-07 RX ADMIN — Medication 25 MILLIGRAM(S): at 17:30

## 2019-03-07 RX ADMIN — TAMSULOSIN HYDROCHLORIDE 0.4 MILLIGRAM(S): 0.4 CAPSULE ORAL at 22:05

## 2019-03-07 RX ADMIN — Medication 100 MILLIGRAM(S): at 11:34

## 2019-03-07 RX ADMIN — Medication 25 MILLIGRAM(S): at 09:21

## 2019-03-07 RX ADMIN — Medication 40 MILLIGRAM(S): at 17:30

## 2019-03-07 RX ADMIN — Medication 100 MILLIGRAM(S): at 17:30

## 2019-03-07 NOTE — DIETITIAN INITIAL EVALUATION ADULT. - ADHERENCE
n/a/Patient Does Follow Low Sodium Diet @Home (Declines Dietary Restrictions While Here)& Doesn't Take Supplements @Home

## 2019-03-07 NOTE — DIETITIAN INITIAL EVALUATION ADULT. - NUTRITION INTERVENTION
Medical Food Supplements/Nutrition Education/Collaboration and Referral of Nutrition Care/Meals and Snack Nutrition Education/Meals and Snack/Collaboration and Referral of Nutrition Care

## 2019-03-07 NOTE — PROGRESS NOTE ADULT - ASSESSMENT
Patient is 84 male with PMH significant for HTN, HLD,  Afib (off xarelto), stomach CA, anemia, gout, BPH. 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 WBAT  -uric acid level 3/7 given swelling, warmth, pain  -BMP in AM 3/7    #BPH:  -continue flomax, bladder scan      #Ureteral calculus: seen on Feb 28th, normal Cr, voiding, no symptoms, stable on repeat CT abd  -monitor voiding patterns, symptoms  -bladder scan, PVR's, toileting program, adequate hydration    #DVT PPX  -continue lovenox      Precautions:        fall, cardiac                                                                          Diet: regular    DVT Prophylaxis:                                  lovenox                                        Medical Prognosis: good    Prescreen Comparison: I have reviewed the prescreen information and I found no relevant changes between the preadmission  screening and my post admission evaluation.     Expected Therapy:   P.T.        hrs/day           O. T.      hrs/day           S.L.P.        hrs/day                    P&O                                                   Excpected Frequency: 5 days/7 day period    Rehab Potential:                                           Estimated Disposition:                          ELOS:              days      Rationale For Inpatient Rehab Admission- Patient demonstrates the following:     [X] Medically appropriate for rehabilitation admission   [X] Has attainable rehab goals with an approrpriate discharge plan  [X] Has rehabilitation potential (expected to make significant improvement within a reasonable period of time)  [X] Requires close medical management by a rehab physician, rehab nursing care and comprehensive interdisciplinary team (including         PT, OT, SLP and/or prosthetics and orthotics)      LABS:  CBC, CMP ,uric acid 3/7  hospitalist consult  neurologist consult Patient is 84 male with PMH significant for HTN, HLD,  Afib (off xarelto), stomach CA, anemia, gout, BPH. 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 Iron stores present. 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   - Await cytogenetics, MDS FISH panel.   - Check erythropoietin level-->to consider Procrit.  -continue folic acid, MVI  -begin comprehensive rehab program OT, PT    #Afib:   -continue metoprolol, cardiac precautions  -currently not on AC, negative w/u for active bleeding, f/u with hospitalist  -monitor vitals    #HTN  -continue norvasc, lasix  -parameters adjusted on medications, schedule changed    #SZ/tremors:  -controlled on keppra  -neuro consult for worsening tremors appreciated. likely multifactorial including essential tremor and metabolic causes, less likely from keppra at current dose. 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 Improved today  -allopurinol, indomethacin  -cold compress, ACE for support in standing activities. WBAT  -uric acid level 3/7   -BMP stable, montior    #BPH/ureteral stone:  consult appreciated  -continue flomax, bladder scan  Repeat U/A and urine culture  - Follow labs and continue rehab  - likely repeat KUB and renal sono next week.      #DVT PPX  -continue lovenox      DNR    Labs:  f/u uric acid  hospitalist consult  CBC , BMP in AM 3/8, 3/11  UA, C+S, CXR  Renal sono, KUB next week

## 2019-03-07 NOTE — DIETITIAN INITIAL EVALUATION ADULT. - NS AS NUTRI INTERV MEALS SNACK3
Continue Regular Diet w/ Thin Liquids (Declines Consistent Carbohydrate DASH-TLC Diet Despite Education)

## 2019-03-07 NOTE — DIETITIAN INITIAL EVALUATION ADULT. - DIET TYPE
regular/supplement (specify)/Declines Consistent Carbohydrate DASH-TLC Diet  & Recommend [place] 1 Packet BID

## 2019-03-07 NOTE — PROGRESS NOTE ADULT - SUBJECTIVE AND OBJECTIVE BOX
Patient is a 84y old  Male who presents with a chief complaint of anemia, debility and functional decline (07 Mar 2019 08:15)      HPI:  Patient is 84 male RH dominant with PMH significant for HTN, HLD,  Afib (off xarelto), stomach CA, anemia, gout, BPH. seizure d/o, recently stopped EToh use, who presented from [facility] with abnormal labs.  Pt reports recent admission to [facility] for gout flare then discharged to rehab.  Pt now returns with guaiac negative anemia (HgB 6.8 ) and  episode of bright red blood in stool. Pt also found with leukocytosis, with temp 100.7 F rectally.  Denies chills, fever, sob, chest pain, weakness, dysuria.    Patient was seen by GI and hematology.  S/P Colonoscopy on 3/1/19. No source of bleeding noted. Negative FOB. S/P EGD during prior admission, and no bleeding identified. Heme performed BM biopsy 3/4, results pending. CKD, Etoh may contribute, although underlying BM d/o such as myelodysplasia suspected. Transfused PRBC. Patient transferred to - [facility] due to debility and functional decline (06 Mar 2019 14:53)      PAST MEDICAL & SURGICAL HISTORY:  Nonrheumatic aortic valve stenosis  Cancer of stomach  Anemia, unspecified type  Seizure disorder  Benign prostatic hyperplasia, unspecified whether lower urinary tract symptoms present  Hyperlipemia  Essential hypertension  Chronic atrial fibrillation  Stomach cancer  Hypertension  Atrial fibrillation  Gout  No significant past surgical history  No significant past surgical history      MEDICATIONS  (STANDING):  allopurinol 100 milliGRAM(s) Oral daily  amLODIPine   Tablet 10 milliGRAM(s) Oral daily  aspirin enteric coated 81 milliGRAM(s) Oral daily  atorvastatin 10 milliGRAM(s) Oral at bedtime  docusate sodium 100 milliGRAM(s) Oral two times a day  enoxaparin Injectable 40 milliGRAM(s) SubCutaneous every 24 hours  folic acid 1 milliGRAM(s) Oral daily  furosemide    Tablet 40 milliGRAM(s) Oral every 12 hours  levETIRAcetam 500 milliGRAM(s) Oral two times a day  metoprolol tartrate 25 milliGRAM(s) Oral every 12 hours  multivitamin 1 Tablet(s) Oral daily  pantoprazole    Tablet 40 milliGRAM(s) Oral before breakfast  tamsulosin 0.4 milliGRAM(s) Oral at bedtime    MEDICATIONS  (PRN):  indomethacin 25 milliGRAM(s) Oral two times a day PRN Moderate Pain (4 - 6)  senna 2 Tablet(s) Oral at bedtime PRN Constipation      Allergies    No Known Allergies    Intolerances          VITALS  84y  Vital Signs Last 24 Hrs  T(C): 36.9 (07 Mar 2019 08:25), Max: 37.4 (06 Mar 2019 22:19)  T(F): 98.5 (07 Mar 2019 08:25), Max: 99.3 (06 Mar 2019 22:19)  HR: 90 (07 Mar 2019 08:25) (47 - 106)  BP: 121/67 (07 Mar 2019 08:25) (88/64 - 135/79)  BP(mean): --  RR: 16 (07 Mar 2019 08:25) (14 - 16)  SpO2: 100% (07 Mar 2019 08:25) (97% - 100%)  Daily     Daily Weight in k.4 (07 Mar 2019 08:59)        RECENT LABS:                          9.4    17.1  )-----------( 262      ( 06 Mar 2019 16:39 )             29.9     03-06    135  |  99  |  22  ----------------------------<  218<H>  4.2   |  24  |  1.25    Ca    8.9      06 Mar 2019 16:39    TPro  7.4  /  Alb  2.1<L>  /  TBili  0.7  /  DBili  x   /  AST  17  /  ALT  12  /  AlkPhos  30<L>  03-06    LIVER FUNCTIONS - ( 06 Mar 2019 16:39 )  Alb: 2.1 g/dL / Pro: 7.4 g/dL / ALK PHOS: 30 U/L / ALT: 12 U/L DA / AST: 17 U/L / GGT: x             Urinalysis Basic - ( 07 Mar 2019 07:45 )    Color: Yellow / Appearance: Clear / S.020 / pH: x  Gluc: x / Ketone: Negative  / Bili: Negative / Urobili: Negative   Blood: x / Protein: 30 mg/dL / Nitrite: Negative   Leuk Esterase: Negative / RBC: 5-10 /HPF / WBC Negative /HPF   Sq Epi: x / Non Sq Epi: Neg.-Few / Bacteria: Few /HPF      URIC ACID PENDING 3/7    CAPILLARY BLOOD GLUCOSE

## 2019-03-08 LAB
ANION GAP SERPL CALC-SCNC: 11 MMOL/L — SIGNIFICANT CHANGE UP (ref 5–17)
BUN SERPL-MCNC: 35 MG/DL — HIGH (ref 7–23)
CALCIUM SERPL-MCNC: 9 MG/DL — SIGNIFICANT CHANGE UP (ref 8.4–10.5)
CHLORIDE SERPL-SCNC: 100 MMOL/L — SIGNIFICANT CHANGE UP (ref 96–108)
CO2 SERPL-SCNC: 25 MMOL/L — SIGNIFICANT CHANGE UP (ref 22–31)
CREAT SERPL-MCNC: 1.34 MG/DL — HIGH (ref 0.5–1.3)
CULTURE RESULTS: NO GROWTH — SIGNIFICANT CHANGE UP
EOSINOPHIL NFR BLD AUTO: 1 % — SIGNIFICANT CHANGE UP (ref 0–6)
EPO SERPL-MCNC: 36.6 MIU/ML — HIGH (ref 2.6–18.5)
GLUCOSE SERPL-MCNC: 232 MG/DL — HIGH (ref 70–99)
HCT VFR BLD CALC: 25.7 % — LOW (ref 39–50)
HGB BLD-MCNC: 8.1 G/DL — LOW (ref 13–17)
LYMPHOCYTES # BLD AUTO: 6 % — LOW (ref 13–44)
MCHC RBC-ENTMCNC: 31.6 GM/DL — LOW (ref 32–36)
MCHC RBC-ENTMCNC: 32 PG — SIGNIFICANT CHANGE UP (ref 27–34)
MCV RBC AUTO: 101.6 FL — HIGH (ref 80–100)
MONOCYTES NFR BLD AUTO: 25 % — HIGH (ref 2–14)
NEUTROPHILS NFR BLD AUTO: 52 % — SIGNIFICANT CHANGE UP (ref 43–77)
PLATELET # BLD AUTO: 250 K/UL — SIGNIFICANT CHANGE UP (ref 150–400)
POTASSIUM SERPL-MCNC: 3.7 MMOL/L — SIGNIFICANT CHANGE UP (ref 3.5–5.3)
POTASSIUM SERPL-SCNC: 3.7 MMOL/L — SIGNIFICANT CHANGE UP (ref 3.5–5.3)
RBC # BLD: 2.53 M/UL — LOW (ref 4.2–5.8)
RBC # FLD: 19.8 % — HIGH (ref 10.3–14.5)
SODIUM SERPL-SCNC: 136 MMOL/L — SIGNIFICANT CHANGE UP (ref 135–145)
SPECIMEN SOURCE: SIGNIFICANT CHANGE UP
WBC # BLD: 18 K/UL — HIGH (ref 3.8–10.5)
WBC # FLD AUTO: 18 K/UL — HIGH (ref 3.8–10.5)

## 2019-03-08 PROCEDURE — 93970 EXTREMITY STUDY: CPT | Mod: 26

## 2019-03-08 PROCEDURE — 71045 X-RAY EXAM CHEST 1 VIEW: CPT | Mod: 26

## 2019-03-08 PROCEDURE — 99233 SBSQ HOSP IP/OBS HIGH 50: CPT

## 2019-03-08 PROCEDURE — 99232 SBSQ HOSP IP/OBS MODERATE 35: CPT

## 2019-03-08 RX ORDER — SODIUM CHLORIDE 9 MG/ML
1000 INJECTION INTRAMUSCULAR; INTRAVENOUS; SUBCUTANEOUS
Qty: 0 | Refills: 0 | Status: DISCONTINUED | OUTPATIENT
Start: 2019-03-08 | End: 2019-03-08

## 2019-03-08 RX ORDER — SODIUM CHLORIDE 9 MG/ML
1000 INJECTION INTRAMUSCULAR; INTRAVENOUS; SUBCUTANEOUS
Qty: 0 | Refills: 0 | Status: DISCONTINUED | OUTPATIENT
Start: 2019-03-08 | End: 2019-03-09

## 2019-03-08 RX ORDER — PREGABALIN 225 MG/1
1000 CAPSULE ORAL DAILY
Qty: 0 | Refills: 0 | Status: DISCONTINUED | OUTPATIENT
Start: 2019-03-08 | End: 2019-04-10

## 2019-03-08 RX ADMIN — Medication 25 MILLIGRAM(S): at 10:14

## 2019-03-08 RX ADMIN — AMLODIPINE BESYLATE 10 MILLIGRAM(S): 2.5 TABLET ORAL at 05:47

## 2019-03-08 RX ADMIN — Medication 25 MILLIGRAM(S): at 02:16

## 2019-03-08 RX ADMIN — TAMSULOSIN HYDROCHLORIDE 0.4 MILLIGRAM(S): 0.4 CAPSULE ORAL at 21:10

## 2019-03-08 RX ADMIN — Medication 25 MILLIGRAM(S): at 05:47

## 2019-03-08 RX ADMIN — Medication 100 MILLIGRAM(S): at 11:55

## 2019-03-08 RX ADMIN — ATORVASTATIN CALCIUM 10 MILLIGRAM(S): 80 TABLET, FILM COATED ORAL at 21:10

## 2019-03-08 RX ADMIN — SODIUM CHLORIDE 70 MILLILITER(S): 9 INJECTION INTRAMUSCULAR; INTRAVENOUS; SUBCUTANEOUS at 21:11

## 2019-03-08 RX ADMIN — Medication 25 MILLIGRAM(S): at 12:07

## 2019-03-08 RX ADMIN — SODIUM CHLORIDE 70 MILLILITER(S): 9 INJECTION INTRAMUSCULAR; INTRAVENOUS; SUBCUTANEOUS at 14:56

## 2019-03-08 RX ADMIN — Medication 81 MILLIGRAM(S): at 11:56

## 2019-03-08 RX ADMIN — Medication 25 MILLIGRAM(S): at 18:07

## 2019-03-08 RX ADMIN — Medication 40 MILLIGRAM(S): at 05:47

## 2019-03-08 RX ADMIN — Medication 1 TABLET(S): at 11:56

## 2019-03-08 RX ADMIN — LEVETIRACETAM 500 MILLIGRAM(S): 250 TABLET, FILM COATED ORAL at 05:47

## 2019-03-08 RX ADMIN — LEVETIRACETAM 500 MILLIGRAM(S): 250 TABLET, FILM COATED ORAL at 18:02

## 2019-03-08 RX ADMIN — Medication 1 MILLIGRAM(S): at 11:56

## 2019-03-08 RX ADMIN — ENOXAPARIN SODIUM 40 MILLIGRAM(S): 100 INJECTION SUBCUTANEOUS at 11:55

## 2019-03-08 RX ADMIN — Medication 100 MILLIGRAM(S): at 18:01

## 2019-03-08 RX ADMIN — Medication 25 MILLIGRAM(S): at 01:02

## 2019-03-08 NOTE — PROGRESS NOTE ADULT - ASSESSMENT
84 male with PMH significant for HTN, HLD,  Afib (off xarelto), stomach CA, anemia, gout, BPH. 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   - F/u erythropoietin level-->to consider Procrit.  -continue folic acid, MVI  - HEME consult appreciated  - guaiac negative, s/p colonoscopy  - comprehensive PT/OT/ rehab    #leukocytosis up trending since yesterday  -monitor CBC, ordered for tomorrow  -ordered CXR, f/u urine Cx   -ordered gentle hydration    #YUKI  gentle hydration  avoid nephrotoxins      # B/l LE edema  - check LE venous dopplers to r/o DVT  -if negative , Teds    #Afib:   -continue metoprolol, cardiac precautions  -currently not on AC  -has been off AC for 3 months   - consider restarting if cleared by GI/cardio   -monitor vitals    #HTN  -BP controlled   -is norvasc, lasix, hold lasix for now  -parameters adjusted on medications, schedule changed    #SZ/tremors: better today  -controlled on keppra  -neuro consult appreciated:. likely multifactorial including essential tremor and metabolic causes    #Gout/CPPD: stable  -allopurinol, indomethacin prn    #BPH/right ureteral stone:   - consult appreciated  -continue flomax, bladder scan  - Repeat U/A NEGATIVE, urine culture pending  - likely repeat KUB and renal sono next week.      #DVT PPX  -continue lovenox sq    #left heel blister  -swiss cheese boots for positioning      DNR

## 2019-03-08 NOTE — PROGRESS NOTE ADULT - ASSESSMENT
Patient is 84 male with PMH significant for HTN, HLD,  Afib (off xarelto), stomach CA, anemia, gout, BPH. seizure d/o, recently stopped EToh use, p/w chronic anemia possibly due to myelodysplastic syndrome, s/p tx PRBC, debility and functional decline      #Anemia/debility: Possible CML based on BM bx  -f/u bone marrow biopsy reuslts: HEME consult appreciated:   - Await cytogenetics, MDS FISH panel.   - Check erythropoietin level-->to consider Procrit.  -continue folic acid, MVI  -begin comprehensive rehab program OT, PT    #leukocytosis and episode orthostasis  -hospitalist to see patient, discussed today 3/8  -monitor CBC, ordered for AM 3/9  -CXR, urine Cx pending  -start IVF, RI. No retention on bladder scan     #Afib:   -continue metoprolol, cardiac precautions  -currently not on AC, negative w/u for active bleeding, f/u with hospitalist  -monitor vitals    #HTN  -continue norvasc, lasix  -parameters adjusted on medications, schedule changed    #SZ/tremors: better today  -controlled on keppra  -neuro consult appreciated:. likely multifactorial including essential tremor and metabolic causes, less likely from keppra at current dose. No active workup recommended    #Gout/CPPD:   -allopurinol, indomethacin    #BPH/ureteral stone:  consult appreciated  -continue flomax, bladder scan  Repeat U/A NEGATIVE  - urine culture pending  - likely repeat KUB and renal sono next week.      #DVT PPX  -continue lovenox    #left heel blister  -swiss cheese boots for positioning      DNR    Labs:    hospitalist consult for elev WBC, discussed with pateint  NITHYA for orthostasis  CBC , BMP in AM 3/9, 3/11  URINE CX  Renal sono, KUB next week

## 2019-03-08 NOTE — PROGRESS NOTE ADULT - ASSESSMENT
84-year-old gentleman with history of anemia, atrial fibrillation and seizure disorder admitted with anemia exacerbation from his [place] facility. Patient notes recent history of bright red blood per rectum. He was scheduled for outpatient colonoscopy.  On admission, found to have rectal temp of 100.7°F in the emergency department.  Patient reports he stopped drinking alcohol approximately 3 months prior to admission

## 2019-03-08 NOTE — PROGRESS NOTE ADULT - SUBJECTIVE AND OBJECTIVE BOX
Patient is a 84y old  Male who presents with a chief complaint of anemia, debility and functional decline (08 Mar 2019 14:12)    Patient seen and examined, has b/l LE edema, denies any other complaints    MEDICATIONS  (STANDING):  allopurinol 100 milliGRAM(s) Oral daily  amLODIPine   Tablet 10 milliGRAM(s) Oral daily  aspirin enteric coated 81 milliGRAM(s) Oral daily  atorvastatin 10 milliGRAM(s) Oral at bedtime  docusate sodium 100 milliGRAM(s) Oral two times a day  enoxaparin Injectable 40 milliGRAM(s) SubCutaneous every 24 hours  folic acid 1 milliGRAM(s) Oral daily  levETIRAcetam 500 milliGRAM(s) Oral two times a day  metoprolol tartrate 25 milliGRAM(s) Oral every 12 hours  multivitamin 1 Tablet(s) Oral daily  pantoprazole    Tablet 40 milliGRAM(s) Oral before breakfast  sodium chloride 0.9%. 1000 milliLiter(s) (70 mL/Hr) IV Continuous <Continuous>  tamsulosin 0.4 milliGRAM(s) Oral at bedtime    MEDICATIONS  (PRN):  indomethacin 25 milliGRAM(s) Oral two times a day PRN Moderate Pain (4 - 6)  senna 2 Tablet(s) Oral at bedtime PRN Constipation      REVIEW OF SYSTEMS:  CONSTITUTIONAL: No fever, weight loss, or fatigue  EYES: No eye pain, visual disturbances, or discharge  ENMT:  No difficulty hearing, tinnitus, vertigo; No sinus or throat pain  NECK: No pain or stiffness  RESPIRATORY: No cough, wheezing, chills or hemoptysis; No shortness of breath  CARDIOVASCULAR: No chest pain, palpitations, dizziness, or leg swelling  GASTROINTESTINAL: No abdominal or epigastric pain. No nausea, vomiting, or hematemesis; No diarrhea or constipation. No melena or hematochezia.  GENITOURINARY: No dysuria, frequency, hematuria, or incontinence  NEUROLOGICAL: No headaches, memory loss, loss of strength, numbness, or tremors  SKIN: No itching, burning, rashes, or lesions   ENDOCRINE: No heat or cold intolerance; No hair loss  MUSCULOSKELETAL:  b/l LE edema, No joint pain or swelling; No muscle, back, or extremity pain  PSYCHIATRIC: No depression, anxiety, mood swings, or difficulty sleeping  HEME/LYMPH: No easy bruising, or bleeding gums  ALLERGY AND IMMUNOLOGIC: No hives or eczema    PHYSICAL EXAM:    T(C): 36.9 (19 @ 07:35), Max: 36.9 (19 @ 07:35)  HR: 83 (19 @ 07:35) (83 - 112)  BP: 126/69 (19 @ 07:35) (117/69 - 144/67)  RR: 15 (19 @ 07:35) (14 - 15)  SpO2: 99% (19 @ 07:35) (95% - 99%)  I&O's Summary    07 Mar 2019 07:01  -  08 Mar 2019 07:00  --------------------------------------------------------  IN: 0 mL / OUT: 505 mL / NET: -505 mL        GENERAL: NAD, well-groomed, well-developed  HEAD:  Atraumatic, Normocephalic  EYES: EOMI, PERRL, conjunctiva and sclera clear  ENMT: Moist mucous membranes,   NECK: Supple, No JVD, Normal thyroid  NERVOUS SYSTEM:  Alert & Oriented X3, no focal deficit  CHEST/LUNG: Clear to ascultation bilaterally; No rales, rhonchi, wheezing, or rubs  HEART: Regular rate and rhythm; No murmurs, rubs, or gallops  ABDOMEN: Soft, Nontender, Nondistended; Bowel sounds present  EXTREMITIES:  2+ Peripheral Pulses, No clubbing, cyanosis, b/l LE 2+ edema  LYMPH: No lymphadenopathy noted  SKIN: No rashes or lesions    LABS:                        8.1    18.0  )-----------( 250      ( 08 Mar 2019 06:16 )             25.7     03-08    136  |  100  |  35<H>  ----------------------------<  232<H>  3.7   |  25  |  1.34<H>    Ca    9.0      08 Mar 2019 06:16    TPro  7.4  /  Alb  2.1<L>  /  TBili  0.7  /  DBili  x   /  AST  17  /  ALT  12  /  AlkPhos  30<L>  03-06      Urinalysis Basic - ( 07 Mar 2019 07:45 )    Color: Yellow / Appearance: Clear / S.020 / pH: x  Gluc: x / Ketone: Negative  / Bili: Negative / Urobili: Negative   Blood: x / Protein: 30 mg/dL / Nitrite: Negative   Leuk Esterase: Negative / RBC: 5-10 /HPF / WBC Negative /HPF   Sq Epi: x / Non Sq Epi: Neg.-Few / Bacteria: Few /HPF    RADIOLOGY & ADDITIONAL TESTS:    Imaging Personally Reviewed:  [x] YES  [ ] NO    Consultant(s) Notes Reviewed:  [x] YES  [ ] NO    Care Discussed with Consultants/Other Providers [x] YES  [ ] NO

## 2019-03-08 NOTE — PROGRESS NOTE ADULT - SUBJECTIVE AND OBJECTIVE BOX
Patient is a 84y old  Male who presents with a chief complaint of anemia, debility and functional decline (07 Mar 2019 15:14)      HPI:  Patient is 84 male RH dominant with PMH significant for HTN, HLD,  Afib (off xarelto), stomach CA, anemia, gout, BPH. seizure d/o, recently stopped EToh use, who presented from [facility] with abnormal labs.  Pt reports recent admission to [facility] for gout flare then discharged to rehab.  Pt now returns with guaiac negative anemia (HgB 6.8 ) and  episode of bright red blood in stool. Pt also found with leukocytosis, with temp 100.7 F rectally.  Denies chills, fever, sob, chest pain, weakness, dysuria.    Patient was seen by GI and hematology.  S/P Colonoscopy on 3/1/19. No source of bleeding noted. Negative FOB. S/P EGD during prior admission, and no bleeding identified. Heme performed BM biopsy 3/4, results pending. CKD, Etoh may contribute, although underlying BM d/o such as myelodysplasia suspected. Transfused PRBC. Patient transferred to - [facility] due to debility and functional decline (06 Mar 2019 14:53)      PAST MEDICAL & SURGICAL HISTORY:  Nonrheumatic aortic valve stenosis  Cancer of stomach  Anemia, unspecified type  Seizure disorder  Benign prostatic hyperplasia, unspecified whether lower urinary tract symptoms present  Hyperlipemia  Essential hypertension  Chronic atrial fibrillation  Stomach cancer  Hypertension  Atrial fibrillation  Gout  No significant past surgical history  No significant past surgical history      MEDICATIONS  (STANDING):  allopurinol 100 milliGRAM(s) Oral daily  amLODIPine   Tablet 10 milliGRAM(s) Oral daily  aspirin enteric coated 81 milliGRAM(s) Oral daily  atorvastatin 10 milliGRAM(s) Oral at bedtime  docusate sodium 100 milliGRAM(s) Oral two times a day  enoxaparin Injectable 40 milliGRAM(s) SubCutaneous every 24 hours  folic acid 1 milliGRAM(s) Oral daily  furosemide    Tablet 40 milliGRAM(s) Oral every 12 hours  levETIRAcetam 500 milliGRAM(s) Oral two times a day  metoprolol tartrate 25 milliGRAM(s) Oral every 12 hours  multivitamin 1 Tablet(s) Oral daily  pantoprazole    Tablet 40 milliGRAM(s) Oral before breakfast  tamsulosin 0.4 milliGRAM(s) Oral at bedtime    MEDICATIONS  (PRN):  indomethacin 25 milliGRAM(s) Oral two times a day PRN Moderate Pain (4 - 6)  senna 2 Tablet(s) Oral at bedtime PRN Constipation      Allergies    No Known Allergies    Intolerances          VITALS  84y  Vital Signs Last 24 Hrs  T(C): 36.9 (08 Mar 2019 07:35), Max: 36.9 (08 Mar 2019 07:35)  T(F): 98.4 (08 Mar 2019 07:35), Max: 98.4 (08 Mar 2019 07:35)  HR: 83 (08 Mar 2019 07:35) (83 - 112)  BP: 126/69 (08 Mar 2019 07:35) (117/69 - 144/67)  BP(mean): --  RR: 15 (08 Mar 2019 07:35) (14 - 15)  SpO2: 99% (08 Mar 2019 07:35) (95% - 99%)  Daily     Daily         RECENT LABS:                          8.1    18.0  )-----------( 250      ( 08 Mar 2019 06:16 )             25.7     03-08    136  |  100  |  35<H>  ----------------------------<  232<H>  3.7   |  25  |  1.34<H>    Ca    9.0      08 Mar 2019 06:16    TPro  7.4  /  Alb  2.1<L>  /  TBili  0.7  /  DBili  x   /  AST  17  /  ALT  12  /  AlkPhos  30<L>  03-06    LIVER FUNCTIONS - ( 06 Mar 2019 16:39 )  Alb: 2.1 g/dL / Pro: 7.4 g/dL / ALK PHOS: 30 U/L / ALT: 12 U/L DA / AST: 17 U/L / GGT: x             Urinalysis Basic - ( 07 Mar 2019 07:45 )    Color: Yellow / Appearance: Clear / S.020 / pH: x  Gluc: x / Ketone: Negative  / Bili: Negative / Urobili: Negative   Blood: x / Protein: 30 mg/dL / Nitrite: Negative   Leuk Esterase: Negative / RBC: 5-10 /HPF / WBC Negative /HPF   Sq Epi: x / Non Sq Epi: Neg.-Few / Bacteria: Few /HPF          CAPILLARY BLOOD GLUCOSE

## 2019-03-08 NOTE — PROGRESS NOTE ADULT - SUBJECTIVE AND OBJECTIVE BOX
YUE KING   84y   Male    Admitting: SAEED Gallego  HPI:  84-year-old gentleman with history of anemia, atrial fibrillation and seizure disorder admitted with anemia exacerbation from his [place] facility. Patient noted recent history of bright red blood per rectum. He was scheduled for outpatient colonoscopy. S/P BM biopsy.  Patient reported he stopped drinking alcohol approximately 3 months prior to admission.  Patient is now on the acute rehabilitation unit.    PAST MEDICAL & SURGICAL HISTORY:  Nonrheumatic aortic valve stenosis  Cancer of stomach  Anemia, unspecified type  Seizure disorder  Benign prostatic hyperplasia, unspecified whether lower urinary tract symptoms present  Hyperlipemia  Essential hypertension  Chronic atrial fibrillation  Stomach cancer  Hypertension  Atrial fibrillation  Gout  No significant past surgical history  No significant past surgical history    HEALTH ISSUES - PROBLEM Dx:  Ureteral stone: Ureteral stone    MEDICATIONS  (STANDING):  allopurinol 100 milliGRAM(s) Oral daily  amLODIPine   Tablet 10 milliGRAM(s) Oral daily  aspirin enteric coated 81 milliGRAM(s) Oral daily  atorvastatin 10 milliGRAM(s) Oral at bedtime  docusate sodium 100 milliGRAM(s) Oral two times a day  enoxaparin Injectable 40 milliGRAM(s) SubCutaneous every 24 hours  folic acid 1 milliGRAM(s) Oral daily  levETIRAcetam 500 milliGRAM(s) Oral two times a day  metoprolol tartrate 25 milliGRAM(s) Oral every 12 hours  multivitamin 1 Tablet(s) Oral daily  pantoprazole    Tablet 40 milliGRAM(s) Oral before breakfast  sodium chloride 0.9%. 1000 milliLiter(s) (70 mL/Hr) IV Continuous <Continuous>  tamsulosin 0.4 milliGRAM(s) Oral at bedtime    MEDICATIONS  (PRN):  indomethacin 25 milliGRAM(s) Oral two times a day PRN Moderate Pain (4 - 6)  senna 2 Tablet(s) Oral at bedtime PRN Constipation    Allergies    No Known Allergies    INTERVAL HPI/OVERNIGHT EVENTS:  Patient S&E at bedside. Denied pain.    VITAL SIGNS:  T(F): 98.4 (19 @ 07:35)  HR: 83 (19 @ 07:35)  BP: 126/69 (19 @ 07:35)  RR: 15 (19 @ 07:35)  SpO2: 99% (19 @ 07:35)    PHYSICAL EXAM:  GENERAL: NAD, well-developed  EYES: EOMI, PERRLA, conjunctiva and sclera clear  NECK: Supple, No JVD  CHEST/LUNG: decreased BS bases ant.  HEART: Regular rate and rhythm  ABDOMEN: Soft, Nontender, Nondistended  EXTREMITIES:  no calf tenderness  NEUROLOGY: awake, alert  SKIN: No vesicles    Labs:             8.1    18.0  )-----------( 250      (  @ 06:16 )             25.7                9.4    17.1  )-----------( 262      (  @ 16:39 )             29.9                9.0    12.1  )-----------( 235      (  @ 09:53 )             27.8           136  |  100  |  35<H>  ----------------------------<  232<H>  3.7   |  25  |  1.34<H>    Ca    9.0      08 Mar 2019 06:16    Urinalysis Basic - ( 07 Mar 2019 07:45 )    Color: Yellow / Appearance: Clear / S.020 / pH: x  Gluc: x / Ketone: Negative  / Bili: Negative / Urobili: Negative   Blood: x / Protein: 30 mg/dL / Nitrite: Negative   Leuk Esterase: Negative / RBC: 5-10 /HPF / WBC Negative /HPF   Sq Epi: x / Non Sq Epi: Neg.-Few / Bacteria: Few /HPF    Erythropoietin Level: 36.6: [employer] DxI 800 Immunoassay System  Values obtained with different assay methods or kits cannot  be used interchangeably. Results cannot be interpreted as  absolute evidence of the presence or absence of malignant  disease.  Performed At: [facility] [id number]  Reyes Araceli B MD Ph:[phone] mIU/mL (19 @ 06:10)    Methylmalonic Acid Level, Serum: 478: This test was developed and its performance characteristics  determined by [facility]. It has not been cleared or  approved by the Food and Drug Administration.  Performed At:  [facility] [id number]  Brian Lanza MD Ph:[phone] nmol/L (19 @ 10:53)  Homocysteine, Serum: 9.0: Test Repeated  Note:  Effective 2019 method has changed from  to Roche 502 umol/L (19 @ 10:53)

## 2019-03-09 LAB
ALBUMIN SERPL ELPH-MCNC: 1.8 G/DL — LOW (ref 3.3–5)
ALP SERPL-CCNC: 34 U/L — LOW (ref 40–120)
ALT FLD-CCNC: 21 U/L DA — SIGNIFICANT CHANGE UP (ref 10–45)
ANION GAP SERPL CALC-SCNC: 9 MMOL/L — SIGNIFICANT CHANGE UP (ref 5–17)
AST SERPL-CCNC: 23 U/L — SIGNIFICANT CHANGE UP (ref 10–40)
BASOPHILS # BLD AUTO: 0.1 K/UL — SIGNIFICANT CHANGE UP (ref 0–0.2)
BILIRUB SERPL-MCNC: 0.3 MG/DL — SIGNIFICANT CHANGE UP (ref 0.2–1.2)
BUN SERPL-MCNC: 36 MG/DL — HIGH (ref 7–23)
CALCIUM SERPL-MCNC: 8.8 MG/DL — SIGNIFICANT CHANGE UP (ref 8.4–10.5)
CHLORIDE SERPL-SCNC: 102 MMOL/L — SIGNIFICANT CHANGE UP (ref 96–108)
CO2 SERPL-SCNC: 24 MMOL/L — SIGNIFICANT CHANGE UP (ref 22–31)
CREAT SERPL-MCNC: 1.37 MG/DL — HIGH (ref 0.5–1.3)
D DIMER BLD IA.RAPID-MCNC: 2698 NG/ML DDU — HIGH
EOSINOPHIL # BLD AUTO: 0.1 K/UL — SIGNIFICANT CHANGE UP (ref 0–0.5)
EOSINOPHIL NFR BLD AUTO: 2 % — SIGNIFICANT CHANGE UP (ref 0–6)
GLUCOSE SERPL-MCNC: 331 MG/DL — HIGH (ref 70–99)
HCT VFR BLD CALC: 23.8 % — LOW (ref 39–50)
HGB BLD-MCNC: 7.6 G/DL — LOW (ref 13–17)
LYMPHOCYTES # BLD AUTO: 1.6 K/UL — SIGNIFICANT CHANGE UP (ref 1–3.3)
LYMPHOCYTES # BLD AUTO: 12 % — LOW (ref 13–44)
MCHC RBC-ENTMCNC: 31.8 GM/DL — LOW (ref 32–36)
MCHC RBC-ENTMCNC: 32.4 PG — SIGNIFICANT CHANGE UP (ref 27–34)
MCV RBC AUTO: 102 FL — HIGH (ref 80–100)
MONOCYTES # BLD AUTO: 4.5 K/UL — HIGH (ref 0–0.9)
MONOCYTES NFR BLD AUTO: 21 % — HIGH (ref 2–14)
NEUTROPHILS # BLD AUTO: 9.5 K/UL — HIGH (ref 1.8–7.4)
NEUTROPHILS NFR BLD AUTO: 50 % — SIGNIFICANT CHANGE UP (ref 43–77)
PLATELET # BLD AUTO: 206 K/UL — SIGNIFICANT CHANGE UP (ref 150–400)
POTASSIUM SERPL-MCNC: 3.7 MMOL/L — SIGNIFICANT CHANGE UP (ref 3.5–5.3)
POTASSIUM SERPL-SCNC: 3.7 MMOL/L — SIGNIFICANT CHANGE UP (ref 3.5–5.3)
PROT SERPL-MCNC: 6.7 G/DL — SIGNIFICANT CHANGE UP (ref 6–8.3)
RBC # BLD: 2.33 M/UL — LOW (ref 4.2–5.8)
RBC # FLD: 19.9 % — HIGH (ref 10.3–14.5)
SODIUM SERPL-SCNC: 135 MMOL/L — SIGNIFICANT CHANGE UP (ref 135–145)
WBC # BLD: 15.7 K/UL — HIGH (ref 3.8–10.5)
WBC # FLD AUTO: 15.7 K/UL — HIGH (ref 3.8–10.5)

## 2019-03-09 PROCEDURE — 99232 SBSQ HOSP IP/OBS MODERATE 35: CPT

## 2019-03-09 PROCEDURE — 99233 SBSQ HOSP IP/OBS HIGH 50: CPT

## 2019-03-09 RX ORDER — SODIUM CHLORIDE 9 MG/ML
1000 INJECTION INTRAMUSCULAR; INTRAVENOUS; SUBCUTANEOUS
Qty: 0 | Refills: 0 | Status: DISCONTINUED | OUTPATIENT
Start: 2019-03-09 | End: 2019-03-10

## 2019-03-09 RX ADMIN — Medication 1 TABLET(S): at 12:23

## 2019-03-09 RX ADMIN — Medication 100 MILLIGRAM(S): at 12:23

## 2019-03-09 RX ADMIN — Medication 25 MILLIGRAM(S): at 23:33

## 2019-03-09 RX ADMIN — Medication 25 MILLIGRAM(S): at 09:30

## 2019-03-09 RX ADMIN — ATORVASTATIN CALCIUM 10 MILLIGRAM(S): 80 TABLET, FILM COATED ORAL at 21:28

## 2019-03-09 RX ADMIN — Medication 25 MILLIGRAM(S): at 17:49

## 2019-03-09 RX ADMIN — Medication 25 MILLIGRAM(S): at 08:51

## 2019-03-09 RX ADMIN — Medication 25 MILLIGRAM(S): at 00:19

## 2019-03-09 RX ADMIN — Medication 1 MILLIGRAM(S): at 12:23

## 2019-03-09 RX ADMIN — Medication 100 MILLIGRAM(S): at 05:50

## 2019-03-09 RX ADMIN — ENOXAPARIN SODIUM 40 MILLIGRAM(S): 100 INJECTION SUBCUTANEOUS at 12:22

## 2019-03-09 RX ADMIN — TAMSULOSIN HYDROCHLORIDE 0.4 MILLIGRAM(S): 0.4 CAPSULE ORAL at 21:28

## 2019-03-09 RX ADMIN — AMLODIPINE BESYLATE 10 MILLIGRAM(S): 2.5 TABLET ORAL at 05:50

## 2019-03-09 RX ADMIN — Medication 100 MILLIGRAM(S): at 17:49

## 2019-03-09 RX ADMIN — Medication 81 MILLIGRAM(S): at 12:23

## 2019-03-09 RX ADMIN — LEVETIRACETAM 500 MILLIGRAM(S): 250 TABLET, FILM COATED ORAL at 05:50

## 2019-03-09 RX ADMIN — LEVETIRACETAM 500 MILLIGRAM(S): 250 TABLET, FILM COATED ORAL at 17:49

## 2019-03-09 RX ADMIN — PREGABALIN 1000 MICROGRAM(S): 225 CAPSULE ORAL at 17:49

## 2019-03-09 RX ADMIN — PANTOPRAZOLE SODIUM 40 MILLIGRAM(S): 20 TABLET, DELAYED RELEASE ORAL at 05:50

## 2019-03-09 RX ADMIN — Medication 25 MILLIGRAM(S): at 01:13

## 2019-03-09 RX ADMIN — Medication 25 MILLIGRAM(S): at 05:51

## 2019-03-09 RX ADMIN — SODIUM CHLORIDE 70 MILLILITER(S): 9 INJECTION INTRAMUSCULAR; INTRAVENOUS; SUBCUTANEOUS at 23:06

## 2019-03-09 NOTE — PROGRESS NOTE ADULT - SUBJECTIVE AND OBJECTIVE BOX
Patient is a 84 year old  Male who presents with a chief complaint of anemia, debility and functional decline (09 Mar 2019 13:01)    Patient seen and examined today morning, reports he feels better today, denies any complaints      MEDICATIONS  (STANDING):  allopurinol 100 milliGRAM(s) Oral daily  amLODIPine   Tablet 10 milliGRAM(s) Oral daily  aspirin enteric coated 81 milliGRAM(s) Oral daily  atorvastatin 10 milliGRAM(s) Oral at bedtime  cyanocobalamin 1000 MICROGram(s) Oral daily  docusate sodium 100 milliGRAM(s) Oral two times a day  enoxaparin Injectable 40 milliGRAM(s) SubCutaneous every 24 hours  folic acid 1 milliGRAM(s) Oral daily  levETIRAcetam 500 milliGRAM(s) Oral two times a day  metoprolol tartrate 25 milliGRAM(s) Oral every 12 hours  multivitamin 1 Tablet(s) Oral daily  pantoprazole    Tablet 40 milliGRAM(s) Oral before breakfast  sodium chloride 0.9%. 1000 milliLiter(s) (70 mL/Hr) IV Continuous <Continuous>  tamsulosin 0.4 milliGRAM(s) Oral at bedtime    MEDICATIONS  (PRN):  indomethacin 25 milliGRAM(s) Oral two times a day PRN Moderate Pain (4 - 6)  senna 2 Tablet(s) Oral at bedtime PRN Constipation      REVIEW OF SYSTEMS:  CONSTITUTIONAL: No fever, weight loss, mild fatigue  EYES: No eye pain, visual disturbances, or discharge  ENMT:  No difficulty hearing, tinnitus, vertigo; No sinus or throat pain  NECK: No pain or stiffness  RESPIRATORY: No cough, wheezing, chills or hemoptysis; No shortness of breath  CARDIOVASCULAR: No chest pain, palpitations, dizziness,   GASTROINTESTINAL: No abdominal or epigastric pain. No nausea, vomiting, or hematemesis; No diarrhea or constipation. No melena or hematochezia.  GENITOURINARY: No dysuria, frequency, hematuria, or incontinence  NEUROLOGICAL: No headaches, memory loss, loss of strength, numbness, or tremors  SKIN: No itching, burning, rashes, or lesions   ENDOCRINE: No heat or cold intolerance; No hair loss  MUSCULOSKELETAL:  b/l LE edema, No joint pain or swelling; No muscle, back, or extremity pain  PSYCHIATRIC: No depression, anxiety, mood swings, or difficulty sleeping  HEME/LYMPH: No easy bruising, or bleeding gums  ALLERGY AND IMMUNOLOGIC: No hives or eczema      PHYSICAL EXAM:  T(C): 36.9 (03-09-19 @ 09:24), Max: 37.1 (03-08-19 @ 23:56)  HR: 76 (03-09-19 @ 09:24) (76 - 90)  BP: 122/64 (03-09-19 @ 09:24) (116/68 - 143/77)  RR: 12 (03-09-19 @ 09:24) (12 - 14)  SpO2: 98% (03-09-19 @ 09:24) (98% - 98%)    I&O's Summary    08 Mar 2019 07:01  -  09 Mar 2019 07:00  --------------------------------------------------------  IN: 910 mL / OUT: 0 mL / NET: 910 mL    09 Mar 2019 06:01  -  09 Mar 2019 14:56  --------------------------------------------------------  IN: 490 mL / OUT: 0 mL / NET: 490 mL    GENERAL: NAD, well-groomed, well-developed  HEAD:  Atraumatic, Normocephalic  EYES: EOMI, PERRL, conjunctiva and sclera clear  ENMT: Moist mucous membranes,   NECK: Supple, No JVD, Normal thyroid  NERVOUS SYSTEM:  Alert & Oriented X3, no focal deficit  CHEST/LUNG: Clear to ascultation bilaterally; No rales, rhonchi, wheezing, or rubs  HEART: Regular rate and rhythm; No murmurs, rubs, or gallops  ABDOMEN: Soft, Nontender, Nondistended; Bowel sounds present  EXTREMITIES:  2+ Peripheral Pulses, No clubbing, cyanosis, b/l LE 1+ edema  SKIN: No rash      LABS:                        7.6    15.7  )-----------( 206      ( 09 Mar 2019 05:34 )             23.8     03-09    135  |  102  |  36<H>  ----------------------------<  331<H>  3.7   |  24  |  1.37<H>    Ca    8.8      09 Mar 2019 06:39    TPro  6.7  /  Alb  1.8<L>  /  TBili  0.3  /  DBili  x   /  AST  23  /  ALT  21  /  AlkPhos  34<L>  03-09    Culture - Urine (collected 03-07-19 @ 16:37)  Source: .Urine Clean Catch (Midstream)  Final Report (03-08-19 @ 17:04):    No growth    EXAM:  US DPLX LWR EXT VEINS COMPL BI  PROCEDURE DATE:  03/08/2019      INTERPRETATION:  CLINICAL HISTORY: 84 years  Male with r/o DVT. Bilateral  lower extremity edema    COMPARISON: None available.    TECHNIQUE: Duplex sonography of the BILATERAL LOWER extremities with color and spectral Doppler, with and without compression.      FINDINGS:  There is normal compressibility of the bilateral common femoral, femoral and popliteal veins.     No calf thrombosis is identified on the left. On the right, there is  limited augmentation in a peroneal vein however bloodflow is documented.      Doppler examination shows normal spontaneous and phasic flow.    Bilateral subcutaneous edema is present in both calves.    A left inguinal lymph node measures 3.6 x 1.3 x 1.3 cm.    IMPRESSION:     Limited augmentation in a right peroneal vein however bloodflow is documented. Cannot rule out partial DVT.    No evidence of bilateral lower extremity deep venous thrombosis from the groin to the knee.    Bilateral calf edema.    Prominent left inguinal lymph node.        RADIOLOGY & ADDITIONAL TESTS:    Imaging Personally Reviewed:  [x] YES  [ ] NO    Consultant(s) Notes Reviewed:  [x] YES  [ ] NO    Care Discussed with Consultants/Other Providers [x] YES  [ ] NO

## 2019-03-09 NOTE — PROGRESS NOTE ADULT - ASSESSMENT
Patient is 84 male with PMH significant for HTN, HLD,  Afib (off xarelto), stomach CA, anemia, gout, BPH. 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   - Check erythropoietin level--> consider Procrit.  -continue folic acid, MVI  -begin comprehensive rehab program OT, PT    #leukocytosis and episode orthostasis  -monitor CBC, IMPROVED 3/9  -CXR, urine Cx NEGATIVE  -continue IVF for, RI 3/9. No retention on bladder scan     #Afib:   -continue metoprolol, cardiac precautions  -currently not on AC, negative w/u for active bleeding, cardiology/GI f/u re: restarting  -monitor vitals    #HTN  -continue norvasc, lasix  -parameters adjusted on medications, schedule changed    #SZ/tremors: better today  -controlled on keppra  -neuro consult appreciated:. likely multifactorial including essential tremor and metabolic causes, less likely from keppra at current dose. No active workup recommended    #Gout/CPPD:   -allopurinol, indomethacin    #BPH/ureteral stone:  consult appreciated  -continue flomax, bladder scan  - likely repeat KUB and renal sono next week.      #DVT PPX  -doppler cannot r/o but did not see definitive peroneal DVT otherwise no other evidence DVT  -continue lovenox    #left heel blister  -swiss cheese boots for positioning      DNR    Labs:  cardiology/GI re: AC  TEDS for orthostasis: ok to use 3/9  CBC , BMP 3/11    Renal sono, KUB next week

## 2019-03-09 NOTE — PROGRESS NOTE ADULT - ASSESSMENT
84 male with PMH significant for HTN, HLD,  Afib (off xarelto), stomach CA, anemia, gout, BPH. 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   - F/u erythropoietin level--> consider Procrit next week  -continue folic acid, MVI  - HEME consult appreciated  - guaiac negative, s/p colonoscopy  - comprehensive PT/OT/ rehab    #leukocytosis improving   -monitor CBC  -CXR- clear, urine Cx - no growth  -gentle hydration    #YUKI  gentle hydration  avoid nephrotoxins      # B/l LE edema  - doppler cannot r/o but did not see definitive peroneal DVT otherwise no other evidence of DVT  -Teds  -continue lovenox sq    #Afib:   -continue metoprolol, cardiac precautions  -currently not on AC  -has been off AC for 3 months   -consider restarting if cleared by GI/cardio   -monitor vitals    #HTN  -BP controlled   -is norvasc, lasix, hold lasix for now  -parameters adjusted on medications, schedule changed    #SZ/tremors: better today  -controlled on keppra  -neuro consult appreciated:. likely multifactorial including essential tremor and metabolic causes    #Gout/CPPD: stable  -allopurinol, indomethacin prn    #BPH/right ureteral stone:   - consult appreciated  -continue flomax, bladder scan  - Repeat U/A NEGATIVE, urine culture pending  - likely repeat KUB and renal sono next week.      #DVT PPX  -continue lovenox sq    #left heel blister  -swiss cheese boots for positioning      DNR

## 2019-03-10 PROCEDURE — 71045 X-RAY EXAM CHEST 1 VIEW: CPT | Mod: 26

## 2019-03-10 PROCEDURE — 99233 SBSQ HOSP IP/OBS HIGH 50: CPT

## 2019-03-10 PROCEDURE — 99232 SBSQ HOSP IP/OBS MODERATE 35: CPT

## 2019-03-10 RX ORDER — ACETAMINOPHEN 500 MG
650 TABLET ORAL ONCE
Qty: 0 | Refills: 0 | Status: COMPLETED | OUTPATIENT
Start: 2019-03-10 | End: 2019-03-10

## 2019-03-10 RX ADMIN — Medication 650 MILLIGRAM(S): at 04:43

## 2019-03-10 RX ADMIN — Medication 25 MILLIGRAM(S): at 17:28

## 2019-03-10 RX ADMIN — ATORVASTATIN CALCIUM 10 MILLIGRAM(S): 80 TABLET, FILM COATED ORAL at 21:43

## 2019-03-10 RX ADMIN — Medication 650 MILLIGRAM(S): at 03:14

## 2019-03-10 RX ADMIN — PANTOPRAZOLE SODIUM 40 MILLIGRAM(S): 20 TABLET, DELAYED RELEASE ORAL at 05:59

## 2019-03-10 RX ADMIN — LEVETIRACETAM 500 MILLIGRAM(S): 250 TABLET, FILM COATED ORAL at 05:59

## 2019-03-10 RX ADMIN — Medication 100 MILLIGRAM(S): at 11:37

## 2019-03-10 RX ADMIN — ENOXAPARIN SODIUM 40 MILLIGRAM(S): 100 INJECTION SUBCUTANEOUS at 11:37

## 2019-03-10 RX ADMIN — Medication 81 MILLIGRAM(S): at 11:37

## 2019-03-10 RX ADMIN — TAMSULOSIN HYDROCHLORIDE 0.4 MILLIGRAM(S): 0.4 CAPSULE ORAL at 21:43

## 2019-03-10 RX ADMIN — LEVETIRACETAM 500 MILLIGRAM(S): 250 TABLET, FILM COATED ORAL at 17:28

## 2019-03-10 RX ADMIN — AMLODIPINE BESYLATE 10 MILLIGRAM(S): 2.5 TABLET ORAL at 05:59

## 2019-03-10 RX ADMIN — PREGABALIN 1000 MICROGRAM(S): 225 CAPSULE ORAL at 11:37

## 2019-03-10 RX ADMIN — Medication 100 MILLIGRAM(S): at 05:59

## 2019-03-10 RX ADMIN — Medication 1 MILLIGRAM(S): at 11:37

## 2019-03-10 RX ADMIN — Medication 100 MILLIGRAM(S): at 17:27

## 2019-03-10 RX ADMIN — Medication 25 MILLIGRAM(S): at 05:59

## 2019-03-10 RX ADMIN — Medication 1 TABLET(S): at 11:37

## 2019-03-10 RX ADMIN — Medication 25 MILLIGRAM(S): at 00:55

## 2019-03-10 NOTE — PROGRESS NOTE ADULT - ASSESSMENT
Patient is 84 male with PMH significant for HTN, HLD,  Afib (off xarelto), stomach CA, anemia, gout, BPH. 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 consider Procrit.  -continue folic acid, MVI  -begin comprehensive rehab program OT, PT    #leukocytosis and episode orthostasis  -monitor CBC, IMPROVED 3/9  -CXR, urine Cx NEGATIVE  - No retention on bladder scan   -CBC in AM 3/11    # cannot r/o DVT on doppler although one not visualized. Elevated D dimer  -due to renal function, V/Q scan ordered, Discussed with hospitalist, ok for test to be performed tomorrow.     #Afib:   -continue metoprolol, cardiac precautions  -currently not on AC, negative w/u for active bleeding, cardiology/GI f/u re: restarting  -monitor vitals    #HTN  -continue norvasc, lasix  -parameters adjusted on medications, schedule changed    #SZ/tremors: better today  -controlled on keppra  -neuro consult appreciated:. likely multifactorial including essential tremor and metabolic causes, less likely from keppra at current dose. No active workup recommended    #Gout/CPPD:   -allopurinol, indomethacin    #BPH/ureteral stone:  consult appreciated  -continue flomax, bladder scan  - likely repeat KUB and renal sono next week.      #DVT PPX  -doppler cannot r/o but did not see definitive peroneal DVT otherwise no other evidence DVT  -continue lovenox    #left heel blister  -swiss cheese boots for positioning      DNR    Labs:  cardiology/GI re: AC  TEDS for orthostasis: ok to use 3/9  CBC , BMP 3/11  V/Q scan 3/11    Renal sono, KUB next week

## 2019-03-10 NOTE — PROGRESS NOTE ADULT - ASSESSMENT
84 male with PMH significant for HTN, HLD,  Afib (off xarelto), stomach CA, anemia, gout, BPH. 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   - F/u erythropoietin level--> consider Procrit next week  -continue folic acid, MVI  - HEME consult appreciated  - guaiac negative, s/p colonoscopy  - comprehensive PT/OT/ rehab    #leukocytosis improving   -monitor CBC  -CXR- clear, urine Cx - no growth  - oral hydration encouraged    #acute on chronic renal failure  oral hydration  avoid nephrotoxins      # B/l LE edema  - doppler cannot r/o but did not see definitive peroneal DVT otherwise no other evidence of DVT  -Teds  -d-dimer elevated, recommend V/q celaya to r/o PE -d/w Dr. Gallego  -continue lovenox sq    #Afib:   -continue metoprolol, cardiac precautions  -currently not on AC  -has been off AC for 3 months   -consider restarting if cleared by GI/cardio   -monitor vitals    #HTN  -BP controlled   -is norvasc, lasix, hold lasix for now, recommend to resume tomorrow  -parameters adjusted on medications, schedule changed    #SZ/tremors: better today  -controlled on keppra  -neuro consult appreciated:. likely multifactorial including essential tremor and metabolic causes    #Gout/CPPD: stable  -allopurinol, indomethacin prn    #BPH/right ureteral stone:   - consult appreciated  -continue flomax, bladder scan  - Repeat U/A NEGATIVE, urine culture pending  - likely repeat KUB and renal sono next week.      #DVT PPX  -continue lovenox sq    #left heel blister  -swiss cheese boots for positioning      DNR

## 2019-03-10 NOTE — PROGRESS NOTE ADULT - SUBJECTIVE AND OBJECTIVE BOX
Patient is a 84 year old  Male who presents with a chief complaint of anemia, debility and functional decline (09 Mar 2019 14:55)    Patient seen and examined today morning, reports he feels better today, denies any complaints      MEDICATIONS  (STANDING):  allopurinol 100 milliGRAM(s) Oral daily  amLODIPine   Tablet 10 milliGRAM(s) Oral daily  aspirin enteric coated 81 milliGRAM(s) Oral daily  atorvastatin 10 milliGRAM(s) Oral at bedtime  cyanocobalamin 1000 MICROGram(s) Oral daily  docusate sodium 100 milliGRAM(s) Oral two times a day  enoxaparin Injectable 40 milliGRAM(s) SubCutaneous every 24 hours  folic acid 1 milliGRAM(s) Oral daily  levETIRAcetam 500 milliGRAM(s) Oral two times a day  metoprolol tartrate 25 milliGRAM(s) Oral every 12 hours  multivitamin 1 Tablet(s) Oral daily  pantoprazole    Tablet 40 milliGRAM(s) Oral before breakfast  sodium chloride 0.9%. 1000 milliLiter(s) (70 mL/Hr) IV Continuous <Continuous>  tamsulosin 0.4 milliGRAM(s) Oral at bedtime    MEDICATIONS  (PRN):  indomethacin 25 milliGRAM(s) Oral two times a day PRN Moderate Pain (4 - 6)  senna 2 Tablet(s) Oral at bedtime PRN Constipation      REVIEW OF SYSTEMS:  CONSTITUTIONAL: No fever, weight loss, mild fatigue  EYES: No eye pain, visual disturbances, or discharge  ENMT:  No difficulty hearing, tinnitus, vertigo; No sinus or throat pain  NECK: No pain or stiffness  RESPIRATORY: No cough, wheezing, chills or hemoptysis; No shortness of breath  CARDIOVASCULAR: No chest pain, palpitations, dizziness,   GASTROINTESTINAL: No abdominal or epigastric pain. No nausea, vomiting, or hematemesis; No diarrhea or constipation. No melena or hematochezia.  GENITOURINARY: No dysuria, frequency, hematuria, or incontinence  NEUROLOGICAL: No headaches, memory loss, loss of strength, numbness, or tremors  SKIN: No itching, burning, rashes, or lesions   ENDOCRINE: No heat or cold intolerance; No hair loss  MUSCULOSKELETAL:  b/l LE edema, No joint pain or swelling; No muscle, back, or extremity pain  PSYCHIATRIC: No depression, anxiety, mood swings, or difficulty sleeping  HEME/LYMPH: No easy bruising, or bleeding gums  ALLERGY AND IMMUNOLOGIC: No hives or eczema      PHYSICAL EXAM:    T(C): 36.6 (03-10-19 @ 07:52), Max: 36.9 (03-09-19 @ 09:24)  HR: 97 (03-10-19 @ 07:52) (76 - 100)  BP: 142/78 (03-10-19 @ 07:52) (122/64 - 142/78)  RR: 12 (03-10-19 @ 07:52) (12 - 14)  SpO2: 97% (03-10-19 @ 07:52) (97% - 98%)  Wt(kg): --  I&O's Summary    09 Mar 2019 06:01  -  10 Mar 2019 07:00  --------------------------------------------------------  IN: 1470 mL / OUT: 0 mL / NET: 1470 mL    GENERAL: NAD, well-groomed, well-developed  HEAD:  Atraumatic, Normocephalic  EYES: EOMI, PERRL, conjunctiva and sclera clear  ENMT: Moist mucous membranes,   NECK: Supple, No JVD, Normal thyroid  NERVOUS SYSTEM:  Alert & Oriented X3, no focal deficit  CHEST/LUNG: Clear to ascultation bilaterally; No rales, rhonchi, wheezing, or rubs  HEART: Regular rate and rhythm; No murmurs, rubs, or gallops  ABDOMEN: Soft, Nontender, Nondistended; Bowel sounds present  EXTREMITIES:  2+ Peripheral Pulses, No clubbing, cyanosis, b/l LE 1+ edema  SKIN: No rash    LABS:                        7.6    15.7  )-----------( 206      ( 09 Mar 2019 05:34 )             23.8     03-09    135  |  102  |  36<H>  ----------------------------<  331<H>  3.7   |  24  |  1.37<H>    Ca    8.8      09 Mar 2019 06:39    TPro  6.7  /  Alb  1.8<L>  /  TBili  0.3  /  DBili  x   /  AST  23  /  ALT  21  /  AlkPhos  34<L>  03-09        CAPILLARY BLOOD GLUCOSE              Culture - Urine (collected 03-07-19 @ 16:37)  Source: .Urine Clean Catch (Midstream)  Final Report (03-08-19 @ 17:04):    No growth        RADIOLOGY & ADDITIONAL TESTS:    Imaging Personally Reviewed:  [x] YES  [ ] NO    Consultant(s) Notes Reviewed:  [x] YES  [ ] NO    Care Discussed with Consultants/Other Providers [x] YES  [ ] NO

## 2019-03-10 NOTE — PROGRESS NOTE ADULT - SUBJECTIVE AND OBJECTIVE BOX
Patient is a 84y old  Male who presents with a chief complaint of anemia, debility and functional decline (10 Mar 2019 09:04)      HPI:  Patient is 84 male RH dominant with PMH significant for HTN, HLD,  Afib (off xarelto), stomach CA, anemia, gout, BPH. seizure d/o, recently stopped EToh use, who presented from [facility] with abnormal labs.  Pt reports recent admission to [facility] for gout flare then discharged to rehab.  Pt now returns with guaiac negative anemia (HgB 6.8 ) and  episode of bright red blood in stool. Pt also found with leukocytosis, with temp 100.7 F rectally.  Denies chills, fever, sob, chest pain, weakness, dysuria.    Patient was seen by GI and hematology.  S/P Colonoscopy on 3/1/19. No source of bleeding noted. Negative FOB. S/P EGD during prior admission, and no bleeding identified. Heme performed BM biopsy 3/4, results pending. CKD, Etoh may contribute, although underlying BM d/o such as myelodysplasia suspected. Transfused PRBC. Patient transferred to - [facility] due to debility and functional decline (06 Mar 2019 14:53)      PAST MEDICAL & SURGICAL HISTORY:  Nonrheumatic aortic valve stenosis  Cancer of stomach  Anemia, unspecified type  Seizure disorder  Benign prostatic hyperplasia, unspecified whether lower urinary tract symptoms present  Hyperlipemia  Essential hypertension  Chronic atrial fibrillation  Stomach cancer  Hypertension  Atrial fibrillation  Gout  No significant past surgical history  No significant past surgical history      MEDICATIONS  (STANDING):  allopurinol 100 milliGRAM(s) Oral daily  amLODIPine   Tablet 10 milliGRAM(s) Oral daily  aspirin enteric coated 81 milliGRAM(s) Oral daily  atorvastatin 10 milliGRAM(s) Oral at bedtime  cyanocobalamin 1000 MICROGram(s) Oral daily  docusate sodium 100 milliGRAM(s) Oral two times a day  enoxaparin Injectable 40 milliGRAM(s) SubCutaneous every 24 hours  folic acid 1 milliGRAM(s) Oral daily  levETIRAcetam 500 milliGRAM(s) Oral two times a day  metoprolol tartrate 25 milliGRAM(s) Oral every 12 hours  multivitamin 1 Tablet(s) Oral daily  pantoprazole    Tablet 40 milliGRAM(s) Oral before breakfast  tamsulosin 0.4 milliGRAM(s) Oral at bedtime    MEDICATIONS  (PRN):  indomethacin 25 milliGRAM(s) Oral two times a day PRN Moderate Pain (4 - 6)  senna 2 Tablet(s) Oral at bedtime PRN Constipation      Allergies    No Known Allergies    Intolerances          VITALS  84y  Vital Signs Last 24 Hrs  T(C): 36.6 (10 Mar 2019 07:52), Max: 36.7 (09 Mar 2019 20:22)  T(F): 97.9 (10 Mar 2019 07:52), Max: 98.1 (09 Mar 2019 20:22)  HR: 97 (10 Mar 2019 07:52) (94 - 100)  BP: 142/78 (10 Mar 2019 07:52) (126/72 - 142/78)  BP(mean): --  RR: 12 (10 Mar 2019 07:52) (12 - 14)  SpO2: 97% (10 Mar 2019 07:52) (97% - 98%)  Daily     Daily         RECENT LABS:                          7.6    15.7  )-----------( 206      ( 09 Mar 2019 05:34 )             23.8     03-09    135  |  102  |  36<H>  ----------------------------<  331<H>  3.7   |  24  |  1.37<H>    Ca    8.8      09 Mar 2019 06:39    TPro  6.7  /  Alb  1.8<L>  /  TBili  0.3  /  DBili  x   /  AST  23  /  ALT  21  /  AlkPhos  34<L>  03-09    LIVER FUNCTIONS - ( 09 Mar 2019 06:39 )  Alb: 1.8 g/dL / Pro: 6.7 g/dL / ALK PHOS: 34 U/L / ALT: 21 U/L DA / AST: 23 U/L / GGT: x             D dimer 2698 3/9    Culture - Urine (collected 03-07-19 @ 16:37)  Source: .Urine Clean Catch (Midstream)  Final Report (03-08-19 @ 17:04):    No growth        CAPILLARY BLOOD GLUCOSE

## 2019-03-11 LAB
ANION GAP SERPL CALC-SCNC: 10 MMOL/L — SIGNIFICANT CHANGE UP (ref 5–17)
BUN SERPL-MCNC: 38 MG/DL — HIGH (ref 7–23)
CALCIUM SERPL-MCNC: 9.3 MG/DL — SIGNIFICANT CHANGE UP (ref 8.4–10.5)
CHLORIDE SERPL-SCNC: 104 MMOL/L — SIGNIFICANT CHANGE UP (ref 96–108)
CO2 SERPL-SCNC: 25 MMOL/L — SIGNIFICANT CHANGE UP (ref 22–31)
CREAT SERPL-MCNC: 1.6 MG/DL — HIGH (ref 0.5–1.3)
GLUCOSE SERPL-MCNC: 240 MG/DL — HIGH (ref 70–99)
HCT VFR BLD CALC: 25.8 % — LOW (ref 39–50)
HGB BLD-MCNC: 7.7 G/DL — LOW (ref 13–17)
MCHC RBC-ENTMCNC: 30 GM/DL — LOW (ref 32–36)
MCHC RBC-ENTMCNC: 30.5 PG — SIGNIFICANT CHANGE UP (ref 27–34)
MCV RBC AUTO: 101.6 FL — HIGH (ref 80–100)
PLATELET # BLD AUTO: 198 K/UL — SIGNIFICANT CHANGE UP (ref 150–400)
POTASSIUM SERPL-MCNC: 4 MMOL/L — SIGNIFICANT CHANGE UP (ref 3.5–5.3)
POTASSIUM SERPL-SCNC: 4 MMOL/L — SIGNIFICANT CHANGE UP (ref 3.5–5.3)
RBC # BLD: 2.54 M/UL — LOW (ref 4.2–5.8)
RBC # FLD: 19.5 % — HIGH (ref 10.3–14.5)
SODIUM SERPL-SCNC: 139 MMOL/L — SIGNIFICANT CHANGE UP (ref 135–145)
WBC # BLD: 10.4 K/UL — SIGNIFICANT CHANGE UP (ref 3.8–10.5)
WBC # FLD AUTO: 10.4 K/UL — SIGNIFICANT CHANGE UP (ref 3.8–10.5)

## 2019-03-11 PROCEDURE — 99232 SBSQ HOSP IP/OBS MODERATE 35: CPT

## 2019-03-11 PROCEDURE — 99233 SBSQ HOSP IP/OBS HIGH 50: CPT

## 2019-03-11 RX ORDER — RIVAROXABAN 15 MG-20MG
20 KIT ORAL EVERY 24 HOURS
Qty: 0 | Refills: 0 | Status: DISCONTINUED | OUTPATIENT
Start: 2019-03-12 | End: 2019-03-18

## 2019-03-11 RX ORDER — ERYTHROPOIETIN 10000 [IU]/ML
10000 INJECTION, SOLUTION INTRAVENOUS; SUBCUTANEOUS
Qty: 0 | Refills: 0 | Status: COMPLETED | OUTPATIENT
Start: 2019-03-11 | End: 2019-03-15

## 2019-03-11 RX ORDER — RIVAROXABAN 15 MG-20MG
20 KIT ORAL EVERY 24 HOURS
Qty: 0 | Refills: 0 | Status: DISCONTINUED | OUTPATIENT
Start: 2019-03-11 | End: 2019-03-11

## 2019-03-11 RX ADMIN — Medication 100 MILLIGRAM(S): at 12:55

## 2019-03-11 RX ADMIN — Medication 81 MILLIGRAM(S): at 12:55

## 2019-03-11 RX ADMIN — Medication 100 MILLIGRAM(S): at 17:31

## 2019-03-11 RX ADMIN — AMLODIPINE BESYLATE 10 MILLIGRAM(S): 2.5 TABLET ORAL at 05:46

## 2019-03-11 RX ADMIN — ERYTHROPOIETIN 10000 UNIT(S): 10000 INJECTION, SOLUTION INTRAVENOUS; SUBCUTANEOUS at 18:47

## 2019-03-11 RX ADMIN — Medication 100 MILLIGRAM(S): at 05:46

## 2019-03-11 RX ADMIN — PREGABALIN 1000 MICROGRAM(S): 225 CAPSULE ORAL at 12:55

## 2019-03-11 RX ADMIN — LEVETIRACETAM 500 MILLIGRAM(S): 250 TABLET, FILM COATED ORAL at 17:31

## 2019-03-11 RX ADMIN — Medication 25 MILLIGRAM(S): at 22:45

## 2019-03-11 RX ADMIN — TAMSULOSIN HYDROCHLORIDE 0.4 MILLIGRAM(S): 0.4 CAPSULE ORAL at 21:44

## 2019-03-11 RX ADMIN — ENOXAPARIN SODIUM 40 MILLIGRAM(S): 100 INJECTION SUBCUTANEOUS at 12:54

## 2019-03-11 RX ADMIN — LEVETIRACETAM 500 MILLIGRAM(S): 250 TABLET, FILM COATED ORAL at 05:46

## 2019-03-11 RX ADMIN — Medication 25 MILLIGRAM(S): at 05:46

## 2019-03-11 RX ADMIN — PANTOPRAZOLE SODIUM 40 MILLIGRAM(S): 20 TABLET, DELAYED RELEASE ORAL at 06:02

## 2019-03-11 RX ADMIN — Medication 1 MILLIGRAM(S): at 12:55

## 2019-03-11 RX ADMIN — Medication 1 TABLET(S): at 12:55

## 2019-03-11 RX ADMIN — Medication 25 MILLIGRAM(S): at 17:31

## 2019-03-11 RX ADMIN — Medication 25 MILLIGRAM(S): at 21:43

## 2019-03-11 RX ADMIN — ATORVASTATIN CALCIUM 10 MILLIGRAM(S): 80 TABLET, FILM COATED ORAL at 21:45

## 2019-03-11 NOTE — PROGRESS NOTE ADULT - SUBJECTIVE AND OBJECTIVE BOX
YUE KING   84y   Male    Admitting: SAEED Gallego  HPI:  84-year-old gentleman with history of anemia, atrial fibrillation and seizure disorder admitted with anemia exacerbation from [facility]. Patient noted recent history of bright red blood per rectum. He was scheduled for outpatient colonoscopy. S/P BM biopsy.  Patient reported he stopped drinking alcohol approximately 3 months prior to admission.  Patient is now on the acute rehabilitation unit.    PAST MEDICAL & SURGICAL HISTORY:  Nonrheumatic aortic valve stenosis  Cancer of stomach  Anemia, unspecified type  Seizure disorder  Benign prostatic hyperplasia, unspecified whether lower urinary tract symptoms present  Hyperlipemia  Essential hypertension  Chronic atrial fibrillation  Stomach cancer  Hypertension  Atrial fibrillation  Gout  No significant past surgical history  No significant past surgical history    HEALTH ISSUES - PROBLEM Dx:  Ureteral stone: Ureteral stone    MEDICATIONS  (STANDING):  allopurinol 100 milliGRAM(s) Oral daily  amLODIPine   Tablet 10 milliGRAM(s) Oral daily  aspirin enteric coated 81 milliGRAM(s) Oral daily  atorvastatin 10 milliGRAM(s) Oral at bedtime  cyanocobalamin 1000 MICROGram(s) Oral daily  docusate sodium 100 milliGRAM(s) Oral two times a day  enoxaparin Injectable 40 milliGRAM(s) SubCutaneous every 24 hours  folic acid 1 milliGRAM(s) Oral daily  levETIRAcetam 500 milliGRAM(s) Oral two times a day  metoprolol tartrate 25 milliGRAM(s) Oral every 12 hours  multivitamin 1 Tablet(s) Oral daily  pantoprazole    Tablet 40 milliGRAM(s) Oral before breakfast  tamsulosin 0.4 milliGRAM(s) Oral at bedtime    MEDICATIONS  (PRN):  indomethacin 25 milliGRAM(s) Oral two times a day PRN Moderate Pain (4 - 6)  senna 2 Tablet(s) Oral at bedtime PRN Constipation    Allergies    No Known Allergies    INTERVAL HPI/OVERNIGHT EVENTS:  Patient S&E at bedside. No c/o CP or SOB.     VITAL SIGNS:  T(F): 98.1 (03-10-19 @ 20:35)  HR: 87 (03-10-19 @ 20:35)  BP: 126/67 (03-10-19 @ 20:35)  RR: 12 (03-10-19 @ 20:35)  SpO2: 98% (03-10-19 @ 20:35)    PHYSICAL EXAM:  GENERAL: NAD, well-developed  EYES: EOMI, PERRLA, conjunctiva and sclera clear  NECK: Supple, No JVD  CHEST/LUNG: decreased BS bases ant.  HEART: Regular rate and rhythm  ABDOMEN: Soft, Nontender, Nondistended  EXTREMITIES:  no calf tenderness  NEUROLOGY: awake, alert  SKIN: No vesicles    Labs:             7.7    10.4  )-----------( 198      ( 03-11 @ 05:55 )             25.8                7.6    15.7  )-----------( 206      ( 03-09 @ 05:34 )             23.8       03-11    139  |  104  |  38<H>  ----------------------------<  240<H>  4.0   |  25  |  1.60<H>    Ca    9.3      11 Mar 2019 05:55

## 2019-03-11 NOTE — PROGRESS NOTE ADULT - SUBJECTIVE AND OBJECTIVE BOX
Patient is a 84y old  Male who presents with a chief complaint of anemia, debility and functional decline (11 Mar 2019 09:00)      HPI:  Patient is 84 male RH dominant with PMH significant for HTN, HLD,  Afib (off xarelto), stomach CA, anemia, gout, BPH. seizure d/o, recently stopped EToh use, who presented from [facility] with abnormal labs.  Pt reports recent admission to [facility] for gout flare then discharged to rehab.  Pt now returns with guaiac negative anemia (HgB 6.8 ) and  episode of bright red blood in stool. Pt also found with leukocytosis, with temp 100.7 F rectally.  Denies chills, fever, sob, chest pain, weakness, dysuria.    Patient was seen by GI and hematology.  S/P Colonoscopy on 3/1/19. No source of bleeding noted. Negative FOB. S/P EGD during prior admission, and no bleeding identified. Heme performed BM biopsy 3/4, results pending. CKD, Etoh may contribute, although underlying BM d/o such as myelodysplasia suspected. Transfused PRBC. Patient transferred to - [facility] due to debility and functional decline (06 Mar 2019 14:53)      PAST MEDICAL & SURGICAL HISTORY:  Nonrheumatic aortic valve stenosis  Cancer of stomach  Anemia, unspecified type  Seizure disorder  Benign prostatic hyperplasia, unspecified whether lower urinary tract symptoms present  Hyperlipemia  Essential hypertension  Chronic atrial fibrillation  Stomach cancer  Hypertension  Atrial fibrillation  Gout  No significant past surgical history  No significant past surgical history      MEDICATIONS  (STANDING):  allopurinol 100 milliGRAM(s) Oral daily  amLODIPine   Tablet 10 milliGRAM(s) Oral daily  aspirin enteric coated 81 milliGRAM(s) Oral daily  atorvastatin 10 milliGRAM(s) Oral at bedtime  cyanocobalamin 1000 MICROGram(s) Oral daily  docusate sodium 100 milliGRAM(s) Oral two times a day  enoxaparin Injectable 40 milliGRAM(s) SubCutaneous every 24 hours  epoetin jackelyn Injectable 31036 Unit(s) SubCutaneous <User Schedule>  folic acid 1 milliGRAM(s) Oral daily  levETIRAcetam 500 milliGRAM(s) Oral two times a day  metoprolol tartrate 25 milliGRAM(s) Oral every 12 hours  multivitamin 1 Tablet(s) Oral daily  pantoprazole    Tablet 40 milliGRAM(s) Oral before breakfast  tamsulosin 0.4 milliGRAM(s) Oral at bedtime    MEDICATIONS  (PRN):  indomethacin 25 milliGRAM(s) Oral two times a day PRN Moderate Pain (4 - 6)  senna 2 Tablet(s) Oral at bedtime PRN Constipation      Allergies    No Known Allergies    Intolerances          VITALS  84y  Vital Signs Last 24 Hrs  T(C): 36.4 (11 Mar 2019 08:05), Max: 36.7 (10 Mar 2019 20:35)  T(F): 97.6 (11 Mar 2019 08:05), Max: 98.1 (10 Mar 2019 20:35)  HR: 79 (11 Mar 2019 08:05) (79 - 87)  BP: 128/69 (11 Mar 2019 08:05) (126/67 - 137/67)  BP(mean): --  RR: 15 (11 Mar 2019 08:05) (12 - 15)  SpO2: 98% (11 Mar 2019 08:05) (98% - 98%)  Daily     Daily         RECENT LABS:                          7.7    10.4  )-----------( 198      ( 11 Mar 2019 05:55 )             25.8     03-11    139  |  104  |  38<H>  ----------------------------<  240<H>  4.0   |  25  |  1.60<H>    Ca    9.3      11 Mar 2019 05:55              Culture - Urine (collected 03-07-19 @ 16:37)  Source: .Urine Clean Catch (Midstream)  Final Report (03-08-19 @ 17:04):    No growth        CAPILLARY BLOOD GLUCOSE

## 2019-03-11 NOTE — PROGRESS NOTE ADULT - ASSESSMENT
Patient is 84 male with PMH significant for HTN, HLD,  Afib (off xarelto), stomach CA, anemia, gout, BPH. 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 , bone marrow biopsy and bone marrow aspirate showed hypercellular bone marrow with trilineage hematopoiesis, myeloid predominance and atypical monocytosis suspicious for chronic myelomonocytic leukemia.   -Patient is a candidate for Procrit-- patient  agreeable to injections. 3/11  -continue folic acid, MVI  -monitor H/H  -begin comprehensive rehab program OT, PT    #leukocytosis and episode orthostasis  -monitor CBC, IMPROVED 3/9  -CXR, urine Cx NEGATIVE  - No retention on bladder scan   -CBC in AM 3/11    # cannot r/o DVT on doppler although one not visualized. Elevated D dimer  -due to renal function, V/Q scan ordered, Discussed with hospitalist, ok for test to be performed tomorrow.     #Afib:   -continue metoprolol, cardiac precautions  -currently not on AC, negative w/u for active bleeding  -call placed to Lois Fierro [phone] per patient request to inform him of current status. Message left with office     #HTN  -continue norvasc, lasix  -parameters adjusted on medications, schedule changed    #SZ/tremors: significantly improved  -controlled on keppra  -neuro consult appreciated:. likely multifactorial including essential tremor and metabolic causes, less likely from keppra at current dose. No active workup recommended    #Gout/CPPD:   -allopurinol, indomethacin    #BPH/ureteral stone:  consult appreciated  -continue flomax, bladder scan  - likely repeat KUB and renal sono next week.      #DVT PPX  -doppler cannot r/o but did not see definitive peroneal DVT otherwise no other evidence DVT  -on lovenox; GI f/u re: clearance to start AC    #left heel blister  -swiss cheese boots for positioning      DNR    Labs:  cardiology/GI re: clearance to start AC  TEDS for orthostasis    CBc BMP 3/13    Renal sono, KUB next week Patient is 84 male with PMH significant for HTN, HLD,  Afib (off xarelto), stomach CA, anemia, gout, BPH. 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 , bone marrow biopsy and bone marrow aspirate showed hypercellular bone marrow with trilineage hematopoiesis, myeloid predominance and atypical monocytosis suspicious for chronic myelomonocytic leukemia.   -Patient is a candidate for Procrit-- patient  agreeable to injections. 3/11  -continue folic acid, MVI  -monitor H/H  -begin comprehensive rehab program OT, PT    #leukocytosis and episode orthostasis  -monitor CBC, IMPROVED 3/9  -CXR, urine Cx NEGATIVE  - No retention on bladder scan   -CBC in AM 3/11    # cannot r/o DVT on doppler although one not visualized. Elevated D dimer  -due to renal function, V/Q scan ordered, Discussed with hospitalist, ok for test to be performed tomorrow.     #Afib:   -continue metoprolol, cardiac precautions  -currently not on AC, negative w/u for active bleeding  -call placed to Lois Fierro [phone] per patient request to inform him of current status. Apprised of BM results, heme recommendations. Given negative EGD/colonoscopy feels would restart from cardiology standpoint, but defers to staff in-house. Will f/u medicine re: dosing, and dc lovenox once started    #HTN  -continue norvasc, lasix  -parameters adjusted on medications, schedule changed    #SZ/tremors: significantly improved  -controlled on keppra  -neuro consult appreciated:. likely multifactorial including essential tremor and metabolic causes, less likely from keppra at current dose. No active workup recommended    #Gout/CPPD:   -allopurinol, indomethacin    #BPH/ureteral stone:  consult appreciated  -continue flomax, bladder scan  - likely repeat KUB and renal sono next week.      #DVT PPX  -doppler cannot r/o but did not see definitive peroneal DVT otherwise no other evidence DVT  -on lovenox; DC once started on xarelto    #left heel blister  -swiss cheese boots for positioning      DNR    Labs:  cardiology/GI re: clearance to start AC  TEDS for orthostasis    CBc BMP 3/13    Renal sono, KUB next week

## 2019-03-11 NOTE — PROGRESS NOTE ADULT - ASSESSMENT
84-year-old gentleman with history of anemia, atrial fibrillation and seizure disorder admitted with anemia exacerbation from his [place] facility. Patient notes recent history of bright red blood per rectum. He was scheduled for outpatient colonoscopy.  On admission, found to have rectal temp of 100.7°F in the emergency department.  Patient reports he stopped drinking alcohol approximately 3 months prior to admission

## 2019-03-12 PROCEDURE — 99232 SBSQ HOSP IP/OBS MODERATE 35: CPT

## 2019-03-12 RX ORDER — ACETAMINOPHEN 500 MG
650 TABLET ORAL EVERY 6 HOURS
Qty: 0 | Refills: 0 | Status: DISCONTINUED | OUTPATIENT
Start: 2019-03-12 | End: 2019-04-10

## 2019-03-12 RX ORDER — INDOMETHACIN 50 MG
25 CAPSULE ORAL ONCE
Qty: 0 | Refills: 0 | Status: COMPLETED | OUTPATIENT
Start: 2019-03-12 | End: 2019-03-12

## 2019-03-12 RX ADMIN — LEVETIRACETAM 500 MILLIGRAM(S): 250 TABLET, FILM COATED ORAL at 18:44

## 2019-03-12 RX ADMIN — Medication 100 MILLIGRAM(S): at 18:44

## 2019-03-12 RX ADMIN — Medication 25 MILLIGRAM(S): at 00:28

## 2019-03-12 RX ADMIN — Medication 25 MILLIGRAM(S): at 05:37

## 2019-03-12 RX ADMIN — Medication 25 MILLIGRAM(S): at 22:54

## 2019-03-12 RX ADMIN — Medication 1 TABLET(S): at 12:29

## 2019-03-12 RX ADMIN — Medication 25 MILLIGRAM(S): at 01:30

## 2019-03-12 RX ADMIN — RIVAROXABAN 20 MILLIGRAM(S): KIT at 18:44

## 2019-03-12 RX ADMIN — PREGABALIN 1000 MICROGRAM(S): 225 CAPSULE ORAL at 12:29

## 2019-03-12 RX ADMIN — AMLODIPINE BESYLATE 10 MILLIGRAM(S): 2.5 TABLET ORAL at 05:37

## 2019-03-12 RX ADMIN — Medication 100 MILLIGRAM(S): at 12:29

## 2019-03-12 RX ADMIN — TAMSULOSIN HYDROCHLORIDE 0.4 MILLIGRAM(S): 0.4 CAPSULE ORAL at 21:01

## 2019-03-12 RX ADMIN — Medication 1 MILLIGRAM(S): at 12:29

## 2019-03-12 RX ADMIN — Medication 25 MILLIGRAM(S): at 18:44

## 2019-03-12 RX ADMIN — Medication 81 MILLIGRAM(S): at 12:29

## 2019-03-12 RX ADMIN — LEVETIRACETAM 500 MILLIGRAM(S): 250 TABLET, FILM COATED ORAL at 05:37

## 2019-03-12 RX ADMIN — Medication 100 MILLIGRAM(S): at 05:37

## 2019-03-12 RX ADMIN — Medication 25 MILLIGRAM(S): at 10:37

## 2019-03-12 RX ADMIN — Medication 25 MILLIGRAM(S): at 21:56

## 2019-03-12 RX ADMIN — Medication 25 MILLIGRAM(S): at 11:00

## 2019-03-12 RX ADMIN — PANTOPRAZOLE SODIUM 40 MILLIGRAM(S): 20 TABLET, DELAYED RELEASE ORAL at 07:09

## 2019-03-12 RX ADMIN — ATORVASTATIN CALCIUM 10 MILLIGRAM(S): 80 TABLET, FILM COATED ORAL at 21:01

## 2019-03-12 NOTE — PROGRESS NOTE ADULT - ASSESSMENT
84-year-old gentleman with history of anemia, atrial fibrillation and seizure disorder admitted with anemia exacerbation from his [place] facility. Patient notes recent history of bright red blood per rectum. He was scheduled for outpatient colonoscopy.  On admission, found to have rectal temp of 100.7°F in the emergency department.  Patient reports he stopped drinking alcohol approximately 3 months prior to admission      Problem:  Anemia:  Chronic kidney disease contributes to patient's anemia, as well as recent GI bleed. 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 Patient receiving Procrit injections. Continue p.o. vitamin B12 supplementation for elevated MMA as well. Patient to continue with rehabilitation.

## 2019-03-12 NOTE — PROGRESS NOTE ADULT - SUBJECTIVE AND OBJECTIVE BOX
YUE KING   84y   Male    Admitting: SAEED Gallego  HPI:  84-year-old gentleman with history of anemia, atrial fibrillation and seizure disorder admitted with anemia exacerbation from his [place] facility. Patient noted recent history of bright red blood per rectum. He was scheduled for outpatient colonoscopy. S/P BM biopsy.  Patient reported he stopped drinking alcohol approximately 3 months prior to admission.  Patient is now on the acute rehabilitation unit.    PAST MEDICAL & SURGICAL HISTORY:  Nonrheumatic aortic valve stenosis  Cancer of stomach  Anemia, unspecified type  Seizure disorder  Benign prostatic hyperplasia, unspecified whether lower urinary tract symptoms present  Hyperlipemia  Essential hypertension  Chronic atrial fibrillation  Stomach cancer  Hypertension  Atrial fibrillation  Gout  No significant past surgical history  No significant past surgical history    HEALTH ISSUES - PROBLEM Dx:  Ureteral stone: Ureteral stone    MEDICATIONS  (STANDING):  allopurinol 100 milliGRAM(s) Oral daily  amLODIPine   Tablet 10 milliGRAM(s) Oral daily  aspirin enteric coated 81 milliGRAM(s) Oral daily  atorvastatin 10 milliGRAM(s) Oral at bedtime  cyanocobalamin 1000 MICROGram(s) Oral daily  docusate sodium 100 milliGRAM(s) Oral two times a day  epoetin jackelyn Injectable 56595 Unit(s) SubCutaneous <User Schedule>  folic acid 1 milliGRAM(s) Oral daily  levETIRAcetam 500 milliGRAM(s) Oral two times a day  metoprolol tartrate 25 milliGRAM(s) Oral every 12 hours  multivitamin 1 Tablet(s) Oral daily  pantoprazole    Tablet 40 milliGRAM(s) Oral before breakfast  rivaroxaban 20 milliGRAM(s) Oral every 24 hours  tamsulosin 0.4 milliGRAM(s) Oral at bedtime    MEDICATIONS  (PRN):  acetaminophen   Tablet .. 650 milliGRAM(s) Oral every 6 hours PRN Mild Pain (1 - 3)  indomethacin 25 milliGRAM(s) Oral two times a day PRN Moderate Pain (4 - 6)  senna 2 Tablet(s) Oral at bedtime PRN Constipation    Allergies    No Known Allergies    INTERVAL HPI/OVERNIGHT EVENTS:  Patient S&E at bedside. Up in chair. No c/o CP or SOB.    VITAL SIGNS:  T(F): 98.2 (03-12-19 @ 07:53)  HR: 81 (03-12-19 @ 07:53)  BP: 131/74 (03-12-19 @ 07:53)  RR: 15 (03-12-19 @ 07:53)  SpO2: 100% (03-12-19 @ 07:53)    PHYSICAL EXAM:  GENERAL: NAD, well-developed  EYES: EOMI, PERRLA, conjunctiva and sclera clear  NECK: Supple, No JVD  CHEST/LUNG: decreased BS bases ant.  HEART: Regular rate and rhythm  ABDOMEN: Soft, Nontender, Nondistended  EXTREMITIES:  no calf tenderness; +edema  NEUROLOGY: awake, alert  SKIN: No vesicles    Labs:             7.7    10.4  )-----------( 198      ( 03-11 @ 05:55 )             25.8       03-11    139  |  104  |  38<H>  ----------------------------<  240<H>  4.0   |  25  |  1.60<H>    Ca    9.3      11 Mar 2019 05:55

## 2019-03-12 NOTE — PROGRESS NOTE ADULT - SUBJECTIVE AND OBJECTIVE BOX
Patient is a 84y old  Male who presents with a chief complaint of anemia, debility and functional decline (11 Mar 2019 13:51)      HPI:  Patient is 84 male RH dominant with PMH significant for HTN, HLD,  Afib (off xarelto), stomach CA, anemia, gout, BPH. seizure d/o, recently stopped EToh use, who presented from [facility] with abnormal labs.  Pt reports recent admission to [facility] for gout flare then discharged to rehab.  Pt now returns with guaiac negative anemia (HgB 6.8 ) and  episode of bright red blood in stool. Pt also found with leukocytosis, with temp 100.7 F rectally.  Denies chills, fever, sob, chest pain, weakness, dysuria.    Patient was seen by GI and hematology.  S/P Colonoscopy on 3/1/19. No source of bleeding noted. Negative FOB. S/P EGD during prior admission, and no bleeding identified. Heme performed BM biopsy 3/4, results pending. CKD, Etoh may contribute, although underlying BM d/o such as myelodysplasia suspected. Transfused PRBC. Patient transferred to - [facility] due to debility and functional decline (06 Mar 2019 14:53)      PAST MEDICAL & SURGICAL HISTORY:  Nonrheumatic aortic valve stenosis  Cancer of stomach  Anemia, unspecified type  Seizure disorder  Benign prostatic hyperplasia, unspecified whether lower urinary tract symptoms present  Hyperlipemia  Essential hypertension  Chronic atrial fibrillation  Stomach cancer  Hypertension  Atrial fibrillation  Gout  No significant past surgical history  No significant past surgical history      MEDICATIONS  (STANDING):  allopurinol 100 milliGRAM(s) Oral daily  amLODIPine   Tablet 10 milliGRAM(s) Oral daily  aspirin enteric coated 81 milliGRAM(s) Oral daily  atorvastatin 10 milliGRAM(s) Oral at bedtime  cyanocobalamin 1000 MICROGram(s) Oral daily  docusate sodium 100 milliGRAM(s) Oral two times a day  epoetin jackelyn Injectable 33844 Unit(s) SubCutaneous <User Schedule>  folic acid 1 milliGRAM(s) Oral daily  levETIRAcetam 500 milliGRAM(s) Oral two times a day  metoprolol tartrate 25 milliGRAM(s) Oral every 12 hours  multivitamin 1 Tablet(s) Oral daily  pantoprazole    Tablet 40 milliGRAM(s) Oral before breakfast  rivaroxaban 20 milliGRAM(s) Oral every 24 hours  tamsulosin 0.4 milliGRAM(s) Oral at bedtime    MEDICATIONS  (PRN):  acetaminophen   Tablet .. 650 milliGRAM(s) Oral every 6 hours PRN Mild Pain (1 - 3)  indomethacin 25 milliGRAM(s) Oral two times a day PRN Moderate Pain (4 - 6)  senna 2 Tablet(s) Oral at bedtime PRN Constipation      Allergies    No Known Allergies    Intolerances          VITALS  84y  Vital Signs Last 24 Hrs  T(C): 36.2 (11 Mar 2019 23:07), Max: 36.2 (11 Mar 2019 23:07)  T(F): 97.1 (11 Mar 2019 23:07), Max: 97.1 (11 Mar 2019 23:07)  HR: 85 (11 Mar 2019 23:07) (85 - 85)  BP: 127/76 (11 Mar 2019 23:07) (127/76 - 127/76)  BP(mean): --  RR: 14 (11 Mar 2019 23:07) (14 - 14)  SpO2: 98% (11 Mar 2019 23:07) (98% - 98%)  Daily     Daily         RECENT LABS:                          7.7    10.4  )-----------( 198      ( 11 Mar 2019 05:55 )             25.8     03-11    139  |  104  |  38<H>  ----------------------------<  240<H>  4.0   |  25  |  1.60<H>    Ca    9.3      11 Mar 2019 05:55              Culture - Urine (collected 03-07-19 @ 16:37)  Source: .Urine Clean Catch (Midstream)  Final Report (03-08-19 @ 17:04):    No growth        CAPILLARY BLOOD GLUCOSE

## 2019-03-12 NOTE — PROGRESS NOTE ADULT - ASSESSMENT
Patient is 84 male with PMH significant for HTN, HLD,  Afib (off xarelto), stomach CA, anemia, gout, BPH. 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 , bone marrow biopsy and bone marrow aspirate showed hypercellular bone marrow with trilineage hematopoiesis, myeloid predominance and atypical monocytosis suspicious for chronic myelomonocytic leukemia.   -continue procrit started 3/11  -continue folic acid, MVI  -monitor H/H. CBC 3/13  -begin comprehensive rehab program OT, PT      #Afib:   -continue metoprolol, cardiac precautions  -started  on xarelto 3/11 after discussions with heme, cardiology, patient  -monitor H/H, symptoms bleeding, discussed with pateint    #HTN  -continue norvasc, lasix  -parameters adjusted on medications, schedule changed    #SZ/tremors: significantly improved  -controlled on keppra  -neuro consult appreciated:. likely multifactorial including essential tremor and metabolic causes, less likely from keppra at current dose. No active workup recommended    #Gout/CPPD: improved  -allopurinol, indomethacin    #BPH/ureteral stone:  consult appreciated  -continue flomax, bladder scan  - f/u re: sono and KUB      #DVT PPX  -on xarelto    #left heel blister  -swiss cheese boots for positioning      DNR    #Case disussed in IDT rounds 3/12. Patient with fluctuations in BP, poor endurance, current goals are for min assist bADLs and min-mod assist transfers and gait, will need 24 hour physical assistance on dc. Patient would benefit from JANET, target 3/20 when medically stable, will discuss with patient and caregivers versus ability to hire level of assistance on dc home    Labs:    CBc BMP 3/13     re: Renal sono, KUB

## 2019-03-13 PROBLEM — D64.9 ANEMIA, UNSPECIFIED: Chronic | Status: ACTIVE | Noted: 2019-01-07

## 2019-03-13 LAB
ANION GAP SERPL CALC-SCNC: 9 MMOL/L — SIGNIFICANT CHANGE UP (ref 5–17)
BUN SERPL-MCNC: 34 MG/DL — HIGH (ref 7–23)
CALCIUM SERPL-MCNC: 9 MG/DL — SIGNIFICANT CHANGE UP (ref 8.4–10.5)
CHLORIDE SERPL-SCNC: 104 MMOL/L — SIGNIFICANT CHANGE UP (ref 96–108)
CO2 SERPL-SCNC: 26 MMOL/L — SIGNIFICANT CHANGE UP (ref 22–31)
CREAT SERPL-MCNC: 1.37 MG/DL — HIGH (ref 0.5–1.3)
GLUCOSE SERPL-MCNC: 286 MG/DL — HIGH (ref 70–99)
HCT VFR BLD CALC: 23.7 % — LOW (ref 39–50)
HGB BLD-MCNC: 7.4 G/DL — LOW (ref 13–17)
MCHC RBC-ENTMCNC: 31.4 GM/DL — LOW (ref 32–36)
MCHC RBC-ENTMCNC: 31.6 PG — SIGNIFICANT CHANGE UP (ref 27–34)
MCV RBC AUTO: 100.6 FL — HIGH (ref 80–100)
PLATELET # BLD AUTO: 187 K/UL — SIGNIFICANT CHANGE UP (ref 150–400)
POTASSIUM SERPL-MCNC: 4 MMOL/L — SIGNIFICANT CHANGE UP (ref 3.5–5.3)
POTASSIUM SERPL-SCNC: 4 MMOL/L — SIGNIFICANT CHANGE UP (ref 3.5–5.3)
RBC # BLD: 2.35 M/UL — LOW (ref 4.2–5.8)
RBC # FLD: 19.3 % — HIGH (ref 10.3–14.5)
SODIUM SERPL-SCNC: 139 MMOL/L — SIGNIFICANT CHANGE UP (ref 135–145)
WBC # BLD: 7 K/UL — SIGNIFICANT CHANGE UP (ref 3.8–10.5)
WBC # FLD AUTO: 7 K/UL — SIGNIFICANT CHANGE UP (ref 3.8–10.5)

## 2019-03-13 PROCEDURE — 99233 SBSQ HOSP IP/OBS HIGH 50: CPT

## 2019-03-13 PROCEDURE — 99232 SBSQ HOSP IP/OBS MODERATE 35: CPT

## 2019-03-13 RX ADMIN — Medication 1 MILLIGRAM(S): at 12:28

## 2019-03-13 RX ADMIN — ERYTHROPOIETIN 10000 UNIT(S): 10000 INJECTION, SOLUTION INTRAVENOUS; SUBCUTANEOUS at 12:27

## 2019-03-13 RX ADMIN — TAMSULOSIN HYDROCHLORIDE 0.4 MILLIGRAM(S): 0.4 CAPSULE ORAL at 22:09

## 2019-03-13 RX ADMIN — ATORVASTATIN CALCIUM 10 MILLIGRAM(S): 80 TABLET, FILM COATED ORAL at 22:09

## 2019-03-13 RX ADMIN — Medication 25 MILLIGRAM(S): at 17:11

## 2019-03-13 RX ADMIN — Medication 25 MILLIGRAM(S): at 06:26

## 2019-03-13 RX ADMIN — LEVETIRACETAM 500 MILLIGRAM(S): 250 TABLET, FILM COATED ORAL at 06:26

## 2019-03-13 RX ADMIN — Medication 100 MILLIGRAM(S): at 06:26

## 2019-03-13 RX ADMIN — Medication 25 MILLIGRAM(S): at 10:00

## 2019-03-13 RX ADMIN — PREGABALIN 1000 MICROGRAM(S): 225 CAPSULE ORAL at 12:28

## 2019-03-13 RX ADMIN — AMLODIPINE BESYLATE 10 MILLIGRAM(S): 2.5 TABLET ORAL at 06:26

## 2019-03-13 RX ADMIN — Medication 25 MILLIGRAM(S): at 09:24

## 2019-03-13 RX ADMIN — Medication 1 TABLET(S): at 12:28

## 2019-03-13 RX ADMIN — PANTOPRAZOLE SODIUM 40 MILLIGRAM(S): 20 TABLET, DELAYED RELEASE ORAL at 06:26

## 2019-03-13 RX ADMIN — LEVETIRACETAM 500 MILLIGRAM(S): 250 TABLET, FILM COATED ORAL at 17:11

## 2019-03-13 RX ADMIN — Medication 100 MILLIGRAM(S): at 12:28

## 2019-03-13 RX ADMIN — Medication 100 MILLIGRAM(S): at 17:11

## 2019-03-13 RX ADMIN — RIVAROXABAN 20 MILLIGRAM(S): KIT at 17:10

## 2019-03-13 RX ADMIN — Medication 81 MILLIGRAM(S): at 12:28

## 2019-03-13 NOTE — CHART NOTE - NSCHARTNOTEFT_GEN_A_CORE
Nutrition Follow Up Note  Hospital Course (Per Electronic Medical Record):   Source: Medical Record [X] Patient [X]    Diet: Regular Diet w/ Thin Liquids  Tolerates Diet Well  No Chewing/Swallowing Difficulties  No Recent Nausea, Vomiting, Diarrhea or Constipation  Consumes 75% of Meals (as Per Documentation) - States Good PO Intake/Appetite   on [place] 1 Packet BID - Patient Takes Nutrition Supplement Well  Educated Patient on Need for Supplementation     Enteral/Parenteral Nutrition: N/A    Current Weight: 225.9lb on 3/6  Obtain New Weight  Obtain Weights Weekly     Pertinent Medications: MEDICATIONS  (STANDING):  allopurinol 100 milliGRAM(s) Oral daily  amLODIPine   Tablet 10 milliGRAM(s) Oral daily  aspirin enteric coated 81 milliGRAM(s) Oral daily  atorvastatin 10 milliGRAM(s) Oral at bedtime  cyanocobalamin 1000 MICROGram(s) Oral daily  docusate sodium 100 milliGRAM(s) Oral two times a day  epoetin jackelyn Injectable 41056 Unit(s) SubCutaneous <User Schedule>  folic acid 1 milliGRAM(s) Oral daily  levETIRAcetam 500 milliGRAM(s) Oral two times a day  metoprolol tartrate 25 milliGRAM(s) Oral every 12 hours  multivitamin 1 Tablet(s) Oral daily  pantoprazole    Tablet 40 milliGRAM(s) Oral before breakfast  rivaroxaban 20 milliGRAM(s) Oral every 24 hours  tamsulosin 0.4 milliGRAM(s) Oral at bedtime    MEDICATIONS  (PRN):  acetaminophen   Tablet .. 650 milliGRAM(s) Oral every 6 hours PRN Mild Pain (1 - 3)  indomethacin 25 milliGRAM(s) Oral two times a day PRN Moderate Pain (4 - 6)  senna 2 Tablet(s) Oral at bedtime PRN Constipation    Pertinent Labs:  03-13 Na139 mmol/L Glu 286 mg/dL<H> K+ 4.0 mmol/L Cr  1.37 mg/dL<H> BUN 34 mg/dL<H> 03-09 Alb 1.8 g/dL<L> 03-07 [id number] 7.0 %<H>    Skin: Stage 2 Pressure Ulcer on Scrotom & Left Heel    Edema: +2 Bilat L/E  (Potential for Weight Fluctuations)     Last BM: on 3/12    Estimated Needs:   [X] No Change since Previous Assessment    Previous Nutrition Diagnosis:   Increased Nutrient Needs  Limited adherence to nutrition - related recommendations    Nutrition Diagnosis is [X] Ongoing - Continues on Nutrition Supplement & Patient Takes Nutrition Supplement  Well     New Nutrition Diagnosis: [X] Not Applicable    Interventions:   1. Recommend Continue Nutrition Plan of Care   2. Educated Patient on Need for Supplementation     Monitoring & Evaluation:   [X] Weights   [X] PO Intake   [X] Follow Up (Per Protocol)  [X] Tolerance to Diet Prescription   [X] Other: Labs     RD Remains Available.  Luis Hanna RDN

## 2019-03-13 NOTE — PROGRESS NOTE ADULT - SUBJECTIVE AND OBJECTIVE BOX
Patient is a 84y old  Male who presents with a chief complaint of anemia, debility and functional decline (13 Mar 2019 11:51)      HPI:  Patient is 84 male RH dominant with PMH significant for HTN, HLD,  Afib (off xarelto), stomach CA, anemia, gout, BPH. seizure d/o, recently stopped EToh use, who presented from [facility] with abnormal labs.  Pt reports recent admission to [facility] for gout flare then discharged to rehab.  Pt now returns with guaiac negative anemia (HgB 6.8 ) and  episode of bright red blood in stool. Pt also found with leukocytosis, with temp 100.7 F rectally.  Denies chills, fever, sob, chest pain, weakness, dysuria.    Patient was seen by GI and hematology.  S/P Colonoscopy on 3/1/19. No source of bleeding noted. Negative FOB. S/P EGD during prior admission, and no bleeding identified. Heme performed BM biopsy 3/4, results pending. CKD, Etoh may contribute, although underlying BM d/o such as myelodysplasia suspected. Transfused PRBC. Patient transferred to - [facility] due to debility and functional decline (06 Mar 2019 14:53)      PAST MEDICAL & SURGICAL HISTORY:  Nonrheumatic aortic valve stenosis  Cancer of stomach  Anemia, unspecified type: Anemia  Seizure disorder  Benign prostatic hyperplasia, unspecified whether lower urinary tract symptoms present  Hyperlipemia  Essential hypertension  Chronic atrial fibrillation  Stomach cancer  Hypertension  Atrial fibrillation  Gout  No significant past surgical history  No significant past surgical history      MEDICATIONS  (STANDING):  allopurinol 100 milliGRAM(s) Oral daily  amLODIPine   Tablet 10 milliGRAM(s) Oral daily  aspirin enteric coated 81 milliGRAM(s) Oral daily  atorvastatin 10 milliGRAM(s) Oral at bedtime  cyanocobalamin 1000 MICROGram(s) Oral daily  docusate sodium 100 milliGRAM(s) Oral two times a day  epoetin jackelyn Injectable 37887 Unit(s) SubCutaneous <User Schedule>  folic acid 1 milliGRAM(s) Oral daily  levETIRAcetam 500 milliGRAM(s) Oral two times a day  metoprolol tartrate 25 milliGRAM(s) Oral every 12 hours  multivitamin 1 Tablet(s) Oral daily  pantoprazole    Tablet 40 milliGRAM(s) Oral before breakfast  rivaroxaban 20 milliGRAM(s) Oral every 24 hours  tamsulosin 0.4 milliGRAM(s) Oral at bedtime    MEDICATIONS  (PRN):  acetaminophen   Tablet .. 650 milliGRAM(s) Oral every 6 hours PRN Mild Pain (1 - 3)  indomethacin 25 milliGRAM(s) Oral two times a day PRN Moderate Pain (4 - 6)  senna 2 Tablet(s) Oral at bedtime PRN Constipation      Allergies    No Known Allergies    Intolerances          VITALS  84y  Vital Signs Last 24 Hrs  T(C): 36.6 (13 Mar 2019 09:11), Max: 36.7 (12 Mar 2019 20:56)  T(F): 97.9 (13 Mar 2019 09:11), Max: 98 (12 Mar 2019 20:56)  HR: 68 (13 Mar 2019 09:11) (68 - 95)  BP: 136/73 (13 Mar 2019 09:11) (136/66 - 156/84)  BP(mean): --  RR: 12 (13 Mar 2019 09:11) (12 - 14)  SpO2: 98% (13 Mar 2019 06:29) (98% - 99%)  Daily     Daily         RECENT LABS:                          7.4    7.0   )-----------( 187      ( 13 Mar 2019 06:00 )             23.7     03-13    139  |  104  |  34<H>  ----------------------------<  286<H>  4.0   |  26  |  1.37<H>    Ca    9.0      13 Mar 2019 06:00                CAPILLARY BLOOD GLUCOSE

## 2019-03-13 NOTE — PROGRESS NOTE ADULT - ASSESSMENT
84 male with PMH significant for HTN, HLD,  Afib (off xarelto), stomach CA, anemia, gout, BPH. 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   - F/u erythropoietin level--> consider Procrit next week  -continue folic acid, MVI  - HEME consult appreciated  - guaiac negative, s/p colonoscopy  - comprehensive PT/OT/ rehab    #leukocytosis resolved  -monitor CBC  -CXR- clear, urine Cx - no growth  - oral hydration encouraged    #acute on chronic renal failure  oral hydration  avoid nephrotoxins      # B/l LE edema  - doppler cannot r/o but did not see definitive peroneal DVT otherwise no other evidence of DVT  -Teds  - v/q cancelled, pt could not tolerate/refused   on xarelto 20mg QD    #Afib:   -continue metoprolol, cardiac precautions  -re-started on xarelto     #HTN  -BP controlled   -is norvasc, hold lasix for now,  -parameters adjusted on medications, schedule changed    #SZ/tremors: better today  -controlled on keppra  -neuro consult appreciated:. likely multifactorial including essential tremor and metabolic causes    #Gout/CPPD: stable  -allopurinol, indomethacin prn    #BPH/right ureteral stone:   - consult appreciated  -continue flomax, bladder scan  - Repeat U/A NEGATIVE, urine culture pending  - likely repeat KUB and renal sono next week.    #DVT PPX    #left heel blister  -offloading boots      DNR

## 2019-03-13 NOTE — PROGRESS NOTE ADULT - ASSESSMENT
Patient is 84 male with PMH significant for HTN, HLD,  Afib (off xarelto), stomach CA, anemia, gout, BPH. 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 , bone marrow biopsy and bone marrow aspirate showed hypercellular bone marrow with trilineage hematopoiesis, myeloid predominance and atypical monocytosis suspicious for chronic myelomonocytic leukemia.   -continue procrit started 3/11. Patient aware and agreeable  -continue folic acid, MVI  -monitor H/H. sl drop today but clinically feeling much better and has had extensive workup r/o active source bleed. recheck CBC 3/15  -continue comprehensive rehab program OT, PT      #Afib:   -continue metoprolol, cardiac precautions  -started  on xarelto 3/11 after discussions with heme, cardiology, patient  -monitor H/H, symptoms bleeding, CBC 3/15    #HTN  -continue norvasc, lasix  -better controlled    #SZ/tremors: significantly improved  -controlled on keppra  -neuro consult appreciated:. likely multifactorial including essential tremor and metabolic causes, less likely from keppra at current dose. No active workup recommended    #Gout/CPPD: improved  -allopurinol, indomethacin  -has been on colchicine in past. given renal function, will discuss with hospitalist    #BPH/ureteral stone:  consult appreciated  -continue flomax, bladder scan        #DVT PPX  -on xarelto  bilateral ACE wraps for edema management    #left heel blister  -swiss cheese boots for positioning      DNR    #Case disussed in IDT rounds 3/12. Patient with fluctuations in BP, poor endurance, current goals are for min assist bADLs and min-mod assist transfers and gait, will need 24 hour physical assistance on dc. Patient would benefit from JANET, target 3/20 when medically stable, will discuss with patient and caregivers versus ability to hire level of assistance on dc home    Labs:    CBc BMP 3/15       re: Renal sono, KUB

## 2019-03-13 NOTE — PROGRESS NOTE ADULT - SUBJECTIVE AND OBJECTIVE BOX
Patient is a 84y old  Male who presents with a chief complaint of anemia, debility and functional decline (12 Mar 2019 18:58)      Patient seen and examined at bedside. feels well, no complaints     ALLERGIES:  No Known Allergies    MEDICATIONS:  acetaminophen   Tablet .. 650 milliGRAM(s) Oral every 6 hours PRN  allopurinol 100 milliGRAM(s) Oral daily  amLODIPine   Tablet 10 milliGRAM(s) Oral daily  aspirin enteric coated 81 milliGRAM(s) Oral daily  atorvastatin 10 milliGRAM(s) Oral at bedtime  cyanocobalamin 1000 MICROGram(s) Oral daily  docusate sodium 100 milliGRAM(s) Oral two times a day  epoetin jackelyn Injectable 72728 Unit(s) SubCutaneous <User Schedule>  folic acid 1 milliGRAM(s) Oral daily  indomethacin 25 milliGRAM(s) Oral two times a day PRN  levETIRAcetam 500 milliGRAM(s) Oral two times a day  metoprolol tartrate 25 milliGRAM(s) Oral every 12 hours  multivitamin 1 Tablet(s) Oral daily  pantoprazole    Tablet 40 milliGRAM(s) Oral before breakfast  rivaroxaban 20 milliGRAM(s) Oral every 24 hours  senna 2 Tablet(s) Oral at bedtime PRN  tamsulosin 0.4 milliGRAM(s) Oral at bedtime    Vital Signs Last 24 Hrs  T(F): 97.9 (13 Mar 2019 09:11), Max: 98 (12 Mar 2019 20:56)  HR: 68 (13 Mar 2019 09:11) (68 - 95)  BP: 136/73 (13 Mar 2019 09:11) (136/66 - 156/84)  RR: 12 (13 Mar 2019 09:11) (12 - 14)  SpO2: 98% (13 Mar 2019 06:29) (98% - 99%)  I&O's Summary      PHYSICAL EXAM:  General: NAD, AO x 3  Neck: Supple, No JVD  Lungs: Clear to auscultation bilaterally  Cardio: RRR, S1/S2, No murmurs  Abdomen: Soft, Nontender, Nondistended; Bowel sounds present  Extremities: No clubbing, cyanosis, or edema      LABS:                        7.4    7.0   )-----------( 187      ( 13 Mar 2019 06:00 )             23.7     03-13    139  |  104  |  34  ----------------------------<  286  4.0   |  26  |  1.37    Ca    9.0      13 Mar 2019 06:00      eGFR if Non African American: 47 mL/min/1.73M2 (03-13-19 @ 06:00)  eGFR if African American: 55 mL/min/1.73M2 (03-13-19 @ 06:00)    12-23 Chol 95 mg/dL LDL 55 mg/dL HDL 26 mg/dL Trig 69 mg/dL    CAPILLARY BLOOD GLUCOSE    03-07 [id number] 7.0  12-23 [id number] 6.3  RADIOLOGY & ADDITIONAL TESTS:    Care Discussed with Consultants/Other Providers:

## 2019-03-14 PROCEDURE — 99232 SBSQ HOSP IP/OBS MODERATE 35: CPT

## 2019-03-14 RX ADMIN — Medication 25 MILLIGRAM(S): at 11:30

## 2019-03-14 RX ADMIN — ATORVASTATIN CALCIUM 10 MILLIGRAM(S): 80 TABLET, FILM COATED ORAL at 21:07

## 2019-03-14 RX ADMIN — Medication 1 MILLIGRAM(S): at 12:23

## 2019-03-14 RX ADMIN — PREGABALIN 1000 MICROGRAM(S): 225 CAPSULE ORAL at 12:23

## 2019-03-14 RX ADMIN — Medication 100 MILLIGRAM(S): at 17:57

## 2019-03-14 RX ADMIN — Medication 25 MILLIGRAM(S): at 10:53

## 2019-03-14 RX ADMIN — Medication 100 MILLIGRAM(S): at 12:23

## 2019-03-14 RX ADMIN — TAMSULOSIN HYDROCHLORIDE 0.4 MILLIGRAM(S): 0.4 CAPSULE ORAL at 21:07

## 2019-03-14 RX ADMIN — LEVETIRACETAM 500 MILLIGRAM(S): 250 TABLET, FILM COATED ORAL at 05:50

## 2019-03-14 RX ADMIN — Medication 81 MILLIGRAM(S): at 12:23

## 2019-03-14 RX ADMIN — Medication 1 TABLET(S): at 12:23

## 2019-03-14 RX ADMIN — Medication 25 MILLIGRAM(S): at 01:46

## 2019-03-14 RX ADMIN — LEVETIRACETAM 500 MILLIGRAM(S): 250 TABLET, FILM COATED ORAL at 17:57

## 2019-03-14 RX ADMIN — RIVAROXABAN 20 MILLIGRAM(S): KIT at 17:57

## 2019-03-14 RX ADMIN — Medication 25 MILLIGRAM(S): at 05:50

## 2019-03-14 RX ADMIN — Medication 25 MILLIGRAM(S): at 17:57

## 2019-03-14 RX ADMIN — PANTOPRAZOLE SODIUM 40 MILLIGRAM(S): 20 TABLET, DELAYED RELEASE ORAL at 05:50

## 2019-03-14 RX ADMIN — AMLODIPINE BESYLATE 10 MILLIGRAM(S): 2.5 TABLET ORAL at 05:50

## 2019-03-14 RX ADMIN — Medication 25 MILLIGRAM(S): at 00:37

## 2019-03-14 RX ADMIN — Medication 100 MILLIGRAM(S): at 05:50

## 2019-03-14 NOTE — PROGRESS NOTE ADULT - ASSESSMENT
Patient is 84 male with PMH significant for HTN, HLD,  Afib (off xarelto), stomach CA, anemia, gout, BPH. seizure d/o, recently stopped EToh use, p/w chronic anemia possibly due to myelodysplastic syndrome, s/p tx PRBC, debility and functional decline      #Anemia/debility: multifactorial: Chronic kidney disease, recent GIB. 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   -Heme appreciated:   -continue procrit started 3/11  -continue folic acid, MVI  -. recheck CBC 3/15  -continue comprehensive rehab program OT, PT      #Afib:   -continue metoprolol, cardiac precautions  -started  on xarelto 3/11 after discussions with heme, cardiology, patient  -monitor H/H, symptoms bleeding, CBC 3/15    #HTN  -continue norvasc, lasix  -controlled currently    #SZ/tremors: significantly improved. likely multifactorial including essential tremor and metabolic causes,  -controlled on keppra  -neuro consult appreciate  .   #Gout/CPPD: improved  -allopurinol, indomethacin  -monitor BMP 3/15    #BPH/ureteral stone:  consult appreciated  -continue flomax, bladder scan        #DVT PPX  -on xarelto  bilateral ACE wraps for edema management    #left heel blister  -swiss cheese boots for positioning      DNR    #Case disussed in IDT rounds 3/12. Patient with fluctuations in BP, poor endurance, current goals are for min assist bADLs and min-mod assist transfers and gait, will need 24 hour physical assistance on dc. Patient would benefit from JANET, target 3/20 when medically stable, will discuss with patient and caregivers versus ability to hire level of assistance on dc home    Labs:    CBc BMP 3/15  CBC BMP 3/18 Patient is 84 male with PMH significant for HTN, HLD,  Afib (off xarelto), stomach CA, anemia, gout, BPH. seizure d/o, recently stopped EToh use, p/w chronic anemia possibly due to myelodysplastic syndrome, s/p tx PRBC, debility and functional decline      #Anemia/debility: multifactorial: Chronic kidney disease, recent GIB. 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   -Heme appreciated:   -continue procrit started 3/11  -continue folic acid, MVI  -. recheck CBC 3/15  -continue comprehensive rehab program OT, PT      #Afib:   -continue metoprolol, cardiac precautions  -started  on xarelto 3/11 after discussions with heme, cardiology, patient  -monitor H/H, symptoms bleeding, CBC 3/15    #HTN  -continue norvasc, lasix  -controlled currently    #SZ/tremors: significantly improved. likely multifactorial including essential tremor and metabolic causes,  -controlled on keppra  -neuro consult appreciate  .   #Gout/CPPD: improved  -allopurinol, indomethacin  -encouraged to reduce sugar intake in relation to gout flares. dietary choices including Berries for fruit discussed  -monitor BMP 3/15    #BPH/ureteral stone:  consult appreciated  -continue flomax, bladder scan      #DVT PPX  -on xarelto  bilateral ACE wraps for edema management    #left heel blister  -swiss cheese boots for positioning      DNR    #Case disussed in IDT rounds 3/12. Patient with fluctuations in BP, poor endurance, current goals are for min assist bADLs and min-mod assist transfers and gait, will need 24 hour physical assistance on dc. Patient would benefit from JANET, target 3/20 when medically stable, will discuss with patient and caregivers versus ability to hire level of assistance on dc home    Labs:    CBc BMP 3/15  CBC BMP 3/18

## 2019-03-14 NOTE — PROGRESS NOTE ADULT - SUBJECTIVE AND OBJECTIVE BOX
Patient is a 84y old  Male who presents with a chief complaint of anemia, debility and functional decline (13 Mar 2019 14:14)      HPI:  Patient is 84 male RH dominant with PMH significant for HTN, HLD,  Afib (off xarelto), stomach CA, anemia, gout, BPH. seizure d/o, recently stopped EToh use, who presented from [facility] with abnormal labs.  Pt reports recent admission to [facility] for gout flare then discharged to rehab.  Pt now returns with guaiac negative anemia (HgB 6.8 ) and  episode of bright red blood in stool. Pt also found with leukocytosis, with temp 100.7 F rectally.  Denies chills, fever, sob, chest pain, weakness, dysuria.    Patient was seen by GI and hematology.  S/P Colonoscopy on 3/1/19. No source of bleeding noted. Negative FOB. S/P EGD during prior admission, and no bleeding identified. Heme performed BM biopsy 3/4, results pending. CKD, Etoh may contribute, although underlying BM d/o such as myelodysplasia suspected. Transfused PRBC. Patient transferred to - [facility] due to debility and functional decline (06 Mar 2019 14:53)      PAST MEDICAL & SURGICAL HISTORY:  Nonrheumatic aortic valve stenosis  Cancer of stomach  Anemia, unspecified type: Anemia  Seizure disorder  Benign prostatic hyperplasia, unspecified whether lower urinary tract symptoms present  Hyperlipemia  Essential hypertension  Chronic atrial fibrillation  Stomach cancer  Hypertension  Atrial fibrillation  Gout  No significant past surgical history  No significant past surgical history      MEDICATIONS  (STANDING):  allopurinol 100 milliGRAM(s) Oral daily  amLODIPine   Tablet 10 milliGRAM(s) Oral daily  aspirin enteric coated 81 milliGRAM(s) Oral daily  atorvastatin 10 milliGRAM(s) Oral at bedtime  cyanocobalamin 1000 MICROGram(s) Oral daily  docusate sodium 100 milliGRAM(s) Oral two times a day  epoetin jackelyn Injectable 96159 Unit(s) SubCutaneous <User Schedule>  folic acid 1 milliGRAM(s) Oral daily  levETIRAcetam 500 milliGRAM(s) Oral two times a day  metoprolol tartrate 25 milliGRAM(s) Oral every 12 hours  multivitamin 1 Tablet(s) Oral daily  pantoprazole    Tablet 40 milliGRAM(s) Oral before breakfast  rivaroxaban 20 milliGRAM(s) Oral every 24 hours  tamsulosin 0.4 milliGRAM(s) Oral at bedtime    MEDICATIONS  (PRN):  acetaminophen   Tablet .. 650 milliGRAM(s) Oral every 6 hours PRN Mild Pain (1 - 3)  indomethacin 25 milliGRAM(s) Oral two times a day PRN Moderate Pain (4 - 6)  senna 2 Tablet(s) Oral at bedtime PRN Constipation      Allergies    No Known Allergies    Intolerances          VITALS  84y  Vital Signs Last 24 Hrs  T(C): 36.7 (14 Mar 2019 07:53), Max: 36.8 (13 Mar 2019 22:16)  T(F): 98.1 (14 Mar 2019 07:53), Max: 98.3 (13 Mar 2019 22:16)  HR: 71 (14 Mar 2019 07:53) (71 - 97)  BP: 146/77 (14 Mar 2019 07:53) (126/72 - 146/77)  BP(mean): --  RR: 12 (14 Mar 2019 07:53) (12 - 14)  SpO2: 100% (14 Mar 2019 07:53) (96% - 100%)  Daily     Daily         RECENT LABS:                          7.4    7.0   )-----------( 187      ( 13 Mar 2019 06:00 )             23.7     03-13    139  |  104  |  34<H>  ----------------------------<  286<H>  4.0   |  26  |  1.37<H>    Ca    9.0      13 Mar 2019 06:00                CAPILLARY BLOOD GLUCOSE

## 2019-03-15 DIAGNOSIS — D64.9 ANEMIA, UNSPECIFIED: ICD-10-CM

## 2019-03-15 LAB
ANION GAP SERPL CALC-SCNC: 10 MMOL/L — SIGNIFICANT CHANGE UP (ref 5–17)
BUN SERPL-MCNC: 29 MG/DL — HIGH (ref 7–23)
CALCIUM SERPL-MCNC: 9 MG/DL — SIGNIFICANT CHANGE UP (ref 8.4–10.5)
CHLORIDE SERPL-SCNC: 103 MMOL/L — SIGNIFICANT CHANGE UP (ref 96–108)
CO2 SERPL-SCNC: 25 MMOL/L — SIGNIFICANT CHANGE UP (ref 22–31)
CREAT SERPL-MCNC: 1.19 MG/DL — SIGNIFICANT CHANGE UP (ref 0.5–1.3)
GLUCOSE SERPL-MCNC: 220 MG/DL — HIGH (ref 70–99)
HCT VFR BLD CALC: 24 % — LOW (ref 39–50)
HCT VFR BLD CALC: 24.6 % — LOW (ref 39–50)
HGB BLD-MCNC: 7.1 G/DL — LOW (ref 13–17)
HGB BLD-MCNC: 7.7 G/DL — LOW (ref 13–17)
MCHC RBC-ENTMCNC: 29.7 GM/DL — LOW (ref 32–36)
MCHC RBC-ENTMCNC: 29.8 PG — SIGNIFICANT CHANGE UP (ref 27–34)
MCHC RBC-ENTMCNC: 31.1 GM/DL — LOW (ref 32–36)
MCHC RBC-ENTMCNC: 31.4 PG — SIGNIFICANT CHANGE UP (ref 27–34)
MCV RBC AUTO: 100.4 FL — HIGH (ref 80–100)
MCV RBC AUTO: 100.9 FL — HIGH (ref 80–100)
PLATELET # BLD AUTO: 222 K/UL — SIGNIFICANT CHANGE UP (ref 150–400)
PLATELET # BLD AUTO: 222 K/UL — SIGNIFICANT CHANGE UP (ref 150–400)
POTASSIUM SERPL-MCNC: 3.6 MMOL/L — SIGNIFICANT CHANGE UP (ref 3.5–5.3)
POTASSIUM SERPL-SCNC: 3.6 MMOL/L — SIGNIFICANT CHANGE UP (ref 3.5–5.3)
RBC # BLD: 2.39 M/UL — LOW (ref 4.2–5.8)
RBC # BLD: 2.44 M/UL — LOW (ref 4.2–5.8)
RBC # FLD: 19.3 % — HIGH (ref 10.3–14.5)
RBC # FLD: 20.1 % — HIGH (ref 10.3–14.5)
SODIUM SERPL-SCNC: 138 MMOL/L — SIGNIFICANT CHANGE UP (ref 135–145)
WBC # BLD: 11.3 K/UL — HIGH (ref 3.8–10.5)
WBC # BLD: 14.7 K/UL — HIGH (ref 3.8–10.5)
WBC # FLD AUTO: 11.3 K/UL — HIGH (ref 3.8–10.5)
WBC # FLD AUTO: 14.7 K/UL — HIGH (ref 3.8–10.5)

## 2019-03-15 PROCEDURE — 99233 SBSQ HOSP IP/OBS HIGH 50: CPT

## 2019-03-15 PROCEDURE — 99232 SBSQ HOSP IP/OBS MODERATE 35: CPT

## 2019-03-15 PROCEDURE — 99233 SBSQ HOSP IP/OBS HIGH 50: CPT | Mod: GC

## 2019-03-15 RX ORDER — INDOMETHACIN 50 MG
50 CAPSULE ORAL THREE TIMES A DAY
Qty: 0 | Refills: 0 | Status: COMPLETED | OUTPATIENT
Start: 2019-03-15 | End: 2019-03-18

## 2019-03-15 RX ORDER — ALLOPURINOL 300 MG
300 TABLET ORAL DAILY
Qty: 0 | Refills: 0 | Status: DISCONTINUED | OUTPATIENT
Start: 2019-03-15 | End: 2019-04-10

## 2019-03-15 RX ADMIN — Medication 1 MILLIGRAM(S): at 17:56

## 2019-03-15 RX ADMIN — Medication 300 MILLIGRAM(S): at 12:31

## 2019-03-15 RX ADMIN — PANTOPRAZOLE SODIUM 40 MILLIGRAM(S): 20 TABLET, DELAYED RELEASE ORAL at 06:13

## 2019-03-15 RX ADMIN — Medication 100 MILLIGRAM(S): at 18:01

## 2019-03-15 RX ADMIN — Medication 50 MILLIGRAM(S): at 22:15

## 2019-03-15 RX ADMIN — Medication 50 MILLIGRAM(S): at 13:21

## 2019-03-15 RX ADMIN — Medication 50 MILLIGRAM(S): at 14:00

## 2019-03-15 RX ADMIN — Medication 25 MILLIGRAM(S): at 05:10

## 2019-03-15 RX ADMIN — Medication 1 TABLET(S): at 12:31

## 2019-03-15 RX ADMIN — ATORVASTATIN CALCIUM 10 MILLIGRAM(S): 80 TABLET, FILM COATED ORAL at 21:02

## 2019-03-15 RX ADMIN — Medication 100 MILLIGRAM(S): at 05:03

## 2019-03-15 RX ADMIN — LEVETIRACETAM 500 MILLIGRAM(S): 250 TABLET, FILM COATED ORAL at 05:04

## 2019-03-15 RX ADMIN — Medication 81 MILLIGRAM(S): at 12:31

## 2019-03-15 RX ADMIN — LEVETIRACETAM 500 MILLIGRAM(S): 250 TABLET, FILM COATED ORAL at 17:55

## 2019-03-15 RX ADMIN — Medication 50 MILLIGRAM(S): at 21:01

## 2019-03-15 RX ADMIN — Medication 650 MILLIGRAM(S): at 16:30

## 2019-03-15 RX ADMIN — AMLODIPINE BESYLATE 10 MILLIGRAM(S): 2.5 TABLET ORAL at 05:04

## 2019-03-15 RX ADMIN — Medication 25 MILLIGRAM(S): at 17:55

## 2019-03-15 RX ADMIN — Medication 650 MILLIGRAM(S): at 15:44

## 2019-03-15 RX ADMIN — RIVAROXABAN 20 MILLIGRAM(S): KIT at 17:56

## 2019-03-15 RX ADMIN — TAMSULOSIN HYDROCHLORIDE 0.4 MILLIGRAM(S): 0.4 CAPSULE ORAL at 21:02

## 2019-03-15 RX ADMIN — ERYTHROPOIETIN 10000 UNIT(S): 10000 INJECTION, SOLUTION INTRAVENOUS; SUBCUTANEOUS at 09:40

## 2019-03-15 RX ADMIN — PREGABALIN 1000 MICROGRAM(S): 225 CAPSULE ORAL at 12:31

## 2019-03-15 NOTE — PROGRESS NOTE ADULT - SUBJECTIVE AND OBJECTIVE BOX
HPI  Pt is a 85yo M w multiple PMH is admitted to acute rehab for debility secondary to anemia likely from myelodysplastic syndrome s/p PRBC.   Pt was seen and examined at bedside. Pt states he does not response well with steroid for gout, only indomethacin works. HH 7.7 this morning but repeat 7.7, no need to transfuse at this time. Pt states gout attack is improving.     Vital Signs Last 24 Hrs  T(C): 36.7 (15 Mar 2019 08:49), Max: 36.7 (14 Mar 2019 20:50)  T(F): 98.1 (15 Mar 2019 08:49), Max: 98.1 (14 Mar 2019 20:50)  HR: 80 (15 Mar 2019 08:49) (80 - 95)  BP: 127/76 (15 Mar 2019 08:49) (127/76 - 146/79)  BP(mean): --  RR: 14 (15 Mar 2019 08:49) (12 - 14)  SpO2: 98% (15 Mar 2019 08:49) (98% - 100%)    I&O's Summary      CAPILLARY BLOOD GLUCOSE          PHYSICAL EXAM:    Constitutional: NAD, awake and alert, well-developed  HEENT: PERR, EOMI, Normal Hearing, MMM  Neck: Soft and supple, No LAD, No JVD  Respiratory: Breath sounds are clear bilaterally, No wheezing, rales or rhonchi  Cardiovascular: S1 and S2, irregular rate and rhythm,   Gastrointestinal: Bowel Sounds present, soft, nontender, nondistended, no guarding, no rebound  Extremities: No peripheral edema  Vascular: 2+ peripheral pulses  Neurological: A/O x 3, no focal deficits  Musculoskeletal: multiple MC joint swelling and redness  Skin: No rashes    MEDICATIONS:  MEDICATIONS  (STANDING):  allopurinol 300 milliGRAM(s) Oral daily  amLODIPine   Tablet 10 milliGRAM(s) Oral daily  aspirin enteric coated 81 milliGRAM(s) Oral daily  atorvastatin 10 milliGRAM(s) Oral at bedtime  cyanocobalamin 1000 MICROGram(s) Oral daily  docusate sodium 100 milliGRAM(s) Oral two times a day  folic acid 1 milliGRAM(s) Oral daily  indomethacin 50 milliGRAM(s) Oral three times a day  levETIRAcetam 500 milliGRAM(s) Oral two times a day  metoprolol tartrate 25 milliGRAM(s) Oral every 12 hours  multivitamin 1 Tablet(s) Oral daily  pantoprazole    Tablet 40 milliGRAM(s) Oral before breakfast  rivaroxaban 20 milliGRAM(s) Oral every 24 hours  tamsulosin 0.4 milliGRAM(s) Oral at bedtime      LABS: All Labs Reviewed:                        7.7    14.7  )-----------( 222      ( 15 Mar 2019 12:02 )             24.6     03-15    138  |  103  |  29<H>  ----------------------------<  220<H>  3.6   |  25  |  1.19    Ca    9.0      15 Mar 2019 05:40            Blood Culture:     RADIOLOGY/EKG:    DVT PPX:    ADVANCED DIRECTIVE:    DISPOSITION:

## 2019-03-15 NOTE — PROGRESS NOTE ADULT - ASSESSMENT
84-year-old gentleman with history of anemia, atrial fibrillation and seizure disorder admitted with anemia exacerbation from his [facility] facility. Patient notes recent history of bright red blood per rectum. He was scheduled for outpatient colonoscopy.  On admission, found to have rectal temp of 100.7°F in the emergency department.  Patient reports he stopped drinking alcohol approximately 3 months prior to admission

## 2019-03-15 NOTE — PROGRESS NOTE ADULT - SUBJECTIVE AND OBJECTIVE BOX
Patient is a 84y old  Male who presents with a chief complaint of anemia, debility and functional decline (14 Mar 2019 11:55)      HPI:  Patient is 84 male RH dominant with PMH significant for HTN, HLD,  Afib (off xarelto), stomach CA, anemia, gout, BPH. seizure d/o, recently stopped EToh use, who presented from [facility] with abnormal labs.  Pt reports recent admission to [facility] for gout flare then discharged to rehab.  Pt now returns with guaiac negative anemia (HgB 6.8 ) and  episode of bright red blood in stool. Pt also found with leukocytosis, with temp 100.7 F rectally.  Denies chills, fever, sob, chest pain, weakness, dysuria.    Patient was seen by GI and hematology.  S/P Colonoscopy on 3/1/19. No source of bleeding noted. Negative FOB. S/P EGD during prior admission, and no bleeding identified. Heme performed BM biopsy 3/4, results pending. CKD, Etoh may contribute, although underlying BM d/o such as myelodysplasia suspected. Transfused PRBC. Patient transferred to - [facility] due to debility and functional decline (06 Mar 2019 14:53)      PAST MEDICAL & SURGICAL HISTORY:  Nonrheumatic aortic valve stenosis  Cancer of stomach  Anemia, unspecified type: Anemia  Seizure disorder  Benign prostatic hyperplasia, unspecified whether lower urinary tract symptoms present  Hyperlipemia  Essential hypertension  Chronic atrial fibrillation  Stomach cancer  Hypertension  Atrial fibrillation  Gout  No significant past surgical history  No significant past surgical history      MEDICATIONS  (STANDING):  allopurinol 300 milliGRAM(s) Oral daily  amLODIPine   Tablet 10 milliGRAM(s) Oral daily  aspirin enteric coated 81 milliGRAM(s) Oral daily  atorvastatin 10 milliGRAM(s) Oral at bedtime  cyanocobalamin 1000 MICROGram(s) Oral daily  docusate sodium 100 milliGRAM(s) Oral two times a day  folic acid 1 milliGRAM(s) Oral daily  indomethacin 50 milliGRAM(s) Oral three times a day  levETIRAcetam 500 milliGRAM(s) Oral two times a day  metoprolol tartrate 25 milliGRAM(s) Oral every 12 hours  multivitamin 1 Tablet(s) Oral daily  pantoprazole    Tablet 40 milliGRAM(s) Oral before breakfast  rivaroxaban 20 milliGRAM(s) Oral every 24 hours  tamsulosin 0.4 milliGRAM(s) Oral at bedtime    MEDICATIONS  (PRN):  acetaminophen   Tablet .. 650 milliGRAM(s) Oral every 6 hours PRN Mild Pain (1 - 3)  senna 2 Tablet(s) Oral at bedtime PRN Constipation      Allergies    No Known Allergies    Intolerances          VITALS  84y  Vital Signs Last 24 Hrs  T(C): 36.7 (15 Mar 2019 08:49), Max: 36.7 (14 Mar 2019 20:50)  T(F): 98.1 (15 Mar 2019 08:49), Max: 98.1 (14 Mar 2019 20:50)  HR: 80 (15 Mar 2019 08:49) (80 - 95)  BP: 127/76 (15 Mar 2019 08:49) (127/76 - 146/79)  BP(mean): --  RR: 14 (15 Mar 2019 08:49) (12 - 14)  SpO2: 98% (15 Mar 2019 08:49) (98% - 100%)  Daily     Daily Weight in k.1 (15 Mar 2019 05:40)        RECENT LABS:                          7.7    14.7  )-----------( 222      ( 15 Mar 2019 12:02 )             24.6     03-15    138  |  103  |  29<H>  ----------------------------<  220<H>  3.6   |  25  |  1.19    Ca    9.0      15 Mar 2019 05:40                CAPILLARY BLOOD GLUCOSE

## 2019-03-15 NOTE — PROGRESS NOTE ADULT - ASSESSMENT
Pt is a 83yo M w multiple PMH is admitted to acute rehab for debility secondary to anemia likely from myelodysplastic syndrome s/p PRBC.     *debility secondary to anemia, CKD, GIB, ?CML  Cont acute rehab  PT/OT  Stool guaiac   HH 7.1-7.7  Transfuse if Hg<7   Cont Folic Acid    *Leukocytosis  Asymptomatic  Will follow up CBC  ?reactive    *Afib   Rate controlled  Cont BB  Cont xarelto     *HTN  Stable  Cont norvasc, lasix     *Gout   Allopurinol  Indomethacin per pt PCP for 3 days  Pt does not response to steroid     *BPH  Cont Flomax     *DVT ppx   Cont xarelto

## 2019-03-15 NOTE — PROGRESS NOTE ADULT - ASSESSMENT
Patient is 84 male with PMH significant for HTN, HLD,  Afib (off xarelto), stomach CA, anemia, gout, BPH. seizure d/o, recently stopped EToh use, p/w chronic anemia possibly due to myelodysplastic syndrome, s/p tx PRBC, debility and functional decline      #Anemia/debility: multifactorial: Chronic kidney disease, recent GIB. 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   -Heme appreciated:   -continue procrit started 3/11  -continue folic acid, MVI  -CBC stable 3/15. will order repeat on Mon .   -hospitalist appreciated, discussed case this morning. Stool guaiac  -continue comprehensive rehab program OT, PT      #Afib:   -continue metoprolol, cardiac precautions  -started  on xarelto 3/11 after discussions with heme, cardiology, patient  -CBC stable, montior    #HTN  -continue norvasc, lasix    #SZ/tremors:  likely multifactorial including essential tremor and metabolic causes,  -controlled on keppra  -neuro consult appreciate  .   #Gout/CPPD: improved  -allopurinol, indomethacin  -encouraged to reduce sugar intake in relation to gout flares. dietary choices including Berries for fruit discussed  -case discussed with private cardiologist, Dr. Lois Wood. as consideration for dc indomethacin due to drop in H/H. Per Dr. Wood, prednisone was NOT effective in managing gout in past (patient also reports the same), recommends indomethacin 50 mg TID x 3 days and increasing allopurinol to 300 mg daily. Will discuss with patient as well    #BPH/ureteral stone:  consult appreciated  -continue flomax, bladder scan      #DVT PPX  -on xarelto  bilateral ACE wraps for edema management    #left heel blister  -swiss cheese boots for positioning      DNR    #Case disussed in IDT rounds 3/12. Patient with fluctuations in BP, poor endurance, current goals are for min assist bADLs and min-mod assist transfers and gait, will need 24 hour physical assistance on dc. Patient would benefit from JANET, target 3/20 when medically stable, will discuss with patient and caregivers versus ability to hire level of assistance on dc home    Labs:    stool guaiac  CBC BMP 3/18 Patient is 84 male with PMH significant for HTN, HLD,  Afib (off xarelto), stomach CA, anemia, gout, BPH. seizure d/o, recently stopped EToh use, p/w chronic anemia possibly due to myelodysplastic syndrome, s/p tx PRBC, debility and functional decline      #Anemia/debility: multifactorial: Chronic kidney disease, recent GIB. 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   -Heme appreciated:   -continue procrit started 3/11  -continue folic acid, MVI  -CBC stable 3/15.  -hospitalist appreciated, discussed case this morning. Stool guaiac  -continue comprehensive rehab program OT, PT    #leukocytosis  -?source. fluctuating WBC since admission including to medicine  -hospitalist f/u  CBc in AM 3/16     #Afib:   -continue metoprolol, cardiac precautions  -started  on xarelto 3/11 after discussions with heme, cardiology, patient  -CBC stable, montior    #HTN  -continue norvasc, lasix    #SZ/tremors:  likely multifactorial including essential tremor and metabolic causes,  -controlled on keppra  -neuro consult appreciate  .   #Gout/CPPD: improved  -allopurinol, indomethacin  -encouraged to reduce sugar intake in relation to gout flares. dietary choices including Berries for fruit discussed  -case discussed with private cardiologist, Dr. Losi Wood. as consideration for dc indomethacin due to drop in H/H. Per Dr. Wood, prednisone was NOT effective in managing gout in past (patient also reports the same), recommends indomethacin 50 mg TID x 3 days and increasing allopurinol to 300 mg daily. Will discuss with patient as well    #BPH/ureteral stone:  consult appreciated  -continue flomax, bladder scan      #DVT PPX  -on xarelto  bilateral ACE wraps for edema management    #left heel blister  -swiss cheese boots for positioning      DNR    #Case disussed in IDT rounds 3/12. Patient with fluctuations in BP, poor endurance, current goals are for min assist bADLs and min-mod assist transfers and gait, will need 24 hour physical assistance on dc. Patient would benefit from JANET, target 3/20 when medically stable, will discuss with patient and caregivers versus ability to hire level of assistance on dc home    Labs:  CBC 3/16 f/u WBC H/H  stool guaiac  CBC BMP 3/18

## 2019-03-15 NOTE — PROGRESS NOTE ADULT - NSICDXPROBLEM_GEN_ALL_CORE_FT
PROBLEM DIAGNOSES  Problem: Anemia  Assessment and Plan: CKD contributes to patient's anemia as well as recent GIB. BM suggestiove of CMMoL-continue with Procrit support. Consider PRBC transfusion if hemoglobin decreases to <7/increased symptoms develop. Vitamin B 12 supplementation for elevated MMA>

## 2019-03-15 NOTE — PROGRESS NOTE ADULT - SUBJECTIVE AND OBJECTIVE BOX
YUE KING   84y   Male    Admitting: SAEED Gallego  HPI:  84-year-old gentleman with history of anemia, atrial fibrillation and seizure disorder admitted with anemia exacerbation from his [place] facility. Patient noted recent history of bright red blood per rectum. He was scheduled for outpatient colonoscopy. S/P BM biopsy.  Patient reported he stopped drinking alcohol approximately 3 months prior to admission.  Patient is now on the acute rehabilitation unit.    PAST MEDICAL & SURGICAL HISTORY:  Nonrheumatic aortic valve stenosis  Cancer of stomach  Anemia, unspecified type: Anemia  Seizure disorder  Benign prostatic hyperplasia, unspecified whether lower urinary tract symptoms present  Hyperlipemia  Essential hypertension  Chronic atrial fibrillation  Stomach cancer  Hypertension  Atrial fibrillation  Gout  No significant past surgical history  No significant past surgical history    HEALTH ISSUES - PROBLEM Dx:  Ureteral stone: Ureteral stone    MEDICATIONS  (STANDING):  allopurinol 300 milliGRAM(s) Oral daily  amLODIPine   Tablet 10 milliGRAM(s) Oral daily  aspirin enteric coated 81 milliGRAM(s) Oral daily  atorvastatin 10 milliGRAM(s) Oral at bedtime  cyanocobalamin 1000 MICROGram(s) Oral daily  docusate sodium 100 milliGRAM(s) Oral two times a day  folic acid 1 milliGRAM(s) Oral daily  indomethacin 50 milliGRAM(s) Oral three times a day  levETIRAcetam 500 milliGRAM(s) Oral two times a day  metoprolol tartrate 25 milliGRAM(s) Oral every 12 hours  multivitamin 1 Tablet(s) Oral daily  pantoprazole    Tablet 40 milliGRAM(s) Oral before breakfast  rivaroxaban 20 milliGRAM(s) Oral every 24 hours  tamsulosin 0.4 milliGRAM(s) Oral at bedtime    MEDICATIONS  (PRN):  acetaminophen   Tablet .. 650 milliGRAM(s) Oral every 6 hours PRN Mild Pain (1 - 3)  senna 2 Tablet(s) Oral at bedtime PRN Constipation    Allergies    No Known Allergies    INTERVAL HPI/OVERNIGHT EVENTS:  Patient S&E at bedside. No c/o CP or SOB.    VITAL SIGNS:  T(F): 98.1 (03-15-19 @ 08:49)  HR: 80 (03-15-19 @ 08:49)  BP: 127/76 (03-15-19 @ 08:49)  RR: 14 (03-15-19 @ 08:49)  SpO2: 98% (03-15-19 @ 08:49)    PHYSICAL EXAM:  GENERAL: NAD, well-developed  EYES: EOMI, PERRLA, conjunctiva and sclera clear  NECK: Supple, No JVD  CHEST/LUNG: decreased BS bases ant.  HEART: Regular rate and rhythm  ABDOMEN: Soft, Nontender, Nondistended  EXTREMITIES:  no calf tenderness; +edema  NEUROLOGY: awake, alert  SKIN: No vesicles    Labs:             7.7    14.7  )-----------( 222      ( 03-15 @ 12:02 )             24.6                7.1    11.3  )-----------( 222      ( 03-15 @ 05:40 )             24.0                7.4    7.0   )-----------( 187      ( 03-13 @ 06:00 )             23.7       03-15    138  |  103  |  29<H>  ----------------------------<  220<H>  3.6   |  25  |  1.19    Ca    9.0      15 Mar 2019 05:40

## 2019-03-16 LAB
ANISOCYTOSIS BLD QL: SLIGHT — SIGNIFICANT CHANGE UP
APPEARANCE UR: ABNORMAL
BACTERIA # UR AUTO: ABNORMAL /HPF
BILIRUB UR-MCNC: NEGATIVE — SIGNIFICANT CHANGE UP
BLASTS # FLD: 1 % — HIGH (ref 0–0)
BURR CELLS BLD QL SMEAR: PRESENT — SIGNIFICANT CHANGE UP
COLOR SPEC: YELLOW — SIGNIFICANT CHANGE UP
COMMENT - URINE: SIGNIFICANT CHANGE UP
DIFF PNL FLD: ABNORMAL
EOSINOPHIL NFR BLD AUTO: 1 — SIGNIFICANT CHANGE UP
EPI CELLS # UR: SIGNIFICANT CHANGE UP
GLUCOSE UR QL: 50 MG/DL
HCT VFR BLD CALC: 27.1 % — LOW (ref 39–50)
HGB BLD-MCNC: 8.4 G/DL — LOW (ref 13–17)
HYPOCHROMIA BLD QL: SLIGHT — SIGNIFICANT CHANGE UP
KETONES UR-MCNC: NEGATIVE — SIGNIFICANT CHANGE UP
LEUKOCYTE ESTERASE UR-ACNC: ABNORMAL
LYMPHOCYTES # BLD AUTO: 18 % — SIGNIFICANT CHANGE UP (ref 13–44)
MACROCYTES BLD QL: SIGNIFICANT CHANGE UP
MCHC RBC-ENTMCNC: 31.1 GM/DL — LOW (ref 32–36)
MCHC RBC-ENTMCNC: 31.9 PG — SIGNIFICANT CHANGE UP (ref 27–34)
MCV RBC AUTO: 102.4 FL — HIGH (ref 80–100)
METAMYELOCYTES # FLD: 1 % — HIGH (ref 0–0)
MONOCYTES NFR BLD AUTO: 28 % — HIGH (ref 2–14)
MYELOCYTES NFR BLD: 1 % — HIGH (ref 0–0)
NEUTROPHILS NFR BLD AUTO: 40 % — LOW (ref 43–77)
NEUTS BAND # BLD: 8 % — SIGNIFICANT CHANGE UP (ref 0–8)
NITRITE UR-MCNC: NEGATIVE — SIGNIFICANT CHANGE UP
PH UR: 5 — SIGNIFICANT CHANGE UP (ref 5–8)
PLAT MORPH BLD: NORMAL — SIGNIFICANT CHANGE UP
PLATELET # BLD AUTO: 248 K/UL — SIGNIFICANT CHANGE UP (ref 150–400)
POIKILOCYTOSIS BLD QL AUTO: SLIGHT — SIGNIFICANT CHANGE UP
POLYCHROMASIA BLD QL SMEAR: SLIGHT — SIGNIFICANT CHANGE UP
PROT UR-MCNC: 30 MG/DL
RBC # BLD: 2.65 M/UL — LOW (ref 4.2–5.8)
RBC # FLD: 19.5 % — HIGH (ref 10.3–14.5)
RBC BLD AUTO: ABNORMAL
RBC CASTS # UR COMP ASSIST: ABNORMAL /HPF (ref 0–4)
ROULEAUX BLD QL SMEAR: PRESENT — SIGNIFICANT CHANGE UP
SP GR SPEC: 1.01 — SIGNIFICANT CHANGE UP (ref 1.01–1.02)
UROBILINOGEN FLD QL: NEGATIVE — SIGNIFICANT CHANGE UP
VARIANT LYMPHS # BLD: 2 % — SIGNIFICANT CHANGE UP (ref 0–6)
WBC # BLD: 24.7 K/UL — HIGH (ref 3.8–10.5)
WBC # FLD AUTO: 24.7 K/UL — HIGH (ref 3.8–10.5)
WBC UR QL: SIGNIFICANT CHANGE UP /HPF (ref 0–5)

## 2019-03-16 PROCEDURE — 99232 SBSQ HOSP IP/OBS MODERATE 35: CPT

## 2019-03-16 RX ORDER — FAMOTIDINE 10 MG/ML
20 INJECTION INTRAVENOUS DAILY
Qty: 0 | Refills: 0 | Status: DISCONTINUED | OUTPATIENT
Start: 2019-03-16 | End: 2019-04-10

## 2019-03-16 RX ADMIN — ATORVASTATIN CALCIUM 10 MILLIGRAM(S): 80 TABLET, FILM COATED ORAL at 21:42

## 2019-03-16 RX ADMIN — Medication 50 MILLIGRAM(S): at 21:42

## 2019-03-16 RX ADMIN — Medication 100 MILLIGRAM(S): at 05:16

## 2019-03-16 RX ADMIN — Medication 81 MILLIGRAM(S): at 11:58

## 2019-03-16 RX ADMIN — FAMOTIDINE 20 MILLIGRAM(S): 10 INJECTION INTRAVENOUS at 19:40

## 2019-03-16 RX ADMIN — LEVETIRACETAM 500 MILLIGRAM(S): 250 TABLET, FILM COATED ORAL at 05:16

## 2019-03-16 RX ADMIN — AMLODIPINE BESYLATE 10 MILLIGRAM(S): 2.5 TABLET ORAL at 05:16

## 2019-03-16 RX ADMIN — Medication 25 MILLIGRAM(S): at 05:16

## 2019-03-16 RX ADMIN — LEVETIRACETAM 500 MILLIGRAM(S): 250 TABLET, FILM COATED ORAL at 17:11

## 2019-03-16 RX ADMIN — PANTOPRAZOLE SODIUM 40 MILLIGRAM(S): 20 TABLET, DELAYED RELEASE ORAL at 05:16

## 2019-03-16 RX ADMIN — TAMSULOSIN HYDROCHLORIDE 0.4 MILLIGRAM(S): 0.4 CAPSULE ORAL at 21:42

## 2019-03-16 RX ADMIN — Medication 300 MILLIGRAM(S): at 11:58

## 2019-03-16 RX ADMIN — Medication 1 MILLIGRAM(S): at 11:58

## 2019-03-16 RX ADMIN — Medication 1 TABLET(S): at 11:58

## 2019-03-16 RX ADMIN — Medication 25 MILLIGRAM(S): at 17:11

## 2019-03-16 RX ADMIN — Medication 50 MILLIGRAM(S): at 14:45

## 2019-03-16 RX ADMIN — Medication 50 MILLIGRAM(S): at 06:33

## 2019-03-16 RX ADMIN — Medication 50 MILLIGRAM(S): at 05:16

## 2019-03-16 RX ADMIN — Medication 50 MILLIGRAM(S): at 14:11

## 2019-03-16 RX ADMIN — RIVAROXABAN 20 MILLIGRAM(S): KIT at 17:11

## 2019-03-16 RX ADMIN — PREGABALIN 1000 MICROGRAM(S): 225 CAPSULE ORAL at 11:58

## 2019-03-16 NOTE — PROGRESS NOTE ADULT - SUBJECTIVE AND OBJECTIVE BOX
Daily Progress Note:  HPI: Patient is 84 male RH dominant with PMH significant for HTN, HLD,  Afib (off xarelto), stomach CA, anemia, gout, BPH. seizure d/o, recently stopped EToh use, who presented from [facility] with abnormal labs.  Pt reports recent admission to [facility] for gout flare then discharged to rehab.  Pt now returns with guaiac negative anemia (HgB 6.8 ) and episode of bright red blood in stool. Pt also found with leukocytosis, with temp 100.7 F rectally.  Denies chills, fever, sob, chest pain, weakness, dysuria.  Patient was seen by GI and hematology. S/P Colonoscopy on 3/1/19. No source of bleeding noted. Negative FOB. S/P EGD during prior admission, and no bleeding identified. Heme performed BM biopsy 3/4, results pending. CKD, Etoh may contribute, although underlying BM d/o such as myelodysplasia suspected. Transfused PRBC. Patient transferred to - [facility] due to debility and functional decline (06 Mar 2019 14:53)    Subjective: Patient seen and examined at bedside, sitting in wheelchair. Reports he feels "great". His joints are feeling better as well. Denies fever, sob, cp, palpitation, dysuria.    ROS: Negative except HPI    Vital Signs Last 24 Hrs  T(C): 36.4 (16 Mar 2019 08:12), Max: 36.5 (15 Mar 2019 17:04)  T(F): 97.6 (16 Mar 2019 08:12), Max: 97.7 (15 Mar 2019 17:04)  HR: 70 (16 Mar 2019 08:12) (70 - 100)  BP: 104/60 (16 Mar 2019 08:12) (104/60 - 134/70)  BP(mean): --  RR: 14 (16 Mar 2019 08:12) (14 - 14)  SpO2: 100% (16 Mar 2019 08:12) (98% - 100%)    PHYSICAL EXAM  Constitutional - NAD, Comfortable  HEENT - NCAT, EOMI  Neck - Supple, No limited ROM  Chest - Breathing comfortably, No wheezing  Cardiovascular - S1S2   Abdomen - Soft   Extremities - Improving redness and swelling of bilateral knees, 2nd MCP's; Improved swelling of b/l LE  Neurologic Exam -  No deficits    RECENT LABS:                        8.4    24.7  )-----------( 248      ( 16 Mar 2019 07:05 )             27.1                           7.7    14.7  )-----------( 222      ( 15 Mar 2019 12:02 )             24.6     03-15    138  |  103  |  29<H>  ----------------------------<  220<H>  3.6   |  25  |  1.19    Ca    9.0      15 Mar 2019 05:40    MEDICATIONS  (STANDING):  allopurinol 300 milliGRAM(s) Oral daily  amLODIPine   Tablet 10 milliGRAM(s) Oral daily  aspirin enteric coated 81 milliGRAM(s) Oral daily  atorvastatin 10 milliGRAM(s) Oral at bedtime  cyanocobalamin 1000 MICROGram(s) Oral daily  docusate sodium 100 milliGRAM(s) Oral two times a day  folic acid 1 milliGRAM(s) Oral daily  indomethacin 50 milliGRAM(s) Oral three times a day  levETIRAcetam 500 milliGRAM(s) Oral two times a day  metoprolol tartrate 25 milliGRAM(s) Oral every 12 hours  multivitamin 1 Tablet(s) Oral daily  pantoprazole    Tablet 40 milliGRAM(s) Oral before breakfast  rivaroxaban 20 milliGRAM(s) Oral every 24 hours  tamsulosin 0.4 milliGRAM(s) Oral at bedtime    MEDICATIONS  (PRN):  acetaminophen   Tablet .. 650 milliGRAM(s) Oral every 6 hours PRN Mild Pain (1 - 3)  senna 2 Tablet(s) Oral at bedtime PRN Constipation

## 2019-03-16 NOTE — PROGRESS NOTE ADULT - ASSESSMENT
84 male with PMH significant for HTN, HLD, Afib, stomach CA, anemia, gout, BPH. seizure d/o, recently stopped EToh use, p/w chronic anemia possibly due to myelodysplastic syndrome, s/p tx PRBC, debility and functional decline    #Anemia/debility: multifactorial: Chronic kidney disease, recent GIB. 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   - Heme appreciated:   - continue procrit started 3/11  - continue folic acid, MVI  - CBC stable 3/15.  - hospitalist appreciated, discussed case this morning. Stool guaiac  - continue comprehensive rehab program OT, PT    #leukocytosis  - ?source. Fluctuating WBC since admission, however today trending up quickly to 24.7  - f/u differential - added on to AM CBC  - Will repeat UA/CXR  - hospitalist f/u  - CBC in AM 3/17 - Call Hematology if trends up further to r/o conversion to acute leukemia    #Afib:   - Continue metoprolol, cardiac precautions  - Started  on xarelto 3/11 after discussions with heme, cardiology, patient  - CBC stable, montior    #HTN  - Continue norvasc, lasix    #SZ/tremors:  likely multifactorial including essential tremor and metabolic causes,  - Controlled on keppra  - Neuro consult appreciate     #Gout/CPPD: improved  - Allopurinol, Indomethacin  - Encouraged to reduce sugar intake in relation to gout flares. dietary choices including Berries for fruit discussed  - Case discussed with private cardiologist, Dr. Lois Wood. as consideration for dc indomethacin due to drop in H/H. Per Dr. Wood, prednisone was NOT effective in managing gout in past (patient also reports the same), recommends indomethacin 50 mg TID x 3 days and increasing allopurinol to 300 mg daily. Patient in agreement with plan, joint pain improving.     #BPH/ureteral stone:  consult appreciated  - Continue flomax, bladder scan    #DVT PPX  - On xarelto  - Bilateral ACE wraps for edema management    #left heel blister  - Swiss cheese boots for positioning    DNR    #Case discussed in IDT rounds 3/12. Patient with fluctuations in BP, poor endurance, current goals are for min assist bADLs and min-mod assist transfers and gait, will need 24 hour physical assistance on dc. Patient would benefit from JANET, target 3/20 when medically stable, will discuss with patient and caregivers versus ability to hire level of assistance on dc home    Labs:  CBC 3/17 f/u WBC H/H  UA  CXR  stool guaiac  CBC BMP 3/18

## 2019-03-17 LAB
ANION GAP SERPL CALC-SCNC: 13 MMOL/L — SIGNIFICANT CHANGE UP (ref 5–17)
BASOPHILS NFR BLD AUTO: 2 % — SIGNIFICANT CHANGE UP (ref 0–2)
BUN SERPL-MCNC: 43 MG/DL — HIGH (ref 7–23)
CALCIUM SERPL-MCNC: 9 MG/DL — SIGNIFICANT CHANGE UP (ref 8.4–10.5)
CHLORIDE SERPL-SCNC: 102 MMOL/L — SIGNIFICANT CHANGE UP (ref 96–108)
CO2 SERPL-SCNC: 24 MMOL/L — SIGNIFICANT CHANGE UP (ref 22–31)
CREAT SERPL-MCNC: 1.52 MG/DL — HIGH (ref 0.5–1.3)
EOSINOPHIL NFR BLD AUTO: 2 % — SIGNIFICANT CHANGE UP (ref 0–6)
GLUCOSE SERPL-MCNC: 280 MG/DL — HIGH (ref 70–99)
HCT VFR BLD CALC: 23.5 % — LOW (ref 39–50)
HGB BLD-MCNC: 7.4 G/DL — LOW (ref 13–17)
LYMPHOCYTES # BLD AUTO: 13 % — SIGNIFICANT CHANGE UP (ref 13–44)
MCHC RBC-ENTMCNC: 31.3 GM/DL — LOW (ref 32–36)
MCHC RBC-ENTMCNC: 31.7 PG — SIGNIFICANT CHANGE UP (ref 27–34)
MCV RBC AUTO: 101.2 FL — HIGH (ref 80–100)
MONOCYTES NFR BLD AUTO: 47 % — HIGH (ref 2–14)
NEUTROPHILS NFR BLD AUTO: 32 % — LOW (ref 43–77)
PLATELET # BLD AUTO: 211 K/UL — SIGNIFICANT CHANGE UP (ref 150–400)
POTASSIUM SERPL-MCNC: 3.5 MMOL/L — SIGNIFICANT CHANGE UP (ref 3.5–5.3)
POTASSIUM SERPL-SCNC: 3.5 MMOL/L — SIGNIFICANT CHANGE UP (ref 3.5–5.3)
RBC # BLD: 2.32 M/UL — LOW (ref 4.2–5.8)
RBC # FLD: 19.4 % — HIGH (ref 10.3–14.5)
SODIUM SERPL-SCNC: 139 MMOL/L — SIGNIFICANT CHANGE UP (ref 135–145)
WBC # BLD: 15.3 K/UL — HIGH (ref 3.8–10.5)
WBC # FLD AUTO: 15.3 K/UL — HIGH (ref 3.8–10.5)

## 2019-03-17 PROCEDURE — 99232 SBSQ HOSP IP/OBS MODERATE 35: CPT

## 2019-03-17 PROCEDURE — 71045 X-RAY EXAM CHEST 1 VIEW: CPT | Mod: 26

## 2019-03-17 RX ADMIN — FAMOTIDINE 20 MILLIGRAM(S): 10 INJECTION INTRAVENOUS at 12:22

## 2019-03-17 RX ADMIN — Medication 81 MILLIGRAM(S): at 12:22

## 2019-03-17 RX ADMIN — Medication 300 MILLIGRAM(S): at 12:22

## 2019-03-17 RX ADMIN — ATORVASTATIN CALCIUM 10 MILLIGRAM(S): 80 TABLET, FILM COATED ORAL at 21:23

## 2019-03-17 RX ADMIN — Medication 100 MILLIGRAM(S): at 05:10

## 2019-03-17 RX ADMIN — Medication 1 MILLIGRAM(S): at 12:22

## 2019-03-17 RX ADMIN — Medication 50 MILLIGRAM(S): at 15:12

## 2019-03-17 RX ADMIN — Medication 50 MILLIGRAM(S): at 21:25

## 2019-03-17 RX ADMIN — PANTOPRAZOLE SODIUM 40 MILLIGRAM(S): 20 TABLET, DELAYED RELEASE ORAL at 05:11

## 2019-03-17 RX ADMIN — Medication 50 MILLIGRAM(S): at 05:10

## 2019-03-17 RX ADMIN — PREGABALIN 1000 MICROGRAM(S): 225 CAPSULE ORAL at 12:22

## 2019-03-17 RX ADMIN — Medication 25 MILLIGRAM(S): at 05:10

## 2019-03-17 RX ADMIN — Medication 50 MILLIGRAM(S): at 21:00

## 2019-03-17 RX ADMIN — LEVETIRACETAM 500 MILLIGRAM(S): 250 TABLET, FILM COATED ORAL at 05:10

## 2019-03-17 RX ADMIN — LEVETIRACETAM 500 MILLIGRAM(S): 250 TABLET, FILM COATED ORAL at 17:17

## 2019-03-17 RX ADMIN — Medication 50 MILLIGRAM(S): at 15:45

## 2019-03-17 RX ADMIN — RIVAROXABAN 20 MILLIGRAM(S): KIT at 17:17

## 2019-03-17 RX ADMIN — Medication 50 MILLIGRAM(S): at 06:10

## 2019-03-17 RX ADMIN — TAMSULOSIN HYDROCHLORIDE 0.4 MILLIGRAM(S): 0.4 CAPSULE ORAL at 21:25

## 2019-03-17 RX ADMIN — AMLODIPINE BESYLATE 10 MILLIGRAM(S): 2.5 TABLET ORAL at 05:10

## 2019-03-17 RX ADMIN — Medication 1 TABLET(S): at 12:22

## 2019-03-17 RX ADMIN — Medication 25 MILLIGRAM(S): at 17:18

## 2019-03-17 NOTE — PROGRESS NOTE ADULT - SUBJECTIVE AND OBJECTIVE BOX
Cc: Gait dysfunction    HPI: Patient with no new medical issues today.  Pain controlled, no chest pain, no N/V, no Fevers/Chills. No other new ROS  Has been tolerating rehabilitation program.  Feels well and in good spirits.    acetaminophen   Tablet .. 650 milliGRAM(s) Oral every 6 hours PRN  allopurinol 300 milliGRAM(s) Oral daily  amLODIPine   Tablet 10 milliGRAM(s) Oral daily  aspirin enteric coated 81 milliGRAM(s) Oral daily  atorvastatin 10 milliGRAM(s) Oral at bedtime  cyanocobalamin 1000 MICROGram(s) Oral daily  docusate sodium 100 milliGRAM(s) Oral two times a day  famotidine    Tablet 20 milliGRAM(s) Oral daily  folic acid 1 milliGRAM(s) Oral daily  indomethacin 50 milliGRAM(s) Oral three times a day  levETIRAcetam 500 milliGRAM(s) Oral two times a day  metoprolol tartrate 25 milliGRAM(s) Oral every 12 hours  multivitamin 1 Tablet(s) Oral daily  pantoprazole    Tablet 40 milliGRAM(s) Oral before breakfast  rivaroxaban 20 milliGRAM(s) Oral every 24 hours  senna 2 Tablet(s) Oral at bedtime PRN  tamsulosin 0.4 milliGRAM(s) Oral at bedtime      T(C): 36.4 (03-17-19 @ 08:21), Max: 36.9 (03-16-19 @ 20:10)  HR: 73 (03-17-19 @ 08:21) (69 - 96)  BP: 123/69 (03-17-19 @ 08:21) (123/64 - 132/65)  RR: 14 (03-17-19 @ 08:21) (14 - 15)  SpO2: 100% (03-17-19 @ 08:21) (97% - 100%)    In NAD  HEENT- EOMI  Heart- RRR, S1S2  Lungs- CTA bl.  Abd- + BS, NT  Ext- No calf pain  Neuro- Exam unchanged  In good spirits                          7.4    15.3  )-----------( 211      ( 17 Mar 2019 05:45 )             23.5     03-17    139  |  102  |  43<H>  ----------------------------<  280<H>  3.5   |  24  |  1.52<H>    Ca    9.0      17 Mar 2019 05:45      Imp: Patient with diagnosis of debility admitted for comprehensive acute rehabilitation.    Plan:  - Continue PT/OT/SLP  - wbc ct improved. monitor anemia and renal function.   - Skin- Turn q2h, check skin daily  - Continue current medications; patient medically stable.   - Patient is stable to continue current rehabilitation program.

## 2019-03-18 LAB
ANION GAP SERPL CALC-SCNC: 13 MMOL/L — SIGNIFICANT CHANGE UP (ref 5–17)
BUN SERPL-MCNC: 44 MG/DL — HIGH (ref 7–23)
CALCIUM SERPL-MCNC: 8.8 MG/DL — SIGNIFICANT CHANGE UP (ref 8.4–10.5)
CHLORIDE SERPL-SCNC: 106 MMOL/L — SIGNIFICANT CHANGE UP (ref 96–108)
CO2 SERPL-SCNC: 23 MMOL/L — SIGNIFICANT CHANGE UP (ref 22–31)
CREAT SERPL-MCNC: 1.44 MG/DL — HIGH (ref 0.5–1.3)
GLUCOSE SERPL-MCNC: 249 MG/DL — HIGH (ref 70–99)
HCT VFR BLD CALC: 23.9 % — LOW (ref 39–50)
HGB BLD-MCNC: 7.7 G/DL — LOW (ref 13–17)
MCHC RBC-ENTMCNC: 32.1 GM/DL — SIGNIFICANT CHANGE UP (ref 32–36)
MCHC RBC-ENTMCNC: 32.4 PG — SIGNIFICANT CHANGE UP (ref 27–34)
MCV RBC AUTO: 100.9 FL — HIGH (ref 80–100)
PLATELET # BLD AUTO: 204 K/UL — SIGNIFICANT CHANGE UP (ref 150–400)
POTASSIUM SERPL-MCNC: 4 MMOL/L — SIGNIFICANT CHANGE UP (ref 3.5–5.3)
POTASSIUM SERPL-SCNC: 4 MMOL/L — SIGNIFICANT CHANGE UP (ref 3.5–5.3)
RBC # BLD: 2.36 M/UL — LOW (ref 4.2–5.8)
RBC # FLD: 19.7 % — HIGH (ref 10.3–14.5)
SODIUM SERPL-SCNC: 142 MMOL/L — SIGNIFICANT CHANGE UP (ref 135–145)
WBC # BLD: 11.4 K/UL — HIGH (ref 3.8–10.5)
WBC # FLD AUTO: 11.4 K/UL — HIGH (ref 3.8–10.5)

## 2019-03-18 PROCEDURE — 99233 SBSQ HOSP IP/OBS HIGH 50: CPT

## 2019-03-18 PROCEDURE — 99232 SBSQ HOSP IP/OBS MODERATE 35: CPT

## 2019-03-18 RX ADMIN — PANTOPRAZOLE SODIUM 40 MILLIGRAM(S): 20 TABLET, DELAYED RELEASE ORAL at 06:20

## 2019-03-18 RX ADMIN — LEVETIRACETAM 500 MILLIGRAM(S): 250 TABLET, FILM COATED ORAL at 06:20

## 2019-03-18 RX ADMIN — Medication 81 MILLIGRAM(S): at 12:40

## 2019-03-18 RX ADMIN — TAMSULOSIN HYDROCHLORIDE 0.4 MILLIGRAM(S): 0.4 CAPSULE ORAL at 21:11

## 2019-03-18 RX ADMIN — Medication 50 MILLIGRAM(S): at 06:45

## 2019-03-18 RX ADMIN — Medication 650 MILLIGRAM(S): at 09:16

## 2019-03-18 RX ADMIN — Medication 1 MILLIGRAM(S): at 12:40

## 2019-03-18 RX ADMIN — FAMOTIDINE 20 MILLIGRAM(S): 10 INJECTION INTRAVENOUS at 12:41

## 2019-03-18 RX ADMIN — Medication 300 MILLIGRAM(S): at 12:41

## 2019-03-18 RX ADMIN — Medication 650 MILLIGRAM(S): at 08:54

## 2019-03-18 RX ADMIN — Medication 100 MILLIGRAM(S): at 06:19

## 2019-03-18 RX ADMIN — Medication 100 MILLIGRAM(S): at 18:20

## 2019-03-18 RX ADMIN — LEVETIRACETAM 500 MILLIGRAM(S): 250 TABLET, FILM COATED ORAL at 18:20

## 2019-03-18 RX ADMIN — Medication 50 MILLIGRAM(S): at 06:25

## 2019-03-18 RX ADMIN — AMLODIPINE BESYLATE 10 MILLIGRAM(S): 2.5 TABLET ORAL at 06:19

## 2019-03-18 RX ADMIN — Medication 1 TABLET(S): at 12:40

## 2019-03-18 RX ADMIN — ATORVASTATIN CALCIUM 10 MILLIGRAM(S): 80 TABLET, FILM COATED ORAL at 21:11

## 2019-03-18 RX ADMIN — PREGABALIN 1000 MICROGRAM(S): 225 CAPSULE ORAL at 12:40

## 2019-03-18 RX ADMIN — Medication 25 MILLIGRAM(S): at 06:20

## 2019-03-18 RX ADMIN — Medication 25 MILLIGRAM(S): at 18:20

## 2019-03-18 NOTE — PROGRESS NOTE ADULT - ASSESSMENT
84 male with PMH significant for HTN, HLD, Afib, stomach CA, anemia, gout, BPH. seizure d/o, recently stopped EToh use, p/w chronic anemia possibly due to myelodysplastic syndrome, s/p tx PRBC, debility and functional decline    #Anemia/debility: multifactorial: Chronic kidney disease, recent GIB. 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   - Heme appreciated:   - continue procrit started 3/11  - continue folic acid, MVI  - heme appreciated. Recommend B12 supplementation, transfuse if Hgb<7.  - continue comprehensive rehab program OT, PT    #leukocytosis  - ?source. Fluctuating WBC since admission, today reduced down to 11  -asymptomatic bacteruria. Montior  -    #Afib:   - Continue metoprolol, cardiac precautions  - Started  on xarelto 3/11 after discussions with heme, cardiology, patient  - CBC stable, montior    #HTN  - Continue norvasc, lasix  -BP controlled    #SZ/tremors:  likely multifactorial including essential tremor and metabolic causes,  - Controlled on keppra  - Neuro consult appreciate     #Gout/CPPD: improved  - Allopurinol      #BPH/ureteral stone:  consult appreciated  - Continue flomax, bladder scan  -monitor BMP    #DVT PPX  - On xarelto  - Bilateral ACE wraps for edema management    #left heel blister  - Swiss cheese boots for positioning    DNR    #Case discussed in IDT rounds 3/12. Patient with fluctuations in BP, poor endurance, current goals are for min assist bADLs and min-mod assist transfers and gait, will need 24 hour physical assistance on dc. Patient would benefit from JANET, target 3/20 when medically stable, will discuss with patient and caregivers versus ability to hire level of assistance on dc home    Labs:  CBC, BMP 3/20 84 male with PMH significant for HTN, HLD, Afib, stomach CA, anemia, gout, BPH. seizure d/o, recently stopped EToh use, p/w chronic anemia possibly due to myelodysplastic syndrome, s/p tx PRBC, debility and functional decline    #Anemia/debility: multifactorial: Chronic kidney disease, recent GIB. 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   - Heme appreciated:   - continue procrit started 3/11  - continue folic acid, MVI  - heme appreciated. Recommend B12 supplementation, transfuse if Hgb<7.  - continue comprehensive rehab program OT, PT    #leukocytosis  - ?source. Fluctuating WBC since admission, today reduced down to 11  -asymptomatic bacteruria. Montior  -    #Afib:   - Continue metoprolol, cardiac precautions  - Started  on xarelto 3/11 after discussions with heme, cardiology, patient  - CBC stable, montior    #HTN  - Continue norvasc, lasix  -BP controlled    #SZ/tremors:  likely multifactorial including essential tremor and metabolic causes,  - Controlled on keppra  - Neuro consult appreciate     #Gout/CPPD: improved  - Allopurinol      #BPH/ureteral stone:  consult appreciated  - Continue flomax, bladder scan  -monitor BMP    #DVT PPX  - On xarelto  - Bilateral ACE wraps for edema management    #left heel blister  - Swiss cheese boots for positioning    DNR    #Case discussed in IDT rounds 3/12. Patient with fluctuations in BP, poor endurance, current goals are for min assist bADLs and min-mod assist transfers and gait, will need 24 hour physical assistance on dc. Patient would benefit from JANET, target 3/20 when medically stable, will discuss with patient and caregivers versus ability to hire level of assistance on dc home    Labs:  CBC, BMP 3/19    *ADDENDUM: Patient had bloody BM today. Vitals stable. Will hold xarelto and ASA for now. GI follow up, CBC in AM and monitor. +diverticulosis on CT a/P

## 2019-03-18 NOTE — PROGRESS NOTE ADULT - ASSESSMENT
84-year-old gentleman with history of anemia, atrial fibrillation and seizure disorder admitted with anemia exacerbation from his [place] facility. Patient notes recent history of bright red blood per rectum. He was scheduled for outpatient colonoscopy.  On admission, found to have rectal temp of 100.7°F in the emergency department.  Patient reports he stopped drinking alcohol approximately 3 months prior to admission

## 2019-03-18 NOTE — PROGRESS NOTE ADULT - NSICDXPROBLEM_GEN_ALL_CORE_FT
PROBLEM DIAGNOSES  Problem: Anemia  Assessment and Plan: CKD contributes to patient's anemia as well as recent GIB. Also, BM suggestiove of CMMoL-continue with Procrit support. Consider PRBC transfusion if hemoglobin decreases to <7/increased symptoms develop. Hemoglobin currently stable. Vitamin B 12 supplementation for elevated MMA.

## 2019-03-18 NOTE — PROGRESS NOTE ADULT - SUBJECTIVE AND OBJECTIVE BOX
YUE KING   84y   Male    Admitting: SAEED Gallego  HPI:  84-year-old gentleman with history of anemia, atrial fibrillation and seizure disorder admitted with anemia exacerbation from his [place] facility. Patient noted recent history of bright red blood per rectum. He was scheduled for outpatient colonoscopy. S/P BM biopsy.  Patient reported he stopped drinking alcohol approximately 3 months prior to admission.  Patient is now on the acute rehabilitation unit.    PAST MEDICAL & SURGICAL HISTORY:  Nonrheumatic aortic valve stenosis  Cancer of stomach  Anemia, unspecified type: Anemia  Seizure disorder  Benign prostatic hyperplasia, unspecified whether lower urinary tract symptoms present  Hyperlipemia  Essential hypertension  Chronic atrial fibrillation  Stomach cancer  Hypertension  Atrial fibrillation  Gout  No significant past surgical history  No significant past surgical history    HEALTH ISSUES - PROBLEM Dx:  Anemia  Ureteral stone: Ureteral stone    MEDICATIONS  (STANDING):  allopurinol 300 milliGRAM(s) Oral daily  amLODIPine   Tablet 10 milliGRAM(s) Oral daily  aspirin enteric coated 81 milliGRAM(s) Oral daily  atorvastatin 10 milliGRAM(s) Oral at bedtime  cyanocobalamin 1000 MICROGram(s) Oral daily  docusate sodium 100 milliGRAM(s) Oral two times a day  famotidine    Tablet 20 milliGRAM(s) Oral daily  folic acid 1 milliGRAM(s) Oral daily  levETIRAcetam 500 milliGRAM(s) Oral two times a day  metoprolol tartrate 25 milliGRAM(s) Oral every 12 hours  multivitamin 1 Tablet(s) Oral daily  pantoprazole    Tablet 40 milliGRAM(s) Oral before breakfast  rivaroxaban 20 milliGRAM(s) Oral every 24 hours  tamsulosin 0.4 milliGRAM(s) Oral at bedtime    MEDICATIONS  (PRN):  acetaminophen   Tablet .. 650 milliGRAM(s) Oral every 6 hours PRN Mild Pain (1 - 3)  senna 2 Tablet(s) Oral at bedtime PRN Constipation    Allergies    No Known Allergies    INTERVAL HPI/OVERNIGHT EVENTS:  Patient S&E at bedside. Resting comfortably. No c/o CP or SOB.    VITAL SIGNS:  T(F): 97.4 (03-18-19 @ 08:38)  HR: 70 (03-18-19 @ 08:38)  BP: 125/62 (03-18-19 @ 08:38)  RR: 14 (03-18-19 @ 08:38)  SpO2: 98% (03-18-19 @ 08:38)    PHYSICAL EXAM:  GENERAL: NAD, well-developed  EYES: EOMI, PERRLA, conjunctiva and sclera clear  NECK: Supple, No JVD  CHEST/LUNG: decreased BS bases ant.  HEART: Regular rate and rhythm  ABDOMEN: Soft, Nontender, Nondistended  EXTREMITIES:  LE bandaging in place  NEUROLOGY: awake, alert  SKIN: No vesicles    Labs:             7.7    11.4  )-----------( 204      ( 03-18 @ 06:55 )             23.9                7.4    15.3  )-----------( 211      ( 03-17 @ 05:45 )             23.5                8.4    24.7  )-----------( 248      ( 03-16 @ 07:05 )             27.1                7.7    14.7  )-----------( 222      ( 03-15 @ 12:02 )             24.6       03-18    142  |  106  |  44<H>  ----------------------------<  249<H>  4.0   |  23  |  1.44<H>    Ca    8.8      18 Mar 2019 06:55

## 2019-03-19 DIAGNOSIS — K62.5 HEMORRHAGE OF ANUS AND RECTUM: ICD-10-CM

## 2019-03-19 LAB
-  AMPICILLIN/SULBACTAM: SIGNIFICANT CHANGE UP
-  CEFAZOLIN: SIGNIFICANT CHANGE UP
-  GENTAMICIN: SIGNIFICANT CHANGE UP
-  OXACILLIN: SIGNIFICANT CHANGE UP
-  PENICILLIN: SIGNIFICANT CHANGE UP
-  RIFAMPIN: SIGNIFICANT CHANGE UP
-  TETRACYCLINE: SIGNIFICANT CHANGE UP
-  TRIMETHOPRIM/SULFAMETHOXAZOLE: SIGNIFICANT CHANGE UP
-  VANCOMYCIN: SIGNIFICANT CHANGE UP
ANION GAP SERPL CALC-SCNC: 10 MMOL/L — SIGNIFICANT CHANGE UP (ref 5–17)
BUN SERPL-MCNC: 43 MG/DL — HIGH (ref 7–23)
CALCIUM SERPL-MCNC: 9.2 MG/DL — SIGNIFICANT CHANGE UP (ref 8.4–10.5)
CHLORIDE SERPL-SCNC: 105 MMOL/L — SIGNIFICANT CHANGE UP (ref 96–108)
CO2 SERPL-SCNC: 25 MMOL/L — SIGNIFICANT CHANGE UP (ref 22–31)
CREAT SERPL-MCNC: 1.46 MG/DL — HIGH (ref 0.5–1.3)
CULTURE RESULTS: SIGNIFICANT CHANGE UP
GLUCOSE SERPL-MCNC: 221 MG/DL — HIGH (ref 70–99)
HCT VFR BLD CALC: 24.9 % — LOW (ref 39–50)
HGB BLD-MCNC: 7.5 G/DL — LOW (ref 13–17)
MCHC RBC-ENTMCNC: 30.2 GM/DL — LOW (ref 32–36)
MCHC RBC-ENTMCNC: 30.9 PG — SIGNIFICANT CHANGE UP (ref 27–34)
MCV RBC AUTO: 102.1 FL — HIGH (ref 80–100)
METHOD TYPE: SIGNIFICANT CHANGE UP
ORGANISM # SPEC MICROSCOPIC CNT: SIGNIFICANT CHANGE UP
ORGANISM # SPEC MICROSCOPIC CNT: SIGNIFICANT CHANGE UP
PLATELET # BLD AUTO: 211 K/UL — SIGNIFICANT CHANGE UP (ref 150–400)
POTASSIUM SERPL-MCNC: 4.2 MMOL/L — SIGNIFICANT CHANGE UP (ref 3.5–5.3)
POTASSIUM SERPL-SCNC: 4.2 MMOL/L — SIGNIFICANT CHANGE UP (ref 3.5–5.3)
RBC # BLD: 2.44 M/UL — LOW (ref 4.2–5.8)
RBC # FLD: 19.3 % — HIGH (ref 10.3–14.5)
SODIUM SERPL-SCNC: 140 MMOL/L — SIGNIFICANT CHANGE UP (ref 135–145)
SPECIMEN SOURCE: SIGNIFICANT CHANGE UP
WBC # BLD: 9.7 K/UL — SIGNIFICANT CHANGE UP (ref 3.8–10.5)
WBC # FLD AUTO: 9.7 K/UL — SIGNIFICANT CHANGE UP (ref 3.8–10.5)

## 2019-03-19 PROCEDURE — 99233 SBSQ HOSP IP/OBS HIGH 50: CPT

## 2019-03-19 RX ORDER — ASPIRIN/CALCIUM CARB/MAGNESIUM 324 MG
81 TABLET ORAL DAILY
Qty: 0 | Refills: 0 | Status: DISCONTINUED | OUTPATIENT
Start: 2019-03-19 | End: 2019-04-10

## 2019-03-19 RX ORDER — RIVAROXABAN 15 MG-20MG
20 KIT ORAL EVERY 24 HOURS
Qty: 0 | Refills: 0 | Status: DISCONTINUED | OUTPATIENT
Start: 2019-03-19 | End: 2019-04-10

## 2019-03-19 RX ADMIN — FAMOTIDINE 20 MILLIGRAM(S): 10 INJECTION INTRAVENOUS at 13:29

## 2019-03-19 RX ADMIN — Medication 1 TABLET(S): at 13:30

## 2019-03-19 RX ADMIN — ATORVASTATIN CALCIUM 10 MILLIGRAM(S): 80 TABLET, FILM COATED ORAL at 21:07

## 2019-03-19 RX ADMIN — RIVAROXABAN 20 MILLIGRAM(S): KIT at 18:48

## 2019-03-19 RX ADMIN — Medication 650 MILLIGRAM(S): at 16:57

## 2019-03-19 RX ADMIN — PREGABALIN 1000 MICROGRAM(S): 225 CAPSULE ORAL at 13:29

## 2019-03-19 RX ADMIN — Medication 300 MILLIGRAM(S): at 13:29

## 2019-03-19 RX ADMIN — AMLODIPINE BESYLATE 10 MILLIGRAM(S): 2.5 TABLET ORAL at 05:40

## 2019-03-19 RX ADMIN — Medication 100 MILLIGRAM(S): at 05:40

## 2019-03-19 RX ADMIN — Medication 25 MILLIGRAM(S): at 18:26

## 2019-03-19 RX ADMIN — Medication 100 MILLIGRAM(S): at 18:26

## 2019-03-19 RX ADMIN — Medication 25 MILLIGRAM(S): at 05:40

## 2019-03-19 RX ADMIN — Medication 1 MILLIGRAM(S): at 13:30

## 2019-03-19 RX ADMIN — TAMSULOSIN HYDROCHLORIDE 0.4 MILLIGRAM(S): 0.4 CAPSULE ORAL at 21:07

## 2019-03-19 RX ADMIN — LEVETIRACETAM 500 MILLIGRAM(S): 250 TABLET, FILM COATED ORAL at 18:26

## 2019-03-19 RX ADMIN — Medication 650 MILLIGRAM(S): at 17:30

## 2019-03-19 RX ADMIN — LEVETIRACETAM 500 MILLIGRAM(S): 250 TABLET, FILM COATED ORAL at 05:40

## 2019-03-19 RX ADMIN — PANTOPRAZOLE SODIUM 40 MILLIGRAM(S): 20 TABLET, DELAYED RELEASE ORAL at 05:41

## 2019-03-19 NOTE — CONSULT NOTE ADULT - ATTENDING COMMENTS
Patient seen and examined with Alisia Cheung NP.  Agree with assessment and plan as above.    Elderly male, known to me from recent admission, now admitted to rehab service.  He did have minimal self-limited rectal bleeding noted; no subsequent episodes.  No abdominal pain.  Recently had EGD & Colonoscopy performed.  VSS.  Abdomen soft, nontender.    Monitor Hgb/Hct and bowel movements for now.  Bleeding likely hemorrhoidal given recent negative endoscopic workup.

## 2019-03-19 NOTE — PROGRESS NOTE ADULT - ASSESSMENT
84 male with PMH significant for HTN, HLD, Afib, stomach CA, anemia, gout, BPH. seizure d/o, recently stopped EToh use, p/w chronic anemia possibly due to myelodysplastic syndrome, s/p tx PRBC, debility and functional decline    #Anemia/debility: multifactorial: Chronic kidney disease, recent GIB. 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   - Heme appreciated:   - continue procrit (started 3/11)  - continue folic acid, MVI  - heme appreciated. Recommend B12 supplementation, transfuse if Hgb<7.  - continue comprehensive rehab program OT, PT    #LGI bleed: BRBPR x 1 episode  -no active source bleeding on EGD/colonoscopy, +diverticulosis on CT a/P  -vitals currently stable, no recurrence, patient denies any other symptoms. h/H stable today  -xarelto and ASA HELD  -GI consult requested  -message left for private physician re: event after discussion with patient today  -CBC 3/21    #leukocytosis: RESOLVED 3/19  - ?source.   -CBC 3/21    #Afib:   - Continue metoprolol, cardiac precautions  -Xarelto restarted 3/11. HELD 3/18 (but received AM dose) due to BRBPR  - CBC stable, montior    #HTN  - Continue norvasc, lasix  -BP controlled    #SZ/tremors:  likely multifactorial including essential tremor and metabolic causes,  - Controlled on keppra  - Neuro consult appreciate     #Gout/CPPD: improved  - Allopurinol. completed indomethacin 50 TID this weekend      #BPH/ureteral stone:  consult appreciated  - Continue flomax, bladder scan  -monitor BMP    #DVT PPX  -xarelto held. No AC due to GI bleed pending GI eval  - Bilateral ACE wraps for edema management    #left heel blister  - Swiss cheese boots for positioning    DNR    #Case discussed in IDT rounds 3/19.   -patient continues to have poor endurance, fluctuanting medicals tatus that impacts progress in therapy. At this time, goals for discharge from [facility] would be min-mod assist for transfers, ADL and ambulation, necessitating physical caregiver support. Team recommends JANET once medically stable for OT and PT    Labs:  CBC, BMP 3/21

## 2019-03-19 NOTE — PROGRESS NOTE ADULT - SUBJECTIVE AND OBJECTIVE BOX
Patient is a 84y old  Male who presents with a chief complaint of anemia, debility and functional decline (18 Mar 2019 13:36)      HPI:  Patient is 84 male RH dominant with PMH significant for HTN, HLD,  Afib (off xarelto), stomach CA, anemia, gout, BPH. seizure d/o, recently stopped EToh use, who presented from [facility] with abnormal labs.  Pt reports recent admission to [facility] for gout flare then discharged to rehab.  Pt now returns with guaiac negative anemia (HgB 6.8 ) and  episode of bright red blood in stool. Pt also found with leukocytosis, with temp 100.7 F rectally.  Denies chills, fever, sob, chest pain, weakness, dysuria.    Patient was seen by GI and hematology.  S/P Colonoscopy on 3/1/19. No source of bleeding noted. Negative FOB. S/P EGD during prior admission, and no bleeding identified. Heme performed BM biopsy 3/4, results pending. CKD, Etoh may contribute, although underlying BM d/o such as myelodysplasia suspected. Transfused PRBC. Patient transferred to - [facility] due to debility and functional decline (06 Mar 2019 14:53)      PAST MEDICAL & SURGICAL HISTORY:  Nonrheumatic aortic valve stenosis  Cancer of stomach  Anemia, unspecified type: Anemia  Seizure disorder  Benign prostatic hyperplasia, unspecified whether lower urinary tract symptoms present  Hyperlipemia  Essential hypertension  Chronic atrial fibrillation  Stomach cancer  Hypertension  Atrial fibrillation  Gout  No significant past surgical history  No significant past surgical history      MEDICATIONS  (STANDING):  allopurinol 300 milliGRAM(s) Oral daily  amLODIPine   Tablet 10 milliGRAM(s) Oral daily  atorvastatin 10 milliGRAM(s) Oral at bedtime  cyanocobalamin 1000 MICROGram(s) Oral daily  docusate sodium 100 milliGRAM(s) Oral two times a day  famotidine    Tablet 20 milliGRAM(s) Oral daily  folic acid 1 milliGRAM(s) Oral daily  levETIRAcetam 500 milliGRAM(s) Oral two times a day  metoprolol tartrate 25 milliGRAM(s) Oral every 12 hours  multivitamin 1 Tablet(s) Oral daily  pantoprazole    Tablet 40 milliGRAM(s) Oral before breakfast  tamsulosin 0.4 milliGRAM(s) Oral at bedtime    MEDICATIONS  (PRN):  acetaminophen   Tablet .. 650 milliGRAM(s) Oral every 6 hours PRN Mild Pain (1 - 3)  senna 2 Tablet(s) Oral at bedtime PRN Constipation      Allergies    No Known Allergies    Intolerances          VITALS  84y  Vital Signs Last 24 Hrs  T(C): 36.2 (19 Mar 2019 09:59), Max: 36.4 (18 Mar 2019 21:52)  T(F): 97.2 (19 Mar 2019 09:59), Max: 97.6 (18 Mar 2019 21:52)  HR: 87 (19 Mar 2019 09:59) (78 - 87)  BP: 127/73 (19 Mar 2019 09:59) (127/73 - 146/81)  BP(mean): --  RR: 16 (19 Mar 2019 09:59) (16 - 18)  SpO2: 92% (19 Mar 2019 09:59) (92% - 97%)  Daily     Daily         RECENT LABS:                          7.5    9.7   )-----------( 211      ( 19 Mar 2019 08:30 )             24.9     03-19    140  |  105  |  43<H>  ----------------------------<  221<H>  4.2   |  25  |  1.46<H>    Ca    9.2      19 Mar 2019 08:30              Culture - Urine (collected 03-17-19 @ 05:41)  Source: .Urine Clean Catch (Midstream)  Final Report (03-19-19 @ 09:47):    50,000 - 99,000 CFU/mL Staphylococcus aureus    <10,000 CFU/ml Normal Urogenital kristi present  Organism: Staphylococcus aureus (03-19-19 @ 09:47)  Organism: Staphylococcus aureus (03-19-19 @ 09:47)      -  Ampicillin/Sulbactam: S <=8/4      -  Cefazolin: S <=4      -  Gentamicin: S <=1 Should not be used as monotherapy      -  Oxacillin: S <=0.25      -  Penicillin: R 8      -  RIF- Rifampin: S <=1 Should not be used as monotherapy      -  Tetra/Doxy: S <=1      -  Trimethoprim/Sulfamethoxazole: S <=0.5/9.5      -  Vancomycin: S 2      Method Type: CARINE        CAPILLARY BLOOD GLUCOSE

## 2019-03-19 NOTE — CONSULT NOTE ADULT - NSICDXPROBLEM_GEN_ALL_CORE_FT
PROBLEM DIAGNOSES  Problem: Rectal bleed  Recommendation: Monitor H/H for now  Check FOB  If no further bleeding noted would consider restarting ASA and Xarelto tomorrow PROBLEM DIAGNOSES  Problem: Rectal bleed  Recommendation: Monitor H/H for now  Could restart ASA and Xarelto

## 2019-03-20 DIAGNOSIS — C93.10 CHRONIC MYELOMONOCYTIC LEUKEMIA NOT HAVING ACHIEVED REMISSION: ICD-10-CM

## 2019-03-20 DIAGNOSIS — D64.9 ANEMIA, UNSPECIFIED: ICD-10-CM

## 2019-03-20 PROCEDURE — 99232 SBSQ HOSP IP/OBS MODERATE 35: CPT

## 2019-03-20 RX ORDER — ERYTHROPOIETIN 10000 [IU]/ML
10000 INJECTION, SOLUTION INTRAVENOUS; SUBCUTANEOUS
Qty: 0 | Refills: 0 | Status: COMPLETED | OUTPATIENT
Start: 2019-03-20 | End: 2019-03-22

## 2019-03-20 RX ADMIN — LEVETIRACETAM 500 MILLIGRAM(S): 250 TABLET, FILM COATED ORAL at 06:35

## 2019-03-20 RX ADMIN — Medication 100 MILLIGRAM(S): at 06:34

## 2019-03-20 RX ADMIN — AMLODIPINE BESYLATE 10 MILLIGRAM(S): 2.5 TABLET ORAL at 06:34

## 2019-03-20 RX ADMIN — Medication 650 MILLIGRAM(S): at 04:15

## 2019-03-20 RX ADMIN — FAMOTIDINE 20 MILLIGRAM(S): 10 INJECTION INTRAVENOUS at 12:55

## 2019-03-20 RX ADMIN — Medication 650 MILLIGRAM(S): at 03:50

## 2019-03-20 RX ADMIN — Medication 100 MILLIGRAM(S): at 18:47

## 2019-03-20 RX ADMIN — Medication 300 MILLIGRAM(S): at 12:54

## 2019-03-20 RX ADMIN — Medication 81 MILLIGRAM(S): at 12:54

## 2019-03-20 RX ADMIN — RIVAROXABAN 20 MILLIGRAM(S): KIT at 18:46

## 2019-03-20 RX ADMIN — Medication 1 MILLIGRAM(S): at 12:55

## 2019-03-20 RX ADMIN — PANTOPRAZOLE SODIUM 40 MILLIGRAM(S): 20 TABLET, DELAYED RELEASE ORAL at 06:35

## 2019-03-20 RX ADMIN — Medication 1 TABLET(S): at 12:55

## 2019-03-20 RX ADMIN — Medication 650 MILLIGRAM(S): at 22:57

## 2019-03-20 RX ADMIN — ATORVASTATIN CALCIUM 10 MILLIGRAM(S): 80 TABLET, FILM COATED ORAL at 21:47

## 2019-03-20 RX ADMIN — LEVETIRACETAM 500 MILLIGRAM(S): 250 TABLET, FILM COATED ORAL at 18:47

## 2019-03-20 RX ADMIN — Medication 25 MILLIGRAM(S): at 18:47

## 2019-03-20 RX ADMIN — ERYTHROPOIETIN 10000 UNIT(S): 10000 INJECTION, SOLUTION INTRAVENOUS; SUBCUTANEOUS at 20:01

## 2019-03-20 RX ADMIN — PREGABALIN 1000 MICROGRAM(S): 225 CAPSULE ORAL at 12:55

## 2019-03-20 RX ADMIN — Medication 25 MILLIGRAM(S): at 06:35

## 2019-03-20 RX ADMIN — TAMSULOSIN HYDROCHLORIDE 0.4 MILLIGRAM(S): 0.4 CAPSULE ORAL at 21:47

## 2019-03-20 RX ADMIN — Medication 650 MILLIGRAM(S): at 23:30

## 2019-03-20 NOTE — PROGRESS NOTE ADULT - SUBJECTIVE AND OBJECTIVE BOX
Patient is a 84y old  Male who presents with a chief complaint of anemia, debility and functional decline (20 Mar 2019 10:58)      HPI:  Patient is 84 male RH dominant with PMH significant for HTN, HLD,  Afib (off xarelto), stomach CA, anemia, gout, BPH. seizure d/o, recently stopped EToh use, who presented from [facility] with abnormal labs.  Pt reports recent admission to [facility] for gout flare then discharged to rehab.  Pt now returns with guaiac negative anemia (HgB 6.8 ) and  episode of bright red blood in stool. Pt also found with leukocytosis, with temp 100.7 F rectally.  Denies chills, fever, sob, chest pain, weakness, dysuria.    Patient was seen by GI and hematology.  S/P Colonoscopy on 3/1/19. No source of bleeding noted. Negative FOB. S/P EGD during prior admission, and no bleeding identified. Heme performed BM biopsy 3/4, results pending. CKD, Etoh may contribute, although underlying BM d/o such as myelodysplasia suspected. Transfused PRBC. Patient transferred to - [facility] due to debility and functional decline (06 Mar 2019 14:53)      PAST MEDICAL & SURGICAL HISTORY:  Nonrheumatic aortic valve stenosis  Cancer of stomach  Anemia, unspecified type: Anemia  Seizure disorder  Benign prostatic hyperplasia, unspecified whether lower urinary tract symptoms present  Hyperlipemia  Essential hypertension  Chronic atrial fibrillation  Stomach cancer  Hypertension  Atrial fibrillation  Gout  No significant past surgical history  No significant past surgical history      MEDICATIONS  (STANDING):  allopurinol 300 milliGRAM(s) Oral daily  amLODIPine   Tablet 10 milliGRAM(s) Oral daily  aspirin enteric coated 81 milliGRAM(s) Oral daily  atorvastatin 10 milliGRAM(s) Oral at bedtime  cyanocobalamin 1000 MICROGram(s) Oral daily  docusate sodium 100 milliGRAM(s) Oral two times a day  epoetin jackelyn Injectable 12699 Unit(s) SubCutaneous <User Schedule>  famotidine    Tablet 20 milliGRAM(s) Oral daily  folic acid 1 milliGRAM(s) Oral daily  levETIRAcetam 500 milliGRAM(s) Oral two times a day  metoprolol tartrate 25 milliGRAM(s) Oral every 12 hours  multivitamin 1 Tablet(s) Oral daily  pantoprazole    Tablet 40 milliGRAM(s) Oral before breakfast  rivaroxaban 20 milliGRAM(s) Oral every 24 hours  tamsulosin 0.4 milliGRAM(s) Oral at bedtime    MEDICATIONS  (PRN):  acetaminophen   Tablet .. 650 milliGRAM(s) Oral every 6 hours PRN Mild Pain (1 - 3)  senna 2 Tablet(s) Oral at bedtime PRN Constipation      Allergies    No Known Allergies    Intolerances          VITALS  84y  Vital Signs Last 24 Hrs  T(C): 35.9 (20 Mar 2019 08:47), Max: 36 (19 Mar 2019 20:28)  T(F): 96.6 (20 Mar 2019 08:47), Max: 96.8 (19 Mar 2019 20:28)  HR: 76 (20 Mar 2019 08:47) (76 - 97)  BP: 130/76 (20 Mar 2019 08:47) (122/66 - 140/70)  BP(mean): --  RR: 16 (20 Mar 2019 08:47) (16 - 18)  SpO2: 95% (20 Mar 2019 08:47) (95% - 96%)  Daily     Daily         RECENT LABS:                          7.5    9.7   )-----------( 211      ( 19 Mar 2019 08:30 )             24.9     03-19    140  |  105  |  43<H>  ----------------------------<  221<H>  4.2   |  25  |  1.46<H>    Ca    9.2      19 Mar 2019 08:30              Culture - Urine (collected 03-17-19 @ 05:41)  Source: .Urine Clean Catch (Midstream)  Final Report (03-19-19 @ 09:47):    50,000 - 99,000 CFU/mL Staphylococcus aureus    <10,000 CFU/ml Normal Urogenital kristi present  Organism: Staphylococcus aureus (03-19-19 @ 09:47)  Organism: Staphylococcus aureus (03-19-19 @ 09:47)      -  Ampicillin/Sulbactam: S <=8/4      -  Cefazolin: S <=4      -  Gentamicin: S <=1 Should not be used as monotherapy      -  Oxacillin: S <=0.25      -  Penicillin: R 8      -  RIF- Rifampin: S <=1 Should not be used as monotherapy      -  Tetra/Doxy: S <=1      -  Trimethoprim/Sulfamethoxazole: S <=0.5/9.5      -  Vancomycin: S 2      Method Type: CARINE        CAPILLARY BLOOD GLUCOSE

## 2019-03-20 NOTE — PROGRESS NOTE ADULT - SUBJECTIVE AND OBJECTIVE BOX
INTERVAL HPI/OVERNIGHT EVENTS:  HPI:      MEDICATIONS  (STANDING):  allopurinol 300 milliGRAM(s) Oral daily  amLODIPine   Tablet 10 milliGRAM(s) Oral daily  aspirin enteric coated 81 milliGRAM(s) Oral daily  atorvastatin 10 milliGRAM(s) Oral at bedtime  cyanocobalamin 1000 MICROGram(s) Oral daily  docusate sodium 100 milliGRAM(s) Oral two times a day  epoetin jackelyn Injectable 29535 Unit(s) SubCutaneous <User Schedule>  famotidine    Tablet 20 milliGRAM(s) Oral daily  folic acid 1 milliGRAM(s) Oral daily  levETIRAcetam 500 milliGRAM(s) Oral two times a day  metoprolol tartrate 25 milliGRAM(s) Oral every 12 hours  multivitamin 1 Tablet(s) Oral daily  pantoprazole    Tablet 40 milliGRAM(s) Oral before breakfast  rivaroxaban 20 milliGRAM(s) Oral every 24 hours  tamsulosin 0.4 milliGRAM(s) Oral at bedtime    MEDICATIONS  (PRN):  acetaminophen   Tablet .. 650 milliGRAM(s) Oral every 6 hours PRN Mild Pain (1 - 3)  senna 2 Tablet(s) Oral at bedtime PRN Constipation      Allergies    No Known Allergies    Intolerances        PHYSICAL EXAM:   Vital Signs:  Vital Signs Last 24 Hrs  T(C): 35.9 (20 Mar 2019 08:47), Max: 36 (19 Mar 2019 20:28)  T(F): 96.6 (20 Mar 2019 08:47), Max: 96.8 (19 Mar 2019 20:28)  HR: 76 (20 Mar 2019 08:47) (76 - 97)  BP: 130/76 (20 Mar 2019 08:47) (122/66 - 140/70)  BP(mean): --  RR: 16 (20 Mar 2019 08:47) (16 - 18)  SpO2: 95% (20 Mar 2019 08:47) (95% - 96%)  Daily     Daily I&O's Summary    19 Mar 2019 07:01  -  20 Mar 2019 07:00  --------------------------------------------------------  IN: 0 mL / OUT: 300 mL / NET: -300 mL    GENERAL:  Appears stated age, well-groomed, well-nourished, no distress  HEENT:  NC/AT,  conjunctivae clear and pink,   CHEST:  Full & symmetric excursion, no increased effort, breath sounds clear  HEART:  Irregular rhythm   ABDOMEN:  Soft, non-tender, non-distended,+normoactive bowel sounds,  EXTEREMITIES:  +1 B/L JESSE, RUE edema   SKIN:  no rash, warm/dry  NEURO:  Alert, oriented,      LABS:                        7.5    9.7   )-----------( 211      ( 19 Mar 2019 08:30 )             24.9     03-19    140  |  105  |  43<H>  ----------------------------<  221<H>  4.2   |  25  |  1.46<H>    Ca    9.2      19 Mar 2019 08:30          amylase   lipase  RADIOLOGY & ADDITIONAL TESTS: INTERVAL HPI/OVERNIGHT EVENTS:  HPI:  83 y/o male (known from prior admission) with PMH Afib (off xarelto), anemia, stomach cancer (25 years ago), gout, HTN, BPH who is currently admitted to rehab now with reported rectal bleeding.  +BM today, per patient and staff,  unaware if there was blood. Denies abdominal pain.     MEDICATIONS  (STANDING):  allopurinol 300 milliGRAM(s) Oral daily  amLODIPine   Tablet 10 milliGRAM(s) Oral daily  aspirin enteric coated 81 milliGRAM(s) Oral daily  atorvastatin 10 milliGRAM(s) Oral at bedtime  cyanocobalamin 1000 MICROGram(s) Oral daily  docusate sodium 100 milliGRAM(s) Oral two times a day  epoetin jackelyn Injectable 37104 Unit(s) SubCutaneous <User Schedule>  famotidine    Tablet 20 milliGRAM(s) Oral daily  folic acid 1 milliGRAM(s) Oral daily  levETIRAcetam 500 milliGRAM(s) Oral two times a day  metoprolol tartrate 25 milliGRAM(s) Oral every 12 hours  multivitamin 1 Tablet(s) Oral daily  pantoprazole    Tablet 40 milliGRAM(s) Oral before breakfast  rivaroxaban 20 milliGRAM(s) Oral every 24 hours  tamsulosin 0.4 milliGRAM(s) Oral at bedtime    MEDICATIONS  (PRN):  acetaminophen   Tablet .. 650 milliGRAM(s) Oral every 6 hours PRN Mild Pain (1 - 3)  senna 2 Tablet(s) Oral at bedtime PRN Constipation      Allergies    No Known Allergies    Intolerances        PHYSICAL EXAM:   Vital Signs:  Vital Signs Last 24 Hrs  T(C): 35.9 (20 Mar 2019 08:47), Max: 36 (19 Mar 2019 20:28)  T(F): 96.6 (20 Mar 2019 08:47), Max: 96.8 (19 Mar 2019 20:28)  HR: 76 (20 Mar 2019 08:47) (76 - 97)  BP: 130/76 (20 Mar 2019 08:47) (122/66 - 140/70)  BP(mean): --  RR: 16 (20 Mar 2019 08:47) (16 - 18)  SpO2: 95% (20 Mar 2019 08:47) (95% - 96%)  Daily     Daily I&O's Summary    19 Mar 2019 07:01  -  20 Mar 2019 07:00  --------------------------------------------------------  IN: 0 mL / OUT: 300 mL / NET: -300 mL    GENERAL:  Appears stated age, well-groomed, well-nourished, no distress  HEENT:  NC/AT,  conjunctivae clear and pink,   CHEST:  Full & symmetric excursion, no increased effort, breath sounds clear  HEART:  Irregular rhythm   ABDOMEN:  Soft, non-tender, non-distended,+normoactive bowel sounds,  EXTEREMITIES:   RUE edema, Ace bandages B/L ankles   SKIN:  no rash, warm/dry  NEURO:  Alert, oriented,      LABS:                        7.5    9.7   )-----------( 211      ( 19 Mar 2019 08:30 )             24.9     03-19    140  |  105  |  43<H>  ----------------------------<  221<H>  4.2   |  25  |  1.46<H>    Ca    9.2      19 Mar 2019 08:30          amylase   lipase  RADIOLOGY & ADDITIONAL TESTS:

## 2019-03-20 NOTE — PROGRESS NOTE ADULT - SUBJECTIVE AND OBJECTIVE BOX
YUE KING   84y   Male    Admitting: [facility] Galleg[facility]  HPI:    PAST MEDICAL & SURGICAL HISTORY:  Nonrheumatic aortic valve stenosis  Cancer of stomach  Anemia, unspecified type: Anemia  Seizure disorder  Benign prostatic hyperplasia, unspecified whether lower urinary tract symptoms present  Hyperlipemia  Essential hypertension  Chronic atrial fibrillation  Stomach cancer  Hypertension  Atrial fibrillation  Gout  No significant past surgical history  No significant past surgical history    HEALTH ISSUES - PROBLEM Dx:  Anemia  Ureteral stone: Ureteral stone  Rectal bleed        MEDICATIONS  (STANDING):  allopurinol 300 milliGRAM(s) Oral daily  amLODIPine   Tablet 10 milliGRAM(s) Oral daily  aspirin enteric coated 81 milliGRAM(s) Oral daily  atorvastatin 10 milliGRAM(s) Oral at bedtime  cyanocobalamin 1000 MICROGram(s) Oral daily  docusate sodium 100 milliGRAM(s) Oral two times a day  famotidine    Tablet 20 milliGRAM(s) Oral daily  folic acid 1 milliGRAM(s) Oral daily  levETIRAcetam 500 milliGRAM(s) Oral two times a day  metoprolol tartrate 25 milliGRAM(s) Oral every 12 hours  multivitamin 1 Tablet(s) Oral daily  pantoprazole    Tablet 40 milliGRAM(s) Oral before breakfast  rivaroxaban 20 milliGRAM(s) Oral every 24 hours  tamsulosin 0.4 milliGRAM(s) Oral at bedtime    MEDICATIONS  (PRN):  acetaminophen   Tablet .. 650 milliGRAM(s) Oral every 6 hours PRN Mild Pain (1 - 3)  senna 2 Tablet(s) Oral at bedtime PRN Constipation    Allergies    No Known Allergies    Intolerances        INTERVAL HPI/OVERNIGHT EVENTS:  Patient S&E at bedside. +Small amounts of rectal bleeding.     VITAL SIGNS:  T(F): 96.4 (03-20-19 @ 06:36)  HR: 84 (03-20-19 @ 06:36)  BP: 140/70 (03-20-19 @ 06:36)  RR: 18 (03-20-19 @ 06:36)  SpO2: 96% (03-20-19 @ 06:36)  Wt(kg): --    PHYSICAL EXAM:  Constitutional: NAD  Eyes: sclera non-icteric  Neck: no JVD  Respiratory: CTA b/l, good air entry b/l ant.  Cardiovascular: RRR, no M/R/G  Gastrointestinal: soft, NTND, no masses palpable, no hepatosplenomegaly  Extremities: no calf tenderness  Neurological: Awake, alert.    Labs:             7.5    9.7   )-----------( 211      ( 03-19 @ 08:30 )             24.9                7.7    11.4  )-----------( 204      ( 03-18 @ 06:55 )             23.9       03-19    140  |  105  |  43<H>  ----------------------------<  221<H>  4.2   |  25  |  1.46<H>    Ca    9.2      19 Mar 2019 08:30                                      RADIOLOGY & ADDITIONAL TESTS:  Consultant notes reviewed : YES [ ] ; NO [ ] YUE KING   84y   Male    Admitting: [facility]leg[facility]  HPI:  84-year-old gentleman with history of anemia, atrial fibrillation and seizure disorder admitted with anemia exacerbation from his [place] facility. Patient noted recent history of bright red blood per rectum. He was scheduled for outpatient colonoscopy. S/P BM biopsy.  Patient reported he stopped drinking alcohol approximately 3 months prior to admission.  Patient is now on the acute rehabilitation unit.    PAST MEDICAL & SURGICAL HISTORY:  Nonrheumatic aortic valve stenosis  Cancer of stomach  Anemia, unspecified type: Anemia  Seizure disorder  Benign prostatic hyperplasia, unspecified whether lower urinary tract symptoms present  Hyperlipemia  Essential hypertension  Chronic atrial fibrillation  Stomach cancer  Hypertension  Atrial fibrillation  Gout    HEALTH ISSUES - PROBLEM Dx:  Anemia  Ureteral stone: Ureteral stone  Rectal bleed    MEDICATIONS  (STANDING):  allopurinol 300 milliGRAM(s) Oral daily  amLODIPine   Tablet 10 milliGRAM(s) Oral daily  aspirin enteric coated 81 milliGRAM(s) Oral daily  atorvastatin 10 milliGRAM(s) Oral at bedtime  cyanocobalamin 1000 MICROGram(s) Oral daily  docusate sodium 100 milliGRAM(s) Oral two times a day  famotidine    Tablet 20 milliGRAM(s) Oral daily  folic acid 1 milliGRAM(s) Oral daily  levETIRAcetam 500 milliGRAM(s) Oral two times a day  metoprolol tartrate 25 milliGRAM(s) Oral every 12 hours  multivitamin 1 Tablet(s) Oral daily  pantoprazole    Tablet 40 milliGRAM(s) Oral before breakfast  rivaroxaban 20 milliGRAM(s) Oral every 24 hours  tamsulosin 0.4 milliGRAM(s) Oral at bedtime    MEDICATIONS  (PRN):  acetaminophen   Tablet .. 650 milliGRAM(s) Oral every 6 hours PRN Mild Pain (1 - 3)  senna 2 Tablet(s) Oral at bedtime PRN Constipation    Allergies    No Known Allergies    INTERVAL HPI/OVERNIGHT EVENTS:  Patient S&E at bedside. +Small amounts of rectal bleeding. No c/o CP or SOB.    VITAL SIGNS:  T(F): 96.4 (03-20-19 @ 06:36)  HR: 84 (03-20-19 @ 06:36)  BP: 140/70 (03-20-19 @ 06:36)  RR: 18 (03-20-19 @ 06:36)  SpO2: 96% (03-20-19 @ 06:36)    PHYSICAL EXAM:  GENERAL: NAD, well-developed  EYES: EOMI, PERRLA, conjunctiva and sclera clear  NECK: Supple, No JVD  CHEST/LUNG: decreased BS bases ant.  HEART: Regular rate and rhythm  ABDOMEN: Soft, Nontender, Nondistended  EXTREMITIES:  LE bandaging in place  NEUROLOGY: awake, alert  SKIN: No vesicles    Labs:             7.5    9.7   )-----------( 211      ( 03-19 @ 08:30 )             24.9                7.7    11.4  )-----------( 204      ( 03-18 @ 06:55 )             23.9       03-19    140  |  105  |  43<H>  ----------------------------<  221<H>  4.2   |  25  |  1.46<H>    Ca    9.2      19 Mar 2019 08:30

## 2019-03-20 NOTE — PROGRESS NOTE ADULT - ASSESSMENT
84-year-old gentleman with history of anemia, atrial fibrillation and seizure disorder admitted with anemia exacerbation from his [place] facility. Patient notes recent history of bright red blood per rectum. He was scheduled for outpatient colonoscopy.  On admission, found to have rectal temp of 100.7°F in the emergency department.  Patient reported he stopped drinking alcohol approximately 3 months prior to admission

## 2019-03-20 NOTE — PROGRESS NOTE ADULT - ASSESSMENT
83 y/o male (known from prior admission) with PMH Afib (off xarelto), anemia, stomach cancer (25 years ago), gout, HTN, BPH who is currently admitted to rehab now with reported rectal bleeding.

## 2019-03-20 NOTE — CHART NOTE - NSCHARTNOTEFT_GEN_A_CORE
Nutrition Follow Up Note  Hospital Course (Per Electronic Medical Record):   Source: Medical Record [X] Patient [X]     Diet: Regular Diet w/ Thin Liquids  Tolerates Diet Well  No Chewing/Swallowing Difficulties  No Recent Nausea, Vomiting, Diarrhea or Constipation  Consumes % of Meals (as Per Documentation) - States Good PO Intake/Appetite (Brings food From Outside)  Educated Patient on Proper Nutrition  on [place] 1 Packet BID - Patient Takes Nutrition Supplement     Enteral/Parenteral Nutrition: N/A    Current Weight: 198.6lb on 3/15  Obtain New Weight to Confirm Change   Obtain Weights Weekly     Pertinent Medications: MEDICATIONS  (STANDING):  allopurinol 300 milliGRAM(s) Oral daily  amLODIPine   Tablet 10 milliGRAM(s) Oral daily  aspirin enteric coated 81 milliGRAM(s) Oral daily  atorvastatin 10 milliGRAM(s) Oral at bedtime  cyanocobalamin 1000 MICROGram(s) Oral daily  docusate sodium 100 milliGRAM(s) Oral two times a day  epoetin jackelyn Injectable 53297 Unit(s) SubCutaneous <User Schedule>  famotidine    Tablet 20 milliGRAM(s) Oral daily  folic acid 1 milliGRAM(s) Oral daily  levETIRAcetam 500 milliGRAM(s) Oral two times a day  metoprolol tartrate 25 milliGRAM(s) Oral every 12 hours  multivitamin 1 Tablet(s) Oral daily  pantoprazole    Tablet 40 milliGRAM(s) Oral before breakfast  rivaroxaban 20 milliGRAM(s) Oral every 24 hours  tamsulosin 0.4 milliGRAM(s) Oral at bedtime    MEDICATIONS  (PRN):  acetaminophen   Tablet .. 650 milliGRAM(s) Oral every 6 hours PRN Mild Pain (1 - 3)  senna 2 Tablet(s) Oral at bedtime PRN Constipation    Pertinent Labs:  03-19 Na140 mmol/L Glu 221 mg/dL<H> K+ 4.2 mmol/L Cr  1.46 mg/dL<H> BUN 43 mg/dL<H> 03-07 [id number] 7.0 %<H>    Skin: Multiple Pressure Ulcers     Edema: None Noted     Last BM: on 3/18    Estimated Needs:   [X] No Change since Previous Assessment    Previous Nutrition Diagnosis:   Increased Nutrient Needs  Limited adherence to nutrition - related recommendations    Nutrition Diagnosis is [X] Ongoing - Continues on Nutrition Supplement & Educated Patient on Proper Nutrition      New Nutrition Diagnosis: [X] Not Applicable    Interventions:   1. Recommend Continue Nutrition Plan of Care   2. Educated Patient on Proper Nutrition     Monitoring & Evaluation:   [X] Weights   [X] PO Intake   [X] Follow Up (Per Protocol)  [X] Tolerance to Diet Prescription   [X] Other: Labs     RD Remains Available.  Luis Hanna RDN

## 2019-03-20 NOTE — PROGRESS NOTE ADULT - ASSESSMENT
84 male with PMH significant for HTN, HLD, Afib, stomach CA, anemia, gout, BPH. seizure d/o, recently stopped EToh use, p/w chronic anemia possibly due to myelodysplastic syndrome, s/p tx PRBC, debility and functional decline    #Anemia/debility: multifactorial: Chronic kidney disease, recent GIB. 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   - Heme appreciated: Not yet in remission, continue supportive therapies 3/20  - continue procrit (started 3/11)  - continue folic acid, MVI  transfuse if Hgb<7.  - continue comprehensive rehab program OT, PT    #LGI bleed: BRBPR x 1 episode  -no active source bleeding on EGD/colonoscopy, +diverticulosis on CT a/P  -vitals currently stable, no recurrence, patient denies any other symptoms. h/H stable\  -xarelto and ASA restarted, GI consult and follow up appreciated. Recommendations discussed with patient  -CBC 3/21    #leukocytosis: RESOLVED 3/19  - ?source. Will hold off on treating urine unless increases again  -CBC 3/21    #Afib:   - Continue metoprolol, cardiac precautions  -Xarelto restarted  - CBC stable, montior    #HTN  - Continue norvasc, lasix  -BP controlled    #SZ/tremors:  likely multifactorial including essential tremor and metabolic causes,  - Controlled on keppra  - Neuro consult appreciate     #Gout/CPPD: improved  - Allopurinol. completed indomethacin 50 TID this weekend      #BPH/ureteral stone:  consult appreciated  - Continue flomax, bladder scan  -monitor BMP    #DVT PPX  -xarelto held. No AC due to GI bleed pending GI eval  - Bilateral ACE wraps for edema management    #left heel blister  - Swiss cheese boots for positioning    DNR    #Case discussed in IDT rounds 3/19.   -patient continues to have poor endurance, fluctuanting medicals tatus that impacts progress in therapy. At this time, goals for discharge from [facility] would be min-mod assist for transfers, ADL and ambulation, necessitating physical caregiver support. Team recommends [facility] once medically stable for OT and PT  -Recommendations discussed in detail with patient including options for rehab services on discharge from IR[facility] and team anticipation need min-mod assist for ADLs and transfers.gait after dc from [facility]. Patient does NOT want [facility], states he will have caregiver at home. Need for caregiver training discussed. Discharge planning team informed, will meet [facility] patient to set up    Labs:  CBC, BMP 3/21

## 2019-03-21 LAB
ANION GAP SERPL CALC-SCNC: 9 MMOL/L — SIGNIFICANT CHANGE UP (ref 5–17)
BUN SERPL-MCNC: 39 MG/DL — HIGH (ref 7–23)
CALCIUM SERPL-MCNC: 9.5 MG/DL — SIGNIFICANT CHANGE UP (ref 8.4–10.5)
CHLORIDE SERPL-SCNC: 106 MMOL/L — SIGNIFICANT CHANGE UP (ref 96–108)
CO2 SERPL-SCNC: 26 MMOL/L — SIGNIFICANT CHANGE UP (ref 22–31)
CREAT SERPL-MCNC: 1.26 MG/DL — SIGNIFICANT CHANGE UP (ref 0.5–1.3)
GLUCOSE SERPL-MCNC: 175 MG/DL — HIGH (ref 70–99)
HCT VFR BLD CALC: 25.1 % — LOW (ref 39–50)
HGB BLD-MCNC: 7.4 G/DL — LOW (ref 13–17)
MCHC RBC-ENTMCNC: 29.5 GM/DL — LOW (ref 32–36)
MCHC RBC-ENTMCNC: 29.6 PG — SIGNIFICANT CHANGE UP (ref 27–34)
MCV RBC AUTO: 100.5 FL — HIGH (ref 80–100)
PLATELET # BLD AUTO: 184 K/UL — SIGNIFICANT CHANGE UP (ref 150–400)
POTASSIUM SERPL-MCNC: 3.9 MMOL/L — SIGNIFICANT CHANGE UP (ref 3.5–5.3)
POTASSIUM SERPL-SCNC: 3.9 MMOL/L — SIGNIFICANT CHANGE UP (ref 3.5–5.3)
RBC # BLD: 2.5 M/UL — LOW (ref 4.2–5.8)
RBC # FLD: 19.7 % — HIGH (ref 10.3–14.5)
SODIUM SERPL-SCNC: 141 MMOL/L — SIGNIFICANT CHANGE UP (ref 135–145)
WBC # BLD: 11 K/UL — HIGH (ref 3.8–10.5)
WBC # FLD AUTO: 11 K/UL — HIGH (ref 3.8–10.5)

## 2019-03-21 PROCEDURE — 99233 SBSQ HOSP IP/OBS HIGH 50: CPT

## 2019-03-21 PROCEDURE — 99232 SBSQ HOSP IP/OBS MODERATE 35: CPT

## 2019-03-21 PROCEDURE — 93010 ELECTROCARDIOGRAM REPORT: CPT

## 2019-03-21 RX ORDER — INDOMETHACIN 50 MG
50 CAPSULE ORAL THREE TIMES A DAY
Qty: 0 | Refills: 0 | Status: DISCONTINUED | OUTPATIENT
Start: 2019-03-21 | End: 2019-03-21

## 2019-03-21 RX ORDER — INDOMETHACIN 50 MG
50 CAPSULE ORAL THREE TIMES A DAY
Qty: 0 | Refills: 0 | Status: DISCONTINUED | OUTPATIENT
Start: 2019-03-21 | End: 2019-03-23

## 2019-03-21 RX ADMIN — Medication 1 MILLIGRAM(S): at 13:11

## 2019-03-21 RX ADMIN — Medication 300 MILLIGRAM(S): at 13:11

## 2019-03-21 RX ADMIN — LEVETIRACETAM 500 MILLIGRAM(S): 250 TABLET, FILM COATED ORAL at 17:46

## 2019-03-21 RX ADMIN — FAMOTIDINE 20 MILLIGRAM(S): 10 INJECTION INTRAVENOUS at 13:11

## 2019-03-21 RX ADMIN — TAMSULOSIN HYDROCHLORIDE 0.4 MILLIGRAM(S): 0.4 CAPSULE ORAL at 21:49

## 2019-03-21 RX ADMIN — Medication 650 MILLIGRAM(S): at 13:15

## 2019-03-21 RX ADMIN — LEVETIRACETAM 500 MILLIGRAM(S): 250 TABLET, FILM COATED ORAL at 06:38

## 2019-03-21 RX ADMIN — PANTOPRAZOLE SODIUM 40 MILLIGRAM(S): 20 TABLET, DELAYED RELEASE ORAL at 06:38

## 2019-03-21 RX ADMIN — Medication 650 MILLIGRAM(S): at 14:00

## 2019-03-21 RX ADMIN — Medication 25 MILLIGRAM(S): at 06:38

## 2019-03-21 RX ADMIN — RIVAROXABAN 20 MILLIGRAM(S): KIT at 17:47

## 2019-03-21 RX ADMIN — Medication 1 TABLET(S): at 13:19

## 2019-03-21 RX ADMIN — Medication 50 MILLIGRAM(S): at 20:40

## 2019-03-21 RX ADMIN — ATORVASTATIN CALCIUM 10 MILLIGRAM(S): 80 TABLET, FILM COATED ORAL at 21:49

## 2019-03-21 RX ADMIN — Medication 25 MILLIGRAM(S): at 17:46

## 2019-03-21 RX ADMIN — AMLODIPINE BESYLATE 10 MILLIGRAM(S): 2.5 TABLET ORAL at 06:38

## 2019-03-21 RX ADMIN — Medication 100 MILLIGRAM(S): at 06:38

## 2019-03-21 RX ADMIN — Medication 100 MILLIGRAM(S): at 17:46

## 2019-03-21 RX ADMIN — Medication 50 MILLIGRAM(S): at 21:15

## 2019-03-21 RX ADMIN — PREGABALIN 1000 MICROGRAM(S): 225 CAPSULE ORAL at 13:11

## 2019-03-21 RX ADMIN — Medication 81 MILLIGRAM(S): at 13:11

## 2019-03-21 NOTE — PROGRESS NOTE ADULT - ASSESSMENT
84 male with PMH significant for HTN, HLD, Afib, stomach CA, anemia, gout, BPH. seizure d/o, recently stopped EToh use, p/w chronic anemia possibly due to myelodysplastic syndrome, s/p tx PRBC, debility and functional decline    #Anemia/debility: multifactorial: Chronic kidney disease, recent GIB. 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   - Heme appreciated: Not yet in remission, continue supportive therapies 3/21  - continue procrit (started 3/11)  - continue folic acid, MVI  transfuse if Hgb<7. H/H stable today 3/21 discussed with patient  - continue comprehensive rehab program OT, PT    #near syncope:  -likely vasovagal s/p large BM  -labs stable, asymptomatic, cleared to continue with therapies  -hosptialist f/u appreciated    #LGI bleed: BRBPR x 1 episode  -no active source bleeding on EGD/colonoscopy, +diverticulosis on CT a/P  -vitals currently stable, no recurrence, patient denies any other symptoms. h/H stable\  -xarelto and ASA restarted, GI consult and follow up appreciated.  -CBC stable. repeat Sat 3/23    #leukocytosis: mild, afebrile, asymptomatic  -montior    #Afib:   - Continue metoprolol, cardiac precautions  -Xarelto restarted  - CBC stable, montior    #HTN  - Continue norvasc, lasix  -BP controlled 130/75    #SZ/tremors:  likely multifactorial including essential tremor and metabolic causes,  - Controlled on keppra  - Neuro consult appreciate     #Gout/CPPD: improved  - Allopurinol. completed indomethacin 50 TID this weekend      #BPH/ureteral stone:  consult appreciated  - Continue flomax, bladder scan  -monitor BMP    #DVT PPX  -xarelto held. No AC due to GI bleed pending GI eval  - Bilateral ACE wraps for edema management    #left heel blister  - Swiss cheese boots for positioning    DNR    #Case discussed in IDT rounds 3/19.   -patient continues to have poor endurance, fluctuanting medicals tatus that impacts progress in therapy. At this time, goals for discharge from IRF would be min-mod assist for transfers, ADL and ambulation, necessitating physical caregiver support. Team recommends [facility] once medically stable for OT and PT  -Recommendations discussed in detail with patient including options for rehab services on discharge from IRf and team anticipation need min-mod assist for ADLs and transfers.gait after dc from IRF. Patient does NOT want JAN[facility], states he will have caregiver at home. Need for caregiver training discussed. Discharge planning team informed, will meet [facility] patient to set up    Labs:  CBC 3/23  CBc BMP 3/25

## 2019-03-21 NOTE — PROGRESS NOTE ADULT - SUBJECTIVE AND OBJECTIVE BOX
Patient is a 84y old  Male who presents with a chief complaint of anemia, debility and functional decline (21 Mar 2019 08:57)      Patient seen and examined at bedside. feels well, no complaints     ALLERGIES:  No Known Allergies    MEDICATIONS:  acetaminophen   Tablet .. 650 milliGRAM(s) Oral every 6 hours PRN  allopurinol 300 milliGRAM(s) Oral daily  amLODIPine   Tablet 10 milliGRAM(s) Oral daily  aspirin enteric coated 81 milliGRAM(s) Oral daily  atorvastatin 10 milliGRAM(s) Oral at bedtime  cyanocobalamin 1000 MICROGram(s) Oral daily  docusate sodium 100 milliGRAM(s) Oral two times a day  epoetin jackelyn Injectable 03172 Unit(s) SubCutaneous <User Schedule>  famotidine    Tablet 20 milliGRAM(s) Oral daily  folic acid 1 milliGRAM(s) Oral daily  levETIRAcetam 500 milliGRAM(s) Oral two times a day  metoprolol tartrate 25 milliGRAM(s) Oral every 12 hours  multivitamin 1 Tablet(s) Oral daily  pantoprazole    Tablet 40 milliGRAM(s) Oral before breakfast  rivaroxaban 20 milliGRAM(s) Oral every 24 hours  senna 2 Tablet(s) Oral at bedtime PRN  tamsulosin 0.4 milliGRAM(s) Oral at bedtime    Vital Signs Last 24 Hrs  T(F): 97.6 (21 Mar 2019 08:14), Max: 98.3 (20 Mar 2019 21:40)  HR: 77 (21 Mar 2019 08:30) (69 - 91)  BP: 139/74 (21 Mar 2019 08:30) (97/58 - 139/74)  RR: 14 (21 Mar 2019 08:14) (14 - 15)  SpO2: 99% (21 Mar 2019 08:30) (99% - 99%)  I&O's Summary      PHYSICAL EXAM:  General: NAD AO x 3  Neck: Supple, No JVD  Lungs: Clear to auscultation bilaterally  Cardio: RRR, S1/S2, No murmurs  Abdomen: Soft, Nontender, Nondistended; Bowel sounds present  Extremities: No clubbing, cyanosis, or edema      LABS:                        7.4    11.0  )-----------( 184      ( 21 Mar 2019 06:40 )             25.1     03-21    141  |  106  |  39  ----------------------------<  175  3.9   |  26  |  1.26    Ca    9.5      21 Mar 2019 06:40      eGFR if Non African American: 52 mL/min/1.73M2 (03-21-19 @ 06:40)  eGFR if African American: 60 mL/min/1.73M2 (03-21-19 @ 06:40)    12-23 Chol 95 mg/dL LDL 55 mg/dL HDL 26 mg/dL Trig 69 mg/dL        CAPILLARY BLOOD GLUCOSE      POCT Blood Glucose.: 195 mg/dL (21 Mar 2019 08:26)    03-07 [id number] 7.0  12-23 [id number] 6.3    Culture - Urine (collected 17 Mar 2019 05:41)  Source: .Urine Clean Catch (Midstream)  Final Report (19 Mar 2019 09:47):    50,000 - 99,000 CFU/mL Staphylococcus aureus    <10,000 CFU/ml Normal Urogenital kristi present  Organism: Staphylococcus aureus (19 Mar 2019 09:47)  Organism: Staphylococcus aureus (19 Mar 2019 09:47)      -  Ampicillin/Sulbactam: S <=8/4      -  Cefazolin: S <=4      -  Gentamicin: S <=1 Should not be used as monotherapy      -  Oxacillin: S <=0.25      -  Penicillin: R 8      -  RIF- Rifampin: S <=1 Should not be used as monotherapy      -  Tetra/Doxy: S <=1      -  Trimethoprim/Sulfamethoxazole: S <=0.5/9.5      -  Vancomycin: S 2      Method Type: CARINE      RADIOLOGY & ADDITIONAL TESTS:    Care Discussed with Consultants/Other Providers:

## 2019-03-21 NOTE — PROGRESS NOTE ADULT - SUBJECTIVE AND OBJECTIVE BOX
Patient is a 84y old  Male who presents with a chief complaint of anemia, debility and functional decline (21 Mar 2019 09:39)      HPI:  Patient is 84 male RH dominant with PMH significant for HTN, HLD,  Afib (off xarelto), stomach CA, anemia, gout, BPH. seizure d/o, recently stopped EToh use, who presented from [facility] with abnormal labs.  Pt reports recent admission to [facility] for gout flare then discharged to rehab.  Pt now returns with guaiac negative anemia (HgB 6.8 ) and  episode of bright red blood in stool. Pt also found with leukocytosis, with temp 100.7 F rectally.  Denies chills, fever, sob, chest pain, weakness, dysuria.    Patient was seen by GI and hematology.  S/P Colonoscopy on 3/1/19. No source of bleeding noted. Negative FOB. S/P EGD during prior admission, and no bleeding identified. Heme performed BM biopsy 3/4, results pending. CKD, Etoh may contribute, although underlying BM d/o such as myelodysplasia suspected. Transfused PRBC. Patient transferred to - [facility] due to debility and functional decline (06 Mar 2019 14:53)      PAST MEDICAL & SURGICAL HISTORY:  Nonrheumatic aortic valve stenosis  Cancer of stomach  Anemia, unspecified type: Anemia  Seizure disorder  Benign prostatic hyperplasia, unspecified whether lower urinary tract symptoms present  Hyperlipemia  Essential hypertension  Chronic atrial fibrillation  Stomach cancer  Hypertension  Atrial fibrillation  Gout  No significant past surgical history  No significant past surgical history      MEDICATIONS  (STANDING):  allopurinol 300 milliGRAM(s) Oral daily  amLODIPine   Tablet 10 milliGRAM(s) Oral daily  aspirin enteric coated 81 milliGRAM(s) Oral daily  atorvastatin 10 milliGRAM(s) Oral at bedtime  cyanocobalamin 1000 MICROGram(s) Oral daily  docusate sodium 100 milliGRAM(s) Oral two times a day  epoetin jackelyn Injectable 54379 Unit(s) SubCutaneous <User Schedule>  famotidine    Tablet 20 milliGRAM(s) Oral daily  folic acid 1 milliGRAM(s) Oral daily  levETIRAcetam 500 milliGRAM(s) Oral two times a day  metoprolol tartrate 25 milliGRAM(s) Oral every 12 hours  multivitamin 1 Tablet(s) Oral daily  pantoprazole    Tablet 40 milliGRAM(s) Oral before breakfast  rivaroxaban 20 milliGRAM(s) Oral every 24 hours  tamsulosin 0.4 milliGRAM(s) Oral at bedtime    MEDICATIONS  (PRN):  acetaminophen   Tablet .. 650 milliGRAM(s) Oral every 6 hours PRN Mild Pain (1 - 3)  senna 2 Tablet(s) Oral at bedtime PRN Constipation      Allergies    No Known Allergies    Intolerances          VITALS  84y  Vital Signs Last 24 Hrs  T(C): 36.9 (21 Mar 2019 10:08), Max: 36.9 (21 Mar 2019 10:08)  T(F): 98.4 (21 Mar 2019 10:08), Max: 98.4 (21 Mar 2019 10:08)  HR: 73 (21 Mar 2019 10:08) (69 - 91)  BP: 130/75 (21 Mar 2019 10:08) (97/58 - 139/74)  BP(mean): --  RR: 14 (21 Mar 2019 10:08) (14 - 15)  SpO2: 97% (21 Mar 2019 10:08) (97% - 99%)  Daily     Daily         RECENT LABS:                          7.4    11.0  )-----------( 184      ( 21 Mar 2019 06:40 )             25.1     03-21    141  |  106  |  39<H>  ----------------------------<  175<H>  3.9   |  26  |  1.26    Ca    9.5      21 Mar 2019 06:40              Culture - Urine (collected 03-17-19 @ 05:41)  Source: .Urine Clean Catch (Midstream)  Final Report (03-19-19 @ 09:47):    50,000 - 99,000 CFU/mL Staphylococcus aureus    <10,000 CFU/ml Normal Urogenital kristi present  Organism: Staphylococcus aureus (03-19-19 @ 09:47)  Organism: Staphylococcus aureus (03-19-19 @ 09:47)      -  Ampicillin/Sulbactam: S <=8/4      -  Cefazolin: S <=4      -  Gentamicin: S <=1 Should not be used as monotherapy      -  Oxacillin: S <=0.25      -  Penicillin: R 8      -  RIF- Rifampin: S <=1 Should not be used as monotherapy      -  Tetra/Doxy: S <=1      -  Trimethoprim/Sulfamethoxazole: S <=0.5/9.5      -  Vancomycin: S 2      Method Type: CARINE        CAPILLARY BLOOD GLUCOSE      POCT Blood Glucose.: 195 mg/dL (21 Mar 2019 08:26)

## 2019-03-21 NOTE — PROGRESS NOTE ADULT - NEGATIVE GASTROINTESTINAL SYMPTOMS
no diarrhea/no vomiting/no melena/no nausea/no hematochezia
no vomiting/Had BM this AM/no nausea/no change in bowel habits

## 2019-03-21 NOTE — PROGRESS NOTE ADULT - ASSESSMENT
84-year-old gentleman with history of anemia, atrial fibrillation and seizure disorder admitted with anemia exacerbation from his [place] facility. Patient notes recent history of bright red blood per rectum. He was scheduled for outpatient colonoscopy.  On admission, found to have rectal temp of 100.7°F in the emergency department.  Patient reported he stopped drinking alcohol approximately 3 months prior to admission

## 2019-03-21 NOTE — PROGRESS NOTE ADULT - SUBJECTIVE AND OBJECTIVE BOX
YUE KING   84y   Male    Admitting: SAEED Gallego  HPI:  84-year-old gentleman with history of anemia, atrial fibrillation and seizure disorder admitted with anemia exacerbation from his [place] facility. Patient noted recent history of bright red blood per rectum. He was scheduled for outpatient colonoscopy. S/P BM biopsy.  Patient reported he stopped drinking alcohol approximately 3 months prior to admission.  Patient is now on the acute rehabilitation unit.    PAST MEDICAL & SURGICAL HISTORY:  Nonrheumatic aortic valve stenosis  Cancer of stomach  Anemia, unspecified type: Anemia  Seizure disorder  Benign prostatic hyperplasia, unspecified whether lower urinary tract symptoms present  Hyperlipemia  Essential hypertension  Chronic atrial fibrillation  Stomach cancer  Hypertension  Atrial fibrillation  Gout  No significant past surgical history  No significant past surgical history    HEALTH ISSUES - PROBLEM Dx:  Anemia  Ureteral stone: Ureteral stone  Chronic myelomonocytic leukemia not having achieved remission: Chronic myelomonocytic leukemia not having achieved remission  Anemia, unspecified type: Anemia, unspecified type  Rectal bleed    MEDICATIONS  (STANDING):  allopurinol 300 milliGRAM(s) Oral daily  amLODIPine   Tablet 10 milliGRAM(s) Oral daily  aspirin enteric coated 81 milliGRAM(s) Oral daily  atorvastatin 10 milliGRAM(s) Oral at bedtime  cyanocobalamin 1000 MICROGram(s) Oral daily  docusate sodium 100 milliGRAM(s) Oral two times a day  epoetin jackelyn Injectable 99240 Unit(s) SubCutaneous <User Schedule>  famotidine    Tablet 20 milliGRAM(s) Oral daily  folic acid 1 milliGRAM(s) Oral daily  levETIRAcetam 500 milliGRAM(s) Oral two times a day  metoprolol tartrate 25 milliGRAM(s) Oral every 12 hours  multivitamin 1 Tablet(s) Oral daily  pantoprazole    Tablet 40 milliGRAM(s) Oral before breakfast  rivaroxaban 20 milliGRAM(s) Oral every 24 hours  tamsulosin 0.4 milliGRAM(s) Oral at bedtime    MEDICATIONS  (PRN):  acetaminophen   Tablet .. 650 milliGRAM(s) Oral every 6 hours PRN Mild Pain (1 - 3)  senna 2 Tablet(s) Oral at bedtime PRN Constipation    Allergies    No Known Allergies    INTERVAL HPI/OVERNIGHT EVENTS:  Patient S&E at bedside. No c/o pain or SOB.    VITAL SIGNS:  T(F): 97.6 (03-21-19 @ 08:14)  HR: 77 (03-21-19 @ 08:30)  BP: 139/74 (03-21-19 @ 08:30)  RR: 14 (03-21-19 @ 08:14)  SpO2: 99% (03-21-19 @ 08:30)    PHYSICAL EXAM:  GENERAL: NAD, well-developed  EYES: EOMI, PERRLA, conjunctiva and sclera clear  NECK: Supple, No JVD  CHEST/LUNG: decreased BS bases ant.  HEART: Regular rate and rhythm  ABDOMEN: Soft, Nontender, Nondistended  EXTREMITIES:  LE bandaging in place  NEUROLOGY: awake, alert    Labs:             7.4    11.0  )-----------( 184      ( 03-21 @ 06:40 )             25.1                7.5    9.7   )-----------( 211      ( 03-19 @ 08:30 )             24.9       03-21    141  |  106  |  39<H>  ----------------------------<  175<H>  3.9   |  26  |  1.26    Ca    9.5      21 Mar 2019 06:40

## 2019-03-22 LAB
ANION GAP SERPL CALC-SCNC: 15 MMOL/L — SIGNIFICANT CHANGE UP (ref 5–17)
APPEARANCE UR: CLEAR — SIGNIFICANT CHANGE UP
BACTERIA # UR AUTO: NEGATIVE /HPF — SIGNIFICANT CHANGE UP
BASOPHILS # BLD AUTO: 0.2 K/UL — SIGNIFICANT CHANGE UP (ref 0–0.2)
BASOPHILS NFR BLD AUTO: 0 % — SIGNIFICANT CHANGE UP (ref 0–2)
BILIRUB UR-MCNC: NEGATIVE — SIGNIFICANT CHANGE UP
BUN SERPL-MCNC: 42 MG/DL — HIGH (ref 7–23)
CALCIUM SERPL-MCNC: 9.5 MG/DL — SIGNIFICANT CHANGE UP (ref 8.4–10.5)
CHLORIDE SERPL-SCNC: 101 MMOL/L — SIGNIFICANT CHANGE UP (ref 96–108)
CO2 SERPL-SCNC: 22 MMOL/L — SIGNIFICANT CHANGE UP (ref 22–31)
COLOR SPEC: YELLOW — SIGNIFICANT CHANGE UP
CREAT SERPL-MCNC: 1.49 MG/DL — HIGH (ref 0.5–1.3)
DIFF PNL FLD: ABNORMAL
EOSINOPHIL # BLD AUTO: 0 K/UL — SIGNIFICANT CHANGE UP (ref 0–0.5)
EOSINOPHIL NFR BLD AUTO: 0 % — SIGNIFICANT CHANGE UP (ref 0–6)
EPI CELLS # UR: SIGNIFICANT CHANGE UP
GLUCOSE SERPL-MCNC: 278 MG/DL — HIGH (ref 70–99)
GLUCOSE UR QL: NEGATIVE — SIGNIFICANT CHANGE UP
HCT VFR BLD CALC: 27 % — LOW (ref 39–50)
HGB BLD-MCNC: 8.1 G/DL — LOW (ref 13–17)
KETONES UR-MCNC: NEGATIVE — SIGNIFICANT CHANGE UP
LEUKOCYTE ESTERASE UR-ACNC: NEGATIVE — SIGNIFICANT CHANGE UP
LYMPHOCYTES # BLD AUTO: 29 % — SIGNIFICANT CHANGE UP (ref 13–44)
LYMPHOCYTES # BLD AUTO: 3.9 K/UL — HIGH (ref 1–3.3)
MCHC RBC-ENTMCNC: 30.2 GM/DL — LOW (ref 32–36)
MCHC RBC-ENTMCNC: 31.1 PG — SIGNIFICANT CHANGE UP (ref 27–34)
MCV RBC AUTO: 102.8 FL — HIGH (ref 80–100)
MONOCYTES # BLD AUTO: 4.8 K/UL — HIGH (ref 0–0.9)
MONOCYTES NFR BLD AUTO: 36 % — HIGH (ref 2–14)
NEUTROPHILS # BLD AUTO: 4.7 K/UL — SIGNIFICANT CHANGE UP (ref 1.8–7.4)
NEUTROPHILS NFR BLD AUTO: 35 % — LOW (ref 43–77)
NITRITE UR-MCNC: NEGATIVE — SIGNIFICANT CHANGE UP
PH UR: 5 — SIGNIFICANT CHANGE UP (ref 5–8)
PLATELET # BLD AUTO: 174 K/UL — SIGNIFICANT CHANGE UP (ref 150–400)
POTASSIUM SERPL-MCNC: 4.1 MMOL/L — SIGNIFICANT CHANGE UP (ref 3.5–5.3)
POTASSIUM SERPL-SCNC: 4.1 MMOL/L — SIGNIFICANT CHANGE UP (ref 3.5–5.3)
PROT UR-MCNC: 30 MG/DL
RBC # BLD: 2.62 M/UL — LOW (ref 4.2–5.8)
RBC # FLD: 20.7 % — HIGH (ref 10.3–14.5)
RBC CASTS # UR COMP ASSIST: NEGATIVE /HPF — SIGNIFICANT CHANGE UP (ref 0–4)
SODIUM SERPL-SCNC: 138 MMOL/L — SIGNIFICANT CHANGE UP (ref 135–145)
SP GR SPEC: 1.01 — SIGNIFICANT CHANGE UP (ref 1.01–1.02)
UROBILINOGEN FLD QL: NEGATIVE — SIGNIFICANT CHANGE UP
WBC # BLD: 13.6 K/UL — HIGH (ref 3.8–10.5)
WBC # FLD AUTO: 13.6 K/UL — HIGH (ref 3.8–10.5)
WBC UR QL: NEGATIVE /HPF — SIGNIFICANT CHANGE UP (ref 0–5)

## 2019-03-22 PROCEDURE — 99233 SBSQ HOSP IP/OBS HIGH 50: CPT

## 2019-03-22 PROCEDURE — 99232 SBSQ HOSP IP/OBS MODERATE 35: CPT

## 2019-03-22 PROCEDURE — 93010 ELECTROCARDIOGRAM REPORT: CPT

## 2019-03-22 RX ORDER — LIDOCAINE 4 G/100G
1 CREAM TOPICAL
Qty: 0 | Refills: 0 | Status: DISCONTINUED | OUTPATIENT
Start: 2019-03-22 | End: 2019-04-10

## 2019-03-22 RX ORDER — ERYTHROPOIETIN 10000 [IU]/ML
10000 INJECTION, SOLUTION INTRAVENOUS; SUBCUTANEOUS
Qty: 0 | Refills: 0 | Status: COMPLETED | OUTPATIENT
Start: 2019-03-22 | End: 2019-03-27

## 2019-03-22 RX ADMIN — PREGABALIN 1000 MICROGRAM(S): 225 CAPSULE ORAL at 12:31

## 2019-03-22 RX ADMIN — Medication 1 TABLET(S): at 12:32

## 2019-03-22 RX ADMIN — LEVETIRACETAM 500 MILLIGRAM(S): 250 TABLET, FILM COATED ORAL at 18:34

## 2019-03-22 RX ADMIN — PANTOPRAZOLE SODIUM 40 MILLIGRAM(S): 20 TABLET, DELAYED RELEASE ORAL at 05:32

## 2019-03-22 RX ADMIN — LEVETIRACETAM 500 MILLIGRAM(S): 250 TABLET, FILM COATED ORAL at 05:32

## 2019-03-22 RX ADMIN — Medication 25 MILLIGRAM(S): at 05:32

## 2019-03-22 RX ADMIN — AMLODIPINE BESYLATE 10 MILLIGRAM(S): 2.5 TABLET ORAL at 05:32

## 2019-03-22 RX ADMIN — ATORVASTATIN CALCIUM 10 MILLIGRAM(S): 80 TABLET, FILM COATED ORAL at 23:20

## 2019-03-22 RX ADMIN — Medication 100 MILLIGRAM(S): at 18:31

## 2019-03-22 RX ADMIN — Medication 100 MILLIGRAM(S): at 05:32

## 2019-03-22 RX ADMIN — Medication 25 MILLIGRAM(S): at 18:33

## 2019-03-22 RX ADMIN — Medication 81 MILLIGRAM(S): at 12:31

## 2019-03-22 RX ADMIN — TAMSULOSIN HYDROCHLORIDE 0.4 MILLIGRAM(S): 0.4 CAPSULE ORAL at 23:20

## 2019-03-22 RX ADMIN — Medication 1 MILLIGRAM(S): at 12:31

## 2019-03-22 RX ADMIN — ERYTHROPOIETIN 10000 UNIT(S): 10000 INJECTION, SOLUTION INTRAVENOUS; SUBCUTANEOUS at 12:44

## 2019-03-22 RX ADMIN — Medication 300 MILLIGRAM(S): at 12:31

## 2019-03-22 RX ADMIN — FAMOTIDINE 20 MILLIGRAM(S): 10 INJECTION INTRAVENOUS at 12:44

## 2019-03-22 RX ADMIN — RIVAROXABAN 20 MILLIGRAM(S): KIT at 18:33

## 2019-03-22 NOTE — CHART NOTE - NSCHARTNOTEFT_GEN_A_CORE
Spoke with Dr. Estevez (hematology) to clarify recommendations. Patient having pre-syncopal episodes, likely due to symptomatic anemia. Today's hgb 8.1 may be hemoconcentrated. Will transfuse patient 1U PRBC today, repeat CBC and monitor for improvement. Discussed with patient who is in agreement with plan. Consent signed, T&S completed. Discussed with Dr. Gallego (Rehab Attending) who is in agreement with plan.

## 2019-03-22 NOTE — PROGRESS NOTE ADULT - ASSESSMENT
84 male with PMH significant for HTN, HLD, Afib, stomach CA, anemia, gout, BPH. seizure d/o, recently stopped EToh use, p/w chronic anemia possibly due to myelodysplastic syndrome, s/p tx PRBC, debility and functional decline    #Anemia/debility: multifactorial: Chronic kidney disease, recent GIB. 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   - Heme appreciated: Not yet in remission, continue supportive therapies 3/21  - continue procrit (started 3/11)  - continue folic acid, MVI  - Transfuse hgb <7  - continue comprehensive rehab program OT, PT    #near syncope: Twice over past 48 hours. BP stable. Possibly 2/2 chronic anemia.  - CBC showing hgb 8.1 today although may be hemo-concentrated. WBC also increased to 13.6, patient is non-toxic appearing and no s/sx infection; gout flare resolving. Slight bump in BUN/Cr as well.   - May benefit from 1U PRBC, will discuss with hospitalist and hematology.   - Likely vasovagal s/p large BM  - hosptialist f/u appreciated  - Hematology f/u appreciated    #LGI bleed: BRBPR x 1 episode  - no active source bleeding on EGD/colonoscopy, +diverticulosis on CT a/P  - vitals currently stable, no recurrence, patient denies any other symptoms. h/H stable\  - xarelto and ASA restarted, GI consult and follow up appreciated.  - CBC stable. repeat Sat 3/23    #leukocytosis: mild, afebrile, asymptomatic  -montior    #Afib:   - Continue metoprolol, cardiac precautions  -Xarelto restarted  - CBC stable, montior    #HTN  - Continue norvasc, lasix  -BP controlled 130/75    #SZ/tremors:  likely multifactorial including essential tremor and metabolic causes,  - Controlled on keppra  - Neuro consult appreciate     #Gout/CPPD: improved  - Allopurinol. completed indomethacin 50 TID   - Patient continues to request indomethacin for knee pain; knee swelling, warmth and redness greatly improved. Will trial lidocaine patch for pain relief, discussed with patient.       #BPH/ureteral stone:  consult appreciated  - Continue flomax, bladder scan  - Monitor BMP    #DVT PPX  - Xarelto held. No AC due to GI bleed pending GI eval  - Bilateral ACE wraps for edema management    #left heel blister  - Swiss cheese boots for positioning    DNR    #Case discussed in IDT rounds 3/19.   -patient continues to have poor endurance, fluctuating medicals tatus that impacts progress in therapy. At this time, goals for discharge from IRF would be min-mod assist for transfers, ADL and ambulation, necessitating physical caregiver support. Team recommends [facility]AN[facility] once medically stable for OT and PT  -Recommendations discussed in detail with patient including options for rehab services on discharge from IRf and team anticipation need min-mod assist for ADLs and transfers. gait after dc from IRF. Patient does NOT want JANET, states he will have caregiver at home. Need for caregiver training discussed. Discharge planning team informed, will meet with patient to set up    Labs:  CBC 3/23  CBC BMP 3/25 84 male with PMH significant for HTN, HLD, Afib, stomach CA, anemia, gout, BPH. seizure d/o, recently stopped EToh use, p/w chronic anemia possibly due to myelodysplastic syndrome, s/p tx PRBC, debility and functional decline    #Anemia/debility: multifactorial: Chronic kidney disease, recent GIB. 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   - Heme appreciated: Not yet in remission, continue supportive therapies 3/21  - continue procrit (started 3/11). Last ordered dose was today, will f/u with heme re: continuation  - continue folic acid, MVI  - Transfuse hgb <7  - continue comprehensive rehab program OT, PT    #near syncope: Twice over past 48 hours . BP stable. Possibly 2/2 chronic anemia. Also had bacteruria but no fever, urinary complaints . +elevated WBC, fluctuating  - CBC showing hgb 8.1 today although may be hemo-concentrated. WBC also increased to 13.6, patient is non-toxic appearing and no s/sx infection; gout flare resolving. Slight bump in BUN/Cr as well.   - May benefit from 1U PRBC, will discuss with hospitalist and hematology.  -cardiology consult   - hosptialist f/u appreciated  -r/o UTI: send repeat UA and C+S. If spikes, WBC trends up, will start Abx based on last Cx results    #LGI bleed: BRBPR x 1 episode  - no active source bleeding on EGD/colonoscopy, +diverticulosis on CT a/P  - vitals currently stable, no recurrence, patient denies any other symptoms. h/H stable\  - xarelto and ASA restarted, GI consult and follow up appreciated.  - CBC stable. repeat Sat 3/23    #leukocytosis: mild, afebrile, asymptomatic  -montior  -resend UA, C+S as above  CBC 3/23    #Afib:   - Continue metoprolol, cardiac precautions  -Xarelto restarted  - CBC stable, montior    #HTN  - Continue norvasc, lasix    #SZ/tremors:  likely multifactorial including essential tremor and metabolic causes,  - Controlled on keppra  - Neuro consult appreciate     #Gout/CPPD: improved  - Allopurinol. completed indomethacin 50 TID   - Patient continues to request indomethacin for knee pain; knee swelling, warmth and redness greatly improved.  - Will trial lidocaine patch for pain relief, discussed with patient.  3/23      #BPH/ureteral stone:  consult appreciated  - Continue flomax, bladder scan  - Monitor BMP    #DVT PPX  - Xarelto held. No AC due to GI bleed pending GI eval  - Bilateral ACE wraps for edema management    #left heel blister  - Swiss cheese boots for positioning    DNR    #Case discussed in IDT rounds 3/19.   -patient continues to have poor endurance, fluctuating medicals tatus that impacts progress in therapy. At this time, goals for discharge from IRF would be min-mod assist for transfers, ADL and ambulation, necessitating physical caregiver support. Team recommends [facility]ANET once medically stable for OT and PT  -Recommendations discussed in detail with patient including options for rehab services on discharge from IRf and team anticipation need min-mod assist for ADLs and transfers. gait after dc from IRF. Patient does NOT want JANET, states he will have caregiver at home. Need for caregiver training discussed. Discharge planning team informed, will meet with patient to set up    Labs:  CBC 3/23  CBC BMP 3/25  UA, C+S

## 2019-03-22 NOTE — PROGRESS NOTE ADULT - SUBJECTIVE AND OBJECTIVE BOX
Patient is a 84y old  Male who presents with a chief complaint of anemia, debility and functional decline (21 Mar 2019 13:53)      Patient seen and examined at bedside. feels well, no complaints     ALLERGIES:  No Known Allergies    MEDICATIONS:  acetaminophen   Tablet .. 650 milliGRAM(s) Oral every 6 hours PRN  allopurinol 300 milliGRAM(s) Oral daily  amLODIPine   Tablet 10 milliGRAM(s) Oral daily  aspirin enteric coated 81 milliGRAM(s) Oral daily  atorvastatin 10 milliGRAM(s) Oral at bedtime  cyanocobalamin 1000 MICROGram(s) Oral daily  docusate sodium 100 milliGRAM(s) Oral two times a day  epoetin jackelyn Injectable 47181 Unit(s) SubCutaneous <User Schedule>  famotidine    Tablet 20 milliGRAM(s) Oral daily  folic acid 1 milliGRAM(s) Oral daily  indomethacin 50 milliGRAM(s) Oral three times a day PRN  levETIRAcetam 500 milliGRAM(s) Oral two times a day  lidocaine   Patch 1 Patch Transdermal <User Schedule>  metoprolol tartrate 25 milliGRAM(s) Oral every 12 hours  multivitamin 1 Tablet(s) Oral daily  pantoprazole    Tablet 40 milliGRAM(s) Oral before breakfast  rivaroxaban 20 milliGRAM(s) Oral every 24 hours  senna 2 Tablet(s) Oral at bedtime PRN  tamsulosin 0.4 milliGRAM(s) Oral at bedtime    Vital Signs Last 24 Hrs  T(F): 98.1 (22 Mar 2019 08:10), Max: 99.1 (21 Mar 2019 21:40)  HR: 94 (22 Mar 2019 10:10) (70 - 94)  BP: 122/69 (22 Mar 2019 10:10) (121/68 - 148/77)  RR: 16 (22 Mar 2019 10:10) (14 - 16)  SpO2: 94% (22 Mar 2019 10:10) (94% - 98%)  I&O's Summary    21 Mar 2019 07:01  -  22 Mar 2019 07:00  --------------------------------------------------------  IN: 0 mL / OUT: 2 mL / NET: -2 mL        PHYSICAL EXAM:  General: NAD, AO x 3  Neck: Supple, No JVD  Lungs: Clear to auscultation bilaterally  Cardio: RRR, S1/S2, No murmurs  Abdomen: Soft, Nontender, Nondistended; Bowel sounds present  Extremities: No clubbing, cyanosis, or edema      LABS:                        8.1    13.6  )-----------( 174      ( 22 Mar 2019 10:30 )             27.0     03-22    138  |  101  |  42  ----------------------------<  278  4.1   |  22  |  1.49    Ca    9.5      22 Mar 2019 10:30      eGFR if Non African American: 42 mL/min/1.73M2 (03-22-19 @ 10:30)  eGFR if : 49 mL/min/1.73M2 (03-22-19 @ 10:30)            12-23 Chol 95 mg/dL LDL 55 mg/dL HDL 26 mg/dL Trig 69 mg/dL        CAPILLARY BLOOD GLUCOSE        03-07 [id number] 7.0  12-23 [id number] 6.3          RADIOLOGY & ADDITIONAL TESTS:    Care Discussed with Consultants/Other Providers:

## 2019-03-22 NOTE — PROGRESS NOTE ADULT - SUBJECTIVE AND OBJECTIVE BOX
YUE KING   84y   Male    Admitting: SAEED Gallego  HPI:  84-year-old gentleman with history of anemia, atrial fibrillation and seizure disorder admitted with anemia exacerbation from his [facility] facility. Patient noted recent history of bright red blood per rectum. He was scheduled for outpatient colonoscopy. S/P BM biopsy.  Patient reported he stopped drinking alcohol approximately 3 months prior to admission.  Patient is now on the acute rehabilitation unit.    PAST MEDICAL & SURGICAL HISTORY:  Nonrheumatic aortic valve stenosis  Cancer of stomach  Anemia, unspecified type: Anemia  Seizure disorder  Benign prostatic hyperplasia, unspecified whether lower urinary tract symptoms present  Hyperlipemia  Essential hypertension  Chronic atrial fibrillation  Stomach cancer  Hypertension  Atrial fibrillation  Gout  No significant past surgical history  No significant past surgical history    HEALTH ISSUES - PROBLEM Dx:  Anemia  Ureteral stone: Ureteral stone  Chronic myelomonocytic leukemia not having achieved remission: Chronic myelomonocytic leukemia not having achieved remission  Anemia, unspecified type: Anemia, unspecified type  Rectal bleed    MEDICATIONS  (STANDING):  allopurinol 300 milliGRAM(s) Oral daily  amLODIPine   Tablet 10 milliGRAM(s) Oral daily  aspirin enteric coated 81 milliGRAM(s) Oral daily  atorvastatin 10 milliGRAM(s) Oral at bedtime  cyanocobalamin 1000 MICROGram(s) Oral daily  docusate sodium 100 milliGRAM(s) Oral two times a day  famotidine    Tablet 20 milliGRAM(s) Oral daily  folic acid 1 milliGRAM(s) Oral daily  levETIRAcetam 500 milliGRAM(s) Oral two times a day  lidocaine   Patch 1 Patch Transdermal <User Schedule>  metoprolol tartrate 25 milliGRAM(s) Oral every 12 hours  multivitamin 1 Tablet(s) Oral daily  pantoprazole    Tablet 40 milliGRAM(s) Oral before breakfast  rivaroxaban 20 milliGRAM(s) Oral every 24 hours  tamsulosin 0.4 milliGRAM(s) Oral at bedtime    MEDICATIONS  (PRN):  acetaminophen   Tablet .. 650 milliGRAM(s) Oral every 6 hours PRN Mild Pain (1 - 3)  indomethacin 50 milliGRAM(s) Oral three times a day PRN Moderate Pain (4 - 6)  senna 2 Tablet(s) Oral at bedtime PRN Constipation    Allergies    No Known Allergies    INTERVAL HPI/OVERNIGHT EVENTS:  Patient S&E at bedside. No c/o CP, SOB, palpitations or light headedness currently.    VITAL SIGNS:  T(F): 98.1 (03-22-19 @ 08:10)  HR: 78 (03-22-19 @ 12:21)  BP: 132/74 (03-22-19 @ 12:21)  RR: 14 (03-22-19 @ 12:21)  SpO2: 98% (03-22-19 @ 12:21)    PHYSICAL EXAM:  GENERAL: NAD, well-developed  EYES: EOMI, PERRLA, conjunctiva and sclera clear  NECK: Supple, No JVD  CHEST/LUNG: decreased BS bases ant.  HEART: Regular rate and rhythm  ABDOMEN: Soft, Nontender, Nondistended  EXTREMITIES:  ankle bandaging in place; no calf tenderness appreciated  NEUROLOGY: awake, alert    Labs:             8.1    13.6  )-----------( 174      ( 03-22 @ 10:30 )             27.0                7.4    11.0  )-----------( 184      ( 03-21 @ 06:40 )             25.1       03-22    138  |  101  |  42<H>  ----------------------------<  278<H>  4.1   |  22  |  1.49<H>    Ca    9.5      22 Mar 2019 10:30

## 2019-03-22 NOTE — PROGRESS NOTE ADULT - ASSESSMENT
84-year-old gentleman with history of anemia, atrial fibrillation and seizure disorder admitted with anemia exacerbation from his [facility] facility. Patient notes recent history of bright red blood per rectum. He was scheduled for outpatient colonoscopy.  On admission, found to have rectal temp of 100.7°F in the emergency department.  Patient reported he stopped drinking alcohol approximately 3 months prior to admission

## 2019-03-22 NOTE — PROGRESS NOTE ADULT - SUBJECTIVE AND OBJECTIVE BOX
Daily Progress Note:  HPI: Patient is 84 male RH dominant with PMH significant for HTN, HLD,  Afib (off xarelto), stomach CA, anemia, gout, BPH. seizure d/o, recently stopped EToh use, who presented from [facility] with abnormal labs.  Pt reports recent admission to [facility] for gout flare then discharged to rehab.  Pt now returns with guaiac negative anemia (HgB 6.8 ) and  episode of bright red blood in stool. Pt also found with leukocytosis, with temp 100.7 F rectally.  Denies chills, fever, sob, chest pain, weakness, dysuria.  Patient was seen by GI and hematology.  S/P Colonoscopy on 3/1/19. No source of bleeding noted. Negative FOB. S/P EGD during prior admission, and no bleeding identified. Heme performed BM biopsy 3/4, results pending. CKD, ETOH may contribute, although underlying BM d/o such as myelodysplasia suspected. Transfused PRBC. Patient transferred to - [facility] due to debility and functional decline (06 Mar 2019 14:53)    Subjective: Patient seen and examined at bedside. Overnight patient requested indomethacin for joint pain, R>L. Patient agreeable to try lidocaine patch instead. No other complaints.   **Patient had RRT at physical therapy, pre-syncope while sitting doing leg exercises. All vitals wnl. EKG showing Afib rate controlled with RBBB, unchanged from previous. Discussed with patient that he may benefit from a blood transfusion. Patient agreeable.     Vital Signs Last 24 Hrs  T(C): 36.7 (22 Mar 2019 08:10), Max: 37.3 (21 Mar 2019 21:40)  T(F): 98.1 (22 Mar 2019 08:10), Max: 99.1 (21 Mar 2019 21:40)  HR: 94 (22 Mar 2019 10:10) (70 - 94)  BP: 122/69 (22 Mar 2019 10:10) (121/68 - 148/77)  BP(mean): --  RR: 16 (22 Mar 2019 10:10) (14 - 16)  SpO2: 94% (22 Mar 2019 10:10) (94% - 98%)    PHYSICAL EXAM  Constitutional - NAD, Comfortable  HEENT - NCAT, EOMI  Neck - Supple, No limited ROM  Chest - Breathing comfortably, No wheezing  Cardiovascular - S1S2   Abdomen - Soft   Extremities - Improving redness and swelling of bilateral knees, 2nd MCP's; Improved swelling of b/l LE; ACE wraps to ankles  Neurologic Exam -  No deficits    RECENT LABS:                        8.1    13.6  )-----------( 174      ( 22 Mar 2019 10:30 )             27.0   03-22    138  |  101  |  42<H>  ----------------------------<  278<H>  4.1   |  22  |  1.49<H>    Ca    9.5      22 Mar 2019 10:30    Culture - Urine (collected 03-17-19 @ 05:41)  Source: .Urine Clean Catch (Midstream)  Final Report (03-19-19 @ 09:47):    50,000 - 99,000 CFU/mL Staphylococcus aureus    <10,000 CFU/ml Normal Urogenital kristi present  Organism: Staphylococcus aureus (03-19-19 @ 09:47)  Organism: Staphylococcus aureus (03-19-19 @ 09:47)      -  Ampicillin/Sulbactam: S <=8/4      -  Cefazolin: S <=4      -  Gentamicin: S <=1 Should not be used as monotherapy      -  Oxacillin: S <=0.25      -  Penicillin: R 8      -  RIF- Rifampin: S <=1 Should not be used as monotherapy      -  Tetra/Doxy: S <=1      -  Trimethoprim/Sulfamethoxazole: S <=0.5/9.5      -  Vancomycin: S 2      Method Type: CARINE    MEDICATIONS  (STANDING):  allopurinol 300 milliGRAM(s) Oral daily  amLODIPine   Tablet 10 milliGRAM(s) Oral daily  aspirin enteric coated 81 milliGRAM(s) Oral daily  atorvastatin 10 milliGRAM(s) Oral at bedtime  cyanocobalamin 1000 MICROGram(s) Oral daily  docusate sodium 100 milliGRAM(s) Oral two times a day  epoetin jackelyn Injectable 86241 Unit(s) SubCutaneous <User Schedule>  famotidine    Tablet 20 milliGRAM(s) Oral daily  folic acid 1 milliGRAM(s) Oral daily  levETIRAcetam 500 milliGRAM(s) Oral two times a day  lidocaine   Patch 1 Patch Transdermal <User Schedule>  metoprolol tartrate 25 milliGRAM(s) Oral every 12 hours  multivitamin 1 Tablet(s) Oral daily  pantoprazole    Tablet 40 milliGRAM(s) Oral before breakfast  rivaroxaban 20 milliGRAM(s) Oral every 24 hours  tamsulosin 0.4 milliGRAM(s) Oral at bedtime    MEDICATIONS  (PRN):  acetaminophen   Tablet .. 650 milliGRAM(s) Oral every 6 hours PRN Mild Pain (1 - 3)  indomethacin 50 milliGRAM(s) Oral three times a day PRN Moderate Pain (4 - 6)  senna 2 Tablet(s) Oral at bedtime PRN Constipation Daily Progress Note:  HPI: Patient is 84 male RH dominant with PMH significant for HTN, HLD,  Afib (off xarelto), stomach CA, anemia, gout, BPH. seizure d/o, recently stopped EToh use, who presented from [facility] with abnormal labs.  Pt reports recent admission to [facility] for gout flare then discharged to rehab.  Pt now returns with guaiac negative anemia (HgB 6.8 ) and  episode of bright red blood in stool. Pt also found with leukocytosis, with temp 100.7 F rectally.  Denies chills, fever, sob, chest pain, weakness, dysuria.  Patient was seen by GI and hematology.  S/P Colonoscopy on 3/1/19. No source of bleeding noted. Negative FOB. S/P EGD during prior admission, and no bleeding identified. Heme performed BM biopsy 3/4, results pending. CKD, ETOH may contribute, although underlying BM d/o such as myelodysplasia suspected. Transfused PRBC. Patient transferred to - [facility] due to debility and functional decline (06 Mar 2019 14:53)    Subjective: Patient seen and examined at bedside. Overnight patient requested indomethacin for joint pain, R>L. Patient agreeable to try lidocaine patch instead. No other complaints.   **Patient had RRT at physical therapy, pre-syncope while sitting doing leg exercises. All vitals wnl. EKG showing Afib rate controlled with RBBB, unchanged from previous. Discussed with patient that he may benefit from a blood transfusion. Patient agreeable.     Vital Signs Last 24 Hrs  T(C): 36.7 (22 Mar 2019 08:10), Max: 37.3 (21 Mar 2019 21:40)  T(F): 98.1 (22 Mar 2019 08:10), Max: 99.1 (21 Mar 2019 21:40)  HR: 94 (22 Mar 2019 10:10) (70 - 94)  BP: 122/69 (22 Mar 2019 10:10) (121/68 - 148/77)  BP(mean): --  RR: 16 (22 Mar 2019 10:10) (14 - 16)  SpO2: 94% (22 Mar 2019 10:10) (94% - 98%)    PHYSICAL EXAM  Constitutional - NAD, Comfortable. prior to RRT, stable, mild discomfort right knee had indomethacin x 1 last night    HEENT - NCAT, EOMI, no new facial droop  Neck - Supple, No limited ROM  Chest - Breathing comfortably, No wheezing  Cardiovascular - S1S2   Abdomen - Soft   Extremities - Improving redness and swelling of bilateral knees, 2nd MCP's; Improved swelling of b/l LE; ACE wraps to ankles  Neurologic Exam -  No deficits    RECENT LABS:                        8.1    13.6  )-----------( 174      ( 22 Mar 2019 10:30 )             27.0   03-22    138  |  101  |  42<H>  ----------------------------<  278<H>  4.1   |  22  |  1.49<H>    Ca    9.5      22 Mar 2019 10:30    Culture - Urine (collected 03-17-19 @ 05:41)  Source: .Urine Clean Catch (Midstream)  Final Report (03-19-19 @ 09:47):    50,000 - 99,000 CFU/mL Staphylococcus aureus    <10,000 CFU/ml Normal Urogenital kristi present  Organism: Staphylococcus aureus (03-19-19 @ 09:47)  Organism: Staphylococcus aureus (03-19-19 @ 09:47)      -  Ampicillin/Sulbactam: S <=8/4      -  Cefazolin: S <=4      -  Gentamicin: S <=1 Should not be used as monotherapy      -  Oxacillin: S <=0.25      -  Penicillin: R 8      -  RIF- Rifampin: S <=1 Should not be used as monotherapy      -  Tetra/Doxy: S <=1      -  Trimethoprim/Sulfamethoxazole: S <=0.5/9.5      -  Vancomycin: S 2      Method Type: CARINE    MEDICATIONS  (STANDING):  allopurinol 300 milliGRAM(s) Oral daily  amLODIPine   Tablet 10 milliGRAM(s) Oral daily  aspirin enteric coated 81 milliGRAM(s) Oral daily  atorvastatin 10 milliGRAM(s) Oral at bedtime  cyanocobalamin 1000 MICROGram(s) Oral daily  docusate sodium 100 milliGRAM(s) Oral two times a day  epoetin jackelyn Injectable 14557 Unit(s) SubCutaneous <User Schedule>  famotidine    Tablet 20 milliGRAM(s) Oral daily  folic acid 1 milliGRAM(s) Oral daily  levETIRAcetam 500 milliGRAM(s) Oral two times a day  lidocaine   Patch 1 Patch Transdermal <User Schedule>  metoprolol tartrate 25 milliGRAM(s) Oral every 12 hours  multivitamin 1 Tablet(s) Oral daily  pantoprazole    Tablet 40 milliGRAM(s) Oral before breakfast  rivaroxaban 20 milliGRAM(s) Oral every 24 hours  tamsulosin 0.4 milliGRAM(s) Oral at bedtime    MEDICATIONS  (PRN):  acetaminophen   Tablet .. 650 milliGRAM(s) Oral every 6 hours PRN Mild Pain (1 - 3)  indomethacin 50 milliGRAM(s) Oral three times a day PRN Moderate Pain (4 - 6)  senna 2 Tablet(s) Oral at bedtime PRN Constipation Daily Progress Note:  HPI: Patient is 84 male RH dominant with PMH significant for HTN, HLD,  Afib (off xarelto), stomach CA, anemia, gout, BPH. seizure d/o, recently stopped EToh use, who presented from [facility] with abnormal labs.  Pt reports recent admission to [facility] for gout flare then discharged to rehab.  Pt now returns with guaiac negative anemia (HgB 6.8 ) and  episode of bright red blood in stool. Pt also found with leukocytosis, with temp 100.7 F rectally.  Denies chills, fever, sob, chest pain, weakness, dysuria.  Patient was seen by GI and hematology.  S/P Colonoscopy on 3/1/19. No source of bleeding noted. Negative FOB. S/P EGD during prior admission, and no bleeding identified. Heme performed BM biopsy 3/4, results pending. CKD, ETOH may contribute, although underlying BM d/o such as myelodysplasia suspected. Transfused PRBC. Patient transferred to - IRF due to debility and functional decline (06 Mar 2019 14:53)    Subjective: Patient seen and examined at bedside. Overnight patient requested indomethacin for joint pain, R>L. Patient agreeable to try lidocaine patch instead. No other complaints.   **Patient had RRT at physical therapy, pre-syncope while sitting doing leg exercises. All vitals wnl. EKG showing Afib rate controlled with RBBB, unchanged from previous. Discussed with patient that he may benefit from a blood transfusion. Patient agreeable.     Vital Signs Last 24 Hrs  T(C): 36.7 (22 Mar 2019 08:10), Max: 37.3 (21 Mar 2019 21:40)  T(F): 98.1 (22 Mar 2019 08:10), Max: 99.1 (21 Mar 2019 21:40)  HR: 94 (22 Mar 2019 10:10) (70 - 94)  BP: 122/69 (22 Mar 2019 10:10) (121/68 - 148/77)  BP(mean): --  RR: 16 (22 Mar 2019 10:10) (14 - 16)  SpO2: 94% (22 Mar 2019 10:10) (94% - 98%)    PHYSICAL EXAM  Constitutional - NAD, Comfortable. prior to RRT, stable, mild discomfort right knee had indomethacin x 1 last night    HEENT - NCAT, EOMI, no new facial droop  Neck - Supple, No limited ROM  Chest - Breathing comfortably, No wheezing  Cardiovascular - S1S2   Abdomen - Soft   Extremities - Improving redness and swelling of bilateral knees, 2nd MCP's; Improved swelling of b/l LE; ACE wraps to ankles  Neurologic Exam -  No deficits    RECENT LABS:                        8.1    13.6  )-----------( 174      ( 22 Mar 2019 10:30 )             27.0   03-22    138  |  101  |  42<H>  ----------------------------<  278<H>  4.1   |  22  |  1.49<H>    Ca    9.5      22 Mar 2019 10:30    Culture - Urine (collected 03-17-19 @ 05:41)  Source: .Urine Clean Catch (Midstream)  Final Report (03-19-19 @ 09:47):    50,000 - 99,000 CFU/mL Staphylococcus aureus    <10,000 CFU/ml Normal Urogenital kristi present  Organism: Staphylococcus aureus (03-19-19 @ 09:47)  Organism: Staphylococcus aureus (03-19-19 @ 09:47)      -  Ampicillin/Sulbactam: S <=8/4      -  Cefazolin: S <=4      -  Gentamicin: S <=1 Should not be used as monotherapy      -  Oxacillin: S <=0.25      -  Penicillin: R 8      -  RIF- Rifampin: S <=1 Should not be used as monotherapy      -  Tetra/Doxy: S <=1      -  Trimethoprim/Sulfamethoxazole: S <=0.5/9.5      -  Vancomycin: S 2      Method Type: CARINE    MEDICATIONS  (STANDING):  allopurinol 300 milliGRAM(s) Oral daily  amLODIPine   Tablet 10 milliGRAM(s) Oral daily  aspirin enteric coated 81 milliGRAM(s) Oral daily  atorvastatin 10 milliGRAM(s) Oral at bedtime  cyanocobalamin 1000 MICROGram(s) Oral daily  docusate sodium 100 milliGRAM(s) Oral two times a day  epoetin jackelyn Injectable 40868 Unit(s) SubCutaneous <User Schedule>  famotidine    Tablet 20 milliGRAM(s) Oral daily  folic acid 1 milliGRAM(s) Oral daily  levETIRAcetam 500 milliGRAM(s) Oral two times a day  lidocaine   Patch 1 Patch Transdermal <User Schedule>  metoprolol tartrate 25 milliGRAM(s) Oral every 12 hours  multivitamin 1 Tablet(s) Oral daily  pantoprazole    Tablet 40 milliGRAM(s) Oral before breakfast  rivaroxaban 20 milliGRAM(s) Oral every 24 hours  tamsulosin 0.4 milliGRAM(s) Oral at bedtime    MEDICATIONS  (PRN):  acetaminophen   Tablet .. 650 milliGRAM(s) Oral every 6 hours PRN Mild Pain (1 - 3)  indomethacin 50 milliGRAM(s) Oral three times a day PRN Moderate Pain (4 - 6)  senna 2 Tablet(s) Oral at bedtime PRN Constipation

## 2019-03-22 NOTE — PROGRESS NOTE ADULT - ASSESSMENT
Pt is a 85yo M w multiple PMH is admitted to acute rehab for debility secondary to anemia likely from myelodysplastic syndrome s/p PRBC.     *debility secondary to anemia, CKD, GIB, ?CML  Cont acute rehab  PT/OT  Stool guaiac   HH 7.1-7.7  Transfuse if Hg<7   Cont Folic Acid    *Leukocytosis  Asymptomatic  Will follow up CBC  ?reactive    *Afib   Rate controlled  Cont BB  Cont xarelto     *HTN  Stable  Cont norvasc, lasix     *Gout   Allopurinol  Indomethacin per pt PCP for 3 days  Pt does not response to steroid     *BPH  Cont Flomax     *DVT ppx   Cont xarelto     RRT called this morning, pt felt weak, lightheaded while in OT, brought into bed, feels fine, says "i felt weak and now i'm here laying down", vitals stable, get EKG, cardio consult.   CBC stable    BMP with elevated BUN/creatinine, YUKI, CKD 3, check PVR, consider gentle/cautious hydration with fluids.   d/w rehab team

## 2019-03-23 LAB
ANION GAP SERPL CALC-SCNC: 12 MMOL/L — SIGNIFICANT CHANGE UP (ref 5–17)
ANION GAP SERPL CALC-SCNC: 15 MMOL/L — SIGNIFICANT CHANGE UP (ref 5–17)
APPEARANCE UR: CLEAR — SIGNIFICANT CHANGE UP
APPEARANCE UR: CLEAR — SIGNIFICANT CHANGE UP
APTT BLD: 48.7 SEC — HIGH (ref 27.5–36.3)
BACTERIA # UR AUTO: ABNORMAL /HPF
BASOPHILS # BLD AUTO: 0.3 K/UL — HIGH (ref 0–0.2)
BASOPHILS NFR BLD AUTO: 0.9 % — SIGNIFICANT CHANGE UP (ref 0–2)
BASOPHILS NFR BLD AUTO: 2 % — SIGNIFICANT CHANGE UP (ref 0–2)
BILIRUB SERPL-MCNC: 0.6 MG/DL — SIGNIFICANT CHANGE UP (ref 0.2–1.2)
BILIRUB UR-MCNC: NEGATIVE — SIGNIFICANT CHANGE UP
BILIRUB UR-MCNC: NEGATIVE — SIGNIFICANT CHANGE UP
BLD GP AB SCN SERPL QL: SIGNIFICANT CHANGE UP
BUN SERPL-MCNC: 43 MG/DL — HIGH (ref 7–23)
BUN SERPL-MCNC: 44 MG/DL — HIGH (ref 7–23)
CALCIUM SERPL-MCNC: 9.1 MG/DL — SIGNIFICANT CHANGE UP (ref 8.4–10.5)
CALCIUM SERPL-MCNC: 9.4 MG/DL — SIGNIFICANT CHANGE UP (ref 8.4–10.5)
CHLORIDE SERPL-SCNC: 100 MMOL/L — SIGNIFICANT CHANGE UP (ref 96–108)
CHLORIDE SERPL-SCNC: 102 MMOL/L — SIGNIFICANT CHANGE UP (ref 96–108)
CO2 SERPL-SCNC: 22 MMOL/L — SIGNIFICANT CHANGE UP (ref 22–31)
CO2 SERPL-SCNC: 23 MMOL/L — SIGNIFICANT CHANGE UP (ref 22–31)
COLOR SPEC: YELLOW — SIGNIFICANT CHANGE UP
COLOR SPEC: YELLOW — SIGNIFICANT CHANGE UP
COMMENT - URINE: SIGNIFICANT CHANGE UP
CREAT SERPL-MCNC: 1.41 MG/DL — HIGH (ref 0.5–1.3)
CREAT SERPL-MCNC: 1.56 MG/DL — HIGH (ref 0.5–1.3)
DIFF PNL FLD: ABNORMAL
DIFF PNL FLD: ABNORMAL
DIR ANTIGLOB POLYSPECIFIC INTERPRETATION: SIGNIFICANT CHANGE UP
EOSINOPHIL # BLD AUTO: 0.1 K/UL — SIGNIFICANT CHANGE UP (ref 0–0.5)
EOSINOPHIL NFR BLD AUTO: 1 % — SIGNIFICANT CHANGE UP (ref 0–6)
EPI CELLS # UR: ABNORMAL
GLUCOSE SERPL-MCNC: 235 MG/DL — HIGH (ref 70–99)
GLUCOSE SERPL-MCNC: 274 MG/DL — HIGH (ref 70–99)
GLUCOSE UR QL: 50 MG/DL
GLUCOSE UR QL: NEGATIVE — SIGNIFICANT CHANGE UP
HCT VFR BLD CALC: 26.1 % — LOW (ref 39–50)
HCT VFR BLD CALC: 27.7 % — LOW (ref 39–50)
HGB BLD-MCNC: 8.2 G/DL — LOW (ref 13–17)
HGB BLD-MCNC: 8.7 G/DL — LOW (ref 13–17)
INR BLD: 2.97 RATIO — HIGH (ref 0.88–1.16)
KETONES UR-MCNC: NEGATIVE — SIGNIFICANT CHANGE UP
KETONES UR-MCNC: NEGATIVE — SIGNIFICANT CHANGE UP
LACTATE SERPL-SCNC: 1.7 MMOL/L — SIGNIFICANT CHANGE UP (ref 0.7–2)
LACTATE SERPL-SCNC: 1.7 MMOL/L — SIGNIFICANT CHANGE UP (ref 0.7–2)
LDH SERPL L TO P-CCNC: 170 U/L — SIGNIFICANT CHANGE UP (ref 50–242)
LEUKOCYTE ESTERASE UR-ACNC: NEGATIVE — SIGNIFICANT CHANGE UP
LEUKOCYTE ESTERASE UR-ACNC: NEGATIVE — SIGNIFICANT CHANGE UP
LYMPHOCYTES # BLD AUTO: 13 % — SIGNIFICANT CHANGE UP (ref 13–44)
LYMPHOCYTES # BLD AUTO: 16 % — SIGNIFICANT CHANGE UP (ref 13–44)
LYMPHOCYTES # BLD AUTO: 2.6 K/UL — SIGNIFICANT CHANGE UP (ref 1–3.3)
MCHC RBC-ENTMCNC: 31.1 PG — SIGNIFICANT CHANGE UP (ref 27–34)
MCHC RBC-ENTMCNC: 31.3 GM/DL — LOW (ref 32–36)
MCHC RBC-ENTMCNC: 31.4 GM/DL — LOW (ref 32–36)
MCHC RBC-ENTMCNC: 31.6 PG — SIGNIFICANT CHANGE UP (ref 27–34)
MCV RBC AUTO: 100.7 FL — HIGH (ref 80–100)
MCV RBC AUTO: 99.4 FL — SIGNIFICANT CHANGE UP (ref 80–100)
MONOCYTES # BLD AUTO: 8 K/UL — HIGH (ref 0–0.9)
MONOCYTES NFR BLD AUTO: 37 % — HIGH (ref 2–14)
MONOCYTES NFR BLD AUTO: 39 % — HIGH (ref 2–14)
NEUTROPHILS # BLD AUTO: 7.1 K/UL — SIGNIFICANT CHANGE UP (ref 1.8–7.4)
NEUTROPHILS NFR BLD AUTO: 31 % — LOW (ref 43–77)
NEUTROPHILS NFR BLD AUTO: 40 % — LOW (ref 43–77)
NITRITE UR-MCNC: NEGATIVE — SIGNIFICANT CHANGE UP
NITRITE UR-MCNC: NEGATIVE — SIGNIFICANT CHANGE UP
PH UR: 5 — SIGNIFICANT CHANGE UP (ref 5–8)
PH UR: 5 — SIGNIFICANT CHANGE UP (ref 5–8)
PLATELET # BLD AUTO: 162 K/UL — SIGNIFICANT CHANGE UP (ref 150–400)
PLATELET # BLD AUTO: 170 K/UL — SIGNIFICANT CHANGE UP (ref 150–400)
POST UNIT NUMBER: SIGNIFICANT CHANGE UP
POTASSIUM SERPL-MCNC: 3.7 MMOL/L — SIGNIFICANT CHANGE UP (ref 3.5–5.3)
POTASSIUM SERPL-MCNC: 3.9 MMOL/L — SIGNIFICANT CHANGE UP (ref 3.5–5.3)
POTASSIUM SERPL-SCNC: 3.7 MMOL/L — SIGNIFICANT CHANGE UP (ref 3.5–5.3)
POTASSIUM SERPL-SCNC: 3.9 MMOL/L — SIGNIFICANT CHANGE UP (ref 3.5–5.3)
PROCALCITONIN SERPL-MCNC: 0.19 NG/ML — HIGH
PROCALCITONIN SERPL-MCNC: 0.24 NG/ML — HIGH
PROT UR-MCNC: 30 MG/DL
PROT UR-MCNC: 30 MG/DL
PROTHROM AB SERPL-ACNC: 34.4 SEC — HIGH (ref 10–12.9)
RBC # BLD: 2.62 M/UL — LOW (ref 4.2–5.8)
RBC # BLD: 2.76 M/UL — LOW (ref 4.2–5.8)
RBC # FLD: 20 % — HIGH (ref 10.3–14.5)
RBC # FLD: 20.5 % — HIGH (ref 10.3–14.5)
RBC CASTS # UR COMP ASSIST: ABNORMAL /HPF (ref 0–4)
SODIUM SERPL-SCNC: 137 MMOL/L — SIGNIFICANT CHANGE UP (ref 135–145)
SODIUM SERPL-SCNC: 137 MMOL/L — SIGNIFICANT CHANGE UP (ref 135–145)
SP GR SPEC: 1.01 — SIGNIFICANT CHANGE UP (ref 1.01–1.02)
SP GR SPEC: 1.01 — SIGNIFICANT CHANGE UP (ref 1.01–1.02)
UROBILINOGEN FLD QL: NEGATIVE — SIGNIFICANT CHANGE UP
UROBILINOGEN FLD QL: NEGATIVE — SIGNIFICANT CHANGE UP
WBC # BLD: 16.4 K/UL — HIGH (ref 3.8–10.5)
WBC # BLD: 17.4 K/UL — HIGH (ref 3.8–10.5)
WBC # FLD AUTO: 16.4 K/UL — HIGH (ref 3.8–10.5)
WBC # FLD AUTO: 17.4 K/UL — HIGH (ref 3.8–10.5)
WBC UR QL: SIGNIFICANT CHANGE UP /HPF (ref 0–5)

## 2019-03-23 PROCEDURE — 71045 X-RAY EXAM CHEST 1 VIEW: CPT | Mod: 26

## 2019-03-23 PROCEDURE — 99232 SBSQ HOSP IP/OBS MODERATE 35: CPT

## 2019-03-23 PROCEDURE — 99233 SBSQ HOSP IP/OBS HIGH 50: CPT

## 2019-03-23 RX ORDER — SODIUM CHLORIDE 9 MG/ML
1000 INJECTION INTRAMUSCULAR; INTRAVENOUS; SUBCUTANEOUS
Qty: 0 | Refills: 0 | Status: DISCONTINUED | OUTPATIENT
Start: 2019-03-23 | End: 2019-03-23

## 2019-03-23 RX ORDER — ACETAMINOPHEN 500 MG
650 TABLET ORAL ONCE
Qty: 0 | Refills: 0 | Status: COMPLETED | OUTPATIENT
Start: 2019-03-23 | End: 2019-03-23

## 2019-03-23 RX ORDER — SODIUM CHLORIDE 9 MG/ML
1000 INJECTION INTRAMUSCULAR; INTRAVENOUS; SUBCUTANEOUS
Qty: 0 | Refills: 0 | Status: DISCONTINUED | OUTPATIENT
Start: 2019-03-23 | End: 2019-04-10

## 2019-03-23 RX ADMIN — Medication 650 MILLIGRAM(S): at 01:00

## 2019-03-23 RX ADMIN — ATORVASTATIN CALCIUM 10 MILLIGRAM(S): 80 TABLET, FILM COATED ORAL at 20:39

## 2019-03-23 RX ADMIN — Medication 650 MILLIGRAM(S): at 00:39

## 2019-03-23 RX ADMIN — Medication 300 MILLIGRAM(S): at 12:34

## 2019-03-23 RX ADMIN — PANTOPRAZOLE SODIUM 40 MILLIGRAM(S): 20 TABLET, DELAYED RELEASE ORAL at 07:02

## 2019-03-23 RX ADMIN — LEVETIRACETAM 500 MILLIGRAM(S): 250 TABLET, FILM COATED ORAL at 07:02

## 2019-03-23 RX ADMIN — Medication 100 MILLIGRAM(S): at 17:47

## 2019-03-23 RX ADMIN — LEVETIRACETAM 500 MILLIGRAM(S): 250 TABLET, FILM COATED ORAL at 17:47

## 2019-03-23 RX ADMIN — Medication 650 MILLIGRAM(S): at 09:30

## 2019-03-23 RX ADMIN — Medication 81 MILLIGRAM(S): at 12:34

## 2019-03-23 RX ADMIN — SODIUM CHLORIDE 100 MILLILITER(S): 9 INJECTION INTRAMUSCULAR; INTRAVENOUS; SUBCUTANEOUS at 03:31

## 2019-03-23 RX ADMIN — Medication 650 MILLIGRAM(S): at 14:58

## 2019-03-23 RX ADMIN — TAMSULOSIN HYDROCHLORIDE 0.4 MILLIGRAM(S): 0.4 CAPSULE ORAL at 20:39

## 2019-03-23 RX ADMIN — LIDOCAINE 1 PATCH: 4 CREAM TOPICAL at 20:39

## 2019-03-23 RX ADMIN — FAMOTIDINE 20 MILLIGRAM(S): 10 INJECTION INTRAVENOUS at 12:34

## 2019-03-23 RX ADMIN — AMLODIPINE BESYLATE 10 MILLIGRAM(S): 2.5 TABLET ORAL at 07:02

## 2019-03-23 RX ADMIN — Medication 25 MILLIGRAM(S): at 17:47

## 2019-03-23 RX ADMIN — Medication 25 MILLIGRAM(S): at 07:02

## 2019-03-23 RX ADMIN — PREGABALIN 1000 MICROGRAM(S): 225 CAPSULE ORAL at 12:34

## 2019-03-23 RX ADMIN — Medication 100 MILLIGRAM(S): at 07:02

## 2019-03-23 RX ADMIN — Medication 650 MILLIGRAM(S): at 14:04

## 2019-03-23 RX ADMIN — Medication 1 MILLIGRAM(S): at 12:34

## 2019-03-23 RX ADMIN — SODIUM CHLORIDE 100 MILLILITER(S): 9 INJECTION INTRAMUSCULAR; INTRAVENOUS; SUBCUTANEOUS at 07:02

## 2019-03-23 RX ADMIN — RIVAROXABAN 20 MILLIGRAM(S): KIT at 17:47

## 2019-03-23 RX ADMIN — Medication 1 TABLET(S): at 12:34

## 2019-03-23 RX ADMIN — Medication 650 MILLIGRAM(S): at 08:50

## 2019-03-23 NOTE — CHART NOTE - NSCHARTNOTEFT_GEN_A_CORE
Notified by RN of fever 102.2 per rectum.    Patient seen and evaluated bedside.  Patient completed pRBC transfusion at around 21:20 vitals were WNL then rechecked 0020 Temp 100.8 oral, 102 rectal, /72  O2Sat 99%.  Patient states feels fine. Currently feels "a little cold." Denies headache, dizziness, bleeding, rash, chest pain, back/flank pain, SOB. He has had transfusion in last 3 months but denies having adverse reaction. OF note, RRT during the day for pre-syncope in PT and patient was thought to have hemoconcentrated hgb and decision was made to transfuse 1u pRBC.  Per RN patient was not pretreated with any medications prior to transfusion.      On exam patient alert , awake, oriented x 4 following commands  Breathing comfortably in no acute distress  Heart RRR, Lungs CTAB   Abdomen soft non tender non distended  Moves all extremities  Skin without rash    Recent labs and imaging reviewed.     Urinalysis + Microscopic Examination (03.22.19 @ 19:00)    Protein, Urine: 30 mg/dL    Leukocyte Esterase Concentration: Negative    pH Urine: 5.0    Specific Gravity: 1.015    Urobilinogen: Negative    Urine Appearance: Clear    Color: Yellow    Blood, Urine: Trace    Glucose Qualitative, Urine: Negative    Bilirubin: Negative    Ketone - Urine: Negative    Nitrite: Negative    Red Blood Cell - Urine: Negative /HPF    White Blood Cell - Urine: Negative /HPF    Epithelial Cells: Neg.-Few    Bacteria: Negative /HPF    84 M afib, anemia, myelodysplastic syndrome s/p transfusion pRBC now with fever, tachycardia likely febrile non hemolytic reaction vs transfusion related sepsis - will get stat chest xray, UA (r/o hematuria - patient had negative UA earlier today), labs - CBC, BMP, lactate, LDH, procalcitonin, pt/INR. Will try to send pRBC packaging down to lab to verify appropriate T&S. Will closely monitor.  Discussed with hospitalist on call.

## 2019-03-23 NOTE — PROGRESS NOTE ADULT - SUBJECTIVE AND OBJECTIVE BOX
HPI:  Patient is 84 male RH dominant with PMH significant for HTN, HLD,  Afib (off xarelto), stomach CA, anemia, gout, BPH. seizure d/o, recently stopped EToh use, who presented from [facility] with abnormal labs.  Pt reports recent admission to [facility] for gout flare then discharged to rehab.  Pt now returns with guaiac negative anemia (HgB 6.8 ) and  episode of bright red blood in stool. Pt also found with leukocytosis, with temp 100.7 F rectally.  Denies chills, fever, sob, chest pain, weakness, dysuria.    Patient was seen by GI and hematology.  S/P Colonoscopy on 3/1/19. No source of bleeding noted. Negative FOB. S/P EGD during prior admission, and no bleeding identified. Heme performed BM biopsy 3/4, Transfused PRBC. Patient transferred to [facility] due to debility and functional decline (06 Mar 2019 14:53)      Subjective  after transfusion, pt had fever. Denies SOB, cough, cp, n, v, d, dizziness.         PAST MEDICAL & SURGICAL HISTORY:  Nonrheumatic aortic valve stenosis  Cancer of stomach  Anemia, unspecified type: Anemia  Seizure disorder  Benign prostatic hyperplasia, unspecified whether lower urinary tract symptoms present  Hyperlipemia  Essential hypertension  Chronic atrial fibrillation  Stomach cancer  Hypertension  Atrial fibrillation  Gout  No significant past surgical history  No significant past surgical history      MedsMEDICATIONS  (STANDING):  allopurinol 300 milliGRAM(s) Oral daily  amLODIPine   Tablet 10 milliGRAM(s) Oral daily  aspirin enteric coated 81 milliGRAM(s) Oral daily  atorvastatin 10 milliGRAM(s) Oral at bedtime  cyanocobalamin 1000 MICROGram(s) Oral daily  docusate sodium 100 milliGRAM(s) Oral two times a day  epoetin jackelyn Injectable 58866 Unit(s) SubCutaneous <User Schedule>  famotidine    Tablet 20 milliGRAM(s) Oral daily  folic acid 1 milliGRAM(s) Oral daily  levETIRAcetam 500 milliGRAM(s) Oral two times a day  lidocaine   Patch 1 Patch Transdermal <User Schedule>  metoprolol tartrate 25 milliGRAM(s) Oral every 12 hours  multivitamin 1 Tablet(s) Oral daily  pantoprazole    Tablet 40 milliGRAM(s) Oral before breakfast  rivaroxaban 20 milliGRAM(s) Oral every 24 hours  sodium chloride 0.9%. 1000 milliLiter(s) (100 mL/Hr) IV Continuous <Continuous>  tamsulosin 0.4 milliGRAM(s) Oral at bedtime    MEDICATIONS  (PRN):  acetaminophen   Tablet .. 650 milliGRAM(s) Oral every 6 hours PRN Mild Pain (1 - 3)  indomethacin 50 milliGRAM(s) Oral three times a day PRN Moderate Pain (4 - 6)  senna 2 Tablet(s) Oral at bedtime PRN Constipation      Vital Signs Last 24 Hrs  T(C): 38.1 (23 Mar 2019 08:50), Max: 39.3 (23 Mar 2019 02:00)  T(F): 100.5 (23 Mar 2019 08:50), Max: 102.7 (23 Mar 2019 02:00)  HR: 86 (23 Mar 2019 08:00) (78 - 108)  BP: 117/64 (23 Mar 2019 08:00) (117/51 - 159/77)  BP(mean): --  RR: 14 (23 Mar 2019 08:00) (14 - 16)  SpO2: 97% (23 Mar 2019 08:00) (95% - 100%)  I&O's Summary      PHYSICAL EXAM:  GENERAL: NAD  NECK: Supple  NERVOUS SYSTEM:  awake and alert  HEART: S1s2 NL , Irreg Irreg  CHEST/LUNG: Clear to percussion bilaterally  ABDOMEN: Soft, Nontender, Nondistended; Bowel sounds present  EXTREMITIES:  No edema      LABS:  |03-23-19 @ 07:45            8.2<L>  17.4<H>>--------------<162            26.1<L>      137  |  102  |  44<H>  --------------------------<235<H>  3.7  |  9.1  |  1.56<H>    Mg -- / Phos--    PT -- / INR --    PTT --    Lipase --  03-23-19 @ 07:45    Imaging Personally Reviewed:  [ ] YES  [ ] NO      HEALTH ISSUES - PROBLEM Dx:    Deconditioning  PT/OT per rehab    Anemia-multifactorial  s/p 1 pRBC on 3/22  start b12 per heme    Fever with leukocytosis  UA neg  CXR-new left basilar density. Pt has not resp symptoms.   Rec blood c/s as part of fever w/u as not done     Afib   Rate controlled  Cont BB  Cont xarelto     Presyncope  Orthostatic  Cont to hold lasix  was given prbc as thought to be sec to anemia  if dizzy again-trial of holding norvasc    Gout   Rec dc'ing NSAIDS in setting of elev Cr and GIB            Care Discussed with Consultants/Other Providers [ x] YES  [ ] NO HPI:  Patient is 84 male RH dominant with PMH significant for HTN, HLD,  Afib (off xarelto), stomach CA, anemia, gout, BPH. seizure d/o, recently stopped EToh use, who presented from [facility] with abnormal labs.  Pt reports recent admission to [facility] for gout flare then discharged to rehab.  Pt now returns with guaiac negative anemia (HgB 6.8 ) and  episode of bright red blood in stool. Pt also found with leukocytosis, with temp 100.7 F rectally.  Denies chills, fever, sob, chest pain, weakness, dysuria.    Patient was seen by GI and hematology.  S/P Colonoscopy on 3/1/19. No source of bleeding noted. Negative FOB. S/P EGD during prior admission, and no bleeding identified. Heme performed BM biopsy 3/4, Transfused PRBC. Patient transferred to [facility] due to debility and functional decline (06 Mar 2019 14:53)      Subjective  after transfusion, pt had fever. Denies SOB, cough, cp, n, v, d, dizziness.         PAST MEDICAL & SURGICAL HISTORY:  Nonrheumatic aortic valve stenosis  Cancer of stomach  Anemia, unspecified type: Anemia  Seizure disorder  Benign prostatic hyperplasia, unspecified whether lower urinary tract symptoms present  Hyperlipemia  Essential hypertension  Chronic atrial fibrillation  Stomach cancer  Hypertension  Atrial fibrillation  Gout  No significant past surgical history  No significant past surgical history      MedsMEDICATIONS  (STANDING):  allopurinol 300 milliGRAM(s) Oral daily  amLODIPine   Tablet 10 milliGRAM(s) Oral daily  aspirin enteric coated 81 milliGRAM(s) Oral daily  atorvastatin 10 milliGRAM(s) Oral at bedtime  cyanocobalamin 1000 MICROGram(s) Oral daily  docusate sodium 100 milliGRAM(s) Oral two times a day  epoetin jackelyn Injectable 40362 Unit(s) SubCutaneous <User Schedule>  famotidine    Tablet 20 milliGRAM(s) Oral daily  folic acid 1 milliGRAM(s) Oral daily  levETIRAcetam 500 milliGRAM(s) Oral two times a day  lidocaine   Patch 1 Patch Transdermal <User Schedule>  metoprolol tartrate 25 milliGRAM(s) Oral every 12 hours  multivitamin 1 Tablet(s) Oral daily  pantoprazole    Tablet 40 milliGRAM(s) Oral before breakfast  rivaroxaban 20 milliGRAM(s) Oral every 24 hours  sodium chloride 0.9%. 1000 milliLiter(s) (100 mL/Hr) IV Continuous <Continuous>  tamsulosin 0.4 milliGRAM(s) Oral at bedtime    MEDICATIONS  (PRN):  acetaminophen   Tablet .. 650 milliGRAM(s) Oral every 6 hours PRN Mild Pain (1 - 3)  indomethacin 50 milliGRAM(s) Oral three times a day PRN Moderate Pain (4 - 6)  senna 2 Tablet(s) Oral at bedtime PRN Constipation      Vital Signs Last 24 Hrs  T(C): 38.1 (23 Mar 2019 08:50), Max: 39.3 (23 Mar 2019 02:00)  T(F): 100.5 (23 Mar 2019 08:50), Max: 102.7 (23 Mar 2019 02:00)  HR: 86 (23 Mar 2019 08:00) (78 - 108)  BP: 117/64 (23 Mar 2019 08:00) (117/51 - 159/77)  BP(mean): --  RR: 14 (23 Mar 2019 08:00) (14 - 16)  SpO2: 97% (23 Mar 2019 08:00) (95% - 100%)  I&O's Summary      PHYSICAL EXAM:  GENERAL: NAD  NECK: Supple  NERVOUS SYSTEM:  awake and alert  HEART: S1s2 NL , Irreg Irreg  CHEST/LUNG: Clear to percussion bilaterally  ABDOMEN: Soft, Nontender, Nondistended; Bowel sounds present  EXTREMITIES:  No edema      LABS:  |03-23-19 @ 07:45            8.2<L>  17.4<H>>--------------<162            26.1<L>      137  |  102  |  44<H>  --------------------------<235<H>  3.7  |  9.1  |  1.56<H>    Mg -- / Phos--    PT -- / INR --    PTT --    Lipase --  03-23-19 @ 07:45    Imaging Personally Reviewed:  [ ] YES  [ ] NO      HEALTH ISSUES - PROBLEM Dx:    Deconditioning  PT/OT per rehab    Anemia-multifactorial  s/p 1 pRBC on 3/22  start b12 per heme    Fever with leukocytosis  UA neg  CXR-new left basilar density. Pt has not resp symptoms.   f/u blood c/s   monitor off abx as no source at this time    Afib   Rate controlled  Cont BB  Cont xarelto     Presyncope  Orthostatic  Cont to hold lasix  was given prbc as thought to be sec to anemia  if dizzy again-trial of holding norvasc    Gout   Rec dc'ing NSAIDS in setting of elev Cr and GIB            Care Discussed with Consultants/Other Providers [ x] YES  [ ] NO

## 2019-03-23 NOTE — PROGRESS NOTE ADULT - SUBJECTIVE AND OBJECTIVE BOX
HPI:   84 male with PMH significant for HTN, HLD,  Afib (off xarelto), stomach CA, anemia, gout, BPH. seizure d/o, recently stopped EToh use, p/w chronic anemia possibly due to myelodysplastic syndrome, s/p tx PRBC, debility and functional decline    INTERVAL SUBJECTIVE & REVIEW OF SYMPTOMS:  Chart reviewed. Patient with temp of 102 overnight after initiation of blood transfusion and all fever work up ordered as per chart note.  Patient reports that he feels well this am except chronic knee pain   felt warm this am and rectal temp 100.5 and given tylenol   Frustrated about events overnight      MEDICATIONS:  acetaminophen   Tablet .. 650 milliGRAM(s) Oral every 6 hours PRN  acetaminophen   Tablet .. 650 milliGRAM(s) Oral once  allopurinol 300 milliGRAM(s) Oral daily  amLODIPine   Tablet 10 milliGRAM(s) Oral daily  aspirin enteric coated 81 milliGRAM(s) Oral daily  atorvastatin 10 milliGRAM(s) Oral at bedtime  cyanocobalamin 1000 MICROGram(s) Oral daily  docusate sodium 100 milliGRAM(s) Oral two times a day  epoetin jackelyn Injectable 08959 Unit(s) SubCutaneous <User Schedule>  famotidine    Tablet 20 milliGRAM(s) Oral daily  folic acid 1 milliGRAM(s) Oral daily  levETIRAcetam 500 milliGRAM(s) Oral two times a day  lidocaine   Patch 1 Patch Transdermal <User Schedule>  metoprolol tartrate 25 milliGRAM(s) Oral every 12 hours  multivitamin 1 Tablet(s) Oral daily  pantoprazole    Tablet 40 milliGRAM(s) Oral before breakfast  rivaroxaban 20 milliGRAM(s) Oral every 24 hours  senna 2 Tablet(s) Oral at bedtime PRN  sodium chloride 0.9%. 1000 milliLiter(s) IV Continuous <Continuous>  tamsulosin 0.4 milliGRAM(s) Oral at bedtime      REVIEW OF SYSTEMS  [x   ] Constitutional WNL  [ x  ] Cardio WNL  [ x  ] Resp WNL  [ x  ] GI WNL  [ x  ]  - denies dysuria  [   ] Heme WNL    VITAL SIGNS  Vital Signs Last 24 Hrs  T(C): 38.1 (23 Mar 2019 08:50), Max: 39.3 (23 Mar 2019 02:00)  T(F): 100.5 (23 Mar 2019 08:50), Max: 102.7 (23 Mar 2019 02:00)  HR: 86 (23 Mar 2019 08:00) (86 - 108)  BP: 117/64 (23 Mar 2019 08:00) (117/51 - 159/77)  BP(mean): --  RR: 14 (23 Mar 2019 12:37) (14 - 16)  SpO2: 100% (23 Mar 2019 12:37) (95% - 100%)    PHYSICAL EXAM  General: NAD  HEENT: at, nc,  dried lips with some old dried blood, no bleeding noted from gums or in mouth  Cardio: S1S2+  Resp: CLEAR  Abdomen: soft  Extrem: no calf tenderness  Skin: right knee with some swelling      RECENT LABS:                        8.2    17.4  )-----------( 162      ( 23 Mar 2019 07:45 )             26.1         137  |  102  |  44<H>  ----------------------------<  235<H>  3.7   |  23  |  1.56<H>    Ca    9.1      23 Mar 2019 07:45    TPro  x   /  Alb  x   /  TBili  0.6  /  DBili  x   /  AST  x   /  ALT  x   /  AlkPhos  x       PT/INR - ( 23 Mar 2019 01:15 )   PT: 34.4 sec;   INR: 2.97 ratio         PTT - ( 23 Mar 2019 01:15 )  PTT:48.7 sec  Urinalysis Basic - ( 23 Mar 2019 05:10 )    Color: Yellow / Appearance: Clear / S.015 / pH: x  Gluc: x / Ketone: Negative  / Bili: Negative / Urobili: Negative   Blood: x / Protein: 30 mg/dL / Nitrite: Negative   Leuk Esterase: Negative / RBC: 26-50 /HPF / WBC 0-2 /HPF   Sq Epi: x / Non Sq Epi: Moderate / Bacteria: Few /HPF      RADIOLOGY/OTHER RESULTS:    < from: Xray Chest 1 View- PORTABLE-Urgent (19 @ 01:30) >  EXAM:  XR CHEST PORTABLE URGENT 1V      PROCEDURE DATE:  2019        INTERPRETATION:  Portable chest x-ray    Indication: Fever. Status red blood cell transfusion.    Portable chest x-ray is compared to a previous examination dated   3/17/2019.    Impression: New left basilar density may be due to overlying soft tissue,   pulmonary consolidation, and/or pleural effusion.    No evidence for pneumothorax.    The trachea is midline.    Stable cardiac silhouette.    < end of copied text >      A/P:    84 male with PMH significant for HTN, HLD, Afib, stomach CA, anemia, gout, BPH. seizure d/o, recently stopped EToh use, p/w chronic anemia possibly due to myelodysplastic syndrome, s/p tx PRBC, debility and functional decline      Fever overnight: ? 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   - Heme appreciated: Not yet in remission, continue supportive therapies 3/21    near syncope: During rehab stay with [facility].     LGI bleed:- xarelto and ASA restarted, GI consult and follow up appreciated.  - CBC stable. repeat Sat 3/23    Afib: - Continue metoprolol, Xarelto    HTN:- Continue norvasc, lasix    SZ/tremors: - Controlled on keppra     #Gout/CPPD: improved  - Allopurinol. Lidoderm patch     DVT prophylaxis: on xarelto HPI:   84 male with PMH significant for HTN, HLD,  Afib (off xarelto), stomach CA, anemia, gout, BPH. seizure d/o, recently stopped EToh use, p/w chronic anemia possibly due to myelodysplastic syndrome, s/p tx PRBC, debility and functional decline    INTERVAL SUBJECTIVE & REVIEW OF SYMPTOMS:  Chart reviewed. Patient with temp of 102 overnight after initiation of blood transfusion and all fever work up ordered as per chart note.  Patient reports that he feels well this am except chronic knee pain   felt warm this am and rectal temp 100.5 and given tylenol   Frustrated about events overnight      MEDICATIONS:  acetaminophen   Tablet .. 650 milliGRAM(s) Oral every 6 hours PRN  acetaminophen   Tablet .. 650 milliGRAM(s) Oral once  allopurinol 300 milliGRAM(s) Oral daily  amLODIPine   Tablet 10 milliGRAM(s) Oral daily  aspirin enteric coated 81 milliGRAM(s) Oral daily  atorvastatin 10 milliGRAM(s) Oral at bedtime  cyanocobalamin 1000 MICROGram(s) Oral daily  docusate sodium 100 milliGRAM(s) Oral two times a day  epoetin jackelyn Injectable 61329 Unit(s) SubCutaneous <User Schedule>  famotidine    Tablet 20 milliGRAM(s) Oral daily  folic acid 1 milliGRAM(s) Oral daily  levETIRAcetam 500 milliGRAM(s) Oral two times a day  lidocaine   Patch 1 Patch Transdermal <User Schedule>  metoprolol tartrate 25 milliGRAM(s) Oral every 12 hours  multivitamin 1 Tablet(s) Oral daily  pantoprazole    Tablet 40 milliGRAM(s) Oral before breakfast  rivaroxaban 20 milliGRAM(s) Oral every 24 hours  senna 2 Tablet(s) Oral at bedtime PRN  sodium chloride 0.9%. 1000 milliLiter(s) IV Continuous <Continuous>  tamsulosin 0.4 milliGRAM(s) Oral at bedtime      REVIEW OF SYSTEMS  [x   ] Constitutional WNL  [ x  ] Cardio WNL  [ x  ] Resp WNL  [ x  ] GI WNL  [ x  ]  - denies dysuria  [   ] Heme WNL    VITAL SIGNS  Vital Signs Last 24 Hrs  T(C): 38.1 (23 Mar 2019 08:50), Max: 39.3 (23 Mar 2019 02:00)  T(F): 100.5 (23 Mar 2019 08:50), Max: 102.7 (23 Mar 2019 02:00)  HR: 86 (23 Mar 2019 08:00) (86 - 108)  BP: 117/64 (23 Mar 2019 08:00) (117/51 - 159/77)  BP(mean): --  RR: 14 (23 Mar 2019 12:37) (14 - 16)  SpO2: 100% (23 Mar 2019 12:37) (95% - 100%)    PHYSICAL EXAM  General: NAD  HEENT: at, nc,  dried lips with some old dried blood, no bleeding noted from gums or in mouth  Cardio: S1S2+  Resp: CLEAR  Abdomen: soft  Extrem: no calf tenderness  Skin: right knee with some swelling      RECENT LABS:  Procalcitonin, Serum (19 @ 07:45)    Procalcitonin, Serum: 0.24: Procalcitonin (PCT) Interpretation                         8.2    17.4  )-----------( 162      ( 23 Mar 2019 07:45 )             26.1         137  |  102  |  44<H>  ----------------------------<  235<H>  3.7   |  23  |  1.56<H>    Ca    9.1      23 Mar 2019 07:45    TPro  x   /  Alb  x   /  TBili  0.6  /  DBili  x   /  AST  x   /  ALT  x   /  AlkPhos  x       PT/INR - ( 23 Mar 2019 01:15 )   PT: 34.4 sec;   INR: 2.97 ratio         PTT - ( 23 Mar 2019 01:15 )  PTT:48.7 sec  Urinalysis Basic - ( 23 Mar 2019 05:10 )    Color: Yellow / Appearance: Clear / S.015 / pH: x  Gluc: x / Ketone: Negative  / Bili: Negative / Urobili: Negative   Blood: x / Protein: 30 mg/dL / Nitrite: Negative   Leuk Esterase: Negative / RBC: 26-50 /HPF / WBC 0-2 /HPF   Sq Epi: x / Non Sq Epi: Moderate / Bacteria: Few /HPF      RADIOLOGY/OTHER RESULTS:    < from: Xray Chest 1 View- PORTABLE-Urgent (19 @ 01:30) >  EXAM:  XR CHEST PORTABLE URGENT 1V      PROCEDURE DATE:  2019        INTERPRETATION:  Portable chest x-ray    Indication: Fever. Status red blood cell transfusion.    Portable chest x-ray is compared to a previous examination dated   3/17/2019.    Impression: New left basilar density may be due to overlying soft tissue,   pulmonary consolidation, and/or pleural effusion.    No evidence for pneumothorax.    The trachea is midline.    Stable cardiac silhouette.    < end of copied text >      A/P:    84 male with PMH significant for HTN, HLD, Afib, stomach CA, anemia, gout, BPH. seizure d/o, recently stopped EToh use, p/w chronic anemia possibly due to myelodysplastic syndrome, s/p tx PRBC, debility and functional decline      Fever overnight: ? 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   - Heme appreciated: Not yet in remission, continue supportive therapies 3/21    near syncope: During rehab stay with [facility].     LGI bleed:- xarelto and ASA restarted, GI consult and follow up appreciated.  - CBC stable. repeat Sat 3/23    Afib: - Continue metoprolol, Xarelto    HTN:- Continue norvasc, lasix    SZ/tremors: - Controlled on keppra     #Gout/CPPD: improved  - Allopurinol. Lidoderm patch     DVT prophylaxis: on xarelto

## 2019-03-24 LAB
-  COAGULASE NEGATIVE STAPHYLOCOCCUS: SIGNIFICANT CHANGE UP
GRAM STN FLD: SIGNIFICANT CHANGE UP
HCT VFR BLD CALC: 23.9 % — LOW (ref 39–50)
HGB BLD-MCNC: 8.2 G/DL — LOW (ref 13–17)
MCHC RBC-ENTMCNC: 33.9 PG — SIGNIFICANT CHANGE UP (ref 27–34)
MCHC RBC-ENTMCNC: 34.1 GM/DL — SIGNIFICANT CHANGE UP (ref 32–36)
MCV RBC AUTO: 99.3 FL — SIGNIFICANT CHANGE UP (ref 80–100)
METHOD TYPE: SIGNIFICANT CHANGE UP
PLATELET # BLD AUTO: 160 K/UL — SIGNIFICANT CHANGE UP (ref 150–400)
PROCALCITONIN SERPL-MCNC: 0.29 NG/ML — HIGH
RBC # BLD: 2.41 M/UL — LOW (ref 4.2–5.8)
RBC # FLD: 20.3 % — HIGH (ref 10.3–14.5)
WBC # BLD: 17.1 K/UL — HIGH (ref 3.8–10.5)
WBC # FLD AUTO: 17.1 K/UL — HIGH (ref 3.8–10.5)

## 2019-03-24 PROCEDURE — 99232 SBSQ HOSP IP/OBS MODERATE 35: CPT

## 2019-03-24 RX ORDER — DEXTROSE 50 % IN WATER 50 %
12.5 SYRINGE (ML) INTRAVENOUS ONCE
Qty: 0 | Refills: 0 | Status: DISCONTINUED | OUTPATIENT
Start: 2019-03-24 | End: 2019-04-10

## 2019-03-24 RX ORDER — DEXTROSE 50 % IN WATER 50 %
15 SYRINGE (ML) INTRAVENOUS ONCE
Qty: 0 | Refills: 0 | Status: DISCONTINUED | OUTPATIENT
Start: 2019-03-24 | End: 2019-04-10

## 2019-03-24 RX ORDER — DEXTROSE 50 % IN WATER 50 %
25 SYRINGE (ML) INTRAVENOUS ONCE
Qty: 0 | Refills: 0 | Status: DISCONTINUED | OUTPATIENT
Start: 2019-03-24 | End: 2019-04-10

## 2019-03-24 RX ORDER — SODIUM CHLORIDE 9 MG/ML
1000 INJECTION, SOLUTION INTRAVENOUS
Qty: 0 | Refills: 0 | Status: DISCONTINUED | OUTPATIENT
Start: 2019-03-24 | End: 2019-04-10

## 2019-03-24 RX ORDER — INSULIN LISPRO 100/ML
VIAL (ML) SUBCUTANEOUS
Qty: 0 | Refills: 0 | Status: DISCONTINUED | OUTPATIENT
Start: 2019-03-24 | End: 2019-04-10

## 2019-03-24 RX ORDER — INSULIN LISPRO 100/ML
VIAL (ML) SUBCUTANEOUS AT BEDTIME
Qty: 0 | Refills: 0 | Status: DISCONTINUED | OUTPATIENT
Start: 2019-03-24 | End: 2019-04-10

## 2019-03-24 RX ORDER — GLUCAGON INJECTION, SOLUTION 0.5 MG/.1ML
1 INJECTION, SOLUTION SUBCUTANEOUS ONCE
Qty: 0 | Refills: 0 | Status: DISCONTINUED | OUTPATIENT
Start: 2019-03-24 | End: 2019-04-10

## 2019-03-24 RX ADMIN — LIDOCAINE 1 PATCH: 4 CREAM TOPICAL at 20:15

## 2019-03-24 RX ADMIN — LIDOCAINE 1 PATCH: 4 CREAM TOPICAL at 07:30

## 2019-03-24 RX ADMIN — Medication 100 MILLIGRAM(S): at 05:52

## 2019-03-24 RX ADMIN — LEVETIRACETAM 500 MILLIGRAM(S): 250 TABLET, FILM COATED ORAL at 17:24

## 2019-03-24 RX ADMIN — ATORVASTATIN CALCIUM 10 MILLIGRAM(S): 80 TABLET, FILM COATED ORAL at 20:15

## 2019-03-24 RX ADMIN — Medication 300 MILLIGRAM(S): at 13:26

## 2019-03-24 RX ADMIN — LEVETIRACETAM 500 MILLIGRAM(S): 250 TABLET, FILM COATED ORAL at 05:52

## 2019-03-24 RX ADMIN — PANTOPRAZOLE SODIUM 40 MILLIGRAM(S): 20 TABLET, DELAYED RELEASE ORAL at 05:52

## 2019-03-24 RX ADMIN — Medication 25 MILLIGRAM(S): at 05:53

## 2019-03-24 RX ADMIN — PREGABALIN 1000 MICROGRAM(S): 225 CAPSULE ORAL at 13:26

## 2019-03-24 RX ADMIN — Medication 100 MILLIGRAM(S): at 17:25

## 2019-03-24 RX ADMIN — RIVAROXABAN 20 MILLIGRAM(S): KIT at 17:24

## 2019-03-24 RX ADMIN — Medication 650 MILLIGRAM(S): at 19:16

## 2019-03-24 RX ADMIN — Medication 1 MILLIGRAM(S): at 17:24

## 2019-03-24 RX ADMIN — TAMSULOSIN HYDROCHLORIDE 0.4 MILLIGRAM(S): 0.4 CAPSULE ORAL at 20:15

## 2019-03-24 RX ADMIN — Medication 81 MILLIGRAM(S): at 13:26

## 2019-03-24 RX ADMIN — LIDOCAINE 1 PATCH: 4 CREAM TOPICAL at 08:30

## 2019-03-24 RX ADMIN — FAMOTIDINE 20 MILLIGRAM(S): 10 INJECTION INTRAVENOUS at 13:26

## 2019-03-24 RX ADMIN — AMLODIPINE BESYLATE 10 MILLIGRAM(S): 2.5 TABLET ORAL at 05:52

## 2019-03-24 RX ADMIN — Medication 1 TABLET(S): at 13:26

## 2019-03-24 RX ADMIN — Medication 1: at 17:23

## 2019-03-24 RX ADMIN — Medication 25 MILLIGRAM(S): at 17:27

## 2019-03-24 NOTE — PROVIDER CONTACT NOTE (CRITICAL VALUE NOTIFICATION) - BACKGROUND
Pt with delayed transfusion reaction overnight 3/22-3/23. Pt was febrile and c/o of chills. Blood cultures and other labs sent.

## 2019-03-24 NOTE — PROVIDER CONTACT NOTE (OTHER) - ASSESSMENT
AOX4. pt denies pain upon urination and frequency. Pt states that today is the first day his urine is this color.
AOX4. VSS, afebrile. Pt states he does not feel dizzy. Pt denies pain in abdomen. Pts mood is sad and hopeless. Urine color leonarda.
VS : BP - 154/72, 102.2 (rectal temperature); HR- 104; RR - 16; O2 - 99% on room air. no sings of bleeding; no rash
Will reviewing lab results, it was noted that blood glucose has been elevated with most blood draws regardless of time. HbgA1C = 7.0. Pt asymptomatic.

## 2019-03-24 NOTE — PROVIDER CONTACT NOTE (OTHER) - ACTION/TREATMENT ORDERED:
MD aware. Awaiting result of urine Cx. Will continue to monitor.
Accu Checks AC/HS. Low dose sliding scale ordered. DM education started. Will continue to monitor.
MD assessed. Awaiting orders. Will continue to monitor.
blood test; UA; chest XR, IVF. will continue to monitor

## 2019-03-24 NOTE — PROVIDER CONTACT NOTE (OTHER) - SITUATION
Urine is tea colored
Blood glucose elevated in labs. HbA1C 7.0
PCA stated stool was dark with red streaks. This is new.
pt received 1 unit of RBC. post blood transfusion monitoring showed critical changes in pt's VS.

## 2019-03-24 NOTE — CHART NOTE - NSCHARTNOTEFT_GEN_A_CORE
Critical Value called in;    Gram Positive Cocci in clusters, PCR pending    ID Consult placed with Call back   - Recd wait for PCR, if coagulase positive then likely contaminant and give nothing, if MRSA or MSSA treat with vancomycin  - if fever or signs of infection dose abx

## 2019-03-24 NOTE — PROVIDER CONTACT NOTE (OTHER) - BACKGROUND
Pt admitted with debility. Pt s/p blood transfusion yesterday. Pt with delayed transfusion reaction overnight. UA and UCx sent yesterday.
Admitted with debility/anemia. Hx of chronic myelomonocytic leukemia.
admitted with debility. No hx of DM.
post blood transfusion monitoring

## 2019-03-24 NOTE — PROVIDER CONTACT NOTE (OTHER) - REASON
Blood glucose elevated in labs. HbA1C 7.0
Stool dark with red streaks
critical vital sings post blood transfusion
Urine tea colored

## 2019-03-24 NOTE — PROVIDER CONTACT NOTE (CRITICAL VALUE NOTIFICATION) - ACTION/TREATMENT ORDERED:
MD contacted ID. ID will review lab and follow up. MD instructed RN to contact MD is pt develops fever or other s/s of infection. Report given to night RN. Will continue to monitor.

## 2019-03-24 NOTE — PROGRESS NOTE ADULT - SUBJECTIVE AND OBJECTIVE BOX
HPI:  Patient is 84 male RH dominant with PMH significant for HTN, HLD,  Afib (off xarelto), stomach CA, anemia, gout, BPH. seizure d/o, recently stopped EToh use, who presented from [facility] with abnormal labs.  Pt reports recent admission to [facility] for gout flare then discharged to rehab.  Pt now returns with guaiac negative anemia (HgB 6.8 ) and  episode of bright red blood in stool. Pt also found with leukocytosis, with temp 100.7 F rectally.  Denies chills, fever, sob, chest pain, weakness, dysuria.    Patient was seen by GI and hematology.  S/P Colonoscopy on 3/1/19. No source of bleeding noted. Negative FOB. S/P EGD during prior admission, and no bleeding identified. Heme performed BM biopsy 3/4, Transfused PRBC. Patient transferred to [facility] due to debility and functional decline (06 Mar 2019 14:53)      Subjective  No complaints. Wants to drink juice. More awake and alert today.   ?bloody stool again      PAST MEDICAL & SURGICAL HISTORY:  Nonrheumatic aortic valve stenosis  Cancer of stomach  Anemia, unspecified type: Anemia  Seizure disorder  Benign prostatic hyperplasia, unspecified whether lower urinary tract symptoms present  Hyperlipemia  Essential hypertension  Chronic atrial fibrillation  Stomach cancer  Hypertension  Atrial fibrillation  Gout  No significant past surgical history  No significant past surgical history      MedsMEDICATIONS  (STANDING):  MEDICATIONS  (STANDING):  allopurinol 300 milliGRAM(s) Oral daily  amLODIPine   Tablet 10 milliGRAM(s) Oral daily  aspirin enteric coated 81 milliGRAM(s) Oral daily  atorvastatin 10 milliGRAM(s) Oral at bedtime  cyanocobalamin 1000 MICROGram(s) Oral daily  docusate sodium 100 milliGRAM(s) Oral two times a day  epoetin jackelyn Injectable 60730 Unit(s) SubCutaneous <User Schedule>  famotidine    Tablet 20 milliGRAM(s) Oral daily  folic acid 1 milliGRAM(s) Oral daily  levETIRAcetam 500 milliGRAM(s) Oral two times a day  lidocaine   Patch 1 Patch Transdermal <User Schedule>  metoprolol tartrate 25 milliGRAM(s) Oral every 12 hours  multivitamin 1 Tablet(s) Oral daily  pantoprazole    Tablet 40 milliGRAM(s) Oral before breakfast  rivaroxaban 20 milliGRAM(s) Oral every 24 hours  sodium chloride 0.9%. 1000 milliLiter(s) (100 mL/Hr) IV Continuous <Continuous>  tamsulosin 0.4 milliGRAM(s) Oral at bedtime    MEDICATIONS  (PRN):  acetaminophen   Tablet .. 650 milliGRAM(s) Oral every 6 hours PRN Mild Pain (1 - 3)  senna 2 Tablet(s) Oral at bedtime PRN Constipation      Vital Signs Last 24 HrsVital Signs Last 24 Hrs  T(C): 36.5 (24 Mar 2019 08:38), Max: 36.9 (23 Mar 2019 22:26)  T(F): 97.7 (24 Mar 2019 08:38), Max: 98.4 (23 Mar 2019 22:26)  HR: 91 (24 Mar 2019 08:38) (85 - 103)  BP: 128/71 (24 Mar 2019 08:38) (125/67 - 144/71)  BP(mean): --  RR: 14 (24 Mar 2019 08:38) (14 - 14)  SpO2: 94% (24 Mar 2019 08:38) (94% - 100%)    PHYSICAL EXAM:  GENERAL: NAD  NECK: Supple  NERVOUS SYSTEM:  awake and alert  HEART: S1s2 NL , Irreg Irreg  CHEST/LUNG: Clear to percussion bilaterally  ABDOMEN: Soft, Nontender, Nondistended; Bowel sounds present  EXTREMITIES:  No edema      LABS:  LABS:                        8.2    17.1  )-----------( 160      ( 24 Mar 2019 05:36 )             23.9       Ca    9.1        23 Mar 2019 07:45      PT/INR - ( 23 Mar 2019 01:15 )   PT: 34.4 sec;   INR: 2.97 ratio         PTT - ( 23 Mar 2019 01:15 )  PTT:48.7 sec  Imaging Personally Reviewed:  [ ] YES  [ ] NO      HEALTH ISSUES - PROBLEM Dx:    Deconditioning  PT/OT per rehab    Anemia-multifactorial  s/p 1 pRBC on 3/22  Cont b12  ?rectal bleeding again. GI saw pt on 3/20  h/h stable. Cont to monitor h/h   reconsult GI if sig bleeding again with dec in h/h    Fever with leukocytosis(pt with h/o  Chronic myelomonocytic leukemia, upon revieweing records pt has has chronically elevated WBC as high as 30K's as far as back in one year ago in [facility])  Afebrile since yesterday  UA neg  CXR-new left basilar density. Pt has not resp symptoms.   f/u blood c/s done 3/23  monitor off abx as no source at this time  mildly elev PCT but less than 0.5    Afib   Rate controlled  Cont BB  Cont xarelto     Presyncope  Orthostatic neg  Cont to hold lasix  was given prbc as thought to be sec to anemia  if dizzy again-trial of holding norvasc    Gout   Rec dc'ing NSAIDS in setting of elev Cr and GIB                Care Discussed with Consultants/Other Providers [ x] YES  [ ] NO HPI:  Patient is 84 male RH dominant with PMH significant for HTN, HLD,  Afib (off xarelto), stomach CA, anemia, gout, BPH. seizure d/o, recently stopped EToh use, who presented from [facility] with abnormal labs.  Pt reports recent admission to [facility] for gout flare then discharged to rehab.  Pt now returns with guaiac negative anemia (HgB 6.8 ) and  episode of bright red blood in stool. Pt also found with leukocytosis, with temp 100.7 F rectally.  Denies chills, fever, sob, chest pain, weakness, dysuria.    Patient was seen by GI and hematology.  S/P Colonoscopy on 3/1/19. No source of bleeding noted. Negative FOB. S/P EGD during prior admission, and no bleeding identified. Heme performed BM biopsy 3/4, Transfused PRBC. Patient transferred to [facility] due to debility and functional decline (06 Mar 2019 14:53)      Subjective  No complaints. Wants to drink juice. More awake and alert today.   ?bloody stool again      PAST MEDICAL & SURGICAL HISTORY:  Nonrheumatic aortic valve stenosis  Cancer of stomach  Anemia, unspecified type: Anemia  Seizure disorder  Benign prostatic hyperplasia, unspecified whether lower urinary tract symptoms present  Hyperlipemia  Essential hypertension  Chronic atrial fibrillation  Stomach cancer  Hypertension  Atrial fibrillation  Gout  No significant past surgical history  No significant past surgical history      MedsMEDICATIONS  (STANDING):  MEDICATIONS  (STANDING):  allopurinol 300 milliGRAM(s) Oral daily  amLODIPine   Tablet 10 milliGRAM(s) Oral daily  aspirin enteric coated 81 milliGRAM(s) Oral daily  atorvastatin 10 milliGRAM(s) Oral at bedtime  cyanocobalamin 1000 MICROGram(s) Oral daily  docusate sodium 100 milliGRAM(s) Oral two times a day  epoetin jackelyn Injectable 88497 Unit(s) SubCutaneous <User Schedule>  famotidine    Tablet 20 milliGRAM(s) Oral daily  folic acid 1 milliGRAM(s) Oral daily  levETIRAcetam 500 milliGRAM(s) Oral two times a day  lidocaine   Patch 1 Patch Transdermal <User Schedule>  metoprolol tartrate 25 milliGRAM(s) Oral every 12 hours  multivitamin 1 Tablet(s) Oral daily  pantoprazole    Tablet 40 milliGRAM(s) Oral before breakfast  rivaroxaban 20 milliGRAM(s) Oral every 24 hours  sodium chloride 0.9%. 1000 milliLiter(s) (100 mL/Hr) IV Continuous <Continuous>  tamsulosin 0.4 milliGRAM(s) Oral at bedtime    MEDICATIONS  (PRN):  acetaminophen   Tablet .. 650 milliGRAM(s) Oral every 6 hours PRN Mild Pain (1 - 3)  senna 2 Tablet(s) Oral at bedtime PRN Constipation      Vital Signs Last 24 HrsVital Signs Last 24 Hrs  T(C): 36.5 (24 Mar 2019 08:38), Max: 36.9 (23 Mar 2019 22:26)  T(F): 97.7 (24 Mar 2019 08:38), Max: 98.4 (23 Mar 2019 22:26)  HR: 91 (24 Mar 2019 08:38) (85 - 103)  BP: 128/71 (24 Mar 2019 08:38) (125/67 - 144/71)  BP(mean): --  RR: 14 (24 Mar 2019 08:38) (14 - 14)  SpO2: 94% (24 Mar 2019 08:38) (94% - 100%)    PHYSICAL EXAM:  GENERAL: NAD  NECK: Supple  NERVOUS SYSTEM:  awake and alert  HEART: S1s2 NL , Irreg Irreg  CHEST/LUNG: Clear to percussion bilaterally  ABDOMEN: Soft, Nontender, Nondistended; Bowel sounds present  EXTREMITIES:  No edema      LABS:  LABS:                        8.2    17.1  )-----------( 160      ( 24 Mar 2019 05:36 )             23.9       Ca    9.1        23 Mar 2019 07:45      PT/INR - ( 23 Mar 2019 01:15 )   PT: 34.4 sec;   INR: 2.97 ratio         PTT - ( 23 Mar 2019 01:15 )  PTT:48.7 sec    CAPILLARY BLOOD GLUCOSE          Imaging Personally Reviewed:  [ ] YES  [ ] NO      HEALTH ISSUES - PROBLEM Dx:    Deconditioning  PT/OT per rehab    Anemia-multifactorial  s/p 1 pRBC on 3/22  Cont b12  ?rectal bleeding again. GI saw pt on 3/20  h/h stable. Cont to monitor h/h   reconsult GI if sig bleeding again with dec in h/h    Fever with leukocytosis(pt with h/o  Chronic myelomonocytic leukemia, upon revieweing records pt has has chronically elevated WBC as high as 30K's as far as back in one year ago in [facility])  Afebrile since yesterday  UA neg  CXR-new left basilar density. Pt has not resp symptoms.   f/u blood c/s done 3/23  monitor off abx as no source at this time  mildly elev PCT but less than 0.5    Afib   Rate controlled  Cont BB  Cont xarelto     Presyncope  Orthostatic neg  Cont to hold lasix  was given prbc as thought to be sec to anemia  if dizzy again-trial of holding norvasc    Gout   Rec dc'ing NSAIDS in setting of elev Cr and GIB    Hyperglycemia  start humalog              Care Discussed with Consultants/Other Providers [ x] YES  [ ] NO HPI:  Patient is 84 male RH dominant with PMH significant for HTN, HLD,  Afib (off xarelto), stomach CA, anemia, gout, BPH. seizure d/o, recently stopped EToh use, who presented from [facility] with abnormal labs.  Pt reports recent admission to [facility] for gout flare then discharged to rehab.  Pt now returns with guaiac negative anemia (HgB 6.8 ) and  episode of bright red blood in stool. Pt also found with leukocytosis, with temp 100.7 F rectally.  Denies chills, fever, sob, chest pain, weakness, dysuria.    Patient was seen by GI and hematology.  S/P Colonoscopy on 3/1/19. No source of bleeding noted. Negative FOB. S/P EGD during prior admission, and no bleeding identified. Heme performed BM biopsy 3/4, Transfused PRBC. Patient transferred to [facility] due to debility and functional decline (06 Mar 2019 14:53)      Subjective  No complaints. Wants to drink juice. More awake and alert today.   ?bloody stool again      PAST MEDICAL & SURGICAL HISTORY:  Nonrheumatic aortic valve stenosis  Cancer of stomach  Anemia, unspecified type: Anemia  Seizure disorder  Benign prostatic hyperplasia, unspecified whether lower urinary tract symptoms present  Hyperlipemia  Essential hypertension  Chronic atrial fibrillation  Stomach cancer  Hypertension  Atrial fibrillation  Gout  No significant past surgical history  No significant past surgical history      MedsMEDICATIONS  (STANDING):  MEDICATIONS  (STANDING):  allopurinol 300 milliGRAM(s) Oral daily  amLODIPine   Tablet 10 milliGRAM(s) Oral daily  aspirin enteric coated 81 milliGRAM(s) Oral daily  atorvastatin 10 milliGRAM(s) Oral at bedtime  cyanocobalamin 1000 MICROGram(s) Oral daily  docusate sodium 100 milliGRAM(s) Oral two times a day  epoetin jackelyn Injectable 60385 Unit(s) SubCutaneous <User Schedule>  famotidine    Tablet 20 milliGRAM(s) Oral daily  folic acid 1 milliGRAM(s) Oral daily  levETIRAcetam 500 milliGRAM(s) Oral two times a day  lidocaine   Patch 1 Patch Transdermal <User Schedule>  metoprolol tartrate 25 milliGRAM(s) Oral every 12 hours  multivitamin 1 Tablet(s) Oral daily  pantoprazole    Tablet 40 milliGRAM(s) Oral before breakfast  rivaroxaban 20 milliGRAM(s) Oral every 24 hours  sodium chloride 0.9%. 1000 milliLiter(s) (100 mL/Hr) IV Continuous <Continuous>  tamsulosin 0.4 milliGRAM(s) Oral at bedtime    MEDICATIONS  (PRN):  acetaminophen   Tablet .. 650 milliGRAM(s) Oral every 6 hours PRN Mild Pain (1 - 3)  senna 2 Tablet(s) Oral at bedtime PRN Constipation      Vital Signs Last 24 HrsVital Signs Last 24 Hrs  T(C): 36.5 (24 Mar 2019 08:38), Max: 36.9 (23 Mar 2019 22:26)  T(F): 97.7 (24 Mar 2019 08:38), Max: 98.4 (23 Mar 2019 22:26)  HR: 91 (24 Mar 2019 08:38) (85 - 103)  BP: 128/71 (24 Mar 2019 08:38) (125/67 - 144/71)  BP(mean): --  RR: 14 (24 Mar 2019 08:38) (14 - 14)  SpO2: 94% (24 Mar 2019 08:38) (94% - 100%)    PHYSICAL EXAM:  GENERAL: NAD  NECK: Supple  NERVOUS SYSTEM:  awake and alert  HEART: S1s2 NL , Irreg Irreg  CHEST/LUNG: Clear to percussion bilaterally  ABDOMEN: Soft, Nontender, Nondistended; Bowel sounds present  EXTREMITIES:  No edema      LABS:  LABS:                        8.2    17.1  )-----------( 160      ( 24 Mar 2019 05:36 )             23.9       Ca    9.1        23 Mar 2019 07:45      PT/INR - ( 23 Mar 2019 01:15 )   PT: 34.4 sec;   INR: 2.97 ratio         PTT - ( 23 Mar 2019 01:15 )  PTT:48.7 sec    CAPILLARY BLOOD GLUCOSE          Imaging Personally Reviewed:  [ ] YES  [ ] NO      HEALTH ISSUES - PROBLEM Dx:    Deconditioning  PT/OT per rehab    Anemia-multifactorial  s/p 1 pRBC on 3/22  Cont b12  ?rectal bleeding again. GI saw pt on 3/20  h/h stable. Cont to monitor h/h   reconsult GI if sig bleeding again with dec in h/h    Fever with leukocytosis(pt with h/o  Chronic myelomonocytic leukemia, upon revieweing records pt has has chronically elevated WBC as high as 30K's as far as back in one year ago in [facility])  Afebrile since yesterday  UA neg  CXR-new left basilar density. Pt has not resp symptoms.   f/u blood c/s done 3/23  monitor off abx as no source at this time  mildly elev PCT but less than 0.5    Afib   Rate controlled  Cont BB  Cont xarelto     Presyncope  Orthostatic neg  Cont to hold lasix  was given prbc as thought to be sec to anemia  if dizzy again-trial of holding norvasc    Gout   Rec dc'ing NSAIDS in setting of elev Cr and GIB    Hyperglycemia with a1c 7.0  start humalog              Care Discussed with Consultants/Other Providers [ x] YES  [ ] NO

## 2019-03-24 NOTE — PROGRESS NOTE ADULT - SUBJECTIVE AND OBJECTIVE BOX
HPI:   84 male with PMH significant for HTN, HLD,  Afib (off xarelto), stomach CA, anemia, gout, BPH. seizure d/o, recently stopped EToh use, p/w chronic anemia possibly due to myelodysplastic syndrome, s/p tx PRBC, debility and functional decline    INTERVAL SUBJECTIVE & REVIEW OF SYMPTOMS:  Chart reviewed. No further temps,   however patient appears more sad today and wondering " what is happening"  Noted to have some bleeding from a small area on face where he was shaved- controlled with pressure  Denies dysuria  + BM this am with some blood and ? dark stool   Hct stable this am       REVIEW OF SYSTEMS  [x   ] Constitutional WNL  [ x  ] Cardio WNL  [ x  ] Resp WNL  [ x  ] GI WNL  [ x  ]  - denies dysuria  [   ] Heme WNL      Vital Signs Last 24 Hrs  T(C): 36.5 (24 Mar 2019 08:38), Max: 36.9 (23 Mar 2019 22:26)  T(F): 97.7 (24 Mar 2019 08:38), Max: 98.4 (23 Mar 2019 22:26)  HR: 91 (24 Mar 2019 08:38) (85 - 103)  BP: 128/71 (24 Mar 2019 08:38) (125/67 - 144/71)  BP(mean): --  RR: 14 (24 Mar 2019 08:38) (14 - 14)  SpO2: 94% (24 Mar 2019 08:38) (94% - 98%)      PHYSICAL EXAM  General: NAD  HEENT: at, nc,    Cardio: S1S2+  Resp: CLEAR  Abdomen: soft  Extrem: no calf tenderness, LE edema appears improved  Skin: right knee with swelling - improved      RECENT LABS:                        8.2    17.1  )-----------( 160      ( 24 Mar 2019 05:36 )             23.9         137  |  102  |  44<H>  ----------------------------<  235<H>  3.7   |  23  |  1.56<H>    Ca    9.1      23 Mar 2019 07:45    TPro  x   /  Alb  x   /  TBili  0.6  /  DBili  x   /  AST  x   /  ALT  x   /  AlkPhos  x       Culture - Blood (03.23.19 @ 01:38)    Specimen Source: .Blood None    Culture Results:   No growth to date.    Procalcitonin, Serum (19 @ 05:36)    Procalcitonin, Serum: 0.29: Procalcitonin (PCT) Interpretation (ng/mL) -    Procalcitonin, Serum (19 @ 07:45)    Procalcitonin, Serum: 0.24: Procalcitonin (PCT) Interpretation                         8.2    17.4  )-----------( 162      ( 23 Mar 2019 07:45 )             26.1         137  |  102  |  44<H>  ----------------------------<  235<H>  3.7   |  23  |  1.56<H>    Ca    9.1      23 Mar 2019 07:45    TPro  x   /  Alb  x   /  TBili  0.6  /  DBili  x   /  AST  x   /  ALT  x   /  AlkPhos  x       PT/INR - ( 23 Mar 2019 01:15 )   PT: 34.4 sec;   INR: 2.97 ratio         PTT - ( 23 Mar 2019 01:15 )  PTT:48.7 sec  Urinalysis Basic - ( 23 Mar 2019 05:10 )    Color: Yellow / Appearance: Clear / S.015 / pH: x  Gluc: x / Ketone: Negative  / Bili: Negative / Urobili: Negative   Blood: x / Protein: 30 mg/dL / Nitrite: Negative   Leuk Esterase: Negative / RBC: 26-50 /HPF / WBC 0-2 /HPF   Sq Epi: x / Non Sq Epi: Moderate / Bacteria: Few /HPF      RADIOLOGY/OTHER RESULTS:    < from: Xray Chest 1 View- PORTABLE-Urgent (19 @ 01:30) >  EXAM:  XR CHEST PORTABLE URGENT 1V      PROCEDURE DATE:  2019        INTERPRETATION:  Portable chest x-ray    Indication: Fever. Status red blood cell transfusion.    Portable chest x-ray is compared to a previous examination dated   3/17/2019.    Impression: New left basilar density may be due to overlying soft tissue,   pulmonary consolidation, and/or pleural effusion.    No evidence for pneumothorax.    The trachea is midline.    Stable cardiac silhouette.    < end of copied text >          A/P:    84 male with PMH significant for HTN, HLD, Afib, stomach CA, anemia, gout, BPH. seizure d/o, recently stopped EToh use, p/w chronic anemia possibly due to myelodysplastic syndrome, s/p tx PRBC, debility and functional decline      Fever overnight ON 3/22 ? 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   - Heme appreciated: Not yet in remission, continue supportive therapies 3/21    Near syncope: During rehab stay with [facility].  Vitals stable , will attempt OOB later today     LGI bleed:- xarelto and ASA restarted, GI consult and follow up appreciated.  - CBC stable. repeat Sat 3/23    Afib: - Continue metoprolol, Xarelto    HTN:- Continue norvasc, lasix    SZ/tremors: - Controlled on keppra     DVT prophylaxis: on xarelto    Hospitalist fu noted HPI:   84 male with PMH significant for HTN, HLD,  Afib (off xarelto), stomach CA, anemia, gout, BPH. seizure d/o, recently stopped EToh use, p/w chronic anemia possibly due to myelodysplastic syndrome, s/p tx PRBC, debility and functional decline    INTERVAL SUBJECTIVE & REVIEW OF SYMPTOMS:  Chart reviewed. No further temps,   however patient appears more sad today and wondering " what is happening"  Noted to have some bleeding from a small area on face where he was shaved- controlled with pressure  Denies dysuria  + BM this am with some blood and ? dark stool   Hct stable this am       REVIEW OF SYSTEMS  [x   ] Constitutional WNL  [ x  ] Cardio WNL  [ x  ] Resp WNL  [ x  ] GI WNL  [ x  ]  - denies dysuria  [   ] Heme WNL      Vital Signs Last 24 Hrs  T(C): 36.5 (24 Mar 2019 08:38), Max: 36.9 (23 Mar 2019 22:26)  T(F): 97.7 (24 Mar 2019 08:38), Max: 98.4 (23 Mar 2019 22:26)  HR: 91 (24 Mar 2019 08:38) (85 - 103)  BP: 128/71 (24 Mar 2019 08:38) (125/67 - 144/71)  BP(mean): --  RR: 14 (24 Mar 2019 08:38) (14 - 14)  SpO2: 94% (24 Mar 2019 08:38) (94% - 98%)      PHYSICAL EXAM  General: NAD  HEENT: at, nc,    Cardio: S1S2+  Resp: CLEAR  Abdomen: soft  Extrem: no calf tenderness, LE edema appears improved  Skin: right knee with swelling - improved    MEDICATIONS  (STANDING):  allopurinol 300 milliGRAM(s) Oral daily  amLODIPine   Tablet 10 milliGRAM(s) Oral daily  aspirin enteric coated 81 milliGRAM(s) Oral daily  atorvastatin 10 milliGRAM(s) Oral at bedtime  cyanocobalamin 1000 MICROGram(s) Oral daily  dextrose 5%. 1000 milliLiter(s) (50 mL/Hr) IV Continuous <Continuous>  dextrose 50% Injectable 12.5 Gram(s) IV Push once  dextrose 50% Injectable 25 Gram(s) IV Push once  dextrose 50% Injectable 25 Gram(s) IV Push once  docusate sodium 100 milliGRAM(s) Oral two times a day  epoetin jackelyn Injectable 11680 Unit(s) SubCutaneous <User Schedule>  famotidine    Tablet 20 milliGRAM(s) Oral daily  folic acid 1 milliGRAM(s) Oral daily  insulin lispro (HumaLOG) corrective regimen sliding scale   SubCutaneous three times a day before meals  insulin lispro (HumaLOG) corrective regimen sliding scale   SubCutaneous at bedtime  levETIRAcetam 500 milliGRAM(s) Oral two times a day  lidocaine   Patch 1 Patch Transdermal <User Schedule>  metoprolol tartrate 25 milliGRAM(s) Oral every 12 hours  multivitamin 1 Tablet(s) Oral daily  pantoprazole    Tablet 40 milliGRAM(s) Oral before breakfast  rivaroxaban 20 milliGRAM(s) Oral every 24 hours  sodium chloride 0.9%. 1000 milliLiter(s) (100 mL/Hr) IV Continuous <Continuous>  tamsulosin 0.4 milliGRAM(s) Oral at bedtime    MEDICATIONS  (PRN):  acetaminophen   Tablet .. 650 milliGRAM(s) Oral every 6 hours PRN Mild Pain (1 - 3)  dextrose 40% Gel 15 Gram(s) Oral once PRN Blood Glucose LESS THAN 70 milliGRAM(s)/deciliter  glucagon  Injectable 1 milliGRAM(s) IntraMuscular once PRN Glucose LESS THAN 70 milligrams/deciliter  senna 2 Tablet(s) Oral at bedtime PRN Constipation        RECENT LABS:                        8.2    17.1  )-----------( 160      ( 24 Mar 2019 05:36 )             23.9         137  |  102  |  44<H>  ----------------------------<  235<H>  3.7   |  23  |  1.56<H>    Ca    9.1      23 Mar 2019 07:45    TPro  x   /  Alb  x   /  TBili  0.6  /  DBili  x   /  AST  x   /  ALT  x   /  AlkPhos  x       Culture - Blood (19 @ 01:38)    Specimen Source: .Blood None    Culture Results:   No growth to date.    Procalcitonin, Serum (19 @ 05:36)    Procalcitonin, Serum: 0.29: Procalcitonin (PCT) Interpretation (ng/mL) -    Procalcitonin, Serum (19 @ 07:45)    Procalcitonin, Serum: 0.24: Procalcitonin (PCT) Interpretation                         8.2    17.4  )-----------( 162      ( 23 Mar 2019 07:45 )             26.1         137  |  102  |  44<H>  ----------------------------<  235<H>  3.7   |  23  |  1.56<H>    Ca    9.1      23 Mar 2019 07:45    TPro  x   /  Alb  x   /  TBili  0.6  /  DBili  x   /  AST  x   /  ALT  x   /  AlkPhos  x       PT/INR - ( 23 Mar 2019 01:15 )   PT: 34.4 sec;   INR: 2.97 ratio         PTT - ( 23 Mar 2019 01:15 )  PTT:48.7 sec  Urinalysis Basic - ( 23 Mar 2019 05:10 )    Color: Yellow / Appearance: Clear / S.015 / pH: x  Gluc: x / Ketone: Negative  / Bili: Negative / Urobili: Negative   Blood: x / Protein: 30 mg/dL / Nitrite: Negative   Leuk Esterase: Negative / RBC: 26-50 /HPF / WBC 0-2 /HPF   Sq Epi: x / Non Sq Epi: Moderate / Bacteria: Few /HPF      RADIOLOGY/OTHER RESULTS:    < from: Xray Chest 1 View- PORTABLE-Urgent (19 @ 01:30) >  EXAM:  XR CHEST PORTABLE URGENT 1V      PROCEDURE DATE:  2019        INTERPRETATION:  Portable chest x-ray    Indication: Fever. Status red blood cell transfusion.    Portable chest x-ray is compared to a previous examination dated   3/17/2019.    Impression: New left basilar density may be due to overlying soft tissue,   pulmonary consolidation, and/or pleural effusion.    No evidence for pneumothorax.    The trachea is midline.    Stable cardiac silhouette.    < end of copied text >          A/P:    84 male with PMH significant for HTN, HLD, Afib, stomach CA, anemia, gout, BPH. seizure d/o, recently stopped EToh use, p/w chronic anemia possibly due to myelodysplastic syndrome, s/p tx PRBC, debility and functional decline      Fever overnight ON 3/22 ? 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   - Heme appreciated: Not yet in remission, continue supportive therapies 3/21    Near syncope: During rehab stay with [facility].  Vitals stable , will attempt OOB later today     LGI bleed:- xarelto and ASA restarted, GI consult and follow up appreciated.  - CBC stable.     Afib: - Continue metoprolol, Xarelto    HTN:- Continue norvasc, lasix    SZ/tremors: - Controlled on keppra     DVT prophylaxis: on xarelto    Mood: will have neuropsych fu    Hyperglycemia on labs: started on SSI Hb A1 C 7 on 3/7    Hospitalist fu noted

## 2019-03-24 NOTE — PROVIDER CONTACT NOTE (OTHER) - RECOMMENDATIONS
Will notify MD
Monitor blood glucose. Consider sliding scale humalog
Review labs from this morning. Consider [facility] follow up. MD to assess.
notified ELIJAH Dr Serrano and nursing supervisor; post blood transfusion reaction protocol followed

## 2019-03-25 LAB
ANION GAP SERPL CALC-SCNC: 13 MMOL/L — SIGNIFICANT CHANGE UP (ref 5–17)
BUN SERPL-MCNC: 46 MG/DL — HIGH (ref 7–23)
CALCIUM SERPL-MCNC: 9.7 MG/DL — SIGNIFICANT CHANGE UP (ref 8.4–10.5)
CHLORIDE SERPL-SCNC: 103 MMOL/L — SIGNIFICANT CHANGE UP (ref 96–108)
CO2 SERPL-SCNC: 22 MMOL/L — SIGNIFICANT CHANGE UP (ref 22–31)
CREAT SERPL-MCNC: 1.41 MG/DL — HIGH (ref 0.5–1.3)
CULTURE RESULTS: SIGNIFICANT CHANGE UP
GLUCOSE SERPL-MCNC: 217 MG/DL — HIGH (ref 70–99)
HCT VFR BLD CALC: 25.3 % — LOW (ref 39–50)
HGB BLD-MCNC: 7.7 G/DL — LOW (ref 13–17)
MCHC RBC-ENTMCNC: 30.3 GM/DL — LOW (ref 32–36)
MCHC RBC-ENTMCNC: 30.8 PG — SIGNIFICANT CHANGE UP (ref 27–34)
MCV RBC AUTO: 101.4 FL — HIGH (ref 80–100)
ORGANISM # SPEC MICROSCOPIC CNT: SIGNIFICANT CHANGE UP
ORGANISM # SPEC MICROSCOPIC CNT: SIGNIFICANT CHANGE UP
PLATELET # BLD AUTO: 172 K/UL — SIGNIFICANT CHANGE UP (ref 150–400)
POTASSIUM SERPL-MCNC: 4 MMOL/L — SIGNIFICANT CHANGE UP (ref 3.5–5.3)
POTASSIUM SERPL-SCNC: 4 MMOL/L — SIGNIFICANT CHANGE UP (ref 3.5–5.3)
RBC # BLD: 2.49 M/UL — LOW (ref 4.2–5.8)
RBC # FLD: 19.6 % — HIGH (ref 10.3–14.5)
SODIUM SERPL-SCNC: 138 MMOL/L — SIGNIFICANT CHANGE UP (ref 135–145)
SPECIMEN SOURCE: SIGNIFICANT CHANGE UP
WBC # BLD: 18.1 K/UL — HIGH (ref 3.8–10.5)
WBC # FLD AUTO: 18.1 K/UL — HIGH (ref 3.8–10.5)

## 2019-03-25 PROCEDURE — 99232 SBSQ HOSP IP/OBS MODERATE 35: CPT

## 2019-03-25 PROCEDURE — 99233 SBSQ HOSP IP/OBS HIGH 50: CPT

## 2019-03-25 RX ORDER — TRAMADOL HYDROCHLORIDE 50 MG/1
25 TABLET ORAL EVERY 6 HOURS
Qty: 0 | Refills: 0 | Status: DISCONTINUED | OUTPATIENT
Start: 2019-03-25 | End: 2019-03-27

## 2019-03-25 RX ORDER — LIDOCAINE 4 G/100G
1 CREAM TOPICAL THREE TIMES A DAY
Qty: 0 | Refills: 0 | Status: DISCONTINUED | OUTPATIENT
Start: 2019-03-25 | End: 2019-04-10

## 2019-03-25 RX ORDER — LIDOCAINE 4 G/100G
1 CREAM TOPICAL THREE TIMES A DAY
Qty: 0 | Refills: 0 | Status: DISCONTINUED | OUTPATIENT
Start: 2019-03-25 | End: 2019-03-25

## 2019-03-25 RX ADMIN — Medication 650 MILLIGRAM(S): at 12:45

## 2019-03-25 RX ADMIN — ATORVASTATIN CALCIUM 10 MILLIGRAM(S): 80 TABLET, FILM COATED ORAL at 21:17

## 2019-03-25 RX ADMIN — Medication 100 MILLIGRAM(S): at 05:31

## 2019-03-25 RX ADMIN — LIDOCAINE 1 APPLICATION(S): 4 CREAM TOPICAL at 21:17

## 2019-03-25 RX ADMIN — Medication 1 TABLET(S): at 12:20

## 2019-03-25 RX ADMIN — TRAMADOL HYDROCHLORIDE 25 MILLIGRAM(S): 50 TABLET ORAL at 19:45

## 2019-03-25 RX ADMIN — LIDOCAINE 1 APPLICATION(S): 4 CREAM TOPICAL at 18:43

## 2019-03-25 RX ADMIN — TAMSULOSIN HYDROCHLORIDE 0.4 MILLIGRAM(S): 0.4 CAPSULE ORAL at 21:17

## 2019-03-25 RX ADMIN — AMLODIPINE BESYLATE 10 MILLIGRAM(S): 2.5 TABLET ORAL at 05:31

## 2019-03-25 RX ADMIN — LIDOCAINE 1 PATCH: 4 CREAM TOPICAL at 21:17

## 2019-03-25 RX ADMIN — RIVAROXABAN 20 MILLIGRAM(S): KIT at 18:37

## 2019-03-25 RX ADMIN — Medication 2: at 16:29

## 2019-03-25 RX ADMIN — Medication 25 MILLIGRAM(S): at 18:38

## 2019-03-25 RX ADMIN — PANTOPRAZOLE SODIUM 40 MILLIGRAM(S): 20 TABLET, DELAYED RELEASE ORAL at 05:31

## 2019-03-25 RX ADMIN — Medication 81 MILLIGRAM(S): at 12:18

## 2019-03-25 RX ADMIN — LEVETIRACETAM 500 MILLIGRAM(S): 250 TABLET, FILM COATED ORAL at 18:38

## 2019-03-25 RX ADMIN — FAMOTIDINE 20 MILLIGRAM(S): 10 INJECTION INTRAVENOUS at 12:20

## 2019-03-25 RX ADMIN — LIDOCAINE 1 PATCH: 4 CREAM TOPICAL at 08:00

## 2019-03-25 RX ADMIN — TRAMADOL HYDROCHLORIDE 25 MILLIGRAM(S): 50 TABLET ORAL at 18:57

## 2019-03-25 RX ADMIN — Medication 300 MILLIGRAM(S): at 12:19

## 2019-03-25 RX ADMIN — PREGABALIN 1000 MICROGRAM(S): 225 CAPSULE ORAL at 12:19

## 2019-03-25 RX ADMIN — Medication 100 MILLIGRAM(S): at 18:38

## 2019-03-25 RX ADMIN — ERYTHROPOIETIN 10000 UNIT(S): 10000 INJECTION, SOLUTION INTRAVENOUS; SUBCUTANEOUS at 11:37

## 2019-03-25 RX ADMIN — Medication 2: at 12:15

## 2019-03-25 RX ADMIN — LIDOCAINE 1 PATCH: 4 CREAM TOPICAL at 19:16

## 2019-03-25 RX ADMIN — Medication 2: at 08:48

## 2019-03-25 RX ADMIN — LEVETIRACETAM 500 MILLIGRAM(S): 250 TABLET, FILM COATED ORAL at 05:31

## 2019-03-25 RX ADMIN — Medication 25 MILLIGRAM(S): at 05:31

## 2019-03-25 RX ADMIN — Medication 1 MILLIGRAM(S): at 12:19

## 2019-03-25 RX ADMIN — Medication 650 MILLIGRAM(S): at 12:16

## 2019-03-25 NOTE — PROGRESS NOTE ADULT - ASSESSMENT
#84 male with PMH significant for HTN, HLD, Afib, stomach CA, anemia, gout, BPH. 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 ID consult, discussed with hospitalist, patient, caregiver who are agreeable  -CBC 3/26    #Anemia/debility: multifactorial: Chronic kidney disease, recent GIB. 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   - Heme appreciated: Not yet in remission, continue supportive therapies    #LGI bleed:- xarelto and ASA restarted, GI consult and follow up appreciated.  - CBC stable. continue to monitor, was tranfused due to near-syncope    #Afib:   - Continue metoprolol, Xarelto  -monitor HR, CBC    #HTN:-   Continue norvasc, lasix  -controlled    # JOint pain:  ?gout ?fever r/o septic arthritis  -will obtain ID consult, If not felt to be septic, caregiver is advocating for restarting indomethacin, aware of risks of bleeding hardik upon restarting xarelto and low H/H but for patient comfort. will continue to follow with hospitalist and ID, patient and caregiver. was discussed in detail this morning  -will also reach out to Dr. Wood later today after consultant recommendaiclarence    #SZ/tremors:   - Controlled on keppra     #DVT prophylaxis:   -on xarelto    Mood:  -neuropsych fu  -patient later states he does not want palliative care/hospice consult at this time. will continue to monitor    Labs:  CBC 3/26  ID  hospitalist #84 male with PMH significant for HTN, HLD, Afib, stomach CA, anemia, gout, BPH. 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 ID consult, discussed with hospitalist, patient, caregiver who are agreeable  -CBC 3/26    #Anemia/debility: multifactorial: Chronic kidney disease, recent GIB. 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   - Heme appreciated: Not yet in remission, continue supportive therapies    #LGI bleed:- xarelto and ASA restarted, GI consult and follow up appreciated.  - CBC stable. continue to monitor, was tranfused due to near-syncope    #Afib:   - Continue metoprolol, Xarelto  -monitor HR, CBC    #HTN:-   Continue norvasc, lasix  -controlled    # JOint pain:  ?gout ?fever r/o septic arthritis  -will obtain ID consult, If not felt to be septic, caregiver is advocating for restarting indomethacin, aware of risks of bleeding hardik upon restarting xarelto and low H/H but for patient comfort. will continue to follow with hospitalist and ID, patient and caregiver. was discussed in detail this morning  -will also reach out to Dr. Wood later today after consultant recommendaiclarence    #SZ/tremors:   - Controlled on keppra     #DVT prophylaxis:   -on xarelto    Mood:  -neuropsych fu  -patient later states he does not want palliative care/hospice consult at this time. will continue to monitor    #Discharge home and caregiver training on hold due to medical status    Labs:  CBC 3/26  ID  hospitalist

## 2019-03-25 NOTE — PROGRESS NOTE ADULT - SUBJECTIVE AND OBJECTIVE BOX
INTERVAL HPI/OVERNIGHT EVENTS:  HPI:  85 y/o male (known from prior admission) with PMH Afib (off xarelto), anemia, stomach cancer (25 years ago), gout, HTN, BPH who is currently admitted to rehab now with reported rectal bleeding.  GI following patient due recent onset of GI bleeding. Staff noted blood present after bowel movement, no clots. Last episode of bloody BM was yesterday, and described as drops of blood.       MEDICATIONS  (STANDING):  allopurinol 300 milliGRAM(s) Oral daily  amLODIPine   Tablet 10 milliGRAM(s) Oral daily  aspirin enteric coated 81 milliGRAM(s) Oral daily  atorvastatin 10 milliGRAM(s) Oral at bedtime  cyanocobalamin 1000 MICROGram(s) Oral daily  dextrose 5%. 1000 milliLiter(s) (50 mL/Hr) IV Continuous <Continuous>  dextrose 50% Injectable 12.5 Gram(s) IV Push once  dextrose 50% Injectable 25 Gram(s) IV Push once  dextrose 50% Injectable 25 Gram(s) IV Push once  docusate sodium 100 milliGRAM(s) Oral two times a day  epoetin jackelyn Injectable 76669 Unit(s) SubCutaneous <User Schedule>  famotidine    Tablet 20 milliGRAM(s) Oral daily  folic acid 1 milliGRAM(s) Oral daily  insulin lispro (HumaLOG) corrective regimen sliding scale   SubCutaneous three times a day before meals  insulin lispro (HumaLOG) corrective regimen sliding scale   SubCutaneous at bedtime  levETIRAcetam 500 milliGRAM(s) Oral two times a day  lidocaine   Patch 1 Patch Transdermal <User Schedule>  lidocaine 5% Ointment 1 Application(s) Topical three times a day  metoprolol tartrate 25 milliGRAM(s) Oral every 12 hours  multivitamin 1 Tablet(s) Oral daily  pantoprazole    Tablet 40 milliGRAM(s) Oral before breakfast  rivaroxaban 20 milliGRAM(s) Oral every 24 hours  sodium chloride 0.9%. 1000 milliLiter(s) (100 mL/Hr) IV Continuous <Continuous>  tamsulosin 0.4 milliGRAM(s) Oral at bedtime    MEDICATIONS  (PRN):  acetaminophen   Tablet .. 650 milliGRAM(s) Oral every 6 hours PRN Mild Pain (1 - 3)  dextrose 40% Gel 15 Gram(s) Oral once PRN Blood Glucose LESS THAN 70 milliGRAM(s)/deciliter  glucagon  Injectable 1 milliGRAM(s) IntraMuscular once PRN Glucose LESS THAN 70 milligrams/deciliter  senna 2 Tablet(s) Oral at bedtime PRN Constipation      Allergies    No Known Allergies    Intolerances        PHYSICAL EXAM:   Vital Signs:  Vital Signs Last 24 Hrs  T(C): 37.1 (25 Mar 2019 09:05), Max: 37.2 (24 Mar 2019 23:00)  T(F): 98.8 (25 Mar 2019 09:05), Max: 98.9 (24 Mar 2019 23:00)  HR: 88 (25 Mar 2019 09:05) (83 - 88)  BP: 114/72 (25 Mar 2019 09:05) (114/72 - 137/63)  BP(mean): --  RR: 14 (25 Mar 2019 09:05) (14 - 14)  SpO2: 100% (25 Mar 2019 09:05) (96% - 100%)  Daily     Daily I&O's Summary    ENERAL:  Appears stated age, well-groomed, well-nourished, no distress  HEENT:  NC/AT,  conjunctivae clear and pink,   CHEST:  Full & symmetric excursion, no increased effort, breath sounds clear  HEART:  Irregularly irregular rhythm   ABDOMEN:  Soft, non-tender, non-distended, +normoactive bowel sounds,  EXTEREMITIES:   tender to touch, Lidocaine patches b/l knees   SKIN:  no rash, warm/dry  NEURO:  Alert, oriented,        LABS:                        7.7    18.1  )-----------( 172      ( 25 Mar 2019 05:50 )             25.3     03-25    138  |  103  |  46<H>  ----------------------------<  217<H>  4.0   |  22  |  1.41<H>    Ca    9.7      25 Mar 2019 05:50          amylase   lipase  RADIOLOGY & ADDITIONAL TESTS:

## 2019-03-25 NOTE — PROGRESS NOTE ADULT - SUBJECTIVE AND OBJECTIVE BOX
HPI:  Patient is 84 male RH dominant with PMH significant for HTN, HLD,  Afib (off xarelto), stomach CA, anemia, gout, BPH. seizure d/o, recently stopped EToh use, who presented from [facility] with abnormal labs.  Pt reports recent admission to [facility] for gout flare then discharged to rehab.  Pt now returns with guaiac negative anemia (HgB 6.8 ) and  episode of bright red blood in stool. Pt also found with leukocytosis, with temp 100.7 F rectally.  Denies chills, fever, sob, chest pain, weakness, dysuria.    Patient was seen by GI and hematology.  S/P Colonoscopy on 3/1/19. No source of bleeding noted. Negative FOB. S/P EGD during prior admission, and no bleeding identified. Heme performed BM biopsy 3/4, Transfused PRBC. Patient transferred to [facility] due to debility and functional decline (06 Mar 2019 14:53)    Had questionable transfusion reaction over the weekend.       Subjective: HC PRoxy Mr Youssef____ at bedside who voices concern for pain, and requests indocin. He appears frustrated and at times does not allow pauses in his speech for me to converse. He asks me to call Mr Barriga "real doctor" in [facility]. On exam, patient is asleep. Rousable, and then awake; c/o generalized body pains from "his toes to his head" that is NOT typical of his prior gout flares. He denies fever chills CP SOB.     Vital Signs Last 24 Hrs  T(C): 37.1 (25 Mar 2019 09:05), Max: 37.2 (24 Mar 2019 23:00)  T(F): 98.8 (25 Mar 2019 09:05), Max: 98.9 (24 Mar 2019 23:00)  HR: 88 (25 Mar 2019 09:05) (83 - 88)  BP: 114/72 (25 Mar 2019 09:05) (114/72 - 137/63)  BP(mean): --  RR: 14 (25 Mar 2019 09:05) (14 - 14)  SpO2: 100% (25 Mar 2019 09:05) (96% - 100%)    PHYSICAL EXAM:  GENERAL: NAD  NECK: Supple  NERVOUS SYSTEM:  awake and alert  HEART: S1s2 NL , Irreg Irreg  CHEST/LUNG: Clear to percussion bilaterally  ABDOMEN: Soft, Nontender, Nondistended; Bowel sounds present  EXTREMITIES:  No edema                            7.7    18.1  )-----------( 172      ( 25 Mar 2019 05:50 )             25.3     25 Mar 2019 05:50    138    |  103    |  46     ----------------------------<  217    4.0     |  22     |  1.41     Ca    9.7        25 Mar 2019 05:50          CAPILLARY BLOOD GLUCOSE      POCT Blood Glucose.: 223 mg/dL (25 Mar 2019 08:16)  POCT Blood Glucose.: 237 mg/dL (24 Mar 2019 21:40)  POCT Blood Glucose.: 187 mg/dL (24 Mar 2019 17:19)      MEDICATIONS  (STANDING):  allopurinol 300 milliGRAM(s) Oral daily  amLODIPine   Tablet 10 milliGRAM(s) Oral daily  aspirin enteric coated 81 milliGRAM(s) Oral daily  atorvastatin 10 milliGRAM(s) Oral at bedtime  cyanocobalamin 1000 MICROGram(s) Oral daily  dextrose 5%. 1000 milliLiter(s) (50 mL/Hr) IV Continuous <Continuous>  dextrose 50% Injectable 12.5 Gram(s) IV Push once  dextrose 50% Injectable 25 Gram(s) IV Push once  dextrose 50% Injectable 25 Gram(s) IV Push once  docusate sodium 100 milliGRAM(s) Oral two times a day  epoetin jackelyn Injectable 74493 Unit(s) SubCutaneous <User Schedule>  famotidine    Tablet 20 milliGRAM(s) Oral daily  folic acid 1 milliGRAM(s) Oral daily  insulin lispro (HumaLOG) corrective regimen sliding scale   SubCutaneous three times a day before meals  insulin lispro (HumaLOG) corrective regimen sliding scale   SubCutaneous at bedtime  levETIRAcetam 500 milliGRAM(s) Oral two times a day  lidocaine   Patch 1 Patch Transdermal <User Schedule>  metoprolol tartrate 25 milliGRAM(s) Oral every 12 hours  multivitamin 1 Tablet(s) Oral daily  pantoprazole    Tablet 40 milliGRAM(s) Oral before breakfast  rivaroxaban 20 milliGRAM(s) Oral every 24 hours  sodium chloride 0.9%. 1000 milliLiter(s) (100 mL/Hr) IV Continuous <Continuous>  tamsulosin 0.4 milliGRAM(s) Oral at bedtime    MEDICATIONS  (PRN):  acetaminophen   Tablet .. 650 milliGRAM(s) Oral every 6 hours PRN Mild Pain (1 - 3)  dextrose 40% Gel 15 Gram(s) Oral once PRN Blood Glucose LESS THAN 70 milliGRAM(s)/deciliter  glucagon  Injectable 1 milliGRAM(s) IntraMuscular once PRN Glucose LESS THAN 70 milligrams/deciliter  senna 2 Tablet(s) Oral at bedtime PRN Constipation

## 2019-03-25 NOTE — PROGRESS NOTE ADULT - ASSESSMENT
83 y/o male (known from prior admission) with PMH Afib (off xarelto), anemia, stomach cancer (25 years ago), gout, HTN, BPH who is currently admitted to rehab now with reported rectal bleeding. S/P 1 unit PRBCS on 3/22/19. H/H today 7.7/25.3.

## 2019-03-25 NOTE — PROGRESS NOTE ADULT - ASSESSMENT
84-year-old gentleman with history of anemia, atrial fibrillation and seizure disorder admitted with anemia exacerbation from his [facility] facility. Patient notes recent history of bright red blood per rectum. He was scheduled for outpatient colonoscopy.  On admission, found to have rectal temp of 100.7°F in the emergency department.  Patient reported he stopped drinking alcohol approximately 3 months prior to admission

## 2019-03-25 NOTE — PROGRESS NOTE ADULT - SUBJECTIVE AND OBJECTIVE BOX
YUE KING   84y   Male    Admitting: SAEED Gallego  HPI:  84-year-old gentleman with history of anemia, atrial fibrillation and seizure disorder admitted with anemia exacerbation from his [facility] facility. Patient noted recent history of bright red blood per rectum. He was scheduled for outpatient colonoscopy. S/P BM biopsy.  Patient reported he stopped drinking alcohol approximately 3 months prior to admission.  Patient is now on the acute rehabilitation unit.    PAST MEDICAL & SURGICAL HISTORY:  Nonrheumatic aortic valve stenosis  Cancer of stomach  Anemia, unspecified type: Anemia  Seizure disorder  Benign prostatic hyperplasia, unspecified whether lower urinary tract symptoms present  Hyperlipemia  Essential hypertension  Chronic atrial fibrillation  Stomach cancer  Hypertension  Atrial fibrillation  Gout    HEALTH ISSUES - PROBLEM Dx:  Anemia  Ureteral stone: Ureteral stone  Chronic myelomonocytic leukemia not having achieved remission: Chronic myelomonocytic leukemia not having achieved remission  Anemia, unspecified type: Anemia, unspecified type  Rectal bleed    MEDICATIONS  (STANDING):  allopurinol 300 milliGRAM(s) Oral daily  amLODIPine   Tablet 10 milliGRAM(s) Oral daily  aspirin enteric coated 81 milliGRAM(s) Oral daily  atorvastatin 10 milliGRAM(s) Oral at bedtime  cyanocobalamin 1000 MICROGram(s) Oral daily  dextrose 5%. 1000 milliLiter(s) (50 mL/Hr) IV Continuous <Continuous>  dextrose 50% Injectable 12.5 Gram(s) IV Push once  dextrose 50% Injectable 25 Gram(s) IV Push once  dextrose 50% Injectable 25 Gram(s) IV Push once  docusate sodium 100 milliGRAM(s) Oral two times a day  epoetin jackelyn Injectable 73221 Unit(s) SubCutaneous <User Schedule>  famotidine    Tablet 20 milliGRAM(s) Oral daily  folic acid 1 milliGRAM(s) Oral daily  insulin lispro (HumaLOG) corrective regimen sliding scale   SubCutaneous three times a day before meals  insulin lispro (HumaLOG) corrective regimen sliding scale   SubCutaneous at bedtime  levETIRAcetam 500 milliGRAM(s) Oral two times a day  lidocaine   Patch 1 Patch Transdermal <User Schedule>  metoprolol tartrate 25 milliGRAM(s) Oral every 12 hours  multivitamin 1 Tablet(s) Oral daily  pantoprazole    Tablet 40 milliGRAM(s) Oral before breakfast  rivaroxaban 20 milliGRAM(s) Oral every 24 hours  sodium chloride 0.9%. 1000 milliLiter(s) (100 mL/Hr) IV Continuous <Continuous>  tamsulosin 0.4 milliGRAM(s) Oral at bedtime    MEDICATIONS  (PRN):  acetaminophen   Tablet .. 650 milliGRAM(s) Oral every 6 hours PRN Mild Pain (1 - 3)  dextrose 40% Gel 15 Gram(s) Oral once PRN Blood Glucose LESS THAN 70 milliGRAM(s)/deciliter  glucagon  Injectable 1 milliGRAM(s) IntraMuscular once PRN Glucose LESS THAN 70 milligrams/deciliter  senna 2 Tablet(s) Oral at bedtime PRN Constipation    Allergies    No Known Allergies    INTERVAL HPI/OVERNIGHT EVENTS:  Patient S&E at bedside. No current c/o CP or SOB.    VITAL SIGNS:  T(F): 98.9 (03-24-19 @ 23:00)  HR: 83 (03-24-19 @ 23:00)  BP: 137/63 (03-24-19 @ 23:00)  RR: 14 (03-24-19 @ 23:00)  SpO2: 96% (03-24-19 @ 23:00)    PHYSICAL EXAM:  GENERAL: NAD, well-developed  EYES: EOMI, PERRLA, conjunctiva and sclera clear  NECK: Supple, No JVD  CHEST/LUNG: decreased BS bases ant.  HEART: Regular rate and rhythm  ABDOMEN: Soft, Nontender, Nondistended  EXTREMITIES:  ankle bandaging in place; no calf tenderness appreciated  NEUROLOGY: awake, alert    Labs:             7.7    18.1  )-----------( 172      ( 03-25 @ 05:50 )             25.3                8.2    17.1  )-----------( 160      ( 03-24 @ 05:36 )             23.9                8.2    17.4  )-----------( 162      ( 03-23 @ 07:45 )             26.1                8.7    16.4  )-----------( 170      ( 03-23 @ 01:15 )             27.7                8.1    13.6  )-----------( 174      ( 03-22 @ 10:30 )             27.0       03-25    138  |  103  |  46<H>  ----------------------------<  217<H>  4.0   |  22  |  1.41<H>    Ca    9.7      25 Mar 2019 05:50    Culture - Blood (collected 23 Mar 2019 12:18)  Source: .Blood blood segments  Preliminary Report (25 Mar 2019 02:01):    No growth to date.    Culture - Blood (collected 23 Mar 2019 01:38)  Source: .Blood None  Gram Stain (24 Mar 2019 18:14):    Growth in anaerobic bottle: Gram Positive Cocci in Clusters  Preliminary Report (24 Mar 2019 18:14):    Growth in anaerobic bottle: Gram Positive Cocci in Clusters    ***Blood Panel PCR results on this specimen are available    approximately 3 hours after the Gram stain result.***    Gram stain, PCR, and/or culture results may not always    correspond due todifference in methodologies.    ************************************************************    This PCR assay was performed using pope[employer]pert.    The following targets are tested for: Enterococcus,    vancomycin resistant enterococci, Listeria monocytogenes,    coagulase negative staphylococci, S. aureus,    methicillin resistant S. aureus, Streptococcus agalactiae    (Group B), S. pneumoniae, S. pyogenes (Group A),    Acinetobacter baumannii, Enterobacter cloacae, E. coli,    Klebsiella oxytoca, K. pneumoniae, Proteus sp.,    Serratia marcescens, Haemophilus influenzae,    Neisseria meningitidis, Pseudomonas aeruginosa, Candida    albicans, C. glabrata, C krusei, C parapsilosis,    C. tropicalis and the KPC resistance gene.    "Due to technical problems, Proteussp. will Not be reported as part of    the BCID panel until further notice"  Organism: Blood Culture PCR (24 Mar 2019 20:40)  Organism: Blood Culture PCR (24 Mar 2019 20:40)    Culture - Blood (collected 23 Mar 2019 01:38)  Source: .Blood Blood-Peripheral  Preliminary Report (24 Mar 2019 11:01):    No growth to date.    RADIOLOGY & ADDITIONAL TESTS:  < from: Xray Chest 1 View- PORTABLE-Urgent (03.23.19 @ 01:30) >    EXAM:  XR CHEST PORTABLE URGENT 1V      PROCEDURE DATE:  03/23/2019        INTERPRETATION:  Portable chest x-ray    Indication: Fever. Status red blood cell transfusion.    Portable chest x-ray is compared to a previous examination dated   3/17/2019.    Impression: New left basilar density may be due to overlying soft tissue,   pulmonary consolidation, and/or pleural effusion.    No evidence for pneumothorax.    The trachea is midline.    Stable cardiac silhouette.      PADMINI DRAKE M.D., ATTENDING RADIOLOGIST  This document has been electronically signed. Mar 23 2019  8:40AM    < end of copied text >

## 2019-03-25 NOTE — CONSULT NOTE ADULT - ASSESSMENT
83 y/o male (known from prior admission) with PMH Afib (off xarelto), anemia, stomach cancer (25 years ago), gout, HTN, BPH who is currently admitted to rehab now with reported rectal bleeding.
83 yo male with multiple medical problems presents with worsening bilateral postural and intention tremor.  Suspect etiology is multifactorial and metabolic and due to his medical condition.  R/O essential tremor.  Keppra especially at this dose does not usually cause tremor especially since his renal function is improving. I do not see a relationship regarding tremor to his seizure disorder history.    REC:  continue Keppra and continue treatment of his multiple medical problems, check thyroid function if not already done, defer further neuro w/u.
84/M Admitted to acute rehab for debility, impaired ADLs/mobility  continue PT/OT     #Anemia, under evaluation  s/p Bone marrow biopsy, pending results  Heme/onc following   guaiac negative, s/p colonoscopy    #Gout, stable at this time.     #Right ureteral stone- asymptomatic.   continue flomax.  urology following    #Afib- has been off AC for 3 months   consider restarting if okay with GI/cardio     DVT prophylaxis- on lovenox SC
85 yo male with CMMoL, anemia, alcohol use,? dementia,A Fib, and polyarticular gout who developed fever in setting of blood transfusion.  His fever appears to have moderated.In his prior admissions he was felt to have fever secondary to gout.His CXR shows a new finding but little clinical support for infection such as pneumonia.Coag negative staph in the blood is a contaminant.  Suggest:  1.monitor off antibiotics  2.If fevers return will consider a repeat CXR, PA and lateral  3.He is a poor historian, it is possible that his polyarticular gout could be a factor with fever.  4.Additional w/u as needed
Admitted with anemia GI bleed and undergoing hematology workup - awaiting bone marrow results. Received recent transfusion and now with leukocytosis.    Now on rehab. Asymptomatic right ureteral stone on CT scan. No pain or colic. No fever or chills. No evidence of UTI.

## 2019-03-25 NOTE — PROGRESS NOTE ADULT - SUBJECTIVE AND OBJECTIVE BOX
Patient is a 84y old  Male who presents with a chief complaint of anemia, debility and functional decline (25 Mar 2019 08:41)      HPI:  Patient is 84 male RH dominant with PMH significant for HTN, HLD,  Afib (off xarelto), stomach CA, anemia, gout, BPH. seizure d/o, recently stopped EToh use, who presented from [facility] with abnormal labs.  Pt reports recent admission to [facility] for gout flare then discharged to rehab.  Pt now returns with guaiac negative anemia (HgB 6.8 ) and  episode of bright red blood in stool. Pt also found with leukocytosis, with temp 100.7 F rectally.  Denies chills, fever, sob, chest pain, weakness, dysuria.    Patient was seen by GI and hematology.  S/P Colonoscopy on 3/1/19. No source of bleeding noted. Negative FOB. S/P EGD during prior admission, and no bleeding identified. Heme performed BM biopsy 3/4, results pending. CKD, Etoh may contribute, although underlying BM d/o such as myelodysplasia suspected. Transfused PRBC. Patient transferred to - [facility] due to debility and functional decline (06 Mar 2019 14:53)      PAST MEDICAL & SURGICAL HISTORY:  Nonrheumatic aortic valve stenosis  Cancer of stomach  Anemia, unspecified type: Anemia  Seizure disorder  Benign prostatic hyperplasia, unspecified whether lower urinary tract symptoms present  Hyperlipemia  Essential hypertension  Chronic atrial fibrillation  Stomach cancer  Hypertension  Atrial fibrillation  Gout  No significant past surgical history  No significant past surgical history      MEDICATIONS  (STANDING):  allopurinol 300 milliGRAM(s) Oral daily  amLODIPine   Tablet 10 milliGRAM(s) Oral daily  aspirin enteric coated 81 milliGRAM(s) Oral daily  atorvastatin 10 milliGRAM(s) Oral at bedtime  cyanocobalamin 1000 MICROGram(s) Oral daily  dextrose 5%. 1000 milliLiter(s) (50 mL/Hr) IV Continuous <Continuous>  dextrose 50% Injectable 12.5 Gram(s) IV Push once  dextrose 50% Injectable 25 Gram(s) IV Push once  dextrose 50% Injectable 25 Gram(s) IV Push once  docusate sodium 100 milliGRAM(s) Oral two times a day  epoetin jackelyn Injectable 58963 Unit(s) SubCutaneous <User Schedule>  famotidine    Tablet 20 milliGRAM(s) Oral daily  folic acid 1 milliGRAM(s) Oral daily  insulin lispro (HumaLOG) corrective regimen sliding scale   SubCutaneous three times a day before meals  insulin lispro (HumaLOG) corrective regimen sliding scale   SubCutaneous at bedtime  levETIRAcetam 500 milliGRAM(s) Oral two times a day  lidocaine   Patch 1 Patch Transdermal <User Schedule>  metoprolol tartrate 25 milliGRAM(s) Oral every 12 hours  multivitamin 1 Tablet(s) Oral daily  pantoprazole    Tablet 40 milliGRAM(s) Oral before breakfast  rivaroxaban 20 milliGRAM(s) Oral every 24 hours  sodium chloride 0.9%. 1000 milliLiter(s) (100 mL/Hr) IV Continuous <Continuous>  tamsulosin 0.4 milliGRAM(s) Oral at bedtime    MEDICATIONS  (PRN):  acetaminophen   Tablet .. 650 milliGRAM(s) Oral every 6 hours PRN Mild Pain (1 - 3)  dextrose 40% Gel 15 Gram(s) Oral once PRN Blood Glucose LESS THAN 70 milliGRAM(s)/deciliter  glucagon  Injectable 1 milliGRAM(s) IntraMuscular once PRN Glucose LESS THAN 70 milligrams/deciliter  senna 2 Tablet(s) Oral at bedtime PRN Constipation      Allergies    No Known Allergies    Intolerances          VITALS  84y  Vital Signs Last 24 Hrs  T(C): 37.1 (25 Mar 2019 09:05), Max: 37.2 (24 Mar 2019 23:00)  T(F): 98.8 (25 Mar 2019 09:05), Max: 98.9 (24 Mar 2019 23:00)  HR: 88 (25 Mar 2019 09:05) (83 - 88)  BP: 114/72 (25 Mar 2019 09:05) (114/72 - 137/63)  BP(mean): --  RR: 14 (25 Mar 2019 09:05) (14 - 14)  SpO2: 100% (25 Mar 2019 09:05) (96% - 100%)  Daily     Daily         RECENT LABS:                          7.7    18.1  )-----------( 172      ( 25 Mar 2019 05:50 )             25.3     03-25    138  |  103  |  46<H>  ----------------------------<  217<H>  4.0   |  22  |  1.41<H>    Ca    9.7      25 Mar 2019 05:50              Culture - Blood (collected 03-23-19 @ 12:18)  Source: .Blood blood segments  Preliminary Report (03-25-19 @ 02:01):    No growth to date.    Culture - Blood (collected 03-23-19 @ 01:38)  Source: .Blood None  Gram Stain (03-24-19 @ 18:14):    Growth in anaerobic bottle: Gram Positive Cocci in Clusters  Preliminary Report (03-24-19 @ 18:14):    Growth in anaerobic bottle: Gram Positive Cocci in Clusters    ***Blood Panel PCR results on this specimen are available    approximately 3 hours after the Gram stain result.***    Gram stain, PCR, and/or culture results may not always    correspond due todifference in methodologies.    ************************************************************    This PCR assay was performed using [employer].    The following targets are tested for: Enterococcus,    vancomycin resistant enterococci, Listeria monocytogenes,    coagulase negative staphylococci, S. aureus,    methicillin resistant S. aureus, Streptococcus agalactiae    (Group B), S. pneumoniae, S. pyogenes (Group A),    Acinetobacter baumannii, Enterobacter cloacae, E. coli,    Klebsiella oxytoca, K. pneumoniae, Proteus sp.,    Serratia marcescens, Haemophilus influenzae,    Neisseria meningitidis, Pseudomonas aeruginosa, Candida    albicans, C. glabrata, C krusei, C parapsilosis,    C. tropicalis and the KPC resistance gene.    "Due to technical problems, Proteussp. will Not be reported as part of    the BCID panel until further notice"  Organism: Blood Culture PCR (03-24-19 @ 20:40)  Organism: Blood Culture PCR (03-24-19 @ 20:40)      -  Coagulase negative Staphylococcus: Detec      Method Type: PCR    Culture - Blood (collected 03-23-19 @ 01:38)  Source: .Blood Blood-Peripheral  Preliminary Report (03-24-19 @ 11:01):    No growth to date.        CAPILLARY BLOOD GLUCOSE      POCT Blood Glucose.: 223 mg/dL (25 Mar 2019 08:16)  POCT Blood Glucose.: 237 mg/dL (24 Mar 2019 21:40)  POCT Blood Glucose.: 187 mg/dL (24 Mar 2019 17:19)

## 2019-03-25 NOTE — CONSULT NOTE ADULT - SUBJECTIVE AND OBJECTIVE BOX
HPI:   Patient is a 84y male with a past history of partial gastrectomy for gastric cancer, alcohol abuse, recent admissions  x 2 to [facility] for anemia with fever felt secondary to polyarticular gout, transferred to [facility] on 3/6, who over past 72 hours has had recurrent fever, at least temporally after receiving a blood transfusion.He has had 2 recent admissions for anemia, EGD and colonoscopy without a source.He required multiple blood transfusions,a BM biopsy with CMMol.He was on zarelto for A fib, held.He also has a history of alcohol use, stopped a few months ago.He had fever on 3/22-3/24, no clear localizing finding.He is a poor historian,? underlying dementia.He denies GI,, or respiratory symptoms.He has chronic joint pain.He was subjectively unaware of fever.    REVIEW OF SYSTEMS:  All other review of systems negative (Comprehensive ROS)B/L hand and knee pain, limited recall    PAST MEDICAL & SURGICAL HISTORY:  Nonrheumatic aortic valve stenosis  Cancer of stomach  Anemia, unspecified type: Anemia  Seizure disorder  Benign prostatic hyperplasia, unspecified whether lower urinary tract symptoms present  Hyperlipemia  Essential hypertension  Chronic atrial fibrillation  Stomach cancer  Hypertension  Atrial fibrillation  Gout  ? partial gastrectomy        Allergies    No Known Allergies    Intolerances        Antimicrobials Day #  :    Other Medications:  acetaminophen   Tablet .. 650 milliGRAM(s) Oral every 6 hours PRN  allopurinol 300 milliGRAM(s) Oral daily  amLODIPine   Tablet 10 milliGRAM(s) Oral daily  aspirin enteric coated 81 milliGRAM(s) Oral daily  atorvastatin 10 milliGRAM(s) Oral at bedtime  cyanocobalamin 1000 MICROGram(s) Oral daily  dextrose 40% Gel 15 Gram(s) Oral once PRN  dextrose 5%. 1000 milliLiter(s) IV Continuous <Continuous>  dextrose 50% Injectable 12.5 Gram(s) IV Push once  dextrose 50% Injectable 25 Gram(s) IV Push once  dextrose 50% Injectable 25 Gram(s) IV Push once  docusate sodium 100 milliGRAM(s) Oral two times a day  epoetin jackelyn Injectable 45072 Unit(s) SubCutaneous <User Schedule>  famotidine    Tablet 20 milliGRAM(s) Oral daily  folic acid 1 milliGRAM(s) Oral daily  glucagon  Injectable 1 milliGRAM(s) IntraMuscular once PRN  insulin lispro (HumaLOG) corrective regimen sliding scale   SubCutaneous three times a day before meals  insulin lispro (HumaLOG) corrective regimen sliding scale   SubCutaneous at bedtime  levETIRAcetam 500 milliGRAM(s) Oral two times a day  lidocaine   Patch 1 Patch Transdermal <User Schedule>  metoprolol tartrate 25 milliGRAM(s) Oral every 12 hours  multivitamin 1 Tablet(s) Oral daily  pantoprazole    Tablet 40 milliGRAM(s) Oral before breakfast  rivaroxaban 20 milliGRAM(s) Oral every 24 hours  senna 2 Tablet(s) Oral at bedtime PRN  sodium chloride 0.9%. 1000 milliLiter(s) IV Continuous <Continuous>  tamsulosin 0.4 milliGRAM(s) Oral at bedtime      FAMILY HISTORY:  No pertinent family history in first degree relatives  No pertinent family history in first degree relatives      SOCIAL HISTORY:  Smoking:  no   ETOH:  yes   Drug Use: no  partner x 37 years    T(F): 98.8 (03-25-19 @ 09:05), Max: 98.9 (03-24-19 @ 23:00)  HR: 88 (03-25-19 @ 09:05)  BP: 114/72 (03-25-19 @ 09:05)  RR: 14 (03-25-19 @ 09:05)  SpO2: 100% (03-25-19 @ 09:05)  Wt(kg): --    PHYSICAL EXAM:  General: alert, no acute distress  Eyes:  anicteric, no conjunctival injection, no discharge  Oropharynx: no lesions or injection 	  Neck: supple, without adenopathy  Lungs: clear to auscultation  Heart: irregular rate and rhythm; soft maya  Abdomen: soft, nondistended, nontender, without mass or organomegaly  Skin: no lesions  Extremities: no clubbing, cyanosis, or edema  Neurologic: alert, oriented, moves all extremities  B/L hand arthritis and knee arthritis  LAB RESULTS:                        7.7    18.1  )-----------( 172      ( 25 Mar 2019 05:50 )             25.3     03-25    138  |  103  |  46<H>  ----------------------------<  217<H>  4.0   |  22  |  1.41<H>    Ca    9.7      25 Mar 2019 05:50        UA no pyuria    MICROBIOLOGY:  RECENT CULTURES:  03-23 @ 12:18 .Blood culture  No growth to date.      03-23 @ 01:38 .Blood Blood-Peripheral Blood Culture PCR    No growth to date.    Growth in anaerobic bottle: Gram Positive Cocci in Clusters, coag negaqtive staph          RADIOLOGY REVIEWED:  < from: Xray Chest 1 View- PORTABLE-Urgent (03.23.19 @ 01:30) >  EXAM:  XR CHEST PORTABLE URGENT 1V      PROCEDURE DATE:  03/23/2019        INTERPRETATION:  Portable chest x-ray    Indication: Fever. Status red blood cell transfusion.    Portable chest x-ray is compared to a previous examination dated   3/17/2019.    Impression: New left basilar density may be due to overlying soft tissue,   pulmonary consolidation, and/or pleural effusion.    No evidence for pneumothorax.    The trachea is midline.    Stable cardiac silhouette.    < end of copied text >
INTERVAL HPI/OVERNIGHT EVENTS:  HPI:  83 y/o male (known from prior admission) with PMH Afib (off xarelto), anemia, stomach cancer (25 years ago), gout, HTN, BPH who is currently admitted to rehab now with reported rectal bleeding. Patient states he was told there was red blood after he had a BM yesterday. Per RN, she received in report that red blood was noted around stool. No further episodes of bleeding since yesterday. Patient persistently with low H/H which he was previously worked up for. He is s/p EGD on 2/8/19 and colonoscopy on 3/1/19 with no source of bleeding identified at that time. FOB negative on 2/28/19.  Patient denies abdomina pain, nausea, vomiting or melena.      MEDICATIONS  (STANDING):  allopurinol 300 milliGRAM(s) Oral daily  amLODIPine   Tablet 10 milliGRAM(s) Oral daily  atorvastatin 10 milliGRAM(s) Oral at bedtime  cyanocobalamin 1000 MICROGram(s) Oral daily  docusate sodium 100 milliGRAM(s) Oral two times a day  famotidine    Tablet 20 milliGRAM(s) Oral daily  folic acid 1 milliGRAM(s) Oral daily  levETIRAcetam 500 milliGRAM(s) Oral two times a day  metoprolol tartrate 25 milliGRAM(s) Oral every 12 hours  multivitamin 1 Tablet(s) Oral daily  pantoprazole    Tablet 40 milliGRAM(s) Oral before breakfast  tamsulosin 0.4 milliGRAM(s) Oral at bedtime    MEDICATIONS  (PRN):  acetaminophen   Tablet .. 650 milliGRAM(s) Oral every 6 hours PRN Mild Pain (1 - 3)  senna 2 Tablet(s) Oral at bedtime PRN Constipation      Allergies    No Known Allergies    Intolerances        PHYSICAL EXAM:   Vital Signs:  Vital Signs Last 24 Hrs  T(C): 36.2 (19 Mar 2019 09:59), Max: 36.4 (18 Mar 2019 21:52)  T(F): 97.2 (19 Mar 2019 09:59), Max: 97.6 (18 Mar 2019 21:52)  HR: 87 (19 Mar 2019 09:59) (78 - 87)  BP: 127/73 (19 Mar 2019 09:59) (127/73 - 146/81)  BP(mean): --  RR: 16 (19 Mar 2019 09:59) (16 - 18)  SpO2: 92% (19 Mar 2019 09:59) (92% - 97%)  Daily     Daily I&O's Summary    GENERAL:  Appears stated age, well-groomed, well-nourished, no distress  HEENT:  NC/AT,  conjunctivae clear and pink,   CHEST:  Full & symmetric excursion, no increased effort, breath sounds clear  HEART:  Irregular rhythm   ABDOMEN:  Soft, non-tender, non-distended,+normoactive bowel sounds,  EXTEREMITIES:  +1 B/L JESSE, RUE edema   SKIN:  no rash, warm/dry  NEURO:  Alert, oriented,    LABS:                        7.5    9.7   )-----------( 211      ( 19 Mar 2019 08:30 )             24.9     03-19    140  |  105  |  43<H>  ----------------------------<  221<H>  4.2   |  25  |  1.46<H>    Ca    9.2      19 Mar 2019 08:30          amylase   lipase  RADIOLOGY & ADDITIONAL TESTS:
Neurology consult    YUE [id number]     Patient is a 84y old  Male who presents with a chief complaint of anemia, debility and functional decline (06 Mar 2019 19:15)      HPI:  Patient is 84 male RH dominant with PMH significant for HTN, HLD,  Afib (off xarelto), stomach CA, anemia, gout, BPH. seizure d/o, recently stopped EToh use, who presented from [facility] with abnormal labs.  Pt reports recent admission to [facility] for gout flare then discharged to rehab.  Pt now returns with guaiac negative anemia (HgB 6.8 ) and  episode of bright red blood in stool. Pt also found with leukocytosis, with temp 100.7 F rectally.  Denies chills, fever, sob, chest pain, weakness, dysuria.    Patient was seen by GI and hematology.  S/P Colonoscopy on 3/1/19. No source of bleeding noted. Negative FOB. S/P EGD during prior admission, and no bleeding identified. Heme performed BM biopsy 3/4, results pending. CKD, Etoh may contribute, although underlying BM d/o such as myelodysplasia suspected. Transfused PRBC. Patient transferred to - [facility] due to debility and functional decline (06 Mar 2019 14:53)    MEDICATIONS    allopurinol 100 milliGRAM(s) Oral daily  amLODIPine   Tablet 10 milliGRAM(s) Oral daily  aspirin enteric coated 81 milliGRAM(s) Oral daily  atorvastatin 10 milliGRAM(s) Oral at bedtime  docusate sodium 100 milliGRAM(s) Oral two times a day  enoxaparin Injectable 40 milliGRAM(s) SubCutaneous every 24 hours  folic acid 1 milliGRAM(s) Oral daily  furosemide    Tablet 40 milliGRAM(s) Oral every 12 hours  indomethacin 25 milliGRAM(s) Oral two times a day PRN  levETIRAcetam 500 milliGRAM(s) Oral two times a day  metoprolol tartrate 25 milliGRAM(s) Oral every 12 hours  multivitamin 1 Tablet(s) Oral daily  pantoprazole    Tablet 40 milliGRAM(s) Oral before breakfast  senna 2 Tablet(s) Oral at bedtime PRN  tamsulosin 0.4 milliGRAM(s) Oral at bedtime      PMH: Nonrheumatic aortic valve stenosis  Cancer of stomach  Anemia, unspecified type  Seizure disorder  Benign prostatic hyperplasia, unspecified whether lower urinary tract symptoms present  Hyperlipemia  Essential hypertension  HTN (hypertension)  Chronic atrial fibrillation  Stomach cancer  Hypertension  Atrial fibrillation  Gout       PSH: No significant past surgical history  No significant past surgical history      Family history:   FAMILY HISTORY:  No pertinent family history in first degree relatives  No pertinent family history in first degree relatives      SOCIAL HISTORY:  No history of tobacco or alcohol use     Allergies    No Known Allergies    Intolerances        Height (cm): 185.42 (03-06 @ 13:00)  Weight (kg): 102.5 (03-06 @ 13:00)  BMI (kg/m2): 29.8 (03-06 @ 13:00)    Vital Signs Last 24 Hrs  T(C): 37.4 (06 Mar 2019 22:19), Max: 37.4 (06 Mar 2019 22:19)  T(F): 99.3 (06 Mar 2019 22:19), Max: 99.3 (06 Mar 2019 22:19)  HR: 87 (07 Mar 2019 07:07) (87 - 106)  BP: 135/79 (07 Mar 2019 07:07) (122/71 - 148/76)  BP(mean): --  RR: 14 (06 Mar 2019 22:19) (14 - 14)  SpO2: 97% (06 Mar 2019 22:19) (97% - 97%)      On Neurological Examination:    Head: normocephalic Neck: supple no carotid bruits.  Appears chronically ill.    Mental Status - Patient is mildly lethargic oriented vague follows commands speech intact.    Cranial Nerves - PERRL, EOMI, VFF, normal V through XII no nystagmus    Motor Exam :  No drift diffusely weak and mild increase in tone with bilateral postural and intention tremor.  No dysmetria.    Sensory    Intact to light touch and pinprick     Reflexes:  symmetric plantars downgoing    Gait -  not tested                                                      LABS:  CBC Full  -  ( 06 Mar 2019 16:39 )  WBC Count : 17.1 K/uL  Hemoglobin : 9.4 g/dL  Hematocrit : 29.9 %  Platelet Count - Automated : 262 K/uL  Mean Cell Volume : 99.6 fl  Mean Cell Hemoglobin : 31.4 pg  Mean Cell Hemoglobin Concentration : 31.5 gm/dL  Auto Neutrophil # : x  Auto Lymphocyte # : x  Auto Monocyte # : x  Auto Eosinophil # : x  Auto Basophil # : x  Auto Neutrophil % : 38.0 %  Auto Lymphocyte % : 17.0 %  Auto Monocyte % : 38.0 %  Auto Eosinophil % : x  Auto Basophil % : 1.0 %      03-06    135  |  99  |  22  ----------------------------<  218<H>  4.2   |  24  |  1.25    Ca    8.9      06 Mar 2019 16:39    TPro  7.4  /  Alb  2.1<L>  /  TBili  0.7  /  DBili  x   /  AST  17  /  ALT  12  /  AlkPhos  30<L>  03-06    LIVER FUNCTIONS - ( 06 Mar 2019 16:39 )  Alb: 2.1 g/dL / Pro: 7.4 g/dL / ALK PHOS: 30 U/L / ALT: 12 U/L DA / AST: 17 U/L / GGT: x           Hemoglobin A1C:                 RADIOLOGY  CT:   MRI:
Patient is a 84y old  Male who presents with a chief complaint of anemia, debility and functional decline (07 Mar 2019 13:36)    HPI:  Patient is 84 male RH dominant with PMH significant for HTN, HLD,  Afib (off xarelto), stomach CA, anemia, gout, BPH. seizure d/o, recently stopped EToh use, who presented from [facility] with abnormal labs.  Pt reports recent admission to [facility] for gout flare then discharged to rehab.  Pt now returns with guaiac negative anemia (HgB 6.8 ) and  episode of bright red blood in stool. Pt also found with leukocytosis, with temp 100.7 F rectally.  Denies chills, fever, sob, chest pain, weakness, dysuria.    Patient was seen by GI and hematology.  S/P Colonoscopy on 3/1/19. No source of bleeding noted. Negative FOB. S/P EGD during prior admission, and no bleeding identified. Heme performed BM biopsy 3/4, results pending. CKD, Etoh may contribute, although underlying BM d/o such as myelodysplasia suspected. Transfused PRBC. Patient transferred to - [facility] due to debility and functional decline (06 Mar 2019 14:53)    Patient seen and examined at bedside. no complaints     PAST MEDICAL & SURGICAL HISTORY:  Nonrheumatic aortic valve stenosis  Cancer of stomach  Anemia, unspecified type  Seizure disorder  Benign prostatic hyperplasia, unspecified whether lower urinary tract symptoms present  Hyperlipemia  Essential hypertension  Chronic atrial fibrillation  Stomach cancer  Hypertension  Atrial fibrillation  Gout  No significant past surgical history  No significant past surgical history    SOCIAL HISTORY:  Tobacco Usage:  ( x  ) never smoked   (   ) former smoker   (   ) current smoker  (     ) pack years    Tobacco Quit Date:  Substance Use (Street drugs): ( x ) never used  (  ) other:  Alcohol Usage:    Family history reviewed and otherwise non-contributory  ALLERGIES:  No Known Allergies    MEDICATIONS:  allopurinol 100 milliGRAM(s) Oral daily  amLODIPine   Tablet 10 milliGRAM(s) Oral daily  aspirin enteric coated 81 milliGRAM(s) Oral daily  atorvastatin 10 milliGRAM(s) Oral at bedtime  docusate sodium 100 milliGRAM(s) Oral two times a day  enoxaparin Injectable 40 milliGRAM(s) SubCutaneous every 24 hours  folic acid 1 milliGRAM(s) Oral daily  furosemide    Tablet 40 milliGRAM(s) Oral every 12 hours  indomethacin 25 milliGRAM(s) Oral two times a day PRN  levETIRAcetam 500 milliGRAM(s) Oral two times a day  metoprolol tartrate 25 milliGRAM(s) Oral every 12 hours  multivitamin 1 Tablet(s) Oral daily  pantoprazole    Tablet 40 milliGRAM(s) Oral before breakfast  senna 2 Tablet(s) Oral at bedtime PRN  tamsulosin 0.4 milliGRAM(s) Oral at bedtime    REVIEW OF SYSTEMS:  CONSTITUTIONAL: No fever, weight loss, or fatigue  EYES: No eye pain, visual disturbances, or discharge  ENMT:  No difficulty hearing, tinnitus, vertigo; No sinus or throat pain  NECK: No neck pain or neck stiffness  RESPIRATORY: No cough, wheezing, chills or hemoptysis; No shortness of breath  CARDIOVASCULAR: No chest pain, palpitations, dizziness, or leg swelling  GASTROINTESTINAL: No abdominal pain, No nausea, vomiting, or hematemesis; No diarrhea or constipation  GENITOURINARY: No dysuria, frequency, hematuria, or incontinence  NEUROLOGICAL: No headaches, memory loss, loss of strength, numbness, or tremors  SKIN: No itching, burning, rashes, or lesions   ENDOCRINE: No heat or cold intolerance; No hair loss  MUSCULOSKELETAL: No joint pain or swelling; No muscle, back, or extremity pain  PSYCHIATRIC: No depression, anxiety, mood swings, or difficulty sleeping  HEME/LYMPH: No easy bruising or bleeding  ALLERY AND IMMUNOLOGIC: No hives or eczema    All other ROS reviewed and negative except as otherwise stated    Vital Signs Last 24 Hrs  T(F): 98.5 (07 Mar 2019 08:25), Max: 99.3 (06 Mar 2019 22:19)  HR: 90 (07 Mar 2019 08:25) (47 - 106)  BP: 121/67 (07 Mar 2019 08:25) (88/64 - 135/79)  RR: 16 (07 Mar 2019 08:25) (14 - 16)  SpO2: 100% (07 Mar 2019 08:25) (97% - 100%)  I&O's Summary    06 Mar 2019 07:01  -  07 Mar 2019 07:00  --------------------------------------------------------  IN: 0 mL / OUT: 350 mL / NET: -350 mL      PHYSICAL EXAM:  GENERAL: NAD, well-groomed, well-developed  HEAD:  Atraumatic, Normocephalic  EYES: EOMI, PERRLA, conjunctiva and sclera clear  NECK: Supple, No JVD  CHEST/LUNG: Clear to auscultation bilaterally; No rales, rhonchi, wheezing, or rubs  HEART: Regular rate and rhythm; S1/S2, No murmurs, rubs, or gallops  ABDOMEN: Soft, Nontender, Nondistended; Bowel sounds present  VASCULAR: Normal pulses, Normal capillary refill  EXTREMITIES:  2+ Peripheral Pulses, No cyanosis, No edema  SKIN: Warm, Intact  PSYCH: Normal mood and affect  NERVOUS SYSTEM:  A/O x 3, Good concentration; CN 2-12 intact, No focal deficits    LABS:                        9.4    17.1  )-----------( 262      ( 06 Mar 2019 16:39 )             29.9     03-06    135  |  99  |  22  ----------------------------<  218  4.2   |  24  |  1.25    Ca    8.9      06 Mar 2019 16:39    TPro  7.4  /  Alb  2.1  /  TBili  0.7  /  DBili  x   /  AST  17  /  ALT  12  /  AlkPhos  30  03-06    eGFR if Non African American: 52 mL/min/1.73M2 (19 @ 16:39)  eGFR if : 61 mL/min/1.73M2 (19 @ 16:39)    TSH 2.58   TSH with FT4 reflex --  Total T3 --    CAPILLARY BLOOD GLUCOSE    12-23 [id number] 6.3    Urinalysis Basic - ( 07 Mar 2019 07:45 )    Color: Yellow / Appearance: Clear / S.020 / pH: x  Gluc: x / Ketone: Negative  / Bili: Negative / Urobili: Negative   Blood: x / Protein: 30 mg/dL / Nitrite: Negative   Leuk Esterase: Negative / RBC: 5-10 /HPF / WBC Negative /HPF   Sq Epi: x / Non Sq Epi: Neg.-Few / Bacteria: Few /HPF        RADIOLOGY & ADDITIONAL TESTS:    Care Discussed with Consultants/Other Providers:
Patient is a 84y old  Male who presents with a chief complaint of anemia, debility and functional decline (06 Mar 2019 14:53).      HPI:  Patient is 84 male RH dominant with PMH significant for HTN, HLD,  Afib (off xarelto), stomach CA, anemia, gout, BPH. seizure d/o, recently stopped EToh use, who presented from [facility] with abnormal labs.  Pt reports recent admission to [facility] for gout flare then discharged to rehab.  Pt now returns with guaiac negative anemia (HgB 6.8 ) and episode of bright red blood in stool.     Patient was seen by GI and hematology.  S/P Colonoscopy on 3/1/19. No source of bleeding noted. Negative FOB. S/P EGD during prior admission, and no bleeding identified. Heme performed BM biopsy 3/4, results pending. CKD, Etoh may contribute, although underlying BM d/o such as myelodysplasia suspected. Transfused PRBC. Patient transferred to - rehab due to debility and functional decline (06 Mar 2019 14:53)    CT scan revealed BPH and a 5 mm right distal ureteral stone without significant hydronephrosis. The findings are stable on interval CT scans.     Patient denies any pain or colic. No fever or chills. He is on Flomax for BPH.      PAST MEDICAL & SURGICAL HISTORY:  Nonrheumatic aortic valve stenosis  Cancer of stomach  Anemia, unspecified type  Seizure disorder  Benign prostatic hyperplasia, unspecified whether lower urinary tract symptoms present  Hyperlipemia  Essential hypertension  Chronic atrial fibrillation  Stomach cancer  Hypertension  Atrial fibrillation  Gout    REVIEW OF SYSTEMS:    CONSTITUTIONAL:  no fevers or chills  NECK: No pain or stiffness  MUSCULOSKELETAL: No back pain  RESPIRATORY: No shortness of breath  CARDIOVASCULAR: No chest pain  GASTROINTESTINAL: No abdominal or epigastric pain. No nausea, vomiting  NEUROLOGICAL: No mental status changes      MEDICATIONS  (STANDING):  allopurinol 100 milliGRAM(s) Oral daily  amLODIPine   Tablet 10 milliGRAM(s) Oral daily  aspirin enteric coated 81 milliGRAM(s) Oral daily  atorvastatin 10 milliGRAM(s) Oral at bedtime  docusate sodium 100 milliGRAM(s) Oral two times a day  enoxaparin Injectable 40 milliGRAM(s) SubCutaneous every 24 hours  folic acid 1 milliGRAM(s) Oral daily  furosemide    Tablet 40 milliGRAM(s) Oral every 12 hours  levETIRAcetam 500 milliGRAM(s) Oral two times a day  metoprolol tartrate 25 milliGRAM(s) Oral every 12 hours  multivitamin 1 Tablet(s) Oral daily  pantoprazole    Tablet 40 milliGRAM(s) Oral before breakfast  tamsulosin 0.4 milliGRAM(s) Oral at bedtime    MEDICATIONS  (PRN):  indomethacin 25 milliGRAM(s) Oral two times a day PRN Moderate Pain (4 - 6)  senna 2 Tablet(s) Oral at bedtime PRN Constipation      Allergies    No Known Allergies          SOCIAL HISTORY: No illicit drug use    FAMILY HISTORY:  No pertinent family history in first degree relatives        Vital Signs Last 24 Hrs  T(C): 37 (06 Mar 2019 13:00), Max: 37 (05 Mar 2019 20:27)  T(F): 98.6 (06 Mar 2019 13:00), Max: 98.6 (05 Mar 2019 20:27)  HR: 106 (06 Mar 2019 18:10) (86 - 106)  BP: 135/77      PHYSICAL EXAM:    Constitutional: NAD, well-developed  HEENT: EOMI  Respiratory: No accessory respiratory muscle use  Abd: Soft, NT/ND  Neurological: A/O x 3  Psychiatric: Normal mood, normal affect        LABS:                        9.4    17.1  )-----------( 262      ( 06 Mar 2019 16:39 )             29.9     03-06    135  |  99  |  22  ----------------------------<  218<H>  4.2   |  24  |  1.25    Ca    8.9      06 Mar 2019 16:39    TPro  7.4  /  Alb  2.1<L>  /  TBili  0.7  /  DBili  x   /  AST  17  /  ALT  12  /  AlkPhos  30<L>  03-06

## 2019-03-25 NOTE — CONSULT NOTE ADULT - REASON FOR ADMISSION
anemia, debility and functional decline

## 2019-03-26 LAB
HAPTOGLOB SERPL-MCNC: 385 MG/DL — HIGH (ref 34–200)
HCT VFR BLD CALC: 25.1 % — LOW (ref 39–50)
HGB BLD-MCNC: 7.8 G/DL — LOW (ref 13–17)
MCHC RBC-ENTMCNC: 31.2 GM/DL — LOW (ref 32–36)
MCHC RBC-ENTMCNC: 31.8 PG — SIGNIFICANT CHANGE UP (ref 27–34)
MCV RBC AUTO: 101.9 FL — HIGH (ref 80–100)
PLATELET # BLD AUTO: 189 K/UL — SIGNIFICANT CHANGE UP (ref 150–400)
RBC # BLD: 2.39 M/UL — LOW (ref 4.2–5.8)
RBC # BLD: 2.47 M/UL — LOW (ref 4.2–5.8)
RBC # FLD: 19.4 % — HIGH (ref 10.3–14.5)
RETICS #: 55.7 K/UL — SIGNIFICANT CHANGE UP (ref 25–125)
RETICS/RBC NFR: 2.3 % — SIGNIFICANT CHANGE UP (ref 0.5–2.5)
WBC # BLD: 16.3 K/UL — HIGH (ref 3.8–10.5)
WBC # FLD AUTO: 16.3 K/UL — HIGH (ref 3.8–10.5)

## 2019-03-26 PROCEDURE — 99232 SBSQ HOSP IP/OBS MODERATE 35: CPT

## 2019-03-26 PROCEDURE — 99233 SBSQ HOSP IP/OBS HIGH 50: CPT

## 2019-03-26 RX ADMIN — LIDOCAINE 1 PATCH: 4 CREAM TOPICAL at 09:00

## 2019-03-26 RX ADMIN — LIDOCAINE 1 APPLICATION(S): 4 CREAM TOPICAL at 21:35

## 2019-03-26 RX ADMIN — LIDOCAINE 1 APPLICATION(S): 4 CREAM TOPICAL at 14:27

## 2019-03-26 RX ADMIN — TRAMADOL HYDROCHLORIDE 25 MILLIGRAM(S): 50 TABLET ORAL at 09:00

## 2019-03-26 RX ADMIN — AMLODIPINE BESYLATE 10 MILLIGRAM(S): 2.5 TABLET ORAL at 05:47

## 2019-03-26 RX ADMIN — Medication 100 MILLIGRAM(S): at 17:24

## 2019-03-26 RX ADMIN — Medication 1 TABLET(S): at 12:24

## 2019-03-26 RX ADMIN — TRAMADOL HYDROCHLORIDE 25 MILLIGRAM(S): 50 TABLET ORAL at 08:10

## 2019-03-26 RX ADMIN — ATORVASTATIN CALCIUM 10 MILLIGRAM(S): 80 TABLET, FILM COATED ORAL at 21:31

## 2019-03-26 RX ADMIN — Medication 2: at 08:10

## 2019-03-26 RX ADMIN — FAMOTIDINE 20 MILLIGRAM(S): 10 INJECTION INTRAVENOUS at 12:24

## 2019-03-26 RX ADMIN — Medication 2: at 12:00

## 2019-03-26 RX ADMIN — Medication 81 MILLIGRAM(S): at 12:24

## 2019-03-26 RX ADMIN — RIVAROXABAN 20 MILLIGRAM(S): KIT at 17:24

## 2019-03-26 RX ADMIN — Medication 1 MILLIGRAM(S): at 12:24

## 2019-03-26 RX ADMIN — PREGABALIN 1000 MICROGRAM(S): 225 CAPSULE ORAL at 12:24

## 2019-03-26 RX ADMIN — LEVETIRACETAM 500 MILLIGRAM(S): 250 TABLET, FILM COATED ORAL at 17:24

## 2019-03-26 RX ADMIN — LEVETIRACETAM 500 MILLIGRAM(S): 250 TABLET, FILM COATED ORAL at 05:47

## 2019-03-26 RX ADMIN — Medication 300 MILLIGRAM(S): at 12:24

## 2019-03-26 RX ADMIN — TAMSULOSIN HYDROCHLORIDE 0.4 MILLIGRAM(S): 0.4 CAPSULE ORAL at 21:31

## 2019-03-26 RX ADMIN — Medication 1: at 17:22

## 2019-03-26 RX ADMIN — Medication 25 MILLIGRAM(S): at 05:47

## 2019-03-26 RX ADMIN — LIDOCAINE 1 PATCH: 4 CREAM TOPICAL at 21:31

## 2019-03-26 RX ADMIN — Medication 100 MILLIGRAM(S): at 05:47

## 2019-03-26 RX ADMIN — TRAMADOL HYDROCHLORIDE 25 MILLIGRAM(S): 50 TABLET ORAL at 17:22

## 2019-03-26 RX ADMIN — PANTOPRAZOLE SODIUM 40 MILLIGRAM(S): 20 TABLET, DELAYED RELEASE ORAL at 05:48

## 2019-03-26 RX ADMIN — Medication 25 MILLIGRAM(S): at 17:24

## 2019-03-26 RX ADMIN — LIDOCAINE 1 APPLICATION(S): 4 CREAM TOPICAL at 05:47

## 2019-03-26 RX ADMIN — TRAMADOL HYDROCHLORIDE 25 MILLIGRAM(S): 50 TABLET ORAL at 18:00

## 2019-03-26 NOTE — PROGRESS NOTE ADULT - SUBJECTIVE AND OBJECTIVE BOX
CC: f/u for recent fever    Patient reports: no fever for past few days.He is of limited recall    REVIEW OF SYSTEMS:  All other review of systems negative (Comprehensive ROS)    Antimicrobials Day #  :off    Other Medications Reviewed  MEDICATIONS  (STANDING):  allopurinol 300 milliGRAM(s) Oral daily  amLODIPine   Tablet 10 milliGRAM(s) Oral daily  aspirin enteric coated 81 milliGRAM(s) Oral daily  atorvastatin 10 milliGRAM(s) Oral at bedtime  cyanocobalamin 1000 MICROGram(s) Oral daily  dextrose 5%. 1000 milliLiter(s) (50 mL/Hr) IV Continuous <Continuous>  dextrose 50% Injectable 12.5 Gram(s) IV Push once  dextrose 50% Injectable 25 Gram(s) IV Push once  dextrose 50% Injectable 25 Gram(s) IV Push once  docusate sodium 100 milliGRAM(s) Oral two times a day  epoetin jackelyn Injectable 27924 Unit(s) SubCutaneous <User Schedule>  famotidine    Tablet 20 milliGRAM(s) Oral daily  folic acid 1 milliGRAM(s) Oral daily  insulin lispro (HumaLOG) corrective regimen sliding scale   SubCutaneous three times a day before meals  insulin lispro (HumaLOG) corrective regimen sliding scale   SubCutaneous at bedtime  levETIRAcetam 500 milliGRAM(s) Oral two times a day  lidocaine   Patch 1 Patch Transdermal <User Schedule>  lidocaine 5% Ointment 1 Application(s) Topical three times a day  metoprolol tartrate 25 milliGRAM(s) Oral every 12 hours  multivitamin 1 Tablet(s) Oral daily  pantoprazole    Tablet 40 milliGRAM(s) Oral before breakfast  rivaroxaban 20 milliGRAM(s) Oral every 24 hours  sodium chloride 0.9%. 1000 milliLiter(s) (100 mL/Hr) IV Continuous <Continuous>  tamsulosin 0.4 milliGRAM(s) Oral at bedtime    T(F): 98.1 (03-26-19 @ 07:58), Max: 98.7 (03-25-19 @ 17:17)  HR: 79 (03-26-19 @ 07:58)  BP: 113/61 (03-26-19 @ 07:58)  RR: 14 (03-26-19 @ 07:58)  SpO2: 98% (03-26-19 @ 07:58)  Wt(kg): --    PHYSICAL EXAM:  General: alert, no acute distress  Eyes:  anicteric, no conjunctival injection, no discharge  Oropharynx: no lesions or injection 	  Neck: supple, without adenopathy  Lungs: clear to auscultation  Heart: irregular rate and rhythm; no murmur, rubs or gallops  Abdomen: soft, nondistended, nontender, without mass or organomegaly  Skin: no lesions  Extremities: no clubbing, cyanosis, trace edema  Neurologic: alert, oriented, moves all extremities  Knees with arthritis  LAB RESULTS:                        7.8    16.3  )-----------( 189      ( 26 Mar 2019 05:45 )             25.1     03-25    138  |  103  |  46<H>  ----------------------------<  217<H>  4.0   |  22  |  1.41<H>    Ca    9.7      25 Mar 2019 05:50          MICROBIOLOGY:  RECENT CULTURES:  03-23 @ 12:18 .Blood blood segments     No growth to date.      03-23 @ 01:38 .Blood Blood-Peripheral Blood Culture PCR    No growth to date.    Growth in anaerobic bottle: Gram Positive Cocci in Clusters    coag negative staph    RADIOLOGY REVIEWED:    < from: Xray Chest 1 View- PORTABLE-Urgent (03.23.19 @ 01:30) >  Impression: New left basilar density may be due to overlying soft tissue,   pulmonary consolidation, and/or pleural effusion.    No evidence for pneumothorax.    The trachea is midline.    Stable cardiac silhouette.    < end of copied text >

## 2019-03-26 NOTE — PROGRESS NOTE ADULT - ASSESSMENT
#84 male with PMH significant for HTN, HLD, Afib, stomach CA, anemia, gout, BPH. seizure d/o, recently stopped EToh use, p/w chronic anemia possibly due to myelodysplastic syndrome, s/p tx PRBC, debility and functional decline      #Fever following transfusion-currently afebrile:  -No obvious focus of infection  -Leucocytosis on labs in setting of CML , decreased today 3/26   ID consult appreciated 3/26,  1.monitor off antibiotics  2.If fevers return will consider a repeat CXR, PA and lateral  3.He is a poor historian, it is possible that his polyarticular gout could be a factor with fever.  -CBC 3/28    #Anemia/debility: multifactorial: Chronic kidney disease, recent GIB. 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   - Heme appreciated: Not yet in remission, continue supportive therapies  -will need education procrit injections for dc if to continue    #LGI bleed:  - xarelto and ASA restarted  -, GI consult and follow up appreciated.  - CBC 3/28    #elevated FS  -CCC ordered. discussed with patient    #Afib:   - Continue metoprolol, Xarelto  -monitor HR, CBC    #HTN:-   Continue norvasc, lasix    # JOint pain:  ?polyarticular gout   -continue allopurinol  -option for palliative care including for pain management discussed, ptient defers today 3/26    #SZ/tremors:   - Controlled on keppra     #DVT prophylaxis:   -on xarelto    Mood:  -neuropsych fu  -patient states he does not want palliative care/hospice consult at this time    #Case discussed in IDT rounds 3/26  -need re-evaluation as medical status interfered with therapy and progress in last several days. may need more assistance, as well as hospital bed for positioning and transfers, requires caregiver education . After re-evaluation, assess patient and caregiver's ability to manage at home vs [facility]AN[facility]    #Discharge home and caregiver training on hold due to medical status    Labs:  CBC 3/28

## 2019-03-26 NOTE — PROGRESS NOTE ADULT - SUBJECTIVE AND OBJECTIVE BOX
Patient is a 84y old  Male who presents with a chief complaint of anemia, debility and functional decline (26 Mar 2019 11:02)      HPI:  Patient is 84 male RH dominant with PMH significant for HTN, HLD,  Afib (off xarelto), stomach CA, anemia, gout, BPH. seizure d/o, recently stopped EToh use, who presented from [facility] with abnormal labs.  Pt reports recent admission to [facility] for gout flare then discharged to rehab.  Pt now returns with guaiac negative anemia (HgB 6.8 ) and  episode of bright red blood in stool. Pt also found with leukocytosis, with temp 100.7 F rectally.  Denies chills, fever, sob, chest pain, weakness, dysuria.    Patient was seen by GI and hematology.  S/P Colonoscopy on 3/1/19. No source of bleeding noted. Negative FOB. S/P EGD during prior admission, and no bleeding identified. Heme performed BM biopsy 3/4, results pending. CKD, Etoh may contribute, although underlying BM d/o such as myelodysplasia suspected. Transfused PRBC. Patient transferred to - [facility] due to debility and functional decline (06 Mar 2019 14:53)      PAST MEDICAL & SURGICAL HISTORY:  Nonrheumatic aortic valve stenosis  Cancer of stomach  Anemia, unspecified type: Anemia  Seizure disorder  Benign prostatic hyperplasia, unspecified whether lower urinary tract symptoms present  Hyperlipemia  Essential hypertension  Chronic atrial fibrillation  Stomach cancer  Hypertension  Atrial fibrillation  Gout  No significant past surgical history  No significant past surgical history      MEDICATIONS  (STANDING):  allopurinol 300 milliGRAM(s) Oral daily  amLODIPine   Tablet 10 milliGRAM(s) Oral daily  aspirin enteric coated 81 milliGRAM(s) Oral daily  atorvastatin 10 milliGRAM(s) Oral at bedtime  cyanocobalamin 1000 MICROGram(s) Oral daily  dextrose 5%. 1000 milliLiter(s) (50 mL/Hr) IV Continuous <Continuous>  dextrose 50% Injectable 12.5 Gram(s) IV Push once  dextrose 50% Injectable 25 Gram(s) IV Push once  dextrose 50% Injectable 25 Gram(s) IV Push once  docusate sodium 100 milliGRAM(s) Oral two times a day  epoetin jackelyn Injectable 25557 Unit(s) SubCutaneous <User Schedule>  famotidine    Tablet 20 milliGRAM(s) Oral daily  folic acid 1 milliGRAM(s) Oral daily  insulin lispro (HumaLOG) corrective regimen sliding scale   SubCutaneous three times a day before meals  insulin lispro (HumaLOG) corrective regimen sliding scale   SubCutaneous at bedtime  levETIRAcetam 500 milliGRAM(s) Oral two times a day  lidocaine   Patch 1 Patch Transdermal <User Schedule>  lidocaine 5% Ointment 1 Application(s) Topical three times a day  metoprolol tartrate 25 milliGRAM(s) Oral every 12 hours  multivitamin 1 Tablet(s) Oral daily  pantoprazole    Tablet 40 milliGRAM(s) Oral before breakfast  rivaroxaban 20 milliGRAM(s) Oral every 24 hours  sodium chloride 0.9%. 1000 milliLiter(s) (100 mL/Hr) IV Continuous <Continuous>  tamsulosin 0.4 milliGRAM(s) Oral at bedtime    MEDICATIONS  (PRN):  acetaminophen   Tablet .. 650 milliGRAM(s) Oral every 6 hours PRN Mild Pain (1 - 3)  dextrose 40% Gel 15 Gram(s) Oral once PRN Blood Glucose LESS THAN 70 milliGRAM(s)/deciliter  glucagon  Injectable 1 milliGRAM(s) IntraMuscular once PRN Glucose LESS THAN 70 milligrams/deciliter  senna 2 Tablet(s) Oral at bedtime PRN Constipation  traMADol 25 milliGRAM(s) Oral every 6 hours PRN Moderate Pain (4 - 6)      Allergies    No Known Allergies    Intolerances          VITALS  84y  Vital Signs Last 24 Hrs  T(C): 36.7 (26 Mar 2019 07:58), Max: 37.1 (25 Mar 2019 17:17)  T(F): 98.1 (26 Mar 2019 07:58), Max: 98.7 (25 Mar 2019 17:17)  HR: 79 (26 Mar 2019 07:58) (79 - 95)  BP: 113/61 (26 Mar 2019 07:58) (113/61 - 135/72)  BP(mean): --  RR: 14 (26 Mar 2019 07:58) (14 - 14)  SpO2: 98% (26 Mar 2019 07:58) (95% - 98%)  Daily     Daily         RECENT LABS:                          7.8    16.3  )-----------( 189      ( 26 Mar 2019 05:45 )             25.1     03-25    138  |  103  |  46<H>  ----------------------------<  217<H>  4.0   |  22  |  1.41<H>    Ca    9.7      25 Mar 2019 05:50              Culture - Blood (collected 03-23-19 @ 12:18)  Source: .Blood blood segments  Preliminary Report (03-25-19 @ 02:01):    No growth to date.    Culture - Blood (collected 03-23-19 @ 01:38)  Source: .Blood None  Gram Stain (03-24-19 @ 18:14):    Growth in anaerobic bottle: Gram Positive Cocci in Clusters  Final Report (03-25-19 @ 20:44):    Growth in anaerobic bottle: Coag Negative Staphylococcus    Single set isolate, possible contaminant. Contact    Microbiology if susceptibility testing clinically    indicated.    ***Blood Panel PCR results on this specimen are available    approximately 3 hours after the Gram stain result.***    Gram stain, PCR, and/or culture results may not always    correspond due to difference in methodologies.    ************************************************************    This PCR assay was performed using [employer].    The following targets are tested for: Enterococcus,    vancomycin resistant enterococci, Listeria monocytogenes,    coagulase negative staphylococci, S. aureus,    methicillin resistant S. aureus, Streptococcus agalactiae    (Group B), S. pneumoniae, S. pyogenes (Group A),    Acinetobacter baumannii, Enterobacter cloacae, E. coli,    Klebsiella oxytoca, K. pneumoniae, Proteus sp.,    Serratia marcescens, Haemophilus influenzae,    Neisseria meningitidis, Pseudomonas aeruginosa, Candida    albicans, C. glabrata, C krusei, C parapsilosis,    C. tropicalis and the KPC resistance gene.    "Due to technical problems, Proteus sp. will Not be reported as part of    the BCID panel until further notice"  Organism: Blood Culture PCR (03-25-19 @ 20:44)  Organism: Blood Culture PCR (03-25-19 @ 20:44)      -  Coagulase negative Staphylococcus: Detec      Method Type: PCR    Culture - Blood (collected 03-23-19 @ 01:38)  Source: .Blood Blood-Peripheral  Preliminary Report (03-24-19 @ 11:01):    No growth to date.        CAPILLARY BLOOD GLUCOSE      POCT Blood Glucose.: 233 mg/dL (26 Mar 2019 11:59)  POCT Blood Glucose.: 216 mg/dL (26 Mar 2019 08:06)  POCT Blood Glucose.: 214 mg/dL (25 Mar 2019 20:36)  POCT Blood Glucose.: 213 mg/dL (25 Mar 2019 16:28)

## 2019-03-26 NOTE — PROGRESS NOTE ADULT - ASSESSMENT
Pt is a 85yo M w multiple PMH is admitted to acute rehab for debility secondary to anemia likely from myelodysplastic syndrome s/p PRBC.     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

## 2019-03-26 NOTE — PROGRESS NOTE ADULT - SUBJECTIVE AND OBJECTIVE BOX
Patient is a 84y old  Male who presents with a chief complaint of anemia, debility and functional decline (26 Mar 2019 10:48)      Patient seen and examined at bedside.    ALLERGIES:  No Known Allergies    MEDICATIONS:  acetaminophen   Tablet .. 650 milliGRAM(s) Oral every 6 hours PRN  allopurinol 300 milliGRAM(s) Oral daily  amLODIPine   Tablet 10 milliGRAM(s) Oral daily  aspirin enteric coated 81 milliGRAM(s) Oral daily  atorvastatin 10 milliGRAM(s) Oral at bedtime  cyanocobalamin 1000 MICROGram(s) Oral daily  dextrose 40% Gel 15 Gram(s) Oral once PRN  dextrose 5%. 1000 milliLiter(s) IV Continuous <Continuous>  dextrose 50% Injectable 12.5 Gram(s) IV Push once  dextrose 50% Injectable 25 Gram(s) IV Push once  dextrose 50% Injectable 25 Gram(s) IV Push once  docusate sodium 100 milliGRAM(s) Oral two times a day  epoetin jackelyn Injectable 76761 Unit(s) SubCutaneous <User Schedule>  famotidine    Tablet 20 milliGRAM(s) Oral daily  folic acid 1 milliGRAM(s) Oral daily  glucagon  Injectable 1 milliGRAM(s) IntraMuscular once PRN  insulin lispro (HumaLOG) corrective regimen sliding scale   SubCutaneous three times a day before meals  insulin lispro (HumaLOG) corrective regimen sliding scale   SubCutaneous at bedtime  levETIRAcetam 500 milliGRAM(s) Oral two times a day  lidocaine   Patch 1 Patch Transdermal <User Schedule>  lidocaine 5% Ointment 1 Application(s) Topical three times a day  metoprolol tartrate 25 milliGRAM(s) Oral every 12 hours  multivitamin 1 Tablet(s) Oral daily  pantoprazole    Tablet 40 milliGRAM(s) Oral before breakfast  rivaroxaban 20 milliGRAM(s) Oral every 24 hours  senna 2 Tablet(s) Oral at bedtime PRN  sodium chloride 0.9%. 1000 milliLiter(s) IV Continuous <Continuous>  tamsulosin 0.4 milliGRAM(s) Oral at bedtime  traMADol 25 milliGRAM(s) Oral every 6 hours PRN    Vital Signs Last 24 Hrs  T(F): 98.1 (26 Mar 2019 07:58), Max: 98.7 (25 Mar 2019 17:17)  HR: 79 (26 Mar 2019 07:58) (79 - 95)  BP: 113/61 (26 Mar 2019 07:58) (113/61 - 135/72)  RR: 14 (26 Mar 2019 07:58) (14 - 14)  SpO2: 98% (26 Mar 2019 07:58) (95% - 98%)  I&O's Summary    25 Mar 2019 07:01  -  26 Mar 2019 07:00  --------------------------------------------------------  IN: 0 mL / OUT: 3 mL / NET: -3 mL        PHYSICAL EXAM:  General: NAD   Neck: Supple, No JVD  Lungs: Clear to auscultation bilaterally  Cardio: RRR, S1/S2, No murmurs  Abdomen: Soft, Nontender, Nondistended; Bowel sounds present  Extremities: No cyanosis, No edema    LABS:                        7.8    16.3  )-----------( 189      ( 26 Mar 2019 05:45 )             25.1     03-25    138  |  103  |  46  ----------------------------<  217  4.0   |  22  |  1.41    Ca    9.7      25 Mar 2019 05:50      eGFR if Non African American: 46 mL/min/1.73M2 (03-25-19 @ 05:50)  eGFR if African American: 53 mL/min/1.73M2 (03-25-19 @ 05:50)                    CAPILLARY BLOOD GLUCOSE      POCT Blood Glucose.: 216 mg/dL (26 Mar 2019 08:06)  POCT Blood Glucose.: 214 mg/dL (25 Mar 2019 20:36)  POCT Blood Glucose.: 213 mg/dL (25 Mar 2019 16:28)  POCT Blood Glucose.: 238 mg/dL (25 Mar 2019 11:42)    03-07 [id number] 7.0        Culture - Blood (collected 23 Mar 2019 12:18)  Source: .Blood blood segments  Preliminary Report (25 Mar 2019 02:01):    No growth to date.    Culture - Blood (collected 23 Mar 2019 01:38)  Source: .Blood None  Gram Stain (24 Mar 2019 18:14):    Growth in anaerobic bottle: Gram Positive Cocci in Clusters  Final Report (25 Mar 2019 20:44):    Growth in anaerobic bottle: Coag Negative Staphylococcus    Single set isolate, possible contaminant. Contact    Microbiology if susceptibility testing clinically    indicated.    ***Blood Panel PCR results on this specimen are available    approximately 3 hours after the Gram stain result.***    Gram stain, PCR, and/or culture results may not always    correspond due to difference in methodologies.    ************************************************************    This PCR assay was performed using [employer].    The following targets are tested for: Enterococcus,    vancomycin resistant enterococci, Listeria monocytogenes,    coagulase negative staphylococci, S. aureus,    methicillin resistant S. aureus, Streptococcus agalactiae    (Group B), S. pneumoniae, S. pyogenes (Group A),    Acinetobacter baumannii, Enterobacter cloacae, E. coli,    Klebsiella oxytoca, K. pneumoniae, Proteus sp.,    Serratia marcescens, Haemophilus influenzae,    Neisseria meningitidis, Pseudomonas aeruginosa, Candida    albicans, C. glabrata, C krusei, C parapsilosis,    C. tropicalis and the KPC resistance gene.    "Due to technical problems, Proteus sp. will Not be reported as part of    the BCID panel until further notice"  Organism: Blood Culture PCR (25 Mar 2019 20:44)  Organism: Blood Culture PCR (25 Mar 2019 20:44)      -  Coagulase negative Staphylococcus: Detec      Method Type: PCR    Culture - Blood (collected 23 Mar 2019 01:38)  Source: .Blood Blood-Peripheral  Preliminary Report (24 Mar 2019 11:01):    No growth to date.        RADIOLOGY & ADDITIONAL TESTS:    Care Discussed with Consultants/Other Providers:

## 2019-03-26 NOTE — PROGRESS NOTE ADULT - ASSESSMENT
84-year-old gentleman with history of anemia, atrial fibrillation and seizure disorder admitted with anemia exacerbation from his [facility] facility. Patient notes recent history of bright red blood per rectum. He was scheduled for outpatient colonoscopy.  On admission, found to have rectal temp of 100.7°F in the emergency department.  Patient reported he stopped drinking alcohol approximately 3 months prior to admission

## 2019-03-26 NOTE — CHART NOTE - NSCHARTNOTEFT_GEN_A_CORE
Assessment:   Patient seen for: F/U    Source: [x] medical review, [x] patient    Factors impacting intake:  [x] other: repetitive menu    Intake: 50%    Diet Prescription: Diet, Consistent Carbohydrate/No Snacks (03-26-19 @ 08:16)    Pt reported fair appetite. Pt w/ 11 days state since admission complained about repetitive menu. Alternative menu was offered. Pt looked uncomfortable at the moment. Denies N/V/D/C. No chewing or swallowing problems at the moment. Pt at the interview requested fluids. Rocky at the bed side table was offered and pt liked it. Currently noted pt diet was changed to Consistent Carbohydrates diet 2/2 high BG. Suggested to continue w/ Rocky BID. Pt was suggested Glucerna shake and pt agreed to try it. Recommend Zinc for 14 days and Vit C to improve wound healing process.     Current Weight: 102.5 kg (226 lbs)  % Weight Change: 14% increased in 11 days (28 lbs) pt w/ current edema john leg +1     Pertinent Medications: MEDICATIONS  (STANDING):  allopurinol 300 milliGRAM(s) Oral daily  amLODIPine   Tablet 10 milliGRAM(s) Oral daily  aspirin enteric coated 81 milliGRAM(s) Oral daily  atorvastatin 10 milliGRAM(s) Oral at bedtime  cyanocobalamin 1000 MICROGram(s) Oral daily  dextrose 5%. 1000 milliLiter(s) (50 mL/Hr) IV Continuous <Continuous>  dextrose 50% Injectable 12.5 Gram(s) IV Push once  dextrose 50% Injectable 25 Gram(s) IV Push once  dextrose 50% Injectable 25 Gram(s) IV Push once  docusate sodium 100 milliGRAM(s) Oral two times a day  epoetin jackelyn Injectable 25666 Unit(s) SubCutaneous <User Schedule>  famotidine    Tablet 20 milliGRAM(s) Oral daily  folic acid 1 milliGRAM(s) Oral daily  insulin lispro (HumaLOG) corrective regimen sliding scale   SubCutaneous three times a day before meals  insulin lispro (HumaLOG) corrective regimen sliding scale   SubCutaneous at bedtime  levETIRAcetam 500 milliGRAM(s) Oral two times a day  lidocaine   Patch 1 Patch Transdermal <User Schedule>  lidocaine 5% Ointment 1 Application(s) Topical three times a day  metoprolol tartrate 25 milliGRAM(s) Oral every 12 hours  multivitamin 1 Tablet(s) Oral daily  pantoprazole    Tablet 40 milliGRAM(s) Oral before breakfast  rivaroxaban 20 milliGRAM(s) Oral every 24 hours  sodium chloride 0.9%. 1000 milliLiter(s) (100 mL/Hr) IV Continuous <Continuous>  tamsulosin 0.4 milliGRAM(s) Oral at bedtime    MEDICATIONS  (PRN):  acetaminophen   Tablet .. 650 milliGRAM(s) Oral every 6 hours PRN Mild Pain (1 - 3)  dextrose 40% Gel 15 Gram(s) Oral once PRN Blood Glucose LESS THAN 70 milliGRAM(s)/deciliter  glucagon  Injectable 1 milliGRAM(s) IntraMuscular once PRN Glucose LESS THAN 70 milligrams/deciliter  senna 2 Tablet(s) Oral at bedtime PRN Constipation  traMADol 25 milliGRAM(s) Oral every 6 hours PRN Moderate Pain (4 - 6)    Pertinent Labs: 03-25 Na138 mmol/L Glu 217 mg/dL<H> K+ 4.0 mmol/L Cr  1.41 mg/dL<H> BUN 46 mg/dL<H> 03-07 [id number] 7.0 %<H>     CAPILLARY BLOOD GLUCOSE      POCT Blood Glucose.: 216 mg/dL (26 Mar 2019 08:06)  POCT Blood Glucose.: 214 mg/dL (25 Mar 2019 20:36)  POCT Blood Glucose.: 213 mg/dL (25 Mar 2019 16:28)      Skin: Pressure injury in scrotum, left heel, buttock stage II     Edema: john leg +1     Estimated Needs:   [x] no change since previous assessment      Previous Nutrition Diagnosis: Increased nutrients needs related to wound healing    Nutrition Diagnosis is [x] ongoing    Interventions:   Recommend    [x] Continue with current diet: Consistent Carbohydrates diet   [x] Nutrition Supplement: Rocky BID, Glucerna shake BID  [x] Nutrition Support: Vit C 500mg/day, Zinc for 14 days   [x] Other: Suggest to evaluate lipid panel 2/2 HLD hx    [x] Monitoring and Evaluation:   Continue to monitor Nutritional intake, Tolerance to diet prescription, weights, labs, skin integrity.  other:  RD remains available upon request and will follow up per protocol. Assessment:   Patient seen for: F/U    Source: [x] medical review, [x] patient    Factors impacting intake:  [x] other: repetitive menu    Intake: 50%    Diet Prescription: Diet, Consistent Carbohydrate/No Snacks (03-26-19 @ 08:16)    Pt reported fair appetite. Pt w/ 11 days state since admission complained about repetitive menu. Alternative menu was offered. Pt looked uncomfortable at the moment. Denies N/V/D/C. No chewing or swallowing problems at the moment. Pt at the interview requested fluids. Rocky at the bed side table was offered and pt liked it. Currently noted pt diet was changed to Consistent Carbohydrates diet 2/2 high BG. Suggested to continue w/ Rocky BID. Pt was suggested Glucerna shake and pt agreed to try it. Recommend Zinc for 14 days and Vit C to improve wound healing process.     Current Weight: 102.5 kg (226 lbs)  % Weight Change: 14% increased in 11 days (28 lbs) pt w/ current edema john leg +1     Pertinent Medications: MEDICATIONS  (STANDING):  allopurinol 300 milliGRAM(s) Oral daily  amLODIPine   Tablet 10 milliGRAM(s) Oral daily  aspirin enteric coated 81 milliGRAM(s) Oral daily  atorvastatin 10 milliGRAM(s) Oral at bedtime  cyanocobalamin 1000 MICROGram(s) Oral daily  dextrose 5%. 1000 milliLiter(s) (50 mL/Hr) IV Continuous <Continuous>  dextrose 50% Injectable 12.5 Gram(s) IV Push once  dextrose 50% Injectable 25 Gram(s) IV Push once  dextrose 50% Injectable 25 Gram(s) IV Push once  docusate sodium 100 milliGRAM(s) Oral two times a day  epoetin jackelyn Injectable 16358 Unit(s) SubCutaneous <User Schedule>  famotidine    Tablet 20 milliGRAM(s) Oral daily  folic acid 1 milliGRAM(s) Oral daily  insulin lispro (HumaLOG) corrective regimen sliding scale   SubCutaneous three times a day before meals  insulin lispro (HumaLOG) corrective regimen sliding scale   SubCutaneous at bedtime  levETIRAcetam 500 milliGRAM(s) Oral two times a day  lidocaine   Patch 1 Patch Transdermal <User Schedule>  lidocaine 5% Ointment 1 Application(s) Topical three times a day  metoprolol tartrate 25 milliGRAM(s) Oral every 12 hours  multivitamin 1 Tablet(s) Oral daily  pantoprazole    Tablet 40 milliGRAM(s) Oral before breakfast  rivaroxaban 20 milliGRAM(s) Oral every 24 hours  sodium chloride 0.9%. 1000 milliLiter(s) (100 mL/Hr) IV Continuous <Continuous>  tamsulosin 0.4 milliGRAM(s) Oral at bedtime    MEDICATIONS  (PRN):  acetaminophen   Tablet .. 650 milliGRAM(s) Oral every 6 hours PRN Mild Pain (1 - 3)  dextrose 40% Gel 15 Gram(s) Oral once PRN Blood Glucose LESS THAN 70 milliGRAM(s)/deciliter  glucagon  Injectable 1 milliGRAM(s) IntraMuscular once PRN Glucose LESS THAN 70 milligrams/deciliter  senna 2 Tablet(s) Oral at bedtime PRN Constipation  traMADol 25 milliGRAM(s) Oral every 6 hours PRN Moderate Pain (4 - 6)    Pertinent Labs: 03-25 Na138 mmol/L Glu 217 mg/dL<H> K+ 4.0 mmol/L Cr  1.41 mg/dL<H> BUN 46 mg/dL<H> 03-07 [id number] 7.0 %<H>     CAPILLARY BLOOD GLUCOSE      POCT Blood Glucose.: 216 mg/dL (26 Mar 2019 08:06)  POCT Blood Glucose.: 214 mg/dL (25 Mar 2019 20:36)  POCT Blood Glucose.: 213 mg/dL (25 Mar 2019 16:28)      Skin: Pressure injury in scrotum, left heel, buttock stage II     Edema: john leg +1     Estimated Needs:   [x] no change since previous assessment      Previous Nutrition Diagnosis: Increased nutrients needs related to wound healing    Nutrition Diagnosis is [x] ongoing    Interventions:   Recommend    [x] Continue with current diet: Consistent Carbohydrates diet   [x] Nutrition Supplement: Rocky BID, Glucerna shake once a day  [x] Nutrition Support: Zinc for 14 days   [x] Other:     [x] Monitoring and Evaluation:   Continue to monitor Nutritional intake, Tolerance to diet prescription, weights, labs, skin integrity.  other:  RD remains available upon request and will follow up per protocol.

## 2019-03-26 NOTE — PROGRESS NOTE ADULT - ASSESSMENT
85 yo male with CMMoL, anemia, alcohol use,? dementia,A Fib, and polyarticular gout who developed fever in setting of blood transfusion.  His fever appears to have moderated.In his prior admissions he was felt to have fever secondary to gout.His CXR shows a new finding but little clinical support for infection such as pneumonia.Coag negative staph in the blood is a contaminant.GI bleed seems to be waning.  ? Reactive leukocytosis  Suggest:  1.monitor off antibiotics  2.If fevers return will consider a repeat CXR, PA and lateral  3.He is a poor historian, it is possible that his polyarticular gout could be a factor with fever.  4.Additional w/u as needed

## 2019-03-26 NOTE — PROGRESS NOTE ADULT - SUBJECTIVE AND OBJECTIVE BOX
YUE KING   84y   Male    Admitting: SAEED Gallego  HPI:  84-year-old gentleman with history of anemia, atrial fibrillation and seizure disorder admitted with anemia exacerbation from his [place] facility. Patient noted recent history of bright red blood per rectum. He was scheduled for outpatient colonoscopy. S/P BM biopsy.  Patient reported he stopped drinking alcohol approximately 3 months prior to admission.  Patient is now on the acute rehabilitation unit.    PAST MEDICAL & SURGICAL HISTORY:  Nonrheumatic aortic valve stenosis  Cancer of stomach  Anemia, unspecified type: Anemia  Seizure disorder  Benign prostatic hyperplasia, unspecified whether lower urinary tract symptoms present  Hyperlipemia  Essential hypertension  Chronic atrial fibrillation  Stomach cancer  Hypertension  Atrial fibrillation  Gout  No significant past surgical history  No significant past surgical history    HEALTH ISSUES - PROBLEM Dx:  Anemia  Ureteral stone: Ureteral stone  Chronic myelomonocytic leukemia not having achieved remission: Chronic myelomonocytic leukemia not having achieved remission  Anemia, unspecified type: Anemia, unspecified type  Rectal bleed    MEDICATIONS  (STANDING):  allopurinol 300 milliGRAM(s) Oral daily  amLODIPine   Tablet 10 milliGRAM(s) Oral daily  aspirin enteric coated 81 milliGRAM(s) Oral daily  atorvastatin 10 milliGRAM(s) Oral at bedtime  cyanocobalamin 1000 MICROGram(s) Oral daily  dextrose 5%. 1000 milliLiter(s) (50 mL/Hr) IV Continuous <Continuous>  dextrose 50% Injectable 12.5 Gram(s) IV Push once  dextrose 50% Injectable 25 Gram(s) IV Push once  dextrose 50% Injectable 25 Gram(s) IV Push once  docusate sodium 100 milliGRAM(s) Oral two times a day  epoetin jackelyn Injectable 94027 Unit(s) SubCutaneous <User Schedule>  famotidine    Tablet 20 milliGRAM(s) Oral daily  folic acid 1 milliGRAM(s) Oral daily  insulin lispro (HumaLOG) corrective regimen sliding scale   SubCutaneous three times a day before meals  insulin lispro (HumaLOG) corrective regimen sliding scale   SubCutaneous at bedtime  levETIRAcetam 500 milliGRAM(s) Oral two times a day  lidocaine   Patch 1 Patch Transdermal <User Schedule>  lidocaine 5% Ointment 1 Application(s) Topical three times a day  metoprolol tartrate 25 milliGRAM(s) Oral every 12 hours  multivitamin 1 Tablet(s) Oral daily  pantoprazole    Tablet 40 milliGRAM(s) Oral before breakfast  rivaroxaban 20 milliGRAM(s) Oral every 24 hours  sodium chloride 0.9%. 1000 milliLiter(s) (100 mL/Hr) IV Continuous <Continuous>  tamsulosin 0.4 milliGRAM(s) Oral at bedtime    MEDICATIONS  (PRN):  acetaminophen   Tablet .. 650 milliGRAM(s) Oral every 6 hours PRN Mild Pain (1 - 3)  dextrose 40% Gel 15 Gram(s) Oral once PRN Blood Glucose LESS THAN 70 milliGRAM(s)/deciliter  glucagon  Injectable 1 milliGRAM(s) IntraMuscular once PRN Glucose LESS THAN 70 milligrams/deciliter  senna 2 Tablet(s) Oral at bedtime PRN Constipation  traMADol 25 milliGRAM(s) Oral every 6 hours PRN Moderate Pain (4 - 6)    Allergies    No Known Allergies    Intolerances    INTERVAL HPI/OVERNIGHT EVENTS:  Patient S&E at bedside. No c/o CP or SOB at rest. Denies current bleeding.    VITAL SIGNS:  T(F): 98.1 (03-26-19 @ 07:58)  HR: 79 (03-26-19 @ 07:58)  BP: 113/61 (03-26-19 @ 07:58)  RR: 14 (03-26-19 @ 07:58)  SpO2: 98% (03-26-19 @ 07:58)    PHYSICAL EXAM:  GENERAL: NAD, well-developed  EYES: EOMI, PERRLA, conjunctiva and sclera clear  NECK: Supple, No JVD  CHEST/LUNG: decreased BS bases ant.  HEART: Regular rate and rhythm  ABDOMEN: Soft, Nontender, Nondistended  EXTREMITIES:  LE bandaging in place  NEUROLOGY: awake, alert    Labs:             7.8    16.3  )-----------( 189      ( 03-26 @ 05:45 )             25.1                7.7    18.1  )-----------( 172      ( 03-25 @ 05:50 )             25.3                8.2    17.1  )-----------( 160      ( 03-24 @ 05:36 )             23.9       03-25    138  |  103  |  46<H>  ----------------------------<  217<H>  4.0   |  22  |  1.41<H>    Ca    9.7      25 Mar 2019 05:50        Haptoglobin, Serum: 385 mg/dL (03-26-19 @ 05:45)  Absolute Reticulocytes: 55.7 K/uL (03-26-19 @ 05:45)

## 2019-03-27 PROCEDURE — 99232 SBSQ HOSP IP/OBS MODERATE 35: CPT

## 2019-03-27 PROCEDURE — 99233 SBSQ HOSP IP/OBS HIGH 50: CPT

## 2019-03-27 RX ORDER — TRAMADOL HYDROCHLORIDE 50 MG/1
50 TABLET ORAL EVERY 6 HOURS
Qty: 0 | Refills: 0 | Status: DISCONTINUED | OUTPATIENT
Start: 2019-03-27 | End: 2019-04-03

## 2019-03-27 RX ORDER — INDOMETHACIN 50 MG
25 CAPSULE ORAL
Qty: 0 | Refills: 0 | Status: COMPLETED | OUTPATIENT
Start: 2019-03-27 | End: 2019-03-29

## 2019-03-27 RX ADMIN — RIVAROXABAN 20 MILLIGRAM(S): KIT at 16:59

## 2019-03-27 RX ADMIN — TAMSULOSIN HYDROCHLORIDE 0.4 MILLIGRAM(S): 0.4 CAPSULE ORAL at 22:08

## 2019-03-27 RX ADMIN — Medication 25 MILLIGRAM(S): at 23:28

## 2019-03-27 RX ADMIN — Medication 25 MILLIGRAM(S): at 23:02

## 2019-03-27 RX ADMIN — PANTOPRAZOLE SODIUM 40 MILLIGRAM(S): 20 TABLET, DELAYED RELEASE ORAL at 05:51

## 2019-03-27 RX ADMIN — LIDOCAINE 1 APPLICATION(S): 4 CREAM TOPICAL at 22:08

## 2019-03-27 RX ADMIN — Medication 300 MILLIGRAM(S): at 12:05

## 2019-03-27 RX ADMIN — ERYTHROPOIETIN 10000 UNIT(S): 10000 INJECTION, SOLUTION INTRAVENOUS; SUBCUTANEOUS at 09:49

## 2019-03-27 RX ADMIN — AMLODIPINE BESYLATE 10 MILLIGRAM(S): 2.5 TABLET ORAL at 05:49

## 2019-03-27 RX ADMIN — PREGABALIN 1000 MICROGRAM(S): 225 CAPSULE ORAL at 12:06

## 2019-03-27 RX ADMIN — Medication 1: at 16:58

## 2019-03-27 RX ADMIN — Medication 100 MILLIGRAM(S): at 05:49

## 2019-03-27 RX ADMIN — Medication 100 MILLIGRAM(S): at 23:02

## 2019-03-27 RX ADMIN — TRAMADOL HYDROCHLORIDE 25 MILLIGRAM(S): 50 TABLET ORAL at 02:01

## 2019-03-27 RX ADMIN — LIDOCAINE 1 APPLICATION(S): 4 CREAM TOPICAL at 05:50

## 2019-03-27 RX ADMIN — LEVETIRACETAM 500 MILLIGRAM(S): 250 TABLET, FILM COATED ORAL at 05:49

## 2019-03-27 RX ADMIN — LEVETIRACETAM 500 MILLIGRAM(S): 250 TABLET, FILM COATED ORAL at 23:02

## 2019-03-27 RX ADMIN — Medication 1 MILLIGRAM(S): at 12:06

## 2019-03-27 RX ADMIN — FAMOTIDINE 20 MILLIGRAM(S): 10 INJECTION INTRAVENOUS at 12:06

## 2019-03-27 RX ADMIN — LIDOCAINE 1 PATCH: 4 CREAM TOPICAL at 12:55

## 2019-03-27 RX ADMIN — ATORVASTATIN CALCIUM 10 MILLIGRAM(S): 80 TABLET, FILM COATED ORAL at 22:08

## 2019-03-27 RX ADMIN — LIDOCAINE 1 PATCH: 4 CREAM TOPICAL at 22:08

## 2019-03-27 RX ADMIN — Medication 81 MILLIGRAM(S): at 12:06

## 2019-03-27 RX ADMIN — LIDOCAINE 1 APPLICATION(S): 4 CREAM TOPICAL at 13:18

## 2019-03-27 RX ADMIN — TRAMADOL HYDROCHLORIDE 50 MILLIGRAM(S): 50 TABLET ORAL at 15:55

## 2019-03-27 RX ADMIN — Medication 2: at 08:08

## 2019-03-27 RX ADMIN — TRAMADOL HYDROCHLORIDE 25 MILLIGRAM(S): 50 TABLET ORAL at 09:44

## 2019-03-27 RX ADMIN — Medication 1: at 12:04

## 2019-03-27 RX ADMIN — Medication 25 MILLIGRAM(S): at 05:49

## 2019-03-27 RX ADMIN — Medication 1 TABLET(S): at 12:06

## 2019-03-27 RX ADMIN — TRAMADOL HYDROCHLORIDE 25 MILLIGRAM(S): 50 TABLET ORAL at 10:29

## 2019-03-27 NOTE — PROGRESS NOTE ADULT - ASSESSMENT
Pt is a 83yo M w multiple PMH is admitted to acute rehab for debility secondary to anemia likely from myelodysplastic syndrome s/p PRBC.     #Debility: continue rehab    #Anemia, underlyign bone marrow disorder: monitor CBC    #CKD stage 3: Cr 1.4     #Afib: rate controlled, cont BB, cont xarelto     #HTN: cont norvasc, lasix     #Gout: pt has pain in hands, edema,  will give Colchicine, continue Allopurinol    #BPH: cont Flomax     #DMII: accuchecks at goal, continue to monitor    #DVT ppx: stable on xarelto

## 2019-03-27 NOTE — PROGRESS NOTE ADULT - ASSESSMENT
83 yo male with CMMoL, anemia, alcohol use,? dementia,A Fib, and polyarticular gout who developed fever in setting of blood transfusion.  His fever appears to have moderated.In his prior admissions he was felt to have fever secondary to gout.His CXR shows a new finding but little clinical support for infection such as pneumonia.Coag negative staph in the blood is a contaminant.GI bleed seems to be waning.  ? Reactive leukocytosis, no clinical support for ongoing infection  Suggest:  1.monitor off antibiotics  2.If fevers return will consider a repeat CXR, PA and lateral  3.He is a poor historian, it is possible that his polyarticular gout could be a factor with fever.  4.Additional w/u as needed

## 2019-03-27 NOTE — PROGRESS NOTE ADULT - SUBJECTIVE AND OBJECTIVE BOX
Patient is a 84y old  Male who presents with a chief complaint of anemia, debility and functional decline (27 Mar 2019 11:54)      HPI:  Patient is 84 male RH dominant with PMH significant for HTN, HLD,  Afib (off xarelto), stomach CA, anemia, gout, BPH. seizure d/o, recently stopped EToh use, who presented from [facility] with abnormal labs.  Pt reports recent admission to [facility] for gout flare then discharged to rehab.  Pt now returns with guaiac negative anemia (HgB 6.8 ) and  episode of bright red blood in stool. Pt also found with leukocytosis, with temp 100.7 F rectally.  Denies chills, fever, sob, chest pain, weakness, dysuria.    Patient was seen by GI and hematology.  S/P Colonoscopy on 3/1/19. No source of bleeding noted. Negative FOB. S/P EGD during prior admission, and no bleeding identified. Heme performed BM biopsy 3/4, results pending. CKD, Etoh may contribute, although underlying BM d/o such as myelodysplasia suspected. Transfused PRBC. Patient transferred to - [facility] due to debility and functional decline (06 Mar 2019 14:53)      PAST MEDICAL & SURGICAL HISTORY:  Nonrheumatic aortic valve stenosis  Cancer of stomach  Anemia, unspecified type: Anemia  Seizure disorder  Benign prostatic hyperplasia, unspecified whether lower urinary tract symptoms present  Hyperlipemia  Essential hypertension  Chronic atrial fibrillation  Stomach cancer  Hypertension  Atrial fibrillation  Gout  No significant past surgical history  No significant past surgical history      MEDICATIONS  (STANDING):  allopurinol 300 milliGRAM(s) Oral daily  amLODIPine   Tablet 10 milliGRAM(s) Oral daily  aspirin enteric coated 81 milliGRAM(s) Oral daily  atorvastatin 10 milliGRAM(s) Oral at bedtime  cyanocobalamin 1000 MICROGram(s) Oral daily  dextrose 5%. 1000 milliLiter(s) (50 mL/Hr) IV Continuous <Continuous>  dextrose 50% Injectable 12.5 Gram(s) IV Push once  dextrose 50% Injectable 25 Gram(s) IV Push once  dextrose 50% Injectable 25 Gram(s) IV Push once  docusate sodium 100 milliGRAM(s) Oral two times a day  famotidine    Tablet 20 milliGRAM(s) Oral daily  folic acid 1 milliGRAM(s) Oral daily  insulin lispro (HumaLOG) corrective regimen sliding scale   SubCutaneous three times a day before meals  insulin lispro (HumaLOG) corrective regimen sliding scale   SubCutaneous at bedtime  levETIRAcetam 500 milliGRAM(s) Oral two times a day  lidocaine   Patch 1 Patch Transdermal <User Schedule>  lidocaine 5% Ointment 1 Application(s) Topical three times a day  metoprolol tartrate 25 milliGRAM(s) Oral every 12 hours  multivitamin 1 Tablet(s) Oral daily  pantoprazole    Tablet 40 milliGRAM(s) Oral before breakfast  rivaroxaban 20 milliGRAM(s) Oral every 24 hours  sodium chloride 0.9%. 1000 milliLiter(s) (100 mL/Hr) IV Continuous <Continuous>  tamsulosin 0.4 milliGRAM(s) Oral at bedtime    MEDICATIONS  (PRN):  acetaminophen   Tablet .. 650 milliGRAM(s) Oral every 6 hours PRN Mild Pain (1 - 3)  dextrose 40% Gel 15 Gram(s) Oral once PRN Blood Glucose LESS THAN 70 milliGRAM(s)/deciliter  glucagon  Injectable 1 milliGRAM(s) IntraMuscular once PRN Glucose LESS THAN 70 milligrams/deciliter  senna 2 Tablet(s) Oral at bedtime PRN Constipation  traMADol 25 milliGRAM(s) Oral every 6 hours PRN Moderate Pain (4 - 6)      Allergies    No Known Allergies    Intolerances          VITALS  84y  Vital Signs Last 24 Hrs  T(C): 37 (27 Mar 2019 07:08), Max: 37.4 (26 Mar 2019 21:35)  T(F): 98.6 (27 Mar 2019 07:08), Max: 99.4 (26 Mar 2019 21:35)  HR: 86 (27 Mar 2019 07:08) (85 - 99)  BP: 117/64 (27 Mar 2019 07:08) (117/64 - 124/68)  BP(mean): --  RR: 12 (27 Mar 2019 07:08) (12 - 14)  SpO2: 97% (27 Mar 2019 07:08) (97% - 98%)  Daily     Daily         RECENT LABS:                          7.8    16.3  )-----------( 189      ( 26 Mar 2019 05:45 )             25.1                   Culture - Blood (collected 03-23-19 @ 12:18)  Source: .Blood blood segments  Preliminary Report (03-25-19 @ 02:01):    No growth to date.    Culture - Blood (collected 03-23-19 @ 01:38)  Source: .Blood None  Gram Stain (03-24-19 @ 18:14):    Growth in anaerobic bottle: Gram Positive Cocci in Clusters  Final Report (03-25-19 @ 20:44):    Growth in anaerobic bottle: Coag Negative Staphylococcus    Single set isolate, possible contaminant. Contact    Microbiology if susceptibility testing clinically    indicated.    ***Blood Panel PCR results on this specimen are available    approximately 3 hours after the Gram stain result.***    Gram stain, PCR, and/or culture results may not always    correspond due to difference in methodologies.    ************************************************************    This PCR assay was performed using [employer].    The following targets are tested for: Enterococcus,    vancomycin resistant enterococci, Listeria monocytogenes,    coagulase negative staphylococci, S. aureus,    methicillin resistant S. aureus, Streptococcus agalactiae    (Group B), S. pneumoniae, S. pyogenes (Group A),    Acinetobacter baumannii, Enterobacter cloacae, E. coli,    Klebsiella oxytoca, K. pneumoniae, Proteus sp.,    Serratia marcescens, Haemophilus influenzae,    Neisseria meningitidis, Pseudomonas aeruginosa, Candida    albicans, C. glabrata, C krusei, C parapsilosis,    C. tropicalis and the KPC resistance gene.    "Due to technical problems, Proteus sp. will Not be reported as part of    the BCID panel until further notice"  Organism: Blood Culture PCR (03-25-19 @ 20:44)  Organism: Blood Culture PCR (03-25-19 @ 20:44)      -  Coagulase negative Staphylococcus: Detec      Method Type: PCR    Culture - Blood (collected 03-23-19 @ 01:38)  Source: .Blood Blood-Peripheral  Preliminary Report (03-24-19 @ 11:01):    No growth to date.        CAPILLARY BLOOD GLUCOSE      POCT Blood Glucose.: 183 mg/dL (27 Mar 2019 11:53)  POCT Blood Glucose.: 227 mg/dL (27 Mar 2019 07:49)  POCT Blood Glucose.: 177 mg/dL (26 Mar 2019 21:29)  POCT Blood Glucose.: 189 mg/dL (26 Mar 2019 17:18)

## 2019-03-27 NOTE — PROGRESS NOTE ADULT - SUBJECTIVE AND OBJECTIVE BOX
CC: f/u for fever    Patient reports : no fever and he is without acute complaints.He has poor insight into medical issues.    REVIEW OF SYSTEMS:  All other review of systems negative (Comprehensive ROS)    Antimicrobials Day #  :    Other Medications Reviewed    T(F): 98.6 (03-27-19 @ 07:08), Max: 99.4 (03-26-19 @ 21:35)  HR: 86 (03-27-19 @ 07:08)  BP: 117/64 (03-27-19 @ 07:08)  RR: 12 (03-27-19 @ 07:08)  SpO2: 97% (03-27-19 @ 07:08)  Wt(kg): --    PHYSICAL EXAM:  General: alert, no acute distress  Eyes:  anicteric, no conjunctival injection, no discharge  Oropharynx: no lesions or injection 	  Neck: supple, without adenopathy  Lungs: clear to auscultation  Heart: irregular rate and rhythm;   Abdomen: soft, nondistended, nontender, without mass or organomegaly  Skin: no lesions  Extremities: no clubbing, cyanosis, or edema  Neurologic: alert, oriented, moves all extremities  B/L knee arthritis  LAB RESULTS:                        7.8    16.3  )-----------( 189      ( 26 Mar 2019 05:45 )             25.1               MICROBIOLOGY:  RECENT CULTURES:  03-23 @ 12:18 .Blood blood segments     No growth to date.      03-23 @ 01:38 .Blood Blood-Peripheral Blood Culture PCR    No growth to date.    Growth in anaerobic bottle: Gram Positive Cocci in Clusters  coag negative staph      RADIOLOGY REVIEWED:

## 2019-03-27 NOTE — PROGRESS NOTE ADULT - ASSESSMENT
#84 male with PMH significant for HTN, HLD, Afib, stomach CA, anemia, gout, BPH. seizure d/o, recently stopped EToh use, p/w chronic anemia possibly due to myelodysplastic syndrome, s/p tx PRBC, debility and functional decline      #Fever following transfusion-currently afebrile:  -No obvious focus of infection  -Leucocytosis on labs in setting of CML , decreasing, f/u in AM3/28   ID consult appreciated 3/27,  1.monitor off antibiotics  2.If fevers return will consider a repeat CXR, PA and lateral  3.He is a poor historian, it is possible that his polyarticular gout could be a factor with fever.  -CBC 3/28    #Anemia/debility: multifactorial: Chronic kidney disease, recent GIB. 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   - Heme appreciated 3/27:   Chronic myelomonocytic leukemia not having achieved remission.  Plan: Supportive H/O care at this time. GF support.   -will need education procrit injections for dc if to continue    #LGI bleed:  - xarelto and ASA restarted  -, GI consult and follow up appreciated.  - CBC 3/28    #elevated FS  -CCD ordered. discussed with patient    #Afib:   - Continue metoprolol, Xarelto  -monitor HR, CBC    #HTN:-   Continue norvasc, lasix    # JOint pain: clinical exam improving, warmth improving although still complains of uncontrolled pain. had short course higher-dose indomethacin and now on 300 mg allopurinol  -patient refuses palliative care for pain management/comfort and GOC discussion, refused again today 3/27  -continue allopurinol  -currently on tramadol, will increase 50 mg and assess. joint effusion seems improved today    #SZ/tremors:   - Controlled on keppra     #DVT prophylaxis:   -on xarelto    Mood:  -neuropsych fu  -patient states he does not want palliative care/hospice consult at this time    #Case discussed in IDT rounds 3/26  -need re-evaluation as medical status interfered with therapy and progress in last several days. may need more assistance, as well as hospital bed for positioning and transfers, requires caregiver education . After re-evaluation, assess patient and caregiver's ability to manage at home vs JANET    #Discharge home and caregiver training on hold due to medical status    Labs:  CBC 3/28 #84 male with PMH significant for HTN, HLD, Afib, stomach CA, anemia, gout, BPH. seizure d/o, recently stopped EToh use, p/w chronic anemia possibly due to myelodysplastic syndrome, s/p tx PRBC, debility and functional decline      #Fever following transfusion-currently afebrile:  -No obvious focus of infection  -Leucocytosis on labs in setting of CML , decreasing, f/u in AM3/28   ID consult appreciated 3/27,  1.monitor off antibiotics  2.If fevers return will consider a repeat CXR, PA and lateral  3.He is a poor historian, it is possible that his polyarticular gout could be a factor with fever.  -CBC 3/28    #Anemia/debility: multifactorial: Chronic kidney disease, recent GIB. 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   - Heme appreciated 3/27:   Chronic myelomonocytic leukemia not having achieved remission.  Plan: Supportive H/O care at this time. GF support.   -will need education procrit injections for dc if to continue    #LGI bleed:  - xarelto and ASA restarted  -, GI consult and follow up appreciated.  - CBC 3/28    #elevated FS  -CCD ordered. discussed with patient    #Afib:   - Continue metoprolol, Xarelto  -monitor HR, CBC    #HTN:-   Continue norvasc, lasix    # JOint pain: clinical exam improving, warmth improving although still complains of uncontrolled pain. had short course higher-dose indomethacin and now on 300 mg allopurinol  -patient refuses palliative care for pain management/comfort and GOC discussion, refused again today 3/27  -continue allopurinol  -currently on tramadol, will increase 50 mg and assess. joint effusion seems improved today    #SZ/tremors:   - Controlled on keppra     #DVT prophylaxis:   -on xarelto    Mood:  -neuropsych fu  -patient states he does not want palliative care/hospice consult at this time    #Case discussed in IDT rounds 3/26  -need re-evaluation as medical status interfered with therapy and progress in last several days. may need more assistance, as well as hospital bed for positioning and transfers, requires caregiver education . After re-evaluation, assess patient and caregiver's ability to manage at home vs JANET    #Discharge home and caregiver training on hold due to medical status    Labs:  CBC 3/28        *ADDENDUM: 3/27/19 2:11PM  Case discussed with patient, partner Gene and private physician. Persistent arthralgia and warmth likely due to residual acute gouty flare. Vitals stable, H/H stable, has not had further episodes rectal bleeding in last several days. Will give 25 mg indomethacin bid x 4 doses in addition to increased tramadol PRN for relief. CBC ordered for AM, patient and caregiver aware of risk with xarelto, however patient responded to medication last week.    Re: future doses, would depend on patient's symptoms . He is on a higher baseline dose of allopurinol, and does respond quickly to indomethacin, as pointed out by caregiver (does not respond to steroids). May benefit more from earlier Rx of indomethacin for several day course during gouty attacks as long as there are no signs of active bleeding/stable renal function. Latter was reinforced

## 2019-03-27 NOTE — PROGRESS NOTE ADULT - SUBJECTIVE AND OBJECTIVE BOX
YUE KING   84y   Male    Admitting: SAEED Gallego  HPI:  84-year-old gentleman with history of anemia, atrial fibrillation and seizure disorder admitted with anemia exacerbation from his [place] facility. Patient noted recent history of bright red blood per rectum. He was scheduled for outpatient colonoscopy. S/P BM biopsy.  Patient reported he stopped drinking alcohol approximately 3 months prior to admission.  Patient is now on the acute rehabilitation unit.    PAST MEDICAL & SURGICAL HISTORY:  Nonrheumatic aortic valve stenosis  Cancer of stomach  Anemia, unspecified type: Anemia  Seizure disorder  Benign prostatic hyperplasia, unspecified whether lower urinary tract symptoms present  Hyperlipemia  Essential hypertension  Chronic atrial fibrillation  Stomach cancer  Hypertension  Atrial fibrillation  Gout    HEALTH ISSUES - PROBLEM Dx:  Anemia  Ureteral stone: Ureteral stone  Chronic myelomonocytic leukemia not having achieved remission: Chronic myelomonocytic leukemia not having achieved remission  Anemia, unspecified type: Anemia, unspecified type  Rectal bleed    MEDICATIONS  (STANDING):  allopurinol 300 milliGRAM(s) Oral daily  amLODIPine   Tablet 10 milliGRAM(s) Oral daily  aspirin enteric coated 81 milliGRAM(s) Oral daily  atorvastatin 10 milliGRAM(s) Oral at bedtime  cyanocobalamin 1000 MICROGram(s) Oral daily  dextrose 5%. 1000 milliLiter(s) (50 mL/Hr) IV Continuous <Continuous>  dextrose 50% Injectable 12.5 Gram(s) IV Push once  dextrose 50% Injectable 25 Gram(s) IV Push once  dextrose 50% Injectable 25 Gram(s) IV Push once  docusate sodium 100 milliGRAM(s) Oral two times a day  famotidine    Tablet 20 milliGRAM(s) Oral daily  folic acid 1 milliGRAM(s) Oral daily  indomethacin 25 milliGRAM(s) Oral two times a day  insulin lispro (HumaLOG) corrective regimen sliding scale   SubCutaneous three times a day before meals  insulin lispro (HumaLOG) corrective regimen sliding scale   SubCutaneous at bedtime  levETIRAcetam 500 milliGRAM(s) Oral two times a day  lidocaine   Patch 1 Patch Transdermal <User Schedule>  lidocaine 5% Ointment 1 Application(s) Topical three times a day  metoprolol tartrate 25 milliGRAM(s) Oral every 12 hours  multivitamin 1 Tablet(s) Oral daily  pantoprazole    Tablet 40 milliGRAM(s) Oral before breakfast  rivaroxaban 20 milliGRAM(s) Oral every 24 hours  sodium chloride 0.9%. 1000 milliLiter(s) (100 mL/Hr) IV Continuous <Continuous>  tamsulosin 0.4 milliGRAM(s) Oral at bedtime    MEDICATIONS  (PRN):  acetaminophen   Tablet .. 650 milliGRAM(s) Oral every 6 hours PRN Mild Pain (1 - 3)  dextrose 40% Gel 15 Gram(s) Oral once PRN Blood Glucose LESS THAN 70 milliGRAM(s)/deciliter  glucagon  Injectable 1 milliGRAM(s) IntraMuscular once PRN Glucose LESS THAN 70 milligrams/deciliter  senna 2 Tablet(s) Oral at bedtime PRN Constipation  traMADol 50 milliGRAM(s) Oral every 6 hours PRN Severe Pain (7 - 10)    Allergies    No Known Allergies    INTERVAL HPI/OVERNIGHT EVENTS:  Patient S&E at bedside. No c/o CP or SOB at rest. Denies obvious bleeding.     VITAL SIGNS:  T(F): 98.6 (03-27-19 @ 07:08)  HR: 86 (03-27-19 @ 07:08)  BP: 117/64 (03-27-19 @ 07:08)  RR: 12 (03-27-19 @ 07:08)  SpO2: 97% (03-27-19 @ 07:08)    PHYSICAL EXAM:  GENERAL: NAD, well-developed  EYES: EOMI, PERRLA, conjunctiva and sclera clear  NECK: Supple, No JVD  CHEST/LUNG: decreased BS bases ant.  HEART: Regular rate and rhythm  ABDOMEN: Soft, Nontender, Nondistended  EXTREMITIES:  LE bandaging in place  NEUROLOGY: awake, alert    Labs:             7.8    16.3  )-----------( 189      ( 03-26 @ 05:45 )             25.1                7.7    18.1  )-----------( 172      ( 03-25 @ 05:50 )             25.3     Haptoglobin, Serum: 385 mg/dL (03-26-19 @ 05:45)  Absolute Reticulocytes: 55.7 K/uL (03-26-19 @ 05:45)

## 2019-03-27 NOTE — PROGRESS NOTE ADULT - SUBJECTIVE AND OBJECTIVE BOX
CC: Patient is a 84y old  Male who presents with a chief complaint of anemia, debility and functional decline (27 Mar 2019 12:31)      S: No f/c/n/v/pain    Patient seen and examined at bedside.    ALLERGIES:  No Known Allergies      MEDICATIONS:  acetaminophen   Tablet .. 650 milliGRAM(s) Oral every 6 hours PRN  allopurinol 300 milliGRAM(s) Oral daily  amLODIPine   Tablet 10 milliGRAM(s) Oral daily  aspirin enteric coated 81 milliGRAM(s) Oral daily  atorvastatin 10 milliGRAM(s) Oral at bedtime  cyanocobalamin 1000 MICROGram(s) Oral daily  dextrose 40% Gel 15 Gram(s) Oral once PRN  dextrose 5%. 1000 milliLiter(s) IV Continuous <Continuous>  dextrose 50% Injectable 12.5 Gram(s) IV Push once  dextrose 50% Injectable 25 Gram(s) IV Push once  dextrose 50% Injectable 25 Gram(s) IV Push once  docusate sodium 100 milliGRAM(s) Oral two times a day  famotidine    Tablet 20 milliGRAM(s) Oral daily  folic acid 1 milliGRAM(s) Oral daily  glucagon  Injectable 1 milliGRAM(s) IntraMuscular once PRN  indomethacin 25 milliGRAM(s) Oral two times a day  insulin lispro (HumaLOG) corrective regimen sliding scale   SubCutaneous three times a day before meals  insulin lispro (HumaLOG) corrective regimen sliding scale   SubCutaneous at bedtime  levETIRAcetam 500 milliGRAM(s) Oral two times a day  lidocaine   Patch 1 Patch Transdermal <User Schedule>  lidocaine 5% Ointment 1 Application(s) Topical three times a day  metoprolol tartrate 25 milliGRAM(s) Oral every 12 hours  multivitamin 1 Tablet(s) Oral daily  pantoprazole    Tablet 40 milliGRAM(s) Oral before breakfast  rivaroxaban 20 milliGRAM(s) Oral every 24 hours  senna 2 Tablet(s) Oral at bedtime PRN  sodium chloride 0.9%. 1000 milliLiter(s) IV Continuous <Continuous>  tamsulosin 0.4 milliGRAM(s) Oral at bedtime  traMADol 50 milliGRAM(s) Oral every 6 hours PRN        Vital Signs Last 24 Hrs  T(F): 98.6 (27 Mar 2019 07:08), Max: 99.4 (26 Mar 2019 21:35)  HR: 86 (27 Mar 2019 07:08) (85 - 99)  BP: 117/64 (27 Mar 2019 07:08) (117/64 - 124/68)  RR: 12 (27 Mar 2019 07:08) (12 - 14)  SpO2: 97% (27 Mar 2019 07:08) (97% - 98%)  I&O's Summary      PHYSICAL EXAM:  General: NAD  ENT: MMM  Neck: Supple, No JVD  Lungs: Clear to auscultation bilaterally  Cardio: RRR, S1/S2, No murmurs  Abdomen: Soft, Nontender, Nondistended; Bowel sounds present  Extremities: No cyanosis, left hand edema, tender to touch b/l hands  Neuro: no new deficits  Skin: no rashes  Psych: AAO    LABS:                        7.8    16.3  )-----------( 189      ( 26 Mar 2019 05:45 )             25.1     03-25    138  |  103  |  46  ----------------------------<  217  4.0   |  22  |  1.41    Ca    9.7      25 Mar 2019 05:50      eGFR if Non African American: 46 mL/min/1.73M2 (03-25-19 @ 05:50)  eGFR if African American: 53 mL/min/1.73M2 (03-25-19 @ 05:50)                    CAPILLARY BLOOD GLUCOSE      POCT Blood Glucose.: 183 mg/dL (27 Mar 2019 11:53)  POCT Blood Glucose.: 227 mg/dL (27 Mar 2019 07:49)  POCT Blood Glucose.: 177 mg/dL (26 Mar 2019 21:29)  POCT Blood Glucose.: 189 mg/dL (26 Mar 2019 17:18)    03-07 [id number] 7.0        Culture - Blood (collected 23 Mar 2019 12:18)  Source: .Blood blood segments  Preliminary Report (25 Mar 2019 02:01):    No growth to date.    Culture - Blood (collected 23 Mar 2019 01:38)  Source: .Blood None  Gram Stain (24 Mar 2019 18:14):    Growth in anaerobic bottle: Gram Positive Cocci in Clusters  Final Report (25 Mar 2019 20:44):    Growth in anaerobic bottle: Coag Negative Staphylococcus    Single set isolate, possible contaminant. Contact    Microbiology if susceptibility testing clinically    indicated.    ***Blood Panel PCR results on this specimen are available    approximately 3 hours after the Gram stain result.***    Gram stain, PCR, and/or culture results may not always    correspond due to difference in methodologies.    ************************************************************    This PCR assay was performed using [employer].    The following targets are tested for: Enterococcus,    vancomycin resistant enterococci, Listeria monocytogenes,    coagulase negative staphylococci, S. aureus,    methicillin resistant S. aureus, Streptococcus agalactiae    (Group B), S. pneumoniae, S. pyogenes (Group A),    Acinetobacter baumannii, Enterobacter cloacae, E. coli,    Klebsiella oxytoca, K. pneumoniae, Proteus sp.,    Serratia marcescens, Haemophilus influenzae,    Neisseria meningitidis, Pseudomonas aeruginosa, Candida    albicans, C. glabrata, C krusei, C parapsilosis,    C. tropicalis and the KPC resistance gene.    "Due to technical problems, Proteus sp. will Not be reported as part of    the BCID panel until further notice"  Organism: Blood Culture PCR (25 Mar 2019 20:44)  Organism: Blood Culture PCR (25 Mar 2019 20:44)      -  Coagulase negative Staphylococcus: Detec      Method Type: PCR    Culture - Blood (collected 23 Mar 2019 01:38)  Source: .Blood Blood-Peripheral  Preliminary Report (24 Mar 2019 11:01):    No growth to date.        RADIOLOGY & ADDITIONAL TESTS:    Care Discussed with Consultants/Other Providers:

## 2019-03-27 NOTE — PROGRESS NOTE ADULT - ASSESSMENT
84-year-old gentleman with history of anemia, atrial fibrillation and seizure disorder admitted with anemia exacerbation from his [place] facility. Patient notes recent history of bright red blood per rectum. He was scheduled for outpatient colonoscopy.  On admission, found to have rectal temp of 100.7°F in the emergency department.  Patient reported he stopped drinking alcohol approximately 3 months prior to admission

## 2019-03-28 LAB
CULTURE RESULTS: SIGNIFICANT CHANGE UP
HCT VFR BLD CALC: 25.2 % — LOW (ref 39–50)
HGB BLD-MCNC: 7.9 G/DL — LOW (ref 13–17)
MCHC RBC-ENTMCNC: 31.2 GM/DL — LOW (ref 32–36)
MCHC RBC-ENTMCNC: 31.8 PG — SIGNIFICANT CHANGE UP (ref 27–34)
MCV RBC AUTO: 101.9 FL — HIGH (ref 80–100)
PLATELET # BLD AUTO: 183 K/UL — SIGNIFICANT CHANGE UP (ref 150–400)
RBC # BLD: 2.47 M/UL — LOW (ref 4.2–5.8)
RBC # FLD: 18.9 % — HIGH (ref 10.3–14.5)
SPECIMEN SOURCE: SIGNIFICANT CHANGE UP
WBC # BLD: 15.1 K/UL — HIGH (ref 3.8–10.5)
WBC # FLD AUTO: 15.1 K/UL — HIGH (ref 3.8–10.5)

## 2019-03-28 PROCEDURE — 99232 SBSQ HOSP IP/OBS MODERATE 35: CPT

## 2019-03-28 RX ORDER — ERYTHROPOIETIN 10000 [IU]/ML
10000 INJECTION, SOLUTION INTRAVENOUS; SUBCUTANEOUS ONCE
Qty: 0 | Refills: 0 | Status: COMPLETED | OUTPATIENT
Start: 2019-03-29 | End: 2019-03-29

## 2019-03-28 RX ADMIN — Medication 81 MILLIGRAM(S): at 12:12

## 2019-03-28 RX ADMIN — Medication 2: at 17:11

## 2019-03-28 RX ADMIN — LIDOCAINE 1 PATCH: 4 CREAM TOPICAL at 08:21

## 2019-03-28 RX ADMIN — Medication 25 MILLIGRAM(S): at 17:12

## 2019-03-28 RX ADMIN — LEVETIRACETAM 500 MILLIGRAM(S): 250 TABLET, FILM COATED ORAL at 17:12

## 2019-03-28 RX ADMIN — LIDOCAINE 1 APPLICATION(S): 4 CREAM TOPICAL at 22:15

## 2019-03-28 RX ADMIN — LIDOCAINE 1 PATCH: 4 CREAM TOPICAL at 12:14

## 2019-03-28 RX ADMIN — Medication 1 TABLET(S): at 12:13

## 2019-03-28 RX ADMIN — LIDOCAINE 1 APPLICATION(S): 4 CREAM TOPICAL at 13:10

## 2019-03-28 RX ADMIN — FAMOTIDINE 20 MILLIGRAM(S): 10 INJECTION INTRAVENOUS at 12:13

## 2019-03-28 RX ADMIN — Medication 25 MILLIGRAM(S): at 05:48

## 2019-03-28 RX ADMIN — PREGABALIN 1000 MICROGRAM(S): 225 CAPSULE ORAL at 12:13

## 2019-03-28 RX ADMIN — RIVAROXABAN 20 MILLIGRAM(S): KIT at 17:11

## 2019-03-28 RX ADMIN — LIDOCAINE 1 APPLICATION(S): 4 CREAM TOPICAL at 08:13

## 2019-03-28 RX ADMIN — Medication 2: at 08:13

## 2019-03-28 RX ADMIN — LIDOCAINE 1 PATCH: 4 CREAM TOPICAL at 22:14

## 2019-03-28 RX ADMIN — ATORVASTATIN CALCIUM 10 MILLIGRAM(S): 80 TABLET, FILM COATED ORAL at 22:14

## 2019-03-28 RX ADMIN — AMLODIPINE BESYLATE 10 MILLIGRAM(S): 2.5 TABLET ORAL at 05:48

## 2019-03-28 RX ADMIN — Medication 25 MILLIGRAM(S): at 18:37

## 2019-03-28 RX ADMIN — Medication 1 MILLIGRAM(S): at 12:13

## 2019-03-28 RX ADMIN — TRAMADOL HYDROCHLORIDE 50 MILLIGRAM(S): 50 TABLET ORAL at 01:33

## 2019-03-28 RX ADMIN — Medication 300 MILLIGRAM(S): at 12:12

## 2019-03-28 RX ADMIN — LEVETIRACETAM 500 MILLIGRAM(S): 250 TABLET, FILM COATED ORAL at 05:48

## 2019-03-28 RX ADMIN — TAMSULOSIN HYDROCHLORIDE 0.4 MILLIGRAM(S): 0.4 CAPSULE ORAL at 22:15

## 2019-03-28 RX ADMIN — Medication 25 MILLIGRAM(S): at 07:18

## 2019-03-28 RX ADMIN — Medication 100 MILLIGRAM(S): at 05:47

## 2019-03-28 RX ADMIN — Medication 2: at 12:11

## 2019-03-28 RX ADMIN — Medication 100 MILLIGRAM(S): at 17:11

## 2019-03-28 RX ADMIN — PANTOPRAZOLE SODIUM 40 MILLIGRAM(S): 20 TABLET, DELAYED RELEASE ORAL at 05:47

## 2019-03-28 NOTE — PROGRESS NOTE ADULT - SUBJECTIVE AND OBJECTIVE BOX
Patient is a 84y old  Male who presents with a chief complaint of anemia, debility and functional decline (28 Mar 2019 08:29)      HPI:  Patient is 84 male RH dominant with PMH significant for HTN, HLD,  Afib (off xarelto), stomach CA, anemia, gout, BPH. seizure d/o, recently stopped EToh use, who presented from [facility] with abnormal labs.  Pt reports recent admission to [facility] for gout flare then discharged to rehab.  Pt now returns with guaiac negative anemia (HgB 6.8 ) and  episode of bright red blood in stool. Pt also found with leukocytosis, with temp 100.7 F rectally.  Denies chills, fever, sob, chest pain, weakness, dysuria.    Patient was seen by GI and hematology.  S/P Colonoscopy on 3/1/19. No source of bleeding noted. Negative FOB. S/P EGD during prior admission, and no bleeding identified. Heme performed BM biopsy 3/4, results pending. CKD, Etoh may contribute, although underlying BM d/o such as myelodysplasia suspected. Transfused PRBC. Patient transferred to - [facility] due to debility and functional decline (06 Mar 2019 14:53)      PAST MEDICAL & SURGICAL HISTORY:  Nonrheumatic aortic valve stenosis  Cancer of stomach  Anemia, unspecified type: Anemia  Seizure disorder  Benign prostatic hyperplasia, unspecified whether lower urinary tract symptoms present  Hyperlipemia  Essential hypertension  Chronic atrial fibrillation  Stomach cancer  Hypertension  Atrial fibrillation  Gout  No significant past surgical history  No significant past surgical history      MEDICATIONS  (STANDING):  allopurinol 300 milliGRAM(s) Oral daily  amLODIPine   Tablet 10 milliGRAM(s) Oral daily  aspirin enteric coated 81 milliGRAM(s) Oral daily  atorvastatin 10 milliGRAM(s) Oral at bedtime  cyanocobalamin 1000 MICROGram(s) Oral daily  dextrose 5%. 1000 milliLiter(s) (50 mL/Hr) IV Continuous <Continuous>  dextrose 50% Injectable 12.5 Gram(s) IV Push once  dextrose 50% Injectable 25 Gram(s) IV Push once  dextrose 50% Injectable 25 Gram(s) IV Push once  docusate sodium 100 milliGRAM(s) Oral two times a day  famotidine    Tablet 20 milliGRAM(s) Oral daily  folic acid 1 milliGRAM(s) Oral daily  indomethacin 25 milliGRAM(s) Oral two times a day  insulin lispro (HumaLOG) corrective regimen sliding scale   SubCutaneous three times a day before meals  insulin lispro (HumaLOG) corrective regimen sliding scale   SubCutaneous at bedtime  levETIRAcetam 500 milliGRAM(s) Oral two times a day  lidocaine   Patch 1 Patch Transdermal <User Schedule>  lidocaine 5% Ointment 1 Application(s) Topical three times a day  metoprolol tartrate 25 milliGRAM(s) Oral every 12 hours  multivitamin 1 Tablet(s) Oral daily  pantoprazole    Tablet 40 milliGRAM(s) Oral before breakfast  rivaroxaban 20 milliGRAM(s) Oral every 24 hours  sodium chloride 0.9%. 1000 milliLiter(s) (100 mL/Hr) IV Continuous <Continuous>  tamsulosin 0.4 milliGRAM(s) Oral at bedtime    MEDICATIONS  (PRN):  acetaminophen   Tablet .. 650 milliGRAM(s) Oral every 6 hours PRN Mild Pain (1 - 3)  dextrose 40% Gel 15 Gram(s) Oral once PRN Blood Glucose LESS THAN 70 milliGRAM(s)/deciliter  glucagon  Injectable 1 milliGRAM(s) IntraMuscular once PRN Glucose LESS THAN 70 milligrams/deciliter  senna 2 Tablet(s) Oral at bedtime PRN Constipation  traMADol 50 milliGRAM(s) Oral every 6 hours PRN Severe Pain (7 - 10)      Allergies    No Known Allergies    Intolerances          VITALS  84y  Vital Signs Last 24 Hrs  T(C): 36.4 (28 Mar 2019 07:56), Max: 36.6 (28 Mar 2019 02:01)  T(F): 97.5 (28 Mar 2019 07:56), Max: 97.8 (28 Mar 2019 02:01)  HR: 66 (28 Mar 2019 07:56) (66 - 94)  BP: 103/59 (28 Mar 2019 07:56) (103/59 - 144/72)  BP(mean): --  RR: 12 (28 Mar 2019 07:56) (12 - 14)  SpO2: 99% (28 Mar 2019 07:56) (97% - 99%)  Daily     Daily         RECENT LABS:                          7.9    15.1  )-----------( 183      ( 28 Mar 2019 06:10 )             25.2                   Culture - Blood (collected 03-23-19 @ 12:18)  Source: .Blood blood segments  Preliminary Report (03-25-19 @ 02:01):    No growth to date.        CAPILLARY BLOOD GLUCOSE      POCT Blood Glucose.: 209 mg/dL (28 Mar 2019 07:48)  POCT Blood Glucose.: 191 mg/dL (27 Mar 2019 21:19)  POCT Blood Glucose.: 178 mg/dL (27 Mar 2019 16:55)  POCT Blood Glucose.: 183 mg/dL (27 Mar 2019 11:53)

## 2019-03-28 NOTE — PROGRESS NOTE ADULT - ASSESSMENT
#84 male with PMH significant for HTN, HLD, Afib, stomach CA, anemia, gout, BPH. seizure d/o, recently stopped EToh use, p/w chronic anemia possibly due to myelodysplastic syndrome, s/p tx PRBC, debility and functional decline      #Anemia/debility: multifactorial: Chronic kidney disease, recent GIB. 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   - Heme appreciated 3/28:   Chronic myelomonocytic leukemia not having achieved remission.  Supportive H/O care at this time.   -GF/PRN transfusional support. Patient not candidate for systemic therapy   -will need education procrit injections for dc if to continue    #LGI bleed:  - xarelto and ASA restarted  -, GI consult and follow up appreciated.  - CBC 3/28 STABLE    #elevated FS  -CCD ordered.    #Afib:   - Continue metoprolol, Xarelto    #HTN:-   Continue norvasc, lasix    # JOint pain/acute gout: IMRPOVED  - tramadol, 50 mg q6 severe pain  -indomecthacin 25 mg bid x 2 days. monitor CBC  -continue allpurinol 300 mg daily  -reviewed plan with patient today 3/28    #SZ/tremors:   - Controlled on keppra     #DVT prophylaxis:   -on xarelto    Mood:  -neuropsych fu  -patient states he does not want palliative care/hospice consult at this time    #Case discussed in IDT rounds 3/26  -need re-evaluation as medical status interfered with therapy and progress in last several days. may need more assistance, as well as hospital bed for positioning and transfers, requires caregiver education . After re-evaluation, assess patient and caregiver's ability to manage at home vs JANET    #Discharge home and caregiver training on hold due to medical status    Labs:  CBC 3/30  CBC, BMP 4/1

## 2019-03-28 NOTE — PROGRESS NOTE ADULT - SUBJECTIVE AND OBJECTIVE BOX
YUE KING   84y   Male    Admitting: SAEED Gallego  HPI:  84-year-old gentleman with history of anemia, atrial fibrillation and seizure disorder admitted with anemia exacerbation from his [facility] facility. Patient noted recent history of bright red blood per rectum. He was scheduled for outpatient colonoscopy. S/P BM biopsy.  Patient reported he stopped drinking alcohol approximately 3 months prior to admission.  Patient is now on the acute rehabilitation unit.    PAST MEDICAL & SURGICAL HISTORY:  Nonrheumatic aortic valve stenosis  Cancer of stomach  Anemia, unspecified type: Anemia  Seizure disorder  Benign prostatic hyperplasia, unspecified whether lower urinary tract symptoms present  Hyperlipemia  Essential hypertension  Chronic atrial fibrillation  Stomach cancer  Hypertension  Atrial fibrillation  Gout  No significant past surgical history  No significant past surgical history    HEALTH ISSUES - PROBLEM Dx:  Anemia  Ureteral stone: Ureteral stone  Chronic myelomonocytic leukemia not having achieved remission: Chronic myelomonocytic leukemia not having achieved remission  Anemia, unspecified type: Anemia, unspecified type  Rectal bleed    MEDICATIONS  (STANDING):  allopurinol 300 milliGRAM(s) Oral daily  amLODIPine   Tablet 10 milliGRAM(s) Oral daily  aspirin enteric coated 81 milliGRAM(s) Oral daily  atorvastatin 10 milliGRAM(s) Oral at bedtime  cyanocobalamin 1000 MICROGram(s) Oral daily  dextrose 5%. 1000 milliLiter(s) (50 mL/Hr) IV Continuous <Continuous>  dextrose 50% Injectable 12.5 Gram(s) IV Push once  dextrose 50% Injectable 25 Gram(s) IV Push once  dextrose 50% Injectable 25 Gram(s) IV Push once  docusate sodium 100 milliGRAM(s) Oral two times a day  famotidine    Tablet 20 milliGRAM(s) Oral daily  folic acid 1 milliGRAM(s) Oral daily  indomethacin 25 milliGRAM(s) Oral two times a day  insulin lispro (HumaLOG) corrective regimen sliding scale   SubCutaneous three times a day before meals  insulin lispro (HumaLOG) corrective regimen sliding scale   SubCutaneous at bedtime  levETIRAcetam 500 milliGRAM(s) Oral two times a day  lidocaine   Patch 1 Patch Transdermal <User Schedule>  lidocaine 5% Ointment 1 Application(s) Topical three times a day  metoprolol tartrate 25 milliGRAM(s) Oral every 12 hours  multivitamin 1 Tablet(s) Oral daily  pantoprazole    Tablet 40 milliGRAM(s) Oral before breakfast  rivaroxaban 20 milliGRAM(s) Oral every 24 hours  sodium chloride 0.9%. 1000 milliLiter(s) (100 mL/Hr) IV Continuous <Continuous>  tamsulosin 0.4 milliGRAM(s) Oral at bedtime    MEDICATIONS  (PRN):  acetaminophen   Tablet .. 650 milliGRAM(s) Oral every 6 hours PRN Mild Pain (1 - 3)  dextrose 40% Gel 15 Gram(s) Oral once PRN Blood Glucose LESS THAN 70 milliGRAM(s)/deciliter  glucagon  Injectable 1 milliGRAM(s) IntraMuscular once PRN Glucose LESS THAN 70 milligrams/deciliter  senna 2 Tablet(s) Oral at bedtime PRN Constipation  traMADol 50 milliGRAM(s) Oral every 6 hours PRN Severe Pain (7 - 10)    Allergies    No Known Allergies    INTERVAL HPI/OVERNIGHT EVENTS:  Patient S&E at bedside. Resting. No c/o CP or SOB or palpitations.     VITAL SIGNS:  T(F): 97.8 (03-28-19 @ 02:01)  HR: 77 (03-28-19 @ 05:55)  BP: 112/70 (03-28-19 @ 05:55)  RR: 14 (03-28-19 @ 02:01)  SpO2: 97% (03-28-19 @ 02:01)    PHYSICAL EXAM:  GENERAL: NAD, well-developed  EYES: EOMI, PERRLA, conjunctiva and sclera clear  NECK: Supple, No JVD  CHEST/LUNG: decreased BS bases ant.  HEART: Regular rate and rhythm  ABDOMEN: Soft, Nontender, Nondistended  EXTREMITIES:  no calf tenderness appreciated  NEUROLOGY: awake, alert    Labs:             7.9    15.1  )-----------( 183      ( 03-28 @ 06:10 )             25.2                7.8    16.3  )-----------( 189      ( 03-26 @ 05:45 )             25.1   Haptoglobin, Serum: 385 mg/dL (03-26-19 @ 05:45)  Absolute Reticulocytes: 55.7 K/uL (03-26-19 @ 05:45)

## 2019-03-28 NOTE — PROGRESS NOTE ADULT - ASSESSMENT
83 yo male with CMMoL, anemia, alcohol use, ? dementia, A Fib, and polyarticular gout who developed fever in setting of blood transfusion.  Fever since resolved- off abx  Coag negative staph in blood is a contaminant (single bottle)  ? Reactive leukocytosis, ?CMMoL baseline, also moderating    Suggest:  Monitor off antibiotics  Follow temps and CBC/diff   Additional w/u as needed

## 2019-03-28 NOTE — PROGRESS NOTE ADULT - SUBJECTIVE AND OBJECTIVE BOX
CC: f/u for fever- resolved, leukocytosis    Patient reports resting comfortably    REVIEW OF SYSTEMS:  All other review of systems negative (Comprehensive ROS)    Antimicrobials off  Medications Reviewed    Vital Signs Last 24 Hrs  T(F): 97.5 (28 Mar 2019 07:56), Max: 97.8 (28 Mar 2019 02:01)  HR: 66 (28 Mar 2019 07:56) (66 - 94)  BP: 103/59 (28 Mar 2019 07:56) (103/59 - 144/72)  BP(mean): --  RR: 12 (28 Mar 2019 07:56) (12 - 14)  SpO2: 99% (28 Mar 2019 07:56) (97% - 99%)    PHYSICAL EXAM:  General: alert, no acute distress  Eyes:  anicteric, no conjunctival injection, no discharge  Oropharynx: no lesions or injection 	  Neck: supple, without adenopathy  Lungs: clear to auscultation  Heart: irregular rhythm, S1,S2  Abdomen: soft, nondistended, nontender, without mass or organomegaly  Skin: no lesions  Extremities: no edema  Neurologic: alert, moves all extremities    LAB RESULTS:                        7.9    15.1  )-----------( 183      ( 28 Mar 2019 06:10 )             25.2       MICROBIOLOGY:  RECENT CULTURES:  Culture - Blood (03.23.19 @ 12:18)    Specimen Source: .Blood blood segments    Culture Results:   No growth to date.    Culture - Blood (03.23.19 @ 01:38)    Specimen Source: .Blood Blood-Peripheral    Culture Results:   No growth at 5 days.    03-23 @ 01:38 .Blood Blood-Peripheral Blood Culture PCR    No growth to date.  Growth in anaerobic bottle: Gram Positive Cocci in Clusters  Coag negative staph      RADIOLOGY REVIEWED

## 2019-03-29 PROCEDURE — 99232 SBSQ HOSP IP/OBS MODERATE 35: CPT

## 2019-03-29 PROCEDURE — 99233 SBSQ HOSP IP/OBS HIGH 50: CPT

## 2019-03-29 RX ORDER — INDOMETHACIN 50 MG
25 CAPSULE ORAL
Qty: 0 | Refills: 0 | Status: COMPLETED | OUTPATIENT
Start: 2019-03-29 | End: 2019-03-29

## 2019-03-29 RX ORDER — POLYETHYLENE GLYCOL 3350 17 G/17G
17 POWDER, FOR SOLUTION ORAL DAILY
Qty: 0 | Refills: 0 | Status: DISCONTINUED | OUTPATIENT
Start: 2019-03-29 | End: 2019-04-10

## 2019-03-29 RX ORDER — ERYTHROPOIETIN 10000 [IU]/ML
10000 INJECTION, SOLUTION INTRAVENOUS; SUBCUTANEOUS ONCE
Qty: 0 | Refills: 0 | Status: COMPLETED | OUTPATIENT
Start: 2019-04-01 | End: 2019-04-01

## 2019-03-29 RX ADMIN — ERYTHROPOIETIN 10000 UNIT(S): 10000 INJECTION, SOLUTION INTRAVENOUS; SUBCUTANEOUS at 16:32

## 2019-03-29 RX ADMIN — Medication 25 MILLIGRAM(S): at 05:57

## 2019-03-29 RX ADMIN — LIDOCAINE 1 APPLICATION(S): 4 CREAM TOPICAL at 05:56

## 2019-03-29 RX ADMIN — PANTOPRAZOLE SODIUM 40 MILLIGRAM(S): 20 TABLET, DELAYED RELEASE ORAL at 06:03

## 2019-03-29 RX ADMIN — LEVETIRACETAM 500 MILLIGRAM(S): 250 TABLET, FILM COATED ORAL at 17:47

## 2019-03-29 RX ADMIN — Medication 3: at 11:49

## 2019-03-29 RX ADMIN — Medication 100 MILLIGRAM(S): at 17:47

## 2019-03-29 RX ADMIN — LEVETIRACETAM 500 MILLIGRAM(S): 250 TABLET, FILM COATED ORAL at 05:57

## 2019-03-29 RX ADMIN — TRAMADOL HYDROCHLORIDE 50 MILLIGRAM(S): 50 TABLET ORAL at 07:39

## 2019-03-29 RX ADMIN — Medication 25 MILLIGRAM(S): at 18:30

## 2019-03-29 RX ADMIN — LIDOCAINE 1 PATCH: 4 CREAM TOPICAL at 10:50

## 2019-03-29 RX ADMIN — Medication 81 MILLIGRAM(S): at 11:42

## 2019-03-29 RX ADMIN — LIDOCAINE 1 APPLICATION(S): 4 CREAM TOPICAL at 21:14

## 2019-03-29 RX ADMIN — TAMSULOSIN HYDROCHLORIDE 0.4 MILLIGRAM(S): 0.4 CAPSULE ORAL at 21:15

## 2019-03-29 RX ADMIN — Medication 1 MILLIGRAM(S): at 11:42

## 2019-03-29 RX ADMIN — Medication 25 MILLIGRAM(S): at 05:56

## 2019-03-29 RX ADMIN — LIDOCAINE 1 APPLICATION(S): 4 CREAM TOPICAL at 13:10

## 2019-03-29 RX ADMIN — LIDOCAINE 1 PATCH: 4 CREAM TOPICAL at 07:15

## 2019-03-29 RX ADMIN — LIDOCAINE 1 PATCH: 4 CREAM TOPICAL at 21:14

## 2019-03-29 RX ADMIN — ATORVASTATIN CALCIUM 10 MILLIGRAM(S): 80 TABLET, FILM COATED ORAL at 21:14

## 2019-03-29 RX ADMIN — Medication 1 TABLET(S): at 11:42

## 2019-03-29 RX ADMIN — PREGABALIN 1000 MICROGRAM(S): 225 CAPSULE ORAL at 11:42

## 2019-03-29 RX ADMIN — Medication 25 MILLIGRAM(S): at 17:47

## 2019-03-29 RX ADMIN — RIVAROXABAN 20 MILLIGRAM(S): KIT at 17:47

## 2019-03-29 RX ADMIN — Medication 1: at 16:31

## 2019-03-29 RX ADMIN — Medication 2: at 07:54

## 2019-03-29 RX ADMIN — Medication 300 MILLIGRAM(S): at 11:42

## 2019-03-29 RX ADMIN — Medication 100 MILLIGRAM(S): at 05:57

## 2019-03-29 RX ADMIN — FAMOTIDINE 20 MILLIGRAM(S): 10 INJECTION INTRAVENOUS at 11:42

## 2019-03-29 RX ADMIN — TRAMADOL HYDROCHLORIDE 50 MILLIGRAM(S): 50 TABLET ORAL at 08:15

## 2019-03-29 RX ADMIN — AMLODIPINE BESYLATE 10 MILLIGRAM(S): 2.5 TABLET ORAL at 05:57

## 2019-03-29 RX ADMIN — Medication 25 MILLIGRAM(S): at 17:46

## 2019-03-29 NOTE — PROGRESS NOTE ADULT - SUBJECTIVE AND OBJECTIVE BOX
DAILY PROGRESS NOTE:  HPI: Patient is 84 male RH dominant with PMH significant for HTN, HLD,  Afib (off xarelto), stomach CA, anemia, gout, BPH. seizure d/o, recently stopped EToh use, who presented from [facility] with abnormal labs.  Pt reports recent admission to [facility] for gout flare then discharged to rehab.  Pt now returns with guaiac negative anemia (HgB 6.8 ) and  episode of bright red blood in stool. Pt also found with leukocytosis, with temp 100.7 F rectally.  Denies chills, fever, sob, chest pain, weakness, dysuria.  Patient was seen by GI and hematology.  S/P Colonoscopy on 3/1/19. No source of bleeding noted. Negative FOB. S/P EGD during prior admission, and no bleeding identified. Heme performed BM biopsy 3/4, results pending. CKD, Etoh may contribute, although underlying BM d/o such as myelodysplasia suspected. Transfused PRBC. Patient transferred to - IRF due to debility and functional decline (06 Mar 2019 14:53)    Subjective: Patient seen and examined at bedside. Reports he is in less pain today, although his ankles and knees are still warm.     ROS: + polyarticular joint pain/swelling; weakness (general); Leg swelling improved    Physical Exam:  Vital Signs Last 24 Hrs  T(C): 36.2 (29 Mar 2019 09:04), Max: 36.8 (28 Mar 2019 22:08)  T(F): 97.1 (29 Mar 2019 09:04), Max: 98.3 (28 Mar 2019 22:08)  HR: 94 (29 Mar 2019 09:04) (90 - 99)  BP: 98/50 (29 Mar 2019 09:04) (98/50 - 117/66)  BP(mean): --  RR: 14 (29 Mar 2019 09:04) (14 - 14)  SpO2: 99% (29 Mar 2019 09:04) (99% - 100%)    Constitutional - NAD, Comfortable.   HEENT - NCAT, EOMI, no new facial droop  Neck - Supple, No limited ROM  Chest - Breathing comfortably, No wheezing  Cardiovascular - S1S2   Abdomen - Soft   Extremities - Improving redness and swelling of 2nd MCP's; b/l ankles and knees still warm. Improved swelling of b/l LE; ACE wraps to ankles  Neurologic Exam -  No deficits    Recent Labs/Imagin.9    15.1  )-----------( 183      ( 28 Mar 2019 06:10 )             25.2     POCT Blood Glucose.: 245 mg/dL (19 @ 07:51)  POCT Blood Glucose.: 227 mg/dL (19 @ 22:11)  POCT Blood Glucose.: 238 mg/dL (19 @ 17:07)  POCT Blood Glucose.: 223 mg/dL (19 @ 11:57)    Hemoglobin A1C, Whole Blood (19 @ 08:21)    Hemoglobin A1C, Whole Blood: 7.0: Method: Immunoassay    Medications:  acetaminophen   Tablet .. 650 milliGRAM(s) Oral every 6 hours PRN  allopurinol 300 milliGRAM(s) Oral daily  amLODIPine   Tablet 10 milliGRAM(s) Oral daily  aspirin enteric coated 81 milliGRAM(s) Oral daily  atorvastatin 10 milliGRAM(s) Oral at bedtime  bisacodyl Suppository 10 milliGRAM(s) Rectal once  cyanocobalamin 1000 MICROGram(s) Oral daily  dextrose 40% Gel 15 Gram(s) Oral once PRN  dextrose 5%. 1000 milliLiter(s) IV Continuous <Continuous>  dextrose 50% Injectable 12.5 Gram(s) IV Push once  dextrose 50% Injectable 25 Gram(s) IV Push once  dextrose 50% Injectable 25 Gram(s) IV Push once  docusate sodium 100 milliGRAM(s) Oral two times a day  epoetin jackelyn Injectable 57040 Unit(s) SubCutaneous once  famotidine    Tablet 20 milliGRAM(s) Oral daily  folic acid 1 milliGRAM(s) Oral daily  glucagon  Injectable 1 milliGRAM(s) IntraMuscular once PRN  indomethacin 25 milliGRAM(s) Oral two times a day  insulin lispro (HumaLOG) corrective regimen sliding scale   SubCutaneous three times a day before meals  insulin lispro (HumaLOG) corrective regimen sliding scale   SubCutaneous at bedtime  levETIRAcetam 500 milliGRAM(s) Oral two times a day  lidocaine   Patch 1 Patch Transdermal <User Schedule>  lidocaine 5% Ointment 1 Application(s) Topical three times a day  metoprolol tartrate 25 milliGRAM(s) Oral every 12 hours  multivitamin 1 Tablet(s) Oral daily  pantoprazole    Tablet 40 milliGRAM(s) Oral before breakfast  polyethylene glycol 3350 17 Gram(s) Oral daily  rivaroxaban 20 milliGRAM(s) Oral every 24 hours  senna 2 Tablet(s) Oral at bedtime PRN  sodium chloride 0.9%. 1000 milliLiter(s) IV Continuous <Continuous>  tamsulosin 0.4 milliGRAM(s) Oral at bedtime  traMADol 50 milliGRAM(s) Oral every 6 hours PRN    Assessment/Plan:  84M pmh HTN, HLD, Afib, stomach CA, anemia, gout, BPH. seizure d/o, recently stopped EToh use, p/w chronic anemia due to myelodysplastic syndrome, s/p tx PRBC, debility and functional decline.    #Anemia/debility: multifactorial: Chronic kidney disease, recent GIB. 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   - Heme appreciated 3/28: Chronic myelomonocytic leukemia not having achieved remission. Supportive H/O care at this time. GF/PRN transfusional support. Patient not candidate for systemic therapy.  - Will need education procrit injections for dc if to continue    #LGI bleed:  - xarelto and ASA restarted  - GI consult and follow up appreciated.  - CBC 3/28 STABLE    #Diabetes Mellitus Type II - NEW DIAGNOSIS  - A1c 7.0%  - Continue Lispro sliding scale   - Will discuss with patient then endocrine consult early next week for discharge recommendations.    #Afib:  - Continue metoprolol, Xarelto    #HTN:  - Continue norvasc, lasix    #Joint pain/acute gout: Waxing and waning joint pain and swelling, improves with indomethacin temporarily   - tramadol, 50 mg q6 severe pain  - Indomecthacin 25 mg bid for one more day as ankles still warm and swollen.   - Continue allpurinol 300 mg daily  - Reviewed plan with patient today 3/29    #SZ/tremors:  - Controlled on keppra    #Constipation: No BM for 2-3 days, adding miralax daily and dulcolax suppository 3/29.   - Monitor for improvement     #DVT prophylaxis:   - Continue Xarelto    Mood:  - Neuropsych fu  - Patient states he does not want palliative care/hospice consult at this time    #Case discussed in IDT rounds 3/26  - Need re-evaluation as medical status interfered with therapy and progress in last several days. May need more assistance, as well as hospital bed for positioning and transfers, requires caregiver education. After re-evaluation, assess patient and caregiver's ability to manage at home vs [facility]AN[facility].    #Discharge home and caregiver training on hold due to medical status    Labs:  CBC, BMP 3/30  CBC, BMP

## 2019-03-29 NOTE — PROGRESS NOTE ADULT - ASSESSMENT
Pt is a 85yo M w multiple PMH is admitted to acute rehab for debility secondary to anemia likely from myelodysplastic syndrome s/p PRBC.     #Debility: continue rehab    #Anemia, underlyign bone marrow disorder: monitor CBC    #CKD stage 3: stable, monitor     #Afib: rate controlled, cont BB, cont xarelto     #HTN: cont norvasc, lasix     #Gout: continue allopurinol    #BPH: cont Flomax     #DMII: accuchecks elevated, consider starting lantus 7 units QHS.     #DVT ppx: stable on xarelto

## 2019-03-29 NOTE — PROGRESS NOTE ADULT - SUBJECTIVE AND OBJECTIVE BOX
Patient is a 84y old  Male who presents with a chief complaint of anemia, debility and functional decline (29 Mar 2019 09:53)      HPI:  Patient is 84 male RH dominant with PMH significant for HTN, HLD,  Afib (off xarelto), stomach CA, anemia, gout, BPH. seizure d/o, recently stopped EToh use, who presented from [facility] with abnormal labs.  Pt reports recent admission to [facility] for gout flare then discharged to rehab.  Pt now returns with guaiac negative anemia (HgB 6.8 ) and  episode of bright red blood in stool. Pt also found with leukocytosis, with temp 100.7 F rectally.  Denies chills, fever, sob, chest pain, weakness, dysuria.    Patient was seen by GI and hematology.  S/P Colonoscopy on 3/1/19. No source of bleeding noted. Negative FOB. S/P EGD during prior admission, and no bleeding identified. Heme performed BM biopsy 3/4, results pending. CKD, Etoh may contribute, although underlying BM d/o such as myelodysplasia suspected. Transfused PRBC. Patient transferred to - [facility] due to debility and functional decline (06 Mar 2019 14:53)      PAST MEDICAL & SURGICAL HISTORY:  Nonrheumatic aortic valve stenosis  Cancer of stomach  Anemia, unspecified type: Anemia  Seizure disorder  Benign prostatic hyperplasia, unspecified whether lower urinary tract symptoms present  Hyperlipemia  Essential hypertension  Chronic atrial fibrillation  Stomach cancer  Hypertension  Atrial fibrillation  Gout  No significant past surgical history  No significant past surgical history      MEDICATIONS  (STANDING):  allopurinol 300 milliGRAM(s) Oral daily  amLODIPine   Tablet 10 milliGRAM(s) Oral daily  aspirin enteric coated 81 milliGRAM(s) Oral daily  atorvastatin 10 milliGRAM(s) Oral at bedtime  bisacodyl Suppository 10 milliGRAM(s) Rectal once  cyanocobalamin 1000 MICROGram(s) Oral daily  dextrose 5%. 1000 milliLiter(s) (50 mL/Hr) IV Continuous <Continuous>  dextrose 50% Injectable 12.5 Gram(s) IV Push once  dextrose 50% Injectable 25 Gram(s) IV Push once  dextrose 50% Injectable 25 Gram(s) IV Push once  docusate sodium 100 milliGRAM(s) Oral two times a day  epoetin jackelyn Injectable 83269 Unit(s) SubCutaneous once  famotidine    Tablet 20 milliGRAM(s) Oral daily  folic acid 1 milliGRAM(s) Oral daily  indomethacin 25 milliGRAM(s) Oral two times a day  insulin lispro (HumaLOG) corrective regimen sliding scale   SubCutaneous three times a day before meals  insulin lispro (HumaLOG) corrective regimen sliding scale   SubCutaneous at bedtime  levETIRAcetam 500 milliGRAM(s) Oral two times a day  lidocaine   Patch 1 Patch Transdermal <User Schedule>  lidocaine 5% Ointment 1 Application(s) Topical three times a day  metoprolol tartrate 25 milliGRAM(s) Oral every 12 hours  multivitamin 1 Tablet(s) Oral daily  pantoprazole    Tablet 40 milliGRAM(s) Oral before breakfast  polyethylene glycol 3350 17 Gram(s) Oral daily  rivaroxaban 20 milliGRAM(s) Oral every 24 hours  sodium chloride 0.9%. 1000 milliLiter(s) (100 mL/Hr) IV Continuous <Continuous>  tamsulosin 0.4 milliGRAM(s) Oral at bedtime    MEDICATIONS  (PRN):  acetaminophen   Tablet .. 650 milliGRAM(s) Oral every 6 hours PRN Mild Pain (1 - 3)  dextrose 40% Gel 15 Gram(s) Oral once PRN Blood Glucose LESS THAN 70 milliGRAM(s)/deciliter  glucagon  Injectable 1 milliGRAM(s) IntraMuscular once PRN Glucose LESS THAN 70 milligrams/deciliter  senna 2 Tablet(s) Oral at bedtime PRN Constipation  traMADol 50 milliGRAM(s) Oral every 6 hours PRN Severe Pain (7 - 10)      Allergies    No Known Allergies    Intolerances          VITALS  84y  Vital Signs Last 24 Hrs  T(C): 36.2 (29 Mar 2019 09:04), Max: 36.8 (28 Mar 2019 22:08)  T(F): 97.1 (29 Mar 2019 09:04), Max: 98.3 (28 Mar 2019 22:08)  HR: 94 (29 Mar 2019 09:04) (90 - 99)  BP: 98/50 (29 Mar 2019 09:04) (98/50 - 117/66)  BP(mean): --  RR: 14 (29 Mar 2019 09:04) (14 - 14)  SpO2: 99% (29 Mar 2019 09:04) (99% - 100%)  Daily     Daily         RECENT LABS:                          7.9    15.1  )-----------( 183      ( 28 Mar 2019 06:10 )             25.2                     CAPILLARY BLOOD GLUCOSE      POCT Blood Glucose.: 245 mg/dL (29 Mar 2019 07:51)  POCT Blood Glucose.: 227 mg/dL (28 Mar 2019 22:11)  POCT Blood Glucose.: 238 mg/dL (28 Mar 2019 17:07)  POCT Blood Glucose.: 223 mg/dL (28 Mar 2019 11:57)

## 2019-03-29 NOTE — PROGRESS NOTE ADULT - ASSESSMENT
84-year-old gentleman with history of anemia, atrial fibrillation and seizure disorder admitted with anemia exacerbation from his [facility] facility. Patient notes recent history of bright red blood per rectum. He was scheduled for outpatient colonoscopy.  On admission, found to have rectal temp of 100.7°F in the emergency department.  Patient reported he stopped drinking alcohol approximately 3 months prior to admission

## 2019-03-29 NOTE — PROGRESS NOTE ADULT - ASSESSMENT
#84 male with PMH significant for HTN, HLD, Afib, stomach CA, anemia, gout, BPH. seizure d/o, recently stopped EToh use, p/w chronic anemia possibly due to myelodysplastic syndrome, s/p tx PRBC, debility and functional decline      #Anemia/debility: multifactorial: Chronic kidney disease, recent GIB. 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   - Heme appreciated 3/28:   Chronic myelomonocytic leukemia not having achieved remission.  Supportive H/O care at this time.   -GF/PRN transfusional support. Patient not candidate for systemic therapy   -will need education procrit injections for dc if to continue    #LGI bleed: self limiting  - xarelto and ASA restarted  -, GI consult and follow up appreciated.      #elevated FS  -CCD ordered.  -monitor. [id number], given age, monitor for now, nutrition/diabetic education for dc    #Afib:   - Continue metoprolol, Xarelto    #HTN:-   Continue norvasc, lasix  -consider adjusting if BP will low    # JOint pain/acute gout: IMRPOVED  - tramadol, 50 mg q6 severe pain  -indomecthacin 25 mg bid x 2 days. WILL EXTEND FOR 2 more dose through 3/30 given persistent warmth joints  -continue allpurinol 300 mg daily  -reviewed plan with patient today 3/29  -CBC in AM 3/30    #SZ/tremors:   - Controlled on keppra     #DVT prophylaxis:   -on xarelto    Mood:  -neuropsych fu  -patient states he does not want palliative care/hospice consult at this time    #Case discussed in IDT rounds 3/26  -need re-evaluation as medical status interfered with therapy and progress in last several days. may need more assistance, as well as hospital bed for positioning and transfers, requires caregiver education . After re-evaluation, assess patient and caregiver's ability to manage at home vs [facility]    #Discharge home and caregiver training on hold due to medical status    Labs:  CBC 3/30  CBC, BMP 4/1 #84 male with PMH significant for HTN, HLD, Afib, stomach CA, anemia, gout, BPH. seizure d/o, recently stopped EToh use, p/w chronic anemia possibly due to myelodysplastic syndrome, s/p tx PRBC, debility and functional decline      #Anemia/debility: multifactorial: Chronic kidney disease, recent GIB. 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   - Heme appreciated 3/28:   Chronic myelomonocytic leukemia not having achieved remission.  Supportive H/O care at this time.   -GF/PRN transfusional support. Patient not candidate for systemic therapy   -will need education procrit injections for dc if to continue    #LGI bleed: self limiting  - xarelto and ASA restarted  -, GI consult and follow up appreciated.      #elevated FS  -CCD ordered.  -monitor. [id number], given age, monitor for now, nutrition/diabetic education for dc    #Afib:   - Continue metoprolol, Xarelto    #HTN:-   Continue norvasc, lasix  -consider adjusting if BP will low    # JOint pain/acute gout: IMRPOVED  - tramadol, 50 mg q6 severe pain  -indomecthacin 25 mg bid x 2 days. WILL EXTEND FOR 2 more dose through 3/29 given persistent warmth joints  -continue allpurinol 300 mg daily  -reviewed plan with patient today 3/29  -CBC in AM 3/30    #SZ/tremors:   - Controlled on keppra     #DVT prophylaxis:   -on xarelto    Mood:  -neuropsych fu  -patient states he does not want palliative care/hospice consult at this time    #Case discussed in IDT rounds 3/26  -need re-evaluation as medical status interfered with therapy and progress in last several days. may need more assistance, as well as hospital bed for positioning and transfers, requires caregiver education . After re-evaluation, assess patient and caregiver's ability to manage at home vs [facility]AN[facility]    #Discharge home and caregiver training on hold due to medical status    Labs:  CBC 3/30  CBC, BMP 4/1

## 2019-03-29 NOTE — PROGRESS NOTE ADULT - ASSESSMENT
83 yo male with CMMoL, anemia, alcohol use,? dementia,A Fib, and polyarticular gout who developed fever in setting of blood transfusion.  His fever appears to have moderated.In his prior admissions he was felt to have fever secondary to gout.His CXR shows a new finding but little clinical support for infection such as pneumonia.Coag negative staph in the blood is a contaminant.GI bleed seems to be waning.  ? Reactive leukocytosis, no clinical support for ongoing infection.  CMMol may be contributing as well  Suggest:  1.monitor off antibiotics  2.If fevers return will consider a repeat CXR, PA and lateral  3.He is a poor historian, it is possible that his polyarticular gout could be a factor with fever.  4.Additional w/u as needed, no additional ID w/u planned at this point,. we will stop actively following, please call if ID issues arise

## 2019-03-29 NOTE — PROGRESS NOTE ADULT - SUBJECTIVE AND OBJECTIVE BOX
Patient is a 84y old  Male who presents with a chief complaint of anemia, debility and functional decline (29 Mar 2019 08:57)      Patient seen and examined at bedside.    ALLERGIES:  No Known Allergies    MEDICATIONS:  acetaminophen   Tablet .. 650 milliGRAM(s) Oral every 6 hours PRN  allopurinol 300 milliGRAM(s) Oral daily  amLODIPine   Tablet 10 milliGRAM(s) Oral daily  aspirin enteric coated 81 milliGRAM(s) Oral daily  atorvastatin 10 milliGRAM(s) Oral at bedtime  bisacodyl Suppository 10 milliGRAM(s) Rectal once  cyanocobalamin 1000 MICROGram(s) Oral daily  dextrose 40% Gel 15 Gram(s) Oral once PRN  dextrose 5%. 1000 milliLiter(s) IV Continuous <Continuous>  dextrose 50% Injectable 12.5 Gram(s) IV Push once  dextrose 50% Injectable 25 Gram(s) IV Push once  dextrose 50% Injectable 25 Gram(s) IV Push once  docusate sodium 100 milliGRAM(s) Oral two times a day  epoetin jackelyn Injectable 11696 Unit(s) SubCutaneous once  famotidine    Tablet 20 milliGRAM(s) Oral daily  folic acid 1 milliGRAM(s) Oral daily  glucagon  Injectable 1 milliGRAM(s) IntraMuscular once PRN  indomethacin 25 milliGRAM(s) Oral two times a day  insulin lispro (HumaLOG) corrective regimen sliding scale   SubCutaneous three times a day before meals  insulin lispro (HumaLOG) corrective regimen sliding scale   SubCutaneous at bedtime  levETIRAcetam 500 milliGRAM(s) Oral two times a day  lidocaine   Patch 1 Patch Transdermal <User Schedule>  lidocaine 5% Ointment 1 Application(s) Topical three times a day  metoprolol tartrate 25 milliGRAM(s) Oral every 12 hours  multivitamin 1 Tablet(s) Oral daily  pantoprazole    Tablet 40 milliGRAM(s) Oral before breakfast  polyethylene glycol 3350 17 Gram(s) Oral daily  rivaroxaban 20 milliGRAM(s) Oral every 24 hours  senna 2 Tablet(s) Oral at bedtime PRN  sodium chloride 0.9%. 1000 milliLiter(s) IV Continuous <Continuous>  tamsulosin 0.4 milliGRAM(s) Oral at bedtime  traMADol 50 milliGRAM(s) Oral every 6 hours PRN    Vital Signs Last 24 Hrs  T(F): 97.1 (29 Mar 2019 09:04), Max: 98.3 (28 Mar 2019 22:08)  HR: 94 (29 Mar 2019 09:04) (90 - 99)  BP: 98/50 (29 Mar 2019 09:04) (98/50 - 117/66)  RR: 14 (29 Mar 2019 09:04) (14 - 14)  SpO2: 99% (29 Mar 2019 09:04) (99% - 100%)  I&O's Summary      PHYSICAL EXAM:  General: NAD, AO x 3  Neck: Supple, No JVD  Lungs: Clear to auscultation bilaterally  Cardio: RRR, S1/S2, No murmurs  Abdomen: Soft, Nontender, Nondistended; Bowel sounds present  Extremities: No clubbing, cyanosis.  bilateral ankle edema.     LABS:                        7.9    15.1  )-----------( 183      ( 28 Mar 2019 06:10 )             25.2     CAPILLARY BLOOD GLUCOSE    POCT Blood Glucose.: 245 mg/dL (29 Mar 2019 07:51)  POCT Blood Glucose.: 227 mg/dL (28 Mar 2019 22:11)  POCT Blood Glucose.: 238 mg/dL (28 Mar 2019 17:07)  POCT Blood Glucose.: 223 mg/dL (28 Mar 2019 11:57)    03-07 [id number] 7.0    RADIOLOGY & ADDITIONAL TESTS:    Care Discussed with Consultants/Other Providers:

## 2019-03-29 NOTE — PROGRESS NOTE ADULT - SUBJECTIVE AND OBJECTIVE BOX
YUE KING   84y   Male    Admitting: SAEED Gallego  HPI:  84-year-old gentleman with history of anemia, atrial fibrillation and seizure disorder admitted with anemia exacerbation from his [facility] facility. Patient noted recent history of bright red blood per rectum. He was scheduled for outpatient colonoscopy. S/P BM biopsy.  Patient reported he stopped drinking alcohol approximately 3 months prior to admission.  Patient is now on the acute rehabilitation unit.    PAST MEDICAL & SURGICAL HISTORY:  Nonrheumatic aortic valve stenosis  Cancer of stomach  Anemia, unspecified type: Anemia  Seizure disorder  Benign prostatic hyperplasia, unspecified whether lower urinary tract symptoms present  Hyperlipemia  Essential hypertension  Chronic atrial fibrillation  Stomach cancer  Hypertension  Atrial fibrillation  Gout  No significant past surgical history  No significant past surgical history    HEALTH ISSUES - PROBLEM Dx:  Anemia  Ureteral stone: Ureteral stone  Chronic myelomonocytic leukemia not having achieved remission: Chronic myelomonocytic leukemia not having achieved remission  Anemia, unspecified type: Anemia, unspecified type  Rectal bleed    MEDICATIONS  (STANDING):  allopurinol 300 milliGRAM(s) Oral daily  amLODIPine   Tablet 10 milliGRAM(s) Oral daily  aspirin enteric coated 81 milliGRAM(s) Oral daily  atorvastatin 10 milliGRAM(s) Oral at bedtime  bisacodyl Suppository 10 milliGRAM(s) Rectal once  cyanocobalamin 1000 MICROGram(s) Oral daily  dextrose 5%. 1000 milliLiter(s) (50 mL/Hr) IV Continuous <Continuous>  dextrose 50% Injectable 12.5 Gram(s) IV Push once  dextrose 50% Injectable 25 Gram(s) IV Push once  dextrose 50% Injectable 25 Gram(s) IV Push once  docusate sodium 100 milliGRAM(s) Oral two times a day  epoetin jackelyn Injectable 89953 Unit(s) SubCutaneous once  famotidine    Tablet 20 milliGRAM(s) Oral daily  folic acid 1 milliGRAM(s) Oral daily  indomethacin 25 milliGRAM(s) Oral two times a day  insulin lispro (HumaLOG) corrective regimen sliding scale   SubCutaneous three times a day before meals  insulin lispro (HumaLOG) corrective regimen sliding scale   SubCutaneous at bedtime  levETIRAcetam 500 milliGRAM(s) Oral two times a day  lidocaine   Patch 1 Patch Transdermal <User Schedule>  lidocaine 5% Ointment 1 Application(s) Topical three times a day  metoprolol tartrate 25 milliGRAM(s) Oral every 12 hours  multivitamin 1 Tablet(s) Oral daily  pantoprazole    Tablet 40 milliGRAM(s) Oral before breakfast  polyethylene glycol 3350 17 Gram(s) Oral daily  rivaroxaban 20 milliGRAM(s) Oral every 24 hours  sodium chloride 0.9%. 1000 milliLiter(s) (100 mL/Hr) IV Continuous <Continuous>  tamsulosin 0.4 milliGRAM(s) Oral at bedtime    MEDICATIONS  (PRN):  acetaminophen   Tablet .. 650 milliGRAM(s) Oral every 6 hours PRN Mild Pain (1 - 3)  dextrose 40% Gel 15 Gram(s) Oral once PRN Blood Glucose LESS THAN 70 milliGRAM(s)/deciliter  glucagon  Injectable 1 milliGRAM(s) IntraMuscular once PRN Glucose LESS THAN 70 milligrams/deciliter  senna 2 Tablet(s) Oral at bedtime PRN Constipation  traMADol 50 milliGRAM(s) Oral every 6 hours PRN Severe Pain (7 - 10)    Allergies    No Known Allergies    INTERVAL HPI/OVERNIGHT EVENTS:  Patient S&E at bedside. Resting comfortably. No c/o CP or SOB.     VITAL SIGNS:  T(F): 97.1 (03-29-19 @ 09:04)  HR: 94 (03-29-19 @ 09:04)  BP: 98/50 (03-29-19 @ 09:04)  RR: 14 (03-29-19 @ 09:04)  SpO2: 99% (03-29-19 @ 09:04)    PHYSICAL EXAM:  GENERAL: NAD, well-developed  EYES: EOMI, PERRLA, conjunctiva and sclera clear  NECK: Supple, No JVD  CHEST/LUNG: decreased BS bases ant.  HEART: Regular rate and rhythm  ABDOMEN: Soft, Nontender, Nondistended  EXTREMITIES:  no calf tenderness appreciated  NEUROLOGY: awake, alert    Labs:             7.9    15.1  )-----------( 183      ( 03-28 @ 06:10 )             25.2

## 2019-03-29 NOTE — PROGRESS NOTE ADULT - SUBJECTIVE AND OBJECTIVE BOX
CC: f/u for recent fever    Patient reports: rt knee pain, now back on indomethacin    REVIEW OF SYSTEMS:  All other review of systems negative (Comprehensive ROS): week, limited ambulatory ability,poor memory.    Antimicrobials Day #  :    Other Medications Reviewed    T(F): 98.3 (03-28-19 @ 22:08), Max: 98.3 (03-28-19 @ 22:08)  HR: 99 (03-29-19 @ 05:55)  BP: 115/71 (03-29-19 @ 05:55)  RR: 14 (03-29-19 @ 05:55)  SpO2: 99% (03-29-19 @ 05:55)  Wt(kg): --    PHYSICAL EXAM:  General: alert, no acute distress  Eyes:  anicteric, no conjunctival injection, no discharge  Oropharynx: no lesions or injection 	  Neck: supple, without adenopathy  Lungs: clear to auscultation  Heart: irregular rate   Abdomen: soft, nondistended, nontender, without mass or organomegaly  Skin: no lesions  Extremities: no clubbing, cyanosis, or edema  Neurologic: alert, oriented, moves all extremities  RT knee with pain and arthritic changes  LAB RESULTS:                        7.9    15.1  )-----------( 183      ( 28 Mar 2019 06:10 )             25.2               MICROBIOLOGY:  RECENT CULTURES:      RADIOLOGY REVIEWED:

## 2019-03-30 LAB
ANION GAP SERPL CALC-SCNC: 11 MMOL/L — SIGNIFICANT CHANGE UP (ref 5–17)
BUN SERPL-MCNC: 66 MG/DL — HIGH (ref 7–23)
CALCIUM SERPL-MCNC: 9.8 MG/DL — SIGNIFICANT CHANGE UP (ref 8.4–10.5)
CHLORIDE SERPL-SCNC: 104 MMOL/L — SIGNIFICANT CHANGE UP (ref 96–108)
CO2 SERPL-SCNC: 24 MMOL/L — SIGNIFICANT CHANGE UP (ref 22–31)
CREAT SERPL-MCNC: 1.38 MG/DL — HIGH (ref 0.5–1.3)
GLUCOSE SERPL-MCNC: 190 MG/DL — HIGH (ref 70–99)
HCT VFR BLD CALC: 23.5 % — LOW (ref 39–50)
HGB BLD-MCNC: 7.2 G/DL — LOW (ref 13–17)
MCHC RBC-ENTMCNC: 30.8 GM/DL — LOW (ref 32–36)
MCHC RBC-ENTMCNC: 32.2 PG — SIGNIFICANT CHANGE UP (ref 27–34)
MCV RBC AUTO: 104.5 FL — HIGH (ref 80–100)
PLATELET # BLD AUTO: 215 K/UL — SIGNIFICANT CHANGE UP (ref 150–400)
POTASSIUM SERPL-MCNC: 3.6 MMOL/L — SIGNIFICANT CHANGE UP (ref 3.5–5.3)
POTASSIUM SERPL-SCNC: 3.6 MMOL/L — SIGNIFICANT CHANGE UP (ref 3.5–5.3)
RBC # BLD: 2.25 M/UL — LOW (ref 4.2–5.8)
RBC # FLD: 20.1 % — HIGH (ref 10.3–14.5)
SODIUM SERPL-SCNC: 139 MMOL/L — SIGNIFICANT CHANGE UP (ref 135–145)
WBC # BLD: 7.8 K/UL — SIGNIFICANT CHANGE UP (ref 3.8–10.5)
WBC # FLD AUTO: 7.8 K/UL — SIGNIFICANT CHANGE UP (ref 3.8–10.5)

## 2019-03-30 PROCEDURE — 99232 SBSQ HOSP IP/OBS MODERATE 35: CPT

## 2019-03-30 RX ADMIN — Medication 1 MILLIGRAM(S): at 12:08

## 2019-03-30 RX ADMIN — Medication 1: at 17:05

## 2019-03-30 RX ADMIN — Medication 50 MILLIGRAM(S): at 12:49

## 2019-03-30 RX ADMIN — LIDOCAINE 1 PATCH: 4 CREAM TOPICAL at 08:11

## 2019-03-30 RX ADMIN — ATORVASTATIN CALCIUM 10 MILLIGRAM(S): 80 TABLET, FILM COATED ORAL at 21:43

## 2019-03-30 RX ADMIN — Medication 650 MILLIGRAM(S): at 12:59

## 2019-03-30 RX ADMIN — Medication 300 MILLIGRAM(S): at 12:08

## 2019-03-30 RX ADMIN — Medication 2: at 12:08

## 2019-03-30 RX ADMIN — RIVAROXABAN 20 MILLIGRAM(S): KIT at 17:07

## 2019-03-30 RX ADMIN — LIDOCAINE 1 PATCH: 4 CREAM TOPICAL at 11:54

## 2019-03-30 RX ADMIN — FAMOTIDINE 20 MILLIGRAM(S): 10 INJECTION INTRAVENOUS at 12:08

## 2019-03-30 RX ADMIN — PANTOPRAZOLE SODIUM 40 MILLIGRAM(S): 20 TABLET, DELAYED RELEASE ORAL at 05:23

## 2019-03-30 RX ADMIN — TRAMADOL HYDROCHLORIDE 50 MILLIGRAM(S): 50 TABLET ORAL at 12:59

## 2019-03-30 RX ADMIN — Medication 25 MILLIGRAM(S): at 12:49

## 2019-03-30 RX ADMIN — TAMSULOSIN HYDROCHLORIDE 0.4 MILLIGRAM(S): 0.4 CAPSULE ORAL at 21:43

## 2019-03-30 RX ADMIN — PREGABALIN 1000 MICROGRAM(S): 225 CAPSULE ORAL at 12:08

## 2019-03-30 RX ADMIN — Medication 25 MILLIGRAM(S): at 17:07

## 2019-03-30 RX ADMIN — Medication 81 MILLIGRAM(S): at 12:08

## 2019-03-30 RX ADMIN — Medication 1 TABLET(S): at 12:11

## 2019-03-30 RX ADMIN — LEVETIRACETAM 500 MILLIGRAM(S): 250 TABLET, FILM COATED ORAL at 05:23

## 2019-03-30 RX ADMIN — Medication 100 MILLIGRAM(S): at 05:23

## 2019-03-30 RX ADMIN — Medication 2: at 08:10

## 2019-03-30 RX ADMIN — LIDOCAINE 1 APPLICATION(S): 4 CREAM TOPICAL at 05:21

## 2019-03-30 RX ADMIN — LEVETIRACETAM 500 MILLIGRAM(S): 250 TABLET, FILM COATED ORAL at 17:07

## 2019-03-30 RX ADMIN — Medication 10 MILLIGRAM(S): at 13:11

## 2019-03-30 RX ADMIN — Medication 25 MILLIGRAM(S): at 05:23

## 2019-03-30 RX ADMIN — AMLODIPINE BESYLATE 10 MILLIGRAM(S): 2.5 TABLET ORAL at 05:23

## 2019-03-30 NOTE — PROGRESS NOTE ADULT - SUBJECTIVE AND OBJECTIVE BOX
Patient is a 84y old  Male who presents with a chief complaint of anemia, debility and functional decline (29 Mar 2019 09:53)      HPI:  Patient is 84 male RH dominant with PMH significant for HTN, HLD,  Afib (off xarelto), stomach CA, anemia, gout, BPH. seizure d/o, recently stopped EToh use, who presented from [facility] with abnormal labs.  Pt reports recent admission to [facility] for gout flare then discharged to rehab.  Pt now returns with guaiac negative anemia (HgB 6.8 ) and  episode of bright red blood in stool. Pt also found with leukocytosis, with temp 100.7 F rectally.  Denies chills, fever, sob, chest pain, weakness, dysuria.    Patient was seen by GI and hematology.  S/P Colonoscopy on 3/1/19. No source of bleeding noted. Negative FOB. S/P EGD during prior admission, and no bleeding identified. Heme performed BM biopsy 3/4, results pending. CKD, Etoh may contribute, although underlying BM d/o such as myelodysplasia suspected. Transfused PRBC. Patient transferred to - [facility] due to debility and functional decline (06 Mar 2019 14:53)      PAST MEDICAL & SURGICAL HISTORY:  Nonrheumatic aortic valve stenosis  Cancer of stomach  Anemia, unspecified type: Anemia  Seizure disorder  Benign prostatic hyperplasia, unspecified whether lower urinary tract symptoms present  Hyperlipemia  Essential hypertension  Chronic atrial fibrillation  Stomach cancer  Hypertension  Atrial fibrillation  Gout  No significant past surgical history    MEDICATIONS  (STANDING):  allopurinol 300 milliGRAM(s) Oral daily  amLODIPine   Tablet 10 milliGRAM(s) Oral daily  aspirin enteric coated 81 milliGRAM(s) Oral daily  atorvastatin 10 milliGRAM(s) Oral at bedtime  cyanocobalamin 1000 MICROGram(s) Oral daily  dextrose 5%. 1000 milliLiter(s) (50 mL/Hr) IV Continuous <Continuous>  dextrose 50% Injectable 12.5 Gram(s) IV Push once  dextrose 50% Injectable 25 Gram(s) IV Push once  dextrose 50% Injectable 25 Gram(s) IV Push once  docusate sodium 100 milliGRAM(s) Oral two times a day  famotidine    Tablet 20 milliGRAM(s) Oral daily  folic acid 1 milliGRAM(s) Oral daily  insulin lispro (HumaLOG) corrective regimen sliding scale   SubCutaneous three times a day before meals  insulin lispro (HumaLOG) corrective regimen sliding scale   SubCutaneous at bedtime  levETIRAcetam 500 milliGRAM(s) Oral two times a day  lidocaine   Patch 1 Patch Transdermal <User Schedule>  lidocaine 5% Ointment 1 Application(s) Topical three times a day  metoprolol tartrate 25 milliGRAM(s) Oral every 12 hours  multivitamin 1 Tablet(s) Oral daily  pantoprazole    Tablet 40 milliGRAM(s) Oral before breakfast  polyethylene glycol 3350 17 Gram(s) Oral daily  rivaroxaban 20 milliGRAM(s) Oral every 24 hours  sodium chloride 0.9%. 1000 milliLiter(s) (100 mL/Hr) IV Continuous <Continuous>  tamsulosin 0.4 milliGRAM(s) Oral at bedtime    MEDICATIONS  (PRN):  acetaminophen   Tablet .. 650 milliGRAM(s) Oral every 6 hours PRN Mild Pain (1 - 3)  dextrose 40% Gel 15 Gram(s) Oral once PRN Blood Glucose LESS THAN 70 milliGRAM(s)/deciliter  glucagon  Injectable 1 milliGRAM(s) IntraMuscular once PRN Glucose LESS THAN 70 milligrams/deciliter  senna 2 Tablet(s) Oral at bedtime PRN Constipation  traMADol 50 milliGRAM(s) Oral every 6 hours PRN Severe Pain (7 - 10)      Allergies    No Known Allergies      Subjective:  Pt reports that he slept well.  No complaints offered.  Denies SOB, dyspnea, cough, dysuria.  Last  3/25        VITALS  84y  Vital Signs Last 24 Hrs  T(C): 36.7 (30 Mar 2019 09:30), Max: 36.7 (30 Mar 2019 09:30)  T(F): 98 (30 Mar 2019 09:30), Max: 98 (30 Mar 2019 09:30)  HR: 88 (30 Mar 2019 09:30) (70 - 88)  BP: 118/70 (30 Mar 2019 09:30) (109/62 - 118/70)  BP(mean): --  RR: 14 (30 Mar 2019 09:30) (14 - 14)  SpO2: 100% (30 Mar 2019 09:30) (100% - 100%)    Physical Exam:   · Constitutional	Awake, alert, NAD  · Respiratory:  normal; respirations non-labored; clear to auscultation bilaterally  · Cardiovascular		irregular rate and rhythm  · Gastrointestinal normal; soft; nontender; nondistended  · Extremities Trace BLE edema.  Calves soft and NTTP    Function:  Bed mobility: Mod A  Transfers: Mod A        RECENT LABS:                        7.2    7.8   )-----------( 215      ( 30 Mar 2019 06:15 )             23.5                             7.9    15.1  )-----------( 183      ( 28 Mar 2019 06:10 )             25.2       CAPILLARY BLOOD GLUCOSE      POCT Blood Glucose.: 250 mg/dL (30 Mar 2019 11:33)  POCT Blood Glucose.: 203 mg/dL (30 Mar 2019 07:52)  POCT Blood Glucose.: 214 mg/dL (29 Mar 2019 20:24)  POCT Blood Glucose.: 199 mg/dL (29 Mar 2019 16:31)

## 2019-03-30 NOTE — PROGRESS NOTE ADULT - ASSESSMENT
#84 male with PMH significant for HTN, HLD, Afib, stomach CA, anemia, gout, BPH. seizure d/o, recently stopped EToh use, p/w chronic anemia possibly due to myelodysplastic syndrome, s/p tx PRBC, debility and functional decline      #Anemia/debility: multifactorial: Chronic kidney disease, recent GIB. 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   - Heme appreciated 3/28:   Chronic myelomonocytic leukemia not having achieved remission.  Supportive H/O care at this time.   -GF/PRN transfusional support. Patient not candidate for systemic therapy   -will need education procrit injections for dc if to continue    #LGI bleed: self limiting  - xarelto and ASA restarted  -, GI consult and follow up appreciated.      #elevated FS  -CCD ordered.  -monitor. LtU0L=7, given age, monitor for now, nutrition/diabetic education for dc    #Afib:   - Continue metoprolol, Xarelto    #HTN:-   Continue norvasc, lasix  -consider adjusting if BP will low    # Joint pain/acute gout:   - tramadol, 50 mg q6 severe pain  -indomecthacin 25 mg bid through 3/29 given persistent warmth joints  -continue allpurinol 300 mg daily    #SZ/tremors:   - Controlled on keppra     #DVT prophylaxis:   -on xarelto    Mood:  -neuropsych fu  -patient states he does not want palliative care/hospice consult at this time    #Case discussed in IDT rounds 3/26  -need re-evaluation as medical status interfered with therapy and progress in last several days. may need more assistance, as well as hospital bed for positioning and transfers, requires caregiver education . After re-evaluation, assess patient and caregiver's ability to manage at home vs JANET    #Discharge home and caregiver training on hold due to medical status    Labs:  CBC, BMP 4/1

## 2019-03-31 LAB
HCT VFR BLD CALC: 25.7 % — LOW (ref 39–50)
HGB BLD-MCNC: 7.8 G/DL — LOW (ref 13–17)
MCHC RBC-ENTMCNC: 30.3 GM/DL — LOW (ref 32–36)
MCHC RBC-ENTMCNC: 31.3 PG — SIGNIFICANT CHANGE UP (ref 27–34)
MCV RBC AUTO: 103.4 FL — HIGH (ref 80–100)
PLATELET # BLD AUTO: 231 K/UL — SIGNIFICANT CHANGE UP (ref 150–400)
RBC # BLD: 2.49 M/UL — LOW (ref 4.2–5.8)
RBC # FLD: 19.9 % — HIGH (ref 10.3–14.5)
WBC # BLD: 7.8 K/UL — SIGNIFICANT CHANGE UP (ref 3.8–10.5)
WBC # FLD AUTO: 7.8 K/UL — SIGNIFICANT CHANGE UP (ref 3.8–10.5)

## 2019-03-31 PROCEDURE — 99232 SBSQ HOSP IP/OBS MODERATE 35: CPT

## 2019-03-31 RX ADMIN — Medication 25 MILLIGRAM(S): at 17:54

## 2019-03-31 RX ADMIN — POLYETHYLENE GLYCOL 3350 17 GRAM(S): 17 POWDER, FOR SOLUTION ORAL at 12:47

## 2019-03-31 RX ADMIN — Medication 1 TABLET(S): at 12:47

## 2019-03-31 RX ADMIN — Medication 1 MILLIGRAM(S): at 12:46

## 2019-03-31 RX ADMIN — LIDOCAINE 1 APPLICATION(S): 4 CREAM TOPICAL at 12:55

## 2019-03-31 RX ADMIN — AMLODIPINE BESYLATE 10 MILLIGRAM(S): 2.5 TABLET ORAL at 05:39

## 2019-03-31 RX ADMIN — FAMOTIDINE 20 MILLIGRAM(S): 10 INJECTION INTRAVENOUS at 12:46

## 2019-03-31 RX ADMIN — Medication 1: at 11:35

## 2019-03-31 RX ADMIN — LEVETIRACETAM 500 MILLIGRAM(S): 250 TABLET, FILM COATED ORAL at 05:39

## 2019-03-31 RX ADMIN — PANTOPRAZOLE SODIUM 40 MILLIGRAM(S): 20 TABLET, DELAYED RELEASE ORAL at 05:39

## 2019-03-31 RX ADMIN — LEVETIRACETAM 500 MILLIGRAM(S): 250 TABLET, FILM COATED ORAL at 17:54

## 2019-03-31 RX ADMIN — Medication 100 MILLIGRAM(S): at 05:39

## 2019-03-31 RX ADMIN — PREGABALIN 1000 MICROGRAM(S): 225 CAPSULE ORAL at 12:46

## 2019-03-31 RX ADMIN — Medication 100 MILLIGRAM(S): at 17:54

## 2019-03-31 RX ADMIN — Medication 81 MILLIGRAM(S): at 12:46

## 2019-03-31 RX ADMIN — Medication 25 MILLIGRAM(S): at 05:39

## 2019-03-31 RX ADMIN — Medication 300 MILLIGRAM(S): at 12:46

## 2019-03-31 RX ADMIN — RIVAROXABAN 20 MILLIGRAM(S): KIT at 17:54

## 2019-03-31 RX ADMIN — Medication 1: at 08:11

## 2019-03-31 NOTE — PROGRESS NOTE ADULT - ASSESSMENT
#84 male with PMH significant for HTN, HLD, Afib, stomach CA, anemia, gout, BPH. seizure d/o, recently stopped EToh use, p/w chronic anemia possibly due to myelodysplastic syndrome, s/p tx PRBC, debility and functional decline      #Anemia/debility: multifactorial: Chronic kidney disease, recent GIB. 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   - Heme appreciated 3/28:   Chronic myelomonocytic leukemia not having achieved remission.  Supportive H/O care at this time.   -GF/PRN transfusional support. Patient not candidate for systemic therapy   -will need education procrit injections for dc if to continue    #LGI bleed: self limiting  - xarelto and ASA restarted  -, GI consult and follow up appreciated.      #elevated FS  -CCD ordered.  -monitor. IwX2A=4, given age, monitor for now, nutrition/diabetic education for dc    #Afib:   - Continue metoprolol, Xarelto    #HTN:-   Continue norvasc, lasix  -consider adjusting BP    # Joint pain/acute gout:   - tramadol, 50 mg q6 severe pain  -indomethacin 25 mg bid through 3/29 given persistent warmth joints  -continue allpurinol 300 mg daily    #SZ/tremors:   - Controlled on keppra     #DVT prophylaxis:   -on xarelto    Mood:  -neuropsych fu  -patient states he does not want palliative care/hospice consult at this time    #Case discussed in IDT rounds 3/26  -need re-evaluation as medical status interfered with therapy and progress in last several days. may need more assistance, as well as hospital bed for positioning and transfers, requires caregiver education . After re-evaluation, assess patient and caregiver's ability to manage at home vs JANET    #Discharge home and caregiver training on hold due to medical status    Labs:  CBC, BMP 4/1

## 2019-03-31 NOTE — PROGRESS NOTE ADULT - SUBJECTIVE AND OBJECTIVE BOX
Patient is a 84y old  Male who presents with a chief complaint of anemia, debility and functional decline (29 Mar 2019 09:53)      HPI:  Patient is 84 male RH dominant with PMH significant for HTN, HLD,  Afib (off xarelto), stomach CA, anemia, gout, BPH. seizure d/o, recently stopped EToh use, who presented from [facility] with abnormal labs.  Pt reports recent admission to [facility] for gout flare then discharged to rehab.  Pt now returns with guaiac negative anemia (HgB 6.8 ) and  episode of bright red blood in stool. Pt also found with leukocytosis, with temp 100.7 F rectally.  Denies chills, fever, sob, chest pain, weakness, dysuria.    Patient was seen by GI and hematology.  S/P Colonoscopy on 3/1/19. No source of bleeding noted. Negative FOB. S/P EGD during prior admission, and no bleeding identified. Heme performed BM biopsy 3/4, results pending. CKD, Etoh may contribute, although underlying BM d/o such as myelodysplasia suspected. Transfused PRBC. Patient transferred to - [facility] due to debility and functional decline (06 Mar 2019 14:53)      PAST MEDICAL & SURGICAL HISTORY:  Nonrheumatic aortic valve stenosis  Cancer of stomach  Anemia, unspecified type: Anemia  Seizure disorder  Benign prostatic hyperplasia, unspecified whether lower urinary tract symptoms present  Hyperlipemia  Essential hypertension  Chronic atrial fibrillation  Stomach cancer  Hypertension  Atrial fibrillation  Gout  No significant past surgical history    MEDICATIONS  (STANDING):  allopurinol 300 milliGRAM(s) Oral daily  amLODIPine   Tablet 10 milliGRAM(s) Oral daily  aspirin enteric coated 81 milliGRAM(s) Oral daily  atorvastatin 10 milliGRAM(s) Oral at bedtime  cyanocobalamin 1000 MICROGram(s) Oral daily  dextrose 5%. 1000 milliLiter(s) (50 mL/Hr) IV Continuous <Continuous>  dextrose 50% Injectable 12.5 Gram(s) IV Push once  dextrose 50% Injectable 25 Gram(s) IV Push once  dextrose 50% Injectable 25 Gram(s) IV Push once  docusate sodium 100 milliGRAM(s) Oral two times a day  famotidine    Tablet 20 milliGRAM(s) Oral daily  folic acid 1 milliGRAM(s) Oral daily  insulin lispro (HumaLOG) corrective regimen sliding scale   SubCutaneous three times a day before meals  insulin lispro (HumaLOG) corrective regimen sliding scale   SubCutaneous at bedtime  levETIRAcetam 500 milliGRAM(s) Oral two times a day  lidocaine   Patch 1 Patch Transdermal <User Schedule>  lidocaine 5% Ointment 1 Application(s) Topical three times a day  metoprolol tartrate 25 milliGRAM(s) Oral every 12 hours  multivitamin 1 Tablet(s) Oral daily  pantoprazole    Tablet 40 milliGRAM(s) Oral before breakfast  polyethylene glycol 3350 17 Gram(s) Oral daily  rivaroxaban 20 milliGRAM(s) Oral every 24 hours  sodium chloride 0.9%. 1000 milliLiter(s) (100 mL/Hr) IV Continuous <Continuous>  tamsulosin 0.4 milliGRAM(s) Oral at bedtime    MEDICATIONS  (PRN):  acetaminophen   Tablet .. 650 milliGRAM(s) Oral every 6 hours PRN Mild Pain (1 - 3)  dextrose 40% Gel 15 Gram(s) Oral once PRN Blood Glucose LESS THAN 70 milliGRAM(s)/deciliter  glucagon  Injectable 1 milliGRAM(s) IntraMuscular once PRN Glucose LESS THAN 70 milligrams/deciliter  senna 2 Tablet(s) Oral at bedtime PRN Constipation  traMADol 50 milliGRAM(s) Oral every 6 hours PRN Severe Pain (7 - 10)      Allergies    No Known Allergies      Subjective:  Pt reports that he slept well.  Feels good.  No complaints offered.  Denies SOB, dyspnea, cough, dysuria.  Last BM 3/30        VITALS  84y  Vital Signs Last 24 Hrs  T(C): 36.3 (31 Mar 2019 09:11), Max: 36.8 (30 Mar 2019 21:45)  T(F): 97.4 (31 Mar 2019 09:11), Max: 98.3 (30 Mar 2019 21:45)  HR: 82 (31 Mar 2019 09:11) (82 - 101)  BP: 117/66 (31 Mar 2019 09:11) (117/66 - 131/67)  BP(mean): --  RR: 14 (31 Mar 2019 09:11) (14 - 14)  SpO2: 100% (31 Mar 2019 09:11) (98% - 100%)    Physical Exam:   · Constitutional	Awake, alert, NAD  · Respiratory:  normal; respirations non-labored; clear to auscultation bilaterally  · Cardiovascular		irregular rate and rhythm  · Gastrointestinal normal; soft; nontender; nondistended  · Extremities Trace BLE edema.  Calves soft and NTTP    Function:  Bed mobility: Mod A  Transfers: Mod A        RECENT LABS:                        7.2    7.8   )-----------( 215      ( 30 Mar 2019 06:15 )             23.5     CAPILLARY BLOOD GLUCOSE      POCT Blood Glucose.: 193 mg/dL (31 Mar 2019 11:32)  POCT Blood Glucose.: 187 mg/dL (31 Mar 2019 08:05)  POCT Blood Glucose.: 170 mg/dL (30 Mar 2019 17:04)                            7.9    15.1  )-----------( 183      ( 28 Mar 2019 06:10 )             25.2       CAPILLARY BLOOD GLUCOSE      POCT Blood Glucose.: 250 mg/dL (30 Mar 2019 11:33)  POCT Blood Glucose.: 203 mg/dL (30 Mar 2019 07:52)  POCT Blood Glucose.: 214 mg/dL (29 Mar 2019 20:24)  POCT Blood Glucose.: 199 mg/dL (29 Mar 2019 16:31)

## 2019-04-01 LAB
ANION GAP SERPL CALC-SCNC: 13 MMOL/L — SIGNIFICANT CHANGE UP (ref 5–17)
BUN SERPL-MCNC: 44 MG/DL — HIGH (ref 7–23)
CALCIUM SERPL-MCNC: 9.8 MG/DL — SIGNIFICANT CHANGE UP (ref 8.4–10.5)
CHLORIDE SERPL-SCNC: 105 MMOL/L — SIGNIFICANT CHANGE UP (ref 96–108)
CO2 SERPL-SCNC: 21 MMOL/L — LOW (ref 22–31)
CREAT SERPL-MCNC: 1.31 MG/DL — HIGH (ref 0.5–1.3)
GLUCOSE SERPL-MCNC: 167 MG/DL — HIGH (ref 70–99)
HCT VFR BLD CALC: 24.4 % — LOW (ref 39–50)
HGB BLD-MCNC: 7.7 G/DL — LOW (ref 13–17)
MCHC RBC-ENTMCNC: 31.4 GM/DL — LOW (ref 32–36)
MCHC RBC-ENTMCNC: 32.6 PG — SIGNIFICANT CHANGE UP (ref 27–34)
MCV RBC AUTO: 103.7 FL — HIGH (ref 80–100)
PLATELET # BLD AUTO: 249 K/UL — SIGNIFICANT CHANGE UP (ref 150–400)
POTASSIUM SERPL-MCNC: 3.9 MMOL/L — SIGNIFICANT CHANGE UP (ref 3.5–5.3)
POTASSIUM SERPL-SCNC: 3.9 MMOL/L — SIGNIFICANT CHANGE UP (ref 3.5–5.3)
RBC # BLD: 2.35 M/UL — LOW (ref 4.2–5.8)
RBC # FLD: 20 % — HIGH (ref 10.3–14.5)
SODIUM SERPL-SCNC: 139 MMOL/L — SIGNIFICANT CHANGE UP (ref 135–145)
WBC # BLD: 10.9 K/UL — HIGH (ref 3.8–10.5)
WBC # FLD AUTO: 10.9 K/UL — HIGH (ref 3.8–10.5)

## 2019-04-01 PROCEDURE — 99232 SBSQ HOSP IP/OBS MODERATE 35: CPT

## 2019-04-01 PROCEDURE — 99233 SBSQ HOSP IP/OBS HIGH 50: CPT

## 2019-04-01 RX ORDER — ERYTHROPOIETIN 10000 [IU]/ML
10000 INJECTION, SOLUTION INTRAVENOUS; SUBCUTANEOUS
Qty: 0 | Refills: 0 | Status: COMPLETED | OUTPATIENT
Start: 2019-04-01 | End: 2019-04-03

## 2019-04-01 RX ADMIN — Medication 650 MILLIGRAM(S): at 11:36

## 2019-04-01 RX ADMIN — Medication 81 MILLIGRAM(S): at 11:38

## 2019-04-01 RX ADMIN — Medication 1: at 17:20

## 2019-04-01 RX ADMIN — Medication 1: at 08:09

## 2019-04-01 RX ADMIN — Medication 100 MILLIGRAM(S): at 17:22

## 2019-04-01 RX ADMIN — Medication 25 MILLIGRAM(S): at 05:15

## 2019-04-01 RX ADMIN — LIDOCAINE 1 APPLICATION(S): 4 CREAM TOPICAL at 15:00

## 2019-04-01 RX ADMIN — LIDOCAINE 1 PATCH: 4 CREAM TOPICAL at 22:46

## 2019-04-01 RX ADMIN — FAMOTIDINE 20 MILLIGRAM(S): 10 INJECTION INTRAVENOUS at 11:39

## 2019-04-01 RX ADMIN — ATORVASTATIN CALCIUM 10 MILLIGRAM(S): 80 TABLET, FILM COATED ORAL at 00:20

## 2019-04-01 RX ADMIN — TAMSULOSIN HYDROCHLORIDE 0.4 MILLIGRAM(S): 0.4 CAPSULE ORAL at 00:21

## 2019-04-01 RX ADMIN — TAMSULOSIN HYDROCHLORIDE 0.4 MILLIGRAM(S): 0.4 CAPSULE ORAL at 22:46

## 2019-04-01 RX ADMIN — Medication 300 MILLIGRAM(S): at 11:37

## 2019-04-01 RX ADMIN — LIDOCAINE 1 APPLICATION(S): 4 CREAM TOPICAL at 22:48

## 2019-04-01 RX ADMIN — Medication 25 MILLIGRAM(S): at 17:27

## 2019-04-01 RX ADMIN — Medication 1 TABLET(S): at 11:38

## 2019-04-01 RX ADMIN — LEVETIRACETAM 500 MILLIGRAM(S): 250 TABLET, FILM COATED ORAL at 05:15

## 2019-04-01 RX ADMIN — Medication 1 MILLIGRAM(S): at 11:37

## 2019-04-01 RX ADMIN — LEVETIRACETAM 500 MILLIGRAM(S): 250 TABLET, FILM COATED ORAL at 17:22

## 2019-04-01 RX ADMIN — ATORVASTATIN CALCIUM 10 MILLIGRAM(S): 80 TABLET, FILM COATED ORAL at 22:46

## 2019-04-01 RX ADMIN — Medication 100 MILLIGRAM(S): at 05:15

## 2019-04-01 RX ADMIN — Medication 650 MILLIGRAM(S): at 12:14

## 2019-04-01 RX ADMIN — PANTOPRAZOLE SODIUM 40 MILLIGRAM(S): 20 TABLET, DELAYED RELEASE ORAL at 05:15

## 2019-04-01 RX ADMIN — SENNA PLUS 2 TABLET(S): 8.6 TABLET ORAL at 22:47

## 2019-04-01 RX ADMIN — POLYETHYLENE GLYCOL 3350 17 GRAM(S): 17 POWDER, FOR SOLUTION ORAL at 11:39

## 2019-04-01 RX ADMIN — PREGABALIN 1000 MICROGRAM(S): 225 CAPSULE ORAL at 11:38

## 2019-04-01 RX ADMIN — Medication 2: at 12:17

## 2019-04-01 RX ADMIN — AMLODIPINE BESYLATE 10 MILLIGRAM(S): 2.5 TABLET ORAL at 05:15

## 2019-04-01 RX ADMIN — ERYTHROPOIETIN 10000 UNIT(S): 10000 INJECTION, SOLUTION INTRAVENOUS; SUBCUTANEOUS at 17:19

## 2019-04-01 RX ADMIN — RIVAROXABAN 20 MILLIGRAM(S): KIT at 17:22

## 2019-04-01 NOTE — PROGRESS NOTE ADULT - ASSESSMENT
#84 male with PMH significant for HTN, HLD, Afib, stomach CA, anemia, gout, BPH. seizure d/o, recently stopped EToh use, p/w chronic anemia possibly due to myelodysplastic syndrome, s/p tx PRBC, debility and functional decline      #Anemia/debility: multifactorial: Chronic kidney disease, recent GIB. 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   - Heme appreciated 4/1:   Chronic myelomonocytic leukemia not having achieved remission.  Supportive H/O care at this time.   -GF/PRN transfusional support. Patient not candidate for systemic therapy   -B12 supplementatoin  -will need education procrit injections for dc if to continue    #LGI bleed: self limiting  - xarelto and ASA restarted  -, GI consult and follow up appreciated.  -CBC /H/H stable today  -f/u CBC later in week 4/4      #elevated FS  -CCD ordered.  -monitor. RuV2G=4, given age, monitor for now, nutrition/diabetic education for dc  -sl improved today    #Afib:   - Continue metoprolol, Xarelto  -rate controlled    #HTN:-   Continue norvasc, lasix. consider reduction in lasix dose due to renal funciton, hypotension and reduced fluid intake. will ask cardiology consult, discussed with patient  bilateral LE ACE wraps      # Joint pain/acute gout:   - tramadol, 50 mg q6 severe pain  -indomethacin dc 3/29. joints and symptoms much improved  -continue allpurinol 300 mg daily    #SZ/tremors:   - Controlled on keppra     #DVT prophylaxis:   -on xarelto    Mood:  -neuropsych fu  -patient states he does not want palliative care/hospice consult at this time    #Case discussed in IDT rounds 3/26  -need re-evaluation as medical status interfered with therapy and progress in last several days. may need more assistance, as well as hospital bed for positioning and transfers, requires caregiver education . After re-evaluation, assess patient and caregiver's ability to manage at home vs JANET    #Discharge home and caregiver training on hold due to medical status    Labs:  CBC, BMP 4/4  cardiology consult #84 male with PMH significant for HTN, HLD, Afib, stomach CA, anemia, gout, BPH. seizure d/o, recently stopped EToh use, p/w chronic anemia possibly due to myelodysplastic syndrome, s/p tx PRBC, debility and functional decline      #Anemia/debility: multifactorial: Chronic kidney disease, recent GIB. 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   - Heme appreciated 4/1:   Chronic myelomonocytic leukemia not having achieved remission.  Supportive H/O care at this time.   -GF/PRN transfusional support. Patient not candidate for systemic therapy   -B12 supplementatoin  -will need education procrit injections for dc if to continue    #LGI bleed: self limiting  - xarelto and ASA restarted  -, GI consult and follow up appreciated.  -CBC /H/H stable today  -f/u CBC later in week 4/4      #elevated FS  -CCD ordered.  -monitor. KdC0J=3, given age, monitor for now, nutrition/diabetic education for dc  -sl improved today    #Afib:   - Continue metoprolol, Xarelto  -rate controlled    #HTN:-   Continue norvasc, lasix. consider reduction in lasix dose due to renal funciton, hypotension and reduced fluid intake. will ask cardiology consult, discussed with patient  bilateral LE ACE wraps      # Joint pain/acute gout:   - tramadol, 50 mg q6 severe pain  -indomethacin dc 3/29. joints and symptoms much improved  -continue allpurinol 300 mg daily    #SZ/tremors:   - Controlled on keppra     #DVT prophylaxis:   -on xarelto    Mood:  -neuropsych fu  -patient states he does not want palliative care/hospice consult at this time    #Case discussed in IDT rounds 3/26  -need re-evaluation as medical status interfered with therapy and progress in last several days. may need more assistance, as well as hospital bed for positioning and transfers, requires caregiver education . After re-evaluation, assess patient and caregiver's ability to manage at home vs JANET  -Patient would benefit from hospital bed at home due to cardiac history, significant lower extremity swelling and orthostasis; repeated gouty flares with multiple joint pain and swelling; and risk for skin breakdown due to progressively bedbound status, Recommend electric hospital bed with rails to assist for transfers and bed mobility, adjustable head and leg sections.     #Discharge home and caregiver training on hold due to medical status    Labs:  CBC, BMP 4/4  cardiology consult

## 2019-04-01 NOTE — PROGRESS NOTE ADULT - ASSESSMENT
#84 male with PMH significant for HTN, HLD, Afib, stomach CA, anemia, gout, BPH. seizure d/o, recently stopped EToh use, p/w chronic anemia possibly due to myelodysplastic syndrome, s/p tx PRBC, debility and functional decline      #Anemia/debility: multifactorial: Chronic kidney disease, recent GIB. 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   - Heme consulting  Chronic myelomonocytic leukemia not having achieved remission.  Supportive H/O care at this time.   -GF/PRN transfusional support. Patient not candidate for systemic therapy   -Anemia - 2/2 renal disease, nutrition-plan B12 supplementation and procrit  -will need education procrit injections for dc if to continue    #LGI bleed: self limiting  - xarelto and ASA restarted  -, GI consult and follow up appreciated.  -CBC /H/H stable today  -f/u CBC later in week 4/4      #elevated FS  -[id number]  -monitor. RmR0T=9, given age, monitor for now, nutrition/diabetic education for dc  -sl improved today    #Afib:   - Continue metoprolol, Xarelto  -rate controlled    #HTN:-   SBP last 24 hours - 130-117  Was on norvasc, metoprolol tartrate bid and lasix- primary team has stopped diuretic and made a cardiology consult for bp control and ckd      # Joint pain/acute gout:   - tramadol, 50 mg q6 severe pain  -indomethacin dc 3/29. joints and symptoms much improved  -continue allpurinol 300 mg daily    #SZ/tremors:   - Controlled on keppra     #DVT prophylaxis:   -on xarelto    Mood:  -neuropsych fu  -patient states he does not want palliative care/hospice consult at this time

## 2019-04-01 NOTE — PROGRESS NOTE ADULT - ASSESSMENT
84-year-old gentleman with history of anemia, atrial fibrillation and seizure disorder admitted with anemia exacerbation from his [facility] facility. Patient notes recent history of bright red blood per rectum. He was scheduled for outpatient colonoscopy.  On admission, found to have rectal temp of 100.7°F in the emergency department.  Patient reported he stopped drinking alcohol approximately 3 months prior to admission

## 2019-04-01 NOTE — PROGRESS NOTE ADULT - SUBJECTIVE AND OBJECTIVE BOX
Patient is a 84y old  Male who presents with a chief complaint of anemia, debility and functional decline (01 Apr 2019 10:05)    HPI:  Patient is 84 male RH dominant with PMH significant for HTN, HLD,  Afib (off xarelto), stomach CA, anemia, gout, BPH. seizure d/o, recently stopped EToh use, who presented from [facility] with abnormal labs.  Pt reports recent admission to [facility] for gout flare then discharged to rehab.  Pt now returns with guaiac negative anemia (HgB 6.8 ) and  episode of bright red blood in stool. Pt also found with leukocytosis, with temp 100.7 F rectally.  Denies chills, fever, sob, chest pain, weakness, dysuria.    Patient was seen by GI and hematology.  S/P Colonoscopy on 3/1/19. No source of bleeding noted. Negative FOB. S/P EGD during prior admission, and no bleeding identified. Heme performed BM biopsy 3/4, results pending. CKD, Etoh may contribute, although underlying BM d/o such as myelodysplasia suspected. Transfused PRBC. Patient transferred to - [facility] due to debility and functional decline (06 Mar 2019 14:53)    Patient seen and examined at bedside.  Patient denies any chest pain or shortness of breath.  Reports knee pain    ALLERGIES:  No Known Allergies    MEDICATIONS  (STANDING):  allopurinol 300 milliGRAM(s) Oral daily  amLODIPine   Tablet 10 milliGRAM(s) Oral daily  aspirin enteric coated 81 milliGRAM(s) Oral daily  atorvastatin 10 milliGRAM(s) Oral at bedtime  cyanocobalamin 1000 MICROGram(s) Oral daily  dextrose 5%. 1000 milliLiter(s) (50 mL/Hr) IV Continuous <Continuous>  dextrose 50% Injectable 12.5 Gram(s) IV Push once  dextrose 50% Injectable 25 Gram(s) IV Push once  dextrose 50% Injectable 25 Gram(s) IV Push once  docusate sodium 100 milliGRAM(s) Oral two times a day  epoetin jackelyn Injectable 27793 Unit(s) SubCutaneous once  epoetin jackelyn Injectable 24272 Unit(s) SubCutaneous <User Schedule>  famotidine    Tablet 20 milliGRAM(s) Oral daily  folic acid 1 milliGRAM(s) Oral daily  insulin lispro (HumaLOG) corrective regimen sliding scale   SubCutaneous three times a day before meals  insulin lispro (HumaLOG) corrective regimen sliding scale   SubCutaneous at bedtime  levETIRAcetam 500 milliGRAM(s) Oral two times a day  lidocaine   Patch 1 Patch Transdermal <User Schedule>  lidocaine 5% Ointment 1 Application(s) Topical three times a day  metoprolol tartrate 25 milliGRAM(s) Oral every 12 hours  multivitamin 1 Tablet(s) Oral daily  pantoprazole    Tablet 40 milliGRAM(s) Oral before breakfast  polyethylene glycol 3350 17 Gram(s) Oral daily  rivaroxaban 20 milliGRAM(s) Oral every 24 hours  sodium chloride 0.9%. 1000 milliLiter(s) (100 mL/Hr) IV Continuous <Continuous>  tamsulosin 0.4 milliGRAM(s) Oral at bedtime    MEDICATIONS  (PRN):  acetaminophen   Tablet .. 650 milliGRAM(s) Oral every 6 hours PRN Mild Pain (1 - 3)  dextrose 40% Gel 15 Gram(s) Oral once PRN Blood Glucose LESS THAN 70 milliGRAM(s)/deciliter  glucagon  Injectable 1 milliGRAM(s) IntraMuscular once PRN Glucose LESS THAN 70 milligrams/deciliter  senna 2 Tablet(s) Oral at bedtime PRN Constipation  traMADol 50 milliGRAM(s) Oral every 6 hours PRN Severe Pain (7 - 10)      Vital Signs Last 24 Hrs  T(F): 97.3 (01 Apr 2019 08:56), Max: 97.3 (31 Mar 2019 22:06)  HR: 100 (01 Apr 2019 08:56) (99 - 100)  BP: 120/68 (01 Apr 2019 08:56) (120/68 - 126/70)  RR: 14 (01 Apr 2019 08:56) (14 - 14)  SpO2: 100% (01 Apr 2019 08:56) (100% - 100%)  I&O's Summary    31 Mar 2019 07:01  -  01 Apr 2019 07:00  --------------------------------------------------------  IN: 0 mL / OUT: 1 mL / NET: -1 mL        PHYSICAL EXAM:  General: NAD, A/O x 3  ENT: MMM  Neck: Supple, No JVD  Lungs: Clear to auscultation bilaterally  Cardio: RRR, S1/S2, No murmurs  Abdomen: Soft, Nontender, Nondistended; Bowel sounds present  Extremities: No cyanosis, No edema    LABS:                        7.7    10.9  )-----------( 249      ( 01 Apr 2019 05:30 )             24.4     04-01    139  |  105  |  44  ----------------------------<  167  3.9   |  21  |  1.31    Ca    9.8      01 Apr 2019 05:30      eGFR if Non African American: 50 mL/min/1.73M2 (04-01-19 @ 05:30)  eGFR if African American: 57 mL/min/1.73M2 (04-01-19 @ 05:30)                    CAPILLARY BLOOD GLUCOSE      POCT Blood Glucose.: 169 mg/dL (01 Apr 2019 07:27)  POCT Blood Glucose.: 213 mg/dL (31 Mar 2019 20:18)  POCT Blood Glucose.: 145 mg/dL (31 Mar 2019 16:48)    03-07 [id number] 7.0          RADIOLOGY & ADDITIONAL TESTS:    Care Discussed with Consultants/Other Providers:

## 2019-04-01 NOTE — PROGRESS NOTE ADULT - SUBJECTIVE AND OBJECTIVE BOX
YUE KING   84y   Male    Admitting: SAEED Gallego  HPI:  84-year-old gentleman with history of anemia, atrial fibrillation and seizure disorder admitted with anemia exacerbation from his [place] facility. Patient noted recent history of bright red blood per rectum. He was scheduled for outpatient colonoscopy. S/P BM biopsy.  Patient reported he stopped drinking alcohol approximately 3 months prior to admission.  Patient is now on the acute rehabilitation unit.    PAST MEDICAL & SURGICAL HISTORY:  Nonrheumatic aortic valve stenosis  Cancer of stomach  Anemia, unspecified type: Anemia  Seizure disorder  Benign prostatic hyperplasia, unspecified whether lower urinary tract symptoms present  Hyperlipemia  Essential hypertension  Chronic atrial fibrillation  Stomach cancer  Hypertension  Atrial fibrillation  Gout  No significant past surgical history  No significant past surgical history    HEALTH ISSUES - PROBLEM Dx:  Anemia  Ureteral stone: Ureteral stone  Chronic myelomonocytic leukemia not having achieved remission: Chronic myelomonocytic leukemia not having achieved remission  Anemia, unspecified type: Anemia, unspecified type  Rectal bleed    MEDICATIONS  (STANDING):  allopurinol 300 milliGRAM(s) Oral daily  amLODIPine   Tablet 10 milliGRAM(s) Oral daily  aspirin enteric coated 81 milliGRAM(s) Oral daily  atorvastatin 10 milliGRAM(s) Oral at bedtime  cyanocobalamin 1000 MICROGram(s) Oral daily  dextrose 5%. 1000 milliLiter(s) (50 mL/Hr) IV Continuous <Continuous>  dextrose 50% Injectable 12.5 Gram(s) IV Push once  dextrose 50% Injectable 25 Gram(s) IV Push once  dextrose 50% Injectable 25 Gram(s) IV Push once  docusate sodium 100 milliGRAM(s) Oral two times a day  epoetin jackelyn Injectable 83927 Unit(s) SubCutaneous once  epoetin jackelyn Injectable 03349 Unit(s) SubCutaneous <User Schedule>  famotidine    Tablet 20 milliGRAM(s) Oral daily  folic acid 1 milliGRAM(s) Oral daily  insulin lispro (HumaLOG) corrective regimen sliding scale   SubCutaneous three times a day before meals  insulin lispro (HumaLOG) corrective regimen sliding scale   SubCutaneous at bedtime  levETIRAcetam 500 milliGRAM(s) Oral two times a day  lidocaine   Patch 1 Patch Transdermal <User Schedule>  lidocaine 5% Ointment 1 Application(s) Topical three times a day  metoprolol tartrate 25 milliGRAM(s) Oral every 12 hours  multivitamin 1 Tablet(s) Oral daily  pantoprazole    Tablet 40 milliGRAM(s) Oral before breakfast  polyethylene glycol 3350 17 Gram(s) Oral daily  rivaroxaban 20 milliGRAM(s) Oral every 24 hours  sodium chloride 0.9%. 1000 milliLiter(s) (100 mL/Hr) IV Continuous <Continuous>  tamsulosin 0.4 milliGRAM(s) Oral at bedtime    MEDICATIONS  (PRN):  acetaminophen   Tablet .. 650 milliGRAM(s) Oral every 6 hours PRN Mild Pain (1 - 3)  dextrose 40% Gel 15 Gram(s) Oral once PRN Blood Glucose LESS THAN 70 milliGRAM(s)/deciliter  glucagon  Injectable 1 milliGRAM(s) IntraMuscular once PRN Glucose LESS THAN 70 milligrams/deciliter  senna 2 Tablet(s) Oral at bedtime PRN Constipation  traMADol 50 milliGRAM(s) Oral every 6 hours PRN Severe Pain (7 - 10)    Allergies    No Known Allergies    INTERVAL HPI/OVERNIGHT EVENTS:  Patient S&E at bedside. No current c/o CP, SOB or light headedness.     VITAL SIGNS:  T(F): 97.3 (03-31-19 @ 22:06)  HR: 99 (03-31-19 @ 22:06)  BP: 126/70 (03-31-19 @ 22:06)  RR: 14 (03-31-19 @ 22:06)  SpO2: 100% (03-31-19 @ 22:06)    PHYSICAL EXAM:  GENERAL: NAD, well-developed  EYES: EOMI, PERRLA, conjunctiva and sclera clear  NECK: Supple, No JVD  CHEST/LUNG: decreased BS bases ant.  HEART: Regular rate and rhythm  ABDOMEN: Soft, Nontender, Nondistended  EXTREMITIES:  no calf tenderness appreciated  NEUROLOGY: awake, alert    Labs:             7.7    10.9  )-----------( 249      ( 04-01 @ 05:30 )             24.4                7.8    7.8   )-----------( 231      ( 03-31 @ 08:35 )             25.7                7.2    7.8   )-----------( 215      ( 03-30 @ 06:15 )             23.5       04-01    139  |  105  |  44<H>  ----------------------------<  167<H>  3.9   |  21<L>  |  1.31<H>    Ca    9.8      01 Apr 2019 05:30

## 2019-04-01 NOTE — PROGRESS NOTE ADULT - SUBJECTIVE AND OBJECTIVE BOX
Patient is a 84y old  Male who presents with a chief complaint of anemia, debility and functional decline (01 Apr 2019 08:01)      HPI:  Patient is 84 male RH dominant with PMH significant for HTN, HLD,  Afib (off xarelto), stomach CA, anemia, gout, BPH. seizure d/o, recently stopped EToh use, who presented from [facility] with abnormal labs.  Pt reports recent admission to [facility] for gout flare then discharged to rehab.  Pt now returns with guaiac negative anemia (HgB 6.8 ) and  episode of bright red blood in stool. Pt also found with leukocytosis, with temp 100.7 F rectally.  Denies chills, fever, sob, chest pain, weakness, dysuria.    Patient was seen by GI and hematology.  S/P Colonoscopy on 3/1/19. No source of bleeding noted. Negative FOB. S/P EGD during prior admission, and no bleeding identified. Heme performed BM biopsy 3/4, results pending. CKD, Etoh may contribute, although underlying BM d/o such as myelodysplasia suspected. Transfused PRBC. Patient transferred to - [facility] due to debility and functional decline (06 Mar 2019 14:53)      PAST MEDICAL & SURGICAL HISTORY:  Nonrheumatic aortic valve stenosis  Cancer of stomach  Anemia, unspecified type: Anemia  Seizure disorder  Benign prostatic hyperplasia, unspecified whether lower urinary tract symptoms present  Hyperlipemia  Essential hypertension  Chronic atrial fibrillation  Stomach cancer  Hypertension  Atrial fibrillation  Gout  No significant past surgical history  No significant past surgical history      MEDICATIONS  (STANDING):  allopurinol 300 milliGRAM(s) Oral daily  amLODIPine   Tablet 10 milliGRAM(s) Oral daily  aspirin enteric coated 81 milliGRAM(s) Oral daily  atorvastatin 10 milliGRAM(s) Oral at bedtime  cyanocobalamin 1000 MICROGram(s) Oral daily  dextrose 5%. 1000 milliLiter(s) (50 mL/Hr) IV Continuous <Continuous>  dextrose 50% Injectable 12.5 Gram(s) IV Push once  dextrose 50% Injectable 25 Gram(s) IV Push once  dextrose 50% Injectable 25 Gram(s) IV Push once  docusate sodium 100 milliGRAM(s) Oral two times a day  epoetin jackelyn Injectable 55809 Unit(s) SubCutaneous once  epoetin jackelyn Injectable 98965 Unit(s) SubCutaneous <User Schedule>  famotidine    Tablet 20 milliGRAM(s) Oral daily  folic acid 1 milliGRAM(s) Oral daily  insulin lispro (HumaLOG) corrective regimen sliding scale   SubCutaneous three times a day before meals  insulin lispro (HumaLOG) corrective regimen sliding scale   SubCutaneous at bedtime  levETIRAcetam 500 milliGRAM(s) Oral two times a day  lidocaine   Patch 1 Patch Transdermal <User Schedule>  lidocaine 5% Ointment 1 Application(s) Topical three times a day  metoprolol tartrate 25 milliGRAM(s) Oral every 12 hours  multivitamin 1 Tablet(s) Oral daily  pantoprazole    Tablet 40 milliGRAM(s) Oral before breakfast  polyethylene glycol 3350 17 Gram(s) Oral daily  rivaroxaban 20 milliGRAM(s) Oral every 24 hours  sodium chloride 0.9%. 1000 milliLiter(s) (100 mL/Hr) IV Continuous <Continuous>  tamsulosin 0.4 milliGRAM(s) Oral at bedtime    MEDICATIONS  (PRN):  acetaminophen   Tablet .. 650 milliGRAM(s) Oral every 6 hours PRN Mild Pain (1 - 3)  dextrose 40% Gel 15 Gram(s) Oral once PRN Blood Glucose LESS THAN 70 milliGRAM(s)/deciliter  glucagon  Injectable 1 milliGRAM(s) IntraMuscular once PRN Glucose LESS THAN 70 milligrams/deciliter  senna 2 Tablet(s) Oral at bedtime PRN Constipation  traMADol 50 milliGRAM(s) Oral every 6 hours PRN Severe Pain (7 - 10)      Allergies    No Known Allergies    Intolerances          VITALS  84y  Vital Signs Last 24 Hrs  T(C): 36.3 (01 Apr 2019 08:56), Max: 36.3 (31 Mar 2019 22:06)  T(F): 97.3 (01 Apr 2019 08:56), Max: 97.3 (31 Mar 2019 22:06)  HR: 100 (01 Apr 2019 08:56) (99 - 100)  BP: 120/68 (01 Apr 2019 08:56) (120/68 - 126/70)  BP(mean): --  RR: 14 (01 Apr 2019 08:56) (14 - 14)  SpO2: 100% (01 Apr 2019 08:56) (100% - 100%)  Daily     Daily         RECENT LABS:                          7.7    10.9  )-----------( 249      ( 01 Apr 2019 05:30 )             24.4     04-01    139  |  105  |  44<H>  ----------------------------<  167<H>  3.9   |  21<L>  |  1.31<H>    Ca    9.8      01 Apr 2019 05:30                CAPILLARY BLOOD GLUCOSE      POCT Blood Glucose.: 169 mg/dL (01 Apr 2019 07:27)  POCT Blood Glucose.: 213 mg/dL (31 Mar 2019 20:18)  POCT Blood Glucose.: 145 mg/dL (31 Mar 2019 16:48)  POCT Blood Glucose.: 193 mg/dL (31 Mar 2019 11:32)

## 2019-04-01 NOTE — CHART NOTE - NSCHARTNOTEFT_GEN_A_CORE
Nutrition Follow Up Note  Hospital Course (Per Electronic Medical Record):   Source: Medical Record [X] Patient [X]     Diet: Consistent Carbohydrate Diet w/ Thin Liquids  on Glucerna 8oz PO Daily & Rocky 1 Packet BID  Tolerates Diet Well  No Chewing/Swallowing Difficulties  No Recent Nausea, Vomiting, Diarrhea or Constipation   Educated Patient on Consistent Carbohydrate Diet    Enteral/Parenteral Nutrition: N/A    Current Weight: 198.6lb on 3/15  Obtain Weights Weekly  Obtain New Weight to Confirm Change     Pertinent Medications: MEDICATIONS  (STANDING):  allopurinol 300 milliGRAM(s) Oral daily  amLODIPine   Tablet 10 milliGRAM(s) Oral daily  aspirin enteric coated 81 milliGRAM(s) Oral daily  atorvastatin 10 milliGRAM(s) Oral at bedtime  cyanocobalamin 1000 MICROGram(s) Oral daily  dextrose 5%. 1000 milliLiter(s) (50 mL/Hr) IV Continuous <Continuous>  dextrose 50% Injectable 12.5 Gram(s) IV Push once  dextrose 50% Injectable 25 Gram(s) IV Push once  dextrose 50% Injectable 25 Gram(s) IV Push once  docusate sodium 100 milliGRAM(s) Oral two times a day  epoetin jackelyn Injectable 17828 Unit(s) SubCutaneous once  epoetin jackelyn Injectable 55167 Unit(s) SubCutaneous <User Schedule>  famotidine    Tablet 20 milliGRAM(s) Oral daily  folic acid 1 milliGRAM(s) Oral daily  insulin lispro (HumaLOG) corrective regimen sliding scale   SubCutaneous three times a day before meals  insulin lispro (HumaLOG) corrective regimen sliding scale   SubCutaneous at bedtime  levETIRAcetam 500 milliGRAM(s) Oral two times a day  lidocaine   Patch 1 Patch Transdermal <User Schedule>  lidocaine 5% Ointment 1 Application(s) Topical three times a day  metoprolol tartrate 25 milliGRAM(s) Oral every 12 hours  multivitamin 1 Tablet(s) Oral daily  pantoprazole    Tablet 40 milliGRAM(s) Oral before breakfast  polyethylene glycol 3350 17 Gram(s) Oral daily  rivaroxaban 20 milliGRAM(s) Oral every 24 hours  sodium chloride 0.9%. 1000 milliLiter(s) (100 mL/Hr) IV Continuous <Continuous>  tamsulosin 0.4 milliGRAM(s) Oral at bedtime    MEDICATIONS  (PRN):  acetaminophen   Tablet .. 650 milliGRAM(s) Oral every 6 hours PRN Mild Pain (1 - 3)  dextrose 40% Gel 15 Gram(s) Oral once PRN Blood Glucose LESS THAN 70 milliGRAM(s)/deciliter  glucagon  Injectable 1 milliGRAM(s) IntraMuscular once PRN Glucose LESS THAN 70 milligrams/deciliter  senna 2 Tablet(s) Oral at bedtime PRN Constipation  traMADol 50 milliGRAM(s) Oral every 6 hours PRN Severe Pain (7 - 10)      Pertinent Labs:  04-01 Na139 mmol/L Glu 167 mg/dL<H> K+ 3.9 mmol/L Cr  1.31 mg/dL<H> BUN 44 mg/dL<H> 03-07 [id number] 7.0 %<H>    POCT (over Last 2 Days) - Ranging from 145-250    Skin: Stage 2 Pressure Ulcer on Left Buttock    Edema: +1 Bilat L/E Edema Noted  (Potential for Weight Fluctuations)     Last BM: on 3/31    Estimated Needs:   [X] No Change since Previous Assessment    Previous Nutrition Diagnosis:   Increased Nutrient Needs  Limited adherence to nutrition - related recommendations    Nutrition Diagnosis is [X] Ongoing - Continues on Nutrition Supplement & Diet Education Provided on Consistent Carbohydrate Diet     New Nutrition Diagnosis: [X] Not Applicable    Interventions:   1. Recommend Continue Nutrition Plan of Care   2. Diet Education Provided on Consistent Carbohydrate Diet    Monitoring & Evaluation:   [X] Weights   [X] PO Intake   [X] Follow Up (Per Protocol)  [X] Tolerance to Diet Prescription   [X] Other: Labs & POCT    RD Remains Available.  Luis Hanna RDN

## 2019-04-02 PROCEDURE — 99232 SBSQ HOSP IP/OBS MODERATE 35: CPT

## 2019-04-02 PROCEDURE — 99233 SBSQ HOSP IP/OBS HIGH 50: CPT

## 2019-04-02 RX ORDER — INDOMETHACIN 50 MG
25 CAPSULE ORAL
Qty: 0 | Refills: 0 | Status: COMPLETED | OUTPATIENT
Start: 2019-04-02 | End: 2019-04-04

## 2019-04-02 RX ADMIN — AMLODIPINE BESYLATE 10 MILLIGRAM(S): 2.5 TABLET ORAL at 06:56

## 2019-04-02 RX ADMIN — LIDOCAINE 1 APPLICATION(S): 4 CREAM TOPICAL at 21:26

## 2019-04-02 RX ADMIN — Medication 25 MILLIGRAM(S): at 18:10

## 2019-04-02 RX ADMIN — Medication 2: at 12:51

## 2019-04-02 RX ADMIN — LIDOCAINE 1 APPLICATION(S): 4 CREAM TOPICAL at 13:41

## 2019-04-02 RX ADMIN — Medication 100 MILLIGRAM(S): at 18:09

## 2019-04-02 RX ADMIN — LEVETIRACETAM 500 MILLIGRAM(S): 250 TABLET, FILM COATED ORAL at 06:56

## 2019-04-02 RX ADMIN — TRAMADOL HYDROCHLORIDE 50 MILLIGRAM(S): 50 TABLET ORAL at 17:49

## 2019-04-02 RX ADMIN — TAMSULOSIN HYDROCHLORIDE 0.4 MILLIGRAM(S): 0.4 CAPSULE ORAL at 21:25

## 2019-04-02 RX ADMIN — Medication 25 MILLIGRAM(S): at 18:08

## 2019-04-02 RX ADMIN — LEVETIRACETAM 500 MILLIGRAM(S): 250 TABLET, FILM COATED ORAL at 18:10

## 2019-04-02 RX ADMIN — Medication 1 MILLIGRAM(S): at 12:51

## 2019-04-02 RX ADMIN — PREGABALIN 1000 MICROGRAM(S): 225 CAPSULE ORAL at 12:51

## 2019-04-02 RX ADMIN — Medication 300 MILLIGRAM(S): at 12:51

## 2019-04-02 RX ADMIN — LIDOCAINE 1 PATCH: 4 CREAM TOPICAL at 21:28

## 2019-04-02 RX ADMIN — Medication 100 MILLIGRAM(S): at 06:56

## 2019-04-02 RX ADMIN — FAMOTIDINE 20 MILLIGRAM(S): 10 INJECTION INTRAVENOUS at 12:50

## 2019-04-02 RX ADMIN — Medication 25 MILLIGRAM(S): at 06:56

## 2019-04-02 RX ADMIN — PANTOPRAZOLE SODIUM 40 MILLIGRAM(S): 20 TABLET, DELAYED RELEASE ORAL at 06:56

## 2019-04-02 RX ADMIN — Medication 1: at 17:29

## 2019-04-02 RX ADMIN — RIVAROXABAN 20 MILLIGRAM(S): KIT at 18:09

## 2019-04-02 RX ADMIN — ATORVASTATIN CALCIUM 10 MILLIGRAM(S): 80 TABLET, FILM COATED ORAL at 21:25

## 2019-04-02 RX ADMIN — Medication 25 MILLIGRAM(S): at 20:33

## 2019-04-02 RX ADMIN — TRAMADOL HYDROCHLORIDE 50 MILLIGRAM(S): 50 TABLET ORAL at 15:34

## 2019-04-02 RX ADMIN — LIDOCAINE 1 PATCH: 4 CREAM TOPICAL at 10:02

## 2019-04-02 RX ADMIN — Medication 1 TABLET(S): at 12:51

## 2019-04-02 RX ADMIN — TRAMADOL HYDROCHLORIDE 50 MILLIGRAM(S): 50 TABLET ORAL at 12:49

## 2019-04-02 RX ADMIN — TRAMADOL HYDROCHLORIDE 50 MILLIGRAM(S): 50 TABLET ORAL at 10:11

## 2019-04-02 RX ADMIN — LIDOCAINE 1 APPLICATION(S): 4 CREAM TOPICAL at 06:57

## 2019-04-02 RX ADMIN — Medication 81 MILLIGRAM(S): at 12:51

## 2019-04-02 RX ADMIN — Medication 1: at 08:15

## 2019-04-02 RX ADMIN — POLYETHYLENE GLYCOL 3350 17 GRAM(S): 17 POWDER, FOR SOLUTION ORAL at 12:51

## 2019-04-02 NOTE — PROGRESS NOTE ADULT - ASSESSMENT
#84 male with PMH significant for HTN, HLD, Afib, stomach CA, anemia, gout, BPH. seizure d/o, recently stopped EToh use, p/w chronic anemia possibly due to myelodysplastic syndrome, s/p tx PRBC, debility and functional decline      #Anemia/debility: multifactorial: Chronic kidney disease, recent GIB. 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   - Heme consulting  Chronic myelomonocytic leukemia not having achieved remission.  Supportive H/O care at this time.   -GF/PRN transfusional support. Patient not candidate for systemic therapy   -Anemia - 2/2 renal disease, nutrition-plan B12 supplementation and procrit  -will need education procrit injections for dc if to continue    #LGI bleed: self limiting  - xarelto and ASA restarted  -, GI consult and follow up appreciated.  -CBC /H/H stable today  -f/u CBC later in week 4/4      #elevated FS  -[id number]  -monitor. [id number]=3, given age, monitor for now, nutrition/diabetic education for dc  -sl improved today    #Afib:   - Continue metoprolol, Xarelto  -rate controlled    #HTN:-   SBP last 24 hours - 211-160  Was on norvasc, metoprolol tartrate bid and lasix- primary team has stopped diuretic and made a cardiology consult for bp control and ckd      # Joint pain/acute gout:   - tramadol, 50 mg q6 severe pain  -indomethacin dc 3/29. joints and symptoms much improved  -continue allpurinol 300 mg daily  -recommend right knee xray    #SZ/tremors:   - Controlled on keppra     #DVT prophylaxis:   -on xarelto    Mood:  -neuropsych fu  -patient states he does not want palliative care/hospice consult at this time

## 2019-04-02 NOTE — PROGRESS NOTE ADULT - SUBJECTIVE AND OBJECTIVE BOX
Patient is a 84y old  Male who presents with a chief complaint of anemia, debility and functional decline (01 Apr 2019 11:40)      HPI:  Patient is 84 male RH dominant with PMH significant for HTN, HLD,  Afib (off xarelto), stomach CA, anemia, gout, BPH. seizure d/o, recently stopped EToh use, who presented from [facility] with abnormal labs.  Pt reports recent admission to [facility] for gout flare then discharged to rehab.  Pt now returns with guaiac negative anemia (HgB 6.8 ) and  episode of bright red blood in stool. Pt also found with leukocytosis, with temp 100.7 F rectally.  Denies chills, fever, sob, chest pain, weakness, dysuria.    Patient was seen by GI and hematology.  S/P Colonoscopy on 3/1/19. No source of bleeding noted. Negative FOB. S/P EGD during prior admission, and no bleeding identified. Heme performed BM biopsy 3/4, results pending. CKD, Etoh may contribute, although underlying BM d/o such as myelodysplasia suspected. Transfused PRBC. Patient transferred to - [facility] due to debility and functional decline (06 Mar 2019 14:53)      PAST MEDICAL & SURGICAL HISTORY:  Nonrheumatic aortic valve stenosis  Cancer of stomach  Anemia, unspecified type: Anemia  Seizure disorder  Benign prostatic hyperplasia, unspecified whether lower urinary tract symptoms present  Hyperlipemia  Essential hypertension  Chronic atrial fibrillation  Stomach cancer  Hypertension  Atrial fibrillation  Gout  No significant past surgical history  No significant past surgical history      MEDICATIONS  (STANDING):  allopurinol 300 milliGRAM(s) Oral daily  amLODIPine   Tablet 10 milliGRAM(s) Oral daily  aspirin enteric coated 81 milliGRAM(s) Oral daily  atorvastatin 10 milliGRAM(s) Oral at bedtime  cyanocobalamin 1000 MICROGram(s) Oral daily  dextrose 5%. 1000 milliLiter(s) (50 mL/Hr) IV Continuous <Continuous>  dextrose 50% Injectable 12.5 Gram(s) IV Push once  dextrose 50% Injectable 25 Gram(s) IV Push once  dextrose 50% Injectable 25 Gram(s) IV Push once  docusate sodium 100 milliGRAM(s) Oral two times a day  epoetin jackelyn Injectable 50174 Unit(s) SubCutaneous <User Schedule>  famotidine    Tablet 20 milliGRAM(s) Oral daily  folic acid 1 milliGRAM(s) Oral daily  insulin lispro (HumaLOG) corrective regimen sliding scale   SubCutaneous three times a day before meals  insulin lispro (HumaLOG) corrective regimen sliding scale   SubCutaneous at bedtime  levETIRAcetam 500 milliGRAM(s) Oral two times a day  lidocaine   Patch 1 Patch Transdermal <User Schedule>  lidocaine 5% Ointment 1 Application(s) Topical three times a day  metoprolol tartrate 25 milliGRAM(s) Oral every 12 hours  multivitamin 1 Tablet(s) Oral daily  pantoprazole    Tablet 40 milliGRAM(s) Oral before breakfast  polyethylene glycol 3350 17 Gram(s) Oral daily  rivaroxaban 20 milliGRAM(s) Oral every 24 hours  sodium chloride 0.9%. 1000 milliLiter(s) (100 mL/Hr) IV Continuous <Continuous>  tamsulosin 0.4 milliGRAM(s) Oral at bedtime    MEDICATIONS  (PRN):  acetaminophen   Tablet .. 650 milliGRAM(s) Oral every 6 hours PRN Mild Pain (1 - 3)  dextrose 40% Gel 15 Gram(s) Oral once PRN Blood Glucose LESS THAN 70 milliGRAM(s)/deciliter  glucagon  Injectable 1 milliGRAM(s) IntraMuscular once PRN Glucose LESS THAN 70 milligrams/deciliter  senna 2 Tablet(s) Oral at bedtime PRN Constipation  traMADol 50 milliGRAM(s) Oral every 6 hours PRN Severe Pain (7 - 10)      Allergies    No Known Allergies    Intolerances          VITALS  84y  Vital Signs Last 24 Hrs  T(C): 36.3 (02 Apr 2019 10:01), Max: 37 (01 Apr 2019 21:26)  T(F): 97.4 (02 Apr 2019 10:01), Max: 98.6 (01 Apr 2019 21:26)  HR: 82 (02 Apr 2019 10:01) (71 - 84)  BP: 113/67 (02 Apr 2019 10:01) (113/67 - 138/88)  BP(mean): --  RR: 14 (02 Apr 2019 10:01) (14 - 14)  SpO2: 100% (02 Apr 2019 10:01) (96% - 100%)  Daily     Daily         RECENT LABS:                          7.7    10.9  )-----------( 249      ( 01 Apr 2019 05:30 )             24.4     04-01    139  |  105  |  44<H>  ----------------------------<  167<H>  3.9   |  21<L>  |  1.31<H>    Ca    9.8      01 Apr 2019 05:30                CAPILLARY BLOOD GLUCOSE      POCT Blood Glucose.: 220 mg/dL (02 Apr 2019 12:45)  POCT Blood Glucose.: 161 mg/dL (02 Apr 2019 07:26)  POCT Blood Glucose.: 160 mg/dL (01 Apr 2019 22:42)  POCT Blood Glucose.: 185 mg/dL (01 Apr 2019 16:45)

## 2019-04-02 NOTE — PROGRESS NOTE ADULT - SUBJECTIVE AND OBJECTIVE BOX
Patient is a 84y old  Male who presents with a chief complaint of anemia, debility and functional decline (02 Apr 2019 12:59)    HPI:  Patient is 84 male RH dominant with PMH significant for HTN, HLD,  Afib (off xarelto), stomach CA, anemia, gout, BPH. seizure d/o, recently stopped EToh use, who presented from [facility] with abnormal labs.  Pt reports recent admission to [facility] for gout flare then discharged to rehab.  Pt now returns with guaiac negative anemia (HgB 6.8 ) and  episode of bright red blood in stool. Pt also found with leukocytosis, with temp 100.7 F rectally.  Denies chills, fever, sob, chest pain, weakness, dysuria.    Patient was seen by GI and hematology.  S/P Colonoscopy on 3/1/19. No source of bleeding noted. Negative FOB. S/P EGD during prior admission, and no bleeding identified. Heme performed BM biopsy 3/4, results pending. CKD, Etoh may contribute, although underlying BM d/o such as myelodysplasia suspected. Transfused PRBC. Patient transferred to - [facility] due to debility and functional decline (06 Mar 2019 14:53)    Patient seen and examined at bedside.  Complains of continued knee pain, reports that it improves with compression.  Denies any chest pain or shortness of breath.      ALLERGIES:  No Known Allergies    MEDICATIONS:  acetaminophen   Tablet .. 650 milliGRAM(s) Oral every 6 hours PRN  allopurinol 300 milliGRAM(s) Oral daily  amLODIPine   Tablet 10 milliGRAM(s) Oral daily  aspirin enteric coated 81 milliGRAM(s) Oral daily  atorvastatin 10 milliGRAM(s) Oral at bedtime  cyanocobalamin 1000 MICROGram(s) Oral daily  dextrose 40% Gel 15 Gram(s) Oral once PRN  dextrose 5%. 1000 milliLiter(s) IV Continuous <Continuous>  dextrose 50% Injectable 12.5 Gram(s) IV Push once  dextrose 50% Injectable 25 Gram(s) IV Push once  dextrose 50% Injectable 25 Gram(s) IV Push once  docusate sodium 100 milliGRAM(s) Oral two times a day  epoetin jackelyn Injectable 54487 Unit(s) SubCutaneous <User Schedule>  famotidine    Tablet 20 milliGRAM(s) Oral daily  folic acid 1 milliGRAM(s) Oral daily  glucagon  Injectable 1 milliGRAM(s) IntraMuscular once PRN  insulin lispro (HumaLOG) corrective regimen sliding scale   SubCutaneous three times a day before meals  insulin lispro (HumaLOG) corrective regimen sliding scale   SubCutaneous at bedtime  levETIRAcetam 500 milliGRAM(s) Oral two times a day  lidocaine   Patch 1 Patch Transdermal <User Schedule>  lidocaine 5% Ointment 1 Application(s) Topical three times a day  metoprolol tartrate 25 milliGRAM(s) Oral every 12 hours  multivitamin 1 Tablet(s) Oral daily  pantoprazole    Tablet 40 milliGRAM(s) Oral before breakfast  polyethylene glycol 3350 17 Gram(s) Oral daily  rivaroxaban 20 milliGRAM(s) Oral every 24 hours  senna 2 Tablet(s) Oral at bedtime PRN  sodium chloride 0.9%. 1000 milliLiter(s) IV Continuous <Continuous>  tamsulosin 0.4 milliGRAM(s) Oral at bedtime  traMADol 50 milliGRAM(s) Oral every 6 hours PRN    Vital Signs Last 24 Hrs  T(F): 97.4 (02 Apr 2019 10:01), Max: 98.6 (01 Apr 2019 21:26)  HR: 82 (02 Apr 2019 10:01) (71 - 84)  BP: 113/67 (02 Apr 2019 10:01) (113/67 - 138/88)  RR: 14 (02 Apr 2019 10:01) (14 - 14)  SpO2: 100% (02 Apr 2019 10:01) (96% - 100%)  I&O's Summary      PHYSICAL EXAM:  General: NAD, A/O x 3  ENT: MMM  Neck: Supple, No JVD  Lungs: Clear to auscultation bilaterally  Cardio: RRR, S1/S2, No murmurs  Abdomen: Soft, Nontender, Nondistended; Bowel sounds present  Extremities: No cyanosis, small palpable effusion of right knee    LABS:                        7.7    10.9  )-----------( 249      ( 01 Apr 2019 05:30 )             24.4     04-01    139  |  105  |  44  ----------------------------<  167  3.9   |  21  |  1.31    Ca    9.8      01 Apr 2019 05:30      eGFR if Non African American: 50 mL/min/1.73M2 (04-01-19 @ 05:30)  eGFR if African American: 57 mL/min/1.73M2 (04-01-19 @ 05:30)                    CAPILLARY BLOOD GLUCOSE      POCT Blood Glucose.: 220 mg/dL (02 Apr 2019 12:45)  POCT Blood Glucose.: 161 mg/dL (02 Apr 2019 07:26)  POCT Blood Glucose.: 160 mg/dL (01 Apr 2019 22:42)  POCT Blood Glucose.: 185 mg/dL (01 Apr 2019 16:45)    03-07 [id number] 7.0          RADIOLOGY & ADDITIONAL TESTS:    Care Discussed with Consultants/Other Providers:

## 2019-04-02 NOTE — PROGRESS NOTE ADULT - ASSESSMENT
#84 male with PMH significant for HTN, HLD, Afib, stomach CA, anemia, gout, BPH. seizure d/o, recently stopped EToh use, p/w chronic anemia possibly due to myelodysplastic syndrome, s/p tx PRBC, debility and functional decline      #Anemia/debility: multifactorial: Chronic kidney disease, recent GIB. 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   - Heme appreciated 4/1:   Chronic myelomonocytic leukemia not having achieved remission.  Supportive H/O care at this time.   -GF/PRN transfusional support. Patient not candidate for systemic therapy   -B12 supplementatoin  -will need education procrit injections for dc if to continue  -CBC 4/4 discussed with patient    #LGI bleed: self limiting, followed by GI  - xarelto and ASA restarted  -transfused for Hgb<7  -f/u CBC later in week 4/4      #elevated FS  -CCD    FrF0F=8, given age, monitor for now, nutrition/diabetic education for dc      #Afib:   - Continue metoprolol, Xarelto  -rate controlled    #HTN:- (113/67 - 138/88) controlled 4/2  Continue norvasc, lasix.   bilateral LE ACE wraps, discussed with nursing and patient  -case discussed with hospitalist. given extensive cardiac history, do not recommend medications such as steroids or midodrine at this time. continue to encourage time OOB, sitting, LE movement . continue ACE wraps LE. Discussed with patient as well    # Joint pain/acute gout:   - tramadol, 50 mg q6 severe pain  -indomethacin dc 3/29. joints and symptoms much improved  -continue allpurinol 300 mg daily    #SZ/tremors:   - Controlled on keppra     #DVT prophylaxis:   -on xarelto    #Mood:  -neuropsych f/u requested, depression screen, possible SSRI?  -patient states he does not want palliative care/hospice consult at this time    #Case discussed in IDT rounds 4/2  -patient and caregiver refuse [facility]/SNF, state they will provide 24 hour supervision at home. Currently mod-max assist for mobility and transfers, and will likely be wheelchair level at home. At this time, dififculty tolerating 3 hours of rehab, patient not interested in hospice level/comfort care, and would likely benefit more from being in his own surroundings with 24 hour assist and home care  -Patient would benefit from hospital bed at home due to cardiac history, significant lower extremity swelling and orthostasis; repeated gouty flares with multiple joint pain and swelling; and risk for skin breakdown due to progressively bedbound status, Recommend electric hospital bed with rails to assist for transfers and bed mobility, adjustable head and leg sections.     -caregiver training    Labs:  CBC, BMP 4/4 #84 male with PMH significant for HTN, HLD, Afib, stomach CA, anemia, gout, BPH. seizure d/o, recently stopped EToh use, p/w chronic anemia possibly due to myelodysplastic syndrome, s/p tx PRBC, debility and functional decline      #Anemia/debility: multifactorial: Chronic kidney disease, recent GIB. 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   - Heme appreciated 4/1:   Chronic myelomonocytic leukemia not having achieved remission.  Supportive H/O care at this time.   -GF/PRN transfusional support. Patient not candidate for systemic therapy   -B12 supplementatoin  -will need education procrit injections for dc if to continue  -CBC 4/4 discussed with patient    #LGI bleed: self limiting, followed by GI  - xarelto and ASA restarted  -transfused for Hgb<7  -f/u CBC later in week 4/4      #elevated FS  -CCD    DsX9Z=4, given age, monitor for now, nutrition/diabetic education for dc      #Afib:   - Continue metoprolol, Xarelto  -rate controlled    #HTN:- (113/67 - 138/88) controlled 4/2  Continue norvasc, lasix.   bilateral LE ACE wraps, discussed with nursing and patient  -case discussed with hospitalist. given extensive cardiac history, do not recommend medications such as steroids or midodrine at this time. continue to encourage time OOB, sitting, LE movement . continue ACE wraps LE. Discussed with patient as well    # Joint pain/acute gout:   - tramadol, 50 mg q6 severe pain  -indomethacin dc 3/29. joints and symptoms much improved  -continue allpurinol 300 mg daily    #SZ/tremors:   - Controlled on keppra     #DVT prophylaxis:   -on xarelto    #Mood:  -neuropsych f/u requested, depression screen, possible SSRI?  -patient states he does not want palliative care/hospice consult at this time    #Case discussed in IDT rounds 4/2  -patient and caregiver refuse [facility]/SNF, state they will provide 24 hour supervision at home. Currently mod-max assist for mobility and transfers, and will likely be wheelchair level at home. At this time, dififculty tolerating 3 hours of rehab, patient not interested in hospice level/comfort care, and would likely benefit more from being in his own surroundings with 24 hour assist and home care  -Patient would benefit from hospital bed at home due to cardiac history, significant lower extremity swelling and orthostasis; repeated gouty flares with multiple joint pain and swelling; and risk for skin breakdown due to progressively bedbound status, Recommend electric hospital bed with rails to assist for transfers and bed mobility, adjustable head and leg sections.     -caregiver training    Labs:  CBC, BMP 4/4  neuropsychology #84 male with PMH significant for HTN, HLD, Afib, stomach CA, anemia, gout, BPH. seizure d/o, recently stopped EToh use, p/w chronic anemia possibly due to myelodysplastic syndrome, s/p tx PRBC, debility and functional decline      #Anemia/debility: multifactorial: Chronic kidney disease, recent GIB. 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   - Heme appreciated 4/1:   Chronic myelomonocytic leukemia not having achieved remission.  Supportive H/O care at this time.   -GF/PRN transfusional support. Patient not candidate for systemic therapy   -B12 supplementatoin  -will need education procrit injections for dc if to continue  -CBC 4/4 discussed with patient    #LGI bleed: self limiting, followed by GI  - xarelto and ASA restarted  -transfused for Hgb<7  -f/u CBC later in week 4/4      #elevated FS  -CCD    TnW2R=3, given age, monitor for now, nutrition/diabetic education for dc      #Afib:   - Continue metoprolol, Xarelto  -rate controlled    #HTN:- (113/67 - 138/88) controlled 4/2  Continue norvasc, lasix.   bilateral LE ACE wraps, discussed with nursing and patient  -case discussed with hospitalist. given extensive cardiac history, do not recommend medications such as steroids or midodrine at this time. continue to encourage time OOB, sitting, LE movement . continue ACE wraps LE. Discussed with patient as well    # Joint pain/acute gout:   - tramadol, 50 mg q6 severe pain  -indomethacin dc 3/29. joints and symptoms much improved  -continue allpurinol 300 mg daily    #SZ/tremors:   - Controlled on keppra     #DVT prophylaxis:   -on xarelto    #Mood:  -neuropsych f/u requested, depression screen, possible SSRI?  -patient states he does not want palliative care/hospice consult at this time    #Case discussed in IDT rounds 4/2  -patient and caregiver refuse [facility]/SNF, state they will provide 24 hour supervision at home. Currently mod-max assist for mobility and transfers, and will likely be wheelchair level at home. At this time, dififculty tolerating 3 hours of rehab, patient not interested in hospice level/comfort care, and would likely benefit more from being in his own surroundings with 24 hour assist and home care  -Patient would benefit from hospital bed at home due to cardiac history, significant lower extremity swelling and orthostasis; repeated gouty flares with multiple joint pain and swelling; and risk for skin breakdown due to progressively bedbound status, Recommend electric hospital bed with rails to assist for transfers and bed mobility, adjustable head and leg sections.     -caregiver training    Labs:  CBC, BMP 4/4  neuropsychology    addendum: patient developed pain in knees and increased effusion adn warmth after therapies today. restart on indomethacin 25 bid x 2 days. CBC ordered for 4/4

## 2019-04-03 PROCEDURE — 99232 SBSQ HOSP IP/OBS MODERATE 35: CPT

## 2019-04-03 RX ORDER — TRAMADOL HYDROCHLORIDE 50 MG/1
50 TABLET ORAL EVERY 6 HOURS
Qty: 0 | Refills: 0 | Status: DISCONTINUED | OUTPATIENT
Start: 2019-04-03 | End: 2019-04-09

## 2019-04-03 RX ADMIN — Medication 25 MILLIGRAM(S): at 17:09

## 2019-04-03 RX ADMIN — Medication 2: at 12:06

## 2019-04-03 RX ADMIN — RIVAROXABAN 20 MILLIGRAM(S): KIT at 17:08

## 2019-04-03 RX ADMIN — LEVETIRACETAM 500 MILLIGRAM(S): 250 TABLET, FILM COATED ORAL at 05:38

## 2019-04-03 RX ADMIN — PANTOPRAZOLE SODIUM 40 MILLIGRAM(S): 20 TABLET, DELAYED RELEASE ORAL at 06:32

## 2019-04-03 RX ADMIN — Medication 1: at 17:08

## 2019-04-03 RX ADMIN — LIDOCAINE 1 PATCH: 4 CREAM TOPICAL at 06:51

## 2019-04-03 RX ADMIN — ERYTHROPOIETIN 10000 UNIT(S): 10000 INJECTION, SOLUTION INTRAVENOUS; SUBCUTANEOUS at 15:10

## 2019-04-03 RX ADMIN — ATORVASTATIN CALCIUM 10 MILLIGRAM(S): 80 TABLET, FILM COATED ORAL at 22:16

## 2019-04-03 RX ADMIN — LIDOCAINE 1 PATCH: 4 CREAM TOPICAL at 22:18

## 2019-04-03 RX ADMIN — Medication 25 MILLIGRAM(S): at 05:36

## 2019-04-03 RX ADMIN — Medication 100 MILLIGRAM(S): at 05:36

## 2019-04-03 RX ADMIN — Medication 100 MILLIGRAM(S): at 17:09

## 2019-04-03 RX ADMIN — Medication 25 MILLIGRAM(S): at 06:51

## 2019-04-03 RX ADMIN — Medication 81 MILLIGRAM(S): at 12:05

## 2019-04-03 RX ADMIN — LIDOCAINE 1 APPLICATION(S): 4 CREAM TOPICAL at 05:37

## 2019-04-03 RX ADMIN — TAMSULOSIN HYDROCHLORIDE 0.4 MILLIGRAM(S): 0.4 CAPSULE ORAL at 22:16

## 2019-04-03 RX ADMIN — Medication 1: at 08:13

## 2019-04-03 RX ADMIN — Medication 1 MILLIGRAM(S): at 12:05

## 2019-04-03 RX ADMIN — Medication 25 MILLIGRAM(S): at 05:37

## 2019-04-03 RX ADMIN — LEVETIRACETAM 500 MILLIGRAM(S): 250 TABLET, FILM COATED ORAL at 17:08

## 2019-04-03 RX ADMIN — Medication 1 TABLET(S): at 12:06

## 2019-04-03 RX ADMIN — Medication 300 MILLIGRAM(S): at 12:05

## 2019-04-03 RX ADMIN — AMLODIPINE BESYLATE 10 MILLIGRAM(S): 2.5 TABLET ORAL at 05:36

## 2019-04-03 RX ADMIN — FAMOTIDINE 20 MILLIGRAM(S): 10 INJECTION INTRAVENOUS at 12:06

## 2019-04-03 RX ADMIN — Medication 25 MILLIGRAM(S): at 18:24

## 2019-04-03 RX ADMIN — POLYETHYLENE GLYCOL 3350 17 GRAM(S): 17 POWDER, FOR SOLUTION ORAL at 15:06

## 2019-04-03 RX ADMIN — LIDOCAINE 1 APPLICATION(S): 4 CREAM TOPICAL at 22:18

## 2019-04-03 RX ADMIN — PREGABALIN 1000 MICROGRAM(S): 225 CAPSULE ORAL at 12:06

## 2019-04-03 RX ADMIN — LIDOCAINE 1 APPLICATION(S): 4 CREAM TOPICAL at 15:10

## 2019-04-03 NOTE — PROGRESS NOTE ADULT - ASSESSMENT
#84 male with PMH significant for HTN, HLD, Afib, stomach CA, anemia, gout, BPH. seizure d/o, recently stopped EToh use, p/w chronic anemia possibly due to myelodysplastic syndrome, s/p tx PRBC, debility and functional decline      #Anemia/debility: multifactorial: Chronic kidney disease, recent GIB. 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   - Heme appreciated 4/1:   Chronic myelomonocytic leukemia not having achieved remission.  Supportive H/O care at this time.   -GF/PRN transfusional support. Patient not candidate for systemic therapy   -B12 supplementatoin  -will need education procrit injections for dc if to continue  -CBC 4/4     #LGI bleed: self limiting, followed by GI  - xarelto and ASA restarted  -transfused for Hgb<7  -f/u CBC in AM, hardik on indomethacin      #elevated FS  -CCD    HiM6M=7, given age, monitor for now, nutrition/diabetic education for dc      #Afib:   - Continue metoprolol, Xarelto  -rate controlled    #HTN:- (113/67 - 138/88) controlled 4/2  Continue norvasc, lasix.   bilateral LE ACE wraps, discussed with nursing and patient  -case discussed with hospitalist. given extensive cardiac history, do not recommend medications such as steroids or midodrine at this time. continue to encourage time OOB, sitting, LE movement . continue ACE wraps LE. Discussed with patient as well    # Joint pain/acute gout:   - tramadol, 50 mg q6 severe pain  -indomethacin restarted 25 bid 4/2 and 4/3 for flare. evaluate tomorrow for need to continue, follow CBC  -continue allpurinol 300 mg daily    #SZ/tremors:   - Controlled on keppra     #DVT prophylaxis:   -on xarelto    #Mood:  -neuropsych f/u requested, depression screen, possible SSRI? mood looks much better today with pain control however  -patient states he does not want palliative care/hospice consult at this time    #Case discussed in IDT rounds 4/2  -patient and caregiver refuse [facility]/SNF, state they will provide 24 hour supervision at home. Currently mod-max assist for mobility and transfers, and will likely be wheelchair level at home. At this time, dififculty tolerating 3 hours of rehab, patient not interested in hospice level/comfort care, and would likely benefit more from being in his own surroundings with 24 hour assist and home care  -Patient would benefit from hospital bed at home due to cardiac history, significant lower extremity swelling and orthostasis; repeated gouty flares with multiple joint pain and swelling; and risk for skin breakdown due to progressively bedbound status, Recommend electric hospital bed with rails to assist for transfers and bed mobility, adjustable head and leg sections.     -caregiver training    Labs:  CBC, BMP 4/4  neuropsychology

## 2019-04-03 NOTE — PROGRESS NOTE ADULT - SUBJECTIVE AND OBJECTIVE BOX
Patient is a 84y old  Male who presents with a chief complaint of anemia, debility and functional decline (2019 15:25)      HPI:  Patient is 84 male RH dominant with PMH significant for HTN, HLD,  Afib (off xarelto), stomach CA, anemia, gout, BPH. seizure d/o, recently stopped EToh use, who presented from [facility] with abnormal labs.  Pt reports recent admission to [facility] for gout flare then discharged to rehab.  Pt now returns with guaiac negative anemia (HgB 6.8 ) and  episode of bright red blood in stool. Pt also found with leukocytosis, with temp 100.7 F rectally.  Denies chills, fever, sob, chest pain, weakness, dysuria.    Patient was seen by GI and hematology.  S/P Colonoscopy on 3/1/19. No source of bleeding noted. Negative FOB. S/P EGD during prior admission, and no bleeding identified. Heme performed BM biopsy 3/4, results pending. CKD, Etoh may contribute, although underlying BM d/o such as myelodysplasia suspected. Transfused PRBC. Patient transferred to - [facility] due to debility and functional decline (06 Mar 2019 14:53)      PAST MEDICAL & SURGICAL HISTORY:  Nonrheumatic aortic valve stenosis  Cancer of stomach  Anemia, unspecified type: Anemia  Seizure disorder  Benign prostatic hyperplasia, unspecified whether lower urinary tract symptoms present  Hyperlipemia  Essential hypertension  Chronic atrial fibrillation  Stomach cancer  Hypertension  Atrial fibrillation  Gout  No significant past surgical history  No significant past surgical history      MEDICATIONS  (STANDING):  allopurinol 300 milliGRAM(s) Oral daily  amLODIPine   Tablet 10 milliGRAM(s) Oral daily  aspirin enteric coated 81 milliGRAM(s) Oral daily  atorvastatin 10 milliGRAM(s) Oral at bedtime  cyanocobalamin 1000 MICROGram(s) Oral daily  dextrose 5%. 1000 milliLiter(s) (50 mL/Hr) IV Continuous <Continuous>  dextrose 50% Injectable 12.5 Gram(s) IV Push once  dextrose 50% Injectable 25 Gram(s) IV Push once  dextrose 50% Injectable 25 Gram(s) IV Push once  docusate sodium 100 milliGRAM(s) Oral two times a day  famotidine    Tablet 20 milliGRAM(s) Oral daily  folic acid 1 milliGRAM(s) Oral daily  indomethacin 25 milliGRAM(s) Oral two times a day  insulin lispro (HumaLOG) corrective regimen sliding scale   SubCutaneous three times a day before meals  insulin lispro (HumaLOG) corrective regimen sliding scale   SubCutaneous at bedtime  levETIRAcetam 500 milliGRAM(s) Oral two times a day  lidocaine   Patch 1 Patch Transdermal <User Schedule>  lidocaine 5% Ointment 1 Application(s) Topical three times a day  metoprolol tartrate 25 milliGRAM(s) Oral every 12 hours  multivitamin 1 Tablet(s) Oral daily  pantoprazole    Tablet 40 milliGRAM(s) Oral before breakfast  polyethylene glycol 3350 17 Gram(s) Oral daily  rivaroxaban 20 milliGRAM(s) Oral every 24 hours  sodium chloride 0.9%. 1000 milliLiter(s) (100 mL/Hr) IV Continuous <Continuous>  tamsulosin 0.4 milliGRAM(s) Oral at bedtime    MEDICATIONS  (PRN):  acetaminophen   Tablet .. 650 milliGRAM(s) Oral every 6 hours PRN Mild Pain (1 - 3)  dextrose 40% Gel 15 Gram(s) Oral once PRN Blood Glucose LESS THAN 70 milliGRAM(s)/deciliter  glucagon  Injectable 1 milliGRAM(s) IntraMuscular once PRN Glucose LESS THAN 70 milligrams/deciliter  senna 2 Tablet(s) Oral at bedtime PRN Constipation  traMADol 50 milliGRAM(s) Oral every 6 hours PRN Severe Pain (7 - 10)      Allergies    No Known Allergies    Intolerances          VITALS  84y  Vital Signs Last 24 Hrs  T(C): 36.4 (2019 08:17), Max: 36.9 (2019 20:27)  T(F): 97.5 (2019 08:17), Max: 98.4 (2019 20:27)  HR: 77 (2019 08:17) (77 - 110)  BP: 114/60 (2019 08:17) (114/60 - 118/71)  BP(mean): --  RR: 14 (2019 08:17) (14 - 14)  SpO2: 100% (2019 08:17) (98% - 100%)  Daily     Daily Weight in k.5 (2019 05:30)        RECENT LABS:                      CAPILLARY BLOOD GLUCOSE      POCT Blood Glucose.: 223 mg/dL (2019 12:04)  POCT Blood Glucose.: 179 mg/dL (2019 07:51)  POCT Blood Glucose.: 199 mg/dL (2019 20:52)  POCT Blood Glucose.: 175 mg/dL (2019 16:34)

## 2019-04-04 LAB
ANION GAP SERPL CALC-SCNC: 14 MMOL/L — SIGNIFICANT CHANGE UP (ref 5–17)
BUN SERPL-MCNC: 55 MG/DL — HIGH (ref 7–23)
CALCIUM SERPL-MCNC: 9.3 MG/DL — SIGNIFICANT CHANGE UP (ref 8.4–10.5)
CHLORIDE SERPL-SCNC: 103 MMOL/L — SIGNIFICANT CHANGE UP (ref 96–108)
CO2 SERPL-SCNC: 19 MMOL/L — LOW (ref 22–31)
CREAT SERPL-MCNC: 1.23 MG/DL — SIGNIFICANT CHANGE UP (ref 0.5–1.3)
GLUCOSE SERPL-MCNC: 159 MG/DL — HIGH (ref 70–99)
HCT VFR BLD CALC: 24.4 % — LOW (ref 39–50)
HGB BLD-MCNC: 7.5 G/DL — LOW (ref 13–17)
MCHC RBC-ENTMCNC: 30.6 GM/DL — LOW (ref 32–36)
MCHC RBC-ENTMCNC: 31.6 PG — SIGNIFICANT CHANGE UP (ref 27–34)
MCV RBC AUTO: 103.3 FL — HIGH (ref 80–100)
PLATELET # BLD AUTO: 216 K/UL — SIGNIFICANT CHANGE UP (ref 150–400)
POTASSIUM SERPL-MCNC: 3.9 MMOL/L — SIGNIFICANT CHANGE UP (ref 3.5–5.3)
POTASSIUM SERPL-SCNC: 3.9 MMOL/L — SIGNIFICANT CHANGE UP (ref 3.5–5.3)
RBC # BLD: 2.36 M/UL — LOW (ref 4.2–5.8)
RBC # FLD: 20.2 % — HIGH (ref 10.3–14.5)
SODIUM SERPL-SCNC: 136 MMOL/L — SIGNIFICANT CHANGE UP (ref 135–145)
WBC # BLD: 9.2 K/UL — SIGNIFICANT CHANGE UP (ref 3.8–10.5)
WBC # FLD AUTO: 9.2 K/UL — SIGNIFICANT CHANGE UP (ref 3.8–10.5)

## 2019-04-04 PROCEDURE — 99233 SBSQ HOSP IP/OBS HIGH 50: CPT

## 2019-04-04 PROCEDURE — 99232 SBSQ HOSP IP/OBS MODERATE 35: CPT

## 2019-04-04 RX ORDER — ERYTHROPOIETIN 10000 [IU]/ML
10000 INJECTION, SOLUTION INTRAVENOUS; SUBCUTANEOUS
Qty: 0 | Refills: 0 | Status: COMPLETED | OUTPATIENT
Start: 2019-04-04 | End: 2019-04-10

## 2019-04-04 RX ADMIN — Medication 25 MILLIGRAM(S): at 05:23

## 2019-04-04 RX ADMIN — Medication 1 TABLET(S): at 12:19

## 2019-04-04 RX ADMIN — Medication 2: at 12:19

## 2019-04-04 RX ADMIN — TAMSULOSIN HYDROCHLORIDE 0.4 MILLIGRAM(S): 0.4 CAPSULE ORAL at 22:23

## 2019-04-04 RX ADMIN — Medication 100 MILLIGRAM(S): at 05:23

## 2019-04-04 RX ADMIN — LIDOCAINE 1 PATCH: 4 CREAM TOPICAL at 22:23

## 2019-04-04 RX ADMIN — Medication 1 MILLIGRAM(S): at 12:20

## 2019-04-04 RX ADMIN — PREGABALIN 1000 MICROGRAM(S): 225 CAPSULE ORAL at 12:20

## 2019-04-04 RX ADMIN — PANTOPRAZOLE SODIUM 40 MILLIGRAM(S): 20 TABLET, DELAYED RELEASE ORAL at 05:23

## 2019-04-04 RX ADMIN — Medication 25 MILLIGRAM(S): at 06:19

## 2019-04-04 RX ADMIN — Medication 81 MILLIGRAM(S): at 12:20

## 2019-04-04 RX ADMIN — Medication 300 MILLIGRAM(S): at 12:19

## 2019-04-04 RX ADMIN — ATORVASTATIN CALCIUM 10 MILLIGRAM(S): 80 TABLET, FILM COATED ORAL at 22:23

## 2019-04-04 RX ADMIN — LIDOCAINE 1 PATCH: 4 CREAM TOPICAL at 07:04

## 2019-04-04 RX ADMIN — LEVETIRACETAM 500 MILLIGRAM(S): 250 TABLET, FILM COATED ORAL at 17:25

## 2019-04-04 RX ADMIN — RIVAROXABAN 20 MILLIGRAM(S): KIT at 17:25

## 2019-04-04 RX ADMIN — LEVETIRACETAM 500 MILLIGRAM(S): 250 TABLET, FILM COATED ORAL at 05:23

## 2019-04-04 RX ADMIN — FAMOTIDINE 20 MILLIGRAM(S): 10 INJECTION INTRAVENOUS at 12:20

## 2019-04-04 RX ADMIN — Medication 100 MILLIGRAM(S): at 17:25

## 2019-04-04 RX ADMIN — LIDOCAINE 1 APPLICATION(S): 4 CREAM TOPICAL at 22:23

## 2019-04-04 RX ADMIN — Medication 1: at 17:24

## 2019-04-04 RX ADMIN — Medication 1: at 08:37

## 2019-04-04 RX ADMIN — Medication 25 MILLIGRAM(S): at 17:25

## 2019-04-04 NOTE — PROGRESS NOTE ADULT - SUBJECTIVE AND OBJECTIVE BOX
YUE KING   84y   Male    Admitting: SAEED Gallego  HPI:  84-year-old gentleman with history of anemia, atrial fibrillation and seizure disorder admitted with anemia exacerbation from his [place] facility. Patient noted recent history of bright red blood per rectum. He was scheduled for outpatient colonoscopy. S/P BM biopsy.  Patient reported he stopped drinking alcohol approximately 3 months prior to admission.  Patient is now on the acute rehabilitation unit.    PAST MEDICAL & SURGICAL HISTORY:  Nonrheumatic aortic valve stenosis  Cancer of stomach  Anemia, unspecified type: Anemia  Seizure disorder  Benign prostatic hyperplasia, unspecified whether lower urinary tract symptoms present  Hyperlipemia  Essential hypertension  Chronic atrial fibrillation  Stomach cancer  Hypertension  Atrial fibrillation  Gout    HEALTH ISSUES - PROBLEM Dx:  Anemia  Ureteral stone: Ureteral stone  Chronic myelomonocytic leukemia not having achieved remission: Chronic myelomonocytic leukemia not having achieved remission  Anemia, unspecified type: Anemia, unspecified type  Rectal bleed    MEDICATIONS  (STANDING):  allopurinol 300 milliGRAM(s) Oral daily  amLODIPine   Tablet 10 milliGRAM(s) Oral daily  aspirin enteric coated 81 milliGRAM(s) Oral daily  atorvastatin 10 milliGRAM(s) Oral at bedtime  cyanocobalamin 1000 MICROGram(s) Oral daily  dextrose 5%. 1000 milliLiter(s) (50 mL/Hr) IV Continuous <Continuous>  dextrose 50% Injectable 12.5 Gram(s) IV Push once  dextrose 50% Injectable 25 Gram(s) IV Push once  dextrose 50% Injectable 25 Gram(s) IV Push once  docusate sodium 100 milliGRAM(s) Oral two times a day  famotidine    Tablet 20 milliGRAM(s) Oral daily  folic acid 1 milliGRAM(s) Oral daily  insulin lispro (HumaLOG) corrective regimen sliding scale   SubCutaneous three times a day before meals  insulin lispro (HumaLOG) corrective regimen sliding scale   SubCutaneous at bedtime  levETIRAcetam 500 milliGRAM(s) Oral two times a day  lidocaine   Patch 1 Patch Transdermal <User Schedule>  lidocaine 5% Ointment 1 Application(s) Topical three times a day  metoprolol tartrate 25 milliGRAM(s) Oral every 12 hours  multivitamin 1 Tablet(s) Oral daily  pantoprazole    Tablet 40 milliGRAM(s) Oral before breakfast  polyethylene glycol 3350 17 Gram(s) Oral daily  rivaroxaban 20 milliGRAM(s) Oral every 24 hours  sodium chloride 0.9%. 1000 milliLiter(s) (100 mL/Hr) IV Continuous <Continuous>  tamsulosin 0.4 milliGRAM(s) Oral at bedtime    MEDICATIONS  (PRN):  acetaminophen   Tablet .. 650 milliGRAM(s) Oral every 6 hours PRN Mild Pain (1 - 3)  dextrose 40% Gel 15 Gram(s) Oral once PRN Blood Glucose LESS THAN 70 milliGRAM(s)/deciliter  glucagon  Injectable 1 milliGRAM(s) IntraMuscular once PRN Glucose LESS THAN 70 milligrams/deciliter  senna 2 Tablet(s) Oral at bedtime PRN Constipation  traMADol 50 milliGRAM(s) Oral every 6 hours PRN Severe Pain (7 - 10)    Allergies    No Known Allergies    INTERVAL HPI/OVERNIGHT EVENTS:  Patient S&E at bedside. Going to take shower. No c/o pain or SOB.    VITAL SIGNS:  T(F): 98.2 (04-03-19 @ 22:19)  HR: 90 (04-04-19 @ 06:48)  BP: 109/57 (04-04-19 @ 06:48)  RR: 14 (04-04-19 @ 06:48)  SpO2: 90% (04-04-19 @ 06:48)    PHYSICAL EXAM:  Constitutional: NAD  Eyes: sclera non-icteric  Neck: no JVD  Respiratory: CTA b/l, good air entry b/l ant.  Cardiovascular: RRR, no M/R/G  Extremities: no calf tenderness  Neurological: Awake, alert.    Labs:             7.5    9.2   )-----------( 216      ( 04-04 @ 06:40 )             24.4       04-04    136  |  103  |  55<H>  ----------------------------<  159<H>  3.9   |  19<L>  |  1.23    Ca    9.3      04 Apr 2019 06:40

## 2019-04-04 NOTE — PROGRESS NOTE ADULT - ASSESSMENT
84-year-old gentleman with history of anemia, atrial fibrillation and seizure disorder admitted with anemia exacerbation from his [place] facility. Patient notes recent history of bright red blood per rectum. He was scheduled for outpatient colonoscopy.  On admission, found to have rectal temp of 100.7°F in the emergency department.  Patient reported he stopped drinking alcohol approximately 3 months prior to admission

## 2019-04-04 NOTE — PROGRESS NOTE ADULT - ASSESSMENT
#84 male with PMH significant for HTN, HLD, Afib, stomach CA, anemia, gout, BPH. seizure d/o, recently stopped EToh use, p/w chronic anemia possibly due to myelodysplastic syndrome, s/p tx PRBC, debility and functional decline      #Anemia/debility: multifactorial: Chronic kidney disease, recent GIB. 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   - Heme consulting  Chronic myelomonocytic leukemia not having achieved remission.  Supportive H/O care at this time.   -GF/PRN transfusional support. Patient not candidate for systemic therapy   -Anemia - 2/2 renal disease, nutrition-plan B12 supplementation and procrit  -will need education procrit injections for dc if to continue    #LGI bleed: self limiting  - xarelto and ASA restarted  -, GI consult and follow up appreciated.  -CBC /H/H stable today  -f/u CBC later in week 4/4      #elevated FS  -[id number]  -monitor. PzM1C=9, given age, monitor for now, nutrition/diabetic education for dc  -sl improved today    #Afib:   - Continue metoprolol, Xarelto  -rate controlled    #HTN:-   SBP last 24 hours - 116-105  Was on norvasc, metoprolol tartrate bid and lasix- primary team has stopped diuretic and made a cardiology consult for bp control and ckd      # Joint pain/acute gout:   - tramadol, 50 mg q6 severe pain  -indomethacin dc 4/3. joints and symptoms much improved  -continue allpurinol 300 mg daily  - dec 2018 xray reviewed no sign of arthritis     #SZ/tremors:   - Controlled on keppra     #DVT prophylaxis:   -on xarelto    Mood:  -neuropsych fu  -patient states he does not want palliative care/hospice consult at this time

## 2019-04-04 NOTE — PROGRESS NOTE ADULT - SUBJECTIVE AND OBJECTIVE BOX
Patient is a 84y old  Male who presents with a chief complaint of anemia, debility and functional decline (04 Apr 2019 11:57)  Patient is 84 male RH dominant with PMH significant for HTN, HLD,  Afib (off xarelto), stomach CA, anemia, gout, BPH. seizure d/o, recently stopped EToh use, who presented from [facility] with abnormal labs.  Pt reports recent admission to [facility] for gout flare then discharged to rehab.  Pt now returns with guaiac negative anemia (HgB 6.8 ) and  episode of bright red blood in stool. Pt also found with leukocytosis, with temp 100.7 F rectally.  Denies chills, fever, sob, chest pain, weakness, dysuria.    Patient was seen by GI and hematology.  S/P Colonoscopy on 3/1/19. No source of bleeding noted. Negative FOB. S/P EGD during prior admission, and no bleeding identified. Heme performed BM biopsy 3/4, results pending. CKD, Etoh may contribute, although underlying BM d/o such as myelodysplasia suspected. Transfused PRBC. Patient transferred to - [facility] due to debility and functional decline (06 Mar 2019 14:53)  Patient seen and examined at bedside.  patient reports no chest pain or shortness of breath.  STates that right knee pain is much improved after one dose of indomethacin.    ALLERGIES:  No Known Allergies    MEDICATIONS:  acetaminophen   Tablet .. 650 milliGRAM(s) Oral every 6 hours PRN  allopurinol 300 milliGRAM(s) Oral daily  amLODIPine   Tablet 10 milliGRAM(s) Oral daily  aspirin enteric coated 81 milliGRAM(s) Oral daily  atorvastatin 10 milliGRAM(s) Oral at bedtime  cyanocobalamin 1000 MICROGram(s) Oral daily  dextrose 40% Gel 15 Gram(s) Oral once PRN  dextrose 5%. 1000 milliLiter(s) IV Continuous <Continuous>  dextrose 50% Injectable 12.5 Gram(s) IV Push once  dextrose 50% Injectable 25 Gram(s) IV Push once  dextrose 50% Injectable 25 Gram(s) IV Push once  docusate sodium 100 milliGRAM(s) Oral two times a day  epoetin jackelyn Injectable 88264 Unit(s) SubCutaneous <User Schedule>  famotidine    Tablet 20 milliGRAM(s) Oral daily  folic acid 1 milliGRAM(s) Oral daily  glucagon  Injectable 1 milliGRAM(s) IntraMuscular once PRN  insulin lispro (HumaLOG) corrective regimen sliding scale   SubCutaneous three times a day before meals  insulin lispro (HumaLOG) corrective regimen sliding scale   SubCutaneous at bedtime  levETIRAcetam 500 milliGRAM(s) Oral two times a day  lidocaine   Patch 1 Patch Transdermal <User Schedule>  lidocaine 5% Ointment 1 Application(s) Topical three times a day  metoprolol tartrate 25 milliGRAM(s) Oral every 12 hours  multivitamin 1 Tablet(s) Oral daily  pantoprazole    Tablet 40 milliGRAM(s) Oral before breakfast  polyethylene glycol 3350 17 Gram(s) Oral daily  rivaroxaban 20 milliGRAM(s) Oral every 24 hours  senna 2 Tablet(s) Oral at bedtime PRN  sodium chloride 0.9%. 1000 milliLiter(s) IV Continuous <Continuous>  tamsulosin 0.4 milliGRAM(s) Oral at bedtime  traMADol 50 milliGRAM(s) Oral every 6 hours PRN    Vital Signs Last 24 Hrs  T(F): 96.4 (04 Apr 2019 09:25), Max: 98.2 (03 Apr 2019 22:19)  HR: 90 (04 Apr 2019 09:25) (82 - 92)  BP: 108/58 (04 Apr 2019 09:25) (105/64 - 116/74)  RR: 14 (04 Apr 2019 09:25) (14 - 14)  SpO2: 100% (04 Apr 2019 09:25) (90% - 100%)  I&O's Summary    04 Apr 2019 07:01  -  04 Apr 2019 14:00  --------------------------------------------------------  IN: 350 mL / OUT: 2 mL / NET: 348 mL        PHYSICAL EXAM:  General: NAD, A/O x 3  ENT: MMM  Neck: Supple, No JVD  Lungs: Clear to auscultation bilaterally  Cardio: RRR, S1/S2, No murmurs  Abdomen: Soft, Nontender, Nondistended; Bowel sounds present  Extremities: No cyanosis, decreased edema of right knee    LABS:                        7.5    9.2   )-----------( 216      ( 04 Apr 2019 06:40 )             24.4     04-04    136  |  103  |  55  ----------------------------<  159  3.9   |  19  |  1.23    Ca    9.3      04 Apr 2019 06:40      eGFR if Non African American: 53 mL/min/1.73M2 (04-04-19 @ 06:40)  eGFR if : 62 mL/min/1.73M2 (04-04-19 @ 06:40)                    CAPILLARY BLOOD GLUCOSE      POCT Blood Glucose.: 213 mg/dL (04 Apr 2019 12:12)  POCT Blood Glucose.: 168 mg/dL (04 Apr 2019 08:34)  POCT Blood Glucose.: 164 mg/dL (03 Apr 2019 22:15)  POCT Blood Glucose.: 153 mg/dL (03 Apr 2019 16:47)    03-07 [id number] 7.0          RADIOLOGY & ADDITIONAL TESTS:    Care Discussed with Consultants/Other Providers:

## 2019-04-04 NOTE — PROGRESS NOTE ADULT - ASSESSMENT
#84 male with PMH significant for HTN, HLD, Afib, stomach CA, anemia, gout, BPH. seizure d/o, recently stopped EToh use, p/w chronic anemia possibly due to myelodysplastic syndrome, s/p tx PRBC, debility and functional decline      #Anemia/debility: multifactorial: Chronic kidney disease, recent GIB. 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   - Heme appreciated 4/4: Chronic myelomonocytic leukemia not having achieved remission.  Supportive H/O care at this time.   -B12 supplementatoin  -will need education procrit injections for dc  -CBC stable    #LGI bleed: self limiting, followed by GI  - xarelto and ASA restarted  -transfused for Hgb<7  monitor CBc, stable 4/4      #elevated FS  -CCD    QaA3D=7, given age, monitor for now, nutrition/diabetic education for dc      #Afib:   - Continue metoprolol, Xarelto  -rate controlled    #HTN/periods orthostasis:-  Continue norvasc, lasix.   bilateral LE ACE wraps, discussed with nursing and patient for OOB activities due to orthostasis  -case discussed with hospitalist. given extensive cardiac history, do not recommend medications such as steroids or midodrine at this time. continue to encourage time OOB, sitting, LE movement . continue ACE wraps LE.     # Joint pain/acute gout:   - tramadol, 50 mg q6 severe pain  -indomethacin restarted 25 bid 4/2 and 4/3 for flare. improved./resolved. DC indomethacin. discussed with patient  -continue allpurinol 300 mg daily    #SZ/tremors:   - Controlled on keppra     #DVT prophylaxis:   -on xarelto    #Mood:  -refuses neuropsychology services or medications    #Case discussed in IDT rounds 4/2  -patient and caregiver refuse [facility]/SNF, state they will provide 24 hour supervision at home. Currently mod-max assist for mobility and transfers, and will likely be wheelchair level at home. At this time, dififculty tolerating 3 hours of rehab, patient not interested in hospice level/comfort care, and would likely benefit more from being in his own surroundings with 24 hour assist and home care  -Patient would benefit from hospital bed at home due to cardiac history, significant lower extremity swelling and orthostasis; repeated gouty flares with multiple joint pain and swelling; and risk for skin breakdown due to progressively bedbound status, Recommend electric hospital bed with rails to assist for transfers and bed mobility, adjustable head and leg sections.     -caregiver training    Labs:  CBC, BMP 4/8

## 2019-04-04 NOTE — PROGRESS NOTE ADULT - NEGATIVE RESPIRATORY AND THORAX SYMPTOMS
no dyspnea/no cough/no wheezing
no cough/no wheezing/no dyspnea
no dyspnea/no cough/no wheezing
no cough/no dyspnea

## 2019-04-04 NOTE — PROGRESS NOTE ADULT - COMMENTS
last BM 2 days ago but does not think he needs medications to assist him to go today
less tremulous. However, had episode orthostasis in PT today, no syncope
pain significantly improved in extremities

## 2019-04-04 NOTE — PROGRESS NOTE ADULT - SUBJECTIVE AND OBJECTIVE BOX
Patient is a 84y old  Male who presents with a chief complaint of anemia, debility and functional decline (04 Apr 2019 08:53)      HPI:  Patient is 84 male RH dominant with PMH significant for HTN, HLD,  Afib (off xarelto), stomach CA, anemia, gout, BPH. seizure d/o, recently stopped EToh use, who presented from [facility] with abnormal labs.  Pt reports recent admission to [facility] for gout flare then discharged to rehab.  Pt now returns with guaiac negative anemia (HgB 6.8 ) and  episode of bright red blood in stool. Pt also found with leukocytosis, with temp 100.7 F rectally.  Denies chills, fever, sob, chest pain, weakness, dysuria.    Patient was seen by GI and hematology.  S/P Colonoscopy on 3/1/19. No source of bleeding noted. Negative FOB. S/P EGD during prior admission, and no bleeding identified. Heme performed BM biopsy 3/4, results pending. CKD, Etoh may contribute, although underlying BM d/o such as myelodysplasia suspected. Transfused PRBC. Patient transferred to - [facility] due to debility and functional decline (06 Mar 2019 14:53)      PAST MEDICAL & SURGICAL HISTORY:  Nonrheumatic aortic valve stenosis  Cancer of stomach  Anemia, unspecified type: Anemia  Seizure disorder  Benign prostatic hyperplasia, unspecified whether lower urinary tract symptoms present  Hyperlipemia  Essential hypertension  Chronic atrial fibrillation  Stomach cancer  Hypertension  Atrial fibrillation  Gout  No significant past surgical history  No significant past surgical history      MEDICATIONS  (STANDING):  allopurinol 300 milliGRAM(s) Oral daily  amLODIPine   Tablet 10 milliGRAM(s) Oral daily  aspirin enteric coated 81 milliGRAM(s) Oral daily  atorvastatin 10 milliGRAM(s) Oral at bedtime  cyanocobalamin 1000 MICROGram(s) Oral daily  dextrose 5%. 1000 milliLiter(s) (50 mL/Hr) IV Continuous <Continuous>  dextrose 50% Injectable 12.5 Gram(s) IV Push once  dextrose 50% Injectable 25 Gram(s) IV Push once  dextrose 50% Injectable 25 Gram(s) IV Push once  docusate sodium 100 milliGRAM(s) Oral two times a day  epoetin jackelyn Injectable 86052 Unit(s) SubCutaneous <User Schedule>  famotidine    Tablet 20 milliGRAM(s) Oral daily  folic acid 1 milliGRAM(s) Oral daily  insulin lispro (HumaLOG) corrective regimen sliding scale   SubCutaneous three times a day before meals  insulin lispro (HumaLOG) corrective regimen sliding scale   SubCutaneous at bedtime  levETIRAcetam 500 milliGRAM(s) Oral two times a day  lidocaine   Patch 1 Patch Transdermal <User Schedule>  lidocaine 5% Ointment 1 Application(s) Topical three times a day  metoprolol tartrate 25 milliGRAM(s) Oral every 12 hours  multivitamin 1 Tablet(s) Oral daily  pantoprazole    Tablet 40 milliGRAM(s) Oral before breakfast  polyethylene glycol 3350 17 Gram(s) Oral daily  rivaroxaban 20 milliGRAM(s) Oral every 24 hours  sodium chloride 0.9%. 1000 milliLiter(s) (100 mL/Hr) IV Continuous <Continuous>  tamsulosin 0.4 milliGRAM(s) Oral at bedtime    MEDICATIONS  (PRN):  acetaminophen   Tablet .. 650 milliGRAM(s) Oral every 6 hours PRN Mild Pain (1 - 3)  dextrose 40% Gel 15 Gram(s) Oral once PRN Blood Glucose LESS THAN 70 milliGRAM(s)/deciliter  glucagon  Injectable 1 milliGRAM(s) IntraMuscular once PRN Glucose LESS THAN 70 milligrams/deciliter  senna 2 Tablet(s) Oral at bedtime PRN Constipation  traMADol 50 milliGRAM(s) Oral every 6 hours PRN Severe Pain (7 - 10)      Allergies    No Known Allergies    Intolerances          VITALS  84y  Vital Signs Last 24 Hrs  T(C): 35.8 (04 Apr 2019 09:25), Max: 36.8 (03 Apr 2019 22:19)  T(F): 96.4 (04 Apr 2019 09:25), Max: 98.2 (03 Apr 2019 22:19)  HR: 90 (04 Apr 2019 09:25) (82 - 92)  BP: 108/58 (04 Apr 2019 09:25) (105/64 - 116/74)  BP(mean): --  RR: 14 (04 Apr 2019 09:25) (14 - 14)  SpO2: 100% (04 Apr 2019 09:25) (90% - 100%)  Daily     Daily         RECENT LABS:                          7.5    9.2   )-----------( 216      ( 04 Apr 2019 06:40 )             24.4     04-04    136  |  103  |  55<H>  ----------------------------<  159<H>  3.9   |  19<L>  |  1.23    Ca    9.3      04 Apr 2019 06:40                CAPILLARY BLOOD GLUCOSE      POCT Blood Glucose.: 168 mg/dL (04 Apr 2019 08:34)  POCT Blood Glucose.: 164 mg/dL (03 Apr 2019 22:15)  POCT Blood Glucose.: 153 mg/dL (03 Apr 2019 16:47)  POCT Blood Glucose.: 223 mg/dL (03 Apr 2019 12:04)

## 2019-04-05 PROCEDURE — 99232 SBSQ HOSP IP/OBS MODERATE 35: CPT

## 2019-04-05 PROCEDURE — 93010 ELECTROCARDIOGRAM REPORT: CPT

## 2019-04-05 RX ORDER — SODIUM CHLORIDE 9 MG/ML
500 INJECTION INTRAMUSCULAR; INTRAVENOUS; SUBCUTANEOUS ONCE
Qty: 0 | Refills: 0 | Status: COMPLETED | OUTPATIENT
Start: 2019-04-05 | End: 2019-04-05

## 2019-04-05 RX ADMIN — Medication 25 MILLIGRAM(S): at 06:27

## 2019-04-05 RX ADMIN — Medication 100 MILLIGRAM(S): at 17:39

## 2019-04-05 RX ADMIN — LEVETIRACETAM 500 MILLIGRAM(S): 250 TABLET, FILM COATED ORAL at 17:39

## 2019-04-05 RX ADMIN — Medication 1: at 08:24

## 2019-04-05 RX ADMIN — LIDOCAINE 1 PATCH: 4 CREAM TOPICAL at 21:53

## 2019-04-05 RX ADMIN — ATORVASTATIN CALCIUM 10 MILLIGRAM(S): 80 TABLET, FILM COATED ORAL at 21:54

## 2019-04-05 RX ADMIN — PREGABALIN 1000 MICROGRAM(S): 225 CAPSULE ORAL at 12:26

## 2019-04-05 RX ADMIN — POLYETHYLENE GLYCOL 3350 17 GRAM(S): 17 POWDER, FOR SOLUTION ORAL at 12:27

## 2019-04-05 RX ADMIN — ERYTHROPOIETIN 10000 UNIT(S): 10000 INJECTION, SOLUTION INTRAVENOUS; SUBCUTANEOUS at 08:37

## 2019-04-05 RX ADMIN — TRAMADOL HYDROCHLORIDE 50 MILLIGRAM(S): 50 TABLET ORAL at 08:25

## 2019-04-05 RX ADMIN — LIDOCAINE 1 PATCH: 4 CREAM TOPICAL at 07:28

## 2019-04-05 RX ADMIN — LEVETIRACETAM 500 MILLIGRAM(S): 250 TABLET, FILM COATED ORAL at 06:27

## 2019-04-05 RX ADMIN — LIDOCAINE 1 PATCH: 4 CREAM TOPICAL at 10:29

## 2019-04-05 RX ADMIN — LIDOCAINE 1 APPLICATION(S): 4 CREAM TOPICAL at 21:53

## 2019-04-05 RX ADMIN — SODIUM CHLORIDE 500 MILLILITER(S): 9 INJECTION INTRAMUSCULAR; INTRAVENOUS; SUBCUTANEOUS at 12:26

## 2019-04-05 RX ADMIN — SODIUM CHLORIDE 100 MILLILITER(S): 9 INJECTION INTRAMUSCULAR; INTRAVENOUS; SUBCUTANEOUS at 23:41

## 2019-04-05 RX ADMIN — SODIUM CHLORIDE 100 MILLILITER(S): 9 INJECTION INTRAMUSCULAR; INTRAVENOUS; SUBCUTANEOUS at 13:36

## 2019-04-05 RX ADMIN — PANTOPRAZOLE SODIUM 40 MILLIGRAM(S): 20 TABLET, DELAYED RELEASE ORAL at 06:27

## 2019-04-05 RX ADMIN — Medication 100 MILLIGRAM(S): at 06:27

## 2019-04-05 RX ADMIN — SODIUM CHLORIDE 100 MILLILITER(S): 9 INJECTION INTRAMUSCULAR; INTRAVENOUS; SUBCUTANEOUS at 21:38

## 2019-04-05 RX ADMIN — LIDOCAINE 1 APPLICATION(S): 4 CREAM TOPICAL at 08:26

## 2019-04-05 RX ADMIN — AMLODIPINE BESYLATE 10 MILLIGRAM(S): 2.5 TABLET ORAL at 06:27

## 2019-04-05 RX ADMIN — Medication 1 TABLET(S): at 12:27

## 2019-04-05 RX ADMIN — Medication 81 MILLIGRAM(S): at 12:27

## 2019-04-05 RX ADMIN — FAMOTIDINE 20 MILLIGRAM(S): 10 INJECTION INTRAVENOUS at 12:26

## 2019-04-05 RX ADMIN — TRAMADOL HYDROCHLORIDE 50 MILLIGRAM(S): 50 TABLET ORAL at 09:29

## 2019-04-05 RX ADMIN — TRAMADOL HYDROCHLORIDE 50 MILLIGRAM(S): 50 TABLET ORAL at 22:22

## 2019-04-05 RX ADMIN — Medication 300 MILLIGRAM(S): at 12:27

## 2019-04-05 RX ADMIN — TRAMADOL HYDROCHLORIDE 50 MILLIGRAM(S): 50 TABLET ORAL at 21:54

## 2019-04-05 RX ADMIN — TAMSULOSIN HYDROCHLORIDE 0.4 MILLIGRAM(S): 0.4 CAPSULE ORAL at 21:53

## 2019-04-05 RX ADMIN — Medication 1 MILLIGRAM(S): at 12:27

## 2019-04-05 RX ADMIN — Medication 1: at 12:26

## 2019-04-05 RX ADMIN — RIVAROXABAN 20 MILLIGRAM(S): KIT at 17:39

## 2019-04-05 NOTE — PROGRESS NOTE ADULT - ASSESSMENT
#84 male with PMH significant for HTN, HLD, Afib, stomach CA, anemia, gout, BPH. seizure d/o, recently stopped EToh use, p/w chronic anemia possibly due to myelodysplastic syndrome, s/p tx PRBC, debility and functional decline      #Anemia/debility: multifactorial: Chronic kidney disease, recent GIB. CML  - Heme appreciated 4/4: Chronic myelomonocytic leukemia not having achieved remission.  Supportive care at this time.   -B12 supplementatoin  -will need education procrit injections for dc  -CBC stable, 4/8 ordered    #LGI bleed: resolved, followed by GI  - xarelto and ASA restarted  -transfuse for Hgb<7  -CBC 4/8      #elevated FS  -CCD    KfP8G=8, given age, monitor for now, nutrition/diabetic education for dc      #Afib:   - Continue metoprolol, Xarelto  -rate controlled, HR 77    #HTN/periods orthostasis:-  Continue norvasc, lasix.   bilateral LE ACE wraps, discussed with nursing and patient for OOB activities due to orthostasis  -case discussed with hospitalist. given extensive cardiac history, do not recommend medications such as steroids or midodrine at this time. continue to encourage time OOB, sitting, LE movement . continue ACE wraps LE. Slow transitional movements, reclining back wheelchair    # Joint pain/acute gout:   - tramadol, 50 mg q6 severe pain  off indomethacin, last dose 25 bid 4/3  -continue allpurinol 300 mg daily    #SZ/tremors:   - Controlled on keppra     #DVT prophylaxis:   -on xarelto    #Mood:  -refuses neuropsychology services or medications    #Case discussed in IDT rounds 4/2  -patient and caregiver refuse JANET/SNF, state they will provide 24 hour supervision at home. Currently mod-max assist for mobility and transfers, and will likely be wheelchair level at home. At this time, dififculty tolerating 3 hours of rehab, patient not interested in hospice level/comfort care, and would likely benefit more from being in his own surroundings with 24 hour assist and home care  -Patient would benefit from hospital bed at home due to cardiac history, significant lower extremity swelling and orthostasis; repeated gouty flares with multiple joint pain and swelling; and risk for skin breakdown due to progressively bedbound status, Recommend electric hospital bed with rails to assist for transfers and bed mobility, adjustable head and leg sections.   -target potential dc 4/9, need caregiver training        Labs:  CBC, BMP 4/8

## 2019-04-05 NOTE — PROGRESS NOTE ADULT - SUBJECTIVE AND OBJECTIVE BOX
YUE KING   84y   Male    Admitting: SAEED Gallego  HPI:  84-year-old gentleman with history of anemia, atrial fibrillation and seizure disorder admitted with anemia exacerbation from his [place] facility. Patient noted recent history of bright red blood per rectum. He was scheduled for outpatient colonoscopy. S/P BM biopsy.  Patient reported he stopped drinking alcohol approximately 3 months prior to admission.  Patient is now on the acute rehabilitation unit.    PAST MEDICAL & SURGICAL HISTORY:  Nonrheumatic aortic valve stenosis  Cancer of stomach  Anemia, unspecified type: Anemia  Seizure disorder  Benign prostatic hyperplasia, unspecified whether lower urinary tract symptoms present  Hyperlipemia  Essential hypertension  Chronic atrial fibrillation  Stomach cancer  Hypertension  Atrial fibrillation  Gout    HEALTH ISSUES - PROBLEM Dx:  Anemia  Ureteral stone: Ureteral stone  Chronic myelomonocytic leukemia not having achieved remission: Chronic myelomonocytic leukemia not having achieved remission  Anemia, unspecified type: Anemia, unspecified type  Rectal bleed    MEDICATIONS  (STANDING):  allopurinol 300 milliGRAM(s) Oral daily  amLODIPine   Tablet 10 milliGRAM(s) Oral daily  aspirin enteric coated 81 milliGRAM(s) Oral daily  atorvastatin 10 milliGRAM(s) Oral at bedtime  cyanocobalamin 1000 MICROGram(s) Oral daily  dextrose 5%. 1000 milliLiter(s) (50 mL/Hr) IV Continuous <Continuous>  dextrose 50% Injectable 12.5 Gram(s) IV Push once  dextrose 50% Injectable 25 Gram(s) IV Push once  dextrose 50% Injectable 25 Gram(s) IV Push once  docusate sodium 100 milliGRAM(s) Oral two times a day  epoetin jackelyn Injectable 34970 Unit(s) SubCutaneous <User Schedule>  famotidine    Tablet 20 milliGRAM(s) Oral daily  folic acid 1 milliGRAM(s) Oral daily  insulin lispro (HumaLOG) corrective regimen sliding scale   SubCutaneous three times a day before meals  insulin lispro (HumaLOG) corrective regimen sliding scale   SubCutaneous at bedtime  levETIRAcetam 500 milliGRAM(s) Oral two times a day  lidocaine   Patch 1 Patch Transdermal <User Schedule>  lidocaine 5% Ointment 1 Application(s) Topical three times a day  metoprolol tartrate 25 milliGRAM(s) Oral every 12 hours  multivitamin 1 Tablet(s) Oral daily  pantoprazole    Tablet 40 milliGRAM(s) Oral before breakfast  polyethylene glycol 3350 17 Gram(s) Oral daily  rivaroxaban 20 milliGRAM(s) Oral every 24 hours  sodium chloride 0.9%. 1000 milliLiter(s) (100 mL/Hr) IV Continuous <Continuous>  tamsulosin 0.4 milliGRAM(s) Oral at bedtime    MEDICATIONS  (PRN):  acetaminophen   Tablet .. 650 milliGRAM(s) Oral every 6 hours PRN Mild Pain (1 - 3)  bisacodyl Suppository 10 milliGRAM(s) Rectal daily PRN Constipation  dextrose 40% Gel 15 Gram(s) Oral once PRN Blood Glucose LESS THAN 70 milliGRAM(s)/deciliter  glucagon  Injectable 1 milliGRAM(s) IntraMuscular once PRN Glucose LESS THAN 70 milligrams/deciliter  senna 2 Tablet(s) Oral at bedtime PRN Constipation  traMADol 50 milliGRAM(s) Oral every 6 hours PRN Severe Pain (7 - 10)    Allergies    No Known Allergies    INTERVAL HPI/OVERNIGHT EVENTS:  Patient S&E at bedside. Resting comfortably. No c/o CP or SOB.     VITAL SIGNS:  T(F): 98.1 (04-05-19 @ 09:08)  HR: 77 (04-05-19 @ 09:08)  BP: 118/66 (04-05-19 @ 09:08)  RR: 14 (04-05-19 @ 09:08)  SpO2: 99% (04-05-19 @ 09:08)    PHYSICAL EXAM:  Constitutional: NAD  Eyes: sclera non-icteric  Neck: no JVD  Respiratory: CTA ant.  Cardiovascular: RRR, no M/R/G  Extremities: no calf tenderness  Neurological: Awake, alert.    Labs:             7.5    9.2   )-----------( 216      ( 04-04 @ 06:40 )             24.4       04-04    136  |  103  |  55<H>  ----------------------------<  159<H>  3.9   |  19<L>  |  1.23    Ca    9.3      04 Apr 2019 06:40

## 2019-04-05 NOTE — PROGRESS NOTE ADULT - ASSESSMENT
84-year-old gentleman with history of anemia, atrial fibrillation and seizure disorder admitted with anemia exacerbation from his [place] facility. Patient notes recent history of bright red blood per rectum. He was scheduled for outpatient colonoscopy.  On admission, found to have rectal temp of 100.7°F in the emergency department.  Patient reported he stopped drinking alcohol approximately 3 months prior to admission

## 2019-04-05 NOTE — PROGRESS NOTE ADULT - NEGATIVE GENERAL SYMPTOMS
Patient feels pain is much better, noted to have significantly less swollen and painful joints to palpation and ROM. Did not have ACE wraps in place, developed hypotension in PT, benefits from encouragement fluids and ACE wraps. Feels fatigued with episodes. No difficulty sleeping overnight. Labs stable
no chills/no fever
no chills/no sweating/Patieent "feels terrible" aching "all over" no chills "weak" "I feel like I want to go to the place where people go before they die [hospice]"/no fever
no fever/no chills
no fever/no chills/no sweating

## 2019-04-05 NOTE — PROGRESS NOTE ADULT - SUBJECTIVE AND OBJECTIVE BOX
Patient is a 84y old  Male who presents with a chief complaint of anemia, debility and functional decline (04 Apr 2019 14:00)      HPI:  Patient is 84 male RH dominant with PMH significant for HTN, HLD,  Afib (off xarelto), stomach CA, anemia, gout, BPH. seizure d/o, recently stopped EToh use, who presented from [facility] with abnormal labs.  Pt reports recent admission to [facility] for gout flare then discharged to rehab.  Pt now returns with guaiac negative anemia (HgB 6.8 ) and  episode of bright red blood in stool. Pt also found with leukocytosis, with temp 100.7 F rectally.  Denies chills, fever, sob, chest pain, weakness, dysuria.    Patient was seen by GI and hematology.  S/P Colonoscopy on 3/1/19. No source of bleeding noted. Negative FOB. S/P EGD during prior admission, and no bleeding identified. Heme performed BM biopsy 3/4, results pending. CKD, Etoh may contribute, although underlying BM d/o such as myelodysplasia suspected. Transfused PRBC. Patient transferred to - [facility] due to debility and functional decline (06 Mar 2019 14:53)      PAST MEDICAL & SURGICAL HISTORY:  Nonrheumatic aortic valve stenosis  Cancer of stomach  Anemia, unspecified type: Anemia  Seizure disorder  Benign prostatic hyperplasia, unspecified whether lower urinary tract symptoms present  Hyperlipemia  Essential hypertension  Chronic atrial fibrillation  Stomach cancer  Hypertension  Atrial fibrillation  Gout  No significant past surgical history  No significant past surgical history      MEDICATIONS  (STANDING):  allopurinol 300 milliGRAM(s) Oral daily  amLODIPine   Tablet 10 milliGRAM(s) Oral daily  aspirin enteric coated 81 milliGRAM(s) Oral daily  atorvastatin 10 milliGRAM(s) Oral at bedtime  cyanocobalamin 1000 MICROGram(s) Oral daily  dextrose 5%. 1000 milliLiter(s) (50 mL/Hr) IV Continuous <Continuous>  dextrose 50% Injectable 12.5 Gram(s) IV Push once  dextrose 50% Injectable 25 Gram(s) IV Push once  dextrose 50% Injectable 25 Gram(s) IV Push once  docusate sodium 100 milliGRAM(s) Oral two times a day  epoetin jackelyn Injectable 61153 Unit(s) SubCutaneous <User Schedule>  famotidine    Tablet 20 milliGRAM(s) Oral daily  folic acid 1 milliGRAM(s) Oral daily  insulin lispro (HumaLOG) corrective regimen sliding scale   SubCutaneous three times a day before meals  insulin lispro (HumaLOG) corrective regimen sliding scale   SubCutaneous at bedtime  levETIRAcetam 500 milliGRAM(s) Oral two times a day  lidocaine   Patch 1 Patch Transdermal <User Schedule>  lidocaine 5% Ointment 1 Application(s) Topical three times a day  metoprolol tartrate 25 milliGRAM(s) Oral every 12 hours  multivitamin 1 Tablet(s) Oral daily  pantoprazole    Tablet 40 milliGRAM(s) Oral before breakfast  polyethylene glycol 3350 17 Gram(s) Oral daily  rivaroxaban 20 milliGRAM(s) Oral every 24 hours  sodium chloride 0.9%. 1000 milliLiter(s) (100 mL/Hr) IV Continuous <Continuous>  tamsulosin 0.4 milliGRAM(s) Oral at bedtime    MEDICATIONS  (PRN):  acetaminophen   Tablet .. 650 milliGRAM(s) Oral every 6 hours PRN Mild Pain (1 - 3)  bisacodyl Suppository 10 milliGRAM(s) Rectal daily PRN Constipation  dextrose 40% Gel 15 Gram(s) Oral once PRN Blood Glucose LESS THAN 70 milliGRAM(s)/deciliter  glucagon  Injectable 1 milliGRAM(s) IntraMuscular once PRN Glucose LESS THAN 70 milligrams/deciliter  senna 2 Tablet(s) Oral at bedtime PRN Constipation  traMADol 50 milliGRAM(s) Oral every 6 hours PRN Severe Pain (7 - 10)      Allergies    No Known Allergies    Intolerances          VITALS  84y  Vital Signs Last 24 Hrs  T(C): 36.7 (05 Apr 2019 09:08), Max: 36.8 (04 Apr 2019 22:25)  T(F): 98.1 (05 Apr 2019 09:08), Max: 98.2 (04 Apr 2019 22:25)  HR: 77 (05 Apr 2019 09:08) (77 - 90)  BP: 118/66 (05 Apr 2019 09:08) (114/62 - 133/67)  BP(mean): --  RR: 14 (05 Apr 2019 09:08) (14 - 14)  SpO2: 99% (05 Apr 2019 09:08) (88% - 99%)  Daily     Daily         RECENT LABS:                          7.5    9.2   )-----------( 216      ( 04 Apr 2019 06:40 )             24.4     04-04    136  |  103  |  55<H>  ----------------------------<  159<H>  3.9   |  19<L>  |  1.23    Ca    9.3      04 Apr 2019 06:40                CAPILLARY BLOOD GLUCOSE      POCT Blood Glucose.: 182 mg/dL (05 Apr 2019 11:48)  POCT Blood Glucose.: 202 mg/dL (05 Apr 2019 10:42)  POCT Blood Glucose.: 153 mg/dL (05 Apr 2019 08:00)  POCT Blood Glucose.: 171 mg/dL (04 Apr 2019 22:21)  POCT Blood Glucose.: 159 mg/dL (04 Apr 2019 17:14)

## 2019-04-06 PROCEDURE — 99232 SBSQ HOSP IP/OBS MODERATE 35: CPT

## 2019-04-06 RX ADMIN — Medication 81 MILLIGRAM(S): at 11:50

## 2019-04-06 RX ADMIN — Medication 1 MILLIGRAM(S): at 11:50

## 2019-04-06 RX ADMIN — Medication 100 MILLIGRAM(S): at 05:03

## 2019-04-06 RX ADMIN — Medication 650 MILLIGRAM(S): at 22:22

## 2019-04-06 RX ADMIN — LIDOCAINE 1 PATCH: 4 CREAM TOPICAL at 06:52

## 2019-04-06 RX ADMIN — ATORVASTATIN CALCIUM 10 MILLIGRAM(S): 80 TABLET, FILM COATED ORAL at 20:35

## 2019-04-06 RX ADMIN — TRAMADOL HYDROCHLORIDE 50 MILLIGRAM(S): 50 TABLET ORAL at 20:35

## 2019-04-06 RX ADMIN — LEVETIRACETAM 500 MILLIGRAM(S): 250 TABLET, FILM COATED ORAL at 05:03

## 2019-04-06 RX ADMIN — LIDOCAINE 1 PATCH: 4 CREAM TOPICAL at 20:33

## 2019-04-06 RX ADMIN — LIDOCAINE 1 APPLICATION(S): 4 CREAM TOPICAL at 13:27

## 2019-04-06 RX ADMIN — TAMSULOSIN HYDROCHLORIDE 0.4 MILLIGRAM(S): 0.4 CAPSULE ORAL at 20:35

## 2019-04-06 RX ADMIN — FAMOTIDINE 20 MILLIGRAM(S): 10 INJECTION INTRAVENOUS at 11:50

## 2019-04-06 RX ADMIN — Medication 650 MILLIGRAM(S): at 20:35

## 2019-04-06 RX ADMIN — PREGABALIN 1000 MICROGRAM(S): 225 CAPSULE ORAL at 11:50

## 2019-04-06 RX ADMIN — Medication 300 MILLIGRAM(S): at 11:50

## 2019-04-06 RX ADMIN — AMLODIPINE BESYLATE 10 MILLIGRAM(S): 2.5 TABLET ORAL at 05:03

## 2019-04-06 RX ADMIN — TRAMADOL HYDROCHLORIDE 50 MILLIGRAM(S): 50 TABLET ORAL at 10:50

## 2019-04-06 RX ADMIN — LIDOCAINE 1 APPLICATION(S): 4 CREAM TOPICAL at 20:34

## 2019-04-06 RX ADMIN — Medication 25 MILLIGRAM(S): at 05:03

## 2019-04-06 RX ADMIN — RIVAROXABAN 20 MILLIGRAM(S): KIT at 17:37

## 2019-04-06 RX ADMIN — PANTOPRAZOLE SODIUM 40 MILLIGRAM(S): 20 TABLET, DELAYED RELEASE ORAL at 05:03

## 2019-04-06 RX ADMIN — LEVETIRACETAM 500 MILLIGRAM(S): 250 TABLET, FILM COATED ORAL at 17:38

## 2019-04-06 RX ADMIN — Medication 1: at 12:15

## 2019-04-06 RX ADMIN — TRAMADOL HYDROCHLORIDE 50 MILLIGRAM(S): 50 TABLET ORAL at 21:21

## 2019-04-06 RX ADMIN — LIDOCAINE 1 APPLICATION(S): 4 CREAM TOPICAL at 05:03

## 2019-04-06 RX ADMIN — POLYETHYLENE GLYCOL 3350 17 GRAM(S): 17 POWDER, FOR SOLUTION ORAL at 11:51

## 2019-04-06 RX ADMIN — Medication 1: at 08:32

## 2019-04-06 RX ADMIN — LIDOCAINE 1 PATCH: 4 CREAM TOPICAL at 09:20

## 2019-04-06 RX ADMIN — Medication 1 TABLET(S): at 11:50

## 2019-04-06 RX ADMIN — Medication 25 MILLIGRAM(S): at 17:37

## 2019-04-06 RX ADMIN — TRAMADOL HYDROCHLORIDE 50 MILLIGRAM(S): 50 TABLET ORAL at 09:53

## 2019-04-06 RX ADMIN — Medication 1: at 17:37

## 2019-04-06 RX ADMIN — Medication 100 MILLIGRAM(S): at 17:37

## 2019-04-06 NOTE — PROGRESS NOTE ADULT - ASSESSMENT
#84 male with PMH significant for HTN, HLD, Afib, stomach CA, anemia, gout, BPH. seizure d/o, recently stopped EToh use, p/w chronic anemia possibly due to myelodysplastic syndrome, s/p tx PRBC, debility and functional decline      #Anemia/debility: multifactorial: Chronic kidney disease, recent GIB. CML  - Heme appreciated 4/5: Chronic myelomonocytic leukemia not having achieved remission.  Supportive care at this time.   -B12 supplementatoin  -will need education procrit injections for dc  -heme onc f/u on dc  -CBC stable, 4/8 ordered    #LGI bleed: resolved, followed by GI  - xarelto and ASA restarted  -transfuse for Hgb<7    #elevated FS  -CCD    EmP4A=4, given age, monitor for now, nutrition/diabetic education for dc      #Afib:   - Continue metoprolol, Xarelto  -rate controlled, HR 77    #HTN/periods orthostasis:-  Continue norvasc, lasix.   bilateral LE ACE wraps, discussed with nursing and patient for OOB activities due to orthostasis  -case discussed with hospitalist. given extensive cardiac history, do not recommend medications such as steroids or midodrine at this time. continue to encourage time OOB, sitting, LE movement . continue ACE wraps LE. Slow transitional movements, reclining back wheelchair  -encouragement given to patient today. reinforcement of binder and ACE wraps OOB to reduce episodes discussed    # Joint pain/acute gout:   - tramadol, 50 mg q6 severe pain  off indomethacin, last dose 25 bid 4/3  -continue allpurinol 300 mg daily    #SZ/tremors:   - Controlled on keppra     #DVT prophylaxis:   -on xarelto    #Mood:  -refuses neuropsychology services or medications    #Case discussed in IDT rounds 4/2  -patient and caregiver refuse JANET/SNF, state they will provide 24 hour supervision at home. Currently mod-max assist for mobility and transfers, and will likely be wheelchair level at home. At this time, dififculty tolerating 3 hours of rehab, patient not interested in hospice level/comfort care, and would likely benefit more from being in his own surroundings with 24 hour assist and home care  -Patient would benefit from hospital bed at home due to cardiac history, significant lower extremity swelling and orthostasis; repeated gouty flares with multiple joint pain and swelling; and risk for skin breakdown due to progressively bedbound status, Recommend electric hospital bed with rails to assist for transfers and bed mobility, adjustable head and leg sections.   -target potential dc 4/9, need caregiver training.Patient aware        Labs:  CBC, BMP 4/8

## 2019-04-06 NOTE — PROGRESS NOTE ADULT - SUBJECTIVE AND OBJECTIVE BOX
Patient is a 84y old  Male who presents with a chief complaint of anemia, debility and functional decline (05 Apr 2019 17:16)      HPI:  Patient is 84 male RH dominant with PMH significant for HTN, HLD,  Afib (off xarelto), stomach CA, anemia, gout, BPH. seizure d/o, recently stopped EToh use, who presented from [facility] with abnormal labs.  Pt reports recent admission to [facility] for gout flare then discharged to rehab.  Pt now returns with guaiac negative anemia (HgB 6.8 ) and  episode of bright red blood in stool. Pt also found with leukocytosis, with temp 100.7 F rectally.  Denies chills, fever, sob, chest pain, weakness, dysuria.    Patient was seen by GI and hematology.  S/P Colonoscopy on 3/1/19. No source of bleeding noted. Negative FOB. S/P EGD during prior admission, and no bleeding identified. Heme performed BM biopsy 3/4, results pending. CKD, Etoh may contribute, although underlying BM d/o such as myelodysplasia suspected. Transfused PRBC. Patient transferred to - [facility] due to debility and functional decline (06 Mar 2019 14:53)      PAST MEDICAL & SURGICAL HISTORY:  Nonrheumatic aortic valve stenosis  Cancer of stomach  Anemia, unspecified type: Anemia  Seizure disorder  Benign prostatic hyperplasia, unspecified whether lower urinary tract symptoms present  Hyperlipemia  Essential hypertension  Chronic atrial fibrillation  Stomach cancer  Hypertension  Atrial fibrillation  Gout  No significant past surgical history  No significant past surgical history      MEDICATIONS  (STANDING):  allopurinol 300 milliGRAM(s) Oral daily  amLODIPine   Tablet 10 milliGRAM(s) Oral daily  aspirin enteric coated 81 milliGRAM(s) Oral daily  atorvastatin 10 milliGRAM(s) Oral at bedtime  cyanocobalamin 1000 MICROGram(s) Oral daily  dextrose 5%. 1000 milliLiter(s) (50 mL/Hr) IV Continuous <Continuous>  dextrose 50% Injectable 12.5 Gram(s) IV Push once  dextrose 50% Injectable 25 Gram(s) IV Push once  dextrose 50% Injectable 25 Gram(s) IV Push once  docusate sodium 100 milliGRAM(s) Oral two times a day  epoetin jackelyn Injectable 69743 Unit(s) SubCutaneous <User Schedule>  famotidine    Tablet 20 milliGRAM(s) Oral daily  folic acid 1 milliGRAM(s) Oral daily  insulin lispro (HumaLOG) corrective regimen sliding scale   SubCutaneous three times a day before meals  insulin lispro (HumaLOG) corrective regimen sliding scale   SubCutaneous at bedtime  levETIRAcetam 500 milliGRAM(s) Oral two times a day  lidocaine   Patch 1 Patch Transdermal <User Schedule>  lidocaine 5% Ointment 1 Application(s) Topical three times a day  metoprolol tartrate 25 milliGRAM(s) Oral every 12 hours  multivitamin 1 Tablet(s) Oral daily  pantoprazole    Tablet 40 milliGRAM(s) Oral before breakfast  polyethylene glycol 3350 17 Gram(s) Oral daily  rivaroxaban 20 milliGRAM(s) Oral every 24 hours  sodium chloride 0.9%. 1000 milliLiter(s) (100 mL/Hr) IV Continuous <Continuous>  tamsulosin 0.4 milliGRAM(s) Oral at bedtime    MEDICATIONS  (PRN):  acetaminophen   Tablet .. 650 milliGRAM(s) Oral every 6 hours PRN Mild Pain (1 - 3)  bisacodyl Suppository 10 milliGRAM(s) Rectal daily PRN Constipation  dextrose 40% Gel 15 Gram(s) Oral once PRN Blood Glucose LESS THAN 70 milliGRAM(s)/deciliter  glucagon  Injectable 1 milliGRAM(s) IntraMuscular once PRN Glucose LESS THAN 70 milligrams/deciliter  senna 2 Tablet(s) Oral at bedtime PRN Constipation  traMADol 50 milliGRAM(s) Oral every 6 hours PRN Severe Pain (7 - 10)      Allergies    No Known Allergies    Intolerances          VITALS  84y  Vital Signs Last 24 Hrs  T(C): 36.5 (06 Apr 2019 09:24), Max: 36.7 (06 Apr 2019 05:23)  T(F): 97.7 (06 Apr 2019 09:24), Max: 98 (06 Apr 2019 05:23)  HR: 64 (06 Apr 2019 09:24) (64 - 82)  BP: 115/69 (06 Apr 2019 09:24) (105/65 - 128/70)  BP(mean): --  RR: 14 (06 Apr 2019 09:24) (14 - 14)  SpO2: 99% (06 Apr 2019 09:24) (99% - 99%)  Daily     Daily         RECENT LABS:                      CAPILLARY BLOOD GLUCOSE      POCT Blood Glucose.: 194 mg/dL (06 Apr 2019 11:47)  POCT Blood Glucose.: 160 mg/dL (06 Apr 2019 07:59)  POCT Blood Glucose.: 205 mg/dL (05 Apr 2019 21:52)  POCT Blood Glucose.: 133 mg/dL (05 Apr 2019 16:46)

## 2019-04-07 PROCEDURE — 99232 SBSQ HOSP IP/OBS MODERATE 35: CPT

## 2019-04-07 PROCEDURE — 99233 SBSQ HOSP IP/OBS HIGH 50: CPT | Mod: GC

## 2019-04-07 RX ADMIN — FAMOTIDINE 20 MILLIGRAM(S): 10 INJECTION INTRAVENOUS at 12:29

## 2019-04-07 RX ADMIN — TRAMADOL HYDROCHLORIDE 50 MILLIGRAM(S): 50 TABLET ORAL at 05:23

## 2019-04-07 RX ADMIN — ATORVASTATIN CALCIUM 10 MILLIGRAM(S): 80 TABLET, FILM COATED ORAL at 22:49

## 2019-04-07 RX ADMIN — LIDOCAINE 1 APPLICATION(S): 4 CREAM TOPICAL at 05:22

## 2019-04-07 RX ADMIN — Medication 1: at 12:30

## 2019-04-07 RX ADMIN — Medication 1 TABLET(S): at 12:29

## 2019-04-07 RX ADMIN — TRAMADOL HYDROCHLORIDE 50 MILLIGRAM(S): 50 TABLET ORAL at 22:48

## 2019-04-07 RX ADMIN — Medication 300 MILLIGRAM(S): at 12:29

## 2019-04-07 RX ADMIN — PREGABALIN 1000 MICROGRAM(S): 225 CAPSULE ORAL at 12:29

## 2019-04-07 RX ADMIN — Medication 25 MILLIGRAM(S): at 05:21

## 2019-04-07 RX ADMIN — Medication 25 MILLIGRAM(S): at 18:19

## 2019-04-07 RX ADMIN — PANTOPRAZOLE SODIUM 40 MILLIGRAM(S): 20 TABLET, DELAYED RELEASE ORAL at 05:22

## 2019-04-07 RX ADMIN — LEVETIRACETAM 500 MILLIGRAM(S): 250 TABLET, FILM COATED ORAL at 05:22

## 2019-04-07 RX ADMIN — Medication 650 MILLIGRAM(S): at 06:00

## 2019-04-07 RX ADMIN — LIDOCAINE 1 PATCH: 4 CREAM TOPICAL at 22:48

## 2019-04-07 RX ADMIN — Medication 650 MILLIGRAM(S): at 23:00

## 2019-04-07 RX ADMIN — Medication 100 MILLIGRAM(S): at 05:21

## 2019-04-07 RX ADMIN — Medication 650 MILLIGRAM(S): at 22:48

## 2019-04-07 RX ADMIN — Medication 81 MILLIGRAM(S): at 12:29

## 2019-04-07 RX ADMIN — RIVAROXABAN 20 MILLIGRAM(S): KIT at 18:19

## 2019-04-07 RX ADMIN — LEVETIRACETAM 500 MILLIGRAM(S): 250 TABLET, FILM COATED ORAL at 18:19

## 2019-04-07 RX ADMIN — Medication 100 MILLIGRAM(S): at 18:19

## 2019-04-07 RX ADMIN — TRAMADOL HYDROCHLORIDE 50 MILLIGRAM(S): 50 TABLET ORAL at 06:00

## 2019-04-07 RX ADMIN — LIDOCAINE 1 PATCH: 4 CREAM TOPICAL at 07:38

## 2019-04-07 RX ADMIN — AMLODIPINE BESYLATE 10 MILLIGRAM(S): 2.5 TABLET ORAL at 05:21

## 2019-04-07 RX ADMIN — Medication 650 MILLIGRAM(S): at 05:24

## 2019-04-07 RX ADMIN — TAMSULOSIN HYDROCHLORIDE 0.4 MILLIGRAM(S): 0.4 CAPSULE ORAL at 22:48

## 2019-04-07 RX ADMIN — LIDOCAINE 1 APPLICATION(S): 4 CREAM TOPICAL at 22:49

## 2019-04-07 RX ADMIN — Medication 1 MILLIGRAM(S): at 12:29

## 2019-04-07 NOTE — PROGRESS NOTE ADULT - SUBJECTIVE AND OBJECTIVE BOX
Patient is a 84y old  Male who presents with a chief complaint of anemia, debility and functional decline (06 Apr 2019 13:23)      HPI:  Patient is 84 male RH dominant with PMH significant for HTN, HLD,  Afib (off xarelto), stomach CA, anemia, gout, BPH. seizure d/o, recently stopped EToh use, who presented from [facility] with abnormal labs.  Pt reports recent admission to [facility] for gout flare then discharged to rehab.  Pt now returns with guaiac negative anemia (HgB 6.8 ) and  episode of bright red blood in stool. Pt also found with leukocytosis, with temp 100.7 F rectally.  Denies chills, fever, sob, chest pain, weakness, dysuria.    Patient was seen by GI and hematology.  S/P Colonoscopy on 3/1/19. No source of bleeding noted. Negative FOB. S/P EGD during prior admission, and no bleeding identified. Heme performed BM biopsy 3/4, results pending. CKD, Etoh may contribute, although underlying BM d/o such as myelodysplasia suspected. Transfused PRBC. Patient transferred to - [facility] due to debility and functional decline (06 Mar 2019 14:53)      PAST MEDICAL & SURGICAL HISTORY:  Nonrheumatic aortic valve stenosis  Cancer of stomach  Anemia, unspecified type: Anemia  Seizure disorder  Benign prostatic hyperplasia, unspecified whether lower urinary tract symptoms present  Hyperlipemia  Essential hypertension  Chronic atrial fibrillation  Stomach cancer  Hypertension  Atrial fibrillation  Gout  No significant past surgical history  No significant past surgical history      MEDICATIONS  (STANDING):  allopurinol 300 milliGRAM(s) Oral daily  amLODIPine   Tablet 10 milliGRAM(s) Oral daily  aspirin enteric coated 81 milliGRAM(s) Oral daily  atorvastatin 10 milliGRAM(s) Oral at bedtime  cyanocobalamin 1000 MICROGram(s) Oral daily  dextrose 5%. 1000 milliLiter(s) (50 mL/Hr) IV Continuous <Continuous>  dextrose 50% Injectable 12.5 Gram(s) IV Push once  dextrose 50% Injectable 25 Gram(s) IV Push once  dextrose 50% Injectable 25 Gram(s) IV Push once  docusate sodium 100 milliGRAM(s) Oral two times a day  epoetin jackelyn Injectable 23703 Unit(s) SubCutaneous <User Schedule>  famotidine    Tablet 20 milliGRAM(s) Oral daily  folic acid 1 milliGRAM(s) Oral daily  insulin lispro (HumaLOG) corrective regimen sliding scale   SubCutaneous three times a day before meals  insulin lispro (HumaLOG) corrective regimen sliding scale   SubCutaneous at bedtime  levETIRAcetam 500 milliGRAM(s) Oral two times a day  lidocaine   Patch 1 Patch Transdermal <User Schedule>  lidocaine 5% Ointment 1 Application(s) Topical three times a day  metoprolol tartrate 25 milliGRAM(s) Oral every 12 hours  multivitamin 1 Tablet(s) Oral daily  pantoprazole    Tablet 40 milliGRAM(s) Oral before breakfast  polyethylene glycol 3350 17 Gram(s) Oral daily  rivaroxaban 20 milliGRAM(s) Oral every 24 hours  sodium chloride 0.9%. 1000 milliLiter(s) (100 mL/Hr) IV Continuous <Continuous>  tamsulosin 0.4 milliGRAM(s) Oral at bedtime    MEDICATIONS  (PRN):  acetaminophen   Tablet .. 650 milliGRAM(s) Oral every 6 hours PRN Mild Pain (1 - 3)  bisacodyl Suppository 10 milliGRAM(s) Rectal daily PRN Constipation  dextrose 40% Gel 15 Gram(s) Oral once PRN Blood Glucose LESS THAN 70 milliGRAM(s)/deciliter  glucagon  Injectable 1 milliGRAM(s) IntraMuscular once PRN Glucose LESS THAN 70 milligrams/deciliter  senna 2 Tablet(s) Oral at bedtime PRN Constipation  traMADol 50 milliGRAM(s) Oral every 6 hours PRN Severe Pain (7 - 10)      Allergies    No Known Allergies    Intolerances          VITALS  84y  Vital Signs Last 24 Hrs  T(C): 36.4 (07 Apr 2019 08:56), Max: 36.8 (06 Apr 2019 20:35)  T(F): 97.5 (07 Apr 2019 08:56), Max: 98.2 (06 Apr 2019 20:35)  HR: 83 (07 Apr 2019 08:56) (65 - 83)  BP: 107/63 (07 Apr 2019 08:56) (107/63 - 128/72)  BP(mean): --  RR: 14 (07 Apr 2019 08:56) (14 - 14)  SpO2: 100% (07 Apr 2019 08:56) (99% - 100%)  Daily     Daily         RECENT LABS:                      CAPILLARY BLOOD GLUCOSE      POCT Blood Glucose.: 152 mg/dL (07 Apr 2019 12:12)  POCT Blood Glucose.: 142 mg/dL (07 Apr 2019 07:54)  POCT Blood Glucose.: 165 mg/dL (06 Apr 2019 20:30)  POCT Blood Glucose.: 172 mg/dL (06 Apr 2019 16:48)

## 2019-04-07 NOTE — PROGRESS NOTE ADULT - SUBJECTIVE AND OBJECTIVE BOX
HPI  Pt is a 83yo M w multiple PMH is admitted to acute rehab for debility secondary to anemia likely from myelodysplastic syndrome s/p PRBC.   Pt was seen and examined at bedside. Pt states he is just worried that he kept have orthostatic episodes during therapy. Denies of any CP, SOB< palpitation, dizziness     Vital Signs Last 24 Hrs  T(C): 36.4 (07 Apr 2019 08:56), Max: 36.8 (06 Apr 2019 20:35)  T(F): 97.5 (07 Apr 2019 08:56), Max: 98.2 (06 Apr 2019 20:35)  HR: 83 (07 Apr 2019 08:56) (65 - 83)  BP: 107/63 (07 Apr 2019 08:56) (107/63 - 128/72)  BP(mean): --  RR: 14 (07 Apr 2019 08:56) (14 - 14)  SpO2: 100% (07 Apr 2019 08:56) (99% - 100%)    I&O's Summary      CAPILLARY BLOOD GLUCOSE      POCT Blood Glucose.: 152 mg/dL (07 Apr 2019 12:12)  POCT Blood Glucose.: 142 mg/dL (07 Apr 2019 07:54)  POCT Blood Glucose.: 165 mg/dL (06 Apr 2019 20:30)  POCT Blood Glucose.: 172 mg/dL (06 Apr 2019 16:48)    PHYSICAL EXAM:    Constitutional: NAD, awake and alert, well-developed  HEENT: PERR, EOMI, Normal Hearing, MMM  Neck: Soft and supple, No LAD, No JVD  Respiratory: Breath sounds are clear bilaterally, No wheezing, rales or rhonchi  Cardiovascular: S1 and S2, irregular rate and rhythm,   Gastrointestinal: Bowel Sounds present, soft, nontender, nondistended, no guarding, no rebound  Extremities: No peripheral edema  Vascular: 2+ peripheral pulses  Neurological: A/O x 3, no focal deficits  Musculoskeletal: multiple MC joint swelling and redness  Skin: No rashes    MEDICATIONS:  MEDICATIONS  (STANDING):  allopurinol 300 milliGRAM(s) Oral daily  amLODIPine   Tablet 10 milliGRAM(s) Oral daily  aspirin enteric coated 81 milliGRAM(s) Oral daily  atorvastatin 10 milliGRAM(s) Oral at bedtime  cyanocobalamin 1000 MICROGram(s) Oral daily  dextrose 5%. 1000 milliLiter(s) (50 mL/Hr) IV Continuous <Continuous>  dextrose 50% Injectable 12.5 Gram(s) IV Push once  dextrose 50% Injectable 25 Gram(s) IV Push once  dextrose 50% Injectable 25 Gram(s) IV Push once  docusate sodium 100 milliGRAM(s) Oral two times a day  epoetin jackelyn Injectable 62405 Unit(s) SubCutaneous <User Schedule>  famotidine    Tablet 20 milliGRAM(s) Oral daily  folic acid 1 milliGRAM(s) Oral daily  insulin lispro (HumaLOG) corrective regimen sliding scale   SubCutaneous three times a day before meals  insulin lispro (HumaLOG) corrective regimen sliding scale   SubCutaneous at bedtime  levETIRAcetam 500 milliGRAM(s) Oral two times a day  lidocaine   Patch 1 Patch Transdermal <User Schedule>  lidocaine 5% Ointment 1 Application(s) Topical three times a day  metoprolol tartrate 25 milliGRAM(s) Oral every 12 hours  multivitamin 1 Tablet(s) Oral daily  pantoprazole    Tablet 40 milliGRAM(s) Oral before breakfast  polyethylene glycol 3350 17 Gram(s) Oral daily  rivaroxaban 20 milliGRAM(s) Oral every 24 hours  sodium chloride 0.9%. 1000 milliLiter(s) (100 mL/Hr) IV Continuous <Continuous>  tamsulosin 0.4 milliGRAM(s) Oral at bedtime      LABS: All Labs Reviewed:                Blood Culture:     RADIOLOGY/EKG:    DVT PPX:    ADVANCED DIRECTIVE:    DISPOSITION:

## 2019-04-07 NOTE — PROGRESS NOTE ADULT - ASSESSMENT
Pt is a 83yo M w multiple PMH is admitted to acute rehab for debility secondary to anemia likely from myelodysplastic syndrome s/p PRBC.     *debility secondary to anemia, CKD, GIB, ?CML  Cont acute rehab  PT/OT  heme onc follow up once DC   Transfuse if Hg<7   Cont Folic Acid    *Leukocytosis  Asymptomatic  Resolved     *Afib   Rate controlled  Cont BB  Cont xarelto     *HTN/orthostatic   Stable  Cont norvasc, lasix   ACE wrap legs     *Gout   Allopurinol  improved, no pain right not   Pt does not response to steroid   tramadol prn for sever pain     *Seizures and tremors  Cont Keppra     *BPH  Cont Flomax     *DVT ppx   Cont xarelto

## 2019-04-08 LAB
ANION GAP SERPL CALC-SCNC: 12 MMOL/L — SIGNIFICANT CHANGE UP (ref 5–17)
BUN SERPL-MCNC: 40 MG/DL — HIGH (ref 7–23)
CALCIUM SERPL-MCNC: 9.2 MG/DL — SIGNIFICANT CHANGE UP (ref 8.4–10.5)
CHLORIDE SERPL-SCNC: 105 MMOL/L — SIGNIFICANT CHANGE UP (ref 96–108)
CO2 SERPL-SCNC: 21 MMOL/L — LOW (ref 22–31)
CREAT SERPL-MCNC: 1.1 MG/DL — SIGNIFICANT CHANGE UP (ref 0.5–1.3)
GLUCOSE SERPL-MCNC: 123 MG/DL — HIGH (ref 70–99)
HCT VFR BLD CALC: 25.6 % — LOW (ref 39–50)
HGB BLD-MCNC: 7.7 G/DL — LOW (ref 13–17)
MCHC RBC-ENTMCNC: 30.1 GM/DL — LOW (ref 32–36)
MCHC RBC-ENTMCNC: 31.5 PG — SIGNIFICANT CHANGE UP (ref 27–34)
MCV RBC AUTO: 104.6 FL — HIGH (ref 80–100)
PLATELET # BLD AUTO: 191 K/UL — SIGNIFICANT CHANGE UP (ref 150–400)
POTASSIUM SERPL-MCNC: 3.8 MMOL/L — SIGNIFICANT CHANGE UP (ref 3.5–5.3)
POTASSIUM SERPL-SCNC: 3.8 MMOL/L — SIGNIFICANT CHANGE UP (ref 3.5–5.3)
RBC # BLD: 2.45 M/UL — LOW (ref 4.2–5.8)
RBC # FLD: 20.5 % — HIGH (ref 10.3–14.5)
SODIUM SERPL-SCNC: 138 MMOL/L — SIGNIFICANT CHANGE UP (ref 135–145)
WBC # BLD: 7.5 K/UL — SIGNIFICANT CHANGE UP (ref 3.8–10.5)
WBC # FLD AUTO: 7.5 K/UL — SIGNIFICANT CHANGE UP (ref 3.8–10.5)

## 2019-04-08 PROCEDURE — 99232 SBSQ HOSP IP/OBS MODERATE 35: CPT

## 2019-04-08 PROCEDURE — 99233 SBSQ HOSP IP/OBS HIGH 50: CPT | Mod: GC

## 2019-04-08 RX ORDER — INDOMETHACIN 50 MG
25 CAPSULE ORAL
Qty: 0 | Refills: 0 | Status: DISCONTINUED | OUTPATIENT
Start: 2019-04-08 | End: 2019-04-10

## 2019-04-08 RX ADMIN — Medication 25 MILLIGRAM(S): at 17:10

## 2019-04-08 RX ADMIN — TRAMADOL HYDROCHLORIDE 50 MILLIGRAM(S): 50 TABLET ORAL at 00:00

## 2019-04-08 RX ADMIN — PANTOPRAZOLE SODIUM 40 MILLIGRAM(S): 20 TABLET, DELAYED RELEASE ORAL at 05:12

## 2019-04-08 RX ADMIN — Medication 1 TABLET(S): at 12:09

## 2019-04-08 RX ADMIN — AMLODIPINE BESYLATE 10 MILLIGRAM(S): 2.5 TABLET ORAL at 05:12

## 2019-04-08 RX ADMIN — Medication 100 MILLIGRAM(S): at 05:13

## 2019-04-08 RX ADMIN — FAMOTIDINE 20 MILLIGRAM(S): 10 INJECTION INTRAVENOUS at 12:09

## 2019-04-08 RX ADMIN — LIDOCAINE 1 PATCH: 4 CREAM TOPICAL at 22:44

## 2019-04-08 RX ADMIN — Medication 1: at 12:09

## 2019-04-08 RX ADMIN — TRAMADOL HYDROCHLORIDE 50 MILLIGRAM(S): 50 TABLET ORAL at 10:22

## 2019-04-08 RX ADMIN — Medication 25 MILLIGRAM(S): at 17:08

## 2019-04-08 RX ADMIN — LIDOCAINE 1 APPLICATION(S): 4 CREAM TOPICAL at 05:13

## 2019-04-08 RX ADMIN — Medication 100 MILLIGRAM(S): at 17:10

## 2019-04-08 RX ADMIN — ERYTHROPOIETIN 10000 UNIT(S): 10000 INJECTION, SOLUTION INTRAVENOUS; SUBCUTANEOUS at 13:43

## 2019-04-08 RX ADMIN — LIDOCAINE 1 PATCH: 4 CREAM TOPICAL at 06:43

## 2019-04-08 RX ADMIN — POLYETHYLENE GLYCOL 3350 17 GRAM(S): 17 POWDER, FOR SOLUTION ORAL at 12:09

## 2019-04-08 RX ADMIN — LEVETIRACETAM 500 MILLIGRAM(S): 250 TABLET, FILM COATED ORAL at 17:10

## 2019-04-08 RX ADMIN — Medication 25 MILLIGRAM(S): at 05:13

## 2019-04-08 RX ADMIN — Medication 300 MILLIGRAM(S): at 12:09

## 2019-04-08 RX ADMIN — RIVAROXABAN 20 MILLIGRAM(S): KIT at 17:10

## 2019-04-08 RX ADMIN — ATORVASTATIN CALCIUM 10 MILLIGRAM(S): 80 TABLET, FILM COATED ORAL at 22:45

## 2019-04-08 RX ADMIN — PREGABALIN 1000 MICROGRAM(S): 225 CAPSULE ORAL at 12:09

## 2019-04-08 RX ADMIN — Medication 1 MILLIGRAM(S): at 12:09

## 2019-04-08 RX ADMIN — TAMSULOSIN HYDROCHLORIDE 0.4 MILLIGRAM(S): 0.4 CAPSULE ORAL at 22:45

## 2019-04-08 RX ADMIN — LIDOCAINE 1 APPLICATION(S): 4 CREAM TOPICAL at 22:45

## 2019-04-08 RX ADMIN — Medication 81 MILLIGRAM(S): at 12:09

## 2019-04-08 RX ADMIN — LEVETIRACETAM 500 MILLIGRAM(S): 250 TABLET, FILM COATED ORAL at 05:12

## 2019-04-08 NOTE — PROGRESS NOTE ADULT - SUBJECTIVE AND OBJECTIVE BOX
HPI  Pt is a 85yo M w multiple PMH is admitted to acute rehab for debility secondary to anemia likely from myelodysplastic syndrome s/p PRBC.   Pt was seen and examined at bedside. States some joints, likely flare from gout     Vital Signs Last 24 Hrs  T(C): 36.4 (08 Apr 2019 08:16), Max: 36.8 (07 Apr 2019 21:55)  T(F): 97.6 (08 Apr 2019 08:16), Max: 98.2 (07 Apr 2019 21:55)  HR: 85 (08 Apr 2019 09:19) (66 - 85)  BP: 109/67 (08 Apr 2019 09:19) (92/54 - 119/66)  BP(mean): --  RR: 14 (08 Apr 2019 08:16) (14 - 14)  SpO2: 98% (08 Apr 2019 08:16) (98% - 100%)    I&O's Summary      CAPILLARY BLOOD GLUCOSE      POCT Blood Glucose.: 168 mg/dL (08 Apr 2019 12:05)  POCT Blood Glucose.: 127 mg/dL (08 Apr 2019 07:34)  POCT Blood Glucose.: 150 mg/dL (07 Apr 2019 22:46)  POCT Blood Glucose.: 145 mg/dL (07 Apr 2019 16:55)  PHYSICAL EXAM:    Constitutional: NAD, awake and alert, well-developed  HEENT: PERR, EOMI, Normal Hearing, MMM  Neck: Soft and supple, No LAD, No JVD  Respiratory: Breath sounds are clear bilaterally, No wheezing, rales or rhonchi  Cardiovascular: S1 and S2, irregular rate and rhythm,   Gastrointestinal: Bowel Sounds present, soft, nontender, nondistended, no guarding, no rebound  Extremities: No peripheral edema  Vascular: 2+ peripheral pulses  Neurological: A/O x 3, no focal deficits  Musculoskeletal: multiple MC joint swelling and redness  Skin: MC finger joint pain, minimal redness noted     MEDICATIONS:  MEDICATIONS  (STANDING):  allopurinol 300 milliGRAM(s) Oral daily  amLODIPine   Tablet 10 milliGRAM(s) Oral daily  aspirin enteric coated 81 milliGRAM(s) Oral daily  atorvastatin 10 milliGRAM(s) Oral at bedtime  cyanocobalamin 1000 MICROGram(s) Oral daily  dextrose 5%. 1000 milliLiter(s) (50 mL/Hr) IV Continuous <Continuous>  dextrose 50% Injectable 12.5 Gram(s) IV Push once  dextrose 50% Injectable 25 Gram(s) IV Push once  dextrose 50% Injectable 25 Gram(s) IV Push once  docusate sodium 100 milliGRAM(s) Oral two times a day  epoetin jackelyn Injectable 76267 Unit(s) SubCutaneous <User Schedule>  famotidine    Tablet 20 milliGRAM(s) Oral daily  folic acid 1 milliGRAM(s) Oral daily  indomethacin 25 milliGRAM(s) Oral two times a day  insulin lispro (HumaLOG) corrective regimen sliding scale   SubCutaneous three times a day before meals  insulin lispro (HumaLOG) corrective regimen sliding scale   SubCutaneous at bedtime  levETIRAcetam 500 milliGRAM(s) Oral two times a day  lidocaine   Patch 1 Patch Transdermal <User Schedule>  lidocaine 5% Ointment 1 Application(s) Topical three times a day  metoprolol tartrate 25 milliGRAM(s) Oral every 12 hours  multivitamin 1 Tablet(s) Oral daily  pantoprazole    Tablet 40 milliGRAM(s) Oral before breakfast  polyethylene glycol 3350 17 Gram(s) Oral daily  rivaroxaban 20 milliGRAM(s) Oral every 24 hours  sodium chloride 0.9%. 1000 milliLiter(s) (100 mL/Hr) IV Continuous <Continuous>  tamsulosin 0.4 milliGRAM(s) Oral at bedtime      LABS: All Labs Reviewed:                        7.7    7.5   )-----------( 191      ( 08 Apr 2019 06:05 )             25.6     04-08    138  |  105  |  40<H>  ----------------------------<  123<H>  3.8   |  21<L>  |  1.10    Ca    9.2      08 Apr 2019 06:05            Blood Culture:     RADIOLOGY/EKG:    DVT PPX:    ADVANCED DIRECTIVE:    DISPOSITION:

## 2019-04-08 NOTE — PROGRESS NOTE ADULT - ASSESSMENT
Pt is a 83yo M w multiple PMH is admitted to acute rehab for debility secondary to anemia likely from myelodysplastic syndrome s/p PRBC.     *debility secondary to anemia, CKD, GIB, ?CML  Cont acute rehab  PT/OT  heme onc follow up once DC   Transfuse if Hg<7   Cont Folic Acid  Hg 7.7 today, stable     *Leukocytosis  Asymptomatic  Resolved     *Afib   Rate controlled  Cont BB  Cont xarelto     *HTN/orthostatic   Stable  Cont norvasc, lasix   ACE wrap legs     *Gout   Cont Allopurinol  Flare today, start Indomethacin for 3 days   Pt does not response to steroid   tramadol prn for sever pain     *Seizures and tremors  Cont Keppra     *BPH  Cont Flomax     *DVT ppx   Cont xarelto

## 2019-04-08 NOTE — PROGRESS NOTE ADULT - ASSESSMENT
#84 male with PMH significant for HTN, HLD, Afib, stomach CA, anemia, gout, BPH. seizure d/o, recently stopped EToh use, p/w chronic anemia possibly due to myelodysplastic syndrome, s/p tx PRBC, debility and functional decline      #Anemia/debility: multifactorial: Chronic kidney disease, recent GIB. 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   -  Supportive care at this time. Followed by heme  -B12 supplementatoin  -heme onc greatly appreciated. Procrit not covered for home injections, will need f/u with heme as outpateint for WEEKLY procrit injections. Heme has offered f/u at [facility], also [facility] as possibilities. Patient and caregiver informed  -CBC 4/8 STABLE    #LGI bleed: resolved, followed by GI. CBC stable 4/8  - xarelto and ASA restarted  -transfuse for Hgb<7    #elevated FS; now improved, controlled 4/8  -CCD    ZtP3D=2, given age, monitor for now, nutrition/diabetic education for dc      #Afib:   - Continue metoprolol, Xarelto  -rate controlled    #HTN/periods orthostasis:-  Continue norvasc, lasix.   -. continue to encourage time OOB, sitting, LE movement . continue ACE wraps LE. Slow transitional movements, reclining back wheelchair. Discussed with caregivers, encourage patient compliance      # Joint pain/acute gout:   - tramadol, 50 mg q6 severe pain  - indomethacin, last dose 25 bid 4/3. Now with beginnings of another flare. H/H and renal function stable, will restart 25 bid x 6 doses. Discussed with patient, caregiver, hospitalist  -continue allpurinol 300 mg daily    #SZ/tremors:   - Controlled on keppra     #DVT prophylaxis:   -on xarelto    #Mood:  -refuses neuropsychology services or medications    #Case discussed in IDT rounds 4/2  -patient and caregiver refuse [facility]/SNF, state they will provide 24 hour supervision at home. Currently mod-max assist for mobility and transfers, and will likely be wheelchair level at home. At this time, dififculty tolerating 3 hours of rehab, patient not interested in hospice level/comfort care, and would likely benefit more from being in his own surroundings with 24 hour assist and home care  -Patient would benefit from hospital bed at home due to cardiac history, significant lower extremity swelling and orthostasis; repeated gouty flares with multiple joint pain and swelling; and risk for skin breakdown due to progressively bedbound status, Recommend electric hospital bed with rails to assist for transfers and bed mobility, adjustable head and leg sections.   -target potential dc 4/9, need caregiver training.Patient aware    Pharmacy: [facility] [place] [phone]    Labs:

## 2019-04-08 NOTE — PROGRESS NOTE ADULT - SUBJECTIVE AND OBJECTIVE BOX
YUE KING   84y   Male    Admitting: SAEED Gallego  HPI:  84-year-old gentleman with history of anemia, atrial fibrillation and seizure disorder admitted with anemia exacerbation from his [place] facility. Patient noted recent history of bright red blood per rectum. He was scheduled for outpatient colonoscopy. S/P BM biopsy.  Patient reported he stopped drinking alcohol approximately 3 months prior to admission.  Patient is now on the acute rehabilitation unit.    PAST MEDICAL & SURGICAL HISTORY:  Nonrheumatic aortic valve stenosis  Cancer of stomach  Anemia, unspecified type: Anemia  Seizure disorder  Benign prostatic hyperplasia, unspecified whether lower urinary tract symptoms present  Hyperlipemia  Essential hypertension  Chronic atrial fibrillation  Stomach cancer  Hypertension  Atrial fibrillation  Gout  No significant past surgical history  No significant past surgical history    HEALTH ISSUES - PROBLEM Dx:  Anemia  Ureteral stone: Ureteral stone  Chronic myelomonocytic leukemia not having achieved remission: Chronic myelomonocytic leukemia not having achieved remission  Anemia, unspecified type: Anemia, unspecified type  Rectal bleed    MEDICATIONS  (STANDING):  allopurinol 300 milliGRAM(s) Oral daily  amLODIPine   Tablet 10 milliGRAM(s) Oral daily  aspirin enteric coated 81 milliGRAM(s) Oral daily  atorvastatin 10 milliGRAM(s) Oral at bedtime  cyanocobalamin 1000 MICROGram(s) Oral daily  dextrose 5%. 1000 milliLiter(s) (50 mL/Hr) IV Continuous <Continuous>  dextrose 50% Injectable 12.5 Gram(s) IV Push once  dextrose 50% Injectable 25 Gram(s) IV Push once  dextrose 50% Injectable 25 Gram(s) IV Push once  docusate sodium 100 milliGRAM(s) Oral two times a day  epoetin jackelyn Injectable 42606 Unit(s) SubCutaneous <User Schedule>  famotidine    Tablet 20 milliGRAM(s) Oral daily  folic acid 1 milliGRAM(s) Oral daily  insulin lispro (HumaLOG) corrective regimen sliding scale   SubCutaneous three times a day before meals  insulin lispro (HumaLOG) corrective regimen sliding scale   SubCutaneous at bedtime  levETIRAcetam 500 milliGRAM(s) Oral two times a day  lidocaine   Patch 1 Patch Transdermal <User Schedule>  lidocaine 5% Ointment 1 Application(s) Topical three times a day  metoprolol tartrate 25 milliGRAM(s) Oral every 12 hours  multivitamin 1 Tablet(s) Oral daily  pantoprazole    Tablet 40 milliGRAM(s) Oral before breakfast  polyethylene glycol 3350 17 Gram(s) Oral daily  rivaroxaban 20 milliGRAM(s) Oral every 24 hours  sodium chloride 0.9%. 1000 milliLiter(s) (100 mL/Hr) IV Continuous <Continuous>  tamsulosin 0.4 milliGRAM(s) Oral at bedtime    MEDICATIONS  (PRN):  acetaminophen   Tablet .. 650 milliGRAM(s) Oral every 6 hours PRN Mild Pain (1 - 3)  bisacodyl Suppository 10 milliGRAM(s) Rectal daily PRN Constipation  dextrose 40% Gel 15 Gram(s) Oral once PRN Blood Glucose LESS THAN 70 milliGRAM(s)/deciliter  glucagon  Injectable 1 milliGRAM(s) IntraMuscular once PRN Glucose LESS THAN 70 milligrams/deciliter  senna 2 Tablet(s) Oral at bedtime PRN Constipation  traMADol 50 milliGRAM(s) Oral every 6 hours PRN Severe Pain (7 - 10)    Allergies    No Known Allergies    INTERVAL HPI/OVERNIGHT EVENTS:  Patient S&E at bedside. No c/o CP, SOB or light headedness.     VITAL SIGNS:  T(F): 97.9 (04-08-19 @ 05:14)  HR: 68 (04-08-19 @ 05:14)  BP: 114/68 (04-08-19 @ 05:14)  RR: 14 (04-08-19 @ 05:14)  SpO2: 99% (04-08-19 @ 05:14)    PHYSICAL EXAM:  Constitutional: NAD  Eyes: sclera non-icteric  Neck: no JVD  Respiratory: CTA ant.  Cardiovascular: RRR, no M/R/G  Abdomen: soft, NT; +BS  Extremities: no calf tenderness    Labs:             7.7    7.5   )-----------( 191      ( 04-08 @ 06:05 )             25.6       04-08    138  |  105  |  40<H>  ----------------------------<  123<H>  3.8   |  21<L>  |  1.10    Ca    9.2      08 Apr 2019 06:05

## 2019-04-08 NOTE — PROGRESS NOTE ADULT - CARDIOVASCULAR DETAILS
irregular rate and rhythm/rate controlled
irregular rate and rhythm

## 2019-04-08 NOTE — CHART NOTE - NSCHARTNOTEFT_GEN_A_CORE
Nutrition Follow Up Note  Hospital Course (Per Electronic Medical Record):   Source: Medical Record [X] Patient [X]     Diet: Consistent Carbohydrate Diet w/ Thin Liquids   Tolerates Diet Well  on [place] 1 Packet BID & Glucerna 8oz PO Daily - Patient Takes Nutrition Supplement   Consumes 50-75% of Meals (as Per Documentation)       Enteral/Parenteral Nutrition: N/A    Current Weight: 173lb on 4/3  Obtain New Weight to Confirm Change   Obtain Weights Weekly     Pertinent Medications: MEDICATIONS  (STANDING):  allopurinol 300 milliGRAM(s) Oral daily  amLODIPine   Tablet 10 milliGRAM(s) Oral daily  aspirin enteric coated 81 milliGRAM(s) Oral daily  atorvastatin 10 milliGRAM(s) Oral at bedtime  cyanocobalamin 1000 MICROGram(s) Oral daily  dextrose 5%. 1000 milliLiter(s) (50 mL/Hr) IV Continuous <Continuous>  dextrose 50% Injectable 12.5 Gram(s) IV Push once  dextrose 50% Injectable 25 Gram(s) IV Push once  dextrose 50% Injectable 25 Gram(s) IV Push once  docusate sodium 100 milliGRAM(s) Oral two times a day  epoetin jackelyn Injectable 98125 Unit(s) SubCutaneous <User Schedule>  famotidine    Tablet 20 milliGRAM(s) Oral daily  folic acid 1 milliGRAM(s) Oral daily  indomethacin 25 milliGRAM(s) Oral two times a day  insulin lispro (HumaLOG) corrective regimen sliding scale   SubCutaneous three times a day before meals  insulin lispro (HumaLOG) corrective regimen sliding scale   SubCutaneous at bedtime  levETIRAcetam 500 milliGRAM(s) Oral two times a day  lidocaine   Patch 1 Patch Transdermal <User Schedule>  lidocaine 5% Ointment 1 Application(s) Topical three times a day  metoprolol tartrate 25 milliGRAM(s) Oral every 12 hours  multivitamin 1 Tablet(s) Oral daily  pantoprazole    Tablet 40 milliGRAM(s) Oral before breakfast  polyethylene glycol 3350 17 Gram(s) Oral daily  rivaroxaban 20 milliGRAM(s) Oral every 24 hours  sodium chloride 0.9%. 1000 milliLiter(s) (100 mL/Hr) IV Continuous <Continuous>  tamsulosin 0.4 milliGRAM(s) Oral at bedtime    MEDICATIONS  (PRN):  acetaminophen   Tablet .. 650 milliGRAM(s) Oral every 6 hours PRN Mild Pain (1 - 3)  bisacodyl Suppository 10 milliGRAM(s) Rectal daily PRN Constipation  dextrose 40% Gel 15 Gram(s) Oral once PRN Blood Glucose LESS THAN 70 milliGRAM(s)/deciliter  glucagon  Injectable 1 milliGRAM(s) IntraMuscular once PRN Glucose LESS THAN 70 milligrams/deciliter  senna 2 Tablet(s) Oral at bedtime PRN Constipation  traMADol 50 milliGRAM(s) Oral every 6 hours PRN Severe Pain (7 - 10)    Pertinent Labs:  04-08 Na138 mmol/L Glu 123 mg/dL<H> K+ 3.8 mmol/L Cr  1.10 mg/dL BUN 40 mg/dL<H>    Skin: Stage 2 Pressure Ulcer on to Left Heel    Edema: None Noted     Last BM: on 4/7    Estimated Needs:   [X] No Change since Previous Assessment    Previous Nutrition Diagnosis:   Increased Nutrient Needs  Limited Adherence to Nutrition - Related Recommendations    Nutrition Diagnosis is [X] Ongoing - Continues on Nutrition Supplement & Patient Takes Nutrition Supplements      New Nutrition Diagnosis: [X] Not Applicable    Interventions:   1. Recommend Continue Nutrition Plan of Care     Monitoring & Evaluation:   [X] Weights   [X] PO Intake   [X] Follow Up (Per Protocol)  [X] Tolerance to Diet Prescription   [X] Other: Labs & POCT    RD Remains Available.  Luis Hanna RDN

## 2019-04-08 NOTE — PROGRESS NOTE ADULT - CARDIOVASCULAR COMMENTS
Hr controlled at 80
HR 79
HR 93, SBP 98
Hr controlled at 71
rate controlled
rate controlled HR 68, /68 stable
rate controlled HR 83
HR 86 
rate controlled
rate controlled HR 79
rate controlled
rate controlled
HR 77 /66 this AM
Hr controled 68
not achycradia
rate controlled
rate controlled HR 73 /75

## 2019-04-08 NOTE — PROGRESS NOTE ADULT - ASSESSMENT
84-year-old gentleman with history of anemia, atrial fibrillation and seizure disorder admitted with anemia exacerbation from his [place] facility. Patient notes recent history of bright red blood per rectum. He was scheduled for outpatient colonoscopy.  On admission, found to have rectal temp of 100.7°F in the emergency department.  Patient reported he stopped drinking alcohol approximately 3 months prior to admission

## 2019-04-08 NOTE — PROGRESS NOTE ADULT - SUBJECTIVE AND OBJECTIVE BOX
Patient is a 84y old  Male who presents with a chief complaint of anemia, debility and functional decline (08 Apr 2019 08:26)      HPI:  Patient is 84 male RH dominant with PMH significant for HTN, HLD,  Afib (off xarelto), stomach CA, anemia, gout, BPH. seizure d/o, recently stopped EToh use, who presented from [facility] with abnormal labs.  Pt reports recent admission to [facility] for gout flare then discharged to rehab.  Pt now returns with guaiac negative anemia (HgB 6.8 ) and  episode of bright red blood in stool. Pt also found with leukocytosis, with temp 100.7 F rectally.  Denies chills, fever, sob, chest pain, weakness, dysuria.    Patient was seen by GI and hematology.  S/P Colonoscopy on 3/1/19. No source of bleeding noted. Negative FOB. S/P EGD during prior admission, and no bleeding identified. Heme performed BM biopsy 3/4, results pending. CKD, Etoh may contribute, although underlying BM d/o such as myelodysplasia suspected. Transfused PRBC. Patient transferred to - [facility] due to debility and functional decline (06 Mar 2019 14:53)      PAST MEDICAL & SURGICAL HISTORY:  Nonrheumatic aortic valve stenosis  Cancer of stomach  Anemia, unspecified type: Anemia  Seizure disorder  Benign prostatic hyperplasia, unspecified whether lower urinary tract symptoms present  Hyperlipemia  Essential hypertension  Chronic atrial fibrillation  Stomach cancer  Hypertension  Atrial fibrillation  Gout  No significant past surgical history  No significant past surgical history      MEDICATIONS  (STANDING):  allopurinol 300 milliGRAM(s) Oral daily  amLODIPine   Tablet 10 milliGRAM(s) Oral daily  aspirin enteric coated 81 milliGRAM(s) Oral daily  atorvastatin 10 milliGRAM(s) Oral at bedtime  cyanocobalamin 1000 MICROGram(s) Oral daily  dextrose 5%. 1000 milliLiter(s) (50 mL/Hr) IV Continuous <Continuous>  dextrose 50% Injectable 12.5 Gram(s) IV Push once  dextrose 50% Injectable 25 Gram(s) IV Push once  dextrose 50% Injectable 25 Gram(s) IV Push once  docusate sodium 100 milliGRAM(s) Oral two times a day  epoetin jackelyn Injectable 77313 Unit(s) SubCutaneous <User Schedule>  famotidine    Tablet 20 milliGRAM(s) Oral daily  folic acid 1 milliGRAM(s) Oral daily  insulin lispro (HumaLOG) corrective regimen sliding scale   SubCutaneous three times a day before meals  insulin lispro (HumaLOG) corrective regimen sliding scale   SubCutaneous at bedtime  levETIRAcetam 500 milliGRAM(s) Oral two times a day  lidocaine   Patch 1 Patch Transdermal <User Schedule>  lidocaine 5% Ointment 1 Application(s) Topical three times a day  metoprolol tartrate 25 milliGRAM(s) Oral every 12 hours  multivitamin 1 Tablet(s) Oral daily  pantoprazole    Tablet 40 milliGRAM(s) Oral before breakfast  polyethylene glycol 3350 17 Gram(s) Oral daily  rivaroxaban 20 milliGRAM(s) Oral every 24 hours  sodium chloride 0.9%. 1000 milliLiter(s) (100 mL/Hr) IV Continuous <Continuous>  tamsulosin 0.4 milliGRAM(s) Oral at bedtime    MEDICATIONS  (PRN):  acetaminophen   Tablet .. 650 milliGRAM(s) Oral every 6 hours PRN Mild Pain (1 - 3)  bisacodyl Suppository 10 milliGRAM(s) Rectal daily PRN Constipation  dextrose 40% Gel 15 Gram(s) Oral once PRN Blood Glucose LESS THAN 70 milliGRAM(s)/deciliter  glucagon  Injectable 1 milliGRAM(s) IntraMuscular once PRN Glucose LESS THAN 70 milligrams/deciliter  senna 2 Tablet(s) Oral at bedtime PRN Constipation  traMADol 50 milliGRAM(s) Oral every 6 hours PRN Severe Pain (7 - 10)      Allergies    No Known Allergies    Intolerances          VITALS  84y  Vital Signs Last 24 Hrs  T(C): 36.6 (08 Apr 2019 05:14), Max: 36.8 (07 Apr 2019 21:55)  T(F): 97.9 (08 Apr 2019 05:14), Max: 98.2 (07 Apr 2019 21:55)  HR: 68 (08 Apr 2019 05:14) (66 - 68)  BP: 114/68 (08 Apr 2019 05:14) (111/70 - 114/68)  BP(mean): --  RR: 14 (08 Apr 2019 05:14) (14 - 14)  SpO2: 99% (08 Apr 2019 05:14) (99% - 100%)  Daily     Daily         RECENT LABS:                          7.7    7.5   )-----------( 191      ( 08 Apr 2019 06:05 )             25.6     04-08    138  |  105  |  40<H>  ----------------------------<  123<H>  3.8   |  21<L>  |  1.10    Ca    9.2      08 Apr 2019 06:05                CAPILLARY BLOOD GLUCOSE      POCT Blood Glucose.: 127 mg/dL (08 Apr 2019 07:34)  POCT Blood Glucose.: 150 mg/dL (07 Apr 2019 22:46)  POCT Blood Glucose.: 145 mg/dL (07 Apr 2019 16:55)  POCT Blood Glucose.: 152 mg/dL (07 Apr 2019 12:12)

## 2019-04-09 ENCOUNTER — TRANSCRIPTION ENCOUNTER (OUTPATIENT)
Age: 84
End: 2019-04-09

## 2019-04-09 PROCEDURE — 99233 SBSQ HOSP IP/OBS HIGH 50: CPT | Mod: GC

## 2019-04-09 PROCEDURE — 99232 SBSQ HOSP IP/OBS MODERATE 35: CPT

## 2019-04-09 RX ORDER — DOCUSATE SODIUM 100 MG
1 CAPSULE ORAL
Qty: 60 | Refills: 0
Start: 2019-04-09 | End: 2019-05-08

## 2019-04-09 RX ORDER — ASPIRIN/CALCIUM CARB/MAGNESIUM 324 MG
1 TABLET ORAL
Qty: 30 | Refills: 0
Start: 2019-04-09 | End: 2019-05-08

## 2019-04-09 RX ORDER — RIVAROXABAN 15 MG-20MG
1 KIT ORAL
Qty: 30 | Refills: 0
Start: 2019-04-09 | End: 2019-05-08

## 2019-04-09 RX ORDER — PREGABALIN 225 MG/1
1 CAPSULE ORAL
Qty: 30 | Refills: 0
Start: 2019-04-09 | End: 2019-05-08

## 2019-04-09 RX ORDER — TAMSULOSIN HYDROCHLORIDE 0.4 MG/1
1 CAPSULE ORAL
Qty: 30 | Refills: 0
Start: 2019-04-09 | End: 2019-05-08

## 2019-04-09 RX ORDER — FOLIC ACID 0.8 MG
1 TABLET ORAL
Qty: 30 | Refills: 0
Start: 2019-04-09 | End: 2019-05-08

## 2019-04-09 RX ORDER — AMLODIPINE BESYLATE 2.5 MG/1
1 TABLET ORAL
Qty: 30 | Refills: 0
Start: 2019-04-09 | End: 2019-05-08

## 2019-04-09 RX ORDER — TRAMADOL HYDROCHLORIDE 50 MG/1
1 TABLET ORAL
Qty: 28 | Refills: 0
Start: 2019-04-09 | End: 2019-04-15

## 2019-04-09 RX ORDER — FAMOTIDINE 10 MG/ML
1 INJECTION INTRAVENOUS
Qty: 30 | Refills: 0
Start: 2019-04-09 | End: 2019-05-08

## 2019-04-09 RX ORDER — TRAMADOL HYDROCHLORIDE 50 MG/1
1 TABLET ORAL
Qty: 28 | Refills: 0 | OUTPATIENT
Start: 2019-04-09 | End: 2019-04-15

## 2019-04-09 RX ORDER — POLYETHYLENE GLYCOL 3350 17 G/17G
17 POWDER, FOR SOLUTION ORAL
Qty: 30 | Refills: 0
Start: 2019-04-09 | End: 2019-05-08

## 2019-04-09 RX ORDER — ACETAMINOPHEN 500 MG
2 TABLET ORAL
Qty: 0 | Refills: 0 | DISCHARGE
Start: 2019-04-09

## 2019-04-09 RX ORDER — ALLOPURINOL 300 MG
3 TABLET ORAL
Qty: 90 | Refills: 0
Start: 2019-04-09 | End: 2019-05-08

## 2019-04-09 RX ORDER — ERYTHROPOIETIN 10000 [IU]/ML
10000 INJECTION, SOLUTION INTRAVENOUS; SUBCUTANEOUS
Qty: 0 | Refills: 0 | DISCHARGE
Start: 2019-04-09

## 2019-04-09 RX ORDER — SENNA PLUS 8.6 MG/1
2 TABLET ORAL
Qty: 0 | Refills: 0 | DISCHARGE
Start: 2019-04-09

## 2019-04-09 RX ORDER — LEVETIRACETAM 250 MG/1
1 TABLET, FILM COATED ORAL
Qty: 60 | Refills: 0 | OUTPATIENT
Start: 2019-04-09 | End: 2019-05-08

## 2019-04-09 RX ORDER — ATORVASTATIN CALCIUM 80 MG/1
1 TABLET, FILM COATED ORAL
Qty: 30 | Refills: 0
Start: 2019-04-09 | End: 2019-05-08

## 2019-04-09 RX ORDER — INDOMETHACIN 50 MG
1 CAPSULE ORAL
Qty: 8 | Refills: 0 | OUTPATIENT
Start: 2019-04-09 | End: 2019-04-12

## 2019-04-09 RX ORDER — LEVETIRACETAM 250 MG/1
1 TABLET, FILM COATED ORAL
Qty: 0 | Refills: 0 | DISCHARGE
Start: 2019-04-09

## 2019-04-09 RX ORDER — LIDOCAINE 4 G/100G
1 CREAM TOPICAL
Qty: 1 | Refills: 0
Start: 2019-04-09

## 2019-04-09 RX ORDER — AMLODIPINE BESYLATE 2.5 MG/1
1 TABLET ORAL
Qty: 0 | Refills: 0 | COMMUNITY

## 2019-04-09 RX ORDER — METOPROLOL TARTRATE 50 MG
1 TABLET ORAL
Qty: 60 | Refills: 0
Start: 2019-04-09 | End: 2019-05-08

## 2019-04-09 RX ADMIN — Medication 25 MILLIGRAM(S): at 17:48

## 2019-04-09 RX ADMIN — TRAMADOL HYDROCHLORIDE 50 MILLIGRAM(S): 50 TABLET ORAL at 07:42

## 2019-04-09 RX ADMIN — LIDOCAINE 1 APPLICATION(S): 4 CREAM TOPICAL at 06:11

## 2019-04-09 RX ADMIN — Medication 300 MILLIGRAM(S): at 11:59

## 2019-04-09 RX ADMIN — AMLODIPINE BESYLATE 10 MILLIGRAM(S): 2.5 TABLET ORAL at 06:10

## 2019-04-09 RX ADMIN — Medication 1 TABLET(S): at 11:59

## 2019-04-09 RX ADMIN — FAMOTIDINE 20 MILLIGRAM(S): 10 INJECTION INTRAVENOUS at 11:59

## 2019-04-09 RX ADMIN — TRAMADOL HYDROCHLORIDE 50 MILLIGRAM(S): 50 TABLET ORAL at 08:27

## 2019-04-09 RX ADMIN — LIDOCAINE 1 PATCH: 4 CREAM TOPICAL at 08:31

## 2019-04-09 RX ADMIN — Medication 1: at 08:29

## 2019-04-09 RX ADMIN — PREGABALIN 1000 MICROGRAM(S): 225 CAPSULE ORAL at 11:59

## 2019-04-09 RX ADMIN — Medication 25 MILLIGRAM(S): at 06:11

## 2019-04-09 RX ADMIN — LEVETIRACETAM 500 MILLIGRAM(S): 250 TABLET, FILM COATED ORAL at 06:10

## 2019-04-09 RX ADMIN — Medication 1 MILLIGRAM(S): at 11:59

## 2019-04-09 RX ADMIN — PANTOPRAZOLE SODIUM 40 MILLIGRAM(S): 20 TABLET, DELAYED RELEASE ORAL at 06:11

## 2019-04-09 RX ADMIN — Medication 100 MILLIGRAM(S): at 06:11

## 2019-04-09 RX ADMIN — LIDOCAINE 1 PATCH: 4 CREAM TOPICAL at 21:07

## 2019-04-09 RX ADMIN — LIDOCAINE 1 APPLICATION(S): 4 CREAM TOPICAL at 21:07

## 2019-04-09 RX ADMIN — Medication 1: at 12:00

## 2019-04-09 RX ADMIN — ATORVASTATIN CALCIUM 10 MILLIGRAM(S): 80 TABLET, FILM COATED ORAL at 21:07

## 2019-04-09 RX ADMIN — Medication 25 MILLIGRAM(S): at 17:56

## 2019-04-09 RX ADMIN — Medication 25 MILLIGRAM(S): at 07:00

## 2019-04-09 RX ADMIN — Medication 81 MILLIGRAM(S): at 11:59

## 2019-04-09 RX ADMIN — Medication 100 MILLIGRAM(S): at 17:49

## 2019-04-09 RX ADMIN — LIDOCAINE 1 APPLICATION(S): 4 CREAM TOPICAL at 13:10

## 2019-04-09 RX ADMIN — Medication 25 MILLIGRAM(S): at 06:10

## 2019-04-09 RX ADMIN — TAMSULOSIN HYDROCHLORIDE 0.4 MILLIGRAM(S): 0.4 CAPSULE ORAL at 21:07

## 2019-04-09 RX ADMIN — LEVETIRACETAM 500 MILLIGRAM(S): 250 TABLET, FILM COATED ORAL at 17:48

## 2019-04-09 RX ADMIN — LIDOCAINE 1 PATCH: 4 CREAM TOPICAL at 10:01

## 2019-04-09 RX ADMIN — RIVAROXABAN 20 MILLIGRAM(S): KIT at 17:48

## 2019-04-09 NOTE — DISCHARGE NOTE PROVIDER - PROVIDER TOKENS
FREE:[LAST:[ Cardiologist],PHONE:[(   )    -],FAX:[(   )    -]] FREE:[LAST:[ Cardiologist],PHONE:[(   )    -],FAX:[(   )    -]],PROVIDER:[TOKEN:[[id number]]],PROVIDER:[TOKEN:[[id number]]]

## 2019-04-09 NOTE — PROGRESS NOTE ADULT - PROBLEM SELECTOR PLAN 2
Supportive H/O care at this time. GF support.
Supportive H/O care at this time. GF/PRN transfusional support. Patient not candidate for systemic therapy currently, with compromised performance status.
Supportive H/O care at this time. GF/PRN transfusional support. Patient not candidate for systemic therapy currently.   Continue to monitor CBC with diff. He continues with rehab.
Supportive H/O care at this time. GF support.
Supportive H/O care at this time. GF support.    ID f/u regarding recent temp. Case discussed with Dr. Zuñiga.
Supportive H/O care at this time. GF support.  Discussed with patient recommended H/O f/u post discharge-he expressed his understanding, and is agreeable.
Supportive H/O care at this time. GF/PRN transfusional support. Patient not candidate for systemic therapy currently.   Continue to monitor CBC with diff.   Patient's questions answered at this time. He plans to confer with his spouse regarding where he will be pursuing hematologic f/u post discharge. Will follow while here.
Supportive H/O care at this time. GF/PRN transfusional support. Patient not candidate for systemic therapy currently.   Continue to monitor CBC with diff. He continues with rehab.
Supportive H/O care at this time. GF/PRN transfusional support. Patient not candidate for systemic therapy currently.   Continue to monitor CBC with diff. He continues with rehab.
Supportive H/O care at this time. GF/PRN transfusional support. Patient not candidate for systemic therapy currently.  Continue to monitor CBC with diff.
Supportive H/O care at this time. GF/PRN transfusional support. Patient not candidate for systemic therapy currently. His questions answered regarding H/O plans of care.  Continue to monitor CBC with diff. He continues with rehab.
Supportive H/O care at this time. GF/PRN transfusional support. Patient not candidate for systemic therapy currently. Poor PS.
Supportive H/O care at this time. GF support.

## 2019-04-09 NOTE — PROGRESS NOTE ADULT - PROBLEM SELECTOR PLAN 1
CKD contributes to patient's anemia as well as h/o GIB. Also, BM c/w CMMoL-Procrit continues.  Vitamin B 12 supplementation for elevated MMA.  With BP issues, could consider PRBC's if further decrease in hemoglobin-f/u CBC 4/8/19 to be done.  Discussed with patient recommended H/O f/u post discharge-he is considering his options regarding where to go for f/u. Offered [facility] as an option. He is considering [facility] as well. His questions answered at this time, and he was appreciative.
CKD contributes to patient's anemia as well as h/o GIB. Also, BM c/w CMMoL-Procrit ordered while patient here.  Vitamin B 12 supplementation for elevated MMA. Hemoglobin stable at present.
CKD contributes to patient's anemia as well as recent GIB. Also, BM c/w CMMoL-would continue with Procrit support.  Vitamin B 12 supplementation for elevated MMA.
84-year-old gentleman with history of stomach cancer and chronic anemia who was admitted with anemia exacerbation. Chronic kidney disease contributes to his anemia, as well as recent GIB. In addition, bone biopsy and bone marrow aspirate showed hypercellular bone marrow with trilineage hematopoiesis, myeloid predominance and atypical monocytosis. Iron stores present. 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 No increase in blasts seen. Await cytogenetics, MDS FISH panel.  Patient would be a candidate for Procrit-however, he did not wish to discuss his hematologic status today. T/C initiating Procrit next week, if patient amenable.  Start B 12 supplementation for elevated MMA.
84-year-old gentleman with history of stomach cancer and chronic anemia who was admitted with anemia exacerbation. Chronic kidney disease contributes to his anemia, as well as recent GIB. In addition, bone marrow biopsy and bone marrow aspirate showed hypercellular bone marrow with trilineage hematopoiesis, myeloid predominance and atypical monocytosis. Iron stores present. 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 No increase in blasts seen. Cytogenetics, MDS FISH panel pending.  Patient is a candidate for Procrit-discussed with patient today-his questions answered, and he is agreeable to injections.  B 12 supplementation for elevated MMA.
84-year-old gentleman with history of stomach cancer and chronic anemia who was admitted with anemia exacerbation. Chronic kidney disease contributes to his anemia, however, s/p recent GIB.   Bone biopsy and bone marrow aspirate showed hypercellular bone marrow with trilineage hematopoiesis, myeloid predominance and atypical monocytosis. Iron stores present. 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 No increase in blasts seen. Await cytogenetics, MDS FISH panel. Discussed bone marrow results thus far with patient. Check erythropoietin level-->to consider Procrit.
CKD contributes to patient's anemia as well as h/o GIB. Also, BM c/w CMMoL-Procrit ordered.  Vitamin B 12 supplementation for elevated MMA.
CKD contributes to patient's anemia as well as h/o GIB. Also, BM c/w CMMoL-Procrit ordered.  Vitamin B 12 supplementation for elevated MMA.
CKD contributes to patient's anemia as well as h/o GIB. Also, BM c/w CMMoL-Procrit to continue (if given as outpatient-would suggest dose of 40,000 units SQ weekly to start).  Vitamin B 12 supplementation for elevated MMA.  If recurrent BP issues, could consider PRBC's if further decrease in hemoglobin. Hemoglobin currently stable.
CKD contributes to patient's anemia as well as h/o GIB. Also, BM c/w CMMoL-Procrit to continue while patient here.  Vitamin B 12 supplementation for elevated MMA. Hemoglobin stable at present.
CKD contributes to patient's anemia as well as h/o GIB. Also, BM c/w CMMoL-Procrit to continue.  Vitamin B 12 supplementation for elevated MMA.  If recurrent BP issues, could consider PRBC's if further decrease in hemoglobin. Hemoglobin currently stable.
CKD contributes to patient's anemia as well as recent GIB. Also, BM c/w CMMoL-continue with Procrit support for now. Consider PRBC transfusion if hemoglobin decreases to <7/increased symptoms develop. GI following. Vitamin B 12 supplementation for elevated MMA.
CKD contributes to patient's anemia as well as recent GIB. Also, BM c/w CMMoL-would continue with Procrit support.  Vitamin B 12 supplementation for elevated MMA.
CKD contributes to patient's anemia as well as recent GIB. Also, BM c/w CMMoL-would continue with Procrit support.  Vitamin B 12 supplementation for elevated MMA. Re-check retic, haptoglobin.
CKD contributes to patient's anemia as well as recent GIB. Also, BM c/w CMMoL-would continue with Procrit support. With recent events noted/"near-syncope," agree with transfusion PRBC's-discussed with patient who agrees. Case discussed with Dr. Romero today. Vitamin B 12 supplementation for elevated MMA. Patient's questions answered at this time.
Continue to Monitor H/H  Continue ASA, Xarelto  Monitor stools
CKD contributes to patient's anemia as well as recent GIB. Also, BM c/w CMMoL-continue with Procrit support for now. Consider PRBC transfusion if hemoglobin decreases to <7/increased symptoms develop. GI following. Vitamin B 12 supplementation for elevated MMA.
Monitor H/H  Continue ASA, Xarelto  Monitor stools

## 2019-04-09 NOTE — DISCHARGE NOTE PROVIDER - CARE PROVIDERS DIRECT ADDRESSES
,DirectAddress_Unknown ,DirectAddress_Unknown,[email or web address],DirectAddress_Unknown

## 2019-04-09 NOTE — DISCHARGE NOTE PROVIDER - CARE PROVIDER_API CALL
Cardiologist,   Phone: (   )    -  Fax: (   )    -  Follow Up Time:  Cardiologist,   Phone: (   )    -  Fax: (   )    -  Follow Up Time:     Jennifer Torres)  Internal Medicine; Medical Oncology  [address], [place] [address]  Phone: [phone]  Fax: [phone]  Follow Up Time:     Sloo Cottrell)  Gastroenterology; Internal Medicine  [address], [address]  [place] [address]  Phone: [phone]  Fax: [phone]  Follow Up Time:

## 2019-04-09 NOTE — DISCHARGE NOTE PROVIDER - NSDCCPCAREPLAN_GEN_ALL_CORE_FT
PRINCIPAL DISCHARGE DIAGNOSIS  Diagnosis: Debility  Assessment and Plan of Treatment: B12 supplementatoin  -heme onc greatly appreciated. Procrit not covered for home injections, will need f/u with heme as outpateint for WEEKLY procrit injections. Heme has offered f/u at [facility], also [facility] as possibilities. Patient and caregiver informed. SW looking into insurance coverage to confirm, also possible administration of injection by home care  -PCP and heme f/u anemia on dc  -Continue home PT/OT        SECONDARY DISCHARGE DIAGNOSES  Diagnosis: S/P lumbar laminectomy  Assessment and Plan of Treatment: for rehab services once medically stable. TLSO OOB    Diagnosis: Right leg DVT  Assessment and Plan of Treatment: no SCDs in place. hold AC for now due to bleed    Diagnosis: Pulmonary emboli  Assessment and Plan of Treatment: monitor respiratory status, off AC now due to bleed    Diagnosis: History of carotid artery stenosis  Assessment and Plan of Treatment: h/o stent. Off DAPT due to acute bleed    Diagnosis: History of TIAs  Assessment and Plan of Treatment: s/p carotid stent placement

## 2019-04-09 NOTE — PROGRESS NOTE ADULT - SUBJECTIVE AND OBJECTIVE BOX
HPI  Pt is a 83yo M w multiple PMH is admitted to acute rehab for debility secondary to anemia likely from myelodysplastic syndrome s/p PRBC.   Pt was seen and examined at bedtime. No complaints at this time. gout flare improving. Hemodynamically stable, BGM in range.     Vital Signs Last 24 Hrs  T(C): 36.4 (09 Apr 2019 08:57), Max: 36.8 (08 Apr 2019 21:00)  T(F): 97.6 (09 Apr 2019 08:57), Max: 98.3 (08 Apr 2019 21:00)  HR: 76 (09 Apr 2019 08:57) (62 - 83)  BP: 117/62 (09 Apr 2019 08:57) (111/55 - 117/62)  BP(mean): --  RR: 14 (09 Apr 2019 08:57) (14 - 15)  SpO2: 100% (09 Apr 2019 08:57) (100% - 100%)    I&O's Summary      CAPILLARY BLOOD GLUCOSE      POCT Blood Glucose.: 162 mg/dL (09 Apr 2019 11:50)  POCT Blood Glucose.: 155 mg/dL (09 Apr 2019 07:45)  POCT Blood Glucose.: 151 mg/dL (08 Apr 2019 22:32)  POCT Blood Glucose.: 142 mg/dL (08 Apr 2019 17:17)    PHYSICAL EXAM:    Constitutional: NAD, awake and alert, well-developed  HEENT: PERR, EOMI, Normal Hearing, MMM  Neck: Soft and supple, No LAD, No JVD  Respiratory: Breath sounds are clear bilaterally, No wheezing, rales or rhonchi  Cardiovascular: S1 and S2, irregular rate and rhythm,   Gastrointestinal: Bowel Sounds present, soft, nontender, nondistended, no guarding, no rebound  Extremities: No peripheral edema  Vascular: 2+ peripheral pulses  Neurological: A/O x 3, no focal deficits  Musculoskeletal: multiple MC joint swelling and redness  Skin: MC finger joint pain, minimal redness noted     MEDICATIONS:  MEDICATIONS  (STANDING):  allopurinol 300 milliGRAM(s) Oral daily  amLODIPine   Tablet 10 milliGRAM(s) Oral daily  aspirin enteric coated 81 milliGRAM(s) Oral daily  atorvastatin 10 milliGRAM(s) Oral at bedtime  cyanocobalamin 1000 MICROGram(s) Oral daily  dextrose 5%. 1000 milliLiter(s) (50 mL/Hr) IV Continuous <Continuous>  dextrose 50% Injectable 12.5 Gram(s) IV Push once  dextrose 50% Injectable 25 Gram(s) IV Push once  dextrose 50% Injectable 25 Gram(s) IV Push once  docusate sodium 100 milliGRAM(s) Oral two times a day  epoetin jackelyn Injectable 44324 Unit(s) SubCutaneous <User Schedule>  famotidine    Tablet 20 milliGRAM(s) Oral daily  folic acid 1 milliGRAM(s) Oral daily  indomethacin 25 milliGRAM(s) Oral two times a day  insulin lispro (HumaLOG) corrective regimen sliding scale   SubCutaneous three times a day before meals  insulin lispro (HumaLOG) corrective regimen sliding scale   SubCutaneous at bedtime  levETIRAcetam 500 milliGRAM(s) Oral two times a day  lidocaine   Patch 1 Patch Transdermal <User Schedule>  lidocaine 5% Ointment 1 Application(s) Topical three times a day  metoprolol tartrate 25 milliGRAM(s) Oral every 12 hours  multivitamin 1 Tablet(s) Oral daily  pantoprazole    Tablet 40 milliGRAM(s) Oral before breakfast  polyethylene glycol 3350 17 Gram(s) Oral daily  rivaroxaban 20 milliGRAM(s) Oral every 24 hours  sodium chloride 0.9%. 1000 milliLiter(s) (100 mL/Hr) IV Continuous <Continuous>  tamsulosin 0.4 milliGRAM(s) Oral at bedtime      LABS: All Labs Reviewed:                        7.7    7.5   )-----------( 191      ( 08 Apr 2019 06:05 )             25.6     04-08    138  |  105  |  40<H>  ----------------------------<  123<H>  3.8   |  21<L>  |  1.10    Ca    9.2      08 Apr 2019 06:05            Blood Culture:     RADIOLOGY/EKG:    DVT PPX:    ADVANCED DIRECTIVE:    DISPOSITION:

## 2019-04-09 NOTE — PROGRESS NOTE ADULT - ASSESSMENT
#84 male with PMH significant for HTN, HLD, Afib, stomach CA, anemia, gout, BPH. seizure d/o, recently stopped EToh use, p/w chronic anemia possibly due to myelodysplastic syndrome, s/p tx PRBC, debility and functional decline    #Anemia/debility: multifactorial: Chronic kidney disease, recent GIB. 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   -  Supportive care at this time. Followed by heme  -B12 supplementatoin  -heme onc greatly appreciated. Procrit not covered for home injections, will need f/u with heme as outpateint for WEEKLY procrit injections. Heme has offered f/u at [facility], also [facility] as possibilities. Patient and caregiver informed  -CBC 4/8 STABLE 7.7    #LGI bleed: resolved, followed by GI. CBC stable 4/8  - xarelto and ASA restarted  -transfuse for Hgb<7    #elevated FS; now improved, controlled 4/8  -CCD    CgH2A=8, given age, monitor for now, nutrition/diabetic education for dc      #Afib:   - Continue metoprolol, Xarelto  -rate controlled    #HTN/periods orthostasis:-  Continue norvasc, lasix.   -. continue to encourage time OOB, sitting, LE movement . continue ACE wraps LE. Slow transitional movements, reclining back wheelchair. Discussed with caregivers, encourage patient compliance    # Joint pain/acute gout:   - tramadol, 50 mg q6 severe pain  - indomethacin, last dose 25 bid 4/3. Now with beginnings of another flare. H/H and renal function stable, c/w 25 bid x 6 doses. Discussed with patient, caregiver, hospitalist  -continue allpurinol 300 mg daily    #SZ/tremors:   - Controlled on keppra     #DVT prophylaxis:   -on xarelto    #Mood:  -refuses neuropsychology services or medications    #Case discussed in IDT rounds 4/9  -patient and caregiver refuse [facility]/SNF, state they will provide 24 hour supervision at home. Currently mod-max assist for mobility and transfers, and will likely be wheelchair level at home. At this time, difficulty tolerating 3 hours of rehab, patient not interested in hospice level/comfort care, and would likely benefit more from being in his own surroundings with 24 hour assist and home care  -Patient would benefit from hospital bed at home (delivery 4/9 or 4/10) due to cardiac history, significant lower extremity swelling and orthostasis; repeated gouty flares with multiple joint pain and swelling; and risk for skin breakdown due to progressively bedbound status, Recommend electric hospital bed with rails to assist for transfers and bed mobility, adjustable head and leg sections.   -target potential dc 4/9,   Pharmacy: [facility] [phone]  -procrit and BP machine sent to home pharmacy #84 male with PMH significant for HTN, HLD, Afib, stomach CA, anemia, gout, BPH. seizure d/o, recently stopped EToh use, p/w chronic anemia possibly due to myelodysplastic syndrome, s/p tx PRBC, debility and functional decline    #Anemia/debility: multifactorial: Chronic kidney disease, recent GIB. 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   -B12 supplementatoin  -heme onc greatly appreciated. Procrit not covered for home injections, will need f/u with heme as outpateint for WEEKLY procrit injections. Heme has offered f/u at [facility], also [facility] as possibilities. Patient and caregiver informed. SW looking into insurance coverage to confirm, also possible administration of injection by home care  -PCP and heme f/u anemia on dc. CBC 4/8 stable    #LGI bleed: resolved,  - xarelto and ASA restarted, H/H stable  -transfuse for Hgb<7    #elevated FS; now improved, controlled 4/8  -CCD    IoB4A=5, given age, monitor for now, nutrition/diabetic education for dc      #Afib:   - Continue metoprolol, Xarelto  -rate controlled    #HTN/periods orthostasis:-  Continue norvasc, lasix.   -. continue to encourage time OOB, sitting, LE movement . continue ACE wraps LE. Slow transitional movements, reclining back wheelchair. Discussed with caregivers, encourage patient compliance  -reinforced with team. Family and patient have declined [facility] to continue to work on OOB activities with therapy, have also declined hospice care     # Joint pain/acute gout:   - tramadol, 50 mg q6 severe pain  - indomethacin, last dose 25 bid 4/3. Now with beginnings of another flare. H/H and renal function stable, c/w 25 bid x 6 doses (day #2 4/9). Discussed with patient, caregiver, hospitalist  -continue allpurinol 300 mg daily    #SZ/tremors:   - Controlled on keppra     #DVT prophylaxis:   -on xarelto    #Mood:  -refuses neuropsychology services or medications    #Case discussed in IDT rounds 4/9  -patient and caregiver refuse [facility]/SNF, state they will provide 24 hour supervision at home. Currently mod-max assist for mobility and transfers, and will likely be wheelchair level at home. At this time, difficulty tolerating 3 hours of rehab, patient not interested in hospice level/comfort care, and would likely benefit more from being in his own surroundings with 24 hour assist and home care  -Patient would benefit from hospital bed at home (delivery 4/9 or 4/10) due to cardiac history, significant lower extremity swelling and orthostasis; repeated gouty flares with multiple joint pain and swelling; and risk for skin breakdown due to progressively bedbound status, Recommend electric hospital bed with rails to assist for transfers and bed mobility, adjustable head and leg sections.   -target potential dc 4/9,   Pharmacy: [facility] [phone]  -procrit and BP machine sent to home pharmacy

## 2019-04-09 NOTE — PROGRESS NOTE ADULT - PROBLEM SELECTOR PROBLEM 2
Chronic myelomonocytic leukemia not having achieved remission

## 2019-04-09 NOTE — PROGRESS NOTE ADULT - ATTENDING COMMENTS
Consultant notes reviewed : YES [x ] ; NO [ ]
Fall, aspiration, decub precautions  Selene Cohn MD
Fall, aspiration, decub precautions  Selene Cohn MD
Selene Cohn MD
Seen and examined with resident. Agree with note.   Patient with elevation of wbc ct.  Discussed with Hospitalist.  Heme-Onc consult called.
Consultant notes reviewed : YES [x ] ; NO [ ]
Patient seen and examined.  Agree with assessment and plan as above.  He has had some drop in Hgb/Hct with rectal bleeding; transfused 1 unit PRBC.  Now appears comfortable.  No abdominal pain.    Continue to monitor Hgb/Hct and bowel movements for now.  Continue asprin & xarelto for now.
Consultant notes reviewed : YES [x ] ; NO [ ]
Patient seen and examined.  Agree with assessment and plan.  Patient is seen in physical rehab session.  No further rectal bleeding noted.    Hgb/Hct to be drawn tomorrow.  Continue present diet & anticoagulation for now.
Consultant notes reviewed : YES [x ] ; NO [ ]
Consultant notes reviewed : YES [x ] ; NO [ ]  Case discussed with attending physician / consultant: YES [x ] ; NO [ ]
Consultant notes reviewed : YES [x ] ; NO [ ]  Case discussed with attending physician / consultant: YES [x ] ; NO [ ]
Consultant notes reviewed : YES [x ] ; NO [ ]

## 2019-04-09 NOTE — PROGRESS NOTE ADULT - SUBJECTIVE AND OBJECTIVE BOX
YUE KING   84y   Male    Admitting: SAEED Gallego  HPI:  84-year-old gentleman with history of anemia, atrial fibrillation and seizure disorder admitted with anemia exacerbation from his [facility] facility. Patient noted recent history of bright red blood per rectum. He was scheduled for outpatient colonoscopy. S/P BM biopsy.  Patient reported he stopped drinking alcohol approximately 3 months prior to admission.  Patient is now on the acute rehabilitation unit.    PAST MEDICAL & SURGICAL HISTORY:  Nonrheumatic aortic valve stenosis  Cancer of stomach  Anemia, unspecified type: Anemia  Seizure disorder  Benign prostatic hyperplasia, unspecified whether lower urinary tract symptoms present  Hyperlipemia  Essential hypertension  Chronic atrial fibrillation  Stomach cancer  Hypertension  Atrial fibrillation  Gout  No significant past surgical history  No significant past surgical history    HEALTH ISSUES - PROBLEM Dx:  Anemia  Ureteral stone: Ureteral stone  Chronic myelomonocytic leukemia not having achieved remission: Chronic myelomonocytic leukemia not having achieved remission  Anemia, unspecified type: Anemia, unspecified type  Rectal bleed      MEDICATIONS  (STANDING):  allopurinol 300 milliGRAM(s) Oral daily  amLODIPine   Tablet 10 milliGRAM(s) Oral daily  aspirin enteric coated 81 milliGRAM(s) Oral daily  atorvastatin 10 milliGRAM(s) Oral at bedtime  cyanocobalamin 1000 MICROGram(s) Oral daily  dextrose 5%. 1000 milliLiter(s) (50 mL/Hr) IV Continuous <Continuous>  dextrose 50% Injectable 12.5 Gram(s) IV Push once  dextrose 50% Injectable 25 Gram(s) IV Push once  dextrose 50% Injectable 25 Gram(s) IV Push once  docusate sodium 100 milliGRAM(s) Oral two times a day  epoetin jackelyn Injectable 95112 Unit(s) SubCutaneous <User Schedule>  famotidine    Tablet 20 milliGRAM(s) Oral daily  folic acid 1 milliGRAM(s) Oral daily  indomethacin 25 milliGRAM(s) Oral two times a day  insulin lispro (HumaLOG) corrective regimen sliding scale   SubCutaneous three times a day before meals  insulin lispro (HumaLOG) corrective regimen sliding scale   SubCutaneous at bedtime  levETIRAcetam 500 milliGRAM(s) Oral two times a day  lidocaine   Patch 1 Patch Transdermal <User Schedule>  lidocaine 5% Ointment 1 Application(s) Topical three times a day  metoprolol tartrate 25 milliGRAM(s) Oral every 12 hours  multivitamin 1 Tablet(s) Oral daily  pantoprazole    Tablet 40 milliGRAM(s) Oral before breakfast  polyethylene glycol 3350 17 Gram(s) Oral daily  rivaroxaban 20 milliGRAM(s) Oral every 24 hours  sodium chloride 0.9%. 1000 milliLiter(s) (100 mL/Hr) IV Continuous <Continuous>  tamsulosin 0.4 milliGRAM(s) Oral at bedtime    MEDICATIONS  (PRN):  acetaminophen   Tablet .. 650 milliGRAM(s) Oral every 6 hours PRN Mild Pain (1 - 3)  bisacodyl Suppository 10 milliGRAM(s) Rectal daily PRN Constipation  dextrose 40% Gel 15 Gram(s) Oral once PRN Blood Glucose LESS THAN 70 milliGRAM(s)/deciliter  glucagon  Injectable 1 milliGRAM(s) IntraMuscular once PRN Glucose LESS THAN 70 milligrams/deciliter  senna 2 Tablet(s) Oral at bedtime PRN Constipation  traMADol 50 milliGRAM(s) Oral every 6 hours PRN Severe Pain (7 - 10)    Allergies    No Known Allergies      INTERVAL HPI/OVERNIGHT EVENTS:  Patient S&E at bedside. Resting comfortably.    VITAL SIGNS:  T(F): 97.6 (04-09-19 @ 08:57)  HR: 76 (04-09-19 @ 08:57)  BP: 117/62 (04-09-19 @ 08:57)  RR: 14 (04-09-19 @ 08:57)  SpO2: 100% (04-09-19 @ 08:57)    PHYSICAL EXAM:  Constitutional: NAD  Eyes: sclera non-icteric  Neck: no JVD  Respiratory: CTA ant.  Cardiovascular: RRR, no M/R/G  Abdomen: soft, NT; +BS  Extremities: no calf tenderness    Labs:             7.7    7.5   )-----------( 191      ( 04-08 @ 06:05 )             25.6       04-08    138  |  105  |  40<H>  ----------------------------<  123<H>  3.8   |  21<L>  |  1.10    Ca    9.2      08 Apr 2019 06:05

## 2019-04-09 NOTE — PROGRESS NOTE ADULT - NSHPATTENDINGPLANDISCUSS_GEN_ALL_CORE
patient; Dr. Gallego, rehab team
patient; rehab team
patient; rehab team
Jesus Manuel, DO; patient; partner; SW; RN; PT, OT
Paula Romero, ; ART RN. patient, hospitalist

## 2019-04-09 NOTE — DISCHARGE NOTE PROVIDER - HOSPITAL COURSE
Patient is 84 male RH dominant with PMH significant for HTN, HLD,  Afib (off xarelto), stomach CA, anemia, gout, BPH. seizure d/o, recently stopped EToh use, who presented from [facility] with abnormal labs.  Pt reports recent admission to [facility] for gout flare then discharged to rehab.  Pt now returns with guaiac negative anemia (HgB 6.8 ) and  episode of bright red blood in stool. Pt also found with leukocytosis, with temp 100.7 F rectally.  Denies chills, fever, sob, chest pain, weakness, dysuria.    Patient was seen by GI and hematology.  S/P Colonoscopy on 3/1/19. No source of bleeding noted. Negative FOB. S/P EGD during prior admission, and no bleeding identified. Heme performed BM biopsy 3/4, results pending. CKD, Etoh may contribute, although underlying BM d/o such as myelodysplasia suspected. Transfused PRBC. Patient transferred to - IRF due to debility and functional decline.    Pt was medically optimized and discharged to acute rehabilitation on 03/6.    Received daily rehabilitation services consisting of PT/OT/SLP. Tolerated therapy and progressed to mod-max assist for mobility and transfers, and will likely be wheelchair level at home. Family training was completed. Acute rehabilitation course was significant for R knee swelling on 3/6 and a Xray was completed.  Pt's gout flare improved on indomethacin and allopurinol and CBCs were routinely monitored. On 3/18, pt had episode of bloody BM with h/o diverticulosis and on 3/19 H/H stable with no evidence of bleed. XARELTO/ASA  was RESTARTED. In addition on 3/21 and 3/22, pt had RRTs for vaso-vagal episodes. hgb was 8.1 and 1U PRBC was given  per heme. On 3/27, indomethacin 25 mg bid x 4 doses for subacute gouty flare. On 4/5, RRT was called for low BP and pt got IVF. On 4/8, pt started on indomethacin again 25 bid x 3 days for gout. Labs stable. Medications and discharge follow up appointments reviewed with patient and discharged home with home therapy services  and private hire.                  RECENT LABS:                   7.7      7.5   )-----------( 191      ( 08 Apr 2019 06:05 )               25.6         04-08        138  |  105  |  40<H>    ----------------------------<  123<H>    3.8   |  21<L>  |  1.10        Ca    9.2      08 Apr 2019 06:05 Patient is 84 male RH dominant with PMH significant for HTN, HLD,  Afib (off xarelto), stomach CA, anemia, gout, BPH. seizure d/o, recently stopped EToh use, who presented from [facility] with abnormal labs.  Pt reports recent admission to [facility] for gout flare then discharged to rehab.  Pt now returns with guaiac negative anemia (HgB 6.8 ) and  episode of bright red blood in stool. Pt also found with leukocytosis, with temp 100.7 F rectally.  Denies chills, fever, sob, chest pain, weakness, dysuria.    Patient was seen by GI and hematology.  S/P Colonoscopy on 3/1/19. No source of bleeding noted. Negative FOB. S/P EGD during prior admission, and no bleeding identified. [facility] performed BM biopsy 3/4, results pending. CKD, Etoh may contribute, although underlying BM d/o such as myelodysplasia suspected. Transfused PRBC. Patient transferred to - IRF due to debility and functional decline.    Pt was medically optimized and discharged to acute rehabilitation on 03/6.        Received daily rehabilitation services consisting of PT/OT/SLP. Tolerated therapy and progressed to mod-max assist for mobility and transfers, and will likely be wheelchair level at home. Family training was completed. Acute rehabilitation course was significant for R knee swelling on 3/6 and a Xray was completed.  Pt's gout flare improved on indomethacin and allopurinol and CBCs were routinely monitored. On 3/18, pt had episode of bloody BM with h/o diverticulosis and on 3/19 H/H stable with no evidence of bleed. XARELTO/ASA  was RESTARTED. In addition on 3/21 and 3/22, pt had RRTs for vaso-vagal episodes. hgb was 8.1 and 1U PRBC was given  per heme. 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  On 3/27, indomethacin 25 mg bid x 4 doses for subacute gouty flare. Patient has had periods of orthostasis , hwoever was not felt to be candidate for medications such as midodrine due to cardiac history. Recommend binder, ACE wraps LE, slow transitional movements and progressive time OOB. He had caregiver training, and was not felt to be able to be managed at home at his current functional level, and is transferred to [facility] for continued OT, PT and skilled nursing services.                 RECENT LABS:                   7.7      7.5   )-----------( 191      ( 08 Apr 2019 06:05 )               25.6         04-08        138  |  105  |  40<H>    ----------------------------<  123<H>    3.8   |  21<L>  |  1.10        Ca    9.2      08 Apr 2019 06:05

## 2019-04-09 NOTE — PROGRESS NOTE ADULT - ASSESSMENT
84-year-old gentleman with history of anemia, atrial fibrillation and seizure disorder admitted with anemia exacerbation from his [place] facility. Patient notes recent history of bright red blood per rectum. He was scheduled for outpatient colonoscopy.  On admission, found to have rectal temp of 100.7°F in the emergency department.  Patient reported he stopped drinking alcohol approximately 3 months prior to admission

## 2019-04-09 NOTE — PROGRESS NOTE ADULT - SUBJECTIVE AND OBJECTIVE BOX
Patient is a 84y old  Male who presents with a chief complaint of anemia, debility and functional decline (08 Apr 2019 08:26)    HPI: Patient is 84 male RH dominant with PMH significant for HTN, HLD,  Afib (off xarelto), stomach CA, anemia, gout, BPH. seizure d/o, recently stopped EToh use, who presented from [facility] with abnormal labs.  Pt reports recent admission to [facility] for gout flare then discharged to rehab.  Pt now returns with guaiac negative anemia (HgB 6.8 ) and  episode of bright red blood in stool. Pt also found with leukocytosis, with temp 100.7 F rectally.  Denies chills, fever, sob, chest pain, weakness, dysuria.  Patient was seen by GI and hematology.  S/P Colonoscopy on 3/1/19. No source of bleeding noted. Negative FOB. S/P EGD during prior admission, and no bleeding identified. Heme performed BM biopsy 3/4, results pending. CKD, Etoh may contribute, although underlying BM d/o such as myelodysplasia suspected. Transfused PRBC. Patient transferred to - [facility] due to debility and functional decline (06 Mar 2019 14:53)    Interval History: Patient was seen and examined at bedside. Pt had no acute events overnight. Pt denies having any complaints at this time. Pain is controlled. Pt still awaiting bed to be delivered to his home prior to discharge.     Review of Systems:   · Negative General Symptoms	no fever; no chills	  · General Comments	Patient was seen eating breakfast this morning, good spirits, NAD. 	    Physical Exam:   · Constitutional	detailed exam	  · Constitutional Details	no distress	  · Constitutional Comments	alert, O x 3 .mood improved,	  		  · Respiratory Details	normal; respirations non-labored; clear to auscultation bilaterally	  · Cardiovascular	detailed exam	  · Cardiovascular Details	irregular rate and rhythm	  		  · Gastrointestinal	detailed exam	  · GI Normal	normal; soft; nontender; bowel sounds normal	  · Extremities	detailed exam	  · Extremities Comments	no calf swelling  +soft, NT no erythema or warm 1+ pedal edema and trace swelling ankles no significant warmth 	   VITALS:   T(C): 36.4 (04-09-19 @ 08:57)  T(F): 97.6 (04-09-19 @ 08:57), Max: 98.3 (04-08-19 @ 21:00)  HR: 76 (04-09-19 @ 08:57) (62 - 83)  BP: 117/62 (04-09-19 @ 08:57) (111/55 - 117/62)  RR:  (14 - 15)  SpO2:  (100% - 100%)  Wt(kg): --    RECENT LABS:                          7.7    7.5   )-----------( 191      ( 08 Apr 2019 06:05 )             25.6     04-08    138  |  105  |  40<H>  ----------------------------<  123<H>  3.8   |  21<L>  |  1.10    Ca    9.2      08 Apr 2019 06:05      MEDICATIONS  (STANDING):  allopurinol 300 milliGRAM(s) Oral daily  amLODIPine   Tablet 10 milliGRAM(s) Oral daily  aspirin enteric coated 81 milliGRAM(s) Oral daily  atorvastatin 10 milliGRAM(s) Oral at bedtime  cyanocobalamin 1000 MICROGram(s) Oral daily  dextrose 5%. 1000 milliLiter(s) (50 mL/Hr) IV Continuous <Continuous>  dextrose 50% Injectable 12.5 Gram(s) IV Push once  dextrose 50% Injectable 25 Gram(s) IV Push once  dextrose 50% Injectable 25 Gram(s) IV Push once  docusate sodium 100 milliGRAM(s) Oral two times a day  epoetin jackelyn Injectable 19963 Unit(s) SubCutaneous <User Schedule>  famotidine    Tablet 20 milliGRAM(s) Oral daily  folic acid 1 milliGRAM(s) Oral daily  indomethacin 25 milliGRAM(s) Oral two times a day  insulin lispro (HumaLOG) corrective regimen sliding scale   SubCutaneous three times a day before meals  insulin lispro (HumaLOG) corrective regimen sliding scale   SubCutaneous at bedtime  levETIRAcetam 500 milliGRAM(s) Oral two times a day  lidocaine   Patch 1 Patch Transdermal <User Schedule>  lidocaine 5% Ointment 1 Application(s) Topical three times a day  metoprolol tartrate 25 milliGRAM(s) Oral every 12 hours  multivitamin 1 Tablet(s) Oral daily  pantoprazole    Tablet 40 milliGRAM(s) Oral before breakfast  polyethylene glycol 3350 17 Gram(s) Oral daily  rivaroxaban 20 milliGRAM(s) Oral every 24 hours  sodium chloride 0.9%. 1000 milliLiter(s) (100 mL/Hr) IV Continuous <Continuous>  tamsulosin 0.4 milliGRAM(s) Oral at bedtime    MEDICATIONS  (PRN):  acetaminophen   Tablet .. 650 milliGRAM(s) Oral every 6 hours PRN Mild Pain (1 - 3)  bisacodyl Suppository 10 milliGRAM(s) Rectal daily PRN Constipation  dextrose 40% Gel 15 Gram(s) Oral once PRN Blood Glucose LESS THAN 70 milliGRAM(s)/deciliter  glucagon  Injectable 1 milliGRAM(s) IntraMuscular once PRN Glucose LESS THAN 70 milligrams/deciliter  senna 2 Tablet(s) Oral at bedtime PRN Constipation  traMADol 50 milliGRAM(s) Oral every 6 hours PRN Severe Pain (7 - 10) Patient is a 84y old  Male who presents with a chief complaint of anemia, debility and functional decline (08 Apr 2019 08:26)    HPI: Patient is 84 male RH dominant with PMH significant for HTN, HLD,  Afib (off xarelto), stomach CA, anemia, gout, BPH. seizure d/o, recently stopped EToh use, who presented from [facility] with abnormal labs.  Pt reports recent admission to [facility] for gout flare then discharged to rehab.  Pt now returns with guaiac negative anemia (HgB 6.8 ) and  episode of bright red blood in stool. Pt also found with leukocytosis, with temp 100.7 F rectally.  Denies chills, fever, sob, chest pain, weakness, dysuria.  Patient was seen by GI and hematology.  S/P Colonoscopy on 3/1/19. No source of bleeding noted. Negative FOB. S/P EGD during prior admission, and no bleeding identified. Heme performed BM biopsy 3/4, results pending. CKD, Etoh may contribute, although underlying BM d/o such as myelodysplasia suspected. Transfused PRBC. Patient transferred to - [facility] due to debility and functional decline (06 Mar 2019 14:53)    Interval History: Patient was seen and examined at bedside. Pt had no acute events overnight. Pt denies having any complaints at this time. Pain is controlled. Pt still awaiting bed to be delivered to his home prior to discharge.     Review of Systems:   · Negative General Symptoms	no fever; no chills	  · General Comments	Patient was seen eating breakfast this morning, good spirits, NAD. Reports pain significantly better on indomethacin. ACE wraps in place. Aware that awaiting delivery of hospital bed for discharge, delayed	    Physical Exam:   · Constitutional	detailed exam	  · Constitutional Details	no distress	  · Constitutional Comments	alert, O x 3 .mood improved,	  		  · Respiratory Details	normal; respirations non-labored; clear to auscultation bilaterally	  · Cardiovascular	detailed exam	  · Cardiovascular Details	irregular rate and rhythm	  		  · Gastrointestinal	detailed exam	  · GI Normal	normal; soft; nontender; bowel sounds normal	  · Extremities	detailed exam	  · Extremities Comments	no calf swelling  +soft, NT no erythema or warm REDUCED pedal edema and trace swelling ankles no significant warmth trace left knee residual effusion and warmth but significantly improved, no right knee warmth 	   VITALS:   T(C): 36.4 (04-09-19 @ 08:57)  T(F): 97.6 (04-09-19 @ 08:57), Max: 98.3 (04-08-19 @ 21:00)  HR: 76 (04-09-19 @ 08:57) (62 - 83)  BP: 117/62 (04-09-19 @ 08:57) (111/55 - 117/62)  RR:  (14 - 15)  SpO2:  (100% - 100%)  Wt(kg): --    RECENT LABS:                          7.7    7.5   )-----------( 191      ( 08 Apr 2019 06:05 )             25.6     04-08    138  |  105  |  40<H>  ----------------------------<  123<H>  3.8   |  21<L>  |  1.10    Ca    9.2      08 Apr 2019 06:05      MEDICATIONS  (STANDING):  allopurinol 300 milliGRAM(s) Oral daily  amLODIPine   Tablet 10 milliGRAM(s) Oral daily  aspirin enteric coated 81 milliGRAM(s) Oral daily  atorvastatin 10 milliGRAM(s) Oral at bedtime  cyanocobalamin 1000 MICROGram(s) Oral daily  dextrose 5%. 1000 milliLiter(s) (50 mL/Hr) IV Continuous <Continuous>  dextrose 50% Injectable 12.5 Gram(s) IV Push once  dextrose 50% Injectable 25 Gram(s) IV Push once  dextrose 50% Injectable 25 Gram(s) IV Push once  docusate sodium 100 milliGRAM(s) Oral two times a day  epoetin jackelyn Injectable 67689 Unit(s) SubCutaneous <User Schedule>  famotidine    Tablet 20 milliGRAM(s) Oral daily  folic acid 1 milliGRAM(s) Oral daily  indomethacin 25 milliGRAM(s) Oral two times a day  insulin lispro (HumaLOG) corrective regimen sliding scale   SubCutaneous three times a day before meals  insulin lispro (HumaLOG) corrective regimen sliding scale   SubCutaneous at bedtime  levETIRAcetam 500 milliGRAM(s) Oral two times a day  lidocaine   Patch 1 Patch Transdermal <User Schedule>  lidocaine 5% Ointment 1 Application(s) Topical three times a day  metoprolol tartrate 25 milliGRAM(s) Oral every 12 hours  multivitamin 1 Tablet(s) Oral daily  pantoprazole    Tablet 40 milliGRAM(s) Oral before breakfast  polyethylene glycol 3350 17 Gram(s) Oral daily  rivaroxaban 20 milliGRAM(s) Oral every 24 hours  sodium chloride 0.9%. 1000 milliLiter(s) (100 mL/Hr) IV Continuous <Continuous>  tamsulosin 0.4 milliGRAM(s) Oral at bedtime    MEDICATIONS  (PRN):  acetaminophen   Tablet .. 650 milliGRAM(s) Oral every 6 hours PRN Mild Pain (1 - 3)  bisacodyl Suppository 10 milliGRAM(s) Rectal daily PRN Constipation  dextrose 40% Gel 15 Gram(s) Oral once PRN Blood Glucose LESS THAN 70 milliGRAM(s)/deciliter  glucagon  Injectable 1 milliGRAM(s) IntraMuscular once PRN Glucose LESS THAN 70 milligrams/deciliter  senna 2 Tablet(s) Oral at bedtime PRN Constipation  traMADol 50 milliGRAM(s) Oral every 6 hours PRN Severe Pain (7 - 10)

## 2019-04-09 NOTE — PROGRESS NOTE ADULT - PROBLEM SELECTOR PROBLEM 1
Anemia, unspecified type
Rectal bleed
Anemia, unspecified type
Rectal bleed

## 2019-04-10 ENCOUNTER — TRANSCRIPTION ENCOUNTER (OUTPATIENT)
Age: 84
End: 2019-04-10

## 2019-04-10 VITALS
OXYGEN SATURATION: 100 % | SYSTOLIC BLOOD PRESSURE: 110 MMHG | RESPIRATION RATE: 14 BRPM | HEART RATE: 84 BPM | TEMPERATURE: 98 F | DIASTOLIC BLOOD PRESSURE: 66 MMHG

## 2019-04-10 PROCEDURE — 80048 BASIC METABOLIC PNL TOTAL CA: CPT

## 2019-04-10 PROCEDURE — 87150 DNA/RNA AMPLIFIED PROBE: CPT

## 2019-04-10 PROCEDURE — 82668 ASSAY OF ERYTHROPOIETIN: CPT

## 2019-04-10 PROCEDURE — 36430 TRANSFUSION BLD/BLD COMPNT: CPT

## 2019-04-10 PROCEDURE — 85730 THROMBOPLASTIN TIME PARTIAL: CPT

## 2019-04-10 PROCEDURE — 83010 ASSAY OF HAPTOGLOBIN QUANT: CPT

## 2019-04-10 PROCEDURE — 36415 COLL VENOUS BLD VENIPUNCTURE: CPT

## 2019-04-10 PROCEDURE — 87186 SC STD MICRODIL/AGAR DIL: CPT

## 2019-04-10 PROCEDURE — 86880 COOMBS TEST DIRECT: CPT

## 2019-04-10 PROCEDURE — 97140 MANUAL THERAPY 1/> REGIONS: CPT

## 2019-04-10 PROCEDURE — 83615 LACTATE (LD) (LDH) ENZYME: CPT

## 2019-04-10 PROCEDURE — 97124 MASSAGE THERAPY: CPT

## 2019-04-10 PROCEDURE — 84436 ASSAY OF TOTAL THYROXINE: CPT

## 2019-04-10 PROCEDURE — 97530 THERAPEUTIC ACTIVITIES: CPT

## 2019-04-10 PROCEDURE — 93970 EXTREMITY STUDY: CPT

## 2019-04-10 PROCEDURE — 82962 GLUCOSE BLOOD TEST: CPT

## 2019-04-10 PROCEDURE — 82247 BILIRUBIN TOTAL: CPT

## 2019-04-10 PROCEDURE — 84443 ASSAY THYROID STIM HORMONE: CPT

## 2019-04-10 PROCEDURE — 93005 ELECTROCARDIOGRAM TRACING: CPT

## 2019-04-10 PROCEDURE — 85610 PROTHROMBIN TIME: CPT

## 2019-04-10 PROCEDURE — 80053 COMPREHEN METABOLIC PANEL: CPT

## 2019-04-10 PROCEDURE — 85045 AUTOMATED RETICULOCYTE COUNT: CPT

## 2019-04-10 PROCEDURE — 87040 BLOOD CULTURE FOR BACTERIA: CPT

## 2019-04-10 PROCEDURE — 87086 URINE CULTURE/COLONY COUNT: CPT

## 2019-04-10 PROCEDURE — 86923 COMPATIBILITY TEST ELECTRIC: CPT

## 2019-04-10 PROCEDURE — 86850 RBC ANTIBODY SCREEN: CPT

## 2019-04-10 PROCEDURE — 83036 HEMOGLOBIN GLYCOSYLATED A1C: CPT

## 2019-04-10 PROCEDURE — 97112 NEUROMUSCULAR REEDUCATION: CPT

## 2019-04-10 PROCEDURE — 84550 ASSAY OF BLOOD/URIC ACID: CPT

## 2019-04-10 PROCEDURE — 86901 BLOOD TYPING SEROLOGIC RH(D): CPT

## 2019-04-10 PROCEDURE — 97110 THERAPEUTIC EXERCISES: CPT

## 2019-04-10 PROCEDURE — 97167 OT EVAL HIGH COMPLEX 60 MIN: CPT

## 2019-04-10 PROCEDURE — 85027 COMPLETE CBC AUTOMATED: CPT

## 2019-04-10 PROCEDURE — 86900 BLOOD TYPING SEROLOGIC ABO: CPT

## 2019-04-10 PROCEDURE — 99239 HOSP IP/OBS DSCHRG MGMT >30: CPT

## 2019-04-10 PROCEDURE — 97535 SELF CARE MNGMENT TRAINING: CPT

## 2019-04-10 PROCEDURE — 81001 URINALYSIS AUTO W/SCOPE: CPT

## 2019-04-10 PROCEDURE — 78582 LUNG VENTILAT&PERFUS IMAGING: CPT

## 2019-04-10 PROCEDURE — 97116 GAIT TRAINING THERAPY: CPT

## 2019-04-10 PROCEDURE — 84145 PROCALCITONIN (PCT): CPT

## 2019-04-10 PROCEDURE — 83605 ASSAY OF LACTIC ACID: CPT

## 2019-04-10 PROCEDURE — 71045 X-RAY EXAM CHEST 1 VIEW: CPT

## 2019-04-10 PROCEDURE — P9016: CPT

## 2019-04-10 PROCEDURE — 85379 FIBRIN DEGRADATION QUANT: CPT

## 2019-04-10 RX ADMIN — POLYETHYLENE GLYCOL 3350 17 GRAM(S): 17 POWDER, FOR SOLUTION ORAL at 12:00

## 2019-04-10 RX ADMIN — LIDOCAINE 1 PATCH: 4 CREAM TOPICAL at 08:18

## 2019-04-10 RX ADMIN — Medication 1: at 12:01

## 2019-04-10 RX ADMIN — LEVETIRACETAM 500 MILLIGRAM(S): 250 TABLET, FILM COATED ORAL at 05:38

## 2019-04-10 RX ADMIN — Medication 1 TABLET(S): at 11:59

## 2019-04-10 RX ADMIN — Medication 1 MILLIGRAM(S): at 12:01

## 2019-04-10 RX ADMIN — Medication 100 MILLIGRAM(S): at 05:38

## 2019-04-10 RX ADMIN — Medication 25 MILLIGRAM(S): at 08:18

## 2019-04-10 RX ADMIN — PREGABALIN 1000 MICROGRAM(S): 225 CAPSULE ORAL at 12:00

## 2019-04-10 RX ADMIN — Medication 25 MILLIGRAM(S): at 05:38

## 2019-04-10 RX ADMIN — LIDOCAINE 1 APPLICATION(S): 4 CREAM TOPICAL at 12:00

## 2019-04-10 RX ADMIN — ERYTHROPOIETIN 10000 UNIT(S): 10000 INJECTION, SOLUTION INTRAVENOUS; SUBCUTANEOUS at 08:50

## 2019-04-10 RX ADMIN — LIDOCAINE 1 APPLICATION(S): 4 CREAM TOPICAL at 05:38

## 2019-04-10 RX ADMIN — Medication 81 MILLIGRAM(S): at 12:00

## 2019-04-10 RX ADMIN — Medication 300 MILLIGRAM(S): at 12:00

## 2019-04-10 RX ADMIN — FAMOTIDINE 20 MILLIGRAM(S): 10 INJECTION INTRAVENOUS at 11:59

## 2019-04-10 RX ADMIN — PANTOPRAZOLE SODIUM 40 MILLIGRAM(S): 20 TABLET, DELAYED RELEASE ORAL at 05:38

## 2019-04-10 RX ADMIN — Medication 1: at 08:21

## 2019-04-10 RX ADMIN — LIDOCAINE 1 PATCH: 4 CREAM TOPICAL at 08:15

## 2019-04-10 NOTE — DISCHARGE NOTE NURSING/CASE MANAGEMENT/SOCIAL WORK - NSDPFAC_GEN_ALL_CORE
[facility], [place], [place]  [phone]

## 2019-04-10 NOTE — PROGRESS NOTE ADULT - SUBJECTIVE AND OBJECTIVE BOX
Patient is a 84y old  Male who presents with a chief complaint of anemia, debility and functional decline (09 Apr 2019 16:59)      HPI:  Patient is 84 male RH dominant with PMH significant for HTN, HLD,  Afib (off xarelto), stomach CA, anemia, gout, BPH. seizure d/o, recently stopped EToh use, who presented from [facility] with abnormal labs.  Pt reports recent admission to [facility] for gout flare then discharged to rehab.  Pt now returns with guaiac negative anemia (HgB 6.8 ) and  episode of bright red blood in stool. Pt also found with leukocytosis, with temp 100.7 F rectally.  Denies chills, fever, sob, chest pain, weakness, dysuria.    Patient was seen by GI and hematology.  S/P Colonoscopy on 3/1/19. No source of bleeding noted. Negative FOB. S/P EGD during prior admission, and no bleeding identified. Heme performed BM biopsy 3/4, results pending. CKD, Etoh may contribute, although underlying BM d/o such as myelodysplasia suspected. Transfused PRBC. Patient transferred to - [facility] due to debility and functional decline (06 Mar 2019 14:53)      PAST MEDICAL & SURGICAL HISTORY:  Nonrheumatic aortic valve stenosis  Cancer of stomach  Anemia, unspecified type: Anemia  Seizure disorder  Benign prostatic hyperplasia, unspecified whether lower urinary tract symptoms present  Hyperlipemia  Essential hypertension  Chronic atrial fibrillation  Stomach cancer  Hypertension  Atrial fibrillation  Gout  No significant past surgical history  No significant past surgical history      MEDICATIONS  (STANDING):  allopurinol 300 milliGRAM(s) Oral daily  amLODIPine   Tablet 10 milliGRAM(s) Oral daily  aspirin enteric coated 81 milliGRAM(s) Oral daily  atorvastatin 10 milliGRAM(s) Oral at bedtime  cyanocobalamin 1000 MICROGram(s) Oral daily  dextrose 5%. 1000 milliLiter(s) (50 mL/Hr) IV Continuous <Continuous>  dextrose 50% Injectable 12.5 Gram(s) IV Push once  dextrose 50% Injectable 25 Gram(s) IV Push once  dextrose 50% Injectable 25 Gram(s) IV Push once  docusate sodium 100 milliGRAM(s) Oral two times a day  famotidine    Tablet 20 milliGRAM(s) Oral daily  folic acid 1 milliGRAM(s) Oral daily  indomethacin 25 milliGRAM(s) Oral two times a day  insulin lispro (HumaLOG) corrective regimen sliding scale   SubCutaneous three times a day before meals  insulin lispro (HumaLOG) corrective regimen sliding scale   SubCutaneous at bedtime  levETIRAcetam 500 milliGRAM(s) Oral two times a day  lidocaine   Patch 1 Patch Transdermal <User Schedule>  lidocaine 5% Ointment 1 Application(s) Topical three times a day  metoprolol tartrate 25 milliGRAM(s) Oral every 12 hours  multivitamin 1 Tablet(s) Oral daily  pantoprazole    Tablet 40 milliGRAM(s) Oral before breakfast  polyethylene glycol 3350 17 Gram(s) Oral daily  rivaroxaban 20 milliGRAM(s) Oral every 24 hours  sodium chloride 0.9%. 1000 milliLiter(s) (100 mL/Hr) IV Continuous <Continuous>  tamsulosin 0.4 milliGRAM(s) Oral at bedtime    MEDICATIONS  (PRN):  acetaminophen   Tablet .. 650 milliGRAM(s) Oral every 6 hours PRN Mild Pain (1 - 3)  bisacodyl Suppository 10 milliGRAM(s) Rectal daily PRN Constipation  dextrose 40% Gel 15 Gram(s) Oral once PRN Blood Glucose LESS THAN 70 milliGRAM(s)/deciliter  glucagon  Injectable 1 milliGRAM(s) IntraMuscular once PRN Glucose LESS THAN 70 milligrams/deciliter  senna 2 Tablet(s) Oral at bedtime PRN Constipation  traMADol 50 milliGRAM(s) Oral every 6 hours PRN Severe Pain (7 - 10)      Allergies    No Known Allergies    Intolerances          VITALS  84y  Vital Signs Last 24 Hrs  T(C): 36.6 (10 Apr 2019 08:55), Max: 36.6 (10 Apr 2019 08:55)  T(F): 97.8 (10 Apr 2019 08:55), Max: 97.8 (10 Apr 2019 08:55)  HR: 84 (10 Apr 2019 08:55) (84 - 95)  BP: 110/66 (10 Apr 2019 08:55) (110/66 - 126/78)  BP(mean): --  RR: 14 (10 Apr 2019 08:55) (14 - 14)  SpO2: 100% (10 Apr 2019 08:55) (100% - 100%)  Daily     Daily         RECENT LABS:                      CAPILLARY BLOOD GLUCOSE      POCT Blood Glucose.: 195 mg/dL (10 Apr 2019 11:54)  POCT Blood Glucose.: 154 mg/dL (10 Apr 2019 08:17)  POCT Blood Glucose.: 157 mg/dL (09 Apr 2019 21:10)  POCT Blood Glucose.: 147 mg/dL (09 Apr 2019 17:01)

## 2019-04-10 NOTE — PROGRESS NOTE ADULT - RS GEN PE MLT RESP DETAILS PC
respirations non-labored/normal/clear to auscultation bilaterally
no wheezes/no rales/normal/no rhonchi/respirations non-labored
clear to auscultation bilaterally/breath sounds equal/respirations non-labored
clear to auscultation bilaterally/no rales/fair effort/respirations non-labored/no rhonchi/no wheezes
good air movement/respirations non-labored/clear to auscultation bilaterally/normal
no rales/clear to auscultation bilaterally/fair effort/no wheezes
no rales/no rhonchi/no wheezes
no rales/no wheezes/respirations non-labored/clear to auscultation bilaterally
no wheezes/respirations non-labored/clear to auscultation bilaterally/no rales/no rhonchi
respirations non-labored/clear to auscultation bilaterally/normal
respirations non-labored/no rales/clear to auscultation bilaterally/breath sounds equal
good air movement/clear to auscultation bilaterally/respirations non-labored/breath sounds equal/clear bases
normal/clear to auscultation bilaterally/respirations non-labored
clear to auscultation bilaterally/respirations non-labored/breath sounds equal
clear to auscultation bilaterally/respirations non-labored/normal
normal/clear to auscultation bilaterally/respirations non-labored
respirations non-labored/no rhonchi/no wheezes/no rales
breath sounds equal/respirations non-labored/clear to auscultation bilaterally
good air movement/respirations non-labored/clear to auscultation bilaterally/normal
no rhonchi/no wheezes/no rales/respirations non-labored
clear to auscultation bilaterally/no rales/no wheezes/no rhonchi/breath sounds equal/respirations non-labored
no rales/no rhonchi/clear to auscultation bilaterally/respirations non-labored/no wheezes
normal/breath sounds equal/respirations non-labored/clear to auscultation bilaterally
respirations non-labored/clear to auscultation bilaterally
respirations non-labored/clear to auscultation bilaterally/breath sounds equal
good air movement/clear to auscultation bilaterally/respirations non-labored
clear to auscultation bilaterally/no wheezes/no rhonchi/no rales

## 2019-04-10 NOTE — PROGRESS NOTE ADULT - PROVIDER SPECIALTY LIST ADULT
Gastroenterology
Gastroenterology
Heme/Onc
Hospitalist
Infectious Disease
Physiatry
Rehab Medicine
Rehab Medicine
Hospitalist
Hospitalist
Heme/Onc
Physiatry

## 2019-04-10 NOTE — PROGRESS NOTE ADULT - EXTREMITIES COMMENTS
no calf swelling +soft, NT no erythema or warmth in calves  NO warmth in bilateral ankles, trace pedal edema  trace left knee warmth, no effusion, no right knee swelling or warmth  no hand swelling
no swelling or redness or effusion in IP joints bilateral hands and wrists  trace ankle warmth, trace sewlling  no knee efffusion or warmth  trace pedal edema
BLE ACE wraps, improved overall swelling calves and feet  no redness  right hand IP joint and finger swelling greatly imrpoved, improved AROm, no warmth or redness  mild tremors right hand today
trace swelling ankles, no redness, no TTP  1-2+ edema LE, distensible, non tender calves  no hand/finger swelling +chronic changes IP joints gout/OA
BLE 1-2+ edema, worse in ankles and feet  paulina rythema or warmth  no TTP +distensible
left hand trace swelling (near IV site, not indurated or warm)  significantly reduced warmth left elbow and shoulder, no swelling  right knee cool, lidoderm patch in place  left knee mld-mod warmth with trace swelling  right ankle mild warmth  no TTP  1+ pedal edema, trace calf swelling, soft, distensible
no calf swelling   +trace bilateral pedal edema  right ankel trace warmth  left anle trace-mild effusion, trace warmth  bilateral knees trace warmth (INCREASED from yesterday)  reduced left hand edeme, reduced warmth  reduced left elbow warmth, no swelling
no right knee effusion or warmth  trace left knee warmth no effusion  trace bilateral foot edema, non pitting  trace ankle effusion no signfiicant warmth
right hand swelling improved, no redness or warmth in IP joints, improved AROM  right knee mild effusion, trace warmth hardik laterally no redness  no joint line TTP  left kne trace effusion and warmth  significantly reduced LE swelling +distensible, NT
right knee patch in place no effusion or warmth  NO left knee warmth today no effusion  no significant ankle warmth, trace effusion  no hand swelling
trace effusion and increased warmth without redness in left knee  trace warmth no swelling right knee  trace warmth and swelling ankles left > right  calves soft, NT
trace left ankle warmth, no effusion  calves soft, NT no erythema or warmth  no significant pedal edema  no right ankle warmth  no hand swelling or warmth
trace warmth left ankle, mild swelling  right akle trace swelling no warmth  no significant effusino right knee, no warmth or erythema  calves NT +1 edema in ACE wraps
fingers no further swelling  chronic arthritic changes as well as gouty tophi  no warmth in bialteral knees  no significant effusion  swellingi n LE also significantly irmpoved, bialteral ACE wraps in place
no significant knee effusion or warmth today  RLE 2+ edema LLE 1-2+ edema  no TTP LE  trace swelling and redness IP joint right hand II finger
right knee NO effusion or warmth  left knee NO effusion this morning, no warmth (resolved)  bilateral ankels trace effusion, reduced warmth  no warmthright elbow, trace left elbow  left fingers trace swelling +effusion PIP joints  calves soft, distensible, improved
ACe wraps in place  trace anle warmth bilaterally  trace warmth and effusion, significantly imrpovedin knees compared to yesterday, sl more in right
no swelling or warmth in IP joints  trace effusion and trace warmth right knee no TTP joint line  calves 1-2+swelling, softer in left, 2+ in right no erytheam NT
bilateral LE with ACE wraps (cannot fit TEDs) with improvement  left hand withotu swelling  right hand II finger swollen, joint swollen but no warmth or joint TTP
calf swelling nearly resolved bilaterally, no erythema or warmthcalves soft, NT  right knee efuffion resolved, cool. pain much better with lidoderm patch  left knee mild effusion and warmth  left ankle mild effusion and warmth  left elbobw, shoulder warm, mild-mod, right elbow mild warmth  no redness  tremors
no calf swelling  +soft, NT no erythema or warmt1+ pedal edema and trace swelling ankles no significant warmth  trace warmth bilateral knees no significant effusion, NTP
right knee trace effusion and warmth  calves and ankles 1+ edema, soft, distensible NT
left hand trace swelling hardik III nad IV digits, trace erythema  NO effusion or warmth left knee  trace right ankle swelling, no warmth  no left ankle swelling or warmth  no calf TTP significantly imrpoved
right knee effusion significantly improved, trace warmth (sig improved) trace effusion  reduced joint line TTP  no calf TTP trace swelling (improved)  +tremors (which dick states he can sometimes control volitionally)
significant improvement in LE swelling, trace calf swelling 1+ pedal edema  no significant joint effusion, trace warmth, no erythema knees  no hand swelling
trace calf swelling and ankle swelling similar to admission  no warmth or erythema  calves soft and distensible  right knee effusion trace, reduced pain  left heel with blister
no calf swelling no pedal edema  right knee no significant effusion trace warmth  no left knee effusion or warmth  no ankle swelling or warmth

## 2019-04-10 NOTE — PROGRESS NOTE ADULT - ASSESSMENT
#84 male with PMH significant for HTN, HLD, Afib, stomach CA, anemia, gout, BPH. seizure d/o, recently stopped EToh use, p/w chronic anemia possibly due to myelodysplastic syndrome, s/p tx PRBC, debility and functional decline    #Anemia/debility: multifactorial: Chronic kidney disease, recent GIB. 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   CBC 4/8 stable  -B12 supplementatoin  -continue procrit  -PCP and heme f/u anemia on dc.    #LGI bleed: resolved,  - xarelto and ASA restarted, H/H stable  -transfuse for Hgb<7    #DM:  QwH5R=1, given age, monitor for now, nutrition/diabetic education for dc  -CCD       #Afib:   - Continue metoprolol, Xarelto  -rate controlled    #HTN/periods orthostasis:-  Continue norvasc, lasix.   -. continue to encourage time OOB, sitting, LE movement . continue ACE wraps LE.  -reinforced with team. Family and patient have declined [facility] to continue to work on OOB activities with therapy, have also declined hospice care     # Joint pain/acute gout:   - tramadol, 50 mg q6 severe pain  - indomethacin, 25 bid x 6 doses (day #3 4/10). Discussed with patient, caregiver, hospitalist  -continue allpurinol 300 mg daily    #SZ/tremors:   - Controlled on keppra     #DVT prophylaxis:   -on xarelto    #Mood:  -refuses neuropsychology services or medications    #Patient and caregiver have agreed to [facility] following caregiver training as well as medical needsi ncluding procrit. Accepted to [facility] today, medically cleared for transfer

## 2019-04-10 NOTE — PROGRESS NOTE ADULT - GENERAL COMMENTS
Patient continues to looks stronger. Mood improved, received procrit injection. No dizziness, gouty pain much better controlled although still present. No B/B complaints
Patient felt some fatigue yesterday but denies CP, SOB, palpitations, dizziness. Had initial decrease in H?H but repeat CBC with stable numbers. BP and HR stable
Patient stable, had espidoe of dizziness while in chair this morning SBP 91 but was not wearing stockings. got better after chair reclined slightly.
Pateint reoprts this morning that he "did not have a terrible night." Reports wanting to do therapy today, pain is better than yesterday morning.
Patient denies dizziness yesterday during therapies. appears mildly fatigued, but also seen at earlier time than usual and has not sat up for breakfast. pain in LE controlled
Patient feels MUCH better this morning, comfortable, feels he can utlize hands, motivated to resume therapy. No new complaints. No N/V no hematuria no blood in stool. vitals stable
Patient in bed, looks better than yesterday. Denies cough, dysuria, change in color of urine, hesitancy, diarrhea. WBC still increasing
Patient in bed, mood stable, no BRBPR. had dizziness on transitional movements yesterday. No N/V. Pain b cricket controlled, more comfortable
Patient in bed. Feeling slightly chiled, but states it is his baseline, not feverish. no cough, no diarrhea or dysuria, no N/V. Fatigued, but better than yesterday. no new joint complaints, controlledo n current emdications. (113/67 - 138/88)
Patient irritable. STates did not sleep last night because IV pump kept beeping. Does NOT want IVF this morning or overnight
Patient looks much better today, pain significantly imrpoved with restart of indomethacin 25 bid. no BRBPR, mood better. no new complaints
Patient looks much improved, no tremors in bed, denies feelign dizziness but hasn't sat up yet. no new complaitns
Patient stable this morning, no complaints of pain, currently no dizziness, palpitations. Nervous about getting OOB because of history othostasis. improtance of increasing time OOB discussed
Patient stable. Denies CP, palpitations, SOB,. diaphoresis, No N/V, no agdominal pain. No further BRBPR, no hematuria. Gouty pain/arthritic pain bearable, present but significantly reduced from last week.. When asked about bloody stool "that's what they tell me, I didn't see it" denies straining, hematochezia
Patient stable. Less periods orthostasis "I feel good" Mood also imrpoved, seen in OT. Denies pain in hands but noted to be more sweollen right
Patient was seen eating breakfast this morning, good spirits, NAD. Pain controlled, on lidoderm patch for knees. During therapy in AM, had orthostasis, on binder and ACE wraps but no syncope,.  Noted to be NAD, joking later, cleared to resume therapies as he tends to be orthostatic upon first transferring from bed to chair, labs yesterday stable , encourage increased time OOB, binder, wraps, also followed by hospitalists
Patient with episode low BP this AM SBP 90's mild dizziness Denies CP, palpitaitons, diaphoresis. Sitting in chair, tremors. Seen by neuro. Per patient THESE ARE NOT CHills, but tremors, which has gotten progressively worse in last 2 months
Patient's mood fair, stable. Looks well. Denies H/a, dizziness, SOB, CP, palpiations. No further BRBPR last 36 hours. No N/V, tolerating po. GI appreciated, informed patient
patient reports increased pain in knees and ankles that started last night. also felt slightly dizzy upon sitting up to eat breakfast. Per staff, was non compliant with TEDs or ACE wraps over weekend. Encouraged to usee
Patient afebrile overnight. Appears fatigued, dysphoric. Pain "all over" cannot express which specific joints. rates 15/10 although does not appear restless or distressed.. Tremors significnatly improved
Patient in much better mood today. Gouty pain significantly improved, smiling. Start of procrit and results of discussions with cardiology were reviewed with pateint and caregiver yesterday. No new complaints today. no tremors
Patient looks well this morning. States pain significantly better after indomethacin and increased allopurinol. tremors improved. no urinary or bowel complaints.
Patient was in bathroom this morning, very large BM and felt dizzy, lightheaded, near-syncope without LOC. Placed back in bed, BP, HR stable, FS stable. O x 3
Patient was upset this morning. Was not able to toelrate V/Q scan this morning due to claustrophobia,. Currently off AC (previously on xarelto); had doppler performed for chronic LE swelling, could not r/o DVT, and D dimer performed over the weekend was elevated . Possibility of restarting AC given cardiac history and possiblity DVT discussed. Patient wants private cardiologist, Dr. Lois Wood ([facility]) to be contacted, says he is upset that his cardiologist will know what to do
Patient states he has NO pain in joints today. Still feels fatigued, mood fair. STates he and partner have agreed to [facility]

## 2019-04-10 NOTE — PROGRESS NOTE ADULT - SUBJECTIVE AND OBJECTIVE BOX
Date/Time Patient Seen:  		  Referring MD:   Data Reviewed	       Patient is a 83y old  Male who presents with a chief complaint of Generalized weakness. (29 Dec 2018 17:18)      Subjective/HPI     PAST MEDICAL & SURGICAL HISTORY:  Stomach cancer  Hypertension  Atrial fibrillation  Gout  No significant past surgical history        Medication list         MEDICATIONS  (STANDING):  allopurinol 100 milliGRAM(s) Oral daily  amLODIPine   Tablet 10 milliGRAM(s) Oral daily  apixaban 2.5 milliGRAM(s) Oral every 12 hours  aspirin enteric coated 81 milliGRAM(s) Oral daily  colchicine 0.6 milliGRAM(s) Oral two times a day  folic acid 1 milliGRAM(s) Oral daily  levETIRAcetam 500 milliGRAM(s) Oral two times a day  metoprolol tartrate 12.5 milliGRAM(s) Oral every 12 hours  multivitamin 1 Tablet(s) Oral daily  piperacillin/tazobactam IVPB. 3.375 Gram(s) IV Intermittent every 8 hours  tamsulosin 0.4 milliGRAM(s) Oral at bedtime    MEDICATIONS  (PRN):  acetaminophen   Tablet .. 650 milliGRAM(s) Oral every 6 hours PRN Temp greater or equal to 38C (100.4F)  aluminum hydroxide/magnesium hydroxide/simethicone Suspension 30 milliLiter(s) Oral every 6 hours PRN Dyspepsia  bisacodyl 5 milliGRAM(s) Oral every 12 hours PRN Constipation  guaiFENesin   Syrup  (Sugar-Free) 200 milliGRAM(s) Oral every 6 hours PRN Cough         Vitals log        ICU Vital Signs Last 24 Hrs  T(C): 36.8 (30 Dec 2018 04:00), Max: 36.9 (29 Dec 2018 20:01)  T(F): 98.2 (30 Dec 2018 04:00), Max: 98.5 (29 Dec 2018 20:01)  HR: 50 (30 Dec 2018 05:06) (50 - 74)  BP: 139/66 (30 Dec 2018 04:00) (121/73 - 150/76)  BP(mean): 86 (30 Dec 2018 04:00) (75 - 95)  ABP: --  ABP(mean): --  RR: 15 (30 Dec 2018 05:06) (10 - 19)  SpO2: 99% (30 Dec 2018 05:06) (95% - 100%)           Input and Output:  I&O's Detail    29 Dec 2018 07:01  -  30 Dec 2018 07:00  --------------------------------------------------------  IN:    IV PiggyBack: 300 mL    Oral Fluid: 30 mL  Total IN: 330 mL    OUT:    Voided: 1300 mL  Total OUT: 1300 mL    Total NET: -970 mL          Lab Data                        7.3    8.94  )-----------( 266      ( 30 Dec 2018 06:16 )             22.8     12-30    142  |  106  |  43<H>  ----------------------------<  213<H>  4.0   |  26  |  1.55<H>    Ca    9.7      30 Dec 2018 06:16  Mg     1.9     12-29              Review of Systems	      Objective     Physical Examination    frail  weal  alert  verbal  cn grossly int  abd soft  lung dec BS      Pertinent Lab findings & Imaging      Jonathan:  NO   Adequate UO     I&O's Detail    29 Dec 2018 07:01  -  30 Dec 2018 07:00  --------------------------------------------------------  IN:    IV PiggyBack: 300 mL    Oral Fluid: 30 mL  Total IN: 330 mL    OUT:    Voided: 1300 mL  Total OUT: 1300 mL    Total NET: -970 mL               Discussed with:     Cultures:	        Radiology copious irrigation

## 2019-04-10 NOTE — PROGRESS NOTE ADULT - GENERAL
details…

## 2019-04-10 NOTE — PROGRESS NOTE ADULT - REASON FOR ADMISSION
anemia, debility and functional decline

## 2019-04-10 NOTE — PROGRESS NOTE ADULT - CONSTITUTIONAL COMMENTS
alert, smiles at examiners. Fatigued, reduced sustained endurance and eye opening
Patient alert O x 3 irritable due to medical issues and possible mddication changes, however defers decisions to his private cardiologist Dr. Lois Wood who was contacted earlier
Alert O x 3. noted to have bags of cookies, donuts at bedside
O x 3 NAD
alert NAD O x 3 mood fair
alert O x 3 fair mood, makes needs known. mild tremors hands (baseline) stable
alert O x 3. mood fair. Caregivers here for training today
alert, mood fair, stable NAD
alert, stable mood. mildly tremulous, NAD O x 4 sitting up
alert, still appears somewhat fatigued, but not distress, not actively in pain in bed
mood fair, NAD
NAd Ox  3 mood stable
alert O x 3
Upset that private cardiologist has not been involved in communication and decision making. REassured that he will be contacted today.
alert O x 3
alert O x 3 NAD "300% better"
alert, O x 3 .mood improved,
Patient angry at tests and reduced sleep. No tremors/shaking in bed this morning
alert O x 3. mood fair. NAD, was not complaining of dizziness this AM on exam, however was sitting 50 degrees HOB elevated
alert, Ox 4 NAD
Patient alert, fatigued, weak, frustrated and dysphoric. in discomfort due to pain O x 3
Patient frustrated with blood work and cultures. Denies any new symptoms, mentions "this is why I hate being in the hospital, they always find something" "I'm at your mercy" O x 3
alert O x 3 No cough. improved endurance, brighter affect
alert mood fair but stable. NAD no diaphoresis O x 3. REFUSED neuropsychology intervention today
alert O x 3 baseline mental status, no dysarthria. After being put back in bed "I feel much better"
alert O x 3, no significant tremors this morning
alert, alert O x 3. seems less distressed than yesterday, but wants to take indomethacin before therapy. asks about therapy schedule, able to recount AM events

## 2019-04-10 NOTE — PROGRESS NOTE ADULT - RESPIRATORY
detailed exam

## 2019-04-10 NOTE — PROGRESS NOTE ADULT - GASTROINTESTINAL DETAILS
bowel sounds normal/soft/nontender/normal
no guarding/soft/nontender/no rigidity
normal/soft/nontender
bowel sounds normal/normal/soft
bowel sounds normal/soft/nontender/no guarding/no rebound tenderness/no rigidity
bowel sounds normal/soft/nontender/normal
nontender/soft/normal
normal/no rebound tenderness/no rigidity/no distention/soft/no guarding
normal/soft/nontender
soft/nontender
soft/nontender/Had Bm this weekend/normal/bowel sounds normal
bowel sounds normal/soft/nontender
nontender/soft/normal/bowel sounds normal
soft/nontender/no distention
normal/no distention/soft/bowel sounds normal/nontender
normal/soft
soft/nontender/bowel sounds normal/normal
bowel sounds normal/soft/nontender
nontender/soft/bowel sounds normal
normal/bowel sounds normal/nontender/soft
normal/nontender/soft/bowel sounds normal
no rebound tenderness/soft/bowel sounds normal/no rigidity/no guarding
bowel sounds normal/normal/nontender/soft
no guarding/soft/nontender/no rigidity/normal
soft/nontender/no distention/normal/bowel sounds normal
soft/normal/nontender

## 2019-04-10 NOTE — DISCHARGE NOTE NURSING/CASE MANAGEMENT/SOCIAL WORK - NSDCDPATPORTLINK_GEN_ALL_CORE
You can access the [facility], offered by [facility], by registering with the following website: [email or web address]

## 2019-04-11 ENCOUNTER — INBOUND DOCUMENT (OUTPATIENT)
Age: 84
End: 2019-04-11

## 2019-05-30 ENCOUNTER — OUTPATIENT (OUTPATIENT)
Dept: OUTPATIENT SERVICES | Facility: HOSPITAL | Age: 84
LOS: 1 days | Discharge: HOME | End: 2019-05-30

## 2019-05-30 DIAGNOSIS — C93.10 CHRONIC MYELOMONOCYTIC LEUKEMIA NOT HAVING ACHIEVED REMISSION: ICD-10-CM

## 2019-05-30 DIAGNOSIS — D64.9 ANEMIA, UNSPECIFIED: ICD-10-CM

## 2019-05-30 DIAGNOSIS — D46.9 MYELODYSPLASTIC SYNDROME, UNSPECIFIED: ICD-10-CM

## 2019-06-03 ENCOUNTER — APPOINTMENT (OUTPATIENT)
Dept: INFUSION THERAPY | Facility: HOSPITAL | Age: 84
End: 2019-06-03

## 2019-06-03 ENCOUNTER — RESULT REVIEW (OUTPATIENT)
Age: 84
End: 2019-06-03

## 2019-06-03 LAB
ANISOCYTOSIS BLD QL: SLIGHT — SIGNIFICANT CHANGE UP
BASOPHILS # BLD AUTO: 0 K/UL — SIGNIFICANT CHANGE UP (ref 0–0.2)
BASOPHILS NFR BLD AUTO: 1 % — SIGNIFICANT CHANGE UP (ref 0–2)
ELLIPTOCYTES BLD QL SMEAR: SLIGHT — SIGNIFICANT CHANGE UP
EOSINOPHIL # BLD AUTO: 0 K/UL — SIGNIFICANT CHANGE UP (ref 0–0.5)
EOSINOPHIL NFR BLD AUTO: 1 % — SIGNIFICANT CHANGE UP (ref 0–6)
HCT VFR BLD CALC: 24.3 % — LOW (ref 39–50)
HGB BLD-MCNC: 8.1 G/DL — LOW (ref 13–17)
HYPOCHROMIA BLD QL: SLIGHT — SIGNIFICANT CHANGE UP
LYMPHOCYTES # BLD AUTO: 2.5 K/UL — SIGNIFICANT CHANGE UP (ref 1–3.3)
LYMPHOCYTES # BLD AUTO: 29 % — SIGNIFICANT CHANGE UP (ref 13–44)
MACROCYTES BLD QL: SIGNIFICANT CHANGE UP
MCHC RBC-ENTMCNC: 33.3 G/DL — SIGNIFICANT CHANGE UP (ref 32–36)
MCHC RBC-ENTMCNC: 35.3 PG — HIGH (ref 27–34)
MCV RBC AUTO: 106 FL — HIGH (ref 80–100)
MICROCYTES BLD QL: SLIGHT — SIGNIFICANT CHANGE UP
MONOCYTES # BLD AUTO: 4.5 K/UL — HIGH (ref 0–0.9)
MONOCYTES NFR BLD AUTO: 45 % — HIGH (ref 2–14)
NEUTROPHILS # BLD AUTO: 2.7 K/UL — SIGNIFICANT CHANGE UP (ref 1.8–7.4)
NEUTROPHILS NFR BLD AUTO: 21 % — LOW (ref 43–77)
NEUTS BAND # BLD: 3 % — SIGNIFICANT CHANGE UP (ref 0–8)
PLAT MORPH BLD: NORMAL — SIGNIFICANT CHANGE UP
PLATELET # BLD AUTO: 250 K/UL — SIGNIFICANT CHANGE UP (ref 150–400)
POIKILOCYTOSIS BLD QL AUTO: SLIGHT — SIGNIFICANT CHANGE UP
POLYCHROMASIA BLD QL SMEAR: SLIGHT — SIGNIFICANT CHANGE UP
RBC # BLD: 2.29 M/UL — LOW (ref 4.2–5.8)
RBC # FLD: 18.1 % — HIGH (ref 10.3–14.5)
RBC BLD AUTO: ABNORMAL
WBC # BLD: 9.7 K/UL — SIGNIFICANT CHANGE UP (ref 3.8–10.5)
WBC # FLD AUTO: 9.7 K/UL — SIGNIFICANT CHANGE UP (ref 3.8–10.5)

## 2019-06-04 DIAGNOSIS — M10.9 GOUT, UNSPECIFIED: ICD-10-CM

## 2019-06-04 DIAGNOSIS — Z71.89 OTHER SPECIFIED COUNSELING: ICD-10-CM

## 2019-06-07 ENCOUNTER — APPOINTMENT (OUTPATIENT)
Dept: HEMATOLOGY ONCOLOGY | Facility: CLINIC | Age: 84
End: 2019-06-07
Payer: MEDICARE

## 2019-06-07 VITALS
HEART RATE: 70 BPM | OXYGEN SATURATION: 100 % | RESPIRATION RATE: 16 BRPM | TEMPERATURE: 97.7 F | DIASTOLIC BLOOD PRESSURE: 69 MMHG | SYSTOLIC BLOOD PRESSURE: 125 MMHG

## 2019-06-07 VITALS
WEIGHT: 172.4 LBS | HEIGHT: 72 IN | BODY MASS INDEX: 23.35 KG/M2 | OXYGEN SATURATION: 100 % | TEMPERATURE: 98 F | RESPIRATION RATE: 16 BRPM | DIASTOLIC BLOOD PRESSURE: 69 MMHG | HEART RATE: 70 BPM | SYSTOLIC BLOOD PRESSURE: 125 MMHG

## 2019-06-07 DIAGNOSIS — I48.91 UNSPECIFIED ATRIAL FIBRILLATION: ICD-10-CM

## 2019-06-07 DIAGNOSIS — Z86.39 PERSONAL HISTORY OF OTHER ENDOCRINE, NUTRITIONAL AND METABOLIC DISEASE: ICD-10-CM

## 2019-06-07 DIAGNOSIS — Z86.79 PERSONAL HISTORY OF OTHER DISEASES OF THE CIRCULATORY SYSTEM: ICD-10-CM

## 2019-06-07 DIAGNOSIS — Z87.438 PERSONAL HISTORY OF OTHER DISEASES OF MALE GENITAL ORGANS: ICD-10-CM

## 2019-06-07 DIAGNOSIS — Z85.028 PERSONAL HISTORY OF OTHER MALIGNANT NEOPLASM OF STOMACH: ICD-10-CM

## 2019-06-07 DIAGNOSIS — Z78.9 OTHER SPECIFIED HEALTH STATUS: ICD-10-CM

## 2019-06-07 DIAGNOSIS — Z87.898 PERSONAL HISTORY OF OTHER SPECIFIED CONDITIONS: ICD-10-CM

## 2019-06-07 PROCEDURE — 99215 OFFICE O/P EST HI 40 MIN: CPT

## 2019-06-07 NOTE — CONSULT LETTER
[Dear  ___] : Dear  [unfilled], [Consult Letter:] : I had the pleasure of evaluating your patient, [unfilled]. [Please see my note below.] : Please see my note below. [Consult Closing:] : Thank you very much for allowing me to participate in the care of this patient.  If you have any questions, please do not hesitate to contact me. [Sincerely,] : Sincerely, [FreeTextEntry3] : Jennifer Sanders M.D.

## 2019-06-07 NOTE — REVIEW OF SYSTEMS
[Muscle Weakness] : muscle weakness [Negative] : Allergic/Immunologic [Fever] : no fever [Chest Pain] : no chest pain [Dysuria] : no dysuria [Fainting] : no fainting [FreeTextEntry9] : generally weak, in WC most of time

## 2019-06-07 NOTE — PHYSICAL EXAM
[Thin] : thin [Normal] : affect appropriate [de-identified] : generally weak [de-identified] : +support stockings; no calf tenderness

## 2019-06-07 NOTE — HISTORY OF PRESENT ILLNESS
[Disease:__________________________] : Disease: [unfilled] [de-identified] : 3/2019-Patient was admitted to the hospital with refractory anemia. Bone marrow biopsy, and bone marrow aspirate showed a hypercellular bone marrow with trilineage hematopoiesis, myeloid predominance and atypical monocytes. Iron stores present. 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 Flow cytometry myeloid immunophenotypic findings showed no increase in myeloid immaturity. Increased monocytes noted. 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 [de-identified] : This is my first office visit with patient. Status post hospitalization and lengthy rehabilitation. He had been admitted to the hospital originally with refractory anemia and rectal bleeding. Bone marrow performed consistent with CMML. \par Patient states he is generally wheelchair-bound now. He had no complaints of chest pain, shortness of breath, lightheadedness, or palpitations. No bleeding. No fevers.\par He was accompanied today by his spouse Mikael.

## 2019-06-07 NOTE — ASSESSMENT
[FreeTextEntry1] : Patient with CMML, with refractory anemia-reviewed diagnosis/ prognosis with potential complications and management options. In light of patient's decreased performance status currently, plan on supportive hematologic care at this time-p.r.n. growth factor support/transfusional support. May consider systemic therapy/chemotherapy in the future, pending patient's course/and his wishes (he is not sure he would agree to chemotherapy).\par Patient and Gene were given the opportunity to ask questions. Their questions have been answered to their apparent satisfaction. Patient expressed his understanding and willingness to comply with recommended followup.

## 2019-06-10 ENCOUNTER — RESULT REVIEW (OUTPATIENT)
Age: 84
End: 2019-06-10

## 2019-06-10 ENCOUNTER — APPOINTMENT (OUTPATIENT)
Dept: INFUSION THERAPY | Facility: HOSPITAL | Age: 84
End: 2019-06-10

## 2019-06-10 LAB
BASOPHILS # BLD AUTO: 0 K/UL — SIGNIFICANT CHANGE UP (ref 0–0.2)
EOSINOPHIL # BLD AUTO: 0 K/UL — SIGNIFICANT CHANGE UP (ref 0–0.5)
HCT VFR BLD CALC: 27.4 % — LOW (ref 39–50)
HGB BLD-MCNC: 8.8 G/DL — LOW (ref 13–17)
LYMPHOCYTES # BLD AUTO: 2.9 K/UL — SIGNIFICANT CHANGE UP (ref 1–3.3)
LYMPHOCYTES # BLD AUTO: 27 % — SIGNIFICANT CHANGE UP (ref 13–44)
LYMPHOCYTES # SPEC AUTO: 1 % — HIGH (ref 0–0)
MCHC RBC-ENTMCNC: 32.1 G/DL — SIGNIFICANT CHANGE UP (ref 32–36)
MCHC RBC-ENTMCNC: 34.6 PG — HIGH (ref 27–34)
MCV RBC AUTO: 108 FL — HIGH (ref 80–100)
MONOCYTES # BLD AUTO: 3.9 K/UL — HIGH (ref 0–0.9)
MONOCYTES NFR BLD AUTO: 52 % — HIGH (ref 2–14)
NEUTROPHILS # BLD AUTO: 2.8 K/UL — SIGNIFICANT CHANGE UP (ref 1.8–7.4)
NEUTROPHILS NFR BLD AUTO: 19 % — LOW (ref 43–77)
NEUTS BAND # BLD: 1 % — SIGNIFICANT CHANGE UP (ref 0–8)
PLAT MORPH BLD: NORMAL — SIGNIFICANT CHANGE UP
PLATELET # BLD AUTO: 261 K/UL — SIGNIFICANT CHANGE UP (ref 150–400)
RBC # BLD: 2.54 M/UL — LOW (ref 4.2–5.8)
RBC # FLD: 18.5 % — HIGH (ref 10.3–14.5)
RBC BLD AUTO: SIGNIFICANT CHANGE UP
WBC # BLD: 9.6 K/UL — SIGNIFICANT CHANGE UP (ref 3.8–10.5)
WBC # FLD AUTO: 9.6 K/UL — SIGNIFICANT CHANGE UP (ref 3.8–10.5)

## 2019-06-17 ENCOUNTER — RESULT REVIEW (OUTPATIENT)
Age: 84
End: 2019-06-17

## 2019-06-17 ENCOUNTER — APPOINTMENT (OUTPATIENT)
Dept: INFUSION THERAPY | Facility: HOSPITAL | Age: 84
End: 2019-06-17

## 2019-06-17 LAB
ANISOCYTOSIS BLD QL: SLIGHT — SIGNIFICANT CHANGE UP
BASOPHILS # BLD AUTO: 0 K/UL — SIGNIFICANT CHANGE UP (ref 0–0.2)
EOSINOPHIL # BLD AUTO: 0 K/UL — SIGNIFICANT CHANGE UP (ref 0–0.5)
EOSINOPHIL NFR BLD AUTO: 1 % — SIGNIFICANT CHANGE UP (ref 0–6)
HCT VFR BLD CALC: 26.8 % — LOW (ref 39–50)
HGB BLD-MCNC: 8.8 G/DL — LOW (ref 13–17)
HYPOCHROMIA BLD QL: SLIGHT — SIGNIFICANT CHANGE UP
LYMPHOCYTES # BLD AUTO: 3.3 K/UL — SIGNIFICANT CHANGE UP (ref 1–3.3)
LYMPHOCYTES # BLD AUTO: 38 % — SIGNIFICANT CHANGE UP (ref 13–44)
LYMPHOCYTES # SPEC AUTO: 1 % — HIGH (ref 0–0)
MACROCYTES BLD QL: SLIGHT — SIGNIFICANT CHANGE UP
MCHC RBC-ENTMCNC: 32.9 G/DL — SIGNIFICANT CHANGE UP (ref 32–36)
MCHC RBC-ENTMCNC: 35.8 PG — HIGH (ref 27–34)
MCV RBC AUTO: 109 FL — HIGH (ref 80–100)
MICROCYTES BLD QL: SLIGHT — SIGNIFICANT CHANGE UP
MONOCYTES # BLD AUTO: 4 K/UL — HIGH (ref 0–0.9)
MONOCYTES NFR BLD AUTO: 33 % — HIGH (ref 2–14)
NEUTROPHILS # BLD AUTO: 2.8 K/UL — SIGNIFICANT CHANGE UP (ref 1.8–7.4)
NEUTROPHILS NFR BLD AUTO: 26 % — LOW (ref 43–77)
NEUTS BAND # BLD: 1 % — SIGNIFICANT CHANGE UP (ref 0–8)
PLAT MORPH BLD: NORMAL — SIGNIFICANT CHANGE UP
PLATELET # BLD AUTO: 157 K/UL — SIGNIFICANT CHANGE UP (ref 150–400)
POIKILOCYTOSIS BLD QL AUTO: SLIGHT — SIGNIFICANT CHANGE UP
POLYCHROMASIA BLD QL SMEAR: SLIGHT — SIGNIFICANT CHANGE UP
RBC # BLD: 2.46 M/UL — LOW (ref 4.2–5.8)
RBC # FLD: 20.3 % — HIGH (ref 10.3–14.5)
RBC BLD AUTO: ABNORMAL
ROULEAUX BLD QL SMEAR: PRESENT — SIGNIFICANT CHANGE UP
WBC # BLD: 10.2 K/UL — SIGNIFICANT CHANGE UP (ref 3.8–10.5)
WBC # FLD AUTO: 10.2 K/UL — SIGNIFICANT CHANGE UP (ref 3.8–10.5)

## 2019-06-24 ENCOUNTER — RESULT REVIEW (OUTPATIENT)
Age: 84
End: 2019-06-24

## 2019-06-24 ENCOUNTER — APPOINTMENT (OUTPATIENT)
Dept: INFUSION THERAPY | Facility: HOSPITAL | Age: 84
End: 2019-06-24

## 2019-06-24 LAB
ANISOCYTOSIS BLD QL: SLIGHT — SIGNIFICANT CHANGE UP
BASOPHILS # BLD AUTO: 0 K/UL — SIGNIFICANT CHANGE UP (ref 0–0.2)
EOSINOPHIL # BLD AUTO: 0 K/UL — SIGNIFICANT CHANGE UP (ref 0–0.5)
HCT VFR BLD CALC: 26.1 % — LOW (ref 39–50)
HGB BLD-MCNC: 8.4 G/DL — LOW (ref 13–17)
HYPOCHROMIA BLD QL: SLIGHT — SIGNIFICANT CHANGE UP
LYMPHOCYTES # BLD AUTO: 2.4 K/UL — SIGNIFICANT CHANGE UP (ref 1–3.3)
LYMPHOCYTES # BLD AUTO: 35 % — SIGNIFICANT CHANGE UP (ref 13–44)
MACROCYTES BLD QL: SLIGHT — SIGNIFICANT CHANGE UP
MCHC RBC-ENTMCNC: 32.4 G/DL — SIGNIFICANT CHANGE UP (ref 32–36)
MCHC RBC-ENTMCNC: 34.3 PG — HIGH (ref 27–34)
MCV RBC AUTO: 106 FL — HIGH (ref 80–100)
MICROCYTES BLD QL: SLIGHT — SIGNIFICANT CHANGE UP
MONOCYTES # BLD AUTO: 3 K/UL — HIGH (ref 0–0.9)
MONOCYTES NFR BLD AUTO: 39 % — HIGH (ref 2–14)
NEUTROPHILS # BLD AUTO: 2.6 K/UL — SIGNIFICANT CHANGE UP (ref 1.8–7.4)
NEUTROPHILS NFR BLD AUTO: 25 % — LOW (ref 43–77)
NEUTS BAND # BLD: 1 % — SIGNIFICANT CHANGE UP (ref 0–8)
PLAT MORPH BLD: NORMAL — SIGNIFICANT CHANGE UP
PLATELET # BLD AUTO: 186 K/UL — SIGNIFICANT CHANGE UP (ref 150–400)
POIKILOCYTOSIS BLD QL AUTO: SLIGHT — SIGNIFICANT CHANGE UP
POLYCHROMASIA BLD QL SMEAR: SLIGHT — SIGNIFICANT CHANGE UP
RBC # BLD: 2.46 M/UL — LOW (ref 4.2–5.8)
RBC # FLD: 18.6 % — HIGH (ref 10.3–14.5)
RBC BLD AUTO: ABNORMAL
ROULEAUX BLD QL SMEAR: PRESENT — SIGNIFICANT CHANGE UP
WBC # BLD: 8.1 K/UL — SIGNIFICANT CHANGE UP (ref 3.8–10.5)
WBC # FLD AUTO: 8.1 K/UL — SIGNIFICANT CHANGE UP (ref 3.8–10.5)

## 2019-06-26 DIAGNOSIS — D50.9 IRON DEFICIENCY ANEMIA, UNSPECIFIED: ICD-10-CM

## 2019-06-27 ENCOUNTER — OUTPATIENT (OUTPATIENT)
Dept: OUTPATIENT SERVICES | Facility: HOSPITAL | Age: 84
LOS: 1 days | Discharge: HOME | End: 2019-06-27

## 2019-06-27 DIAGNOSIS — D64.9 ANEMIA, UNSPECIFIED: ICD-10-CM

## 2019-07-01 ENCOUNTER — RESULT REVIEW (OUTPATIENT)
Age: 84
End: 2019-07-01

## 2019-07-01 ENCOUNTER — APPOINTMENT (OUTPATIENT)
Dept: INFUSION THERAPY | Facility: HOSPITAL | Age: 84
End: 2019-07-01

## 2019-07-01 ENCOUNTER — APPOINTMENT (OUTPATIENT)
Dept: HEMATOLOGY ONCOLOGY | Facility: CLINIC | Age: 84
End: 2019-07-01
Payer: MEDICARE

## 2019-07-01 VITALS
HEART RATE: 85 BPM | OXYGEN SATURATION: 100 % | BODY MASS INDEX: 24.31 KG/M2 | DIASTOLIC BLOOD PRESSURE: 84 MMHG | RESPIRATION RATE: 16 BRPM | SYSTOLIC BLOOD PRESSURE: 153 MMHG | WEIGHT: 179.23 LBS | TEMPERATURE: 98 F

## 2019-07-01 DIAGNOSIS — D46.9 MYELODYSPLASTIC SYNDROME, UNSPECIFIED: ICD-10-CM

## 2019-07-01 DIAGNOSIS — C93.10 CHRONIC MYELOMONOCYTIC LEUKEMIA NOT HAVING ACHIEVED REMISSION: ICD-10-CM

## 2019-07-01 LAB
ANISOCYTOSIS BLD QL: SLIGHT — SIGNIFICANT CHANGE UP
BASOPHILS # BLD AUTO: 0 K/UL — SIGNIFICANT CHANGE UP (ref 0–0.2)
EOSINOPHIL # BLD AUTO: 0.1 K/UL — SIGNIFICANT CHANGE UP (ref 0–0.5)
HCT VFR BLD CALC: 27.4 % — LOW (ref 39–50)
HGB BLD-MCNC: 8.4 G/DL — LOW (ref 13–17)
HYPOCHROMIA BLD QL: SLIGHT — SIGNIFICANT CHANGE UP
LYMPHOCYTES # BLD AUTO: 24 % — SIGNIFICANT CHANGE UP (ref 13–44)
LYMPHOCYTES # BLD AUTO: 3.3 K/UL — SIGNIFICANT CHANGE UP (ref 1–3.3)
MACROCYTES BLD QL: SIGNIFICANT CHANGE UP
MCHC RBC-ENTMCNC: 30.8 G/DL — LOW (ref 32–36)
MCHC RBC-ENTMCNC: 33.2 PG — SIGNIFICANT CHANGE UP (ref 27–34)
MCV RBC AUTO: 107 FL — HIGH (ref 80–100)
METAMYELOCYTES # FLD: 1 % — HIGH (ref 0–0)
MICROCYTES BLD QL: SLIGHT — SIGNIFICANT CHANGE UP
MONOCYTES # BLD AUTO: 7.6 K/UL — HIGH (ref 0–0.9)
MONOCYTES NFR BLD AUTO: 49 % — HIGH (ref 2–14)
NEUTROPHILS # BLD AUTO: 3.3 K/UL — SIGNIFICANT CHANGE UP (ref 1.8–7.4)
NEUTROPHILS NFR BLD AUTO: 24 % — LOW (ref 43–77)
NEUTS BAND # BLD: 2 % — SIGNIFICANT CHANGE UP (ref 0–8)
PLAT MORPH BLD: NORMAL — SIGNIFICANT CHANGE UP
PLATELET # BLD AUTO: 200 K/UL — SIGNIFICANT CHANGE UP (ref 150–400)
POIKILOCYTOSIS BLD QL AUTO: SLIGHT — SIGNIFICANT CHANGE UP
POLYCHROMASIA BLD QL SMEAR: SIGNIFICANT CHANGE UP
RBC # BLD: 2.55 M/UL — LOW (ref 4.2–5.8)
RBC # FLD: 19.7 % — HIGH (ref 10.3–14.5)
RBC BLD AUTO: ABNORMAL
ROULEAUX BLD QL SMEAR: PRESENT — SIGNIFICANT CHANGE UP
WBC # BLD: 14.4 K/UL — HIGH (ref 3.8–10.5)
WBC # FLD AUTO: 14.4 K/UL — HIGH (ref 3.8–10.5)

## 2019-07-01 PROCEDURE — 99214 OFFICE O/P EST MOD 30 MIN: CPT

## 2019-07-01 RX ORDER — ERYTHROPOIETIN 40000 [IU]/ML
40000 INJECTION, SOLUTION INTRAVENOUS; SUBCUTANEOUS
Refills: 0 | Status: ACTIVE | COMMUNITY
Start: 2019-07-01

## 2019-07-01 NOTE — ASSESSMENT
[FreeTextEntry1] : Patient with CMML/refractory anemia-hemoglobin currently stable-patient wishes to continue with Procrit injections as needed. Discussed treatment options, including consideration of systemic therapy/chemotherapy at this point-patient declines any systemic therapy at this time, opting for supportive hematologic care.\par Patient was given an opportunity to ask questions. His questions have been answered to his satisfaction.

## 2019-07-01 NOTE — HISTORY OF PRESENT ILLNESS
[2 - Distress Level] : Distress Level: 2 [Disease:__________________________] : Disease: [unfilled] [de-identified] : 3/2019-Patient was admitted to the hospital with refractory anemia. Bone marrow biopsy, and bone marrow aspirate showed a hypercellular bone marrow with trilineage hematopoiesis, myeloid predominance and atypical monocytes. Iron stores present. 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 Flow cytometry myeloid immunophenotypic findings showed no increase in myeloid immaturity. Increased monocytes noted. 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 [de-identified] : Generally feeling well currently. Continues with physical therapy at home-able to ambulate short distances without walker now. \par He has no complaints of chest pain, shortness of breath, lightheadedness, or palpitations. No bleeding. No fevers.\par He was accompanied today by his spouse Gene.

## 2019-07-01 NOTE — REVIEW OF SYSTEMS
[Negative] : Allergic/Immunologic [Fever] : no fever [Chest Pain] : no chest pain [Muscle Pain] : no muscle pain [Fainting] : no fainting [Proptosis] : no proptosis

## 2019-07-01 NOTE — PHYSICAL EXAM
[Normal] : well developed, well nourished, in no acute distress [de-identified] : +support stockings; no calf tenderness [de-identified] : generally weak

## 2019-07-08 ENCOUNTER — APPOINTMENT (OUTPATIENT)
Dept: INFUSION THERAPY | Facility: HOSPITAL | Age: 84
End: 2019-07-08

## 2019-07-08 ENCOUNTER — RESULT REVIEW (OUTPATIENT)
Age: 84
End: 2019-07-08

## 2019-07-08 LAB
ANISOCYTOSIS BLD QL: SLIGHT — SIGNIFICANT CHANGE UP
BASOPHILS # BLD AUTO: 0 K/UL — SIGNIFICANT CHANGE UP (ref 0–0.2)
BASOPHILS NFR BLD AUTO: 1 % — SIGNIFICANT CHANGE UP (ref 0–2)
EOSINOPHIL # BLD AUTO: 0 K/UL — SIGNIFICANT CHANGE UP (ref 0–0.5)
HCT VFR BLD CALC: 26.1 % — LOW (ref 39–50)
HGB BLD-MCNC: 8.6 G/DL — LOW (ref 13–17)
HYPOCHROMIA BLD QL: SLIGHT — SIGNIFICANT CHANGE UP
LYMPHOCYTES # BLD AUTO: 3 K/UL — SIGNIFICANT CHANGE UP (ref 1–3.3)
LYMPHOCYTES # BLD AUTO: 34 % — SIGNIFICANT CHANGE UP (ref 13–44)
MACROCYTES BLD QL: SLIGHT — SIGNIFICANT CHANGE UP
MCHC RBC-ENTMCNC: 32.9 G/DL — SIGNIFICANT CHANGE UP (ref 32–36)
MCHC RBC-ENTMCNC: 35.5 PG — HIGH (ref 27–34)
MCV RBC AUTO: 108 FL — HIGH (ref 80–100)
MICROCYTES BLD QL: SLIGHT — SIGNIFICANT CHANGE UP
MONOCYTES # BLD AUTO: 2.5 K/UL — HIGH (ref 0–0.9)
MONOCYTES NFR BLD AUTO: 35 % — HIGH (ref 2–14)
NEUTROPHILS # BLD AUTO: 3.7 K/UL — SIGNIFICANT CHANGE UP (ref 1.8–7.4)
NEUTROPHILS NFR BLD AUTO: 30 % — LOW (ref 43–77)
OVALOCYTES BLD QL SMEAR: SLIGHT — SIGNIFICANT CHANGE UP
PLAT MORPH BLD: NORMAL — SIGNIFICANT CHANGE UP
PLATELET # BLD AUTO: 224 K/UL — SIGNIFICANT CHANGE UP (ref 150–400)
POIKILOCYTOSIS BLD QL AUTO: SLIGHT — SIGNIFICANT CHANGE UP
POLYCHROMASIA BLD QL SMEAR: SLIGHT — SIGNIFICANT CHANGE UP
RBC # BLD: 2.42 M/UL — LOW (ref 4.2–5.8)
RBC # FLD: 19.3 % — HIGH (ref 10.3–14.5)
RBC BLD AUTO: ABNORMAL
SPHEROCYTES BLD QL SMEAR: SLIGHT — SIGNIFICANT CHANGE UP
WBC # BLD: 9.2 K/UL — SIGNIFICANT CHANGE UP (ref 3.8–10.5)
WBC # FLD AUTO: 9.2 K/UL — SIGNIFICANT CHANGE UP (ref 3.8–10.5)

## 2019-07-15 ENCOUNTER — RESULT REVIEW (OUTPATIENT)
Age: 84
End: 2019-07-15

## 2019-07-15 ENCOUNTER — APPOINTMENT (OUTPATIENT)
Dept: INFUSION THERAPY | Facility: HOSPITAL | Age: 84
End: 2019-07-15

## 2019-07-15 LAB
ANISOCYTOSIS BLD QL: SLIGHT — SIGNIFICANT CHANGE UP
BASOPHILS # BLD AUTO: 0 K/UL — SIGNIFICANT CHANGE UP (ref 0–0.2)
EOSINOPHIL # BLD AUTO: 0 K/UL — SIGNIFICANT CHANGE UP (ref 0–0.5)
EOSINOPHIL NFR BLD AUTO: 1 % — SIGNIFICANT CHANGE UP (ref 0–6)
HCT VFR BLD CALC: 27.4 % — LOW (ref 39–50)
HGB BLD-MCNC: 8.5 G/DL — LOW (ref 13–17)
HYPOCHROMIA BLD QL: SLIGHT — SIGNIFICANT CHANGE UP
LYMPHOCYTES # BLD AUTO: 2.6 K/UL — SIGNIFICANT CHANGE UP (ref 1–3.3)
LYMPHOCYTES # BLD AUTO: 27 % — SIGNIFICANT CHANGE UP (ref 13–44)
MACROCYTES BLD QL: SLIGHT — SIGNIFICANT CHANGE UP
MCHC RBC-ENTMCNC: 31 G/DL — LOW (ref 32–36)
MCHC RBC-ENTMCNC: 33.8 PG — SIGNIFICANT CHANGE UP (ref 27–34)
MCV RBC AUTO: 109 FL — HIGH (ref 80–100)
MICROCYTES BLD QL: SLIGHT — SIGNIFICANT CHANGE UP
MONOCYTES # BLD AUTO: 3.5 K/UL — HIGH (ref 0–0.9)
MONOCYTES NFR BLD AUTO: 20 % — HIGH (ref 2–14)
MYELOCYTES NFR BLD: 1 % — HIGH (ref 0–0)
NEUTROPHILS # BLD AUTO: 5.2 K/UL — SIGNIFICANT CHANGE UP (ref 1.8–7.4)
NEUTROPHILS NFR BLD AUTO: 50 % — SIGNIFICANT CHANGE UP (ref 43–77)
NEUTS BAND # BLD: 1 % — SIGNIFICANT CHANGE UP (ref 0–8)
OVALOCYTES BLD QL SMEAR: SLIGHT — SIGNIFICANT CHANGE UP
PLAT MORPH BLD: NORMAL — SIGNIFICANT CHANGE UP
PLATELET # BLD AUTO: 147 K/UL — LOW (ref 150–400)
POIKILOCYTOSIS BLD QL AUTO: SLIGHT — SIGNIFICANT CHANGE UP
POLYCHROMASIA BLD QL SMEAR: SLIGHT — SIGNIFICANT CHANGE UP
RBC # BLD: 2.5 M/UL — LOW (ref 4.2–5.8)
RBC # FLD: 18.9 % — HIGH (ref 10.3–14.5)
RBC BLD AUTO: ABNORMAL
ROULEAUX BLD QL SMEAR: PRESENT — SIGNIFICANT CHANGE UP
WBC # BLD: 11.4 K/UL — HIGH (ref 3.8–10.5)
WBC # FLD AUTO: 11.4 K/UL — HIGH (ref 3.8–10.5)

## 2019-07-22 ENCOUNTER — APPOINTMENT (OUTPATIENT)
Dept: INFUSION THERAPY | Facility: HOSPITAL | Age: 84
End: 2019-07-22

## 2019-07-22 ENCOUNTER — RESULT REVIEW (OUTPATIENT)
Age: 84
End: 2019-07-22

## 2019-07-22 LAB
BASOPHILS # BLD AUTO: 0 K/UL — SIGNIFICANT CHANGE UP (ref 0–0.2)
BLASTS # FLD: 1 % — HIGH (ref 0–0)
EOSINOPHIL # BLD AUTO: 0 K/UL — SIGNIFICANT CHANGE UP (ref 0–0.5)
EOSINOPHIL NFR BLD AUTO: 2 % — SIGNIFICANT CHANGE UP (ref 0–6)
HCT VFR BLD CALC: 26.8 % — LOW (ref 39–50)
HGB BLD-MCNC: 8.6 G/DL — LOW (ref 13–17)
LYMPHOCYTES # BLD AUTO: 3.1 K/UL — SIGNIFICANT CHANGE UP (ref 1–3.3)
LYMPHOCYTES # BLD AUTO: 33 % — SIGNIFICANT CHANGE UP (ref 13–44)
LYMPHOCYTES # SPEC AUTO: 1 % — HIGH (ref 0–0)
MCHC RBC-ENTMCNC: 32 G/DL — SIGNIFICANT CHANGE UP (ref 32–36)
MCHC RBC-ENTMCNC: 35.1 PG — HIGH (ref 27–34)
MCV RBC AUTO: 110 FL — HIGH (ref 80–100)
MONOCYTES # BLD AUTO: 3.9 K/UL — HIGH (ref 0–0.9)
MONOCYTES NFR BLD AUTO: 37 % — HIGH (ref 2–14)
NEUTROPHILS # BLD AUTO: 2.5 K/UL — SIGNIFICANT CHANGE UP (ref 1.8–7.4)
NEUTROPHILS NFR BLD AUTO: 24 % — LOW (ref 43–77)
NEUTS BAND # BLD: 2 % — SIGNIFICANT CHANGE UP (ref 0–8)
PLAT MORPH BLD: NORMAL — SIGNIFICANT CHANGE UP
PLATELET # BLD AUTO: 188 K/UL — SIGNIFICANT CHANGE UP (ref 150–400)
RBC # BLD: 2.45 M/UL — LOW (ref 4.2–5.8)
RBC # FLD: 19.7 % — HIGH (ref 10.3–14.5)
RBC BLD AUTO: SIGNIFICANT CHANGE UP
WBC # BLD: 9.6 K/UL — SIGNIFICANT CHANGE UP (ref 3.8–10.5)
WBC # FLD AUTO: 9.6 K/UL — SIGNIFICANT CHANGE UP (ref 3.8–10.5)

## 2019-07-23 ENCOUNTER — OUTPATIENT (OUTPATIENT)
Dept: OUTPATIENT SERVICES | Facility: HOSPITAL | Age: 84
LOS: 1 days | Discharge: HOME | End: 2019-07-23

## 2019-07-23 DIAGNOSIS — C93.10 CHRONIC MYELOMONOCYTIC LEUKEMIA NOT HAVING ACHIEVED REMISSION: ICD-10-CM

## 2019-07-23 DIAGNOSIS — D46.9 MYELODYSPLASTIC SYNDROME, UNSPECIFIED: ICD-10-CM

## 2019-07-29 ENCOUNTER — APPOINTMENT (OUTPATIENT)
Dept: INFUSION THERAPY | Facility: HOSPITAL | Age: 84
End: 2019-07-29

## 2019-07-29 ENCOUNTER — APPOINTMENT (OUTPATIENT)
Dept: HEMATOLOGY ONCOLOGY | Facility: CLINIC | Age: 84
End: 2019-07-29
Payer: MEDICARE

## 2019-07-29 ENCOUNTER — RESULT REVIEW (OUTPATIENT)
Age: 84
End: 2019-07-29

## 2019-07-29 VITALS
RESPIRATION RATE: 16 BRPM | WEIGHT: 183.4 LBS | BODY MASS INDEX: 24.88 KG/M2 | DIASTOLIC BLOOD PRESSURE: 75 MMHG | HEART RATE: 63 BPM | OXYGEN SATURATION: 98 % | SYSTOLIC BLOOD PRESSURE: 160 MMHG | TEMPERATURE: 98.4 F

## 2019-07-29 LAB
ANISOCYTOSIS BLD QL: SLIGHT — SIGNIFICANT CHANGE UP
BASOPHILS NFR BLD AUTO: 1 % — SIGNIFICANT CHANGE UP (ref 0–2)
EOSINOPHIL NFR BLD AUTO: 1 % — SIGNIFICANT CHANGE UP (ref 0–6)
HCT VFR BLD CALC: 28.4 % — LOW (ref 39–50)
HGB BLD-MCNC: 8.9 G/DL — LOW (ref 13–17)
HYPOCHROMIA BLD QL: SLIGHT — SIGNIFICANT CHANGE UP
LYMPHOCYTES # BLD AUTO: 35 % — SIGNIFICANT CHANGE UP (ref 13–44)
LYMPHOCYTES # BLD AUTO: SIGNIFICANT CHANGE UP (ref 1–3.3)
MACROCYTES BLD QL: SLIGHT — SIGNIFICANT CHANGE UP
MCHC RBC-ENTMCNC: 31.2 G/DL — LOW (ref 32–36)
MCHC RBC-ENTMCNC: 34.6 PG — HIGH (ref 27–34)
MCV RBC AUTO: 111 FL — HIGH (ref 80–100)
MICROCYTES BLD QL: SLIGHT — SIGNIFICANT CHANGE UP
MONOCYTES NFR BLD AUTO: 44 % — HIGH (ref 2–14)
NEUTROPHILS # BLD AUTO: SIGNIFICANT CHANGE UP (ref 1.8–7.4)
NEUTROPHILS NFR BLD AUTO: 19 % — LOW (ref 43–77)
OVALOCYTES BLD QL SMEAR: SLIGHT — SIGNIFICANT CHANGE UP
PLAT MORPH BLD: NORMAL — SIGNIFICANT CHANGE UP
PLATELET # BLD AUTO: 161 K/UL — SIGNIFICANT CHANGE UP (ref 150–400)
POIKILOCYTOSIS BLD QL AUTO: SLIGHT — SIGNIFICANT CHANGE UP
POLYCHROMASIA BLD QL SMEAR: SLIGHT — SIGNIFICANT CHANGE UP
RBC # BLD: 2.56 M/UL — LOW (ref 4.2–5.8)
RBC # FLD: 20.2 % — HIGH (ref 10.3–14.5)
RBC BLD AUTO: ABNORMAL
ROULEAUX BLD QL SMEAR: PRESENT — SIGNIFICANT CHANGE UP
WBC # BLD: 14.9 K/UL — HIGH (ref 3.8–10.5)
WBC # FLD AUTO: 14.9 K/UL — HIGH (ref 3.8–10.5)

## 2019-07-29 PROCEDURE — 99214 OFFICE O/P EST MOD 30 MIN: CPT

## 2019-07-29 NOTE — PHYSICAL EXAM
[Normal] : affect appropriate [de-identified] : +support stockings; no calf tenderness [de-identified] : alert and oriented x 3 [de-identified] : non-toxic appearing

## 2019-07-29 NOTE — ASSESSMENT
[FreeTextEntry1] : Patient with CMML/refractory anemia-hemoglobin currently stable-patient wishes to continue with Procrit injections as needed. No overt bleeding noted clinically at present.\par Discussed treatment options, including systemic therapy-patient wishes for continued supportive hematologic care/GF support. \par Patient/Gene were given the opportunity to ask questions. Their questions have been answered to their satisfaction at this time.

## 2019-07-29 NOTE — HISTORY OF PRESENT ILLNESS
[Disease:__________________________] : Disease: [unfilled] [de-identified] : 3/2019-Patient was admitted to the hospital with refractory anemia. Bone marrow biopsy, and bone marrow aspirate showed a hypercellular bone marrow with trilineage hematopoiesis, myeloid predominance and atypical monocytes. Iron stores present. 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 Flow cytometry myeloid immunophenotypic findings showed no increase in myeloid immaturity. Increased monocytes noted. 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 [de-identified] : Still getting PT-walking without a walker at times now. Overall, stated he is feeling well.\par Saw Dr. White in PCP f/u-told jhoan. good.\par He has no complaints of chest pain, shortness of breath, lightheadedness, or palpitations. No bleeding. No fevers.\par He was accompanied today by his spouse Mikael.

## 2019-08-05 ENCOUNTER — RESULT REVIEW (OUTPATIENT)
Age: 84
End: 2019-08-05

## 2019-08-05 ENCOUNTER — APPOINTMENT (OUTPATIENT)
Dept: INFUSION THERAPY | Facility: HOSPITAL | Age: 84
End: 2019-08-05

## 2019-08-05 LAB
BASOPHILS # BLD AUTO: 0 K/UL — SIGNIFICANT CHANGE UP (ref 0–0.2)
BASOPHILS NFR BLD AUTO: 0.1 % — SIGNIFICANT CHANGE UP (ref 0–2)
EOSINOPHIL # BLD AUTO: 0.2 K/UL — SIGNIFICANT CHANGE UP (ref 0–0.5)
EOSINOPHIL NFR BLD AUTO: 1.5 % — SIGNIFICANT CHANGE UP (ref 0–6)
HCT VFR BLD CALC: 25.5 % — LOW (ref 39–50)
HGB BLD-MCNC: 8.3 G/DL — LOW (ref 13–17)
LYMPHOCYTES # BLD AUTO: 2.6 K/UL — SIGNIFICANT CHANGE UP (ref 1–3.3)
LYMPHOCYTES # BLD AUTO: 23.3 % — SIGNIFICANT CHANGE UP (ref 13–44)
MCHC RBC-ENTMCNC: 32.4 G/DL — SIGNIFICANT CHANGE UP (ref 32–36)
MCHC RBC-ENTMCNC: 36 PG — HIGH (ref 27–34)
MCV RBC AUTO: 111 FL — HIGH (ref 80–100)
MONOCYTES # BLD AUTO: 3.5 K/UL — HIGH (ref 0–0.9)
MONOCYTES NFR BLD AUTO: 31.8 % — HIGH (ref 2–14)
NEUTROPHILS # BLD AUTO: 4.8 K/UL — SIGNIFICANT CHANGE UP (ref 1.8–7.4)
NEUTROPHILS NFR BLD AUTO: 43.4 % — SIGNIFICANT CHANGE UP (ref 43–77)
PLATELET # BLD AUTO: 170 K/UL — SIGNIFICANT CHANGE UP (ref 150–400)
RBC # BLD: 2.3 M/UL — LOW (ref 4.2–5.8)
RBC # FLD: 19.7 % — HIGH (ref 10.3–14.5)
WBC # BLD: 11 K/UL — HIGH (ref 3.8–10.5)
WBC # FLD AUTO: 11 K/UL — HIGH (ref 3.8–10.5)

## 2019-08-12 ENCOUNTER — RESULT REVIEW (OUTPATIENT)
Age: 84
End: 2019-08-12

## 2019-08-12 ENCOUNTER — APPOINTMENT (OUTPATIENT)
Dept: INFUSION THERAPY | Facility: HOSPITAL | Age: 84
End: 2019-08-12

## 2019-08-12 LAB
ANISOCYTOSIS BLD QL: SLIGHT — SIGNIFICANT CHANGE UP
EOSINOPHIL NFR BLD AUTO: 1 % — SIGNIFICANT CHANGE UP (ref 0–6)
HCT VFR BLD CALC: 26.2 % — LOW (ref 39–50)
HGB BLD-MCNC: 8.1 G/DL — LOW (ref 13–17)
HYPOCHROMIA BLD QL: SLIGHT — SIGNIFICANT CHANGE UP
LYMPHOCYTES # BLD AUTO: 2.7 K/UL — SIGNIFICANT CHANGE UP (ref 1–3.3)
LYMPHOCYTES # BLD AUTO: 31 % — SIGNIFICANT CHANGE UP (ref 13–44)
MACROCYTES BLD QL: SLIGHT — SIGNIFICANT CHANGE UP
MCHC RBC-ENTMCNC: 31 G/DL — LOW (ref 32–36)
MCHC RBC-ENTMCNC: 34.2 PG — HIGH (ref 27–34)
MCV RBC AUTO: 110 FL — HIGH (ref 80–100)
MONOCYTES # BLD AUTO: 6.6 K/UL — HIGH (ref 0–0.9)
MONOCYTES NFR BLD AUTO: 28 % — HIGH (ref 2–14)
MYELOCYTES NFR BLD: 1 % — HIGH (ref 0–0)
NEUTROPHILS # BLD AUTO: 5.4 K/UL — SIGNIFICANT CHANGE UP (ref 1.8–7.4)
NEUTROPHILS NFR BLD AUTO: 38 % — LOW (ref 43–77)
OVALOCYTES BLD QL SMEAR: SLIGHT — SIGNIFICANT CHANGE UP
PLAT MORPH BLD: NORMAL — SIGNIFICANT CHANGE UP
PLATELET # BLD AUTO: 175 K/UL — SIGNIFICANT CHANGE UP (ref 150–400)
POIKILOCYTOSIS BLD QL AUTO: SLIGHT — SIGNIFICANT CHANGE UP
PROMYELOCYTES # FLD: 1 % — HIGH (ref 0–0)
RBC # BLD: 2.38 M/UL — LOW (ref 4.2–5.8)
RBC # FLD: 19.8 % — HIGH (ref 10.3–14.5)
RBC BLD AUTO: SIGNIFICANT CHANGE UP
TARGETS BLD QL SMEAR: SLIGHT — SIGNIFICANT CHANGE UP
WBC # BLD: 14.7 K/UL — HIGH (ref 3.8–10.5)
WBC # FLD AUTO: 14.7 K/UL — HIGH (ref 3.8–10.5)

## 2019-08-19 ENCOUNTER — APPOINTMENT (OUTPATIENT)
Dept: INFUSION THERAPY | Facility: HOSPITAL | Age: 84
End: 2019-08-19

## 2019-08-19 ENCOUNTER — OUTPATIENT (OUTPATIENT)
Dept: OUTPATIENT SERVICES | Facility: HOSPITAL | Age: 84
LOS: 1 days | End: 2019-08-19
Payer: MEDICARE

## 2019-08-19 ENCOUNTER — RESULT REVIEW (OUTPATIENT)
Age: 84
End: 2019-08-19

## 2019-08-19 ENCOUNTER — OTHER (OUTPATIENT)
Age: 84
End: 2019-08-19

## 2019-08-19 DIAGNOSIS — C93.10 CHRONIC MYELOMONOCYTIC LEUKEMIA NOT HAVING ACHIEVED REMISSION: ICD-10-CM

## 2019-08-19 LAB
ANISOCYTOSIS BLD QL: SLIGHT — SIGNIFICANT CHANGE UP
BLD GP AB SCN SERPL QL: NEGATIVE — SIGNIFICANT CHANGE UP
EOSINOPHIL # BLD AUTO: 0.1 K/UL — SIGNIFICANT CHANGE UP (ref 0–0.5)
EOSINOPHIL NFR BLD AUTO: 4 % — SIGNIFICANT CHANGE UP (ref 0–6)
HCT VFR BLD CALC: 20.7 % — CRITICAL LOW (ref 39–50)
HGB BLD-MCNC: 6.6 G/DL — CRITICAL LOW (ref 13–17)
HYPOCHROMIA BLD QL: SLIGHT — SIGNIFICANT CHANGE UP
LYMPHOCYTES # BLD AUTO: 2.5 K/UL — SIGNIFICANT CHANGE UP (ref 1–3.3)
LYMPHOCYTES # BLD AUTO: 22 % — SIGNIFICANT CHANGE UP (ref 13–44)
LYMPHOCYTES # SPEC AUTO: 1 % — HIGH (ref 0–0)
MACROCYTES BLD QL: SLIGHT — SIGNIFICANT CHANGE UP
MCHC RBC-ENTMCNC: 31.9 G/DL — LOW (ref 32–36)
MCHC RBC-ENTMCNC: 35.6 PG — HIGH (ref 27–34)
MCV RBC AUTO: 111 FL — HIGH (ref 80–100)
METAMYELOCYTES # FLD: 2 % — HIGH (ref 0–0)
MICROCYTES BLD QL: SLIGHT — SIGNIFICANT CHANGE UP
MONOCYTES # BLD AUTO: 4.5 K/UL — HIGH (ref 0–0.9)
MONOCYTES NFR BLD AUTO: 38 % — HIGH (ref 2–14)
MYELOCYTES NFR BLD: 3 % — HIGH (ref 0–0)
NEUTROPHILS # BLD AUTO: 3.6 K/UL — SIGNIFICANT CHANGE UP (ref 1.8–7.4)
NEUTROPHILS NFR BLD AUTO: 28 % — LOW (ref 43–77)
NEUTS HYPERSEG # BLD: PRESENT — SIGNIFICANT CHANGE UP
OVALOCYTES BLD QL SMEAR: SLIGHT — SIGNIFICANT CHANGE UP
PLAT MORPH BLD: NORMAL — SIGNIFICANT CHANGE UP
PLATELET # BLD AUTO: 193 K/UL — SIGNIFICANT CHANGE UP (ref 150–400)
POIKILOCYTOSIS BLD QL AUTO: SLIGHT — SIGNIFICANT CHANGE UP
PROMYELOCYTES # FLD: 2 % — HIGH (ref 0–0)
RBC # BLD: 1.86 M/UL — LOW (ref 4.2–5.8)
RBC # FLD: 20 % — HIGH (ref 10.3–14.5)
RBC BLD AUTO: SIGNIFICANT CHANGE UP
RH IG SCN BLD-IMP: POSITIVE — SIGNIFICANT CHANGE UP
RH IG SCN BLD-IMP: POSITIVE — SIGNIFICANT CHANGE UP
SMUDGE CELLS # BLD: PRESENT — SIGNIFICANT CHANGE UP
TARGETS BLD QL SMEAR: SLIGHT — SIGNIFICANT CHANGE UP
WBC # BLD: 10.7 K/UL — HIGH (ref 3.8–10.5)
WBC # FLD AUTO: 10.7 K/UL — HIGH (ref 3.8–10.5)

## 2019-08-20 ENCOUNTER — APPOINTMENT (OUTPATIENT)
Dept: INFUSION THERAPY | Facility: HOSPITAL | Age: 84
End: 2019-08-20

## 2019-08-21 DIAGNOSIS — Z51.89 ENCOUNTER FOR OTHER SPECIFIED AFTERCARE: ICD-10-CM

## 2019-08-22 ENCOUNTER — OUTPATIENT (OUTPATIENT)
Dept: OUTPATIENT SERVICES | Facility: HOSPITAL | Age: 84
LOS: 1 days | Discharge: HOME | End: 2019-08-22

## 2019-08-22 DIAGNOSIS — C93.10 CHRONIC MYELOMONOCYTIC LEUKEMIA NOT HAVING ACHIEVED REMISSION: ICD-10-CM

## 2019-08-22 DIAGNOSIS — D46.9 MYELODYSPLASTIC SYNDROME, UNSPECIFIED: ICD-10-CM

## 2019-08-26 ENCOUNTER — RESULT REVIEW (OUTPATIENT)
Age: 84
End: 2019-08-26

## 2019-08-26 ENCOUNTER — APPOINTMENT (OUTPATIENT)
Dept: INFUSION THERAPY | Facility: HOSPITAL | Age: 84
End: 2019-08-26

## 2019-08-26 ENCOUNTER — APPOINTMENT (OUTPATIENT)
Dept: HEMATOLOGY ONCOLOGY | Facility: CLINIC | Age: 84
End: 2019-08-26
Payer: MEDICARE

## 2019-08-26 VITALS
DIASTOLIC BLOOD PRESSURE: 81 MMHG | BODY MASS INDEX: 24.67 KG/M2 | RESPIRATION RATE: 16 BRPM | HEART RATE: 74 BPM | OXYGEN SATURATION: 100 % | TEMPERATURE: 98.2 F | SYSTOLIC BLOOD PRESSURE: 150 MMHG | WEIGHT: 181.88 LBS

## 2019-08-26 LAB
ANISOCYTOSIS BLD QL: SIGNIFICANT CHANGE UP
BASOPHILS # BLD AUTO: 0 K/UL — SIGNIFICANT CHANGE UP (ref 0–0.2)
BLD GP AB SCN SERPL QL: NEGATIVE — SIGNIFICANT CHANGE UP
EOSINOPHIL # BLD AUTO: 0.1 K/UL — SIGNIFICANT CHANGE UP (ref 0–0.5)
HCT VFR BLD CALC: 28.8 % — LOW (ref 39–50)
HGB BLD-MCNC: 9 G/DL — LOW (ref 13–17)
HYPOCHROMIA BLD QL: SLIGHT — SIGNIFICANT CHANGE UP
LYMPHOCYTES # BLD AUTO: 2.3 K/UL — SIGNIFICANT CHANGE UP (ref 1–3.3)
LYMPHOCYTES # BLD AUTO: 22 % — SIGNIFICANT CHANGE UP (ref 13–44)
MACROCYTES BLD QL: SIGNIFICANT CHANGE UP
MCHC RBC-ENTMCNC: 31.4 G/DL — LOW (ref 32–36)
MCHC RBC-ENTMCNC: 34.3 PG — HIGH (ref 27–34)
MCV RBC AUTO: 109 FL — HIGH (ref 80–100)
MICROCYTES BLD QL: SLIGHT — SIGNIFICANT CHANGE UP
MONOCYTES # BLD AUTO: 4.8 K/UL — HIGH (ref 0–0.9)
MONOCYTES NFR BLD AUTO: 41 % — HIGH (ref 2–14)
NEUTROPHILS # BLD AUTO: 4 K/UL — SIGNIFICANT CHANGE UP (ref 1.8–7.4)
NEUTROPHILS NFR BLD AUTO: 37 % — LOW (ref 43–77)
OVALOCYTES BLD QL SMEAR: SLIGHT — SIGNIFICANT CHANGE UP
PLAT MORPH BLD: NORMAL — SIGNIFICANT CHANGE UP
PLATELET # BLD AUTO: 148 K/UL — LOW (ref 150–400)
POIKILOCYTOSIS BLD QL AUTO: SLIGHT — SIGNIFICANT CHANGE UP
POLYCHROMASIA BLD QL SMEAR: SLIGHT — SIGNIFICANT CHANGE UP
RBC # BLD: 2.64 M/UL — LOW (ref 4.2–5.8)
RBC # FLD: 19.2 % — HIGH (ref 10.3–14.5)
RBC BLD AUTO: ABNORMAL
RH IG SCN BLD-IMP: POSITIVE — SIGNIFICANT CHANGE UP
TARGETS BLD QL SMEAR: SLIGHT — SIGNIFICANT CHANGE UP
WBC # BLD: 11.2 K/UL — HIGH (ref 3.8–10.5)
WBC # FLD AUTO: 11.2 K/UL — HIGH (ref 3.8–10.5)

## 2019-08-26 PROCEDURE — 86900 BLOOD TYPING SEROLOGIC ABO: CPT

## 2019-08-26 PROCEDURE — 99214 OFFICE O/P EST MOD 30 MIN: CPT

## 2019-08-26 PROCEDURE — 86850 RBC ANTIBODY SCREEN: CPT

## 2019-08-26 PROCEDURE — 86901 BLOOD TYPING SEROLOGIC RH(D): CPT

## 2019-08-26 PROCEDURE — 86923 COMPATIBILITY TEST ELECTRIC: CPT

## 2019-08-26 RX ORDER — CIPROFLOXACIN HYDROCHLORIDE 500 MG/1
500 TABLET, FILM COATED ORAL
Qty: 6 | Refills: 0 | Status: DISCONTINUED | COMMUNITY
Start: 2017-02-01 | End: 2019-08-26

## 2019-08-26 NOTE — PHYSICAL EXAM
[Normal] : affect appropriate [de-identified] : non-toxic appearing [de-identified] : +support stockings; no calf tenderness [de-identified] : alert and oriented x 3

## 2019-08-26 NOTE — HISTORY OF PRESENT ILLNESS
[Disease:__________________________] : Disease: [unfilled] [de-identified] : 3/2019-Patient was admitted to the hospital with refractory anemia. Bone marrow biopsy, and bone marrow aspirate showed a hypercellular bone marrow with trilineage hematopoiesis, myeloid predominance and atypical monocytes. Iron stores present. 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 Flow cytometry myeloid immunophenotypic findings showed no increase in myeloid immaturity. Increased monocytes noted. 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 [de-identified] : S/P transfusion 2 units PRBCs-states now feels quite well. Getting PT still at least 3 times a week.\par Friend Holly present.\par He has no complaints of chest pain, shortness of breath, lightheadedness, or palpitations. No bleeding. No fevers.\par

## 2019-08-26 NOTE — ASSESSMENT
[FreeTextEntry1] : Patient with CMMoL/refractory anemia-hemoglobin increased following transfusion 2 units pack red blood cells. Patient continuing with growth factor support for now-Procrit dose has been increased to 60,000 units weekly.\par Patient's questions have been answered to his apparent satisfaction at this time. Interval followup lab work to be done.

## 2019-09-03 ENCOUNTER — APPOINTMENT (OUTPATIENT)
Dept: INFUSION THERAPY | Facility: HOSPITAL | Age: 84
End: 2019-09-03

## 2019-09-09 ENCOUNTER — RESULT REVIEW (OUTPATIENT)
Age: 84
End: 2019-09-09

## 2019-09-09 ENCOUNTER — APPOINTMENT (OUTPATIENT)
Dept: INFUSION THERAPY | Facility: HOSPITAL | Age: 84
End: 2019-09-09

## 2019-09-09 LAB
ANISOCYTOSIS BLD QL: SLIGHT — SIGNIFICANT CHANGE UP
EOSINOPHIL NFR BLD AUTO: 2 % — SIGNIFICANT CHANGE UP (ref 0–6)
HCT VFR BLD CALC: 29.8 % — LOW (ref 39–50)
HGB BLD-MCNC: 9.2 G/DL — LOW (ref 13–17)
HYPOCHROMIA BLD QL: SLIGHT — SIGNIFICANT CHANGE UP
LYMPHOCYTES # BLD AUTO: 35 % — SIGNIFICANT CHANGE UP (ref 13–44)
LYMPHOCYTES # BLD AUTO: SIGNIFICANT CHANGE UP K/UL (ref 1–3.3)
MACROCYTES BLD QL: SLIGHT — SIGNIFICANT CHANGE UP
MCHC RBC-ENTMCNC: 30.7 G/DL — LOW (ref 32–36)
MCHC RBC-ENTMCNC: 33.6 PG — SIGNIFICANT CHANGE UP (ref 27–34)
MCV RBC AUTO: 109 FL — HIGH (ref 80–100)
MICROCYTES BLD QL: SLIGHT — SIGNIFICANT CHANGE UP
MONOCYTES NFR BLD AUTO: 10 % — SIGNIFICANT CHANGE UP (ref 2–14)
NEUTROPHILS # BLD AUTO: 4.1 K/UL — SIGNIFICANT CHANGE UP (ref 1.8–7.4)
NEUTROPHILS NFR BLD AUTO: 53 % — SIGNIFICANT CHANGE UP (ref 43–77)
PLAT MORPH BLD: ABNORMAL
PLATELET # BLD AUTO: 154 K/UL — SIGNIFICANT CHANGE UP (ref 150–400)
POIKILOCYTOSIS BLD QL AUTO: SLIGHT — SIGNIFICANT CHANGE UP
POLYCHROMASIA BLD QL SMEAR: SLIGHT — SIGNIFICANT CHANGE UP
RBC # BLD: 2.73 M/UL — LOW (ref 4.2–5.8)
RBC # FLD: 18.4 % — HIGH (ref 10.3–14.5)
RBC BLD AUTO: ABNORMAL
WBC # BLD: 10.5 K/UL — SIGNIFICANT CHANGE UP (ref 3.8–10.5)
WBC # FLD AUTO: 10.5 K/UL — SIGNIFICANT CHANGE UP (ref 3.8–10.5)

## 2019-09-16 ENCOUNTER — APPOINTMENT (OUTPATIENT)
Dept: INFUSION THERAPY | Facility: HOSPITAL | Age: 84
End: 2019-09-16

## 2019-09-19 ENCOUNTER — OUTPATIENT (OUTPATIENT)
Dept: OUTPATIENT SERVICES | Facility: HOSPITAL | Age: 84
LOS: 1 days | Discharge: HOME | End: 2019-09-19

## 2019-09-19 DIAGNOSIS — D46.9 MYELODYSPLASTIC SYNDROME, UNSPECIFIED: ICD-10-CM

## 2019-09-19 DIAGNOSIS — C93.10 CHRONIC MYELOMONOCYTIC LEUKEMIA NOT HAVING ACHIEVED REMISSION: ICD-10-CM

## 2019-09-23 ENCOUNTER — RESULT REVIEW (OUTPATIENT)
Age: 84
End: 2019-09-23

## 2019-09-23 ENCOUNTER — APPOINTMENT (OUTPATIENT)
Dept: INFUSION THERAPY | Facility: HOSPITAL | Age: 84
End: 2019-09-23

## 2019-09-23 ENCOUNTER — APPOINTMENT (OUTPATIENT)
Dept: HEMATOLOGY ONCOLOGY | Facility: CLINIC | Age: 84
End: 2019-09-23
Payer: MEDICARE

## 2019-09-23 VITALS
OXYGEN SATURATION: 100 % | HEART RATE: 82 BPM | TEMPERATURE: 97.9 F | WEIGHT: 180 LBS | RESPIRATION RATE: 17 BRPM | SYSTOLIC BLOOD PRESSURE: 155 MMHG | DIASTOLIC BLOOD PRESSURE: 79 MMHG | BODY MASS INDEX: 24.41 KG/M2

## 2019-09-23 LAB
BASOPHILS # BLD AUTO: 0 K/UL — SIGNIFICANT CHANGE UP (ref 0–0.2)
EOSINOPHIL # BLD AUTO: 0 K/UL — SIGNIFICANT CHANGE UP (ref 0–0.5)
FERRITIN SERPL-MCNC: 579 NG/ML
HCT VFR BLD CALC: 26.2 % — LOW (ref 39–50)
HGB BLD-MCNC: 8 G/DL — LOW (ref 13–17)
IRON SATN MFR SERPL: 42 %
IRON SERPL-MCNC: 69 UG/DL
LYMPHOCYTES # BLD AUTO: 1.9 K/UL — SIGNIFICANT CHANGE UP (ref 1–3.3)
LYMPHOCYTES # BLD AUTO: 26 % — SIGNIFICANT CHANGE UP (ref 13–44)
MCHC RBC-ENTMCNC: 30.6 G/DL — LOW (ref 32–36)
MCHC RBC-ENTMCNC: 33.9 PG — SIGNIFICANT CHANGE UP (ref 27–34)
MCV RBC AUTO: 111 FL — HIGH (ref 80–100)
MONOCYTES # BLD AUTO: 4.4 K/UL — HIGH (ref 0–0.9)
MONOCYTES NFR BLD AUTO: 51 % — HIGH (ref 2–14)
NEUTROPHILS # BLD AUTO: 2.3 K/UL — SIGNIFICANT CHANGE UP (ref 1.8–7.4)
NEUTROPHILS NFR BLD AUTO: 18 % — LOW (ref 43–77)
NEUTS BAND # BLD: 5 % — SIGNIFICANT CHANGE UP (ref 0–8)
PLAT MORPH BLD: NORMAL — SIGNIFICANT CHANGE UP
PLATELET # BLD AUTO: 149 K/UL — LOW (ref 150–400)
RBC # BLD: 2.36 M/UL — LOW (ref 4.2–5.8)
RBC # FLD: 18.3 % — HIGH (ref 10.3–14.5)
RBC BLD AUTO: SIGNIFICANT CHANGE UP
RETICS #: 27.3 K/UL — SIGNIFICANT CHANGE UP (ref 25–125)
RETICS/RBC NFR: 1.2 % — SIGNIFICANT CHANGE UP (ref 0.5–2.5)
TIBC SERPL-MCNC: 163 UG/DL
UIBC SERPL-MCNC: 94 UG/DL
WBC # BLD: 8.6 K/UL — SIGNIFICANT CHANGE UP (ref 3.8–10.5)
WBC # FLD AUTO: 8.6 K/UL — SIGNIFICANT CHANGE UP (ref 3.8–10.5)

## 2019-09-23 PROCEDURE — 99214 OFFICE O/P EST MOD 30 MIN: CPT

## 2019-09-23 NOTE — HISTORY OF PRESENT ILLNESS
[Disease:__________________________] : Disease: [unfilled] [CMMOL Prognostic Scoring System: ___] : CMMOL Prognostic Scoring System: [unfilled] [de-identified] : 3/2019-Patient was admitted to the hospital with refractory anemia. Bone marrow biopsy, and bone marrow aspirate showed a hypercellular bone marrow with trilineage hematopoiesis, myeloid predominance and atypical monocytes. Iron stores present. 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 Flow cytometry myeloid immunophenotypic findings showed no increase in myeloid immaturity. Increased monocytes noted. 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 [de-identified] : Overall, feeling well without new complaints. Ambulating better-using cane more, rather than walker, and occasionally ambulates without a cane.\par He has no complaints of chest pain, shortness of breath, lightheadedness, or palpitations. No bleeding. No fevers.\par No Procrit given last week.\par Going on the [place] a cruise in 12/2019.\par Gene present.

## 2019-09-23 NOTE — ASSESSMENT
[FreeTextEntry1] : Patient with CMML/refractory anemia-he continues to receive supportive hematologic care with p.r.n. growth factor/transfusional support. He is increasing his activity level, maintaining his activities of daily living. \par Procrit given today. Interval followup lab work planned.\par Patient and Gene were given the opportunity to ask questions. Their questions have been answered to their apparent satisfaction at this time.

## 2019-09-23 NOTE — PHYSICAL EXAM
[Normal] : affect appropriate [de-identified] : +support stockings; no calf tenderness [de-identified] : non-toxic appearing [de-identified] : alert and oriented x 3

## 2019-09-30 ENCOUNTER — RESULT REVIEW (OUTPATIENT)
Age: 84
End: 2019-09-30

## 2019-09-30 ENCOUNTER — OUTPATIENT (OUTPATIENT)
Dept: OUTPATIENT SERVICES | Facility: HOSPITAL | Age: 84
LOS: 1 days | End: 2019-09-30
Payer: MEDICARE

## 2019-09-30 ENCOUNTER — APPOINTMENT (OUTPATIENT)
Dept: INFUSION THERAPY | Facility: HOSPITAL | Age: 84
End: 2019-09-30

## 2019-09-30 DIAGNOSIS — D64.9 ANEMIA, UNSPECIFIED: ICD-10-CM

## 2019-09-30 LAB
BASOPHILS # BLD AUTO: 0 K/UL — SIGNIFICANT CHANGE UP (ref 0–0.2)
BASOPHILS NFR BLD AUTO: 1 % — SIGNIFICANT CHANGE UP (ref 0–2)
BLD GP AB SCN SERPL QL: NEGATIVE — SIGNIFICANT CHANGE UP
EOSINOPHIL # BLD AUTO: 0.1 K/UL — SIGNIFICANT CHANGE UP (ref 0–0.5)
HCT VFR BLD CALC: 21.4 % — LOW (ref 39–50)
HGB BLD-MCNC: 6.6 G/DL — CRITICAL LOW (ref 13–17)
LYMPHOCYTES # BLD AUTO: 2.8 K/UL — SIGNIFICANT CHANGE UP (ref 1–3.3)
LYMPHOCYTES # BLD AUTO: 22 % — SIGNIFICANT CHANGE UP (ref 13–44)
MCHC RBC-ENTMCNC: 30.9 G/DL — LOW (ref 32–36)
MCHC RBC-ENTMCNC: 34.1 PG — HIGH (ref 27–34)
MCV RBC AUTO: 110 FL — HIGH (ref 80–100)
MONOCYTES # BLD AUTO: 3.8 K/UL — HIGH (ref 0–0.9)
MONOCYTES NFR BLD AUTO: 44 % — HIGH (ref 2–14)
NEUTROPHILS # BLD AUTO: 3 K/UL — SIGNIFICANT CHANGE UP (ref 1.8–7.4)
NEUTROPHILS NFR BLD AUTO: 33 % — LOW (ref 43–77)
PLAT MORPH BLD: NORMAL — SIGNIFICANT CHANGE UP
PLATELET # BLD AUTO: 176 K/UL — SIGNIFICANT CHANGE UP (ref 150–400)
RBC # BLD: 1.94 M/UL — LOW (ref 4.2–5.8)
RBC # FLD: 18.2 % — HIGH (ref 10.3–14.5)
RBC BLD AUTO: SIGNIFICANT CHANGE UP
RH IG SCN BLD-IMP: POSITIVE — SIGNIFICANT CHANGE UP
WBC # BLD: 9.7 K/UL — SIGNIFICANT CHANGE UP (ref 3.8–10.5)
WBC # FLD AUTO: 9.7 K/UL — SIGNIFICANT CHANGE UP (ref 3.8–10.5)

## 2019-09-30 PROCEDURE — 86850 RBC ANTIBODY SCREEN: CPT

## 2019-09-30 PROCEDURE — 86900 BLOOD TYPING SEROLOGIC ABO: CPT

## 2019-09-30 PROCEDURE — 86901 BLOOD TYPING SEROLOGIC RH(D): CPT

## 2019-09-30 PROCEDURE — 86923 COMPATIBILITY TEST ELECTRIC: CPT

## 2019-10-01 ENCOUNTER — APPOINTMENT (OUTPATIENT)
Dept: INFUSION THERAPY | Facility: HOSPITAL | Age: 84
End: 2019-10-01

## 2019-10-02 DIAGNOSIS — Z51.89 ENCOUNTER FOR OTHER SPECIFIED AFTERCARE: ICD-10-CM

## 2019-10-06 NOTE — PHYSICAL THERAPY INITIAL EVALUATION ADULT - PRECAUTIONS/LIMITATIONS, REHAB EVAL
Pt changed into scrubs, belongings itemized and placed in pt locker.    no known precautions/limitations

## 2019-10-07 ENCOUNTER — RESULT REVIEW (OUTPATIENT)
Age: 84
End: 2019-10-07

## 2019-10-07 ENCOUNTER — APPOINTMENT (OUTPATIENT)
Dept: INFUSION THERAPY | Facility: HOSPITAL | Age: 84
End: 2019-10-07

## 2019-10-07 LAB
ANISOCYTOSIS BLD QL: SLIGHT — SIGNIFICANT CHANGE UP
DACRYOCYTES BLD QL SMEAR: SLIGHT — SIGNIFICANT CHANGE UP
EOSINOPHIL NFR BLD AUTO: 1 % — SIGNIFICANT CHANGE UP (ref 0–6)
HCT VFR BLD CALC: 24.4 % — LOW (ref 39–50)
HGB BLD-MCNC: 8 G/DL — LOW (ref 13–17)
HYPOCHROMIA BLD QL: SLIGHT — SIGNIFICANT CHANGE UP
LYMPHOCYTES # BLD AUTO: 38 % — SIGNIFICANT CHANGE UP (ref 13–44)
LYMPHOCYTES # BLD AUTO: SIGNIFICANT CHANGE UP K/UL (ref 1–3.3)
MACROCYTES BLD QL: SLIGHT — SIGNIFICANT CHANGE UP
MCHC RBC-ENTMCNC: 32.8 G/DL — SIGNIFICANT CHANGE UP (ref 32–36)
MCHC RBC-ENTMCNC: 34.1 PG — HIGH (ref 27–34)
MCV RBC AUTO: 104 FL — HIGH (ref 80–100)
MICROCYTES BLD QL: SLIGHT — SIGNIFICANT CHANGE UP
MONOCYTES NFR BLD AUTO: 36 % — HIGH (ref 2–14)
NEUTROPHILS # BLD AUTO: 2.6 K/UL — SIGNIFICANT CHANGE UP (ref 1.8–7.4)
NEUTROPHILS NFR BLD AUTO: 25 % — LOW (ref 43–77)
PLAT MORPH BLD: NORMAL — SIGNIFICANT CHANGE UP
PLATELET # BLD AUTO: 118 K/UL — LOW (ref 150–400)
POLYCHROMASIA BLD QL SMEAR: SLIGHT — SIGNIFICANT CHANGE UP
RBC # BLD: 2.35 M/UL — LOW (ref 4.2–5.8)
RBC # FLD: 20.2 % — HIGH (ref 10.3–14.5)
RBC BLD AUTO: ABNORMAL
WBC # BLD: 10.9 K/UL — HIGH (ref 3.8–10.5)
WBC # FLD AUTO: 10.9 K/UL — HIGH (ref 3.8–10.5)

## 2019-10-14 ENCOUNTER — APPOINTMENT (OUTPATIENT)
Dept: INFUSION THERAPY | Facility: HOSPITAL | Age: 84
End: 2019-10-14

## 2019-10-14 ENCOUNTER — RESULT REVIEW (OUTPATIENT)
Age: 84
End: 2019-10-14

## 2019-10-14 LAB
ACANTHOCYTES BLD QL SMEAR: SLIGHT — SIGNIFICANT CHANGE UP
ANISOCYTOSIS BLD QL: SLIGHT — SIGNIFICANT CHANGE UP
BASOPHILS # BLD AUTO: 0.1 K/UL — SIGNIFICANT CHANGE UP (ref 0–0.2)
BASOPHILS NFR BLD AUTO: 1 % — SIGNIFICANT CHANGE UP (ref 0–2)
DACRYOCYTES BLD QL SMEAR: SLIGHT — SIGNIFICANT CHANGE UP
ELLIPTOCYTES BLD QL SMEAR: SLIGHT — SIGNIFICANT CHANGE UP
EOSINOPHIL # BLD AUTO: 0 K/UL — SIGNIFICANT CHANGE UP (ref 0–0.5)
HCT VFR BLD CALC: 24 % — LOW (ref 39–50)
HGB BLD-MCNC: 7.9 G/DL — LOW (ref 13–17)
HYPOCHROMIA BLD QL: SLIGHT — SIGNIFICANT CHANGE UP
LYMPHOCYTES # BLD AUTO: 3.2 K/UL — SIGNIFICANT CHANGE UP (ref 1–3.3)
LYMPHOCYTES # BLD AUTO: 30 % — SIGNIFICANT CHANGE UP (ref 13–44)
MACROCYTES BLD QL: SLIGHT — SIGNIFICANT CHANGE UP
MCHC RBC-ENTMCNC: 32.9 G/DL — SIGNIFICANT CHANGE UP (ref 32–36)
MCHC RBC-ENTMCNC: 35.5 PG — HIGH (ref 27–34)
MCV RBC AUTO: 108 FL — HIGH (ref 80–100)
MICROCYTES BLD QL: SLIGHT — SIGNIFICANT CHANGE UP
MONOCYTES # BLD AUTO: 2.8 K/UL — HIGH (ref 0–0.9)
MONOCYTES NFR BLD AUTO: 41 % — HIGH (ref 2–14)
NEUTROPHILS # BLD AUTO: 4 K/UL — SIGNIFICANT CHANGE UP (ref 1.8–7.4)
NEUTROPHILS NFR BLD AUTO: 28 % — LOW (ref 43–77)
OVALOCYTES BLD QL SMEAR: SLIGHT — SIGNIFICANT CHANGE UP
PLAT MORPH BLD: NORMAL — SIGNIFICANT CHANGE UP
PLATELET # BLD AUTO: 122 K/UL — LOW (ref 150–400)
POIKILOCYTOSIS BLD QL AUTO: SLIGHT — SIGNIFICANT CHANGE UP
POLYCHROMASIA BLD QL SMEAR: SLIGHT — SIGNIFICANT CHANGE UP
RBC # BLD: 2.23 M/UL — LOW (ref 4.2–5.8)
RBC # FLD: 20.5 % — HIGH (ref 10.3–14.5)
RBC BLD AUTO: ABNORMAL
WBC # BLD: 10 K/UL — SIGNIFICANT CHANGE UP (ref 3.8–10.5)
WBC # FLD AUTO: 10 K/UL — SIGNIFICANT CHANGE UP (ref 3.8–10.5)

## 2019-10-16 ENCOUNTER — OUTPATIENT (OUTPATIENT)
Dept: OUTPATIENT SERVICES | Facility: HOSPITAL | Age: 84
LOS: 1 days | Discharge: HOME | End: 2019-10-16

## 2019-10-16 DIAGNOSIS — D46.9 MYELODYSPLASTIC SYNDROME, UNSPECIFIED: ICD-10-CM

## 2019-10-16 DIAGNOSIS — C93.10 CHRONIC MYELOMONOCYTIC LEUKEMIA NOT HAVING ACHIEVED REMISSION: ICD-10-CM

## 2019-10-21 ENCOUNTER — APPOINTMENT (OUTPATIENT)
Dept: HEMATOLOGY ONCOLOGY | Facility: CLINIC | Age: 84
End: 2019-10-21
Payer: MEDICARE

## 2019-10-21 ENCOUNTER — RESULT REVIEW (OUTPATIENT)
Age: 84
End: 2019-10-21

## 2019-10-21 ENCOUNTER — APPOINTMENT (OUTPATIENT)
Dept: INFUSION THERAPY | Facility: HOSPITAL | Age: 84
End: 2019-10-21

## 2019-10-21 VITALS
WEIGHT: 175.04 LBS | SYSTOLIC BLOOD PRESSURE: 138 MMHG | DIASTOLIC BLOOD PRESSURE: 66 MMHG | RESPIRATION RATE: 15 BRPM | TEMPERATURE: 97.6 F | BODY MASS INDEX: 23.74 KG/M2 | OXYGEN SATURATION: 100 % | HEART RATE: 66 BPM

## 2019-10-21 LAB
BASOPHILS # BLD AUTO: 0.1 K/UL — SIGNIFICANT CHANGE UP (ref 0–0.2)
BASOPHILS NFR BLD AUTO: 0.8 % — SIGNIFICANT CHANGE UP (ref 0–2)
EOSINOPHIL # BLD AUTO: 0.1 K/UL — SIGNIFICANT CHANGE UP (ref 0–0.5)
EOSINOPHIL NFR BLD AUTO: 1.4 % — SIGNIFICANT CHANGE UP (ref 0–6)
HCT VFR BLD CALC: 23.7 % — LOW (ref 39–50)
HGB BLD-MCNC: 7.6 G/DL — LOW (ref 13–17)
LYMPHOCYTES # BLD AUTO: 2.7 K/UL — SIGNIFICANT CHANGE UP (ref 1–3.3)
LYMPHOCYTES # BLD AUTO: 34.8 % — SIGNIFICANT CHANGE UP (ref 13–44)
MCHC RBC-ENTMCNC: 32 G/DL — SIGNIFICANT CHANGE UP (ref 32–36)
MCHC RBC-ENTMCNC: 35.4 PG — HIGH (ref 27–34)
MCV RBC AUTO: 111 FL — HIGH (ref 80–100)
MONOCYTES # BLD AUTO: 2.4 K/UL — HIGH (ref 0–0.9)
MONOCYTES NFR BLD AUTO: 31.5 % — HIGH (ref 2–14)
NEUTROPHILS # BLD AUTO: 2.4 K/UL — SIGNIFICANT CHANGE UP (ref 1.8–7.4)
NEUTROPHILS NFR BLD AUTO: 31.5 % — LOW (ref 43–77)
PLATELET # BLD AUTO: 141 K/UL — LOW (ref 150–400)
RBC # BLD: 2.14 M/UL — LOW (ref 4.2–5.8)
RBC # FLD: 22.1 % — HIGH (ref 10.3–14.5)
WBC # BLD: 7.7 K/UL — SIGNIFICANT CHANGE UP (ref 3.8–10.5)
WBC # FLD AUTO: 7.7 K/UL — SIGNIFICANT CHANGE UP (ref 3.8–10.5)

## 2019-10-21 PROCEDURE — 99214 OFFICE O/P EST MOD 30 MIN: CPT

## 2019-10-21 NOTE — HISTORY OF PRESENT ILLNESS
[Disease:__________________________] : Disease: [unfilled] [CMMOL Prognostic Scoring System: ___] : CMMOL Prognostic Scoring System: [unfilled] [de-identified] : Going to [place] on 11/13/19 for 4 days.\par 12/12/19 Going to [place] on a cruise for 12 days.\par Overall, feeling well without complaints. Ambulating better-using cane more.\par He has no complaints of chest pain, shortness of breath, lightheadedness, or palpitations. No bleeding. No fevers.\par Gene present. [de-identified] : 3/2019-Patient was admitted to the hospital with refractory anemia. Bone marrow biopsy, and bone marrow aspirate showed a hypercellular bone marrow with trilineage hematopoiesis, myeloid predominance and atypical monocytes. Iron stores present. 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 Flow cytometry myeloid immunophenotypic findings showed no increase in myeloid immaturity. Increased monocytes noted. 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

## 2019-10-21 NOTE — PHYSICAL EXAM
[Normal] : affect appropriate [de-identified] : non-toxic appearing [de-identified] : alert and oriented x 3 [de-identified] : +support stockings; no calf tenderness

## 2019-10-21 NOTE — REVIEW OF SYSTEMS
[Negative] : Allergic/Immunologic [Muscle Pain] : no muscle pain [Dizziness] : no dizziness [Fainting] : no fainting

## 2019-10-21 NOTE — ASSESSMENT
[FreeTextEntry1] : Patient with CMML/refractory anemia-recommend reevaluation of bone marrow-bone marrow aspiration/biopsy-patient refuses currently. Discussed potential treatment options with increasing need for transfusions-recommend consideration of chemotherapy/hypomethylating agent-patient refuses to consider systemic therapy at this time. He wishes for continued supportive hematologic care with p.r.n. growth factor support/transfusions.\par Patient and Gene were given the opportunity to ask questions. Their questions have been answered to their apparent satisfaction at this time.

## 2019-10-28 ENCOUNTER — RESULT REVIEW (OUTPATIENT)
Age: 84
End: 2019-10-28

## 2019-10-28 ENCOUNTER — APPOINTMENT (OUTPATIENT)
Dept: INFUSION THERAPY | Facility: HOSPITAL | Age: 84
End: 2019-10-28

## 2019-10-28 LAB
BASOPHILS # BLD AUTO: 0 K/UL — SIGNIFICANT CHANGE UP (ref 0–0.2)
EOSINOPHIL # BLD AUTO: 0.2 K/UL — SIGNIFICANT CHANGE UP (ref 0–0.5)
EOSINOPHIL NFR BLD AUTO: 1 % — SIGNIFICANT CHANGE UP (ref 0–6)
HCT VFR BLD CALC: 24.7 % — LOW (ref 39–50)
HGB BLD-MCNC: 8.1 G/DL — LOW (ref 13–17)
LYMPHOCYTES # BLD AUTO: 2.8 K/UL — SIGNIFICANT CHANGE UP (ref 1–3.3)
LYMPHOCYTES # BLD AUTO: 38 % — SIGNIFICANT CHANGE UP (ref 13–44)
MCHC RBC-ENTMCNC: 32.6 G/DL — SIGNIFICANT CHANGE UP (ref 32–36)
MCHC RBC-ENTMCNC: 36.4 PG — HIGH (ref 27–34)
MCV RBC AUTO: 112 FL — HIGH (ref 80–100)
MONOCYTES # BLD AUTO: 4.8 K/UL — HIGH (ref 0–0.9)
MONOCYTES NFR BLD AUTO: 39 % — HIGH (ref 2–14)
NEUTROPHILS # BLD AUTO: 2.3 K/UL — SIGNIFICANT CHANGE UP (ref 1.8–7.4)
NEUTROPHILS NFR BLD AUTO: 22 % — LOW (ref 43–77)
PLAT MORPH BLD: NORMAL — SIGNIFICANT CHANGE UP
PLATELET # BLD AUTO: 148 K/UL — LOW (ref 150–400)
RBC # BLD: 2.21 M/UL — LOW (ref 4.2–5.8)
RBC # FLD: 22.6 % — HIGH (ref 10.3–14.5)
RBC BLD AUTO: SIGNIFICANT CHANGE UP
WBC # BLD: 10 K/UL — SIGNIFICANT CHANGE UP (ref 3.8–10.5)
WBC # FLD AUTO: 10 K/UL — SIGNIFICANT CHANGE UP (ref 3.8–10.5)

## 2019-11-04 ENCOUNTER — RESULT REVIEW (OUTPATIENT)
Age: 84
End: 2019-11-04

## 2019-11-04 ENCOUNTER — APPOINTMENT (OUTPATIENT)
Dept: INFUSION THERAPY | Facility: HOSPITAL | Age: 84
End: 2019-11-04

## 2019-11-04 LAB
ANISOCYTOSIS BLD QL: SLIGHT — SIGNIFICANT CHANGE UP
BASOPHILS # BLD AUTO: 0 K/UL — SIGNIFICANT CHANGE UP (ref 0–0.2)
EOSINOPHIL # BLD AUTO: 0 K/UL — SIGNIFICANT CHANGE UP (ref 0–0.5)
EOSINOPHIL NFR BLD AUTO: 1 % — SIGNIFICANT CHANGE UP (ref 0–6)
HCT VFR BLD CALC: 23.6 % — LOW (ref 39–50)
HGB BLD-MCNC: 7.7 G/DL — LOW (ref 13–17)
HYPOCHROMIA BLD QL: SIGNIFICANT CHANGE UP
LYMPHOCYTES # BLD AUTO: 3.2 K/UL — SIGNIFICANT CHANGE UP (ref 1–3.3)
LYMPHOCYTES # BLD AUTO: 40 % — SIGNIFICANT CHANGE UP (ref 13–44)
MCHC RBC-ENTMCNC: 32.4 G/DL — SIGNIFICANT CHANGE UP (ref 32–36)
MCHC RBC-ENTMCNC: 36 PG — HIGH (ref 27–34)
MCV RBC AUTO: 111 FL — HIGH (ref 80–100)
MONOCYTES # BLD AUTO: 4.4 K/UL — HIGH (ref 0–0.9)
MONOCYTES NFR BLD AUTO: 32 % — HIGH (ref 2–14)
NEUTROPHILS # BLD AUTO: 3.2 K/UL — SIGNIFICANT CHANGE UP (ref 1.8–7.4)
NEUTROPHILS NFR BLD AUTO: 27 % — LOW (ref 43–77)
PLAT MORPH BLD: NORMAL — SIGNIFICANT CHANGE UP
PLATELET # BLD AUTO: 205 K/UL — SIGNIFICANT CHANGE UP (ref 150–400)
POIKILOCYTOSIS BLD QL AUTO: SLIGHT — SIGNIFICANT CHANGE UP
POLYCHROMASIA BLD QL SMEAR: SLIGHT — SIGNIFICANT CHANGE UP
RBC # BLD: 2.13 M/UL — LOW (ref 4.2–5.8)
RBC # FLD: 22.8 % — HIGH (ref 10.3–14.5)
RBC BLD AUTO: ABNORMAL
WBC # BLD: 10.8 K/UL — HIGH (ref 3.8–10.5)
WBC # FLD AUTO: 10.8 K/UL — HIGH (ref 3.8–10.5)

## 2019-11-08 NOTE — PROGRESS NOTE ADULT - PROBLEM SELECTOR PLAN 3
FYI~ A refill has been made to the  assistance program for Dulera & Proventil HFA.    A 90 day supply of Dulera & Proventil HFA will be delivered to Marie's home within 7-10 business days.    Thank you,    Vandana Gonzalez  Prescription Assistance        f/u culture report.  off antibiotics as per ID.

## 2019-11-11 ENCOUNTER — APPOINTMENT (OUTPATIENT)
Dept: INFUSION THERAPY | Facility: HOSPITAL | Age: 84
End: 2019-11-11

## 2019-11-11 ENCOUNTER — OTHER (OUTPATIENT)
Age: 84
End: 2019-11-11

## 2019-11-11 ENCOUNTER — RESULT REVIEW (OUTPATIENT)
Age: 84
End: 2019-11-11

## 2019-11-11 LAB
BASOPHILS # BLD AUTO: 0.1 K/UL — SIGNIFICANT CHANGE UP (ref 0–0.2)
BASOPHILS NFR BLD AUTO: 0.7 % — SIGNIFICANT CHANGE UP (ref 0–2)
EOSINOPHIL # BLD AUTO: 0.1 K/UL — SIGNIFICANT CHANGE UP (ref 0–0.5)
EOSINOPHIL NFR BLD AUTO: 1 % — SIGNIFICANT CHANGE UP (ref 0–6)
HCT VFR BLD CALC: 23 % — LOW (ref 39–50)
HGB BLD-MCNC: 7.6 G/DL — LOW (ref 13–17)
LYMPHOCYTES # BLD AUTO: 2.2 K/UL — SIGNIFICANT CHANGE UP (ref 1–3.3)
LYMPHOCYTES # BLD AUTO: 25.4 % — SIGNIFICANT CHANGE UP (ref 13–44)
MCHC RBC-ENTMCNC: 33 G/DL — SIGNIFICANT CHANGE UP (ref 32–36)
MCHC RBC-ENTMCNC: 36 PG — HIGH (ref 27–34)
MCV RBC AUTO: 109 FL — HIGH (ref 80–100)
MONOCYTES # BLD AUTO: 2.6 K/UL — HIGH (ref 0–0.9)
MONOCYTES NFR BLD AUTO: 29.5 % — HIGH (ref 2–14)
NEUTROPHILS # BLD AUTO: 3.8 K/UL — SIGNIFICANT CHANGE UP (ref 1.8–7.4)
NEUTROPHILS NFR BLD AUTO: 43.4 % — SIGNIFICANT CHANGE UP (ref 43–77)
PLATELET # BLD AUTO: 155 K/UL — SIGNIFICANT CHANGE UP (ref 150–400)
RBC # BLD: 2.11 M/UL — LOW (ref 4.2–5.8)
RBC # FLD: 19.4 % — HIGH (ref 10.3–14.5)
WBC # BLD: 8.8 K/UL — SIGNIFICANT CHANGE UP (ref 3.8–10.5)
WBC # FLD AUTO: 8.8 K/UL — SIGNIFICANT CHANGE UP (ref 3.8–10.5)

## 2019-11-11 NOTE — PROGRESS NOTE ADULT - SUBJECTIVE AND OBJECTIVE BOX
Does NOT APPEAR VISUALLY SIGNIFICANT. Patient is a 84y old  Male who presents with a chief complaint of anemia, debility and functional decline (08 Mar 2019 16:55)      HPI:  Patient is 84 male RH dominant with PMH significant for HTN, HLD,  Afib (off xarelto), stomach CA, anemia, gout, BPH. seizure d/o, recently stopped EToh use, who presented from [facility] with abnormal labs.  Pt reports recent admission to [facility] for gout flare then discharged to rehab.  Pt now returns with guaiac negative anemia (HgB 6.8 ) and  episode of bright red blood in stool. Pt also found with leukocytosis, with temp 100.7 F rectally.  Denies chills, fever, sob, chest pain, weakness, dysuria.    Patient was seen by GI and hematology.  S/P Colonoscopy on 3/1/19. No source of bleeding noted. Negative FOB. S/P EGD during prior admission, and no bleeding identified. Heme performed BM biopsy 3/4, results pending. CKD, Etoh may contribute, although underlying BM d/o such as myelodysplasia suspected. Transfused PRBC. Patient transferred to - [facility] due to debility and functional decline (06 Mar 2019 14:53)      PAST MEDICAL & SURGICAL HISTORY:  Nonrheumatic aortic valve stenosis  Cancer of stomach  Anemia, unspecified type  Seizure disorder  Benign prostatic hyperplasia, unspecified whether lower urinary tract symptoms present  Hyperlipemia  Essential hypertension  Chronic atrial fibrillation  Stomach cancer  Hypertension  Atrial fibrillation  Gout  No significant past surgical history  No significant past surgical history      MEDICATIONS  (STANDING):  allopurinol 100 milliGRAM(s) Oral daily  amLODIPine   Tablet 10 milliGRAM(s) Oral daily  aspirin enteric coated 81 milliGRAM(s) Oral daily  atorvastatin 10 milliGRAM(s) Oral at bedtime  cyanocobalamin 1000 MICROGram(s) Oral daily  docusate sodium 100 milliGRAM(s) Oral two times a day  enoxaparin Injectable 40 milliGRAM(s) SubCutaneous every 24 hours  folic acid 1 milliGRAM(s) Oral daily  levETIRAcetam 500 milliGRAM(s) Oral two times a day  metoprolol tartrate 25 milliGRAM(s) Oral every 12 hours  multivitamin 1 Tablet(s) Oral daily  pantoprazole    Tablet 40 milliGRAM(s) Oral before breakfast  sodium chloride 0.9%. 1000 milliLiter(s) (70 mL/Hr) IV Continuous <Continuous>  tamsulosin 0.4 milliGRAM(s) Oral at bedtime    MEDICATIONS  (PRN):  indomethacin 25 milliGRAM(s) Oral two times a day PRN Moderate Pain (4 - 6)  senna 2 Tablet(s) Oral at bedtime PRN Constipation      Allergies    No Known Allergies    Intolerances          VITALS  84y  Vital Signs Last 24 Hrs  T(C): 36.9 (09 Mar 2019 09:24), Max: 37.1 (08 Mar 2019 23:56)  T(F): 98.4 (09 Mar 2019 09:24), Max: 98.8 (08 Mar 2019 23:56)  HR: 76 (09 Mar 2019 09:24) (76 - 90)  BP: 122/64 (09 Mar 2019 09:24) (116/68 - 143/77)  BP(mean): --  RR: 12 (09 Mar 2019 09:24) (12 - 14)  SpO2: 98% (09 Mar 2019 09:24) (98% - 98%)  Daily     Daily         RECENT LABS:                          7.6    15.7  )-----------( 206      ( 09 Mar 2019 05:34 )             23.8     03-09    135  |  102  |  36<H>  ----------------------------<  331<H>  3.7   |  24  |  1.37<H>    Ca    8.8      09 Mar 2019 06:39    TPro  6.7  /  Alb  1.8<L>  /  TBili  0.3  /  DBili  x   /  AST  23  /  ALT  21  /  AlkPhos  34<L>  03-09    LIVER FUNCTIONS - ( 09 Mar 2019 06:39 )  Alb: 1.8 g/dL / Pro: 6.7 g/dL / ALK PHOS: 34 U/L / ALT: 21 U/L DA / AST: 23 U/L / GGT: x                 Culture - Urine (collected 03-07-19 @ 16:37)  Source: .Urine Clean Catch (Midstream)  Final Report (03-08-19 @ 17:04):    No growth        CAPILLARY BLOOD GLUCOSE      EXAM:  US DPLX LWR EXT VEINS COMPL BI      PROCEDURE DATE:  03/08/2019        INTERPRETATION:  CLINICAL HISTORY: 84 years  Male with r/o DVT. Bilateral   lower extremity edema    COMPARISON: None available.    TECHNIQUE: Duplex sonography of the BILATERAL LOWER extremities with   color and spectral Doppler, with and without compression.      FINDINGS:    There is normal compressibility of the bilateral common femoral, femoral   and popliteal veins.     No calf thrombosis is identified on the left. On the right, there is   limited augmentation in a peroneal vein however bloodflow is documented.      Doppler examination shows normal spontaneous and phasic flow.    Bilateral subcutaneous edema is present in both calves.    A left inguinal lymph node measures 3.6 x 1.3 x 1.3 cm.    IMPRESSION:     Limited augmentation in a right peroneal vein however bloodflow is   documented. Cannot rule out partial DVT.    No evidence of bilateral lower extremity deep venous thrombosis from the   groin to the knee.    Bilateral calf edema.    Prominent left inguinal lymph node.                  BELTRAN CASTRO M.D., ATTENDING RADIOLOGIST  This document has been electronically signed. Mar  8 2019  4:35PM

## 2019-11-12 ENCOUNTER — OUTPATIENT (OUTPATIENT)
Dept: OUTPATIENT SERVICES | Facility: HOSPITAL | Age: 84
LOS: 1 days | Discharge: HOME | End: 2019-11-12

## 2019-11-12 DIAGNOSIS — C93.10 CHRONIC MYELOMONOCYTIC LEUKEMIA NOT HAVING ACHIEVED REMISSION: ICD-10-CM

## 2019-11-12 DIAGNOSIS — D46.9 MYELODYSPLASTIC SYNDROME, UNSPECIFIED: ICD-10-CM

## 2019-11-18 ENCOUNTER — APPOINTMENT (OUTPATIENT)
Dept: INFUSION THERAPY | Facility: HOSPITAL | Age: 84
End: 2019-11-18

## 2019-11-18 ENCOUNTER — RESULT REVIEW (OUTPATIENT)
Age: 84
End: 2019-11-18

## 2019-11-18 ENCOUNTER — APPOINTMENT (OUTPATIENT)
Dept: HEMATOLOGY ONCOLOGY | Facility: CLINIC | Age: 84
End: 2019-11-18
Payer: MEDICARE

## 2019-11-18 VITALS
WEIGHT: 187.39 LBS | BODY MASS INDEX: 25.41 KG/M2 | SYSTOLIC BLOOD PRESSURE: 131 MMHG | HEART RATE: 69 BPM | OXYGEN SATURATION: 100 % | RESPIRATION RATE: 16 BRPM | TEMPERATURE: 97.4 F | DIASTOLIC BLOOD PRESSURE: 73 MMHG

## 2019-11-18 LAB
BASOPHILS # BLD AUTO: 0 K/UL — SIGNIFICANT CHANGE UP (ref 0–0.2)
BASOPHILS NFR BLD AUTO: 0.3 % — SIGNIFICANT CHANGE UP (ref 0–2)
EOSINOPHIL # BLD AUTO: 0.1 K/UL — SIGNIFICANT CHANGE UP (ref 0–0.5)
EOSINOPHIL NFR BLD AUTO: 1.6 % — SIGNIFICANT CHANGE UP (ref 0–6)
HCT VFR BLD CALC: 23 % — LOW (ref 39–50)
HGB BLD-MCNC: 7.3 G/DL — LOW (ref 13–17)
LYMPHOCYTES # BLD AUTO: 2.2 K/UL — SIGNIFICANT CHANGE UP (ref 1–3.3)
LYMPHOCYTES # BLD AUTO: 26.2 % — SIGNIFICANT CHANGE UP (ref 13–44)
MCHC RBC-ENTMCNC: 31.6 G/DL — LOW (ref 32–36)
MCHC RBC-ENTMCNC: 35 PG — HIGH (ref 27–34)
MCV RBC AUTO: 110 FL — HIGH (ref 80–100)
MONOCYTES # BLD AUTO: 3.9 K/UL — HIGH (ref 0–0.9)
MONOCYTES NFR BLD AUTO: 46.3 % — HIGH (ref 2–14)
NEUTROPHILS # BLD AUTO: 2.1 K/UL — SIGNIFICANT CHANGE UP (ref 1.8–7.4)
NEUTROPHILS NFR BLD AUTO: 25.6 % — LOW (ref 43–77)
PLATELET # BLD AUTO: 146 K/UL — LOW (ref 150–400)
RBC # BLD: 2.08 M/UL — LOW (ref 4.2–5.8)
RBC # FLD: 20.6 % — HIGH (ref 10.3–14.5)
WBC # BLD: 8.3 K/UL — SIGNIFICANT CHANGE UP (ref 3.8–10.5)
WBC # FLD AUTO: 8.3 K/UL — SIGNIFICANT CHANGE UP (ref 3.8–10.5)

## 2019-11-18 PROCEDURE — 99214 OFFICE O/P EST MOD 30 MIN: CPT

## 2019-11-18 NOTE — HISTORY OF PRESENT ILLNESS
[Disease:__________________________] : Disease: [unfilled] [CMMOL Prognostic Scoring System: ___] : CMMOL Prognostic Scoring System: [unfilled] [de-identified] : 3/2019-Patient was admitted to the hospital with refractory anemia. Bone marrow biopsy, and bone marrow aspirate showed a hypercellular bone marrow with trilineage hematopoiesis, myeloid predominance and atypical monocytes. Iron stores present. 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 Flow cytometry myeloid immunophenotypic findings showed no increase in myeloid immaturity. Increased monocytes noted. 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 [de-identified] : Had a very nice time in [place]. Maintaining ADL's.\par 12/12/19 Going to [place] on a cruise for 12 days.\par He has no complaints of chest pain, shortness of breath, lightheadedness, or palpitations. No bleeding. No fevers.\par Gene present.

## 2019-11-18 NOTE — ASSESSMENT
[FreeTextEntry1] : Patient with CMML/refractory anemia- patient consents to continued supportive hematologic care with p.r.n. growth factor support/transfusions.\par T/C PRBC's for hemoglobin <7/related symptoms.\par Patient and Gene were given the opportunity to ask questions. Their questions have been answered to their apparent satisfaction at this time.

## 2019-11-18 NOTE — PHYSICAL EXAM
[Normal] : affect appropriate [de-identified] : non-toxic appearing [de-identified] : alert and oriented x 3 [de-identified] : no calf tenderness

## 2019-11-25 ENCOUNTER — RESULT REVIEW (OUTPATIENT)
Age: 84
End: 2019-11-25

## 2019-11-25 ENCOUNTER — APPOINTMENT (OUTPATIENT)
Dept: INFUSION THERAPY | Facility: HOSPITAL | Age: 84
End: 2019-11-25

## 2019-11-25 LAB
ANISOCYTOSIS BLD QL: SLIGHT — SIGNIFICANT CHANGE UP
BASOPHILS # BLD AUTO: 0 K/UL — SIGNIFICANT CHANGE UP (ref 0–0.2)
ELLIPTOCYTES BLD QL SMEAR: SLIGHT — SIGNIFICANT CHANGE UP
EOSINOPHIL # BLD AUTO: 0 K/UL — SIGNIFICANT CHANGE UP (ref 0–0.5)
HCT VFR BLD CALC: 22.8 % — LOW (ref 39–50)
HGB BLD-MCNC: 7.3 G/DL — LOW (ref 13–17)
HYPOCHROMIA BLD QL: SLIGHT — SIGNIFICANT CHANGE UP
LYMPHOCYTES # BLD AUTO: 2.3 K/UL — SIGNIFICANT CHANGE UP (ref 1–3.3)
LYMPHOCYTES # BLD AUTO: 34 % — SIGNIFICANT CHANGE UP (ref 13–44)
MACROCYTES BLD QL: SLIGHT — SIGNIFICANT CHANGE UP
MCHC RBC-ENTMCNC: 32.2 G/DL — SIGNIFICANT CHANGE UP (ref 32–36)
MCHC RBC-ENTMCNC: 35.1 PG — HIGH (ref 27–34)
MCV RBC AUTO: 109 FL — HIGH (ref 80–100)
MICROCYTES BLD QL: SLIGHT — SIGNIFICANT CHANGE UP
MONOCYTES # BLD AUTO: 4.4 K/UL — HIGH (ref 0–0.9)
MONOCYTES NFR BLD AUTO: 44 % — HIGH (ref 2–14)
NEUTROPHILS # BLD AUTO: 2.3 K/UL — SIGNIFICANT CHANGE UP (ref 1.8–7.4)
NEUTROPHILS NFR BLD AUTO: 22 % — LOW (ref 43–77)
PLAT MORPH BLD: NORMAL — SIGNIFICANT CHANGE UP
PLATELET # BLD AUTO: 145 K/UL — LOW (ref 150–400)
POIKILOCYTOSIS BLD QL AUTO: SLIGHT — SIGNIFICANT CHANGE UP
POLYCHROMASIA BLD QL SMEAR: SLIGHT — SIGNIFICANT CHANGE UP
RBC # BLD: 2.09 M/UL — LOW (ref 4.2–5.8)
RBC # FLD: 22.3 % — HIGH (ref 10.3–14.5)
RBC BLD AUTO: ABNORMAL
WBC # BLD: 9 K/UL — SIGNIFICANT CHANGE UP (ref 3.8–10.5)
WBC # FLD AUTO: 9 K/UL — SIGNIFICANT CHANGE UP (ref 3.8–10.5)

## 2019-12-02 ENCOUNTER — RESULT REVIEW (OUTPATIENT)
Age: 84
End: 2019-12-02

## 2019-12-02 ENCOUNTER — OTHER (OUTPATIENT)
Age: 84
End: 2019-12-02

## 2019-12-02 ENCOUNTER — APPOINTMENT (OUTPATIENT)
Dept: INFUSION THERAPY | Facility: HOSPITAL | Age: 84
End: 2019-12-02

## 2019-12-02 LAB
EOSINOPHIL # BLD AUTO: 0.2 K/UL — SIGNIFICANT CHANGE UP (ref 0–0.5)
EOSINOPHIL NFR BLD AUTO: 1 % — SIGNIFICANT CHANGE UP (ref 0–6)
HCT VFR BLD CALC: 23.9 % — LOW (ref 39–50)
HGB BLD-MCNC: 7.4 G/DL — LOW (ref 13–17)
LYMPHOCYTES # BLD AUTO: 2.3 K/UL — SIGNIFICANT CHANGE UP (ref 1–3.3)
LYMPHOCYTES # BLD AUTO: 25 % — SIGNIFICANT CHANGE UP (ref 13–44)
MCHC RBC-ENTMCNC: 31.1 G/DL — LOW (ref 32–36)
MCHC RBC-ENTMCNC: 34.4 PG — HIGH (ref 27–34)
MCV RBC AUTO: 111 FL — HIGH (ref 80–100)
MONOCYTES # BLD AUTO: 3.8 K/UL — HIGH (ref 0–0.9)
MONOCYTES NFR BLD AUTO: 38 % — HIGH (ref 2–14)
NEUTROPHILS # BLD AUTO: 3.1 K/UL — SIGNIFICANT CHANGE UP (ref 1.8–7.4)
NEUTROPHILS NFR BLD AUTO: 30 % — LOW (ref 43–77)
NEUTS BAND # BLD: 6 % — SIGNIFICANT CHANGE UP (ref 0–8)
PLAT MORPH BLD: NORMAL — SIGNIFICANT CHANGE UP
PLATELET # BLD AUTO: 133 K/UL — LOW (ref 150–400)
RBC # BLD: 2.16 M/UL — LOW (ref 4.2–5.8)
RBC # FLD: 19.1 % — HIGH (ref 10.3–14.5)
RBC BLD AUTO: SIGNIFICANT CHANGE UP
WBC # BLD: 9.3 K/UL — SIGNIFICANT CHANGE UP (ref 3.8–10.5)
WBC # FLD AUTO: 9.3 K/UL — SIGNIFICANT CHANGE UP (ref 3.8–10.5)

## 2019-12-09 ENCOUNTER — RESULT REVIEW (OUTPATIENT)
Age: 84
End: 2019-12-09

## 2019-12-09 ENCOUNTER — OUTPATIENT (OUTPATIENT)
Dept: OUTPATIENT SERVICES | Facility: HOSPITAL | Age: 84
LOS: 1 days | End: 2019-12-09
Payer: MEDICARE

## 2019-12-09 ENCOUNTER — APPOINTMENT (OUTPATIENT)
Dept: INFUSION THERAPY | Facility: HOSPITAL | Age: 84
End: 2019-12-09

## 2019-12-09 DIAGNOSIS — C93.10 CHRONIC MYELOMONOCYTIC LEUKEMIA NOT HAVING ACHIEVED REMISSION: ICD-10-CM

## 2019-12-09 DIAGNOSIS — D46.9 MYELODYSPLASTIC SYNDROME, UNSPECIFIED: ICD-10-CM

## 2019-12-09 LAB
BLD GP AB SCN SERPL QL: NEGATIVE — SIGNIFICANT CHANGE UP
EOSINOPHIL # BLD AUTO: 0.1 K/UL — SIGNIFICANT CHANGE UP (ref 0–0.5)
EOSINOPHIL NFR BLD AUTO: 2 % — SIGNIFICANT CHANGE UP (ref 0–6)
HCT VFR BLD CALC: 24.6 % — LOW (ref 39–50)
HGB BLD-MCNC: 7.6 G/DL — LOW (ref 13–17)
LYMPHOCYTES # BLD AUTO: 1.8 K/UL — SIGNIFICANT CHANGE UP (ref 1–3.3)
LYMPHOCYTES # BLD AUTO: 27 % — SIGNIFICANT CHANGE UP (ref 13–44)
MCHC RBC-ENTMCNC: 30.9 G/DL — LOW (ref 32–36)
MCHC RBC-ENTMCNC: 33.9 PG — SIGNIFICANT CHANGE UP (ref 27–34)
MCV RBC AUTO: 110 FL — HIGH (ref 80–100)
METAMYELOCYTES # FLD: 4 % — HIGH (ref 0–0)
MONOCYTES # BLD AUTO: 3.2 K/UL — HIGH (ref 0–0.9)
MONOCYTES NFR BLD AUTO: 33 % — HIGH (ref 2–14)
MYELOCYTES NFR BLD: 4 % — HIGH (ref 0–0)
NEUTROPHILS # BLD AUTO: 3 K/UL — SIGNIFICANT CHANGE UP (ref 1.8–7.4)
NEUTROPHILS NFR BLD AUTO: 30 % — LOW (ref 43–77)
PLAT MORPH BLD: NORMAL — SIGNIFICANT CHANGE UP
PLATELET # BLD AUTO: 149 K/UL — LOW (ref 150–400)
RBC # BLD: 2.24 M/UL — LOW (ref 4.2–5.8)
RBC # FLD: 18.8 % — HIGH (ref 10.3–14.5)
RBC BLD AUTO: SIGNIFICANT CHANGE UP
RH IG SCN BLD-IMP: POSITIVE — SIGNIFICANT CHANGE UP
WBC # BLD: 8.1 K/UL — SIGNIFICANT CHANGE UP (ref 3.8–10.5)
WBC # FLD AUTO: 8.1 K/UL — SIGNIFICANT CHANGE UP (ref 3.8–10.5)

## 2019-12-09 PROCEDURE — 86850 RBC ANTIBODY SCREEN: CPT

## 2019-12-09 PROCEDURE — 86901 BLOOD TYPING SEROLOGIC RH(D): CPT

## 2019-12-09 PROCEDURE — 86923 COMPATIBILITY TEST ELECTRIC: CPT

## 2019-12-09 PROCEDURE — 86900 BLOOD TYPING SEROLOGIC ABO: CPT

## 2019-12-10 ENCOUNTER — APPOINTMENT (OUTPATIENT)
Dept: INFUSION THERAPY | Facility: HOSPITAL | Age: 84
End: 2019-12-10

## 2019-12-11 DIAGNOSIS — Z51.89 ENCOUNTER FOR OTHER SPECIFIED AFTERCARE: ICD-10-CM

## 2019-12-16 ENCOUNTER — OUTPATIENT (OUTPATIENT)
Dept: OUTPATIENT SERVICES | Facility: HOSPITAL | Age: 84
LOS: 1 days | Discharge: HOME | End: 2019-12-16

## 2019-12-16 DIAGNOSIS — C93.10 CHRONIC MYELOMONOCYTIC LEUKEMIA NOT HAVING ACHIEVED REMISSION: ICD-10-CM

## 2019-12-16 DIAGNOSIS — D46.9 MYELODYSPLASTIC SYNDROME, UNSPECIFIED: ICD-10-CM

## 2019-12-23 ENCOUNTER — RESULT REVIEW (OUTPATIENT)
Age: 84
End: 2019-12-23

## 2019-12-23 ENCOUNTER — APPOINTMENT (OUTPATIENT)
Dept: INFUSION THERAPY | Facility: HOSPITAL | Age: 84
End: 2019-12-23

## 2019-12-23 LAB
BASOPHILS # BLD AUTO: 0 K/UL — SIGNIFICANT CHANGE UP (ref 0–0.2)
BASOPHILS NFR BLD AUTO: 2 % — SIGNIFICANT CHANGE UP (ref 0–2)
EOSINOPHIL # BLD AUTO: 0 K/UL — SIGNIFICANT CHANGE UP (ref 0–0.5)
EOSINOPHIL NFR BLD AUTO: 1 % — SIGNIFICANT CHANGE UP (ref 0–6)
HCT VFR BLD CALC: 29.1 % — LOW (ref 39–50)
HGB BLD-MCNC: 8.9 G/DL — LOW (ref 13–17)
LYMPHOCYTES # BLD AUTO: 19 % — SIGNIFICANT CHANGE UP (ref 13–44)
LYMPHOCYTES # BLD AUTO: 2.7 K/UL — SIGNIFICANT CHANGE UP (ref 1–3.3)
MCHC RBC-ENTMCNC: 30.7 G/DL — LOW (ref 32–36)
MCHC RBC-ENTMCNC: 32.4 PG — SIGNIFICANT CHANGE UP (ref 27–34)
MCV RBC AUTO: 106 FL — HIGH (ref 80–100)
MONOCYTES # BLD AUTO: 6.4 K/UL — HIGH (ref 0–0.9)
MONOCYTES NFR BLD AUTO: 27 % — HIGH (ref 2–14)
NEUTROPHILS # BLD AUTO: 8.5 K/UL — HIGH (ref 1.8–7.4)
NEUTROPHILS NFR BLD AUTO: 51 % — SIGNIFICANT CHANGE UP (ref 43–77)
PLAT MORPH BLD: NORMAL — SIGNIFICANT CHANGE UP
PLATELET # BLD AUTO: 136 K/UL — LOW (ref 150–400)
RBC # BLD: 2.75 M/UL — LOW (ref 4.2–5.8)
RBC # FLD: 18 % — HIGH (ref 10.3–14.5)
RBC BLD AUTO: SIGNIFICANT CHANGE UP
WBC # BLD: 17.6 K/UL — HIGH (ref 3.8–10.5)
WBC # FLD AUTO: 17.6 K/UL — HIGH (ref 3.8–10.5)

## 2019-12-31 ENCOUNTER — RESULT REVIEW (OUTPATIENT)
Age: 84
End: 2019-12-31

## 2019-12-31 ENCOUNTER — APPOINTMENT (OUTPATIENT)
Dept: INFUSION THERAPY | Facility: HOSPITAL | Age: 84
End: 2019-12-31

## 2019-12-31 ENCOUNTER — APPOINTMENT (OUTPATIENT)
Dept: HEMATOLOGY ONCOLOGY | Facility: CLINIC | Age: 84
End: 2019-12-31
Payer: MEDICARE

## 2019-12-31 VITALS
OXYGEN SATURATION: 100 % | SYSTOLIC BLOOD PRESSURE: 148 MMHG | RESPIRATION RATE: 15 BRPM | TEMPERATURE: 97.8 F | HEART RATE: 77 BPM | DIASTOLIC BLOOD PRESSURE: 81 MMHG

## 2019-12-31 LAB
ANISOCYTOSIS BLD QL: SLIGHT — SIGNIFICANT CHANGE UP
BASOPHILS # BLD AUTO: 0 K/UL — SIGNIFICANT CHANGE UP (ref 0–0.2)
ELLIPTOCYTES BLD QL SMEAR: SLIGHT — SIGNIFICANT CHANGE UP
EOSINOPHIL # BLD AUTO: 0.1 K/UL — SIGNIFICANT CHANGE UP (ref 0–0.5)
EOSINOPHIL NFR BLD AUTO: 1 % — SIGNIFICANT CHANGE UP (ref 0–6)
HCT VFR BLD CALC: 28.9 % — LOW (ref 39–50)
HGB BLD-MCNC: 8.9 G/DL — LOW (ref 13–17)
HYPOCHROMIA BLD QL: SLIGHT — SIGNIFICANT CHANGE UP
LYMPHOCYTES # BLD AUTO: 2.8 K/UL — SIGNIFICANT CHANGE UP (ref 1–3.3)
LYMPHOCYTES # BLD AUTO: 24 % — SIGNIFICANT CHANGE UP (ref 13–44)
MACROCYTES BLD QL: SLIGHT — SIGNIFICANT CHANGE UP
MCHC RBC-ENTMCNC: 30.7 G/DL — LOW (ref 32–36)
MCHC RBC-ENTMCNC: 31.8 PG — SIGNIFICANT CHANGE UP (ref 27–34)
MCV RBC AUTO: 104 FL — HIGH (ref 80–100)
MICROCYTES BLD QL: SLIGHT — SIGNIFICANT CHANGE UP
MONOCYTES # BLD AUTO: 6.6 K/UL — HIGH (ref 0–0.9)
MONOCYTES NFR BLD AUTO: 38 % — HIGH (ref 2–14)
NEUTROPHILS # BLD AUTO: 7.4 K/UL — SIGNIFICANT CHANGE UP (ref 1.8–7.4)
NEUTROPHILS NFR BLD AUTO: 33 % — LOW (ref 43–77)
NEUTS BAND # BLD: 4 % — SIGNIFICANT CHANGE UP (ref 0–8)
PLAT MORPH BLD: NORMAL — SIGNIFICANT CHANGE UP
PLATELET # BLD AUTO: 144 K/UL — LOW (ref 150–400)
POIKILOCYTOSIS BLD QL AUTO: SLIGHT — SIGNIFICANT CHANGE UP
POLYCHROMASIA BLD QL SMEAR: SLIGHT — SIGNIFICANT CHANGE UP
RBC # BLD: 2.79 M/UL — LOW (ref 4.2–5.8)
RBC # FLD: 18.6 % — HIGH (ref 10.3–14.5)
RBC BLD AUTO: ABNORMAL
WBC # BLD: 17 K/UL — HIGH (ref 3.8–10.5)
WBC # FLD AUTO: 17 K/UL — HIGH (ref 3.8–10.5)

## 2019-12-31 PROCEDURE — 99214 OFFICE O/P EST MOD 30 MIN: CPT

## 2019-12-31 NOTE — PHYSICAL EXAM
[Normal] : affect appropriate [de-identified] : non-toxic appearing [de-identified] : no calf tenderness [de-identified] : alert and oriented x 3

## 2019-12-31 NOTE — CONSULT LETTER
[Dear  ___] : Dear  [unfilled], [Courtesy Letter:] : I had the pleasure of seeing your patient, [unfilled], in my office today. [Please see my note below.] : Please see my note below. [Consult Closing:] : Thank you very much for allowing me to participate in the care of this patient.  If you have any questions, please do not hesitate to contact me. [Sincerely,] : Sincerely, [FreeTextEntry3] : Jennifer Sanders M.D.

## 2019-12-31 NOTE — HISTORY OF PRESENT ILLNESS
[Disease:__________________________] : Disease: [unfilled] [CMMOL Prognostic Scoring System: ___] : CMMOL Prognostic Scoring System: [unfilled] [de-identified] : S/P transfusion 2 units PRBC's 12/10/19. Had a nice time in [place].\par Going to [place] this week.\par 2/14//2020, planning to go to [place] for 2 weeks (usually goes yearly).\par He has no complaints of chest pain, shortness of breath, lightheadedness, or palpitations. No bleeding. No fevers.\par Gene present. [de-identified] : 3/2019-Patient was admitted to the hospital with refractory anemia. Bone marrow biopsy, and bone marrow aspirate showed a hypercellular bone marrow with trilineage hematopoiesis, myeloid predominance and atypical monocytes. Iron stores present. 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 Flow cytometry myeloid immunophenotypic findings showed no increase in myeloid immaturity. Increased monocytes noted. 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

## 2019-12-31 NOTE — ASSESSMENT
[FreeTextEntry1] : Patient with CMML/refractory anemia- patient wishes for continued supportive hematologic care with p.r.n. growth factor support/transfusions. Continuing to monitor CBC with differential.\par T/C PRBC's for hemoglobin <7/related symptoms.\par Patient and Gene were given the opportunity to ask questions. Their questions have been answered to their apparent satisfaction at this time.

## 2020-01-07 ENCOUNTER — APPOINTMENT (OUTPATIENT)
Dept: INFUSION THERAPY | Facility: HOSPITAL | Age: 85
End: 2020-01-07

## 2020-01-07 ENCOUNTER — RESULT REVIEW (OUTPATIENT)
Age: 85
End: 2020-01-07

## 2020-01-07 LAB
ANISOCYTOSIS BLD QL: SLIGHT — SIGNIFICANT CHANGE UP
BASOPHILS # BLD AUTO: 0 K/UL — SIGNIFICANT CHANGE UP (ref 0–0.2)
ELLIPTOCYTES BLD QL SMEAR: SLIGHT — SIGNIFICANT CHANGE UP
EOSINOPHIL # BLD AUTO: 0 K/UL — SIGNIFICANT CHANGE UP (ref 0–0.5)
HCT VFR BLD CALC: 29 % — LOW (ref 39–50)
HGB BLD-MCNC: 9.3 G/DL — LOW (ref 13–17)
HYPOCHROMIA BLD QL: SLIGHT — SIGNIFICANT CHANGE UP
LYMPHOCYTES # BLD AUTO: 17 % — SIGNIFICANT CHANGE UP (ref 13–44)
LYMPHOCYTES # BLD AUTO: 2.8 K/UL — SIGNIFICANT CHANGE UP (ref 1–3.3)
MACROCYTES BLD QL: SLIGHT — SIGNIFICANT CHANGE UP
MCHC RBC-ENTMCNC: 32.1 G/DL — SIGNIFICANT CHANGE UP (ref 32–36)
MCHC RBC-ENTMCNC: 33.7 PG — SIGNIFICANT CHANGE UP (ref 27–34)
MCV RBC AUTO: 105 FL — HIGH (ref 80–100)
MICROCYTES BLD QL: SLIGHT — SIGNIFICANT CHANGE UP
MONOCYTES # BLD AUTO: 5.7 K/UL — HIGH (ref 0–0.9)
MONOCYTES NFR BLD AUTO: 30 % — HIGH (ref 2–14)
NEUTROPHILS # BLD AUTO: 9.3 K/UL — HIGH (ref 1.8–7.4)
NEUTROPHILS NFR BLD AUTO: 52 % — SIGNIFICANT CHANGE UP (ref 43–77)
NEUTS BAND # BLD: 1 % — SIGNIFICANT CHANGE UP (ref 0–8)
PLAT MORPH BLD: NORMAL — SIGNIFICANT CHANGE UP
PLATELET # BLD AUTO: 135 K/UL — LOW (ref 150–400)
POIKILOCYTOSIS BLD QL AUTO: SLIGHT — SIGNIFICANT CHANGE UP
POLYCHROMASIA BLD QL SMEAR: SLIGHT — SIGNIFICANT CHANGE UP
RBC # BLD: 2.76 M/UL — LOW (ref 4.2–5.8)
RBC # FLD: 20 % — HIGH (ref 10.3–14.5)
RBC BLD AUTO: ABNORMAL
TARGETS BLD QL SMEAR: SLIGHT — SIGNIFICANT CHANGE UP
WBC # BLD: 17.9 K/UL — HIGH (ref 3.8–10.5)
WBC # FLD AUTO: 17.9 K/UL — HIGH (ref 3.8–10.5)
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
## 2020-01-13 ENCOUNTER — RESULT REVIEW (OUTPATIENT)
Age: 85
End: 2020-01-13

## 2020-01-13 ENCOUNTER — APPOINTMENT (OUTPATIENT)
Dept: INFUSION THERAPY | Facility: HOSPITAL | Age: 85
End: 2020-01-13

## 2020-01-13 LAB
ANISOCYTOSIS BLD QL: SLIGHT — SIGNIFICANT CHANGE UP
BASOPHILS # BLD AUTO: 0 K/UL — SIGNIFICANT CHANGE UP (ref 0–0.2)
ELLIPTOCYTES BLD QL SMEAR: SLIGHT — SIGNIFICANT CHANGE UP
EOSINOPHIL # BLD AUTO: 0.1 K/UL — SIGNIFICANT CHANGE UP (ref 0–0.5)
EOSINOPHIL NFR BLD AUTO: 1 % — SIGNIFICANT CHANGE UP (ref 0–6)
HCT VFR BLD CALC: 31.7 % — LOW (ref 39–50)
HGB BLD-MCNC: 9.9 G/DL — LOW (ref 13–17)
HYPOCHROMIA BLD QL: SLIGHT — SIGNIFICANT CHANGE UP
LYMPHOCYTES # BLD AUTO: 18 % — SIGNIFICANT CHANGE UP (ref 13–44)
LYMPHOCYTES # BLD AUTO: 2 K/UL — SIGNIFICANT CHANGE UP (ref 1–3.3)
MACROCYTES BLD QL: SLIGHT — SIGNIFICANT CHANGE UP
MCHC RBC-ENTMCNC: 31.3 G/DL — LOW (ref 32–36)
MCHC RBC-ENTMCNC: 32.6 PG — SIGNIFICANT CHANGE UP (ref 27–34)
MCV RBC AUTO: 104 FL — HIGH (ref 80–100)
MICROCYTES BLD QL: SLIGHT — SIGNIFICANT CHANGE UP
MONOCYTES # BLD AUTO: 5.1 K/UL — HIGH (ref 0–0.9)
MONOCYTES NFR BLD AUTO: 41 % — HIGH (ref 2–14)
NEUTROPHILS # BLD AUTO: 5 K/UL — SIGNIFICANT CHANGE UP (ref 1.8–7.4)
NEUTROPHILS NFR BLD AUTO: 40 % — LOW (ref 43–77)
PLAT MORPH BLD: NORMAL — SIGNIFICANT CHANGE UP
PLATELET # BLD AUTO: 143 K/UL — LOW (ref 150–400)
POIKILOCYTOSIS BLD QL AUTO: SLIGHT — SIGNIFICANT CHANGE UP
POLYCHROMASIA BLD QL SMEAR: SLIGHT — SIGNIFICANT CHANGE UP
RBC # BLD: 3.05 M/UL — LOW (ref 4.2–5.8)
RBC # FLD: 19.9 % — HIGH (ref 10.3–14.5)
RBC BLD AUTO: ABNORMAL
WBC # BLD: 12.3 K/UL — HIGH (ref 3.8–10.5)
WBC # FLD AUTO: 12.3 K/UL — HIGH (ref 3.8–10.5)

## 2020-01-15 ENCOUNTER — OUTPATIENT (OUTPATIENT)
Dept: OUTPATIENT SERVICES | Facility: HOSPITAL | Age: 85
LOS: 1 days | Discharge: HOME | End: 2020-01-15

## 2020-01-15 DIAGNOSIS — D46.9 MYELODYSPLASTIC SYNDROME, UNSPECIFIED: ICD-10-CM

## 2020-01-15 DIAGNOSIS — C93.10 CHRONIC MYELOMONOCYTIC LEUKEMIA NOT HAVING ACHIEVED REMISSION: ICD-10-CM

## 2020-01-21 ENCOUNTER — RESULT REVIEW (OUTPATIENT)
Age: 85
End: 2020-01-21

## 2020-01-21 ENCOUNTER — APPOINTMENT (OUTPATIENT)
Dept: INFUSION THERAPY | Facility: HOSPITAL | Age: 85
End: 2020-01-21

## 2020-01-21 LAB
BASOPHILS # BLD AUTO: 0 K/UL — SIGNIFICANT CHANGE UP (ref 0–0.2)
BASOPHILS NFR BLD AUTO: 0.6 % — SIGNIFICANT CHANGE UP (ref 0–2)
EOSINOPHIL # BLD AUTO: 0.1 K/UL — SIGNIFICANT CHANGE UP (ref 0–0.5)
EOSINOPHIL NFR BLD AUTO: 0.9 % — SIGNIFICANT CHANGE UP (ref 0–6)
FERRITIN SERPL-MCNC: 468 NG/ML
HCT VFR BLD CALC: 30.7 % — LOW (ref 39–50)
HGB BLD-MCNC: 9.3 G/DL — LOW (ref 13–17)
IRON SATN MFR SERPL: 26 %
IRON SERPL-MCNC: 42 UG/DL
LYMPHOCYTES # BLD AUTO: 2.1 K/UL — SIGNIFICANT CHANGE UP (ref 1–3.3)
LYMPHOCYTES # BLD AUTO: 28.2 % — SIGNIFICANT CHANGE UP (ref 13–44)
MCHC RBC-ENTMCNC: 30.4 G/DL — LOW (ref 32–36)
MCHC RBC-ENTMCNC: 32.2 PG — SIGNIFICANT CHANGE UP (ref 27–34)
MCV RBC AUTO: 106 FL — HIGH (ref 80–100)
MONOCYTES # BLD AUTO: 1.9 K/UL — HIGH (ref 0–0.9)
MONOCYTES NFR BLD AUTO: 25 % — HIGH (ref 2–14)
NEUTROPHILS # BLD AUTO: 3.4 K/UL — SIGNIFICANT CHANGE UP (ref 1.8–7.4)
NEUTROPHILS NFR BLD AUTO: 45.3 % — SIGNIFICANT CHANGE UP (ref 43–77)
PLATELET # BLD AUTO: 138 K/UL — LOW (ref 150–400)
RBC # BLD: 2.9 M/UL — LOW (ref 4.2–5.8)
RBC # FLD: 20.6 % — HIGH (ref 10.3–14.5)
RETICS #: 78.1 K/UL — SIGNIFICANT CHANGE UP (ref 25–125)
RETICS/RBC NFR: 2.7 % — HIGH (ref 0.5–2.5)
TIBC SERPL-MCNC: 162 UG/DL
UIBC SERPL-MCNC: 120 UG/DL
WBC # BLD: 7.5 K/UL — SIGNIFICANT CHANGE UP (ref 3.8–10.5)
WBC # FLD AUTO: 7.5 K/UL — SIGNIFICANT CHANGE UP (ref 3.8–10.5)

## 2020-01-27 ENCOUNTER — APPOINTMENT (OUTPATIENT)
Dept: HEMATOLOGY ONCOLOGY | Facility: CLINIC | Age: 85
End: 2020-01-27
Payer: MEDICARE

## 2020-01-27 ENCOUNTER — APPOINTMENT (OUTPATIENT)
Dept: INFUSION THERAPY | Facility: HOSPITAL | Age: 85
End: 2020-01-27

## 2020-01-27 ENCOUNTER — RESULT REVIEW (OUTPATIENT)
Age: 85
End: 2020-01-27

## 2020-01-27 VITALS
HEART RATE: 72 BPM | BODY MASS INDEX: 23.11 KG/M2 | TEMPERATURE: 97.5 F | SYSTOLIC BLOOD PRESSURE: 133 MMHG | RESPIRATION RATE: 18 BRPM | DIASTOLIC BLOOD PRESSURE: 68 MMHG | OXYGEN SATURATION: 100 % | WEIGHT: 170.4 LBS

## 2020-01-27 DIAGNOSIS — D64.9 ANEMIA, UNSPECIFIED: ICD-10-CM

## 2020-01-27 DIAGNOSIS — C93.10 CHRONIC MYELOMONOCYTIC LEUKEMIA NOT HAVING ACHIEVED REMISSION: ICD-10-CM

## 2020-01-27 LAB
BASOPHILS # BLD AUTO: 0.1 K/UL — SIGNIFICANT CHANGE UP (ref 0–0.2)
BASOPHILS NFR BLD AUTO: 0.6 % — SIGNIFICANT CHANGE UP (ref 0–2)
EOSINOPHIL # BLD AUTO: 0.2 K/UL — SIGNIFICANT CHANGE UP (ref 0–0.5)
EOSINOPHIL NFR BLD AUTO: 1.4 % — SIGNIFICANT CHANGE UP (ref 0–6)
HCT VFR BLD CALC: 29.6 % — LOW (ref 39–50)
HGB BLD-MCNC: 9.1 G/DL — LOW (ref 13–17)
LYMPHOCYTES # BLD AUTO: 2.9 K/UL — SIGNIFICANT CHANGE UP (ref 1–3.3)
LYMPHOCYTES # BLD AUTO: 25 % — SIGNIFICANT CHANGE UP (ref 13–44)
MCHC RBC-ENTMCNC: 30.8 G/DL — LOW (ref 32–36)
MCHC RBC-ENTMCNC: 32.6 PG — SIGNIFICANT CHANGE UP (ref 27–34)
MCV RBC AUTO: 106 FL — HIGH (ref 80–100)
MONOCYTES # BLD AUTO: 3.8 K/UL — HIGH (ref 0–0.9)
MONOCYTES NFR BLD AUTO: 33.5 % — HIGH (ref 2–14)
NEUTROPHILS # BLD AUTO: 4.5 K/UL — SIGNIFICANT CHANGE UP (ref 1.8–7.4)
NEUTROPHILS NFR BLD AUTO: 39.5 % — LOW (ref 43–77)
PLATELET # BLD AUTO: 138 K/UL — LOW (ref 150–400)
RBC # BLD: 2.8 M/UL — LOW (ref 4.2–5.8)
RBC # FLD: 21.3 % — HIGH (ref 10.3–14.5)
WBC # BLD: 11.5 K/UL — HIGH (ref 3.8–10.5)
WBC # FLD AUTO: 11.5 K/UL — HIGH (ref 3.8–10.5)

## 2020-01-27 PROCEDURE — 99214 OFFICE O/P EST MOD 30 MIN: CPT

## 2020-01-27 NOTE — ASSESSMENT
[FreeTextEntry1] : Patient with CMML/refractory anemia- patient wishes for continued supportive hematologic care with p.r.n. growth factor support/transfusions. Continuing to monitor CBC with differential.\par T/C PRBC's for hemoglobin <7/related symptoms. May consider PRBC's pending f/u lab work in next couple of weeks, in anticipation of [place] trip planned.\par Patient and Gene were given the opportunity to ask questions. Their questions have been answered to their apparent satisfaction at this time.

## 2020-01-27 NOTE — REVIEW OF SYSTEMS
[Negative] : Allergic/Immunologic [Muscle Pain] : no muscle pain [Fainting] : no fainting [Proptosis] : no proptosis

## 2020-01-27 NOTE — PHYSICAL EXAM
[Normal] : affect appropriate [de-identified] : non-toxic appearing [de-identified] : no calf tenderness [de-identified] : alert and oriented x 3

## 2020-02-03 ENCOUNTER — RESULT REVIEW (OUTPATIENT)
Age: 85
End: 2020-02-03

## 2020-02-03 ENCOUNTER — APPOINTMENT (OUTPATIENT)
Dept: INFUSION THERAPY | Facility: HOSPITAL | Age: 85
End: 2020-02-03

## 2020-02-03 LAB
ANISOCYTOSIS BLD QL: SIGNIFICANT CHANGE UP
BASOPHILS # BLD AUTO: 0 K/UL — SIGNIFICANT CHANGE UP (ref 0–0.2)
BASOPHILS NFR BLD AUTO: 1 % — SIGNIFICANT CHANGE UP (ref 0–2)
ELLIPTOCYTES BLD QL SMEAR: SLIGHT — SIGNIFICANT CHANGE UP
EOSINOPHIL # BLD AUTO: 0.1 K/UL — SIGNIFICANT CHANGE UP (ref 0–0.5)
EOSINOPHIL NFR BLD AUTO: 1 % — SIGNIFICANT CHANGE UP (ref 0–6)
HCT VFR BLD CALC: 29.3 % — LOW (ref 39–50)
HGB BLD-MCNC: 9.2 G/DL — LOW (ref 13–17)
HYPOCHROMIA BLD QL: SLIGHT — SIGNIFICANT CHANGE UP
LYMPHOCYTES # BLD AUTO: 2.6 K/UL — SIGNIFICANT CHANGE UP (ref 1–3.3)
LYMPHOCYTES # BLD AUTO: 28 % — SIGNIFICANT CHANGE UP (ref 13–44)
MACROCYTES BLD QL: SLIGHT — SIGNIFICANT CHANGE UP
MCHC RBC-ENTMCNC: 31.6 G/DL — LOW (ref 32–36)
MCHC RBC-ENTMCNC: 33.7 PG — SIGNIFICANT CHANGE UP (ref 27–34)
MCV RBC AUTO: 107 FL — HIGH (ref 80–100)
MICROCYTES BLD QL: SIGNIFICANT CHANGE UP
MONOCYTES # BLD AUTO: 5.7 K/UL — HIGH (ref 0–0.9)
MONOCYTES NFR BLD AUTO: 30 % — HIGH (ref 2–14)
NEUTROPHILS # BLD AUTO: 6.1 K/UL — SIGNIFICANT CHANGE UP (ref 1.8–7.4)
NEUTROPHILS NFR BLD AUTO: 40 % — LOW (ref 43–77)
PLAT MORPH BLD: NORMAL — SIGNIFICANT CHANGE UP
PLATELET # BLD AUTO: 102 K/UL — LOW (ref 150–400)
POIKILOCYTOSIS BLD QL AUTO: SLIGHT — SIGNIFICANT CHANGE UP
POLYCHROMASIA BLD QL SMEAR: SLIGHT — SIGNIFICANT CHANGE UP
RBC # BLD: 2.74 M/UL — LOW (ref 4.2–5.8)
RBC # FLD: 21.9 % — HIGH (ref 10.3–14.5)
RBC BLD AUTO: ABNORMAL
TARGETS BLD QL SMEAR: SLIGHT — SIGNIFICANT CHANGE UP
WBC # BLD: 14.5 K/UL — HIGH (ref 3.8–10.5)
WBC # FLD AUTO: 14.5 K/UL — HIGH (ref 3.8–10.5)

## 2020-02-10 ENCOUNTER — RESULT REVIEW (OUTPATIENT)
Age: 85
End: 2020-02-10

## 2020-02-10 ENCOUNTER — APPOINTMENT (OUTPATIENT)
Dept: INFUSION THERAPY | Facility: HOSPITAL | Age: 85
End: 2020-02-10

## 2020-02-10 LAB
ANISOCYTOSIS BLD QL: SLIGHT — SIGNIFICANT CHANGE UP
BASOPHILS # BLD AUTO: 0 K/UL — SIGNIFICANT CHANGE UP (ref 0–0.2)
DACRYOCYTES BLD QL SMEAR: SLIGHT — SIGNIFICANT CHANGE UP
EOSINOPHIL # BLD AUTO: 0 K/UL — SIGNIFICANT CHANGE UP (ref 0–0.5)
EOSINOPHIL NFR BLD AUTO: 1 % — SIGNIFICANT CHANGE UP (ref 0–6)
HCT VFR BLD CALC: 27.8 % — LOW (ref 39–50)
HGB BLD-MCNC: 8.8 G/DL — LOW (ref 13–17)
HYPOCHROMIA BLD QL: SLIGHT — SIGNIFICANT CHANGE UP
LYMPHOCYTES # BLD AUTO: 28 % — SIGNIFICANT CHANGE UP (ref 13–44)
LYMPHOCYTES # BLD AUTO: 3.4 K/UL — HIGH (ref 1–3.3)
MACROCYTES BLD QL: SLIGHT — SIGNIFICANT CHANGE UP
MCHC RBC-ENTMCNC: 31.8 G/DL — LOW (ref 32–36)
MCHC RBC-ENTMCNC: 34 PG — SIGNIFICANT CHANGE UP (ref 27–34)
MCV RBC AUTO: 107 FL — HIGH (ref 80–100)
MICROCYTES BLD QL: SLIGHT — SIGNIFICANT CHANGE UP
MONOCYTES # BLD AUTO: 2.2 K/UL — HIGH (ref 0–0.9)
MONOCYTES NFR BLD AUTO: 35 % — HIGH (ref 2–14)
NEUTROPHILS # BLD AUTO: 6.4 K/UL — SIGNIFICANT CHANGE UP (ref 1.8–7.4)
NEUTROPHILS NFR BLD AUTO: 36 % — LOW (ref 43–77)
OVALOCYTES BLD QL SMEAR: SLIGHT — SIGNIFICANT CHANGE UP
PLAT MORPH BLD: NORMAL — SIGNIFICANT CHANGE UP
PLATELET # BLD AUTO: 121 K/UL — LOW (ref 150–400)
POIKILOCYTOSIS BLD QL AUTO: SLIGHT — SIGNIFICANT CHANGE UP
POLYCHROMASIA BLD QL SMEAR: SLIGHT — SIGNIFICANT CHANGE UP
RBC # BLD: 2.6 M/UL — LOW (ref 4.2–5.8)
RBC # FLD: 23.6 % — HIGH (ref 10.3–14.5)
RBC BLD AUTO: ABNORMAL
WBC # BLD: 12.1 K/UL — HIGH (ref 3.8–10.5)
WBC # FLD AUTO: 12.1 K/UL — HIGH (ref 3.8–10.5)

## 2020-02-24 ENCOUNTER — OUTPATIENT (OUTPATIENT)
Dept: OUTPATIENT SERVICES | Facility: HOSPITAL | Age: 85
LOS: 1 days | Discharge: HOME | End: 2020-02-24

## 2020-02-24 DIAGNOSIS — D46.9 MYELODYSPLASTIC SYNDROME, UNSPECIFIED: ICD-10-CM

## 2020-03-02 ENCOUNTER — APPOINTMENT (OUTPATIENT)
Dept: INFUSION THERAPY | Facility: HOSPITAL | Age: 85
End: 2020-03-02

## 2020-03-09 ENCOUNTER — APPOINTMENT (OUTPATIENT)
Dept: INFUSION THERAPY | Facility: HOSPITAL | Age: 85
End: 2020-03-09

## 2020-03-09 ENCOUNTER — APPOINTMENT (OUTPATIENT)
Dept: HEMATOLOGY ONCOLOGY | Facility: CLINIC | Age: 85
End: 2020-03-09

## 2020-03-16 ENCOUNTER — APPOINTMENT (OUTPATIENT)
Dept: INFUSION THERAPY | Facility: HOSPITAL | Age: 85
End: 2020-03-16

## 2020-03-23 ENCOUNTER — APPOINTMENT (OUTPATIENT)
Dept: INFUSION THERAPY | Facility: HOSPITAL | Age: 85
End: 2020-03-23

## 2020-03-29 NOTE — HISTORY OF PRESENT ILLNESS
-- DO NOT REPLY / DO NOT REPLY ALL --  -- Message is from the [facility]--    COVID-19 Universal Screening: Negative    General Patient Message  Patient states she is not getting the right metformin states the one the pharmacy is giving gives her the upset stomach would like Dr to help with this.     Reason for Call:   -- DO NOT REPLY / DO NOT REPLY ALL --  -- Message is from the [facility]--    COVID-19 Universal Screening: Negative    Patient is requesting a medication refill - medication is on active medication list    Patient is currently OUT of the requested medication.    Was Medication Pended?  Yes.    Rx Name and Dose:  Pantoprazole 40 mg     Duration: 30 days    Pharmacy  [id number] [address] [facility]    Patient confirmed the above pharmacy as correct?  Yes    Caller Information       Type Contact Phone    03/28/2020 08:59 PM Phone (Incoming) Sallie Meredith (Self) [phone] (M)          Alternative phone number: none     Turnaround time given to caller:   \"This message will be sent to [state Provider's name]. The clinical team will fulfill your request as soon as they review your message when the office opens on Monday (or after the holiday).\"    Caller Information       Type Contact Phone    03/28/2020 08:59 PM Phone (Incoming) Sallie Meredith (Self) [phone] (M)          Alternative phone number: none     Turnaround time given to caller:   \"This message will be sent to [state Provider's name]. The clinical team will fulfill your request as soon as they review your message when the office opens on Monday (or after the holiday).\"     [Disease:__________________________] : Disease: [unfilled] [CMMOL Prognostic Scoring System: ___] : CMMOL Prognostic Scoring System: [unfilled] [de-identified] : 3/2019-Patient was admitted to the hospital with refractory anemia. Bone marrow biopsy, and bone marrow aspirate showed a hypercellular bone marrow with trilineage hematopoiesis, myeloid predominance and atypical monocytes. Iron stores present. 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 Flow cytometry myeloid immunophenotypic findings showed no increase in myeloid immaturity. Increased monocytes noted. 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 [de-identified] : Didn't go to [place] as Gene was sick.\par 2/14//2020, planning to go to [facility] for 2 weeks (usually goes yearly), returning 3/1/20.\par He has no complaints of chest pain, shortness of breath, lightheadedness, or palpitations. No bleeding. No fevers.\par Gene present.

## 2020-03-30 ENCOUNTER — APPOINTMENT (OUTPATIENT)
Dept: INFUSION THERAPY | Facility: HOSPITAL | Age: 85
End: 2020-03-30

## 2020-05-29 NOTE — PROGRESS NOTE ADULT - SUBJECTIVE AND OBJECTIVE BOX
Attempted to call Elton back twice. Unable to get through, unable to leave message. Patient is at end of healing. Was told he could return to regular work duties on 5/21 and follow up as needed. If Elton calls back, will need fax # to send over office note and work note from 5/21.   Patient is a 84y old  Male who presents with a chief complaint of anemia, debility and functional decline (18 Mar 2019 09:30)      HPI:  Patient is 84 male RH dominant with PMH significant for HTN, HLD,  Afib (off xarelto), stomach CA, anemia, gout, BPH. seizure d/o, recently stopped EToh use, who presented from [facility] with abnormal labs.  Pt reports recent admission to [facility] for gout flare then discharged to rehab.  Pt now returns with guaiac negative anemia (HgB 6.8 ) and  episode of bright red blood in stool. Pt also found with leukocytosis, with temp 100.7 F rectally.  Denies chills, fever, sob, chest pain, weakness, dysuria.    Patient was seen by GI and hematology.  S/P Colonoscopy on 3/1/19. No source of bleeding noted. Negative FOB. S/P EGD during prior admission, and no bleeding identified. Heme performed BM biopsy 3/4, results pending. CKD, Etoh may contribute, although underlying BM d/o such as myelodysplasia suspected. Transfused PRBC. Patient transferred to - [facility] due to debility and functional decline (06 Mar 2019 14:53)      PAST MEDICAL & SURGICAL HISTORY:  Nonrheumatic aortic valve stenosis  Cancer of stomach  Anemia, unspecified type: Anemia  Seizure disorder  Benign prostatic hyperplasia, unspecified whether lower urinary tract symptoms present  Hyperlipemia  Essential hypertension  Chronic atrial fibrillation  Stomach cancer  Hypertension  Atrial fibrillation  Gout  No significant past surgical history  No significant past surgical history      MEDICATIONS  (STANDING):  allopurinol 300 milliGRAM(s) Oral daily  amLODIPine   Tablet 10 milliGRAM(s) Oral daily  aspirin enteric coated 81 milliGRAM(s) Oral daily  atorvastatin 10 milliGRAM(s) Oral at bedtime  cyanocobalamin 1000 MICROGram(s) Oral daily  docusate sodium 100 milliGRAM(s) Oral two times a day  famotidine    Tablet 20 milliGRAM(s) Oral daily  folic acid 1 milliGRAM(s) Oral daily  levETIRAcetam 500 milliGRAM(s) Oral two times a day  metoprolol tartrate 25 milliGRAM(s) Oral every 12 hours  multivitamin 1 Tablet(s) Oral daily  pantoprazole    Tablet 40 milliGRAM(s) Oral before breakfast  rivaroxaban 20 milliGRAM(s) Oral every 24 hours  tamsulosin 0.4 milliGRAM(s) Oral at bedtime    MEDICATIONS  (PRN):  acetaminophen   Tablet .. 650 milliGRAM(s) Oral every 6 hours PRN Mild Pain (1 - 3)  senna 2 Tablet(s) Oral at bedtime PRN Constipation      Allergies    No Known Allergies    Intolerances          VITALS  84y  Vital Signs Last 24 Hrs  T(C): 36.3 (18 Mar 2019 08:38), Max: 36.3 (17 Mar 2019 23:34)  T(F): 97.4 (18 Mar 2019 08:38), Max: 97.4 (17 Mar 2019 23:34)  HR: 70 (18 Mar 2019 08:38) (70 - 78)  BP: 125/62 (18 Mar 2019 08:38) (125/62 - 130/70)  BP(mean): --  RR: 14 (18 Mar 2019 08:38) (14 - 18)  SpO2: 98% (18 Mar 2019 08:38) (98% - 98%)  Daily     Daily         RECENT LABS:                          7.7    11.4  )-----------( 204      ( 18 Mar 2019 06:55 )             23.9     03-18    142  |  106  |  44<H>  ----------------------------<  249<H>  4.0   |  23  |  1.44<H>    Ca    8.8      18 Mar 2019 06:55              Culture - Urine (collected 03-17-19 @ 05:41)  Source: .Urine Clean Catch (Midstream)  Preliminary Report (03-18-19 @ 07:54):    50,000 - 99,000 CFU/mL Gram positive organisms    <10,000 CFU/ml Normal Urogenital kristi present        CAPILLARY BLOOD GLUCOSE

## 2021-08-12 NOTE — PROGRESS NOTE ADULT - ASSESSMENT
84-year-old gentleman with history of anemia, atrial fibrillation and seizure disorder admitted with anemia exacerbation from his [place] facility. Patient notes recent history of bright red blood per rectum. He was scheduled for outpatient colonoscopy.  On admission, found to have rectal temp of 100.7°F in the emergency department.  Patient reported he stopped drinking alcohol approximately 3 months prior to admission I was physically present for the key portions of the evaluation and managemnent (E/M) service provided.  I agree with the above history, physical, and plan which I have reviewed and edited where appropriate, with the exceptions as per my note.    Neuro exam is stable.    Plan for 1g rituximab once we have a negative hepB PCR, will need hepB ppx as per ID. following this, dispo planning.

## 2021-08-18 NOTE — ED PROVIDER NOTE - RESPIRATORY [+], MLM
[FreeTextEntry1] : Pt is a 73 yo with PMB with endometrial biopsy not showing any evidence of malignancy or hyperplasia on 1/21/21. She was started on Megace 80 mg PO BID which stopped the bleeding. She ran out of the medication for 1 week and her bleeding started again. We repeated the endometrial biopsy today. Continue Megace. 
COUGH

## 2022-03-18 NOTE — ED PROVIDER NOTE - NSCAREINITIATED _GEN_ER
[FreeTextEntry1] : telehealth visit;, audio and video, consent for the visit on file, just the two of us\par \par patient has been doing well, with this a regularly scheuled visit, to discuss Kaveh's current status, since he underwent colonoscopy and egd back on june 23d 2021.\par \par Whats really amazing is that Kaveh continues to do very well, endoscopically, and by symptoms or clinically\par \par that is, he has no symptoms\par no diarrhea\par no abdominal pain\par no extraintestinal manifestations of Crohns\par no joing problems\par no rash [he has seen a dermatologist twice a year for a number of years]\par \par and in fact he is completely assymptomatic.\par \par he also had, in keeping wiith this, a negative colonoscopy with no active disease, no active inflammation, nl terminal ileum, but what was seen as usual, is scarring in the right colon, with atrophy..\par biopsies likewise showed no active inflammation..\par \par we had determined and decided at the time that we would not treat without signs of active disease.\par \par  Kim Hope)

## 2022-06-29 NOTE — PROGRESS NOTE ADULT - ASSESSMENT
Chief complaint:   Chief Complaint   Patient presents with   • Hypertension   • Medicare Wellness Visit     Subsequent       Vitals:  Visit Vitals  /72 (BP Location: LUE - Left upper extremity, Cuff Size: Regular)   Pulse 72   Ht 5' 10\" (1.778 m)   Wt 84.6 kg (186 lb 6.4 oz)   SpO2 97%   BMI 26.75 kg/m²       HISTORY OF PRESENT ILLNESS     HPI  Patient is a 70-year-old male was here for his annual follow-up regarding Medicare wellness, hypertension, hyperlipidemia, hyperglycemia, chronic kidney disease.  Under Medicare wellness form a mentions bladder control problem related to urinary frequency which is chronic.  He also mentions body pain related to his chronic neuropathy of the hands and feet.  Patient also mentions anger related to events in society.  He also mentions hearing problem for which he has hearing aids.  Remainder review of systems unremarkable.  No visual changes.  No headaches.  No nausea vomiting diarrhea constipation.  No burning with urination.  No abdominal pain.  No chest pain or shortness of breath.  No palpitations.  No lower extremity edema.  No joint swellings.  Other significant problems:  Patient Active Problem List    Diagnosis Date Noted   • Depressive disorder 02/07/2018     Priority: Medium   • Stage 3a chronic kidney disease (CMS/[facility]) 06/29/2022     Priority: Low   • Hyperglycemia 05/02/2017     Priority: Low   • Mixed hyperlipidemia 10/20/2016     Priority: Low   • Benign hypertension 10/20/2016     Priority: Low   • Sensorineural hearing loss of both ears 05/25/2016     Priority: Low     Class: Chronic     This patient has hearing loss. Please use slower speech and visual cues when communicating     • Other specified anxiety disorders 11/19/2015     Priority: Low       PAST MEDICAL, FAMILY AND SOCIAL HISTORY     Medications:  Current Outpatient Medications   Medication Sig Dispense Refill   • ALPRAZolam (XANAX) 0.5 MG tablet Take 1 tablet by mouth 3 times daily as needed for  Anxiety. 270 tablet 1   • butalbital-APAP-caffeine (FIORICET) -40 MG per tablet TAKE 1 TABLET BY MOUTH EVERY 6 HOURS AS NEEDED FOR PAIN 30 tablet 0   • fenofibrate micronized (LOFIBRA) 200 MG capsule TAKE 1 CAPSULE BY MOUTH  DAILY BEFORE BREAKFAST 90 capsule 2   • simvastatin (ZOCOR) 40 MG tablet TAKE 1 TABLET BY MOUTH  NIGHTLY 90 tablet 2   • lisinopril-hydroCHLOROthiazide (ZESTORETIC) 10-12.5 MG per tablet TAKE 1 TABLET BY MOUTH  DAILY 90 tablet 3     No current facility-administered medications for this visit.       Allergies:  ALLERGIES:   Allergen Reactions   • Capsaicin HIVES   • Lidocaine RASH     Patient had rash with lidocaine patch rash only under patch   Adhesive         Past Medical  History/Surgeries:  Past Medical History:   Diagnosis Date   • Anxiety    • BPH (benign prostatic hypertrophy)    • Dupuytren contracture    • Essential (primary) hypertension    • Hematuria    • Migraines    • Other and unspecified hyperlipidemia    • Radicular low back pain    • Renal artery stenosis (CMS/HCC)    • Renal insufficiency    • Sleep apnea    • Stress    • Tobacco use        Past Surgical History:   Procedure Laterality Date   • Colonoscopy  2007   • Removal gallbladder  2007   • Tonsillectomy and adenoidectomy     • Uvulectomy         Family History:  Family History   Problem Relation Age of Onset   • Heart disease Father    • Substance abuse Paternal Uncle    • Diabetes Brother        Social History:  Social History     Tobacco Use   • Smoking status: Former Smoker     Packs/day: 0.50     Years: 30.00     Pack years: 15.00     Types: Cigarettes, Cigars     Quit date: 2007     Years since quittin.7   • Smokeless tobacco: Never Used   Substance Use Topics   • Alcohol use: Yes     Comment: occasionally       REVIEW OF SYSTEMS     Review of Systems  See history of present illness  PHYSICAL EXAM     Physical Exam  Constitutional:  Alert and oriented. Well hydrated and nourished. In no  acute distress.  Skin:  No masses or rashes.  Eyes:  PERRLA (pupils equal, round, and reactive to light and accommodation), EOMI (extraocular movements are intact), conjunctivae normal.  HENT:  Ear canals and tympanic membranes normal bilaterally. Nasal mucosa normal. Oropharyngeal mucosa normal. Tongue and uvula at midline.  Neck:  No masses noted. Trachea at midline. Thyroid nonpalpable.  Nodes:  No cervical, supraclavicular, axillary, or inguinal adenopathy noted.  Lungs:  Clear to auscultation without rhonchi, rales, or wheezes.   Heart:  Regular rate and rhythm without murmur, S3 or S4. No carotid bruits. No abdominal bruit. Radial, femoral, posterior tibial, and dorsalis pedis pulses normal bilaterally. No lower extremity edema.   Abdomen:  Soft, nontender, normal bowel sounds present. No herniations noted.  Liver and spleen not enlarged.  Rectal:  Normal anal sphincter. Prostate mildly enlarged.  Stool for blood negative.  Musculoskeletal:  Normal range of motion of upper and lower extremity joints bilaterally. No swelling of bilateral upper and lower extremity joints. Motor tone of upper and lower extremities normal bilaterally.  Neurological:  Except for decreased hearing bilaterally Cranial nerves II through XII normal. Motor and sensory of bilateral upper and lower extremities normal.  Psychological:  Alert and oriented x3. Mood and affect normal.          ASSESSMENT/PLAN     1.  Medicare wellness.  Updated tetanus discussed.  2. Hypertension.  Controlled.  Continue present management.  Re-evaluate labs.  3. Hyperlipidemia.  Continue present management.  Re-evaluate labs.  4. Hyperglycemia.  Continue present management.  Re-evaluate labs.  5. Chronic kidney disease stage IIIA.  Continue present management.  Re-evaluate labs.  6. Depression/anxiety.  Continue present management per Psychiatry.  7. Peripheral neuropathy.  Continue present management.  8.  History of B12 deficiency.  Re-evaluate labs.  9.  Follow-up 1 year.     #84 male with PMH significant for HTN, HLD, Afib, stomach CA, anemia, gout, BPH. seizure d/o, recently stopped EToh use, p/w chronic anemia possibly due to myelodysplastic syndrome, s/p tx PRBC, debility and functional decline      #Anemia/debility: multifactorial: Chronic kidney disease, recent GIB. 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   -  Supportive care at this time. Followed by heme  -B12 supplementatoin  -will need education procrit injections for dc  -heme onc f/u on dc  -CBC 4/8     #LGI bleed: resolved, followed by GI  - xarelto and ASA restarted  -transfuse for Hgb<7    #elevated FS  -CCD    GgW2L=7, given age, monitor for now, nutrition/diabetic education for dc      #Afib:   - Continue metoprolol, Xarelto  -rate controlled    #HTN/periods orthostasis:-  Continue norvasc, lasix.   bilateral LE ACE wraps, discussed with nursing and patient for OOB activities due to orthostasis  -case discussed with hospitalist. given extensive cardiac history, do not recommend medications such as steroids or midodrine at this time. continue to encourage time OOB, sitting, LE movement . continue ACE wraps LE. Slow transitional movements, reclining back wheelchair  -encouragement given to patient today. reinforcement of binder and ACE wraps OOB to reduce episodes discussed    # Joint pain/acute gout:   - tramadol, 50 mg q6 severe pain  off indomethacin, last dose 25 bid 4/3  -continue allpurinol 300 mg daily    #SZ/tremors:   - Controlled on keppra     #DVT prophylaxis:   -on xarelto    #Mood:  -refuses neuropsychology services or medications    #Case discussed in IDT rounds 4/2  -patient and caregiver refuse [facility]/SNF, state they will provide 24 hour supervision at home. Currently mod-max assist for mobility and transfers, and will likely be wheelchair level at home. At this time, dififculty tolerating 3 hours of rehab, patient not interested in hospice level/comfort care, and would likely benefit more from being in his own surroundings with 24 hour assist and home care  -Patient would benefit from hospital bed at home due to cardiac history, significant lower extremity swelling and orthostasis; repeated gouty flares with multiple joint pain and swelling; and risk for skin breakdown due to progressively bedbound status, Recommend electric hospital bed with rails to assist for transfers and bed mobility, adjustable head and leg sections.   -target potential dc 4/9, need caregiver training.Patient aware        Labs:  CBC, BMP 4/8

## 2022-08-10 NOTE — INPATIENT CERTIFICATION FOR MEDICARE PATIENTS - PHYSICIAN CONCUR
I concur with the Admission Order and I certify that services are provided in accordance with Section 42 CFR § 412.3
Initial (On Arrival)

## 2022-11-08 NOTE — ED ADULT NURSE NOTE - NS ED NOTE  TALK SOMEONE YN
General Sunscreen Counseling: Reviewed sun protection including SPF 30 or higher, reapplication every 2 hours while in direct sunlight, sunglasses, wide brim hats, UPF clothing.
Detail Level: Zone
No

## 2023-01-12 NOTE — DISCHARGE NOTE NURSING/CASE MANAGEMENT/SOCIAL WORK - NSDCPEPTSTRK_GEN_ALL_CORE
Anesthesia Pre Eval Note    Anesthesia ROS/Med Hx        Anesthetic Complication History:  Patient does not have a history of anesthetic complications      Pulmonary Review:    Positive for COPD     Neuro/Psych Review:  Patient does not have a neuro/psych history       Cardiovascular Review:    Positive for cardiomyopathy  Positive for past MI  Positive for CAD  Positive for CABG/stent  Positive for hypertension  Positive for hyperlipidemia    GI/HEPATIC/RENAL Review:  Patient does not have a GI/hepatic/renalhistory       End/Other Review:    Positive for arthritis  Positive for substance abuse - tobacco  Additional Results:     ALLERGIES:  No Known Allergies       No results found for: WBC, RBC, HGB, HCT, MCV, MCH, MCHC, RDWCV, SODIUM, POTASSIUM, CHLORIDE, CO2, GLUCOSE, BUN, CREATININE, GFRESTIMATE, EGFRNONAFR, GFRA, GFRNA, CALCIUM, HCG, PLT, PTT, INR   Past Medical History:  No date: Arthritis  No date: Atrial fibrillation (CMS/HCC)  No date: CAD (coronary artery disease)  No date: Cigarette nicotine dependence without complication  No date: Essential (primary) hypertension  No date: Hyperlipidemia  No date: Myocardial infarct (CMS/HCC)  No date: Primary cardiomyopathy (CMS/HCC)    Past Surgical History:  No date: Cardiac electrophysiology mapping and ablation  2019: Coronary artery bypass graft  No date: Thromboendart w/w0 graft carotid  neck incs; Right       Prior to Admission medications :  Medication albuterol 108 (90 Base) MCG/ACT inhaler, Sig INHALE 2 PUFFS BY MOUTH EVERY FOUR HOURS AS NEEDED FOR SHORTNESS OF BREATH OR WHEEZING, Start Date 8/11/22, End Date , Taking? Yes, Authorizing Provider Umesh Corado MD    Medication clopidogrel (PLAVIX) 75 MG tablet, Sig Take 75 mg by mouth daily., Start Date , End Date , Taking? Yes, Authorizing Provider Outside Provider    Medication aspirin (ECOTRIN) 81 MG EC tablet, Sig Take 81 mg by mouth daily., Start Date 1/31/20, End Date , Taking? Yes, Authorizing Provider  Outside Provider    Medication atorvastatin (LIPITOR) 40 MG tablet, Sig Take 1 tablet by mouth daily., Start Date 2/26/21, End Date , Taking? Yes, Authorizing Provider Outside Provider    Medication carvedilol (COREG) 12.5 MG tablet, Sig Take 6.25 mg by mouth daily. , Start Date 2/26/21, End Date , Taking? Yes, Authorizing Provider Outside Provider    Medication lisinopril (ZESTRIL) 10 MG tablet, Sig Take 1 tablet by mouth daily., Start Date 6/16/20, End Date , Taking? Yes, Authorizing Provider Outside Provider    Medication tamsulosin (FLOMAX) 0.4 MG Cap, Sig Take 1 capsule by mouth daily as needed., Start Date 12/7/20, End Date , Taking? Yes, Authorizing Provider Outside Provider         Patient Vitals in the past 24 hrs:  01/12/23 1148, BP:(!) 141/52, Temp:36.7 °C (98.1 °F), Temp src:Temporal, Pulse:63, Resp:16, SpO2:97 %, Weight:91.7 kg (202 lb 2.6 oz)      Relevant Problems   No relevant active problems       Physical Exam     Airway   Mallampati: II  TM Distance: >3 FB  Neck ROM: Full  Neck: Non-tender and Able to place in sniff position  TMJ Mobility: Good    Cardiovascular  Cardiovascular exam normal  Cardio Rhythm: Regular  Cardio Rate: Normal    Head Assessment  Head assessment: Normocephalic and Atraumatic    General Assessment  General Assessment: Alert and oriented and No acute distress    Dental Exam  Dental exam normal    Pulmonary Exam  Pulmonary exam normal  Breath sounds clear to auscultation:  Yes    Abdominal Exam  Abdominal exam normal      Anesthesia Plan:    ASA Status: 3  Anesthesia Type: General    Induction: Intravenous  Preferred Airway Type: ETT  Premedication: IV    Checklist  Reviewed: Lab Results, EKG, Pregnancy Test Results, Chest X-Rays, Nursing Notes, Consultations, Patient Summary, Beta Blocker Status, Outside Records, Care Everywhere, DNR Status, NPO Status, Medications, Problem list, Allergies and Past Med History  Consent/Risks Discussed Statement:  The proposed anesthetic plan,  Stroke warning signs and symptoms/Signs and symptoms of stroke/Prescribed medications/Need for follow up after discharge/Stroke support groups for patients, families, and friends/Stroke education booklet/Call 911 for stroke/Risk factors for stroke including its risks and benefits, have been discussed with the Patient along with the risks and benefits of alternatives. Questions were encouraged and answered and the patient and/or representative understands and agrees to proceed.        I discussed with the patient (and/or patient's legal representative) the risks and benefits of the proposed anesthesia plan, General, which may include services performed by other anesthesia providers.    Alternative anesthesia plans, if available, were reviewed with the patient (and/or patient's legal representative). Discussion has been held with the patient (and/or patient's legal representative) regarding risks of anesthesia, which include Nausea, Vomiting, Headache, Hypotension, Sore Throat, Dental Injury and Allergic Reaction and emergent situations that may require change in anesthesia plan.    The patient (and/or patient's legal representative) has indicated understanding, his/her questions have been answered, and he/she wishes to proceed with the planned anesthetic.

## 2023-03-28 NOTE — PROGRESS NOTE ADULT - ASSESSMENT
Pt is a 85yo M w multiple PMH is admitted to acute rehab for debility secondary to anemia likely from myelodysplastic syndrome s/p PRBC.     *debility secondary to anemia, CKD, GIB, ?CML  Cont acute rehab  PT/OT  Stool guaiac   HH 7.1-7.7  Transfuse if Hg<7   Cont Folic Acid    *Leukocytosis  Asymptomatic  Will follow up CBC  ?reactive    *Afib   Rate controlled  Cont BB  Cont xarelto     *HTN  Stable  Cont norvasc, lasix     *Gout   Allopurinol  Indomethacin per pt PCP for 3 days  Pt does not response to steroid     *BPH  Cont Flomax     *DVT ppx   Cont xarelto     RRT called this morning, pt felt dizzy in the bathroom, brought back to bed, states "i'm much better now". no LOC, vitals stable, /78, HR 80s, pulses 98% on RA, RR 12-15   likely orthostatic changes. had large BM this morning. CBC stable. Attending to bill

## 2023-10-24 NOTE — PROGRESS NOTE ADULT - I WAS PHYSICALLY PRESENT FOR THE KEY PORTIONS OF THE EVALUATION AND MANAGEMENT (E/M) SERVICE PROVIDED.  I AGREE WITH THE ABOVE HISTORY, PHYSICAL, AND PLAN WHICH I HAVE REVIEWED AND EDITED WHERE APPROPRIATE
Statement Selected

## 2024-01-03 NOTE — PATIENT PROFILE ADULT - NSASFALLNEEDSASSISTWITH_GEN_A_NUR
[FreeTextEntry1] : Start medication as prescribed  Patient to continue with present medications.  Increase fluid intake.  RTO as needed    20 minutes was spent with patient reviewing findings/results during this visit. Ample time was provided to answer any questions or address concerns to the patients satisfaction..  toileting/walking/standing

## 2024-01-08 NOTE — ED ADULT NURSE NOTE - NS ED NURSE TRANSPORT WITH
Quality 110: Preventive Care And Screening: Influenza Immunization: Influenza Immunization not Administered because Patient Refused. Quality 47: Advance Care Plan: Advance Care Planning discussed and documented in the medical record; patient did not wish or was not able to name a surrogate decision maker or provide an advance care plan. Detail Level: Detailed Quality 111:Pneumonia Vaccination Status For Older Adults: Patient did not receive any pneumococcal conjugate or polysaccharide vaccine on or after their 60th birthday and before the end of the measurement period Quality 226: Preventive Care And Screening: Tobacco Use: Screening And Cessation Intervention: Patient screened for tobacco use and is an ex/non-smoker Quality 130: Documentation Of Current Medications In The Medical Record: Current Medications Documented pulse ox/Cardiac Monitor/Defib/ACLS/Rescue Kit/O2/BVM

## 2024-01-25 NOTE — DIETITIAN INITIAL EVALUATION ADULT. - OTHER INFO
84yr Old Male - Dx: Malaise - Initial Assessment - Regular Diet w/ Thin Liquids (Recommend Rocky 1 Packet BID), Denies Food Allergy, Tolerates Diet, No Chewing/Swallowing Complications (Per Pt), Consumes 75% of Meals, Stage 2 Pressure Ulcer on Scrotom & Left Heel, No Edema, No Recent N/V/D/C Patient

## 2024-07-23 NOTE — PATIENT PROFILE ADULT - NSPROGENOTHERPROVIDER_GEN_A_NUR
partial response, initially at 13cm, pulled back by surgery fellow to 8cm/post-procedure radiography performed/chest tube needs repositioning none

## 2024-12-26 NOTE — ED PROVIDER NOTE - GASTROINTESTINAL [-], MLM
[FreeTextEntry6] : fell down carpeted stairs yesterday cried right away  jeancarlos came and evaluated   nasal congestion  no vomiting/no abdominal pain/no nausea/no diarrhea

## 2025-01-15 NOTE — PROGRESS NOTE ADULT - GASTROINTESTINAL
Pt here for drug instillation to the bladder.    Regimen: Gemzar   Cycle/Day: #2 of 6     Advance Directives Filed: Yes      ECOG:   ECOG [01/15/25 0912]   ECOG Performance Status 0         Karla Ball NP is supervising clinician today.    Reviewed and verified Advanced Directives: Yes: Advance Directive is in chart     Nursing Assessment:   A focused nursing assessment addressing the toxicity of chemotherapy was performed and the patient reports the following:    Anxiety/Depression/Insomnia:  NO  Pain: NO    Toxicity Assessment    Adverse Events?  Adverse Events: No    Auditory/Ear  Assessment: Yes (Within Defined Limits)    Cardiac General  Assessment: Yes (Within Defined Limits)    Constitutional  Assessment: Yes (Within Defined Limits)    Dermatology/Skin  Assessment: Yes (Within Defined Limits)    Gastrointestinal  Assessment: Yes (Within Defined Limits)    Hemorrhage/Bleeding  Assessment: Yes (Within Defined Limits)    Musculoskeletal  Assessment: Yes (Within Defined Limits)    Neurology  Assessment: Yes (Within Defined Limits)    Pain  Assessment: Yes (Within Defined Limits)    Pulmonary/Upper Respiratory  Assessment: Yes (Within Defined Limits)    Genitourinary  Assessment: Yes (Within Defined Limits)      Pre-Treatment:  Treatment consent signed  Patient has valid pre-authorization  VS completed  Treatment parameters verified in patient protocol  Chemotherapy doses independently doubled checked & verified by two practitioners    Education: No new instructions needed chemotherapy/treatment medication symptoms and toxicities, waste precautions, information given and all questions answered. Patient instructed to void prior to procedure.    Treatment: Patient positioned and draped for procedure. A 16 fr catheter was inserted using sterile technique.     Patient identified by 2 caregivers using 2 identifiers at the bedside.    PPE donned, catheter clamped, and prescribed medication instilled per RN.Catheter  removed and patient retained drug in bladder and was instructed to empty bladder per orders and discarded as bulk chemotherapy waste.     Post Treatment: Treatment tolerated well; no adverse reaction Patient denies dysuria, pain, hematuria, fever, back pain.    Patient to monitor for: signs of infection, signs of bleeding, urinary pain, or signs of dehydration and report to MD.    Next appointment scheduled:   Future Appointments   Date Time Provider Department Center   1/21/2025  9:15 AM Brynn Laboy MD [facility]   1/22/2025  8:45 AM [facility] INFUSION RN [facility]   1/29/2025  8:45 AM [facility] INFUSION RN [facility]   2/5/2025  1:00 PM [facility] INFUSION RN [facility]   2/12/2025  8:45 AM [facility] INFUSION RN [facility]   3/10/2025 10:45 AM Tha Taylor MD [address]   4/15/2025  9:20 AM [facility]   4/22/2025  9:00 AM Linus Avalos MD [facility]       Patient instructed to call the office with any questions or concerns.    Patient Discharged: Pt discharged to home per self, ambulatory.             detailed exam

## 2025-06-18 NOTE — PROGRESS NOTE ADULT - ASSESSMENT
The closure device was inserted into the right femoral artery. HEALTH ISSUES - PROBLEM Dx:    Deconditioning  PT/OT per rehab    Anemia-multifactorial  s/p 1 pRBC on 3/22 w/ fever intra-transfusion  Cont b12  Hx rectal  bleeding , diverticula on CT.  GI saw pt on 3/20    Fever with leukocytosis(pt with h/o  Chronic myelomonocytic leukemia, upon revieweing records pt has has chronically elevated WBC as high as 30K's as far as back in one year ago in [facility])  DDx malignancy?  INfectious workup non impressive     Afib   Rate controlled  Cont BB  Cont xarelto     Presyncope  Orthostatic neg  Cont to hold lasix  was given prbc as thought to be sec to anemia/hypovolemia  if dizzy again-trial of holding norvasc    Gout   NSAIDs Dc for anemia, GI Bleed, acute/CKD  Exam NOT suggestive of gout. no strong indication, and fairly strong contraindication for indocin in this setting. Case disccussed at length with PMR team presents. After throrough discussion, Mr Youssef seems satisfied with continuing a fever and pain workup as long as we attempt to treat the pain. Consider a trial of Tramadol for severe pain. He has good relief w/ lidocaine patch. Consider lidocaine or capsaicin gel.  I will call PMD Dr Fierro to facilitate the process.     Hyperglycemia with a1c 7.0